# Patient Record
Sex: MALE | Race: WHITE | NOT HISPANIC OR LATINO | Employment: OTHER | URBAN - METROPOLITAN AREA
[De-identification: names, ages, dates, MRNs, and addresses within clinical notes are randomized per-mention and may not be internally consistent; named-entity substitution may affect disease eponyms.]

---

## 2017-03-27 ENCOUNTER — HOSPITAL ENCOUNTER (OUTPATIENT)
Dept: LAB | Facility: HOSPITAL | Age: 57
Setting detail: SPECIMEN
Discharge: HOME | End: 2017-03-27
Attending: INTERNAL MEDICINE | Admitting: INTERNAL MEDICINE

## 2017-03-27 LAB
ALBUMIN SERPL-MCNC: 3.7 G/DL (ref 3.5–4.8)
ALBUMIN/GLOB SERPL: 1 {RATIO} (ref 1–1.7)
ALP SERPL-CCNC: 97 IU/L (ref 32–91)
ALT SERPL-CCNC: 22 IU/L (ref 17–63)
ANION GAP SERPL CALC-SCNC: 15.2 MMOL/L (ref 10–20)
AST SERPL-CCNC: 18 IU/L (ref 15–41)
BILIRUB SERPL-MCNC: 0.5 MG/DL (ref 0.3–1.2)
BUN SERPL-MCNC: 20 MG/DL (ref 8–20)
BUN/CREAT SERPL: 18.2 (ref 6.2–20.3)
CALCIUM SERPL-MCNC: 10.5 MG/DL (ref 8.9–10.3)
CHLORIDE SERPL-SCNC: 98 MMOL/L (ref 101–111)
CHOLEST SERPL-MCNC: 310 MG/DL
CHOLEST/HDLC SERPL: 6.7 {RATIO}
CONV CO2: 28 MMOL/L (ref 22–32)
CONV LDL CHOLESTEROL DIRECT: 204 MG/DL (ref 0–100)
CONV MICROALBUM.,U,RANDOM: 310 MG/L
CONV TOTAL PROTEIN: 7.5 G/DL (ref 6.1–7.9)
CREAT 24H UR-MCNC: 58.5 MG/DL
CREAT UR-MCNC: 1.1 MG/DL (ref 0.7–1.2)
GLOBULIN UR ELPH-MCNC: 3.8 G/DL (ref 2.5–3.8)
GLUCOSE SERPL-MCNC: 268 MG/DL (ref 65–99)
HDLC SERPL-MCNC: 46 MG/DL
LDLC/HDLC SERPL: 4.4 {RATIO}
LIPID INTERPRETATION: ABNORMAL
MICROALBUMIN/CREAT UR: 529.9 UG/MG
POTASSIUM SERPL-SCNC: 4.2 MMOL/L (ref 3.6–5.1)
SODIUM SERPL-SCNC: 137 MMOL/L (ref 136–144)
TRIGL SERPL-MCNC: 370 MG/DL
TSH SERPL-ACNC: 2.47 UIU/ML (ref 0.34–5.6)
VLDLC SERPL CALC-MCNC: 60.3 MG/DL

## 2017-04-03 ENCOUNTER — CONVERSION ENCOUNTER (OUTPATIENT)
Dept: ENDOCRINOLOGY | Facility: CLINIC | Age: 57
End: 2017-04-03

## 2017-05-08 ENCOUNTER — CONVERSION ENCOUNTER (OUTPATIENT)
Dept: ENDOCRINOLOGY | Facility: CLINIC | Age: 57
End: 2017-05-08

## 2017-05-31 ENCOUNTER — CONVERSION ENCOUNTER (OUTPATIENT)
Dept: ENDOCRINOLOGY | Facility: CLINIC | Age: 57
End: 2017-05-31

## 2017-06-19 ENCOUNTER — CONVERSION ENCOUNTER (OUTPATIENT)
Dept: ENDOCRINOLOGY | Facility: CLINIC | Age: 57
End: 2017-06-19

## 2017-07-25 ENCOUNTER — HOSPITAL ENCOUNTER (OUTPATIENT)
Dept: LAB | Facility: HOSPITAL | Age: 57
Setting detail: SPECIMEN
Discharge: HOME | End: 2017-07-25
Attending: INTERNAL MEDICINE | Admitting: INTERNAL MEDICINE

## 2017-07-25 LAB
ALBUMIN SERPL-MCNC: 3.7 G/DL (ref 3.5–4.8)
ALBUMIN/GLOB SERPL: 0.9 {RATIO} (ref 1–1.7)
ALP SERPL-CCNC: 102 IU/L (ref 32–91)
ALT SERPL-CCNC: 34 IU/L (ref 17–63)
ANION GAP SERPL CALC-SCNC: 15.3 MMOL/L (ref 10–20)
AST SERPL-CCNC: 28 IU/L (ref 15–41)
BILIRUB SERPL-MCNC: 0.6 MG/DL (ref 0.3–1.2)
BUN SERPL-MCNC: 25 MG/DL (ref 8–20)
BUN/CREAT SERPL: 22.7 (ref 6.2–20.3)
CALCIUM SERPL-MCNC: 9.6 MG/DL (ref 8.9–10.3)
CHLORIDE SERPL-SCNC: 100 MMOL/L (ref 101–111)
CHOLEST SERPL-MCNC: 178 MG/DL
CHOLEST/HDLC SERPL: 4.8 {RATIO}
CONV CO2: 25 MMOL/L (ref 22–32)
CONV LDL CHOLESTEROL DIRECT: 89 MG/DL (ref 0–100)
CONV TOTAL PROTEIN: 7.7 G/DL (ref 6.1–7.9)
CREAT UR-MCNC: 1.1 MG/DL (ref 0.7–1.2)
GLOBULIN UR ELPH-MCNC: 4 G/DL (ref 2.5–3.8)
GLUCOSE SERPL-MCNC: 217 MG/DL (ref 65–99)
HDLC SERPL-MCNC: 37 MG/DL
LDLC/HDLC SERPL: 2.4 {RATIO}
LIPID INTERPRETATION: ABNORMAL
POTASSIUM SERPL-SCNC: 4.3 MMOL/L (ref 3.6–5.1)
SODIUM SERPL-SCNC: 136 MMOL/L (ref 136–144)
TRIGL SERPL-MCNC: 333 MG/DL
VLDLC SERPL CALC-MCNC: 52.3 MG/DL

## 2017-07-31 ENCOUNTER — CONVERSION ENCOUNTER (OUTPATIENT)
Dept: ENDOCRINOLOGY | Facility: CLINIC | Age: 57
End: 2017-07-31

## 2017-08-01 ENCOUNTER — CONVERSION ENCOUNTER (OUTPATIENT)
Dept: ENDOCRINOLOGY | Facility: CLINIC | Age: 57
End: 2017-08-01

## 2017-11-29 ENCOUNTER — CONVERSION ENCOUNTER (OUTPATIENT)
Dept: ENDOCRINOLOGY | Facility: CLINIC | Age: 57
End: 2017-11-29

## 2018-03-22 ENCOUNTER — CONVERSION ENCOUNTER (OUTPATIENT)
Dept: ENDOCRINOLOGY | Facility: CLINIC | Age: 58
End: 2018-03-22

## 2018-06-06 ENCOUNTER — HOSPITAL ENCOUNTER (OUTPATIENT)
Dept: LAB | Facility: HOSPITAL | Age: 58
Setting detail: SPECIMEN
Discharge: HOME | End: 2018-06-06
Attending: INTERNAL MEDICINE | Admitting: INTERNAL MEDICINE

## 2018-06-13 ENCOUNTER — CONVERSION ENCOUNTER (OUTPATIENT)
Dept: ENDOCRINOLOGY | Facility: CLINIC | Age: 58
End: 2018-06-13

## 2018-08-31 ENCOUNTER — HOSPITAL ENCOUNTER (OUTPATIENT)
Dept: WOUND CARE | Facility: HOSPITAL | Age: 58
Discharge: HOME | End: 2018-08-31
Attending: NURSE PRACTITIONER | Admitting: NURSE PRACTITIONER

## 2018-09-07 ENCOUNTER — HOSPITAL ENCOUNTER (OUTPATIENT)
Dept: WOUND CARE | Facility: HOSPITAL | Age: 58
Discharge: HOME | End: 2018-09-07
Attending: NURSE PRACTITIONER | Admitting: NURSE PRACTITIONER

## 2018-09-14 ENCOUNTER — HOSPITAL ENCOUNTER (OUTPATIENT)
Dept: WOUND CARE | Facility: HOSPITAL | Age: 58
Discharge: HOME | End: 2018-09-14
Attending: NURSE PRACTITIONER | Admitting: NURSE PRACTITIONER

## 2018-09-21 ENCOUNTER — HOSPITAL ENCOUNTER (OUTPATIENT)
Dept: WOUND CARE | Facility: HOSPITAL | Age: 58
Discharge: HOME | End: 2018-09-21
Attending: NURSE PRACTITIONER | Admitting: NURSE PRACTITIONER

## 2018-10-05 ENCOUNTER — HOSPITAL ENCOUNTER (OUTPATIENT)
Dept: WOUND CARE | Facility: HOSPITAL | Age: 58
Discharge: HOME | End: 2018-10-05
Attending: NURSE PRACTITIONER | Admitting: NURSE PRACTITIONER

## 2018-10-26 ENCOUNTER — HOSPITAL ENCOUNTER (OUTPATIENT)
Dept: WOUND CARE | Facility: HOSPITAL | Age: 58
Discharge: HOME | End: 2018-10-26
Attending: NURSE PRACTITIONER | Admitting: NURSE PRACTITIONER

## 2018-11-09 ENCOUNTER — HOSPITAL ENCOUNTER (OUTPATIENT)
Dept: WOUND CARE | Facility: HOSPITAL | Age: 58
Discharge: HOME | End: 2018-11-09
Attending: NURSE PRACTITIONER | Admitting: NURSE PRACTITIONER

## 2018-11-23 ENCOUNTER — HOSPITAL ENCOUNTER (OUTPATIENT)
Dept: WOUND CARE | Facility: HOSPITAL | Age: 58
Discharge: HOME | End: 2018-11-23
Attending: NURSE PRACTITIONER | Admitting: NURSE PRACTITIONER

## 2019-01-02 ENCOUNTER — HOSPITAL ENCOUNTER (OUTPATIENT)
Dept: LAB | Facility: HOSPITAL | Age: 59
Setting detail: SPECIMEN
Discharge: HOME | End: 2019-01-02
Attending: INTERNAL MEDICINE | Admitting: INTERNAL MEDICINE

## 2019-01-02 LAB
ALBUMIN SERPL-MCNC: 3.4 G/DL (ref 3.5–4.8)
ALBUMIN/GLOB SERPL: 0.9 {RATIO} (ref 1–1.7)
ALP SERPL-CCNC: 125 IU/L (ref 32–91)
ALT SERPL-CCNC: 24 IU/L (ref 17–63)
ANION GAP SERPL CALC-SCNC: 22.5 MMOL/L (ref 10–20)
AST SERPL-CCNC: 32 IU/L (ref 15–41)
BILIRUB SERPL-MCNC: 0.2 MG/DL (ref 0.3–1.2)
BUN SERPL-MCNC: 45 MG/DL (ref 8–20)
BUN/CREAT SERPL: 25 (ref 6.2–20.3)
CALCIUM SERPL-MCNC: 9.8 MG/DL (ref 8.9–10.3)
CHLORIDE SERPL-SCNC: 87 MMOL/L (ref 101–111)
CHOLEST SERPL-MCNC: 179 MG/DL
CHOLEST/HDLC SERPL: 4.9 {RATIO}
CONV CO2: 24 MMOL/L (ref 22–32)
CONV LDL CHOLESTEROL DIRECT: 76 MG/DL (ref 0–100)
CONV MICROALBUM.,U,RANDOM: 729 MG/L
CONV TOTAL PROTEIN: 7.1 G/DL (ref 6.1–7.9)
CREAT 24H UR-MCNC: 48.4 MG/DL
CREAT UR-MCNC: 1.8 MG/DL (ref 0.7–1.2)
GLOBULIN UR ELPH-MCNC: 3.7 G/DL (ref 2.5–3.8)
GLUCOSE SERPL-MCNC: ABNORMAL MG/DL (ref 65–99)
HDLC SERPL-MCNC: 37 MG/DL
LDLC/HDLC SERPL: 2.1 {RATIO}
LIPID INTERPRETATION: ABNORMAL
MICROALBUMIN/CREAT UR: 1506.2 UG/MG
POTASSIUM SERPL-SCNC: 4.5 MMOL/L (ref 3.6–5.1)
SODIUM SERPL-SCNC: 129 MMOL/L (ref 136–144)
TRIGL SERPL-MCNC: 712 MG/DL
VLDLC SERPL CALC-MCNC: 66.6 MG/DL

## 2019-01-09 ENCOUNTER — CONVERSION ENCOUNTER (OUTPATIENT)
Dept: ENDOCRINOLOGY | Facility: CLINIC | Age: 59
End: 2019-01-09

## 2019-04-02 ENCOUNTER — HOSPITAL ENCOUNTER (OUTPATIENT)
Dept: LAB | Facility: HOSPITAL | Age: 59
Setting detail: SPECIMEN
Discharge: HOME | End: 2019-04-02
Attending: INTERNAL MEDICINE | Admitting: INTERNAL MEDICINE

## 2019-04-02 LAB
ALBUMIN SERPL-MCNC: 3.6 G/DL (ref 3.5–4.8)
ALBUMIN/GLOB SERPL: 0.9 {RATIO} (ref 1–1.7)
ALP SERPL-CCNC: 103 IU/L (ref 32–91)
ALT SERPL-CCNC: 42 IU/L (ref 17–63)
ANION GAP SERPL CALC-SCNC: 17.2 MMOL/L (ref 10–20)
AST SERPL-CCNC: 32 IU/L (ref 15–41)
BILIRUB SERPL-MCNC: 0.7 MG/DL (ref 0.3–1.2)
BUN SERPL-MCNC: 24 MG/DL (ref 8–20)
BUN/CREAT SERPL: 20 (ref 6.2–20.3)
CALCIUM SERPL-MCNC: 9.8 MG/DL (ref 8.9–10.3)
CHLORIDE SERPL-SCNC: 97 MMOL/L (ref 101–111)
CHOLEST SERPL-MCNC: 173 MG/DL
CHOLEST/HDLC SERPL: 4.8 {RATIO}
CONV CO2: 25 MMOL/L (ref 22–32)
CONV LDL CHOLESTEROL DIRECT: 96 MG/DL (ref 0–100)
CONV TOTAL PROTEIN: 7.5 G/DL (ref 6.1–7.9)
CREAT UR-MCNC: 1.2 MG/DL (ref 0.7–1.2)
GLOBULIN UR ELPH-MCNC: 3.9 G/DL (ref 2.5–3.8)
GLUCOSE SERPL-MCNC: 155 MG/DL (ref 65–99)
HDLC SERPL-MCNC: 36 MG/DL
LDLC/HDLC SERPL: 2.6 {RATIO}
LIPID INTERPRETATION: ABNORMAL
POTASSIUM SERPL-SCNC: 4.2 MMOL/L (ref 3.6–5.1)
SODIUM SERPL-SCNC: 135 MMOL/L (ref 136–144)
TRIGL SERPL-MCNC: 363 MG/DL
VLDLC SERPL CALC-MCNC: 40.9 MG/DL

## 2019-04-09 ENCOUNTER — CONVERSION ENCOUNTER (OUTPATIENT)
Dept: ENDOCRINOLOGY | Facility: CLINIC | Age: 59
End: 2019-04-09

## 2019-05-15 ENCOUNTER — INPATIENT HOSPITAL (AMBULATORY)
Dept: URBAN - METROPOLITAN AREA HOSPITAL 84 | Facility: HOSPITAL | Age: 59
End: 2019-05-15
Payer: COMMERCIAL

## 2019-05-15 ENCOUNTER — INPATIENT HOSPITAL (AMBULATORY)
Dept: URBAN - METROPOLITAN AREA HOSPITAL 84 | Facility: HOSPITAL | Age: 59
End: 2019-05-15

## 2019-05-15 DIAGNOSIS — R93.2 ABNORMAL FINDINGS ON DIAGNOSTIC IMAGING OF LIVER AND BILIARY: ICD-10-CM

## 2019-05-15 DIAGNOSIS — K29.60 OTHER GASTRITIS WITHOUT BLEEDING: ICD-10-CM

## 2019-05-15 DIAGNOSIS — R74.8 ABNORMAL LEVELS OF OTHER SERUM ENZYMES: ICD-10-CM

## 2019-05-15 DIAGNOSIS — K21.0 GASTRO-ESOPHAGEAL REFLUX DISEASE WITH ESOPHAGITIS: ICD-10-CM

## 2019-05-15 DIAGNOSIS — K31.84 GASTROPARESIS: ICD-10-CM

## 2019-05-15 DIAGNOSIS — R11.2 NAUSEA WITH VOMITING, UNSPECIFIED: ICD-10-CM

## 2019-05-15 PROCEDURE — 99252 IP/OBS CONSLTJ NEW/EST SF 35: CPT | Mod: 25 | Performed by: NURSE PRACTITIONER

## 2019-05-15 PROCEDURE — 43239 EGD BIOPSY SINGLE/MULTIPLE: CPT | Performed by: INTERNAL MEDICINE

## 2019-05-16 ENCOUNTER — INPATIENT HOSPITAL (AMBULATORY)
Dept: URBAN - METROPOLITAN AREA HOSPITAL 84 | Facility: HOSPITAL | Age: 59
End: 2019-05-16

## 2019-05-16 DIAGNOSIS — R74.8 ABNORMAL LEVELS OF OTHER SERUM ENZYMES: ICD-10-CM

## 2019-05-16 DIAGNOSIS — R11.2 NAUSEA WITH VOMITING, UNSPECIFIED: ICD-10-CM

## 2019-05-16 DIAGNOSIS — R93.2 ABNORMAL FINDINGS ON DIAGNOSTIC IMAGING OF LIVER AND BILIARY: ICD-10-CM

## 2019-05-16 DIAGNOSIS — K29.60 OTHER GASTRITIS WITHOUT BLEEDING: ICD-10-CM

## 2019-05-16 DIAGNOSIS — K31.84 GASTROPARESIS: ICD-10-CM

## 2019-05-16 PROCEDURE — 99232 SBSQ HOSP IP/OBS MODERATE 35: CPT | Performed by: NURSE PRACTITIONER

## 2019-05-23 ENCOUNTER — ON CAMPUS - OUTPATIENT (AMBULATORY)
Dept: URBAN - METROPOLITAN AREA HOSPITAL 85 | Facility: HOSPITAL | Age: 59
End: 2019-05-23

## 2019-05-23 DIAGNOSIS — I25.10 ATHEROSCLEROTIC HEART DISEASE OF NATIVE CORONARY ARTERY WITH: ICD-10-CM

## 2019-05-23 DIAGNOSIS — E11.9 TYPE 2 DIABETES MELLITUS WITHOUT COMPLICATIONS: ICD-10-CM

## 2019-05-23 DIAGNOSIS — R79.89 OTHER SPECIFIED ABNORMAL FINDINGS OF BLOOD CHEMISTRY: ICD-10-CM

## 2019-05-23 DIAGNOSIS — K21.9 GASTRO-ESOPHAGEAL REFLUX DISEASE WITHOUT ESOPHAGITIS: ICD-10-CM

## 2019-05-23 DIAGNOSIS — Z95.5 PRESENCE OF CORONARY ANGIOPLASTY IMPLANT AND GRAFT: ICD-10-CM

## 2019-05-23 DIAGNOSIS — R11.2 NAUSEA WITH VOMITING, UNSPECIFIED: ICD-10-CM

## 2019-05-23 PROCEDURE — 99242 OFF/OP CONSLTJ NEW/EST SF 20: CPT | Performed by: NURSE PRACTITIONER

## 2019-06-04 VITALS
DIASTOLIC BLOOD PRESSURE: 84 MMHG | BODY MASS INDEX: 26.39 KG/M2 | DIASTOLIC BLOOD PRESSURE: 85 MMHG | BODY MASS INDEX: 26.39 KG/M2 | SYSTOLIC BLOOD PRESSURE: 117 MMHG | BODY MASS INDEX: 27.77 KG/M2 | HEART RATE: 112 BPM | HEIGHT: 73 IN | BODY MASS INDEX: 27.05 KG/M2 | HEART RATE: 99 BPM | HEART RATE: 72 BPM | SYSTOLIC BLOOD PRESSURE: 130 MMHG | WEIGHT: 209.5 LBS | DIASTOLIC BLOOD PRESSURE: 84 MMHG | SYSTOLIC BLOOD PRESSURE: 124 MMHG | HEIGHT: 73 IN | WEIGHT: 200 LBS | HEART RATE: 105 BPM | SYSTOLIC BLOOD PRESSURE: 122 MMHG | SYSTOLIC BLOOD PRESSURE: 136 MMHG | HEIGHT: 73 IN | WEIGHT: 195 LBS | SYSTOLIC BLOOD PRESSURE: 110 MMHG | OXYGEN SATURATION: 98 % | DIASTOLIC BLOOD PRESSURE: 78 MMHG | DIASTOLIC BLOOD PRESSURE: 90 MMHG | DIASTOLIC BLOOD PRESSURE: 72 MMHG | WEIGHT: 201 LBS | DIASTOLIC BLOOD PRESSURE: 80 MMHG | RESPIRATION RATE: 16 BRPM | WEIGHT: 200 LBS | HEIGHT: 73 IN | BODY MASS INDEX: 27.31 KG/M2 | DIASTOLIC BLOOD PRESSURE: 84 MMHG | SYSTOLIC BLOOD PRESSURE: 96 MMHG | DIASTOLIC BLOOD PRESSURE: 72 MMHG | HEART RATE: 76 BPM | WEIGHT: 212 LBS | BODY MASS INDEX: 27.43 KG/M2 | BODY MASS INDEX: 28.1 KG/M2 | WEIGHT: 185 LBS | WEIGHT: 200 LBS | OXYGEN SATURATION: 96 % | SYSTOLIC BLOOD PRESSURE: 119 MMHG | DIASTOLIC BLOOD PRESSURE: 80 MMHG | HEART RATE: 103 BPM | HEIGHT: 73 IN | OXYGEN SATURATION: 97 % | SYSTOLIC BLOOD PRESSURE: 125 MMHG | SYSTOLIC BLOOD PRESSURE: 125 MMHG | WEIGHT: 207 LBS | OXYGEN SATURATION: 97 % | BODY MASS INDEX: 26.64 KG/M2 | RESPIRATION RATE: 16 BRPM | HEART RATE: 94 BPM | SYSTOLIC BLOOD PRESSURE: 130 MMHG | HEART RATE: 69 BPM | RESPIRATION RATE: 18 BRPM | BODY MASS INDEX: 26.51 KG/M2 | WEIGHT: 207 LBS | RESPIRATION RATE: 16 BRPM | HEART RATE: 66 BPM | OXYGEN SATURATION: 99 % | HEART RATE: 103 BPM | DIASTOLIC BLOOD PRESSURE: 73 MMHG | WEIGHT: 205 LBS

## 2019-06-28 DIAGNOSIS — E55.9 VITAMIN D DEFICIENCY: ICD-10-CM

## 2019-06-28 DIAGNOSIS — I10 HYPERTENSION, UNSPECIFIED TYPE: ICD-10-CM

## 2019-06-28 DIAGNOSIS — E78.2 HYPERLIPIDEMIA, MIXED: Primary | ICD-10-CM

## 2019-06-28 DIAGNOSIS — E11.49 TYPE 2 DIABETES MELLITUS WITH OTHER DIABETIC NEUROLOGICAL COMPLICATION (HCC): ICD-10-CM

## 2019-06-28 PROBLEM — R05.3 CHRONIC COUGH: Status: ACTIVE | Noted: 2018-03-22

## 2019-06-28 PROBLEM — I25.10 ATHEROSCLEROTIC HEART DISEASE OF NATIVE CORONARY ARTERY WITHOUT ANGINA PECTORIS: Status: ACTIVE | Noted: 2017-05-08

## 2019-06-28 PROBLEM — Z95.2 HISTORY OF PROSTHETIC HEART VALVE: Status: ACTIVE | Noted: 2017-05-08

## 2019-06-28 PROBLEM — L97.509 DIABETIC FOOT ULCER: Status: ACTIVE | Noted: 2019-04-09

## 2019-06-28 PROBLEM — T78.40XA ALLERGIC STATE: Status: ACTIVE | Noted: 2019-06-28

## 2019-06-28 PROBLEM — R11.2 NAUSEA AND VOMITING: Status: ACTIVE | Noted: 2019-05-14

## 2019-06-28 PROBLEM — E11.65 TYPE 2 DIABETES MELLITUS WITH HYPERGLYCEMIA (HCC): Status: ACTIVE | Noted: 2018-06-13

## 2019-06-28 PROBLEM — J45.909 ASTHMA: Status: ACTIVE | Noted: 2018-03-22

## 2019-06-28 PROBLEM — M53.80 OTHER SPECIFIED DORSOPATHIES, SITE UNSPECIFIED: Status: ACTIVE | Noted: 2019-06-28

## 2019-06-28 PROBLEM — M19.90 DEGENERATIVE JOINT DISEASE: Status: ACTIVE | Noted: 2019-06-28

## 2019-06-28 PROBLEM — E11.621 DIABETIC FOOT ULCER (HCC): Status: ACTIVE | Noted: 2019-04-09

## 2019-07-02 ENCOUNTER — LAB (OUTPATIENT)
Dept: LAB | Facility: HOSPITAL | Age: 59
End: 2019-07-02

## 2019-07-02 DIAGNOSIS — E11.49 TYPE 2 DIABETES MELLITUS WITH OTHER DIABETIC NEUROLOGICAL COMPLICATION (HCC): ICD-10-CM

## 2019-07-02 DIAGNOSIS — E55.9 VITAMIN D DEFICIENCY: ICD-10-CM

## 2019-07-02 DIAGNOSIS — E78.2 HYPERLIPIDEMIA, MIXED: ICD-10-CM

## 2019-07-02 DIAGNOSIS — I10 HYPERTENSION, UNSPECIFIED TYPE: ICD-10-CM

## 2019-07-02 LAB
ALBUMIN SERPL-MCNC: 3.7 G/DL (ref 3.5–4.8)
ALBUMIN/GLOB SERPL: 1 G/DL (ref 1–1.7)
ALP SERPL-CCNC: 94 U/L (ref 32–91)
ALT SERPL W P-5'-P-CCNC: 29 U/L (ref 17–63)
ANION GAP SERPL CALCULATED.3IONS-SCNC: 19.6 MMOL/L (ref 10–20)
ARTICHOKE IGE QN: 127 MG/DL (ref 0–100)
AST SERPL-CCNC: 26 U/L (ref 15–41)
BILIRUB SERPL-MCNC: 0.7 MG/DL (ref 0.3–1.2)
BUN BLD-MCNC: 12 MG/DL (ref 8–20)
BUN/CREAT SERPL: 9.2 (ref 6.2–20.3)
CALCIUM SPEC-SCNC: 9.8 MG/DL (ref 8.9–10.3)
CHLORIDE SERPL-SCNC: 100 MMOL/L (ref 101–111)
CHOLEST SERPL-MCNC: 214 MG/DL
CO2 SERPL-SCNC: 22 MMOL/L (ref 22–32)
CREAT BLD-MCNC: 1.3 MG/DL (ref 0.7–1.2)
GFR SERPL CREATININE-BSD FRML MDRD: 56 ML/MIN/1.73
GLOBULIN UR ELPH-MCNC: 3.7 GM/DL (ref 2.5–3.8)
GLUCOSE BLD-MCNC: 116 MG/DL (ref 65–99)
HBA1C MFR BLD: 8.2 % (ref 3.5–5.6)
HDLC SERPL QL: 5.49
HDLC SERPL-MCNC: 39 MG/DL
LDLC/HDLC SERPL: 2.57 {RATIO}
POTASSIUM BLD-SCNC: 4.6 MMOL/L (ref 3.6–5.1)
PROT SERPL-MCNC: 7.4 G/DL (ref 6.1–7.9)
SODIUM BLD-SCNC: 137 MMOL/L (ref 136–144)
TRIGL SERPL-MCNC: 374 MG/DL
VLDLC SERPL-MCNC: 74.8 MG/DL

## 2019-07-02 PROCEDURE — 80053 COMPREHEN METABOLIC PANEL: CPT | Performed by: INTERNAL MEDICINE

## 2019-07-02 PROCEDURE — 80061 LIPID PANEL: CPT | Performed by: INTERNAL MEDICINE

## 2019-07-02 PROCEDURE — 83036 HEMOGLOBIN GLYCOSYLATED A1C: CPT | Performed by: INTERNAL MEDICINE

## 2019-07-02 PROCEDURE — 36415 COLL VENOUS BLD VENIPUNCTURE: CPT

## 2019-07-02 PROCEDURE — 82306 VITAMIN D 25 HYDROXY: CPT | Performed by: INTERNAL MEDICINE

## 2019-07-03 LAB — 25(OH)D3 SERPL-MCNC: 20.4 NG/ML (ref 30–100)

## 2019-07-10 ENCOUNTER — OFFICE VISIT (OUTPATIENT)
Dept: ENDOCRINOLOGY | Facility: CLINIC | Age: 59
End: 2019-07-10

## 2019-07-10 VITALS
WEIGHT: 205 LBS | HEIGHT: 73 IN | OXYGEN SATURATION: 97 % | SYSTOLIC BLOOD PRESSURE: 122 MMHG | BODY MASS INDEX: 27.17 KG/M2 | HEART RATE: 110 BPM | DIASTOLIC BLOOD PRESSURE: 68 MMHG

## 2019-07-10 DIAGNOSIS — E11.49 TYPE 2 DIABETES MELLITUS WITH OTHER DIABETIC NEUROLOGICAL COMPLICATION (HCC): Primary | ICD-10-CM

## 2019-07-10 DIAGNOSIS — E78.2 HYPERLIPIDEMIA, MIXED: ICD-10-CM

## 2019-07-10 DIAGNOSIS — E55.9 VITAMIN D DEFICIENCY: ICD-10-CM

## 2019-07-10 LAB — GLUCOSE BLDC GLUCOMTR-MCNC: 215 MG/DL (ref 70–130)

## 2019-07-10 PROCEDURE — 99214 OFFICE O/P EST MOD 30 MIN: CPT | Performed by: INTERNAL MEDICINE

## 2019-07-10 PROCEDURE — 82962 GLUCOSE BLOOD TEST: CPT | Performed by: INTERNAL MEDICINE

## 2019-07-10 RX ORDER — LANCETS 33 GAUGE
EACH MISCELLANEOUS
COMMUNITY
Start: 2017-04-03 | End: 2020-07-14 | Stop reason: CLARIF

## 2019-07-10 RX ORDER — METOPROLOL TARTRATE 50 MG/1
50 TABLET, FILM COATED ORAL 2 TIMES DAILY
Refills: 11 | COMMUNITY
Start: 2019-06-25 | End: 2019-11-26 | Stop reason: SDUPTHER

## 2019-07-10 RX ORDER — ATORVASTATIN CALCIUM 20 MG/1
20 TABLET, FILM COATED ORAL EVERY EVENING
Refills: 12 | COMMUNITY
Start: 2019-04-26 | End: 2019-07-19 | Stop reason: SDUPTHER

## 2019-07-10 RX ORDER — FLURBIPROFEN SODIUM 0.3 MG/ML
SOLUTION/ DROPS OPHTHALMIC
Refills: 3 | COMMUNITY
Start: 2019-04-10 | End: 2020-04-20 | Stop reason: SDUPTHER

## 2019-07-10 RX ORDER — DAPAGLIFLOZIN 10 MG/1
1 TABLET, FILM COATED ORAL
Refills: 4 | COMMUNITY
Start: 2019-07-04 | End: 2020-03-10

## 2019-07-10 RX ORDER — OMEPRAZOLE 40 MG/1
40 CAPSULE, DELAYED RELEASE ORAL 2 TIMES DAILY
Refills: 3 | COMMUNITY
Start: 2019-06-25 | End: 2019-09-22 | Stop reason: SDUPTHER

## 2019-07-10 RX ORDER — METOCLOPRAMIDE 10 MG/1
10 TABLET ORAL 3 TIMES DAILY
Refills: 6 | COMMUNITY
Start: 2019-06-30 | End: 2020-07-06

## 2019-07-10 NOTE — PATIENT INSTRUCTIONS
Restart otc vit D 5,000 units daily.  Increase Novolin 70/30 to 64 units before suppe r& bedtime.  Call if blood sugars are running under 100 or over 200.  F/u in 6 months, with labs prior.

## 2019-07-11 NOTE — PROGRESS NOTES
[facility]        Patient Care Team:  Al Kimbrough MD as PCP - General  Al Kimbrough MD as PCP - Family Medicine    Chief Complaint:    Chief Complaint   Patient presents with   • Diabetes     type 2         Subjective   Here for diabetes f/u  Blood sugars higher @ bedtime  Problem with the cost of insulin. Taking [employer] brand now  Hospitalized in June with nausea  Still taking metformin. Off vit D since hospital stay  Exercise program: walking    Interval History:     Patient Complaints: nausea better, but still there  Patient Denies:  hypoglycemia  History taken from: patient    Review of Systems:   Review of Systems   Eyes: Negative for blurred vision.   Gastrointestinal: Positive for nausea.   Endocrine: Negative for polyuria.   Neurological: Negative for headache.     Lost  4 lb since last visit    Objective     Vital Signs  Heart Rate:  [110] 110  BP: (122)/(68) 122/68    Physical Exam:     General Appearance:    Alert, cooperative, in no acute distress   Head:    Normocephalic, without obvious abnormality, atraumatic   Eyes:            Lids and lashes normal, conjunctivae and sclerae normal, no   icterus, no pallor, corneas clear, PERRLA   Throat:   No oral lesions,  oral mucosa moist   Neck:   No adenopathy, supple,  no thyromegaly, no   carotid bruit   Lungs:     Clear    Heart:    Regular rhythm and normal rate   Chest Wall:    No abnormalities observed   Abdomen:     Normal bowel sounds, soft                 Extremities:   Moves all extremities well, no edema, hammer toes, plantar calluses               Pulses:   Pulses palpable and equal bilaterally   Skin:   Dry.    Neurologic:  DTR absent, able to feel the 10g monofilament          Results Review:    I have reviewed the patient's new clinical results, labs & imaging.    Medication Review:   Prior to Admission medications    Medication Sig Start Date End Date Taking? Authorizing Provider   atorvastatin (LIPITOR) 20  MG tablet Take 20 mg by mouth Every Evening. 1 daily 4/26/19  Yes Jaylene Bergre MD B-D UF III MINI PEN NEEDLES 31G X 5 MM misc USE WITH INSULIN PEN 4 TIMES A DAY DX E11.65 4/10/19  Yes Jaylene Berger MD   Cholecalciferol 5000 units tablet Take  by mouth. One daily   Yes Jaylene Berger MD   FARXIGA 10 MG tablet Take 1 tablet by mouth Every Morning Before Breakfast. 1 daily 7/4/19  Yes Jaylene Berger MD   glucose blood (ONE TOUCH ULTRA TEST) test strip ONETOUCH ULTRA BLUE STRP 11/4/18  Yes Jaylene Berger MD   Insulin NPH Isophane & Regular (NOVOLIN 70/30 FLEXPEN RELION) (70-30) 100 UNIT/ML suspension pen-injector Inject 60 Units under the skin into the appropriate area as directed 4 (Four) Times a Day. 5/10/18  Yes Jaylene Berger MD   Insulin Pen Needle (B-D UF III MINI PEN NEEDLES) 31G X 5 MM misc BD PEN NEEDLE MINI U/F 31G X 5 MM 3/9/15  Yes Jaylene Berger MD   metoclopramide (REGLAN) 10 MG tablet Take one three times daily before meals 6/30/19  Yes Jaylene Berger MD   metoprolol tartrate (LOPRESSOR) 50 MG tablet Take 50 mg by mouth 2 (Two) Times a Day. Take one twice daily 6/25/19  Yes Jaylene Berger MD   omeprazole (priLOSEC) 40 MG capsule Take 40 mg by mouth 2 (Two) Times a Day. Twice daily 6/25/19  Yes Provider, Historical, MD   ONETOUCH DELICA LANCETS 33G misc KATERINE DELICA LANCETS 33G 4/3/17  Yes Jaylene Berger MD       Lab Results (most recent)     Procedure Component Value Units Date/Time    POC Glucose Fingerstick [[id number]]  (Abnormal) Collected:  07/10/19 1247    Specimen:  Blood Updated:  07/10/19 1247     Glucose 215 mg/dL      Comment: ate@ 9pm last night, 16 hours ago, insulin@ 10am this morning               Lab Results   Component Value Date    HGBA1C 8.2 (H) 07/02/2019    HGBA1C 7.4 (H) 05/23/2019    HGBA1C 7.1 (H) 05/15/2019      Lab Results   Component Value Date    GLUCOSE 116 (H) 07/02/2019    BUN 12 07/02/2019     CREATININE 1.30 (H) 07/02/2019    EGFRIFNONA 56 (L) 07/02/2019    BCR 9.2 07/02/2019    K 4.6 07/02/2019    CO2 22.0 07/02/2019    CALCIUM 9.8 07/02/2019    ALBUMIN 3.70 07/02/2019    LABIL2 0.8 (L) 05/26/2019    AST 26 07/02/2019    ALT 29 07/02/2019    CHOL 214 (H) 07/02/2019     (H) 07/02/2019    HDL 39 07/02/2019    TRIG 374 (H) 07/02/2019     Lab Results   Component Value Date    TSH 2.47 03/27/2017    [id number] 20.4 (L) 07/02/2019       Assessment/Plan     Chao was seen today for diabetes.    Diagnoses and all orders for this visit:    Type 2 diabetes mellitus with other diabetic neurological complication (CMS/[facility])  -     POC Glucose Fingerstick  -     Hemoglobin A1c; Future    Vitamin D deficiency  -     Vitamin D 25 Hydroxy; Future    Hyperlipidemia, mixed      Restart otc vit D 5,000 units daily.  Increase Novolin 70/30 to 64 units before supper & bedtime.  Decrease metformin to one @ breakfast & supper to see if helps decrease nausea.  Call if blood sugars are running under 100 or over 200.        Radha Manriquez MD  07/10/19  11:04 PM

## 2019-07-15 ENCOUNTER — OFFICE VISIT (OUTPATIENT)
Dept: FAMILY MEDICINE CLINIC | Facility: CLINIC | Age: 59
End: 2019-07-15

## 2019-07-15 VITALS
DIASTOLIC BLOOD PRESSURE: 78 MMHG | RESPIRATION RATE: 20 BRPM | OXYGEN SATURATION: 97 % | HEART RATE: 100 BPM | SYSTOLIC BLOOD PRESSURE: 114 MMHG | WEIGHT: 206.8 LBS | HEIGHT: 73 IN | BODY MASS INDEX: 27.41 KG/M2 | TEMPERATURE: 98.1 F

## 2019-07-15 DIAGNOSIS — K31.84 GASTROPARESIS: Primary | ICD-10-CM

## 2019-07-15 DIAGNOSIS — E11.40 TYPE 2 DIABETES MELLITUS WITH DIABETIC NEUROPATHY, WITH LONG-TERM CURRENT USE OF INSULIN (HCC): ICD-10-CM

## 2019-07-15 DIAGNOSIS — Z79.4 TYPE 2 DIABETES MELLITUS WITH DIABETIC NEUROPATHY, WITH LONG-TERM CURRENT USE OF INSULIN (HCC): ICD-10-CM

## 2019-07-15 PROCEDURE — 99213 OFFICE O/P EST LOW 20 MIN: CPT | Performed by: FAMILY MEDICINE

## 2019-07-15 RX ORDER — ONDANSETRON HYDROCHLORIDE 8 MG/1
8 TABLET, FILM COATED ORAL EVERY 8 HOURS PRN
Qty: 90 TABLET | Refills: 1 | Status: SHIPPED | OUTPATIENT
Start: 2019-07-15 | End: 2019-08-14

## 2019-07-15 NOTE — ASSESSMENT & PLAN NOTE
Diabetes is unchanged.   Continue current treatment regimen.  Dietary recommendations for ADA diet.  Regular aerobic exercise.  Diabetes will be reassessed in 1 year     Patient followed by Dr. Peace for his diabetes.  Last A1c was around 8%.  Continue to try and improve on this weekly..

## 2019-07-15 NOTE — ASSESSMENT & PLAN NOTE
Patient will move his Reglan to early morning with the first dose being somewhere around 5:56 in the morning when he gets up to take his insulin.  His next dose to be before his first meal of the day which is usually around noon.  His other 2 doses will be before his evening meal and also then again at bedtime.  If this seems to help we will possibly drop some of the evening doses back.

## 2019-07-15 NOTE — PROGRESS NOTES
Rooming Tab(CC,VS,Pt Hx,Fall Screen)  Chief Complaint   Patient presents with   • GI Problem     6 week f/u of gastroparesis       Subjective   Patient is here for follow-up on gastroparesis.  He has diabetes.  After his last visit we started him on Reglan 10 mg before meals and at bedtime.  He states he is better but not completely back to normal yet.  He is wondering if maybe Zofran would help.  We were going to use that the last time and forgot to get him his prescription for it.  I have reviewed and updated his medications, medical history and problem list during today's office visit.     Patient Care Team:  Al Kimbrough MD as PCP - General  Al Kimbrough MD as PCP - Family Medicine    Problem List Tab  Medications Tab  Synopsis Tab  Chart Review Tab  Care Everywhere Tab  Immunizations Tab  Patient History Tab    Social History     Tobacco Use   • Smoking status: Former Smoker   Substance Use Topics   • Alcohol use: Yes     Comment: occasionally       Review of Systems   Constitutional: Negative.  Negative for appetite change, diaphoresis, fatigue, fever and unexpected weight loss.   HENT: Negative for congestion, sinus pressure and sore throat.    Eyes: Positive for blurred vision. Negative for double vision and itching.        Diabetic exam ok.   Respiratory: Negative for cough and shortness of breath.    Cardiovascular: Negative for chest pain and palpitations.   Genitourinary: Positive for frequency and nocturia. Negative for urinary incontinence and difficulty urinating.   Musculoskeletal: Negative for arthralgias, back pain, joint swelling, myalgias and neck pain.   Skin: Negative for dry skin and rash.   Allergic/Immunologic: Negative for environmental allergies.   Neurological: Negative for dizziness, tremors, syncope, headache and memory problem.   Hematological: Does not bruise/bleed easily.   Psychiatric/Behavioral: Negative for depressed mood. The patient is not nervous/anxious.    All  "other systems reviewed and are negative.      Objective     Rooming Tab(CC,VS,Pt Hx,Fall Screen)  /78 (BP Location: Right arm, Patient Position: Sitting, Cuff Size: Adult)   Pulse 100   Temp 98.1 °F (36.7 °C) (Oral)   Resp 20   Ht 185.4 cm (73\")   Wt 93.8 kg (206 lb 12.8 oz)   SpO2 97%   BMI 27.28 kg/m²     Body mass index is 27.28 kg/m².    Physical Exam   Constitutional: He is oriented to person, place, and time. He appears well-developed and well-nourished.   HENT:   Head: Normocephalic and atraumatic.   Right Ear: External ear normal.   Left Ear: External ear normal.   Nose: Nose normal.   Mouth/Throat: Oropharynx is clear and moist. No oropharyngeal exudate.   Eyes: Conjunctivae and EOM are normal. Pupils are equal, round, and reactive to light. Right eye exhibits no discharge. Left eye exhibits no discharge. No scleral icterus.   Neck: Normal range of motion. Neck supple. No JVD present. No thyromegaly present.   Cardiovascular: Normal rate, regular rhythm, normal heart sounds and intact distal pulses.   No murmur heard.  Pulmonary/Chest: Breath sounds normal. No respiratory distress. He has no wheezes. He has no rales. He exhibits no tenderness.   Abdominal: Soft. Bowel sounds are normal. He exhibits no distension. There is no tenderness.   Genitourinary: Rectal exam shows guaiac negative stool.   Musculoskeletal: Normal range of motion. He exhibits no edema, tenderness or deformity.   Lymphadenopathy:     He has no cervical adenopathy.   Neurological: He is alert and oriented to person, place, and time.   Skin: Skin is warm and dry.   Psychiatric: He has a normal mood and affect. His behavior is normal. Judgment and thought content normal.        Statin Choice Calculator  Data Reviewed:               Lab Results   Component Value Date    BUN 12 07/02/2019    CREATININE 1.30 (H) 07/02/2019    EGFRIFNONA 56 (L) 07/02/2019     07/02/2019    K 4.6 07/02/2019     (L) 07/02/2019    CALCIUM " 9.8 07/02/2019    ALBUMIN 3.70 07/02/2019    BILITOT 0.7 07/02/2019    ALKPHOS 94 (H) 07/02/2019    AST 26 07/02/2019    ALT 29 07/02/2019    TRIG 374 (H) 07/02/2019    HDL 39 07/02/2019    VLDL 74.8 07/02/2019     (H) 07/02/2019    LDLHDL 2.57 07/02/2019    WBC 14.1 (H) 05/26/2019    RBC 4.89 05/26/2019    HCT 44.1 05/26/2019    MCV 90.2 05/26/2019    MCH 30.7 05/26/2019    QZNI20TX 20.4 (L) 07/02/2019      Assessment/Plan   Order Review Tab  Health Maintenance Tab  Patient Plan/Order Tab  Diagnoses and all orders for this visit:    1. Gastroparesis (Primary)  Assessment & Plan:  Patient will move his Reglan to early morning with the first dose being somewhere around 5:56 in the morning when he gets up to take his insulin.  His next dose to be before his first meal of the day which is usually around noon.  His other 2 doses will be before his evening meal and also then again at bedtime.  If this seems to help we will possibly drop some of the evening doses back.      2. Type 2 diabetes mellitus with diabetic neuropathy, with long-term current use of insulin (CMS/[facility])  Assessment & Plan:  Diabetes is unchanged.   Continue current treatment regimen.  Dietary recommendations for ADA diet.  Regular aerobic exercise.  Diabetes will be reassessed in 1 year     Patient followed by Dr. Peace for his diabetes.  Last A1c was around 8%.  Continue to try and improve on this weekly..        Wrapup Tab  Return in about 6 months (around 1/15/2020) for Recheck.

## 2019-07-17 ENCOUNTER — OFFICE (AMBULATORY)
Dept: URBAN - METROPOLITAN AREA CLINIC 64 | Facility: CLINIC | Age: 59
End: 2019-07-17

## 2019-07-17 VITALS
HEIGHT: 73 IN | DIASTOLIC BLOOD PRESSURE: 76 MMHG | SYSTOLIC BLOOD PRESSURE: 128 MMHG | HEART RATE: 102 BPM | WEIGHT: 209 LBS

## 2019-07-17 DIAGNOSIS — R74.8 ABNORMAL LEVELS OF OTHER SERUM ENZYMES: ICD-10-CM

## 2019-07-17 DIAGNOSIS — Z12.11 ENCOUNTER FOR SCREENING FOR MALIGNANT NEOPLASM OF COLON: ICD-10-CM

## 2019-07-17 DIAGNOSIS — K76.0 FATTY (CHANGE OF) LIVER, NOT ELSEWHERE CLASSIFIED: ICD-10-CM

## 2019-07-17 DIAGNOSIS — Z83.71 FAMILY HISTORY OF COLONIC POLYPS: ICD-10-CM

## 2019-07-17 DIAGNOSIS — K21.9 GASTRO-ESOPHAGEAL REFLUX DISEASE WITHOUT ESOPHAGITIS: ICD-10-CM

## 2019-07-17 DIAGNOSIS — R11.2 NAUSEA WITH VOMITING, UNSPECIFIED: ICD-10-CM

## 2019-07-17 PROCEDURE — 99214 OFFICE O/P EST MOD 30 MIN: CPT | Performed by: NURSE PRACTITIONER

## 2019-07-19 RX ORDER — ATORVASTATIN CALCIUM 20 MG/1
20 TABLET, FILM COATED ORAL EVERY EVENING
Qty: 30 TABLET | Refills: 3 | Status: ON HOLD | OUTPATIENT
Start: 2019-07-19 | End: 2019-12-11 | Stop reason: SDUPTHER

## 2019-07-22 RX ORDER — METFORMIN HYDROCHLORIDE 500 MG/1
TABLET, EXTENDED RELEASE ORAL
Qty: 270 TABLET | Refills: 3 | Status: SHIPPED | OUTPATIENT
Start: 2019-07-22 | End: 2019-11-26

## 2019-08-20 ENCOUNTER — OFFICE (AMBULATORY)
Dept: URBAN - METROPOLITAN AREA PATHOLOGY 4 | Facility: PATHOLOGY | Age: 59
End: 2019-08-20
Payer: COMMERCIAL

## 2019-08-20 ENCOUNTER — ON CAMPUS - OUTPATIENT (AMBULATORY)
Dept: URBAN - METROPOLITAN AREA HOSPITAL 2 | Facility: HOSPITAL | Age: 59
End: 2019-08-20
Payer: COMMERCIAL

## 2019-08-20 VITALS
DIASTOLIC BLOOD PRESSURE: 76 MMHG | SYSTOLIC BLOOD PRESSURE: 145 MMHG | SYSTOLIC BLOOD PRESSURE: 134 MMHG | WEIGHT: 207 LBS | DIASTOLIC BLOOD PRESSURE: 66 MMHG | DIASTOLIC BLOOD PRESSURE: 78 MMHG | HEART RATE: 115 BPM | SYSTOLIC BLOOD PRESSURE: 118 MMHG | SYSTOLIC BLOOD PRESSURE: 161 MMHG | DIASTOLIC BLOOD PRESSURE: 63 MMHG | SYSTOLIC BLOOD PRESSURE: 108 MMHG | DIASTOLIC BLOOD PRESSURE: 74 MMHG | OXYGEN SATURATION: 94 % | DIASTOLIC BLOOD PRESSURE: 68 MMHG | DIASTOLIC BLOOD PRESSURE: 64 MMHG | OXYGEN SATURATION: 97 % | SYSTOLIC BLOOD PRESSURE: 113 MMHG | HEART RATE: 98 BPM | DIASTOLIC BLOOD PRESSURE: 96 MMHG | HEART RATE: 96 BPM | HEART RATE: 108 BPM | SYSTOLIC BLOOD PRESSURE: 131 MMHG | OXYGEN SATURATION: 96 % | DIASTOLIC BLOOD PRESSURE: 61 MMHG | OXYGEN SATURATION: 100 % | DIASTOLIC BLOOD PRESSURE: 86 MMHG | HEART RATE: 110 BPM | SYSTOLIC BLOOD PRESSURE: 133 MMHG | DIASTOLIC BLOOD PRESSURE: 62 MMHG | DIASTOLIC BLOOD PRESSURE: 71 MMHG | HEART RATE: 100 BPM | HEART RATE: 109 BPM | DIASTOLIC BLOOD PRESSURE: 84 MMHG | TEMPERATURE: 98.2 F | DIASTOLIC BLOOD PRESSURE: 52 MMHG | SYSTOLIC BLOOD PRESSURE: 117 MMHG | OXYGEN SATURATION: 98 % | SYSTOLIC BLOOD PRESSURE: 127 MMHG | RESPIRATION RATE: 16 BRPM | OXYGEN SATURATION: 90 % | OXYGEN SATURATION: 92 % | HEART RATE: 104 BPM | SYSTOLIC BLOOD PRESSURE: 140 MMHG | SYSTOLIC BLOOD PRESSURE: 146 MMHG | HEART RATE: 101 BPM | HEIGHT: 73 IN | SYSTOLIC BLOOD PRESSURE: 136 MMHG | DIASTOLIC BLOOD PRESSURE: 58 MMHG | HEART RATE: 102 BPM | RESPIRATION RATE: 18 BRPM | SYSTOLIC BLOOD PRESSURE: 122 MMHG | OXYGEN SATURATION: 95 % | SYSTOLIC BLOOD PRESSURE: 101 MMHG | SYSTOLIC BLOOD PRESSURE: 110 MMHG

## 2019-08-20 DIAGNOSIS — D12.3 BENIGN NEOPLASM OF TRANSVERSE COLON: ICD-10-CM

## 2019-08-20 DIAGNOSIS — D12.5 BENIGN NEOPLASM OF SIGMOID COLON: ICD-10-CM

## 2019-08-20 DIAGNOSIS — Z12.11 ENCOUNTER FOR SCREENING FOR MALIGNANT NEOPLASM OF COLON: ICD-10-CM

## 2019-08-20 DIAGNOSIS — D12.2 BENIGN NEOPLASM OF ASCENDING COLON: ICD-10-CM

## 2019-08-20 LAB
GI HISTOLOGY: A. UNSPECIFIED: (no result)
GI HISTOLOGY: B. UNSPECIFIED: (no result)
GI HISTOLOGY: C. UNSPECIFIED: (no result)
GI HISTOLOGY: PDF REPORT: (no result)

## 2019-08-20 PROCEDURE — 45385 COLONOSCOPY W/LESION REMOVAL: CPT | Mod: 33 | Performed by: INTERNAL MEDICINE

## 2019-08-20 PROCEDURE — 88305 TISSUE EXAM BY PATHOLOGIST: CPT | Mod: 33 | Performed by: INTERNAL MEDICINE

## 2019-09-23 RX ORDER — OMEPRAZOLE 40 MG/1
CAPSULE, DELAYED RELEASE ORAL
Qty: 180 CAPSULE | Refills: 1 | Status: SHIPPED | OUTPATIENT
Start: 2019-09-23 | End: 2019-11-26

## 2019-09-30 ENCOUNTER — OFFICE (AMBULATORY)
Dept: URBAN - METROPOLITAN AREA CLINIC 64 | Facility: CLINIC | Age: 59
End: 2019-09-30

## 2019-09-30 VITALS
HEIGHT: 73 IN | WEIGHT: 208 LBS | DIASTOLIC BLOOD PRESSURE: 74 MMHG | SYSTOLIC BLOOD PRESSURE: 145 MMHG | HEART RATE: 111 BPM

## 2019-09-30 DIAGNOSIS — Z86.010 PERSONAL HISTORY OF COLONIC POLYPS: ICD-10-CM

## 2019-09-30 DIAGNOSIS — R11.2 NAUSEA WITH VOMITING, UNSPECIFIED: ICD-10-CM

## 2019-09-30 DIAGNOSIS — R74.8 ABNORMAL LEVELS OF OTHER SERUM ENZYMES: ICD-10-CM

## 2019-09-30 DIAGNOSIS — K21.9 GASTRO-ESOPHAGEAL REFLUX DISEASE WITHOUT ESOPHAGITIS: ICD-10-CM

## 2019-09-30 DIAGNOSIS — K76.0 FATTY (CHANGE OF) LIVER, NOT ELSEWHERE CLASSIFIED: ICD-10-CM

## 2019-09-30 PROCEDURE — 99213 OFFICE O/P EST LOW 20 MIN: CPT | Performed by: NURSE PRACTITIONER

## 2019-11-04 ENCOUNTER — ON CAMPUS - OUTPATIENT (AMBULATORY)
Dept: URBAN - METROPOLITAN AREA HOSPITAL 2 | Facility: HOSPITAL | Age: 59
End: 2019-11-04
Payer: COMMERCIAL

## 2019-11-04 ENCOUNTER — OFFICE (AMBULATORY)
Dept: URBAN - METROPOLITAN AREA PATHOLOGY 4 | Facility: PATHOLOGY | Age: 59
End: 2019-11-04
Payer: COMMERCIAL

## 2019-11-04 VITALS
RESPIRATION RATE: 16 BRPM | DIASTOLIC BLOOD PRESSURE: 70 MMHG | OXYGEN SATURATION: 92 % | SYSTOLIC BLOOD PRESSURE: 138 MMHG | HEART RATE: 98 BPM | SYSTOLIC BLOOD PRESSURE: 141 MMHG | SYSTOLIC BLOOD PRESSURE: 154 MMHG | DIASTOLIC BLOOD PRESSURE: 89 MMHG | SYSTOLIC BLOOD PRESSURE: 128 MMHG | OXYGEN SATURATION: 91 % | HEIGHT: 73 IN | HEART RATE: 103 BPM | OXYGEN SATURATION: 93 % | DIASTOLIC BLOOD PRESSURE: 75 MMHG | OXYGEN SATURATION: 89 % | HEART RATE: 100 BPM | DIASTOLIC BLOOD PRESSURE: 92 MMHG | DIASTOLIC BLOOD PRESSURE: 99 MMHG | DIASTOLIC BLOOD PRESSURE: 97 MMHG | SYSTOLIC BLOOD PRESSURE: 139 MMHG | HEART RATE: 107 BPM | SYSTOLIC BLOOD PRESSURE: 158 MMHG | SYSTOLIC BLOOD PRESSURE: 118 MMHG | DIASTOLIC BLOOD PRESSURE: 83 MMHG | HEART RATE: 97 BPM | HEART RATE: 95 BPM | HEART RATE: 102 BPM | SYSTOLIC BLOOD PRESSURE: 112 MMHG | HEART RATE: 104 BPM | DIASTOLIC BLOOD PRESSURE: 84 MMHG | DIASTOLIC BLOOD PRESSURE: 90 MMHG | SYSTOLIC BLOOD PRESSURE: 143 MMHG | OXYGEN SATURATION: 94 % | OXYGEN SATURATION: 96 % | HEART RATE: 99 BPM | SYSTOLIC BLOOD PRESSURE: 130 MMHG | WEIGHT: 209 LBS | TEMPERATURE: 97.5 F | DIASTOLIC BLOOD PRESSURE: 81 MMHG | HEART RATE: 101 BPM | SYSTOLIC BLOOD PRESSURE: 123 MMHG

## 2019-11-04 DIAGNOSIS — D12.3 BENIGN NEOPLASM OF TRANSVERSE COLON: ICD-10-CM

## 2019-11-04 DIAGNOSIS — Z86.010 PERSONAL HISTORY OF COLONIC POLYPS: ICD-10-CM

## 2019-11-04 DIAGNOSIS — D12.4 BENIGN NEOPLASM OF DESCENDING COLON: ICD-10-CM

## 2019-11-04 DIAGNOSIS — D12.5 BENIGN NEOPLASM OF SIGMOID COLON: ICD-10-CM

## 2019-11-04 DIAGNOSIS — D12.2 BENIGN NEOPLASM OF ASCENDING COLON: ICD-10-CM

## 2019-11-04 DIAGNOSIS — K57.30 DIVERTICULOSIS OF LARGE INTESTINE WITHOUT PERFORATION OR ABS: ICD-10-CM

## 2019-11-04 LAB
GI HISTOLOGY: A. UNSPECIFIED: (no result)
GI HISTOLOGY: B. UNSPECIFIED: (no result)
GI HISTOLOGY: C. UNSPECIFIED: (no result)
GI HISTOLOGY: D. UNSPECIFIED: (no result)
GI HISTOLOGY: PDF REPORT: (no result)

## 2019-11-04 PROCEDURE — 88305 TISSUE EXAM BY PATHOLOGIST: CPT | Mod: 33 | Performed by: INTERNAL MEDICINE

## 2019-11-04 PROCEDURE — 45385 COLONOSCOPY W/LESION REMOVAL: CPT | Mod: 33 | Performed by: INTERNAL MEDICINE

## 2019-11-04 PROCEDURE — 45381 COLONOSCOPY SUBMUCOUS NJX: CPT | Performed by: INTERNAL MEDICINE

## 2019-11-11 ENCOUNTER — OFFICE VISIT (OUTPATIENT)
Dept: SURGERY | Facility: CLINIC | Age: 59
End: 2019-11-11

## 2019-11-11 VITALS
HEART RATE: 104 BPM | WEIGHT: 209.4 LBS | TEMPERATURE: 98.1 F | DIASTOLIC BLOOD PRESSURE: 99 MMHG | SYSTOLIC BLOOD PRESSURE: 162 MMHG | HEIGHT: 73 IN | BODY MASS INDEX: 27.75 KG/M2 | OXYGEN SATURATION: 94 %

## 2019-11-11 DIAGNOSIS — K63.5 POLYP OF TRANSVERSE COLON, UNSPECIFIED TYPE: Primary | ICD-10-CM

## 2019-11-11 PROBLEM — H35.89 OTHER SPECIFIED RETINAL DISORDERS: Status: ACTIVE | Noted: 2019-11-11

## 2019-11-11 PROBLEM — H25.813 COMBINED FORMS OF AGE-RELATED CATARACT, BILATERAL: Status: ACTIVE | Noted: 2019-11-11

## 2019-11-11 PROCEDURE — 99204 OFFICE O/P NEW MOD 45 MIN: CPT | Performed by: SURGERY

## 2019-11-11 RX ORDER — GABAPENTIN 100 MG/1
600 CAPSULE ORAL 3 TIMES DAILY
Status: CANCELLED | OUTPATIENT
Start: 2019-11-11

## 2019-11-11 RX ORDER — ACETAMINOPHEN 500 MG
1000 TABLET ORAL EVERY 6 HOURS
Status: CANCELLED | OUTPATIENT
Start: 2019-11-11

## 2019-11-11 RX ORDER — ERYTHROMYCIN 500 MG/1
500 TABLET, COATED ORAL 3 TIMES DAILY
Qty: 3 TABLET | Refills: 0 | Status: SHIPPED | OUTPATIENT
Start: 2019-11-11 | End: 2019-11-12

## 2019-11-11 RX ORDER — CELECOXIB 100 MG/1
200 CAPSULE ORAL DAILY
Status: CANCELLED | OUTPATIENT
Start: 2019-11-11

## 2019-11-11 RX ORDER — CHLORHEXIDINE GLUCONATE 4 G/100ML
SOLUTION TOPICAL 2 TIMES DAILY
Qty: 236 ML | Refills: 0 | Status: ON HOLD | OUTPATIENT
Start: 2019-11-11 | End: 2019-12-05

## 2019-11-11 RX ORDER — MAGNESIUM CARB/ALUMINUM HYDROX 105-160MG
296 TABLET,CHEWABLE ORAL ONCE
Qty: 296 ML | Refills: 0 | Status: SHIPPED | OUTPATIENT
Start: 2019-11-11 | End: 2019-11-11

## 2019-11-11 RX ORDER — NEOMYCIN SULFATE 500 MG/1
1000 TABLET ORAL 3 TIMES DAILY
Qty: 3 TABLET | Refills: 0 | Status: SHIPPED | OUTPATIENT
Start: 2019-11-11 | End: 2019-11-12

## 2019-11-12 NOTE — PROGRESS NOTES
GENERAL SURGERY CONSULTATION NOTE    Consult requested by: Dr. Brian    Patient Care Team:  Al Kimbrough MD as PCP - General  Al Kimbrough MD as PCP - Family Medicine    Reason for consult: unresectable transverse colon polyp    Subjective     Patient is a 59 y.o. male presents with an unresectable transverse colon polyp.  The patient reportedly had his first colonoscopy performed in August of this year where he was found to have 36 polyps in his colon which were removed.  At that time, the pathology from the polyps was benign, but due to the overwhelming number of polyps within his colon, the patient was scheduled for a repeat short interval colonoscopy.  Earlier this month, the patient underwent an additional colonoscopy which demonstrated 18 polyps including one polyp in the transverse colon which measured 2 cm in diameter and was too large to be biopsied.  All of his polyps have returned with fragments of tubular adenoma and one hyperplastic polyp within the sigmoid colon.  Other significant findings from his colonoscopy demonstrate diverticulosis within the sigmoid colon which was mild.  The area of the transverse colon polyp was tattooed both proximally and distally.  Of note, the patient's family history is significant for a sister with colon cancer.  His past medical history is significant for gastroparesis which is managed with Reglan and diabetes.  He reports having some diarrhea, but denies constipation, melena, or hematochezia.    Review of Systems   Constitutional: Negative for appetite change, chills and fever.   HENT: Negative for congestion and sore throat.    Respiratory: Negative for cough and shortness of breath.    Cardiovascular: Negative for chest pain and palpitations.   Gastrointestinal: Positive for diarrhea and nausea. Negative for abdominal pain, constipation, vomiting and GERD.   Endocrine: Positive for polydipsia.   Genitourinary: Positive for frequency. Negative for  difficulty urinating and dysuria.   Musculoskeletal: Positive for joint swelling. Negative for arthralgias and back pain.   Skin: Negative for rash and skin lesions.   Neurological: Positive for numbness. Negative for dizziness, seizures and memory problem.   Hematological: Negative for adenopathy. Does not bruise/bleed easily.   Psychiatric/Behavioral: Negative for sleep disturbance and depressed mood.        History  Past Medical History:   Diagnosis Date   • ACE-inhibitor cough    • Hypertension    • RAD (reactive airway disease)      Past Surgical History:   Procedure Laterality Date   • CHOLECYSTECTOMY  2004   • CORONARY ANGIOPLASTY WITH STENT PLACEMENT  04/2017   • KNEE ARTHROSCOPY Left 08/2003   • KNEE JOINT MANIPULATION Left 04/2010   • TOTAL KNEE ARTHROPLASTY Bilateral     2013,2011     Family History   Problem Relation Age of Onset   • Irritable bowel syndrome Mother    • Anxiety disorder Mother    • Depression Father    • Hypertension Father    • Mental illness Father         committed suicide   • Other Sister         back problems   • Other Brother         back problems     Social History     Tobacco Use   • Smoking status: Former Smoker   Substance Use Topics   • Alcohol use: Yes     Comment: occasionally   • Drug use: No       (Not in a hospital admission)  Allergies:  Patient has no known allergies.    Objective     Vital Signs  Temp:  [98.1 °F (36.7 °C)] 98.1 °F (36.7 °C)  Heart Rate:  [104] 104  BP: (162)/(99) 162/99    Physical Exam   Constitutional: He is oriented to person, place, and time. He appears well-developed and well-nourished.   HENT:   Head: Normocephalic and atraumatic.   Eyes: Pupils are equal, round, and reactive to light.   Neck: Normal range of motion.   Cardiovascular: Normal rate and regular rhythm.   Pulmonary/Chest: Effort normal and breath sounds normal.   Abdominal: Soft. He exhibits no distension. There is no tenderness. There is no rigidity, no rebound and no guarding. No  hernia.   1 cm, easily reducible, umbilical hernia.   Musculoskeletal: Normal range of motion. He exhibits no edema.   Lymphadenopathy:     He has no cervical adenopathy.     He has no axillary adenopathy.   Neurological: He is alert and oriented to person, place, and time.   Skin: Skin is warm and dry. No rash noted.   Psychiatric: He has a normal mood and affect.   Vitals reviewed.      Results Review:   Lab Results (last 24 hours)     ** No results found for the last 24 hours. **        No radiology results for the last day      I reviewed the patient's new imaging results and agree with the interpretation.  I reviewed the patient's other test results and agree with the interpretation    Assessment/Plan     Active Problems:  Unresectable transverse colon polyp    We discussed the findings of the colonoscopy, as well as the surgical options available to him.  We discussed that performing a transverse colectomy does not fact present some difficulties as the hepatic and splenic flexures or watershed areas and re-anastomosing these areas together can result in ischemia at the anastomosis.  Therefore, I would prefer to perform a hemicolectomy either on the right or left side depending on where the unresectable polyp is located.  The patient understands, and agrees to proceed.  We will perform a partial colectomy, either right or left, depending on the intraoperative findings.  Discussed the risk, benefits, and alternatives to surgery, as well as his expected postoperative course.  We will schedule the surgery soon.  The patient understands he will need to perform a colon prep the day prior to surgery.    Ronaldo Ardon MD  11/12/19  10:31 AM

## 2019-11-26 ENCOUNTER — APPOINTMENT (OUTPATIENT)
Dept: PREADMISSION TESTING | Facility: HOSPITAL | Age: 59
End: 2019-11-26

## 2019-11-26 ENCOUNTER — HOSPITAL ENCOUNTER (OUTPATIENT)
Dept: GENERAL RADIOLOGY | Facility: HOSPITAL | Age: 59
Discharge: HOME | End: 2019-11-26
Admitting: SURGERY

## 2019-11-26 ENCOUNTER — TELEPHONE (OUTPATIENT)
Dept: FAMILY MEDICINE CLINIC | Facility: CLINIC | Age: 59
End: 2019-11-26

## 2019-11-26 VITALS
DIASTOLIC BLOOD PRESSURE: 84 MMHG | HEIGHT: 73 IN | BODY MASS INDEX: 27.04 KG/M2 | HEART RATE: 98 BPM | WEIGHT: 204 LBS | OXYGEN SATURATION: 98 % | SYSTOLIC BLOOD PRESSURE: 162 MMHG

## 2019-11-26 DIAGNOSIS — K63.5 POLYP OF TRANSVERSE COLON, UNSPECIFIED TYPE: ICD-10-CM

## 2019-11-26 LAB
ABO GROUP BLD: NORMAL
ANION GAP SERPL CALCULATED.3IONS-SCNC: 14 MMOL/L (ref 5–15)
APTT PPP: 21.5 SECONDS (ref 24–31)
BLD GP AB SCN SERPL QL: NEGATIVE
BUN BLD-MCNC: 17 MG/DL (ref 8–23)
BUN/CREAT SERPL: 13.3 (ref 7–25)
CALCIUM SPEC-SCNC: 9.9 MG/DL (ref 8.6–10.5)
CEA SERPL-MCNC: 1.9 NG/ML
CHLORIDE SERPL-SCNC: 95 MMOL/L (ref 98–107)
CO2 SERPL-SCNC: 29 MMOL/L (ref 22–29)
CREAT BLD-MCNC: 1.28 MG/DL (ref 0.76–1.27)
DEPRECATED RDW RBC AUTO: 43.8 FL (ref 37–54)
ERYTHROCYTE [DISTWIDTH] IN BLOOD BY AUTOMATED COUNT: 14.1 % (ref 12.3–15.4)
GFR SERPL CREATININE-BSD FRML MDRD: 57 ML/MIN/1.73
GLUCOSE BLD-MCNC: 136 MG/DL (ref 65–99)
HBA1C MFR BLD: 9.7 % (ref 3.5–5.6)
HCT VFR BLD AUTO: 43.7 % (ref 37.5–51)
HGB BLD-MCNC: 15.2 G/DL (ref 13–17.7)
INR PPP: 0.92 (ref 0.9–1.1)
MCH RBC QN AUTO: 30.7 PG (ref 26.6–33)
MCHC RBC AUTO-ENTMCNC: 34.6 G/DL (ref 31.5–35.7)
MCV RBC AUTO: 88.6 FL (ref 79–97)
PLATELET # BLD AUTO: 380 10*3/MM3 (ref 140–450)
PMV BLD AUTO: 7.5 FL (ref 6–12)
POTASSIUM BLD-SCNC: 4.1 MMOL/L (ref 3.5–5.2)
PROTHROMBIN TIME: 9.8 SECONDS (ref 9.6–11.7)
RBC # BLD AUTO: 4.94 10*6/MM3 (ref 4.14–5.8)
RH BLD: POSITIVE
SODIUM BLD-SCNC: 138 MMOL/L (ref 136–145)
T&S EXPIRATION DATE: NORMAL
WBC NRBC COR # BLD: 9.5 10*3/MM3 (ref 3.4–10.8)

## 2019-11-26 PROCEDURE — 71046 X-RAY EXAM CHEST 2 VIEWS: CPT

## 2019-11-26 PROCEDURE — 86901 BLOOD TYPING SEROLOGIC RH(D): CPT | Performed by: SURGERY

## 2019-11-26 PROCEDURE — 85730 THROMBOPLASTIN TIME PARTIAL: CPT | Performed by: SURGERY

## 2019-11-26 PROCEDURE — 86900 BLOOD TYPING SEROLOGIC ABO: CPT

## 2019-11-26 PROCEDURE — 86900 BLOOD TYPING SEROLOGIC ABO: CPT | Performed by: SURGERY

## 2019-11-26 PROCEDURE — 82378 CARCINOEMBRYONIC ANTIGEN: CPT | Performed by: SURGERY

## 2019-11-26 PROCEDURE — 36415 COLL VENOUS BLD VENIPUNCTURE: CPT

## 2019-11-26 PROCEDURE — 80048 BASIC METABOLIC PNL TOTAL CA: CPT | Performed by: SURGERY

## 2019-11-26 PROCEDURE — 85027 COMPLETE CBC AUTOMATED: CPT | Performed by: SURGERY

## 2019-11-26 PROCEDURE — 86901 BLOOD TYPING SEROLOGIC RH(D): CPT

## 2019-11-26 PROCEDURE — 85610 PROTHROMBIN TIME: CPT | Performed by: SURGERY

## 2019-11-26 PROCEDURE — 93005 ELECTROCARDIOGRAM TRACING: CPT

## 2019-11-26 PROCEDURE — 86850 RBC ANTIBODY SCREEN: CPT | Performed by: SURGERY

## 2019-11-26 PROCEDURE — 83036 HEMOGLOBIN GLYCOSYLATED A1C: CPT | Performed by: SURGERY

## 2019-11-26 RX ORDER — DIPHENHYDRAMINE HCL 25 MG
2 CAPSULE ORAL EVERY 4 HOURS
Status: ON HOLD | COMMUNITY
End: 2019-12-11 | Stop reason: SDUPTHER

## 2019-11-26 RX ORDER — METOPROLOL TARTRATE 50 MG/1
50 TABLET, FILM COATED ORAL 2 TIMES DAILY
Qty: 60 TABLET | Refills: 11 | Status: SHIPPED | OUTPATIENT
Start: 2019-11-26 | End: 2019-12-26

## 2019-11-26 RX ORDER — OMEPRAZOLE 40 MG/1
40 CAPSULE, DELAYED RELEASE ORAL 2 TIMES DAILY
Status: ON HOLD | COMMUNITY
End: 2019-12-11 | Stop reason: SDUPTHER

## 2019-11-26 NOTE — TELEPHONE ENCOUNTER
Gave message to patient at 4:12pm.  He said Metoprolol made him cough, but he will try it again.  Will call us back with readings in two weeks.

## 2019-11-26 NOTE — PAT
Notified Dr Alanis office regarding elevated bp, pt stopped metoprolol due to cough.  LM requesting further instructions to call pt to advise prior to surgery

## 2019-11-26 NOTE — TELEPHONE ENCOUNTER
Mari with [facility] Pre-Admitting Testing left a voice message that patient was there for testing for surgery on 12/5 with Dr Ardon. His blood pressure was 170/96 and they waited a bit and it went to 162/84.  Patient took himself off Metoprolol a few weeks back because he had a severe cough.  He is on nothing now.  What do you want him to do? Patient can be reached at [phone] or [phone].

## 2019-11-26 NOTE — TELEPHONE ENCOUNTER
I refilled Chao's metoprolol.  Tell him to restart it twice a day.  After a couple weeks I need to hear about his blood pressure results.  Take a blood pressure in the morning and then 12 hours later and write those down.  We will make adjustments after 2 weeks

## 2019-11-27 ENCOUNTER — TELEPHONE (OUTPATIENT)
Dept: PREADMISSION TESTING | Facility: HOSPITAL | Age: 59
End: 2019-11-27

## 2019-11-29 PROCEDURE — 93010 ELECTROCARDIOGRAM REPORT: CPT | Performed by: INTERNAL MEDICINE

## 2019-12-04 ENCOUNTER — ANESTHESIA EVENT (OUTPATIENT)
Dept: PERIOP | Facility: HOSPITAL | Age: 59
End: 2019-12-04

## 2019-12-05 ENCOUNTER — HOSPITAL ENCOUNTER (INPATIENT)
Facility: HOSPITAL | Age: 59
LOS: 6 days | Discharge: NURSING, REHAB OR LONG-TERM CARE | End: 2019-12-11
Attending: SURGERY | Admitting: SURGERY

## 2019-12-05 ENCOUNTER — ANESTHESIA (OUTPATIENT)
Dept: PERIOP | Facility: HOSPITAL | Age: 59
End: 2019-12-05

## 2019-12-05 DIAGNOSIS — Z90.49 STATUS POST COLON RESECTION: Primary | ICD-10-CM

## 2019-12-05 DIAGNOSIS — K63.5 POLYP OF TRANSVERSE COLON, UNSPECIFIED TYPE: ICD-10-CM

## 2019-12-05 LAB
ANION GAP SERPL CALCULATED.3IONS-SCNC: 15 MMOL/L (ref 5–15)
BASOPHILS # BLD AUTO: 0.1 10*3/MM3 (ref 0–0.2)
BASOPHILS NFR BLD AUTO: 0.3 % (ref 0–1.5)
BUN BLD-MCNC: 20 MG/DL (ref 8–23)
BUN/CREAT SERPL: 11.9 (ref 7–25)
CALCIUM SPEC-SCNC: 8.6 MG/DL (ref 8.6–10.5)
CHLORIDE SERPL-SCNC: 96 MMOL/L (ref 98–107)
CO2 SERPL-SCNC: 21 MMOL/L (ref 22–29)
CREAT BLD-MCNC: 1.68 MG/DL (ref 0.76–1.27)
DEPRECATED RDW RBC AUTO: 46.4 FL (ref 37–54)
EOSINOPHIL # BLD AUTO: 0 10*3/MM3 (ref 0–0.4)
EOSINOPHIL NFR BLD AUTO: 0 % (ref 0.3–6.2)
ERYTHROCYTE [DISTWIDTH] IN BLOOD BY AUTOMATED COUNT: 14.6 % (ref 12.3–15.4)
GFR SERPL CREATININE-BSD FRML MDRD: 42 ML/MIN/1.73
GLUCOSE BLD-MCNC: 396 MG/DL (ref 65–99)
GLUCOSE BLDC GLUCOMTR-MCNC: 200 MG/DL (ref 70–105)
GLUCOSE BLDC GLUCOMTR-MCNC: 230 MG/DL (ref 70–105)
GLUCOSE BLDC GLUCOMTR-MCNC: 268 MG/DL (ref 70–105)
GLUCOSE BLDC GLUCOMTR-MCNC: 296 MG/DL (ref 70–105)
GLUCOSE BLDC GLUCOMTR-MCNC: 325 MG/DL (ref 70–105)
GLUCOSE BLDC GLUCOMTR-MCNC: 337 MG/DL (ref 70–105)
HCT VFR BLD AUTO: 42.3 % (ref 37.5–51)
HGB BLD-MCNC: 14 G/DL (ref 13–17.7)
LYMPHOCYTES # BLD AUTO: 0.6 10*3/MM3 (ref 0.7–3.1)
LYMPHOCYTES NFR BLD AUTO: 3.5 % (ref 19.6–45.3)
MCH RBC QN AUTO: 29.8 PG (ref 26.6–33)
MCHC RBC AUTO-ENTMCNC: 33 G/DL (ref 31.5–35.7)
MCV RBC AUTO: 90.2 FL (ref 79–97)
MONOCYTES # BLD AUTO: 1.2 10*3/MM3 (ref 0.1–0.9)
MONOCYTES NFR BLD AUTO: 6.6 % (ref 5–12)
NEUTROPHILS # BLD AUTO: 15.9 10*3/MM3 (ref 1.7–7)
NEUTROPHILS NFR BLD AUTO: 89.6 % (ref 42.7–76)
NRBC BLD AUTO-RTO: 0 /100 WBC (ref 0–0.2)
PLATELET # BLD AUTO: 434 10*3/MM3 (ref 140–450)
PMV BLD AUTO: 7.7 FL (ref 6–12)
POTASSIUM BLD-SCNC: 6.3 MMOL/L (ref 3.5–5.2)
POTASSIUM BLD-SCNC: 6.4 MMOL/L (ref 3.5–5.2)
RBC # BLD AUTO: 4.69 10*6/MM3 (ref 4.14–5.8)
SODIUM BLD-SCNC: 132 MMOL/L (ref 136–145)
WBC NRBC COR # BLD: 17.8 10*3/MM3 (ref 3.4–10.8)

## 2019-12-05 PROCEDURE — 25010000002 DEXAMETHASONE PER 1 MG: Performed by: ANESTHESIOLOGY

## 2019-12-05 PROCEDURE — 82962 GLUCOSE BLOOD TEST: CPT

## 2019-12-05 PROCEDURE — 63710000001 INSULIN LISPRO (HUMAN) PER 5 UNITS: Performed by: INTERNAL MEDICINE

## 2019-12-05 PROCEDURE — 63710000001 INSULIN LISPRO (HUMAN) PER 5 UNITS: Performed by: ANESTHESIOLOGIST ASSISTANT

## 2019-12-05 PROCEDURE — 88309 TISSUE EXAM BY PATHOLOGIST: CPT | Performed by: SURGERY

## 2019-12-05 PROCEDURE — 25010000002 ONDANSETRON PER 1 MG: Performed by: PHYSICIAN ASSISTANT

## 2019-12-05 PROCEDURE — 85025 COMPLETE CBC W/AUTO DIFF WBC: CPT | Performed by: PHYSICIAN ASSISTANT

## 2019-12-05 PROCEDURE — 44140 PARTIAL REMOVAL OF COLON: CPT | Performed by: SURGERY

## 2019-12-05 PROCEDURE — 25010000002 MIDAZOLAM PER 1 MG: Performed by: ANESTHESIOLOGIST ASSISTANT

## 2019-12-05 PROCEDURE — 63710000001 INSULIN LISPRO (HUMAN) PER 5 UNITS: Performed by: ANESTHESIOLOGY

## 2019-12-05 PROCEDURE — 25010000002 FENTANYL CITRATE (PF) 100 MCG/2ML SOLUTION: Performed by: ANESTHESIOLOGIST ASSISTANT

## 2019-12-05 PROCEDURE — 25010000002 METOCLOPRAMIDE PER 10 MG: Performed by: ANESTHESIOLOGIST ASSISTANT

## 2019-12-05 PROCEDURE — 25010000002 ROPIVACAINE PER 1 MG: Performed by: ANESTHESIOLOGY

## 2019-12-05 PROCEDURE — 25010000002 PROPOFOL 10 MG/ML EMULSION: Performed by: ANESTHESIOLOGIST ASSISTANT

## 2019-12-05 PROCEDURE — 84132 ASSAY OF SERUM POTASSIUM: CPT | Performed by: PHYSICIAN ASSISTANT

## 2019-12-05 PROCEDURE — 25010000002 CEFOXITIN PER 1 G: Performed by: SURGERY

## 2019-12-05 PROCEDURE — 99252 IP/OBS CONSLTJ NEW/EST SF 35: CPT | Performed by: PHYSICIAN ASSISTANT

## 2019-12-05 PROCEDURE — 44140 PARTIAL REMOVAL OF COLON: CPT | Performed by: NURSE PRACTITIONER

## 2019-12-05 PROCEDURE — 80048 BASIC METABOLIC PNL TOTAL CA: CPT | Performed by: PHYSICIAN ASSISTANT

## 2019-12-05 PROCEDURE — 0DBU0ZZ EXCISION OF OMENTUM, OPEN APPROACH: ICD-10-PCS | Performed by: SURGERY

## 2019-12-05 PROCEDURE — 0DNU0ZZ RELEASE OMENTUM, OPEN APPROACH: ICD-10-PCS | Performed by: SURGERY

## 2019-12-05 PROCEDURE — 25010000002 HYDRALAZINE PER 20 MG: Performed by: ANESTHESIOLOGIST ASSISTANT

## 2019-12-05 PROCEDURE — 0DTF0ZZ RESECTION OF RIGHT LARGE INTESTINE, OPEN APPROACH: ICD-10-PCS | Performed by: SURGERY

## 2019-12-05 PROCEDURE — 25010000002 HYDROMORPHONE PER 4 MG: Performed by: ANESTHESIOLOGIST ASSISTANT

## 2019-12-05 DEVICE — PROXIMATE RELOADABLE LINEAR CUTTER WITH SAFETY LOCK-OUT, 75MM
Type: IMPLANTABLE DEVICE | Site: TRANSVERSE COLON | Status: FUNCTIONAL
Brand: PROXIMATE

## 2019-12-05 DEVICE — PROXIMATE LINEAR CUTTER RELOAD, BLUE, 75MM
Type: IMPLANTABLE DEVICE | Site: TRANSVERSE COLON | Status: FUNCTIONAL
Brand: PROXIMATE

## 2019-12-05 RX ORDER — SODIUM CHLORIDE, SODIUM LACTATE, POTASSIUM CHLORIDE, CALCIUM CHLORIDE 600; 310; 30; 20 MG/100ML; MG/100ML; MG/100ML; MG/100ML
9 INJECTION, SOLUTION INTRAVENOUS CONTINUOUS PRN
Status: DISCONTINUED | OUTPATIENT
Start: 2019-12-05 | End: 2019-12-05 | Stop reason: HOSPADM

## 2019-12-05 RX ORDER — DEXTROSE MONOHYDRATE 25 G/50ML
25 INJECTION, SOLUTION INTRAVENOUS
Status: DISCONTINUED | OUTPATIENT
Start: 2019-12-05 | End: 2019-12-05 | Stop reason: HOSPADM

## 2019-12-05 RX ORDER — ROPIVACAINE HYDROCHLORIDE 5 MG/ML
INJECTION, SOLUTION EPIDURAL; INFILTRATION; PERINEURAL
Status: DISCONTINUED | OUTPATIENT
Start: 2019-12-05 | End: 2019-12-05 | Stop reason: SURG

## 2019-12-05 RX ORDER — METOPROLOL TARTRATE 50 MG/1
50 TABLET, FILM COATED ORAL 2 TIMES DAILY
Status: DISCONTINUED | OUTPATIENT
Start: 2019-12-05 | End: 2019-12-11 | Stop reason: HOSPADM

## 2019-12-05 RX ORDER — PHENYLEPHRINE HCL IN 0.9% NACL 0.5 MG/5ML
SYRINGE (ML) INTRAVENOUS AS NEEDED
Status: DISCONTINUED | OUTPATIENT
Start: 2019-12-05 | End: 2019-12-05 | Stop reason: SURG

## 2019-12-05 RX ORDER — NICOTINE POLACRILEX 4 MG
15 LOZENGE BUCCAL
Status: DISCONTINUED | OUTPATIENT
Start: 2019-12-05 | End: 2019-12-05 | Stop reason: HOSPADM

## 2019-12-05 RX ORDER — DEXAMETHASONE SODIUM PHOSPHATE 4 MG/ML
INJECTION, SOLUTION INTRA-ARTICULAR; INTRALESIONAL; INTRAMUSCULAR; INTRAVENOUS; SOFT TISSUE
Status: DISCONTINUED | OUTPATIENT
Start: 2019-12-05 | End: 2019-12-05 | Stop reason: SURG

## 2019-12-05 RX ORDER — ONDANSETRON 2 MG/ML
4 INJECTION INTRAMUSCULAR; INTRAVENOUS ONCE AS NEEDED
Status: DISCONTINUED | OUTPATIENT
Start: 2019-12-05 | End: 2019-12-05 | Stop reason: HOSPADM

## 2019-12-05 RX ORDER — FENTANYL CITRATE 50 UG/ML
INJECTION, SOLUTION INTRAMUSCULAR; INTRAVENOUS AS NEEDED
Status: DISCONTINUED | OUTPATIENT
Start: 2019-12-05 | End: 2019-12-05 | Stop reason: SURG

## 2019-12-05 RX ORDER — LORAZEPAM 2 MG/ML
1 INJECTION INTRAMUSCULAR
Status: DISCONTINUED | OUTPATIENT
Start: 2019-12-05 | End: 2019-12-05 | Stop reason: HOSPADM

## 2019-12-05 RX ORDER — DEXTROSE MONOHYDRATE 25 G/50ML
25 INJECTION, SOLUTION INTRAVENOUS
Status: DISCONTINUED | OUTPATIENT
Start: 2019-12-05 | End: 2019-12-10 | Stop reason: SDUPTHER

## 2019-12-05 RX ORDER — SODIUM CHLORIDE 9 MG/ML
100 INJECTION, SOLUTION INTRAVENOUS CONTINUOUS
Status: DISCONTINUED | OUTPATIENT
Start: 2019-12-06 | End: 2019-12-11 | Stop reason: HOSPADM

## 2019-12-05 RX ORDER — ONDANSETRON 2 MG/ML
4 INJECTION INTRAMUSCULAR; INTRAVENOUS ONCE
Status: DISCONTINUED | OUTPATIENT
Start: 2019-12-05 | End: 2019-12-05 | Stop reason: HOSPADM

## 2019-12-05 RX ORDER — PROMETHAZINE HYDROCHLORIDE 25 MG/ML
6.25 INJECTION, SOLUTION INTRAMUSCULAR; INTRAVENOUS ONCE AS NEEDED
Status: DISCONTINUED | OUTPATIENT
Start: 2019-12-05 | End: 2019-12-05 | Stop reason: HOSPADM

## 2019-12-05 RX ORDER — LIDOCAINE HYDROCHLORIDE 20 MG/ML
INJECTION, SOLUTION EPIDURAL; INFILTRATION; INTRACAUDAL; PERINEURAL AS NEEDED
Status: DISCONTINUED | OUTPATIENT
Start: 2019-12-05 | End: 2019-12-05 | Stop reason: SURG

## 2019-12-05 RX ORDER — OXYCODONE HYDROCHLORIDE 5 MG/1
10 TABLET ORAL EVERY 4 HOURS PRN
Status: DISCONTINUED | OUTPATIENT
Start: 2019-12-05 | End: 2019-12-05 | Stop reason: HOSPADM

## 2019-12-05 RX ORDER — FAMOTIDINE 10 MG/ML
20 INJECTION, SOLUTION INTRAVENOUS ONCE
Status: COMPLETED | OUTPATIENT
Start: 2019-12-05 | End: 2019-12-05

## 2019-12-05 RX ORDER — SODIUM CHLORIDE 0.9 % (FLUSH) 0.9 %
3 SYRINGE (ML) INJECTION EVERY 12 HOURS SCHEDULED
Status: DISCONTINUED | OUTPATIENT
Start: 2019-12-05 | End: 2019-12-05 | Stop reason: HOSPADM

## 2019-12-05 RX ORDER — METOCLOPRAMIDE HYDROCHLORIDE 5 MG/ML
INJECTION INTRAMUSCULAR; INTRAVENOUS AS NEEDED
Status: DISCONTINUED | OUTPATIENT
Start: 2019-12-05 | End: 2019-12-05 | Stop reason: SURG

## 2019-12-05 RX ORDER — GABAPENTIN 300 MG/1
600 CAPSULE ORAL 3 TIMES DAILY
Status: DISCONTINUED | OUTPATIENT
Start: 2019-12-05 | End: 2019-12-05 | Stop reason: HOSPADM

## 2019-12-05 RX ORDER — ATORVASTATIN CALCIUM 20 MG/1
20 TABLET, FILM COATED ORAL EVERY EVENING
Status: DISCONTINUED | OUTPATIENT
Start: 2019-12-05 | End: 2019-12-06

## 2019-12-05 RX ORDER — NALOXONE HCL 0.4 MG/ML
0.4 VIAL (ML) INJECTION
Status: DISCONTINUED | OUTPATIENT
Start: 2019-12-05 | End: 2019-12-11 | Stop reason: HOSPADM

## 2019-12-05 RX ORDER — NICOTINE POLACRILEX 4 MG
15 LOZENGE BUCCAL
Status: DISCONTINUED | OUTPATIENT
Start: 2019-12-05 | End: 2019-12-10 | Stop reason: SDUPTHER

## 2019-12-05 RX ORDER — PROMETHAZINE HYDROCHLORIDE 25 MG/1
25 SUPPOSITORY RECTAL ONCE AS NEEDED
Status: DISCONTINUED | OUTPATIENT
Start: 2019-12-05 | End: 2019-12-05 | Stop reason: HOSPADM

## 2019-12-05 RX ORDER — OXYCODONE HYDROCHLORIDE 5 MG/1
5 TABLET ORAL EVERY 4 HOURS PRN
Status: DISCONTINUED | OUTPATIENT
Start: 2019-12-05 | End: 2019-12-11 | Stop reason: HOSPADM

## 2019-12-05 RX ORDER — HYDRALAZINE HYDROCHLORIDE 20 MG/ML
INJECTION INTRAMUSCULAR; INTRAVENOUS AS NEEDED
Status: DISCONTINUED | OUTPATIENT
Start: 2019-12-05 | End: 2019-12-05 | Stop reason: SURG

## 2019-12-05 RX ORDER — ONDANSETRON 2 MG/ML
4 INJECTION INTRAMUSCULAR; INTRAVENOUS EVERY 6 HOURS PRN
Status: DISCONTINUED | OUTPATIENT
Start: 2019-12-05 | End: 2019-12-11 | Stop reason: HOSPADM

## 2019-12-05 RX ORDER — ACETAMINOPHEN 500 MG
1000 TABLET ORAL 3 TIMES DAILY
Status: DISCONTINUED | OUTPATIENT
Start: 2019-12-05 | End: 2019-12-11 | Stop reason: HOSPADM

## 2019-12-05 RX ORDER — SODIUM CHLORIDE 0.9 % (FLUSH) 0.9 %
3-10 SYRINGE (ML) INJECTION AS NEEDED
Status: DISCONTINUED | OUTPATIENT
Start: 2019-12-05 | End: 2019-12-05 | Stop reason: HOSPADM

## 2019-12-05 RX ORDER — CELECOXIB 200 MG/1
200 CAPSULE ORAL DAILY
Status: DISCONTINUED | OUTPATIENT
Start: 2019-12-05 | End: 2019-12-05 | Stop reason: HOSPADM

## 2019-12-05 RX ORDER — HYDROMORPHONE HCL 110MG/55ML
0.5 PATIENT CONTROLLED ANALGESIA SYRINGE INTRAVENOUS
Status: DISCONTINUED | OUTPATIENT
Start: 2019-12-05 | End: 2019-12-05 | Stop reason: HOSPADM

## 2019-12-05 RX ORDER — FENTANYL CITRATE 50 UG/ML
50 INJECTION, SOLUTION INTRAMUSCULAR; INTRAVENOUS
Status: DISCONTINUED | OUTPATIENT
Start: 2019-12-05 | End: 2019-12-05 | Stop reason: HOSPADM

## 2019-12-05 RX ORDER — HYDROMORPHONE HCL 110MG/55ML
PATIENT CONTROLLED ANALGESIA SYRINGE INTRAVENOUS AS NEEDED
Status: DISCONTINUED | OUTPATIENT
Start: 2019-12-05 | End: 2019-12-05 | Stop reason: SURG

## 2019-12-05 RX ORDER — ROCURONIUM BROMIDE 10 MG/ML
INJECTION, SOLUTION INTRAVENOUS AS NEEDED
Status: DISCONTINUED | OUTPATIENT
Start: 2019-12-05 | End: 2019-12-05 | Stop reason: SURG

## 2019-12-05 RX ORDER — ACETAMINOPHEN 500 MG
1000 TABLET ORAL EVERY 6 HOURS
Status: DISCONTINUED | OUTPATIENT
Start: 2019-12-05 | End: 2019-12-05 | Stop reason: HOSPADM

## 2019-12-05 RX ORDER — LIDOCAINE HYDROCHLORIDE 10 MG/ML
0.5 INJECTION, SOLUTION EPIDURAL; INFILTRATION; INTRACAUDAL; PERINEURAL ONCE AS NEEDED
Status: DISCONTINUED | OUTPATIENT
Start: 2019-12-05 | End: 2019-12-05 | Stop reason: HOSPADM

## 2019-12-05 RX ORDER — OXYCODONE HYDROCHLORIDE 5 MG/1
10 TABLET ORAL EVERY 4 HOURS PRN
Status: DISCONTINUED | OUTPATIENT
Start: 2019-12-05 | End: 2019-12-11 | Stop reason: HOSPADM

## 2019-12-05 RX ORDER — SCOLOPAMINE TRANSDERMAL SYSTEM 1 MG/1
1 PATCH, EXTENDED RELEASE TRANSDERMAL
Status: DISCONTINUED | OUTPATIENT
Start: 2019-12-05 | End: 2019-12-05 | Stop reason: HOSPADM

## 2019-12-05 RX ORDER — PROMETHAZINE HYDROCHLORIDE 25 MG/1
25 TABLET ORAL ONCE AS NEEDED
Status: DISCONTINUED | OUTPATIENT
Start: 2019-12-05 | End: 2019-12-05 | Stop reason: HOSPADM

## 2019-12-05 RX ORDER — DEXTROSE, SODIUM CHLORIDE, AND POTASSIUM CHLORIDE 5; .45; .15 G/100ML; G/100ML; G/100ML
100 INJECTION INTRAVENOUS CONTINUOUS
Status: DISCONTINUED | OUTPATIENT
Start: 2019-12-05 | End: 2019-12-05

## 2019-12-05 RX ORDER — LABETALOL HYDROCHLORIDE 5 MG/ML
INJECTION, SOLUTION INTRAVENOUS AS NEEDED
Status: DISCONTINUED | OUTPATIENT
Start: 2019-12-05 | End: 2019-12-05 | Stop reason: SURG

## 2019-12-05 RX ORDER — PROPOFOL 10 MG/ML
VIAL (ML) INTRAVENOUS AS NEEDED
Status: DISCONTINUED | OUTPATIENT
Start: 2019-12-05 | End: 2019-12-05 | Stop reason: SURG

## 2019-12-05 RX ORDER — ALBUTEROL SULFATE 2.5 MG/3ML
2.5 SOLUTION RESPIRATORY (INHALATION) EVERY 6 HOURS PRN
Status: DISCONTINUED | OUTPATIENT
Start: 2019-12-05 | End: 2019-12-11 | Stop reason: HOSPADM

## 2019-12-05 RX ORDER — PANTOPRAZOLE SODIUM 40 MG/1
40 TABLET, DELAYED RELEASE ORAL EVERY MORNING
Status: DISCONTINUED | OUTPATIENT
Start: 2019-12-06 | End: 2019-12-11 | Stop reason: HOSPADM

## 2019-12-05 RX ORDER — HYDROMORPHONE HCL 110MG/55ML
0.5 PATIENT CONTROLLED ANALGESIA SYRINGE INTRAVENOUS
Status: DISCONTINUED | OUTPATIENT
Start: 2019-12-05 | End: 2019-12-11 | Stop reason: HOSPADM

## 2019-12-05 RX ORDER — MIDAZOLAM HYDROCHLORIDE 1 MG/ML
INJECTION INTRAMUSCULAR; INTRAVENOUS AS NEEDED
Status: DISCONTINUED | OUTPATIENT
Start: 2019-12-05 | End: 2019-12-05 | Stop reason: SURG

## 2019-12-05 RX ADMIN — HYDROMORPHONE HYDROCHLORIDE 0.4 MG: 2 INJECTION INTRAMUSCULAR; INTRAVENOUS; SUBCUTANEOUS at 15:31

## 2019-12-05 RX ADMIN — LABETALOL 20 MG/4 ML (5 MG/ML) INTRAVENOUS SYRINGE 5 MG: at 14:26

## 2019-12-05 RX ADMIN — HYDROMORPHONE HYDROCHLORIDE 0.4 MG: 2 INJECTION INTRAMUSCULAR; INTRAVENOUS; SUBCUTANEOUS at 14:08

## 2019-12-05 RX ADMIN — HYDRALAZINE HYDROCHLORIDE 10 MG: 20 INJECTION INTRAMUSCULAR; INTRAVENOUS at 14:30

## 2019-12-05 RX ADMIN — GABAPENTIN 600 MG: 300 CAPSULE ORAL at 11:00

## 2019-12-05 RX ADMIN — PROPOFOL 50 MG: 10 INJECTION, EMULSION INTRAVENOUS at 12:55

## 2019-12-05 RX ADMIN — PHENYLEPHRINE HYDROCHLORIDE 100 MCG: 10 INJECTION INTRAVENOUS at 15:12

## 2019-12-05 RX ADMIN — FENTANYL CITRATE 50 MCG: 50 INJECTION, SOLUTION INTRAMUSCULAR; INTRAVENOUS at 12:26

## 2019-12-05 RX ADMIN — PHENYLEPHRINE HYDROCHLORIDE 200 MCG: 10 INJECTION INTRAVENOUS at 15:35

## 2019-12-05 RX ADMIN — INSULIN LISPRO 5 UNITS: 100 INJECTION, SOLUTION INTRAVENOUS; SUBCUTANEOUS at 21:09

## 2019-12-05 RX ADMIN — INSULIN LISPRO 5 UNITS: 100 INJECTION, SOLUTION INTRAVENOUS; SUBCUTANEOUS at 11:11

## 2019-12-05 RX ADMIN — SODIUM CHLORIDE, SODIUM LACTATE, POTASSIUM CHLORIDE, AND CALCIUM CHLORIDE: 600; 310; 30; 20 INJECTION, SOLUTION INTRAVENOUS at 13:23

## 2019-12-05 RX ADMIN — PHENYLEPHRINE HYDROCHLORIDE 100 MCG: 10 INJECTION INTRAVENOUS at 11:40

## 2019-12-05 RX ADMIN — INSULIN LISPRO 8 UNITS: 100 INJECTION, SOLUTION INTRAVENOUS; SUBCUTANEOUS at 16:45

## 2019-12-05 RX ADMIN — LABETALOL 20 MG/4 ML (5 MG/ML) INTRAVENOUS SYRINGE 5 MG: at 12:55

## 2019-12-05 RX ADMIN — DEXAMETHASONE SODIUM PHOSPHATE 4 MG: 4 INJECTION, SOLUTION INTRAMUSCULAR; INTRAVENOUS at 16:29

## 2019-12-05 RX ADMIN — SUGAMMADEX 200 MG: 100 INJECTION, SOLUTION INTRAVENOUS at 15:49

## 2019-12-05 RX ADMIN — HYDROMORPHONE HYDROCHLORIDE 0.6 MG: 2 INJECTION INTRAMUSCULAR; INTRAVENOUS; SUBCUTANEOUS at 13:35

## 2019-12-05 RX ADMIN — SCOPALAMINE 1 PATCH: 1 PATCH, EXTENDED RELEASE TRANSDERMAL at 11:00

## 2019-12-05 RX ADMIN — METOPROLOL TARTRATE 50 MG: 50 TABLET, FILM COATED ORAL at 21:09

## 2019-12-05 RX ADMIN — ATORVASTATIN CALCIUM 20 MG: 20 TABLET, FILM COATED ORAL at 21:10

## 2019-12-05 RX ADMIN — PHENYLEPHRINE HYDROCHLORIDE 100 MCG: 10 INJECTION INTRAVENOUS at 11:45

## 2019-12-05 RX ADMIN — SODIUM CHLORIDE, SODIUM LACTATE, POTASSIUM CHLORIDE, AND CALCIUM CHLORIDE: 600; 310; 30; 20 INJECTION, SOLUTION INTRAVENOUS at 14:55

## 2019-12-05 RX ADMIN — OXYCODONE HYDROCHLORIDE 10 MG: 5 TABLET ORAL at 22:43

## 2019-12-05 RX ADMIN — FAMOTIDINE 20 MG: 10 INJECTION, SOLUTION INTRAVENOUS at 10:59

## 2019-12-05 RX ADMIN — ROCURONIUM BROMIDE 15 MG: 10 INJECTION, SOLUTION INTRAVENOUS at 14:30

## 2019-12-05 RX ADMIN — LABETALOL 20 MG/4 ML (5 MG/ML) INTRAVENOUS SYRINGE 5 MG: at 13:05

## 2019-12-05 RX ADMIN — METOCLOPRAMIDE 10 MG: 5 INJECTION, SOLUTION INTRAMUSCULAR; INTRAVENOUS at 12:05

## 2019-12-05 RX ADMIN — ROCURONIUM BROMIDE 50 MG: 10 INJECTION, SOLUTION INTRAVENOUS at 11:23

## 2019-12-05 RX ADMIN — PHENYLEPHRINE HYDROCHLORIDE 100 MCG: 10 INJECTION INTRAVENOUS at 14:56

## 2019-12-05 RX ADMIN — INSULIN LISPRO 10 UNITS: 100 INJECTION, SOLUTION INTRAVENOUS; SUBCUTANEOUS at 14:54

## 2019-12-05 RX ADMIN — PHENYLEPHRINE HYDROCHLORIDE 50 MCG: 10 INJECTION INTRAVENOUS at 14:50

## 2019-12-05 RX ADMIN — PHENYLEPHRINE HYDROCHLORIDE 50 MCG: 10 INJECTION INTRAVENOUS at 14:46

## 2019-12-05 RX ADMIN — ONDANSETRON 4 MG: 2 INJECTION INTRAMUSCULAR; INTRAVENOUS at 20:01

## 2019-12-05 RX ADMIN — ACETAMINOPHEN 1000 MG: 500 TABLET, FILM COATED ORAL at 21:09

## 2019-12-05 RX ADMIN — PHENYLEPHRINE HYDROCHLORIDE 100 MCG: 10 INJECTION INTRAVENOUS at 15:04

## 2019-12-05 RX ADMIN — SODIUM CHLORIDE, SODIUM LACTATE, POTASSIUM CHLORIDE, AND CALCIUM CHLORIDE 9 ML/HR: 600; 310; 30; 20 INJECTION, SOLUTION INTRAVENOUS at 10:45

## 2019-12-05 RX ADMIN — OXYCODONE HYDROCHLORIDE 10 MG: 5 TABLET ORAL at 18:59

## 2019-12-05 RX ADMIN — LABETALOL 20 MG/4 ML (5 MG/ML) INTRAVENOUS SYRINGE 5 MG: at 14:08

## 2019-12-05 RX ADMIN — CEFOXITIN 2 G: 2 INJECTION, POWDER, FOR SOLUTION INTRAVENOUS at 14:11

## 2019-12-05 RX ADMIN — FENTANYL CITRATE 50 MCG: 50 INJECTION, SOLUTION INTRAMUSCULAR; INTRAVENOUS at 12:37

## 2019-12-05 RX ADMIN — HYDROMORPHONE HYDROCHLORIDE 0.4 MG: 2 INJECTION INTRAMUSCULAR; INTRAVENOUS; SUBCUTANEOUS at 13:21

## 2019-12-05 RX ADMIN — MIDAZOLAM 2 MG: 1 INJECTION INTRAMUSCULAR; INTRAVENOUS at 11:14

## 2019-12-05 RX ADMIN — OXYCODONE HYDROCHLORIDE 10 MG: 5 TABLET ORAL at 11:04

## 2019-12-05 RX ADMIN — PROPOFOL 200 MG: 10 INJECTION, EMULSION INTRAVENOUS at 11:23

## 2019-12-05 RX ADMIN — PROPOFOL 150 MCG/KG/MIN: 10 INJECTION, EMULSION INTRAVENOUS at 11:34

## 2019-12-05 RX ADMIN — CEFOXITIN 2 G: 2 INJECTION, POWDER, FOR SOLUTION INTRAVENOUS at 11:22

## 2019-12-05 RX ADMIN — ROCURONIUM BROMIDE 10 MG: 10 INJECTION, SOLUTION INTRAVENOUS at 13:34

## 2019-12-05 RX ADMIN — FENTANYL CITRATE 100 MCG: 50 INJECTION, SOLUTION INTRAMUSCULAR; INTRAVENOUS at 11:17

## 2019-12-05 RX ADMIN — HYDROMORPHONE HYDROCHLORIDE 0.2 MG: 2 INJECTION INTRAMUSCULAR; INTRAVENOUS; SUBCUTANEOUS at 15:45

## 2019-12-05 RX ADMIN — DEXTROSE MONOHYDRATE, SODIUM CHLORIDE, AND POTASSIUM CHLORIDE 100 ML/HR: 50; 4.5; 1.49 INJECTION, SOLUTION INTRAVENOUS at 21:00

## 2019-12-05 RX ADMIN — CELECOXIB 200 MG: 200 CAPSULE ORAL at 11:00

## 2019-12-05 RX ADMIN — ACETAMINOPHEN 1000 MG: 500 TABLET, FILM COATED ORAL at 10:59

## 2019-12-05 RX ADMIN — LIDOCAINE HYDROCHLORIDE 90 MG: 20 INJECTION, SOLUTION EPIDURAL; INFILTRATION; INTRACAUDAL; PERINEURAL at 11:22

## 2019-12-05 RX ADMIN — ROPIVACAINE HYDROCHLORIDE 30 ML: 5 INJECTION, SOLUTION EPIDURAL; INFILTRATION; PERINEURAL at 16:29

## 2019-12-05 NOTE — ANESTHESIA PROCEDURE NOTES
Airway  Urgency: elective    Date/Time: 12/5/2019 11:25 AM    General Information and Staff    Patient location during procedure: OR    Indications and Patient Condition  Indications for airway management: airway protection    Preoxygenated: yes  Mask difficulty assessment: 0 - not attempted    Final Airway Details  Final airway type: endotracheal airway      Successful airway: ETT  Cuffed: yes   Successful intubation technique: direct laryngoscopy and RSI  Facilitating devices/methods: intubating stylet and cricoid pressure  Endotracheal tube insertion site: oral  Blade: Timothy  Blade size: 4  ETT size (mm): 7.5  Cormack-Lehane Classification: grade I - full view of glottis  Placement verified by: chest auscultation, capnometry and palpation of cuff   Measured from: teeth  ETT/EBT  to teeth (cm): 23  Number of attempts at approach: 1  Assessment: lips, teeth, and gum same as pre-op and atraumatic intubation    Additional Comments  Modfied RSI 2/2 gastroporesis

## 2019-12-05 NOTE — ANESTHESIA PROCEDURE NOTES
Peripheral Block      Patient location during procedure: OR  Start time: 12/5/2019 3:51 PM  Stop time: 12/5/2019 4:11 PM  Reason for block: at surgeon's request and post-op pain management  Performed by  Anesthesiologist: Lizbeth Pisano MD  Preanesthetic Checklist  Completed: patient identified, site marked, surgical consent, pre-op evaluation, timeout performed, IV checked, risks and benefits discussed and monitors and equipment checked  Prep:  Pt Position: supine  Sterile barriers:cap, gloves and sterile barriers  Prep: ChloraPrep  Patient monitoring: blood pressure monitoring, continuous pulse oximetry and EKG  Procedure  Nursing cardiac assessment comments yes: asleep; on about 0.3 mac sevo for about 20 min.  Performed under: general  Guidance:ultrasound guided  ULTRASOUND INTERPRETATION. Using ultrasound guidance a 21 G gauge needle was placed in close proximity to the nerve, at which point, under ultrasound guidance anesthetic was injected in the area of the nerve and spread of the anesthesia was seen on ultrasound in close proximity thereto.  There were no abnormalities seen on ultrasound; a digital image was taken; and the patient tolerated the procedure with no complications. Images:still images obtained, printed/placed on chart    Laterality:Bilateral  Block Type:TAP    Needle Type:echogenic  Needle Gauge:21 G      Medications Used: ropivacaine (NAROPIN) 0.5 % injection, 30 mL  dexamethasone (DECADRON) injection, 4 mg      Medications  Preservative Free Saline:30ml    Post Assessment  Injection Assessment: negative aspiration for heme, no paresthesia on injection and incremental injection  Patient Tolerance:comfortable throughout block  Complications:no

## 2019-12-05 NOTE — OP NOTE
COLON RESECTION SMALL BOWEL  Operative Note    Patient Name:  Chao Lazar  YOB: 1960    Date of Surgery:  12/5/2019     Indications: The patient is a 59-year-old gentleman who was found to have a unresectable colon polyp within the transverse colon.  It was unclear whether the area was within the splenic or hepatic flexure.  Therefore, I discussed with the patient that we would need to perform a hemicolectomy either on the right or left, but I was not sure exactly which.  We would need to see intraoperatively and make that determination there.  The patient understood, and agreed to proceed to the operating room for open hemicolectomy.    Pre-op Diagnosis:   Polyp of transverse colon, unspecified type [D12.3]    Post-op Diagnosis:  Post-Op Diagnosis Codes:     * Polyp of transverse colon, unspecified type [D12.3]    Procedure/CPT® Codes:      Procedure(s):  open right hemicolectomy    Staff:  Surgeon(s):  Ronaldo Ardon MD    Assistant: Flores Barragan APRN    Anesthesia: General with Block    Estimated Blood Loss: 500 mL    Implants:    Implant Name Type Inv. Item Serial No.  Lot No. LRB No. Used   STPLR LNR CUT PROX 75MM LYNETTE TLC75 - SNA - [id number] Implant STPLR LNR CUT PROX 75MM LYNETTE [id number] NA [employer]  DIV OF J AND J [id number] N/A 1   RELOAD STPLR LNR CUT PROX 75MM LYNETTE TCR75 - SNA - [id number] Implant RELOAD STPLR LNR CUT PROX 75MM LYNETTE TCR75 NA [employer]  DIV OF J AND J [id number] N/A 3       Specimen:          Specimens     ID Source Type Tests Collected By Collected At Frozen?      A Large Intestine, Right / Ascending Colon Tissue · TISSUE PATHOLOGY EXAM   Ronaldo Ardon MD 12/5/19 7063 No     Description: right colon and omentum          Findings: Tattoos within the hepatic flexure; massive, fatty omentum with large amount of intra-abdominal fat making visualization difficult.    Complications: None, immediately    Description of Procedure: After  obtaining informed consent in the preop holding area the patient was brought to the operating room and placed in supine position.  SCDs were applied, and preoperative antibiotics were administered.  The patient then underwent uncomplicated induction of general endotracheal anesthesia.  A Bradford catheter was then inserted, and the patient was positioned in lithotomy.  The abdomen and perineum were then prepped and draped in the usual sterile fashion.  After a brief timeout the procedure began.  I began by making a vertical midline incision and entered the abdomen by incising the peritoneum.  Immediately, there was a large amount of thick, fatty omentum which filled the entire abdomen.  There was so much omentum, that I had to perform a partial omentectomy to be able to see the small bowel and the colon.  In the transverse colon, at the hepatic flexure I was able to locate a tattoo, and then more proximally near the liver, there was a second tattoo.  A segment of the transverse colon was selected approximately 5 cm away from the more distal tattoo as our distal margin.  I then incised along the white line of [place] on the right side and medialized the a sending colon in its entirety.  There were some adhesions of the small bowel down in the pelvis which had to be lysed using electrocautery.  Once this was done, the terminal ileum was freely mobile.  I then turned my attention to mobilization of the hepatic flexure by incising the gastrocolic ligament at the midline and proceeded to march towards the liver.  There were dense adhesions of the remaining omentum to the liver within the gallbladder fossa as the patient had a previous cholecystectomy.  Care was taken to lyse these adhesions and fully mobilized the hepatic flexure.  I then treated a window in the mesentery of the terminal ileum and fired a 75 mm ELIAS stapler.  I then used a Enseal device and divided the mesentery low on the ileocolic artery to preserve a large  portion of the lymphovascular pedicle.  I continued to march along the mesentery using the Enseal device to divide the mesentery until I had reached the right branch of the middle colic artery.  Then made a window in the mesentery of the transverse colon at the area I had selected from the distal tattoo.  I fired a 75 mm ELIAS stapler across the colon.  I was then able to divide the mesentery using the Enseal up to that part of the colon.  With the colon now completely removed it was passed off the field as specimen.  I then turned my attention towards creation of my ileocolic anastomosis.  A stay suture was placed on the small bowel and colon antimesenteric borders, and then I created an enterotomy and a colotomy.  A 75 mm ELIAS stapler was placed through the enterotomy/colotomy and I performed a stapled side-to-side, functional end-to-end anastomosis.  Care was taken to ensure there was no twisting of the bowel or colon, and the anastomosis did not appear to be under any tension.  There was also no evidence of any kinking of the bowel.  I then fired a stapler over the common enterotomy, and oversewed the new staple line using 3-0 silk suture.  I then was able to close the mesenteric defect using running 2-0 Vicryl suture.  The abdomen was irrigated, and hemostasis was observed.  We then removed our gowns and gloves and redraped the patient for a clean closure.  The fascia of the midline abdomen was then closed using running #1 looped PDS suture.  The skin was closed using skin staples.  The wound was dressed using an island dressing.  The patient tolerated the procedure well and remained in the operating room for a tap block, before being extubated, and taken to PACU in satisfactory condition.    Ronaldo Ardon MD     Date: 12/5/2019  Time: 3:58 PM

## 2019-12-05 NOTE — ANESTHESIA PREPROCEDURE EVALUATION
Anesthesia Evaluation     Patient summary reviewed and Nursing notes reviewed   NPO Solid Status: > 8 hours  NPO Liquid Status: > 8 hours           Airway   Mallampati: II  TM distance: >3 FB  Neck ROM: full  No difficulty expected  Dental - normal exam   (+) poor dentition    Pulmonary - normal exam   (+) asthma,  Cardiovascular - normal exam    (+) hypertension well controlled, CAD, cardiac stents hyperlipidemia,       Neuro/Psych- negative ROS  GI/Hepatic/Renal/Endo    (+) obesity,  GERD,  diabetes mellitus type 2 poorly controlled,     Musculoskeletal     Abdominal  - normal exam    Bowel sounds: normal.   Substance History - negative use     OB/GYN negative ob/gyn ROS         Other   arthritis,                      Anesthesia Plan    ASA 3     general with block   (General with eras tap block post op  tiva)  intravenous induction     Anesthetic plan, all risks, benefits, and alternatives have been provided, discussed and informed consent has been obtained with: patient.    Plan discussed with CAA.

## 2019-12-05 NOTE — INTERVAL H&P NOTE
H&P reviewed.  The patient was examined and there are no changes to the H&P. Proceed to OR for colon resection with possible ostomy.

## 2019-12-06 ENCOUNTER — APPOINTMENT (OUTPATIENT)
Dept: CT IMAGING | Facility: HOSPITAL | Age: 59
End: 2019-12-06

## 2019-12-06 ENCOUNTER — APPOINTMENT (OUTPATIENT)
Dept: MRI IMAGING | Facility: HOSPITAL | Age: 59
End: 2019-12-06

## 2019-12-06 ENCOUNTER — HOSPITAL ENCOUNTER (INPATIENT)
Dept: CARDIOLOGY | Facility: HOSPITAL | Age: 59
Discharge: HOME | End: 2019-12-06

## 2019-12-06 VITALS
WEIGHT: 200 LBS | HEIGHT: 73 IN | DIASTOLIC BLOOD PRESSURE: 75 MMHG | SYSTOLIC BLOOD PRESSURE: 132 MMHG | BODY MASS INDEX: 26.51 KG/M2

## 2019-12-06 PROBLEM — N17.9 AKI (ACUTE KIDNEY INJURY): Status: ACTIVE | Noted: 2019-12-06

## 2019-12-06 PROBLEM — I63.531 ACUTE ISCHEMIC RIGHT PCA STROKE (HCC): Status: ACTIVE | Noted: 2019-12-06

## 2019-12-06 PROBLEM — Z90.49 STATUS POST COLON RESECTION: Status: ACTIVE | Noted: 2019-12-06

## 2019-12-06 LAB
ANION GAP SERPL CALCULATED.3IONS-SCNC: 14 MMOL/L (ref 5–15)
ANION GAP SERPL CALCULATED.3IONS-SCNC: 20 MMOL/L (ref 5–15)
APTT PPP: 23.3 SECONDS (ref 24–31)
BASOPHILS # BLD AUTO: 0 10*3/MM3 (ref 0–0.2)
BASOPHILS NFR BLD AUTO: 0.1 % (ref 0–1.5)
BH CV ECHO MEAS - ACS: 1.8 CM
BH CV ECHO MEAS - AO MAX PG (FULL): 5.4 MMHG
BH CV ECHO MEAS - AO MAX PG: 9.5 MMHG
BH CV ECHO MEAS - AO MEAN PG (FULL): 4.2 MMHG
BH CV ECHO MEAS - AO MEAN PG: 6.7 MMHG
BH CV ECHO MEAS - AO ROOT AREA (BSA CORRECTED): 1.4
BH CV ECHO MEAS - AO ROOT AREA: 7.6 CM^2
BH CV ECHO MEAS - AO ROOT DIAM: 3.1 CM
BH CV ECHO MEAS - AO V2 MAX: 154 CM/SEC
BH CV ECHO MEAS - AO V2 MEAN: 125.9 CM/SEC
BH CV ECHO MEAS - AO V2 VTI: 34.3 CM
BH CV ECHO MEAS - AVA(I,A): 2 CM^2
BH CV ECHO MEAS - AVA(I,D): 2 CM^2
BH CV ECHO MEAS - AVA(V,A): 2.3 CM^2
BH CV ECHO MEAS - AVA(V,D): 2.3 CM^2
BH CV ECHO MEAS - BSA(HAYCOCK): 2.2 M^2
BH CV ECHO MEAS - BSA: 2.2 M^2
BH CV ECHO MEAS - BZI_BMI: 26.4 KILOGRAMS/M^2
BH CV ECHO MEAS - BZI_METRIC_HEIGHT: 185.4 CM
BH CV ECHO MEAS - BZI_METRIC_WEIGHT: 90.7 KG
BH CV ECHO MEAS - EDV(CUBED): 80.4 ML
BH CV ECHO MEAS - EDV(MOD-SP4): 87.8 ML
BH CV ECHO MEAS - EDV(TEICH): 83.8 ML
BH CV ECHO MEAS - EF(CUBED): 59 %
BH CV ECHO MEAS - EF(MOD-BP): 56 %
BH CV ECHO MEAS - EF(MOD-SP4): 55.5 %
BH CV ECHO MEAS - EF(TEICH): 50.9 %
BH CV ECHO MEAS - ESV(CUBED): 33 ML
BH CV ECHO MEAS - ESV(MOD-SP4): 39.1 ML
BH CV ECHO MEAS - ESV(TEICH): 41.2 ML
BH CV ECHO MEAS - FS: 25.7 %
BH CV ECHO MEAS - IVS/LVPW: 0.87
BH CV ECHO MEAS - IVSD: 0.83 CM
BH CV ECHO MEAS - LA DIMENSION(2D): 4.3 CM
BH CV ECHO MEAS - LV DIASTOLIC VOL/BSA (35-75): 40.8 ML/M^2
BH CV ECHO MEAS - LV MASS(C)D: 122.4 GRAMS
BH CV ECHO MEAS - LV MASS(C)DI: 56.9 GRAMS/M^2
BH CV ECHO MEAS - LV MAX PG: 4 MMHG
BH CV ECHO MEAS - LV MEAN PG: 2.5 MMHG
BH CV ECHO MEAS - LV SYSTOLIC VOL/BSA (12-30): 18.2 ML/M^2
BH CV ECHO MEAS - LV V1 MAX: 100.4 CM/SEC
BH CV ECHO MEAS - LV V1 MEAN: 75.1 CM/SEC
BH CV ECHO MEAS - LV V1 VTI: 20.1 CM
BH CV ECHO MEAS - LVIDD: 4.3 CM
BH CV ECHO MEAS - LVIDS: 3.2 CM
BH CV ECHO MEAS - LVOT AREA: 3.5 CM^2
BH CV ECHO MEAS - LVOT DIAM: 2.1 CM
BH CV ECHO MEAS - LVPWD: 0.95 CM
BH CV ECHO MEAS - MV A MAX VEL: 100.9 CM/SEC
BH CV ECHO MEAS - MV DEC SLOPE: 279.7 CM/SEC^2
BH CV ECHO MEAS - MV DEC TIME: 0.31 SEC
BH CV ECHO MEAS - MV E MAX VEL: 87.4 CM/SEC
BH CV ECHO MEAS - MV E/A: 0.87
BH CV ECHO MEAS - MV MAX PG: 4.4 MMHG
BH CV ECHO MEAS - MV MEAN PG: 2.3 MMHG
BH CV ECHO MEAS - MV V2 MAX: 105.3 CM/SEC
BH CV ECHO MEAS - MV V2 MEAN: 73.5 CM/SEC
BH CV ECHO MEAS - MV V2 VTI: 29.2 CM
BH CV ECHO MEAS - MVA(VTI): 2.4 CM^2
BH CV ECHO MEAS - PA MAX PG (FULL): 2.4 MMHG
BH CV ECHO MEAS - PA MAX PG: 8.2 MMHG
BH CV ECHO MEAS - PA V2 MAX: 142.5 CM/SEC
BH CV ECHO MEAS - PULM A REVS DUR: 0.1 SEC
BH CV ECHO MEAS - PULM A REVS VEL: 32.3 CM/SEC
BH CV ECHO MEAS - PULM DIAS VEL: 45.4 CM/SEC
BH CV ECHO MEAS - PULM S/D: 1.7
BH CV ECHO MEAS - PULM SYS VEL: 77.1 CM/SEC
BH CV ECHO MEAS - PVA(V,A): 2.5 CM^2
BH CV ECHO MEAS - PVA(V,D): 2.5 CM^2
BH CV ECHO MEAS - QP/QS: 0.88
BH CV ECHO MEAS - RAP SYSTOLE: 3 MMHG
BH CV ECHO MEAS - RV MAX PG: 5.8 MMHG
BH CV ECHO MEAS - RV MEAN PG: 2.7 MMHG
BH CV ECHO MEAS - RV V1 MAX: 120.1 CM/SEC
BH CV ECHO MEAS - RV V1 MEAN: 73 CM/SEC
BH CV ECHO MEAS - RV V1 VTI: 20.9 CM
BH CV ECHO MEAS - RVOT AREA: 3 CM^2
BH CV ECHO MEAS - RVOT DIAM: 1.9 CM
BH CV ECHO MEAS - RVSP: 11 MMHG
BH CV ECHO MEAS - SI(AO): 120.3 ML/M^2
BH CV ECHO MEAS - SI(CUBED): 22 ML/M^2
BH CV ECHO MEAS - SI(LVOT): 32.4 ML/M^2
BH CV ECHO MEAS - SI(MOD-SP4): 22.6 ML/M^2
BH CV ECHO MEAS - SI(TEICH): 19.8 ML/M^2
BH CV ECHO MEAS - SV(AO): 258.8 ML
BH CV ECHO MEAS - SV(CUBED): 47.4 ML
BH CV ECHO MEAS - SV(LVOT): 69.8 ML
BH CV ECHO MEAS - SV(MOD-SP4): 48.7 ML
BH CV ECHO MEAS - SV(RVOT): 61.6 ML
BH CV ECHO MEAS - SV(TEICH): 42.6 ML
BH CV ECHO MEAS - TR MAX VEL: 141.3 CM/SEC
BUN BLD-MCNC: 21 MG/DL (ref 8–23)
BUN BLD-MCNC: 22 MG/DL (ref 8–23)
BUN/CREAT SERPL: 11.4 (ref 7–25)
BUN/CREAT SERPL: 12.1 (ref 7–25)
CALCIUM SPEC-SCNC: 8.4 MG/DL (ref 8.6–10.5)
CALCIUM SPEC-SCNC: 8.7 MG/DL (ref 8.6–10.5)
CHLORIDE SERPL-SCNC: 100 MMOL/L (ref 98–107)
CHLORIDE SERPL-SCNC: 98 MMOL/L (ref 98–107)
CHOLEST SERPL-MCNC: 175 MG/DL (ref 0–200)
CO2 SERPL-SCNC: 18 MMOL/L (ref 22–29)
CO2 SERPL-SCNC: 19 MMOL/L (ref 22–29)
CREAT BLD-MCNC: 1.73 MG/DL (ref 0.76–1.27)
CREAT BLD-MCNC: 1.93 MG/DL (ref 0.76–1.27)
DEPRECATED RDW RBC AUTO: 45.9 FL (ref 37–54)
DEPRECATED RDW RBC AUTO: 47.7 FL (ref 37–54)
EOSINOPHIL # BLD AUTO: 0 10*3/MM3 (ref 0–0.4)
EOSINOPHIL NFR BLD AUTO: 0 % (ref 0.3–6.2)
ERYTHROCYTE [DISTWIDTH] IN BLOOD BY AUTOMATED COUNT: 14.3 % (ref 12.3–15.4)
ERYTHROCYTE [DISTWIDTH] IN BLOOD BY AUTOMATED COUNT: 15 % (ref 12.3–15.4)
GFR SERPL CREATININE-BSD FRML MDRD: 36 ML/MIN/1.73
GFR SERPL CREATININE-BSD FRML MDRD: 40 ML/MIN/1.73
GLUCOSE BLD-MCNC: 405 MG/DL (ref 65–99)
GLUCOSE BLD-MCNC: 438 MG/DL (ref 65–99)
GLUCOSE BLDC GLUCOMTR-MCNC: 259 MG/DL (ref 70–105)
GLUCOSE BLDC GLUCOMTR-MCNC: 268 MG/DL (ref 70–105)
GLUCOSE BLDC GLUCOMTR-MCNC: 325 MG/DL (ref 70–105)
GLUCOSE BLDC GLUCOMTR-MCNC: 365 MG/DL (ref 70–105)
GLUCOSE BLDC GLUCOMTR-MCNC: 374 MG/DL (ref 70–105)
GLUCOSE BLDC GLUCOMTR-MCNC: 437 MG/DL (ref 70–105)
HBA1C MFR BLD: 9.5 % (ref 3.5–5.6)
HCT VFR BLD AUTO: 38.5 % (ref 37.5–51)
HCT VFR BLD AUTO: 39.7 % (ref 37.5–51)
HDLC SERPL-MCNC: 37 MG/DL (ref 40–60)
HGB BLD-MCNC: 12.8 G/DL (ref 13–17.7)
HGB BLD-MCNC: 13.6 G/DL (ref 13–17.7)
HOLD SPECIMEN: NORMAL
HOLD SPECIMEN: NORMAL
INR PPP: 0.94 (ref 0.9–1.1)
LDLC SERPL CALC-MCNC: ABNORMAL MG/DL
LDLC/HDLC SERPL: ABNORMAL {RATIO}
LV EF 2D ECHO EST: 60 %
LYMPHOCYTES # BLD AUTO: 0.7 10*3/MM3 (ref 0.7–3.1)
LYMPHOCYTES NFR BLD AUTO: 4.3 % (ref 19.6–45.3)
MCH RBC QN AUTO: 29.7 PG (ref 26.6–33)
MCH RBC QN AUTO: 31.4 PG (ref 26.6–33)
MCHC RBC AUTO-ENTMCNC: 33.3 G/DL (ref 31.5–35.7)
MCHC RBC AUTO-ENTMCNC: 34.4 G/DL (ref 31.5–35.7)
MCV RBC AUTO: 89 FL (ref 79–97)
MCV RBC AUTO: 91.3 FL (ref 79–97)
MONOCYTES # BLD AUTO: 1.1 10*3/MM3 (ref 0.1–0.9)
MONOCYTES NFR BLD AUTO: 6.8 % (ref 5–12)
NEUTROPHILS # BLD AUTO: 14.2 10*3/MM3 (ref 1.7–7)
NEUTROPHILS NFR BLD AUTO: 88.8 % (ref 42.7–76)
NRBC BLD AUTO-RTO: 0 /100 WBC (ref 0–0.2)
PLATELET # BLD AUTO: 379 10*3/MM3 (ref 140–450)
PLATELET # BLD AUTO: 439 10*3/MM3 (ref 140–450)
PMV BLD AUTO: 7.4 FL (ref 6–12)
PMV BLD AUTO: 8.3 FL (ref 6–12)
POTASSIUM BLD-SCNC: 4.8 MMOL/L (ref 3.5–5.2)
POTASSIUM BLD-SCNC: 4.8 MMOL/L (ref 3.5–5.2)
POTASSIUM BLD-SCNC: 6.6 MMOL/L (ref 3.5–5.2)
PROTHROMBIN TIME: 10 SECONDS (ref 9.6–11.7)
RBC # BLD AUTO: 4.33 10*6/MM3 (ref 4.14–5.8)
RBC # BLD AUTO: 4.35 10*6/MM3 (ref 4.14–5.8)
SODIUM BLD-SCNC: 133 MMOL/L (ref 136–145)
SODIUM BLD-SCNC: 136 MMOL/L (ref 136–145)
TRIGL SERPL-MCNC: 454 MG/DL (ref 0–150)
TSH SERPL DL<=0.05 MIU/L-ACNC: 2.34 UIU/ML (ref 0.27–4.2)
VLDLC SERPL-MCNC: ABNORMAL MG/DL
WBC NRBC COR # BLD: 15.7 10*3/MM3 (ref 3.4–10.8)
WBC NRBC COR # BLD: 15.9 10*3/MM3 (ref 3.4–10.8)
WHOLE BLOOD HOLD SPECIMEN: NORMAL

## 2019-12-06 PROCEDURE — 99233 SBSQ HOSP IP/OBS HIGH 50: CPT | Performed by: INTERNAL MEDICINE

## 2019-12-06 PROCEDURE — 97166 OT EVAL MOD COMPLEX 45 MIN: CPT

## 2019-12-06 PROCEDURE — 25010000002 HYDROMORPHONE PER 4 MG: Performed by: SURGERY

## 2019-12-06 PROCEDURE — 99024 POSTOP FOLLOW-UP VISIT: CPT | Performed by: SURGERY

## 2019-12-06 PROCEDURE — 80048 BASIC METABOLIC PNL TOTAL CA: CPT | Performed by: PHYSICIAN ASSISTANT

## 2019-12-06 PROCEDURE — 84132 ASSAY OF SERUM POTASSIUM: CPT | Performed by: INTERNAL MEDICINE

## 2019-12-06 PROCEDURE — 0 IOPAMIDOL PER 1 ML: Performed by: SURGERY

## 2019-12-06 PROCEDURE — 97162 PT EVAL MOD COMPLEX 30 MIN: CPT

## 2019-12-06 PROCEDURE — 85025 COMPLETE CBC W/AUTO DIFF WBC: CPT | Performed by: PHYSICIAN ASSISTANT

## 2019-12-06 PROCEDURE — 84443 ASSAY THYROID STIM HORMONE: CPT | Performed by: NURSE PRACTITIONER

## 2019-12-06 PROCEDURE — 63710000001 INSULIN REGULAR HUMAN PER 5 UNITS: Performed by: INTERNAL MEDICINE

## 2019-12-06 PROCEDURE — 63710000001 INSULIN LISPRO (HUMAN) PER 5 UNITS: Performed by: INTERNAL MEDICINE

## 2019-12-06 PROCEDURE — 86038 ANTINUCLEAR ANTIBODIES: CPT | Performed by: NURSE PRACTITIONER

## 2019-12-06 PROCEDURE — 85730 THROMBOPLASTIN TIME PARTIAL: CPT | Performed by: PHYSICIAN ASSISTANT

## 2019-12-06 PROCEDURE — 70551 MRI BRAIN STEM W/O DYE: CPT

## 2019-12-06 PROCEDURE — 63710000001 INSULIN GLARGINE PER 5 UNITS: Performed by: INTERNAL MEDICINE

## 2019-12-06 PROCEDURE — 83036 HEMOGLOBIN GLYCOSYLATED A1C: CPT | Performed by: PHYSICIAN ASSISTANT

## 2019-12-06 PROCEDURE — 93306 TTE W/DOPPLER COMPLETE: CPT | Performed by: INTERNAL MEDICINE

## 2019-12-06 PROCEDURE — 80048 BASIC METABOLIC PNL TOTAL CA: CPT | Performed by: PSYCHIATRY & NEUROLOGY

## 2019-12-06 PROCEDURE — 70450 CT HEAD/BRAIN W/O DYE: CPT

## 2019-12-06 PROCEDURE — 93306 TTE W/DOPPLER COMPLETE: CPT

## 2019-12-06 PROCEDURE — 83721 ASSAY OF BLOOD LIPOPROTEIN: CPT | Performed by: NURSE PRACTITIONER

## 2019-12-06 PROCEDURE — 99253 IP/OBS CNSLTJ NEW/EST LOW 45: CPT | Performed by: NURSE PRACTITIONER

## 2019-12-06 PROCEDURE — 80061 LIPID PANEL: CPT | Performed by: NURSE PRACTITIONER

## 2019-12-06 PROCEDURE — 97535 SELF CARE MNGMENT TRAINING: CPT

## 2019-12-06 PROCEDURE — 93005 ELECTROCARDIOGRAM TRACING: CPT | Performed by: PHYSICIAN ASSISTANT

## 2019-12-06 PROCEDURE — 85027 COMPLETE CBC AUTOMATED: CPT | Performed by: PHYSICIAN ASSISTANT

## 2019-12-06 PROCEDURE — 82962 GLUCOSE BLOOD TEST: CPT

## 2019-12-06 PROCEDURE — 92610 EVALUATE SWALLOWING FUNCTION: CPT

## 2019-12-06 PROCEDURE — 70498 CT ANGIOGRAPHY NECK: CPT

## 2019-12-06 PROCEDURE — 82607 VITAMIN B-12: CPT | Performed by: NURSE PRACTITIONER

## 2019-12-06 PROCEDURE — 70496 CT ANGIOGRAPHY HEAD: CPT

## 2019-12-06 PROCEDURE — 85610 PROTHROMBIN TIME: CPT | Performed by: PHYSICIAN ASSISTANT

## 2019-12-06 PROCEDURE — 93010 ELECTROCARDIOGRAM REPORT: CPT | Performed by: INTERNAL MEDICINE

## 2019-12-06 RX ORDER — ASPIRIN 81 MG/1
81 TABLET ORAL DAILY
Status: DISCONTINUED | OUTPATIENT
Start: 2019-12-07 | End: 2019-12-11 | Stop reason: HOSPADM

## 2019-12-06 RX ORDER — SODIUM POLYSTYRENE SULFONATE 15 G/60ML
15 SUSPENSION ORAL; RECTAL ONCE
Status: COMPLETED | OUTPATIENT
Start: 2019-12-06 | End: 2019-12-06

## 2019-12-06 RX ORDER — DEXTROSE MONOHYDRATE 25 G/50ML
25 INJECTION, SOLUTION INTRAVENOUS
Status: DISCONTINUED | OUTPATIENT
Start: 2019-12-06 | End: 2019-12-06 | Stop reason: SDUPTHER

## 2019-12-06 RX ORDER — NICOTINE POLACRILEX 4 MG
15 LOZENGE BUCCAL
Status: DISCONTINUED | OUTPATIENT
Start: 2019-12-06 | End: 2019-12-06 | Stop reason: SDUPTHER

## 2019-12-06 RX ORDER — ASPIRIN 325 MG
325 TABLET ORAL ONCE
Status: DISCONTINUED | OUTPATIENT
Start: 2019-12-06 | End: 2019-12-06

## 2019-12-06 RX ORDER — INSULIN GLARGINE 100 [IU]/ML
20 INJECTION, SOLUTION SUBCUTANEOUS NIGHTLY
Status: DISCONTINUED | OUTPATIENT
Start: 2019-12-06 | End: 2019-12-07

## 2019-12-06 RX ORDER — ASPIRIN 300 MG/1
300 SUPPOSITORY RECTAL ONCE
Status: COMPLETED | OUTPATIENT
Start: 2019-12-06 | End: 2019-12-06

## 2019-12-06 RX ORDER — ATORVASTATIN CALCIUM 40 MG/1
40 TABLET, FILM COATED ORAL EVERY EVENING
Status: DISCONTINUED | OUTPATIENT
Start: 2019-12-06 | End: 2019-12-07

## 2019-12-06 RX ADMIN — SODIUM POLYSTYRENE SULFONATE 15 G: 15 SUSPENSION ORAL; RECTAL at 00:51

## 2019-12-06 RX ADMIN — INSULIN LISPRO 6 UNITS: 100 INJECTION, SOLUTION INTRAVENOUS; SUBCUTANEOUS at 22:14

## 2019-12-06 RX ADMIN — OXYCODONE HYDROCHLORIDE 10 MG: 5 TABLET ORAL at 22:14

## 2019-12-06 RX ADMIN — INSULIN LISPRO 6 UNITS: 100 INJECTION, SOLUTION INTRAVENOUS; SUBCUTANEOUS at 07:36

## 2019-12-06 RX ADMIN — INSULIN GLARGINE 20 UNITS: 100 INJECTION, SOLUTION SUBCUTANEOUS at 22:14

## 2019-12-06 RX ADMIN — PANTOPRAZOLE SODIUM 40 MG: 40 TABLET, DELAYED RELEASE ORAL at 05:32

## 2019-12-06 RX ADMIN — HYDROMORPHONE HYDROCHLORIDE 0.5 MG: 2 INJECTION, SOLUTION INTRAMUSCULAR; INTRAVENOUS; SUBCUTANEOUS at 08:56

## 2019-12-06 RX ADMIN — INSULIN HUMAN 10 UNITS: 100 INJECTION, SOLUTION PARENTERAL at 05:31

## 2019-12-06 RX ADMIN — OXYCODONE HYDROCHLORIDE 10 MG: 5 TABLET ORAL at 07:36

## 2019-12-06 RX ADMIN — ASPIRIN 300 MG: 300 SUPPOSITORY RECTAL at 11:00

## 2019-12-06 RX ADMIN — ACETAMINOPHEN 1000 MG: 500 TABLET, FILM COATED ORAL at 07:37

## 2019-12-06 RX ADMIN — ACETAMINOPHEN 1000 MG: 500 TABLET, FILM COATED ORAL at 20:37

## 2019-12-06 RX ADMIN — HYDROMORPHONE HYDROCHLORIDE 0.5 MG: 2 INJECTION, SOLUTION INTRAMUSCULAR; INTRAVENOUS; SUBCUTANEOUS at 13:07

## 2019-12-06 RX ADMIN — ATORVASTATIN CALCIUM 40 MG: 40 TABLET, FILM COATED ORAL at 16:06

## 2019-12-06 RX ADMIN — INSULIN LISPRO 6 UNITS: 100 INJECTION, SOLUTION INTRAVENOUS; SUBCUTANEOUS at 10:39

## 2019-12-06 RX ADMIN — METOPROLOL TARTRATE 50 MG: 50 TABLET, FILM COATED ORAL at 20:37

## 2019-12-06 RX ADMIN — HYDROMORPHONE HYDROCHLORIDE 0.5 MG: 2 INJECTION, SOLUTION INTRAMUSCULAR; INTRAVENOUS; SUBCUTANEOUS at 16:10

## 2019-12-06 RX ADMIN — HYDROMORPHONE HYDROCHLORIDE 0.5 MG: 2 INJECTION, SOLUTION INTRAMUSCULAR; INTRAVENOUS; SUBCUTANEOUS at 02:54

## 2019-12-06 RX ADMIN — INSULIN LISPRO 3 UNITS: 100 INJECTION, SOLUTION INTRAVENOUS; SUBCUTANEOUS at 17:25

## 2019-12-06 RX ADMIN — SODIUM CHLORIDE 100 ML/HR: 900 INJECTION, SOLUTION INTRAVENOUS at 02:11

## 2019-12-06 RX ADMIN — HYDROMORPHONE HYDROCHLORIDE 0.5 MG: 2 INJECTION, SOLUTION INTRAMUSCULAR; INTRAVENOUS; SUBCUTANEOUS at 00:50

## 2019-12-06 RX ADMIN — INSULIN LISPRO 5 UNITS: 100 INJECTION, SOLUTION INTRAVENOUS; SUBCUTANEOUS at 13:04

## 2019-12-06 RX ADMIN — METOPROLOL TARTRATE 50 MG: 50 TABLET, FILM COATED ORAL at 07:37

## 2019-12-06 RX ADMIN — ACETAMINOPHEN 1000 MG: 500 TABLET, FILM COATED ORAL at 16:06

## 2019-12-06 RX ADMIN — IOPAMIDOL 100 ML: 755 INJECTION, SOLUTION INTRAVENOUS at 11:15

## 2019-12-06 RX ADMIN — HYDROMORPHONE HYDROCHLORIDE 0.5 MG: 2 INJECTION, SOLUTION INTRAMUSCULAR; INTRAVENOUS; SUBCUTANEOUS at 05:32

## 2019-12-06 RX ADMIN — HYDROMORPHONE HYDROCHLORIDE 0.5 MG: 2 INJECTION, SOLUTION INTRAMUSCULAR; INTRAVENOUS; SUBCUTANEOUS at 20:38

## 2019-12-06 NOTE — PLAN OF CARE
Problem: Patient Care Overview  Goal: Plan of Care Review   12/06/19 1431   OTHER   Outcome Summary Orders received via stroke protocol. Swallow evaluation completed on this date. Pt tolerates all consistencies w/no clinical s/s of aspiration. Recommend a regular diet w/thin liquids. Please see report for further details. ST to cont to follow for diet tolerance.

## 2019-12-06 NOTE — THERAPY EVALUATION
Acute Care - Occupational Therapy Initial Evaluation  [facility]IG[facility] Mc[facility]     Patient Name: Chao Lazar  : 1960  MRN: [id number]  Today's Date: 2019             Admit Date: 2019       ICD-10-CM ICD-9-CM   1. Polyp of transverse colon, unspecified type D12.3 211.3     Patient Active Problem List   Diagnosis   • Allergic state   • Asthma   • Atherosclerotic heart disease of native coronary artery without angina pectoris   • Chronic cough   • Degenerative joint disease   • Diabetes mellitus, type II (CMS/[facility])   • Type 2 diabetes mellitus with other diabetic neurological complication (CMS/[facility])   • Diabetic foot ulcer (CMS/[facility])   • Encounter for general adult medical examination without abnormal findings   • Gastroparesis   • History of prosthetic heart valve   • Hyperlipidemia, mixed   • Hypertension   • Nausea and vomiting   • Other specified dorsopathies, site unspecified   • Type 2 diabetes mellitus with hyperglycemia (CMS/[facility])   • Vitamin D deficiency   • Combined forms of age-related cataract, bilateral   • Other specified retinal disorders   • Presbyopia   • Type 2 or unspecified type diabetes mellitus     Past Medical History:   Diagnosis Date   • ACE-inhibitor cough    • Arthritis    • Diabetes mellitus (CMS/[facility]) 1988    type 2    • GERD (gastroesophageal reflux disease)    • Hyperlipidemia    • Hypertension    • RAD (reactive airway disease)      Past Surgical History:   Procedure Laterality Date   • CARDIAC CATHETERIZATION     • CHOLECYSTECTOMY     • COLONOSCOPY  2019    x2    • CORONARY ANGIOPLASTY WITH STENT PLACEMENT  2017   • FRACTURE SURGERY      collar bone as a child    • KNEE ARTHROSCOPY Left 2003   • KNEE JOINT MANIPULATION Left 2010   • TOTAL KNEE ARTHROPLASTY Bilateral     ,          OT ASSESSMENT FLOWSHEET (last 12 hours)      Occupational Therapy Evaluation     Row Name 19 0855                   OT Evaluation Time/Intention    Subjective Information  no  complaints  -SR        Document Type  evaluation  -SR        Mode of Treatment  individual therapy  -SR           General Information    Equipment Currently Used at Home  none;cane, straight;walker, rolling  -SR        Pertinent History of Current Functional Problem  Pt underwent R hemicolectomy due to colon polyp.   -SR        Existing Precautions/Restrictions  -- Abdominal incision   -SR           Relationship/Environment    Lives With  spouse  -SR        Name(s) of Who Lives With Patient  Debbi  -SR        Family Caregiver if Needed  spouse Pt works during the day.   -SR           Resource/Environmental Concerns    Current Living Arrangements  home/apartment/condo  -SR           Home Main Entrance    Number of Stairs, Main Entrance  two  -SR           Cognitive Assessment/Intervention- PT/OT    Orientation Status (Cognition)  oriented x 4  -SR           Bed Mobility Assessment/Treatment    Bed Mobility Assessment/Treatment  supine-sit  -SR        Supine-Sit [place] (Bed Mobility)  moderate assist (50% patient effort)  -SR           Functional Mobility    Functional Mobility- Ind. Level  contact guard assist  -SR        Functional Mobility- Device  rolling walker  -SR           Transfer Assessment/Treatment    Transfer Assessment/Treatment  sit-stand transfer  -SR           Sit-Stand Transfer    Sit-Stand [place] (Transfers)  minimum assist (75% patient effort)  -SR        Assistive Device (Sit-Stand Transfers)  walker, front-wheeled  -SR           ADL Assessment/Intervention    BADL Assessment/Intervention  lower body dressing  -SR           Lower Body Dressing Assessment/Training    Lower Body Dressing [place] Level  socks;supervision  -SR           General ROM    GENERAL ROM COMMENTS  WFL   -SR           MMT (Manual Muscle Testing)    General MMT Comments  Shoulders 4-/5 due to pain.  Distal UE 4/5.    -SR           Balance    Balance  static sitting balance;static standing balance;dynamic  sitting balance;dynamic standing balance  -SR           Static Sitting Balance    Level of [place] (Unsupported Sitting, Static Balance)  supervision  -SR        Sitting Position (Unsupported Sitting, Static Balance)  sitting on edge of bed  -SR        Time Able to Maintain Position (Unsupported Sitting, Static Balance)  4 to 5 minutes  -SR        Comment (Supported Sitting, Static Balance)  Pt reports mild dizzines while sitting EOB.  States it remains the same and does not improve or get worse.  BP taken and was 124/78  -SR           Dynamic Sitting Balance    Level of [place], Reaches Outside Midline (Sitting, Dynamic Balance)  supervision  -SR        Sitting Position, Reaches Outside Midline (Sitting, Dynamic Balance)  sitting in chair  -SR           Static Standing Balance    Level of [place] (Supported Standing, Static Balance)  minimal assist, 75% patient effort  -SR        Time Able to Maintain Position (Supported Standing, Static Balance)  2 to 3 minutes  -SR        Assistive Device Utilized (Supported Standing, Static Balance)  walker, rolling  -SR           Dynamic Standing Balance    Level of [place], Reaches Outside Midline (Standing, Dynamic Balance)  minimal assist, 75% patient effort  -SR        Time Able to Maintain Position, Reaches Outside Midline (Standing, Dynamic Balance)  45 to 60 seconds  -SR        Assistive Device Utilized (Supported Standing, Dynamic Balance)  walker, rolling  -SR           Positioning and Restraints    Pre-Treatment Position  sitting in chair/recliner  -SR        Post Treatment Position  bed  -SR        In Bed  call light within reach;encouraged to call for assist;exit alarm on  -SR           Pain Scale: Numbers Pre/Post-Treatment    Pain Scale: Numbers, Pretreatment  6/10  -SR           Wound 12/05/19 midline abdomen Incision    Wound - Properties Group Date first assessed: 12/05/19  -AL Present on Hospital Admission: N  -AL Orientation: midline  -AL  Location: abdomen  -AL Primary Wound Type: Incision  -AL, SURGICAL INCISION        Wound 12/05/19 1551 Other (See comments) abdomen Incision    Wound - Properties Group Date first assessed: 12/05/19  -LEONARDO Time first assessed: 1551  -LEONARDO Side: Other (See comments)  -LEONARDO Location: abdomen  -LEONARDO Primary Wound Type: Incision  -LEONARDO       Plan of Care Review    Plan of Care Reviewed With  patient  -SR           Clinical Impression (OT)    Criteria for Skilled Therapeutic Interventions Met (OT Eval)  yes;treatment indicated  -SR        Rehab Potential (OT Eval)  good, to achieve stated therapy goals  -SR        Therapy Frequency (OT Eval)  3 times/wk  -SR        Predicted Duration of Therapy Intervention (Therapy Eval)  Until discharge   -SR        Anticipated Discharge Disposition (OT)  inpatient rehabilitation facility  -SR           Vital Signs    Intra Systolic BP Rehab  124  -SR        Intra Treatment Diastolic BP  78  -SR           Planned OT Interventions    Planned Therapy Interventions (OT Eval)  activity tolerance training;BADL retraining;functional balance retraining;occupation/activity based interventions;passive ROM/stretching;ROM/therapeutic exercise;transfer/mobility retraining  -SR           OT Goals    Bed Mobility Goal Selection (OT)  bed mobility, OT goal 1  -SR        Toileting Goal Selection (OT)  toileting, OT goal 1  -SR           Bed Mobility Goal 1 (OT)    Activity/Assistive Device (Bed Mobility Goal 1, OT)  bed mobility activities, all  -SR        [place] Level/Cues Needed (Bed Mobility Goal 1, OT)  conditional independence  -SR        Time Frame (Bed Mobility Goal 1, OT)  2 weeks  -SR           Toileting Goal 1 (OT)    Activity/Device (Toileting Goal 1, OT)  toileting skills, all  -SR        [place] Level/Cues Needed (Toileting Goal 1, OT)  contact guard assist  -SR        Time Frame (Toileting Goal 1, OT)  2 weeks  -SR           Living Environment    Home Accessibility  stairs to enter home   -SR          User Key  (r) = Recorded By, (t) = Taken By, (c) = Cosigned By    Initials Name Effective Dates     Loulou Judd OT 03/01/19 -     Viktoria Harvey RN 03/01/19 -     Uma Duque RN 03/01/19 -          Occupational Therapy Education     Title: PT OT SLP Therapies (In Progress)     Topic: Occupational Therapy (In Progress)     Point: ADL training (Done)     Description: Instruct learner(s) on proper safety adaptation and remediation techniques during self care or transfers.   Instruct in proper use of assistive devices.    Learning Progress Summary           Patient Acceptance, E,TB, VU by  at 12/6/2019 11:24 AM                   Point: Body mechanics (Done)     Description: Instruct learner(s) on proper positioning and spine alignment during self-care, functional mobility activities and/or exercises.    Learning Progress Summary           Patient Acceptance, E,TB, VU by SR at 12/6/2019 11:24 AM                               User Key     Initials Effective Dates Name Provider Type Discipline     03/01/19 -  Loulou Judd OT Occupational Therapist OT                  OT Recommendation and Plan  Outcome Summary/Treatment Plan (OT)  Anticipated Discharge Disposition (OT): inpatient rehabilitation facility  Planned Therapy Interventions (OT Eval): activity tolerance training, BADL retraining, functional balance retraining, occupation/activity based interventions, passive ROM/stretching, ROM/therapeutic exercise, transfer/mobility retraining  Therapy Frequency (OT Eval): 3 times/wk  Plan of Care Review  Plan of Care Reviewed With: patient  Plan of Care Reviewed With: patient  Outcome Summary: Pt admitted for polyp of transferse colon.  Underwent R hemicolectomy 12/5.  He is very painful this AM and RN just administered IV pain medication.  He required mod assist for bed mobiltiy.  Reported mild dizziness and BP was taken and WNL.  Dizziness was thought to be associated  with pain medications.  Due to dizziness chair was brought closer to patient.  He required min assist for balance while walking to chair.  He demonstrates ability to reach distal LE using figure 4 technique. After OT session pt had stroke like symptoms with PT and code stroke was called.  Pt may require re-evaluation next visit.  At this point recommend IP rehab at discharge.          Outcome Measures     Row Name 12/06/19 1000             Modified Dickinson Scale    Pre-Stroke Modified Dickinson Scale  0 - No Symptoms at all.  -SS      Modified [place] Scale  4 - Moderately severe disability.  Unable to walk without assistance, and unable to attend to own bodily needs without assistance.  -SS         Functional Assessment    Outcome Measure Options  Modified Dickinson  -SS        User Key  (r) = Recorded By, (t) = Taken By, (c) = Cosigned By    Initials Name Provider Type    SS Sunni Kwan, PT Physical Therapist          Time Calculation:   Time Calculation- OT     Row Name 12/06/19 1124             Time Calculation- OT    OT Start Time  0855  -SR      OT Stop Time  0920  -SR      OT Time Calculation (min)  25 min  -SR      Total Timed Code Minutes- OT  10 minute(s)  -SR      OT Non-Billable Time (min)  15 min  -SR      OT Received On  12/06/19  -SR      OT - Next Appointment  12/09/19  -      OT Goal Re-Cert Due Date  12/20/19  -SR        User Key  (r) = Recorded By, (t) = Taken By, (c) = Cosigned By    Initials Name Provider Type    SR Loulou Judd OT Occupational Therapist        Therapy Charges for Today     Code Description Service Date Service Provider Modifiers Qty    [id number]  OT EVAL MOD COMPLEXITY 2 12/6/2019 Loulou Judd OT GO 1    [id number]  OT SELF CARE/MGMT/TRAIN EA 15 MIN 12/6/2019 Loulou Judd OT GO 1               Loulou Judd OT  12/6/2019

## 2019-12-06 NOTE — CONSULTS
Referring Provider: Duglas  Reason for Consultation: Medical management    Patient Care Team:  Al Kimbrough MD as PCP - General  Al Kimbrough MD as PCP - Family Medicine    Chief complaint: Abdominal pain following colon resection    Subjective     History of present illness: Patient is a 59-year-old male seen today for colon resection secondary to recent diagnosis of multiple polyps.  Patient denies any symptoms associated with his condition however he was undergoing frequent colonoscopies due to sister who was diagnosed with colon cancer.  At present patient states his pain is present but tolerable (being managed by surgeon) he does report some nausea.  He denies any abdominal bloating, and has not had a bowel movement or passed any flatus at present.  No chest pain, short of air, cough or fevers are reported.    Patient does have a history of diabetes and states his blood glucose measurements have been elevated recently.  Current glucose 337.  Other labs pending.    Review of Systems:  Review of Systems   Constitutional: Negative.    HENT: Negative.    Eyes: Negative.    Respiratory: Negative.    Cardiovascular: Negative.    Gastrointestinal: Positive for abdominal pain and nausea.   Endocrine: Negative.    Genitourinary: Negative.    Musculoskeletal: Negative.    Skin: Negative.    Allergic/Immunologic: Negative.    Neurological: Negative.    Hematological: Negative.    Psychiatric/Behavioral: Negative.        History:  Past Medical History:   Diagnosis Date   • ACE-inhibitor cough    • Arthritis    • Diabetes mellitus (CMS/HCC) 1988    type 2    • GERD (gastroesophageal reflux disease)    • Hyperlipidemia    • Hypertension    • RAD (reactive airway disease)       Past Surgical History:   Procedure Laterality Date   • CARDIAC CATHETERIZATION     • CHOLECYSTECTOMY  2004   • COLONOSCOPY  2019    x2    • CORONARY ANGIOPLASTY WITH STENT PLACEMENT  04/2017   • FRACTURE SURGERY      collar bone as  a child    • KNEE ARTHROSCOPY Left 2003   • KNEE JOINT MANIPULATION Left 2010   • TOTAL KNEE ARTHROPLASTY Bilateral     ,     Family History   Problem Relation Age of Onset   • Irritable bowel syndrome Mother    • Anxiety disorder Mother    • Depression Father    • Hypertension Father    • Mental illness Father         committed suicide   • Other Sister         back problems   • Other Brother         back problems     Social History     Tobacco Use   • Smoking status: Former Smoker     Last attempt to quit: 2007     Years since quittin.9   • Smokeless tobacco: Never Used   Substance Use Topics   • Alcohol use: Yes     Comment: occasionally   • Drug use: No     Medications Prior to Admission   Medication Sig Dispense Refill Last Dose   • atorvastatin (LIPITOR) 20 MG tablet Take 1 tablet by mouth Every Evening. 1 daily 30 tablet 3 2019 at Unknown time   • Cholecalciferol 5000 units tablet Take 1 tablet by mouth Daily. Stop taking on    Past Week at Unknown time   • FARXIGA 10 MG tablet Take 1 tablet by mouth Every Morning Before Breakfast. Do not take morning of surgery  4 Past Week at Unknown time   • Insulin NPH Isophane & Regular (NOVOLIN 70/30 FLEXPEN RELION) (70-30) 100 UNIT/ML suspension pen-injector Inject 64 Units under the skin into the appropriate area as directed 4 (Four) Times a Day. Do not take the day of surgery   2019 at Unknown time   • metFORMIN (GLUCOPHAGE) 500 MG tablet Take 1 tablet by mouth 3 (Three) Times a Day. (Patient taking differently: Take 500 mg by mouth 2 (Two) Times a Day With Meals. Take last dose Dec 3 Tues am) 30 tablet 3 Past Week at Unknown time   • metoclopramide (REGLAN) 10 MG tablet Take 10 mg by mouth 3 (Three) Times a Day. Do not take the morning of surgery  6 2019 at Unknown time   • metoprolol tartrate (LOPRESSOR) 50 MG tablet Take 1 tablet by mouth 2 (Two) Times a Day for 30 days. Take one twice daily 60 tablet 11 2019 at Unknown  time   • omeprazole (PrilOSEC) 40 MG capsule Take 40 mg by mouth 2 (Two) Times a Day. May take the day of surgery   12/4/2019 at Unknown time   • B-D UF III MINI PEN NEEDLES 31G X 5 MM misc USE WITH INSULIN PEN 4 TIMES A DAY DX E11.65  3 Taking   • diphenhydrAMINE (BENADRYL ALLERGY) 25 mg capsule Take 2 capsules by mouth Every 4 (Four) Hours.      • glucose blood (ONE TOUCH ULTRA TEST) test strip ONETOUCH ULTRA BLUE STRP   Taking   • Insulin Pen Needle (B-D UF III MINI PEN NEEDLES) 31G X 5 MM misc Use with insulin 4 times daily dx:e11.65 150 each 3 Taking   • ONETOUCH DELICA LANCETS 33G misc ONETOUCH DELICA LANCETS 33G   Taking     Allergies:  Patient has no known allergies.      Objective      Vital Signs:  Temp:  [97.3 °F (36.3 °C)-98.2 °F (36.8 °C)] 98.2 °F (36.8 °C)  Heart Rate:  [88-93] 91  Resp:  [12-24] 18  BP: ()/(58-73) 107/68      Med Review:  Scheduled Meds:  acetaminophen 1,000 mg Oral TID   atorvastatin 20 mg Oral Q PM   insulin lispro 0-7 Units Subcutaneous 4x Daily With Meals & Nightly   metoprolol tartrate 50 mg Oral BID   [START ON 12/6/2019] pantoprazole 40 mg Oral QAM     Continuous Infusions:  dextrose 5 % and sodium chloride 0.45 % with KCl 20 mEq/L 100 mL/hr Last Rate: 100 mL/hr (12/05/19 1942)     PRN Meds:.dextrose  •  dextrose  •  glucagon (human recombinant)  •  HYDROmorphone **AND** naloxone  •  insulin lispro **AND** insulin lispro  •  magnesium hydroxide  •  oxyCODONE **OR** oxyCODONE      Physical Exam:  Physical Exam   Constitutional: He is oriented to person, place, and time. He appears well-developed and well-nourished.   HENT:   Head: Normocephalic and atraumatic.   Eyes: Conjunctivae and EOM are normal. Pupils are equal, round, and reactive to light.   Neck: Normal range of motion. Neck supple.   Cardiovascular: Normal rate, regular rhythm, normal heart sounds and intact distal pulses.   Pulmonary/Chest: Effort normal and breath sounds normal.   Abdominal:   Abdominal binder  in place, difficult to assess abdomen.  Patient does report mild to moderate surgical related pain denies any feelings of distention or bloating.   Musculoskeletal: Normal range of motion.   Neurological: He is alert and oriented to person, place, and time.   Skin: Skin is warm and dry.   Psychiatric: He has a normal mood and affect. His behavior is normal. Judgment and thought content normal.       General: NAD, resting in bed  Eyes: PERRL, nonicteric  HENT: normocephalic, atraumatic, MMM, pharynx clear without erythema or exudate  Card: S1, S2, no murmurs  Resp: CTA b/l, no r/r/w  GI: soft, nt, nd, +bs  Skin: warm, dry, intact, no rashes  Extremities: no c/c/e  Neuro: CN II-XII grossly intact, no focal deficits  Psych: appropriate mood and affect      Labs:  Lab Results (last 24 hours)     Procedure Component Value Units Date/Time    POC Glucose Once [[id number]]  (Abnormal) Collected:  12/05/19 1732    Specimen:  Blood Updated:  12/05/19 1733     Glucose 296 mg/dL      Comment: Serial Number: [id number]:  [id number]       POC Glucose Once [[id number]]  (Abnormal) Collected:  12/05/19 1629    Specimen:  Blood Updated:  12/05/19 1631     Glucose 268 mg/dL      Comment: Serial Number: [id number]:  [id number]       POC Glucose Once [[id number]]  (Abnormal) Collected:  12/05/19 1441    Specimen:  Blood Updated:  12/05/19 1442     Glucose 325 mg/dL      Comment: Serial Number: [id number]:  [id number]       POC Glucose Once [[id number]]  (Abnormal) Collected:  12/05/19 1211    Specimen:  Blood Updated:  12/05/19 1226     Glucose 200 mg/dL      Comment: Serial Number: [id number]:  [id number]       POC Glucose Once [[id number]]  (Abnormal) Collected:  12/05/19 1052    Specimen:  Blood Updated:  12/05/19 1055     Glucose 230 mg/dL      Comment: Serial Number: [id number]:  [id number]             Imaging Results (Last 24 Hours)     ** No results found for the last 24 hours. **                                    ECG/EMG Results (most recent)     None              Results Review:  I reviewed the patient's new clinical results.  I reviewed the patient's new imaging results and agree with the interpretation.        Assessment and Plan:  Status post colon resection with nausea and vomiting  -Colon resection performed on 12/5/2019 with patient reporting moderate pain (being managed by surgeon) and some nausea  -Zofran and Phenergan ordered  -Check BMP and CBC    Diabetes mellitus type 2  -Patient has a history of diabetes on several home medications and notes his blood sugar has recently become fairly poorly controlled  -Current glucose 337  -Check A1c  -Basal and SSI  -Before meals and at bedtime    Hypertension  -Patient has a history of hypertension currently well controlled with blood pressure of 123/79  -Continue metoprolol  -Monitor while admitted    Hyperlipidemia  -Continue statin    GERD  -Continue PPI    Asthma  Patient has a history of asthma though he reports he has not required his inhaler for some time.  -Albuterol nebulizer ordered every 4 hours as needed        I discussed the patients findings and my recommendations with patient and primary care team    Zhen Edwards PA-C  12/05/19  7:49 PM

## 2019-12-06 NOTE — CONSULTS
Primary Care Provider: Ronaldo Ardon*     Consult requested by: Code stroke protocol    Reason for Consultation: Neurological evaluation, inpatient code stroke    History taken from: patient chart family RN    Chief complaint: Left sided vision loss       SUBJECTIVE:    History of present illness: Pt admitted for a hemicolectomy due to an unresectable colon polyp within the transverse colon. He is s/p surgery on 12/5.    A code stroke was initiated this morning around 10am when pt informed the nurse that he could not see out of the left half of his visual field. Pt was evaluated at . He was found to have a minimal left facial droop and complete left homonymous hemianopia. No other focal deficits appreciated (NIH 3). After discussion with pt and his wife at , the last known normal is unclear. Pt's wife reports that she noticed he had difficulty finding things on the left side of the room around 6am this morning when he first got up. He reports not seeing the door on the left around that time. It is unclear if his vision was intact before this moment as he was sleeping.     Pt was taken to radiology for a stat CT head which showed an acute to subacute right occipital lobe ischemic stroke, No hemorrhage. CTA head and neck shows a right PCA P2 segment occlusion.    Pt is not a tPA candidate due to unclear last normal, major abdominal surgery within the past 24 hours, as well as a well defined stroke within the right occipital lobe which suggest the stroke is likely at least 12-24 hours hold.     The possibility of intervention was discussed with Dr. Reddy Solorio at [facility]. Considering the well defined stroke on CT and unclear last normal, Dr. Solorio's opinion is the pt will likely be too high risk for hemorrhagic transformation and does not recommend transfer for embolectomy. He did offer recommendations for full stroke workup as detailed in the plan.     Review of Systems    Constitutional: Negative.    Eyes: Positive for visual disturbance. Negative for photophobia and pain.   Cardiovascular: Negative for chest pain.   Neurological: Positive for facial asymmetry. Negative for dizziness, tremors, seizures, syncope, speech difficulty, light-headedness, numbness and headaches.            PATIENT HISTORY:  Past Medical History:   Diagnosis Date   • ACE-inhibitor cough    • Arthritis    • Diabetes mellitus (CMS/[facility]) 1988    type 2    • GERD (gastroesophageal reflux disease)    • Hyperlipidemia    • Hypertension    • RAD (reactive airway disease)    ,   Past Surgical History:   Procedure Laterality Date   • CARDIAC CATHETERIZATION     • CHOLECYSTECTOMY     • COLONOSCOPY  2019    x2    • CORONARY ANGIOPLASTY WITH STENT PLACEMENT  2017   • FRACTURE SURGERY      collar bone as a child    • KNEE ARTHROSCOPY Left 2003   • KNEE JOINT MANIPULATION Left 2010   • TOTAL KNEE ARTHROPLASTY Bilateral     ,   ,   Family History   Problem Relation Age of Onset   • Irritable bowel syndrome Mother    • Anxiety disorder Mother    • Depression Father    • Hypertension Father    • Mental illness Father         committed suicide   • Other Sister         back problems   • Other Brother         back problems   ,   Social History     Tobacco Use   • Smoking status: Former Smoker     Last attempt to quit: 2007     Years since quittin.9   • Smokeless tobacco: Never Used   Substance Use Topics   • Alcohol use: Yes     Comment: occasionally   • Drug use: No   ,   Medications Prior to Admission   Medication Sig Dispense Refill Last Dose   • atorvastatin (LIPITOR) 20 MG tablet Take 1 tablet by mouth Every Evening. 1 daily 30 tablet 3 2019 at Unknown time   • Cholecalciferol 5000 units tablet Take 1 tablet by mouth Daily. Stop taking on    Past Week at Unknown time   • FARXIGA 10 MG tablet Take 1 tablet by mouth Every Morning Before Breakfast. Do not take morning of surgery  4 Past  Week at Unknown time   • Insulin NPH Isophane & Regular (NOVOLIN 70/30 FLEXPEN RELION) (70-30) 100 UNIT/ML suspension pen-injector Inject 64 Units under the skin into the appropriate area as directed 4 (Four) Times a Day. Do not take the day of surgery   12/4/2019 at Unknown time   • metFORMIN (GLUCOPHAGE) 500 MG tablet Take 1 tablet by mouth 3 (Three) Times a Day. (Patient taking differently: Take 500 mg by mouth 2 (Two) Times a Day With Meals. Take last dose Dec 3 Tues am) 30 tablet 3 Past Week at Unknown time   • metoclopramide (REGLAN) 10 MG tablet Take 10 mg by mouth 3 (Three) Times a Day. Do not take the morning of surgery  6 12/4/2019 at Unknown time   • metoprolol tartrate (LOPRESSOR) 50 MG tablet Take 1 tablet by mouth 2 (Two) Times a Day for 30 days. Take one twice daily 60 tablet 11 12/4/2019 at Unknown time   • omeprazole (PrilOSEC) 40 MG capsule Take 40 mg by mouth 2 (Two) Times a Day. May take the day of surgery   12/4/2019 at Unknown time   • B-D UF III MINI PEN NEEDLES 31G X 5 MM misc USE WITH INSULIN PEN 4 TIMES A DAY DX E11.65  3 Taking   • diphenhydrAMINE (BENADRYL ALLERGY) 25 mg capsule Take 2 capsules by mouth Every 4 (Four) Hours.      • glucose blood (ONE TOUCH ULTRA TEST) test strip ONETOUCH ULTRA BLUE STRP   Taking   • Insulin Pen Needle (B-D UF III MINI PEN NEEDLES) 31G X 5 MM misc Use with insulin 4 times daily dx:e11.65 150 each 3 Taking   • ONETOUCH DELICA LANCETS 33G misc ONETOUCH DELICA LANCETS 33G   Taking   , Scheduled Meds:      acetaminophen 1,000 mg Oral TID   atorvastatin 20 mg Oral Q PM   insulin lispro 0-7 Units Subcutaneous 4x Daily With Meals & Nightly   metoprolol tartrate 50 mg Oral BID   pantoprazole 40 mg Oral QAM   , Continuous Infusions:      sodium chloride 100 mL/hr Last Rate: 100 mL/hr (12/06/19 0211)   , PRN Meds:  albuterol  •  dextrose  •  dextrose  •  glucagon (human recombinant)  •  HYDROmorphone **AND** naloxone  •  insulin lispro **AND** insulin lispro  •   magnesium hydroxide  •  ondansetron  •  oxyCODONE **OR** oxyCODONE  •  promethazine, Allergies:  Patient has no known allergies.    ________________________________________________________        OBJECTIVE:  Upon today's exam, pt is awake and alert.          Neurologic Exam  PHYSICAL EXAM:    Constitutional: The patient is in no apparent distress, bright awake and alert. There is no shortness of breath.     HEENT: There is no tenderness over the temporal arteries bilaterally. Normocephalic, atraumatic. TMJ open symmetrically without tenderness.    Chest: Breathing unlabored    Cardiac: Regular rate and rhythm.     Extremities:  No clubbing, cyanosis or edema.    NEUROLOGICAL:    Cognition:   Fully oriented.  Fund of knowledge excellent.  Concentration and attention normal.   Language normal with normal comprehension, fluent speech, intact repetition and naming.   Short and long term memory appears intact    Cranial nerves;    II - pupils bilaterally equal reacting to light,  Left homonymous hemianopia  Fundoscopic exam- Not able to be done, non-dilated exam  III,IV,VI: EOMI with no diplopia  V: Normal facial sensations  VII: Very slight left nasolabial fold flattening  VIII: No New hearing abnormality  IX, X, XI: normal gag and shoulder shrug;  XII: tongue is in the midline.    Sensory:  Intact to light touch in all extremities.     Motor: Strength 5/5 bilaterally upper and lower extremities. No involuntary movements present. Normal tone and bulk.  Deep tendon reflexes: 2/4 and symmetrical in biceps, brachioradialis, triceps, bilateral 2/4 knees and ankles. Both plantars are flexor.    Cerebellar: Finger to nose and mirror movements normal bilaterally.    Gait and balance: deferred  ___________________________________________________________  Interval: baseline  1a. Level of Consciousness: 0-->Alert, keenly responsive  1b. LOC Questions: 0-->Answers both questions correctly  1c. LOC Commands: 0-->Performs both tasks  correctly  2. Best Gaze: 0-->Normal  3. Visual: 2-->Complete hemianopia  4. Facial Palsy: 1-->Minor paralysis (flattened nasolabial fold, asymmetry on smiling)  5a. Motor Arm, Left: 0-->No drift, limb holds 90 (or 45) degrees for full 10 secs  5b. Motor Arm, Right: 0-->No drift, limb holds 90 (or 45) degrees for full 10 secs  6a. Motor Leg, Left: 0-->No drift, leg holds 30 degree position for full 5 secs  6b. Motor Leg, Right: 0-->No drift, leg holds 30 degree position for full 5 secs  7. Limb Ataxia: 0-->Absent  8. Sensory: 0-->Normal, no sensory loss  9. Best Language: 0-->No aphasia, normal  10. Dysarthria: 0-->Normal  11. Extinction and Inattention (formerly Neglect): 0-->No abnormality    Total (NIH Stroke Scale): 3    ________________________________________________________   RESULTS REVIEW:    VITAL SIGNS:   Temp:  [97.3 °F (36.3 °C)-98.4 °F (36.9 °C)] 98.1 °F (36.7 °C)  Heart Rate:  [] 97  Resp:  [12-24] 14  BP: ()/(58-79) 132/75     LABS:  WBC   Date Value Ref Range Status   12/06/2019 15.70 (H) 3.40 - 10.80 10*3/mm3 Final     RBC   Date Value Ref Range Status   12/06/2019 4.33 4.14 - 5.80 10*6/mm3 Final     Hemoglobin   Date Value Ref Range Status   12/06/2019 12.8 (L) 13.0 - 17.7 g/dL Final     Hematocrit   Date Value Ref Range Status   12/06/2019 38.5 37.5 - 51.0 % Final     MCV   Date Value Ref Range Status   12/06/2019 89.0 79.0 - 97.0 fL Final     MCH   Date Value Ref Range Status   12/06/2019 29.7 26.6 - 33.0 pg Final     MCHC   Date Value Ref Range Status   12/06/2019 33.3 31.5 - 35.7 g/dL Final     RDW   Date Value Ref Range Status   12/06/2019 15.0 12.3 - 15.4 % Final     RDW-SD   Date Value Ref Range Status   12/06/2019 47.7 37.0 - 54.0 fl Final     MPV   Date Value Ref Range Status   12/06/2019 7.4 6.0 - 12.0 fL Final     Platelets   Date Value Ref Range Status   12/06/2019 439 140 - 450 10*3/mm3 Final     Neutrophil %   Date Value Ref Range Status   12/06/2019 88.8 (H) 42.7 - 76.0 %  Final     Lymphocyte %   Date Value Ref Range Status   12/06/2019 4.3 (L) 19.6 - 45.3 % Final     Monocyte %   Date Value Ref Range Status   12/06/2019 6.8 5.0 - 12.0 % Final     Eosinophil %   Date Value Ref Range Status   12/06/2019 0.0 (L) 0.3 - 6.2 % Final     Basophil %   Date Value Ref Range Status   12/06/2019 0.1 0.0 - 1.5 % Final     Neutrophils, Absolute   Date Value Ref Range Status   12/06/2019 14.20 (H) 1.70 - 7.00 10*3/mm3 Final     Lymphocytes, Absolute   Date Value Ref Range Status   12/06/2019 0.70 0.70 - 3.10 10*3/mm3 Final     Monocytes, Absolute   Date Value Ref Range Status   12/06/2019 1.10 (H) 0.10 - 0.90 10*3/mm3 Final     Eosinophils, Absolute   Date Value Ref Range Status   12/06/2019 0.00 0.00 - 0.40 10*3/mm3 Final     Basophils, Absolute   Date Value Ref Range Status   12/06/2019 0.00 0.00 - 0.20 10*3/mm3 Final     nRBC   Date Value Ref Range Status   12/06/2019 0.0 0.0 - 0.2 /100 WBC Final     Glucose   Date Value Ref Range Status   12/06/2019 405 (C) 65 - 99 mg/dL Final     BUN   Date Value Ref Range Status   12/06/2019 22 8 - 23 mg/dL Final     Creatinine   Date Value Ref Range Status   12/06/2019 1.93 (H) 0.76 - 1.27 mg/dL Final     Sodium   Date Value Ref Range Status   12/06/2019 136 136 - 145 mmol/L Final     Potassium   Date Value Ref Range Status   12/06/2019 4.8 3.5 - 5.2 mmol/L Final   12/06/2019 4.8 3.5 - 5.2 mmol/L Final     Chloride   Date Value Ref Range Status   12/06/2019 98 98 - 107 mmol/L Final     CO2   Date Value Ref Range Status   12/06/2019 18.0 (L) 22.0 - 29.0 mmol/L Final     Calcium   Date Value Ref Range Status   12/06/2019 8.7 8.6 - 10.5 mg/dL Final     eGFR Non  Amer   Date Value Ref Range Status   12/06/2019 36 (L) >60 mL/min/1.73 Final     BUN/Creatinine Ratio   Date Value Ref Range Status   12/06/2019 11.4 7.0 - 25.0 Final     Anion Gap   Date Value Ref Range Status   12/06/2019 20.0 (H) 5.0 - 15.0 mmol/L Final       Lab Results   Component Value  Date    TSH 2.340 12/06/2019     (H) 07/02/2019    HGBA1C 9.5 (H) 12/06/2019         IMAGING STUDIES:  Ct Angiogram Carotids    Result Date: 12/6/2019   1. Acute to subacute area of infarction involving the right posterior cerebral artery territory with occlusion of the right P2 segment of the posterior cerebral artery. 2. No hemodynamically significant extracranial vascular disease.  Electronically Signed By-Ronaldo Sheikh On:12/6/2019 11:04 AM This report was finalized on [id number] by  Ronaldo Sheikh, .    Ct Angiogram Head    Result Date: 12/6/2019   1. Acute to subacute area of infarction involving the right posterior cerebral artery territory with occlusion of the right P2 segment of the posterior cerebral artery. 2. No hemodynamically significant extracranial vascular disease.  Electronically Signed By-Ronaldo Sheikh On:12/6/2019 11:04 AM This report was finalized on [id number] by  Ronaldo Sheikh, .    Ct Head Without Contrast Stroke Protocol    Result Date: 12/6/2019   1. Findings consistent with early subacute ischemic change in the right occipitoparietal region, PCA distribution. MRI is a more sensitive examination for determining acuity of ischemic change or for intracranial metastatic disease.  Electronically Signed ByClement Churchill On:12/6/2019 10:20 AM This report was finalized on [id number] by  Joshua Churchill, .      I reviewed the patient's new clinical results.      ________________________________________________________     PROBLEM LIST:    Diabetes mellitus, type II (CMS/HCC)    Hyperlipidemia, mixed    Hypertension    Acute ischemic right PCA stroke involving the right occipital lobe (CMS/HCC)          Assessment/Plan   ASSESSMENT/PLAN:  1. Acute to subacute right occipital lobe ischemic stroke with occlusion of the right PCA P2 segment. Concern for embolic disease.   Pt is not a tPA or intervention candidate as a clear last known normal cannot be established. Also, CT findings suggest the stroke  is at least 12-24 hours old. Pt's major risk factors for stroke are uncontrolled diabetes with chronic hyperglycemia, HLD, and HTN.   - CT head: Findings consistent with early subacute ischemic change in the right  occipitoparietal region, PCA distribution.  - MRI brain: pending  - CTA head and neck: cute to subacute area of infarction involving the right posterior cerebral artery territory with occlusion of the right P2 segment of the posterior cerebral artery. No hemodynamically significant extracranial vascular disease.  - Echo: pending  - May need LINH pending echo results.   - Event monitor x30 days or Loop recorder to r/o afib  - EKG: Sinus rhythm. Abnormal T, consider ischemia, lateral leads  - Labs: A1C: 9.5, B12: P, LDL: P, TSH: 2.34  - Antithrombotics: Recommend ASA 81mg daily for now if ok with surgery  - Statin: Lipitor 40mg qhs  - PT/OT/ST as appropriate, Neuro checks per protocol, DVT prophylaxis, Stroke education  - D[facility] with [facility] Sol[facility] at [facility]     2. DM2 with acute hyperglycemia, uncontrolled  - Family reports non-compliance with medications   - BS between 300 and 400 today  - SSI per primary  - Recommend dietician and diabetic educator    4. Polyp of transverse colon s/p open right hemicolectomy    5. Medical non-compliance  - discussed the risks of non-compliance    6. Modification of stroke risk factors:   - Blood pressure should be less than 130/80 outpatient, HbA1c less than 6.5, LDL less than 70; b12>500 and smoking cessation if applicable. We would be grateful if the primary team / primary care physician keep a close watch on this above targets.  - Stroke education  - Follow up with neurologist of choice      I discussed the patients findings and my recommendations with patient, family, nursing staff and consulting provider    EMRE Lutz  12/06/19  11:50 AM

## 2019-12-06 NOTE — PLAN OF CARE
Problem: Patient Care Overview  Goal: Plan of Care Review  Outcome: Ongoing (interventions implemented as appropriate)   12/06/19 1118   OTHER   Outcome Summary Pt admitted for polyp of transferse colon. Underwent R hemicolectomy 12/5. He is very painful this AM and RN just administered IV pain medication. He required mod assist for bed mobiltiy. Reported mild dizziness and BP was taken and WNL. Dizziness was thought to be associated with pain medications. Due to dizziness chair was brought closer to patient. He required min assist for balance while walking to chair. He demonstrates ability to reach distal LE using figure 4 technique. After OT session pt had stroke like symptoms with PT and code stroke was called. Pt may require re-evaluation next visit. At this point recommend IP rehab at discharge.    Coping/Psychosocial   Plan of Care Reviewed With patient

## 2019-12-06 NOTE — NURSING NOTE
Pt was walking with physical therapy when rn was notified pt was having trouble with left sided vision.  rn assessed pt at [facility], vss,  c/o loss of vision, tingling in L arm, pain in right side of neck.  Called code stroke at this time. Team at [facility] @ [id number].

## 2019-12-06 NOTE — THERAPY EVALUATION
Acute Care - Physical Therapy Initial Evaluation  RIGOBERTO Mcdermott     Patient Name: Chao Lazar  : 1960  MRN: [id number]  Today's Date: 2019                Admit Date: 2019    Visit Dx:     ICD-10-CM ICD-9-CM   1. Polyp of transverse colon, unspecified type D12.3 211.3     Patient Active Problem List   Diagnosis   • Allergic state   • Asthma   • Atherosclerotic heart disease of native coronary artery without angina pectoris   • Chronic cough   • Degenerative joint disease   • Diabetes mellitus, type II (CMS/[facility])   • Type 2 diabetes mellitus with other diabetic neurological complication (CMS/[facility])   • Diabetic foot ulcer (CMS/[facility])   • Encounter for general adult medical examination without abnormal findings   • Gastroparesis   • History of prosthetic heart valve   • Hyperlipidemia, mixed   • Hypertension   • Nausea and vomiting   • Other specified dorsopathies, site unspecified   • Type 2 diabetes mellitus with hyperglycemia (CMS/[facility])   • Vitamin D deficiency   • Combined forms of age-related cataract, bilateral   • Other specified retinal disorders   • Presbyopia   • Type 2 or unspecified type diabetes mellitus     Past Medical History:   Diagnosis Date   • ACE-inhibitor cough    • Arthritis    • Diabetes mellitus (CMS/[facility]) 1988    type 2    • GERD (gastroesophageal reflux disease)    • Hyperlipidemia    • Hypertension    • RAD (reactive airway disease)      Past Surgical History:   Procedure Laterality Date   • CARDIAC CATHETERIZATION     • CHOLECYSTECTOMY     • COLONOSCOPY  2019    x2    • CORONARY ANGIOPLASTY WITH STENT PLACEMENT  2017   • FRACTURE SURGERY      collar bone as a child    • KNEE ARTHROSCOPY Left 2003   • KNEE JOINT MANIPULATION Left 2010   • TOTAL KNEE ARTHROPLASTY Bilateral     ,        PT ASSESSMENT (last 12 hours)      Physical Therapy Evaluation     Row Name 19 0956          PT Evaluation Time/Intention    Document Type  evaluation  -SS     Mode of  Treatment  physical therapy  -     Row Name 12/06/19 0956          General Information    Equipment Currently Used at Home  none;cane, straight;walker, rolling  -     Pertinent History of Current Functional Problem  58 y/o M POD 1 open R hemicolectomy due to polyp of transverse colon.   -     Existing Precautions/Restrictions  -- abdominal incision- wearing binder  -     Row Name 12/06/19 0956          Relationship/Environment    Lives With  spouse  -     Name(s) of Who Lives With Patient  Debbi- spouse works full time but brother will be nearby if needed  -     Row Name 12/06/19 0956          Resource/Environmental Concerns    Current Living Arrangements  home/apartment/condo  -     Row Name 12/06/19 0956          Living Environment    Home Accessibility  stairs to enter home  -     Row Name 12/06/19 0956          Home Main Entrance    Number of Stairs, Main Entrance  two  -     Row Name 12/06/19 0956          Cognitive Assessment/Intervention- PT/OT    Orientation Status (Cognition)  oriented x 4  -     Row Name 12/06/19 0956          Safety Issues, Functional Mobility    Impairments Affecting Function (Mobility)  visual/perceptual;postural/trunk control;balance  -     Row Name 12/06/19 0956          Bed Mobility Assessment/Treatment    Bed Mobility Assessment/Treatment  sit-supine  -     Sit-Supine [place] (Bed Mobility)  minimum assist (75% patient effort)  -     Row Name 12/06/19 0956          Transfer Assessment/Treatment    Transfer Assessment/Treatment  sit-stand transfer  -     Sit-Stand [place] (Transfers)  minimum assist (75% patient effort)  -     Row Name 12/06/19 0956          Gait/Stairs Assessment/Training    [place] Level (Gait)  moderate assist (50% patient effort)  -     Assistive Device (Gait)  walker, front-wheeled  -     Distance in Feet (Gait)  30  -SS     Comment (Gait/Stairs)  pt with poor postural stability and makes contact with multiple  items on L sided environment. Pt runs walker directly into door on L and does not see it. Following this, pt was retured to sit, RN called. Vision assessed- pt not tracking L. Pt returned to bed. Vitals assessed and WNL. Pt reports tingling in L hand.  RN called code stroke. Strength on L side is WNL, no facial droop noted.   -     Row Name 12/06/19 0956          General ROM    GENERAL ROM COMMENTS  grossly WFL  -     Row Name 12/06/19 0956          MMT (Manual Muscle Testing)    General MMT Comments  grossly WFL  -     Row Name 12/06/19 0956          Motor Assessment/Intervention    Additional Documentation  Balance (Group)  -     Row Name 12/06/19 0956          Balance    Balance  static sitting balance;static standing balance  -     Row Name 12/06/19 0956          Static Sitting Balance    Level of [place] (Unsupported Sitting, Static Balance)  supervision  -     Row Name 12/06/19 0956          Static Standing Balance    Level of [place] (Supported Standing, Static Balance)  moderate assist, 50 to 74% patient effort  -     Time Able to Maintain Position (Supported Standing, Static Balance)  2 to 3 minutes  -     Assistive Device Utilized (Supported Standing, Static Balance)  walker, rolling  -     Row Name 12/06/19 0956          Sensory Assessment/Intervention    Sensory General Assessment  -- tingling in L hand  -     Row Name 12/06/19 0956          Vision Assessment/Intervention    Vision Assessment Comment  L visual field loss  -     Row Name 12/06/19 0956          Pain Assessment    Additional Documentation  Pain Scale: Numbers Pre/Post-Treatment (Group)  -     Row Name 12/06/19 0956          Pain Scale: Numbers Pre/Post-Treatment    Pain Scale: Numbers, Pretreatment  7/10  -     Pain Scale: Numbers, Post-Treatment  7/10  -     Row Name             Wound 12/05/19 midline abdomen Incision    Wound - Properties Group Date first assessed: 12/05/19  -AL Present on Hospital  Admission: N  -AL Orientation: midline  -AL Location: abdomen  -AL Primary Wound Type: Incision  -AL, SURGICAL INCISION     Row Name             Wound 12/05/19 1551 Other (See comments) abdomen Incision    Wound - Properties Group Date first assessed: 12/05/19  -LEONARDO Time first assessed: 1551  -LEONARDO Side: Other (See comments)  -LEONARDO Location: abdomen  -LEONARDO Primary Wound Type: Incision  -LEONARDO    Row Name 12/06/19 0956          Physical Therapy Clinical Impression    Criteria for Skilled Interventions Met (PT Clinical Impression)  yes;treatment indicated  -SS     Impairments Found (describe specific impairments)  aerobic capacity/endurance;gait, locomotion, and balance;motor function;posture  -SS     Rehab Potential (PT Clinical Summary)  good, to achieve stated therapy goals  -SS     Predicted Duration of Therapy (PT)  until d/c  -SS     Row Name 12/06/19 0956          Vital Signs    Post Systolic BP Rehab  124  -SS     Post Treatment Diastolic BP  78  -SS     Intratreatment Heart Rate (beats/min)  105  -SS     Posttreatment Heart Rate (beats/min)  102  -SS     Intra SpO2 (%)  95  -SS     Row Name 12/06/19 0956          Physical Therapy Goals    Bed Mobility Goal Selection (PT)  bed mobility, PT goal 1  -SS     Transfer Goal Selection (PT)  transfer, PT goal 1  -SS     Gait Training Goal Selection (PT)  gait training, PT goal 1  -SS     Row Name 12/06/19 0956          Bed Mobility Goal 1 (PT)    Activity/Assistive Device (Bed Mobility Goal 1, PT)  bed mobility activities, all  -SS     [place] Level/Cues Needed (Bed Mobility Goal 1, PT)  conditional independence  -SS     Time Frame (Bed Mobility Goal 1, PT)  long term goal (LTG);2 weeks  -     Row Name 12/06/19 0956          Transfer Goal 1 (PT)    Activity/Assistive Device (Transfer Goal 1, PT)  transfers, all  -SS     [place] Level/Cues Needed (Transfer Goal 1, PT)  conditional independence  -SS     Time Frame (Transfer Goal 1, PT)  long term goal (LTG);2 weeks   -     Row Name 12/06/19 0956          Gait Training Goal 1 (PT)    Activity/Assistive Device (Gait Training Goal 1, PT)  gait (walking locomotion)  -     [place] Level (Gait Training Goal 1, PT)  independent  -     Distance (Gait Goal 1, PT)  100  -     Time Frame (Gait Training Goal 1, PT)  long term goal (LTG);2 weeks  -     Row Name 12/06/19 0956          Positioning and Restraints    Pre-Treatment Position  sitting in chair/recliner  -     Post Treatment Position  bed  -SS     In Bed  with nsg;with other staff;with family/caregiver code stroke team present  -       User Key  (r) = Recorded By, (t) = Taken By, (c) = Cosigned By    Initials Name Provider Type     Sunni Kwan, PT Physical Therapist    Viktoria Harvey, RN Registered Nurse    Uma Duque RN Registered Nurse        Physical Therapy Education     Title: PT OT SLP Therapies (Done)     Topic: Physical Therapy (Done)     Point: Mobility training (Done)     Learning Progress Summary           Patient Acceptance, E, VU by  at 12/6/2019 10:14 AM                               User Key     Initials Effective Dates Name Provider Type Discipline     06/19/19 -  Sunni Kwan, PT Physical Therapist PT              PT Recommendation and Plan  Anticipated Discharge Disposition (PT): inpatient rehabilitation facility  Planned Therapy Interventions (PT Eval): balance training, bed mobility training, gait training, home exercise program, neuromuscular re-education, patient/family education, postural re-education, strengthening, transfer training  Therapy Frequency (PT Clinical Impression): daily  Outcome Summary/Treatment Plan (PT)  Anticipated Discharge Disposition (PT): inpatient rehabilitation facility  Outcome Summary: 60 y/o M POD 1 open R hemicolectomy due to polyp of transverse colon. Pt independent at baseline, ambulates without AD, is retired. Pt this date is up in chair upon PT arrival appearing fatigued and  reports 7/10 pain however is agreeable to PT eval. He requires min A for STS and is noted to have postural instability requiring use of RW to stand and ambulate. Requires mod A for ambulation due to postural instability. Pt reports lightheadedness during ambulation that does not worsen. HR and SaO2 WNL throughout gait. When pt is ambulating through hospital room door, it is noted that he runs directly into door frame on L with walker as if he did not see it. Pt when questioned reports he does not see door frame. Chair pulled up, pt return to sit, vision assessed and pt not tracking to L and cannot see in his L visual field. Pt returned to bed, RN called. Pt reports L hand tingling. BP WNL. RN called code stroke. At this point, code stroke team took over care of pt. Will follow case and re-assess as needed. Currently recommending IP rehab upon d/c. Will follow daily.   Outcome Measures     Row Name 12/06/19 1000             Modified Atkinson Scale    Pre-Stroke Modified [place]kinson Scale  0 - No Symptoms at all.  -SS      Modified [place] Scale  4 - Moderately severe disability.  Unable to walk without assistance, and unable to attend to own bodily needs without assistance.  -SS         Functional Assessment    Outcome Measure Options  Modified [place]  -        User Key  (r) = Recorded By, (t) = Taken By, (c) = Cosigned By    Initials Name Provider Type    Sunni Gonzáles PT Physical Therapist         Time Calculation:   PT Charges     Row Name 12/06/19 1015             Time Calculation    Start Time  0934  -      Stop Time  0951  -      Time Calculation (min)  17 min  -      PT Received On  12/06/19  -      PT - Next Appointment  12/07/19  -      PT Goal Re-Cert Due Date  12/20/19  -         Time Calculation- PT    Total Timed Code Minutes- PT  0 minute(s)  -        User Key  (r) = Recorded By, (t) = Taken By, (c) = Cosigned By    Initials Name Provider Type    Sunni Gonzáles PT Physical  Therapist        Therapy Charges for Today     Code Description Service Date Service Provider Modifiers Qty    44164091328 HC PT EVAL MOD COMPLEXITY 4 12/6/2019 Sunni Kwan, PT GP 1          PT G-Codes  Outcome Measure Options: Modified [place]  Modified [place] Scale: 4 - Moderately severe disability.  Unable to walk without assistance, and unable to attend to own bodily needs without assistance.      Sunni Kwan, PT  12/6/2019

## 2019-12-06 NOTE — ANESTHESIA POSTPROCEDURE EVALUATION
Patient: Chao Lazar    Procedure Summary     Date:  12/05/19 Room / Location:  [facility] OR 09 / [facility] MAIN OR    Anesthesia Start:  1116 Anesthesia Stop:  1621    Procedure:  open right hemicolectomy (Right Abdomen) Diagnosis:       Polyp of transverse colon, unspecified type      (Polyp of transverse colon, unspecified type [D12.3])    Surgeon:  Ronaldo Ardon MD Provider:  Heather Menendez MD    Anesthesia Type:  general with block ASA Status:  3          Anesthesia Type: general with block  Last vitals  BP   107/68 (12/05/19 1710)   Temp   98.2 °F (36.8 °C) (12/05/19 1710)   Pulse   91 (12/05/19 1710)   Resp   18 (12/05/19 1710)     SpO2   94 % (12/05/19 1710)     Post Anesthesia Care and Evaluation    Patient location during evaluation: PACU  Patient participation: complete - patient participated  Level of consciousness: awake  Pain score: 0 (See nurse's notes for pain score)  Pain management: adequate  Airway patency: patent  Anesthetic complications: No anesthetic complications  PONV Status: none  Cardiovascular status: acceptable  Respiratory status: acceptable  Hydration status: acceptable    Comments: Patient seen and examined postoperatively; vital signs stable; SpO2 greater than or equal to 90%; cardiopulmonary status stable; nausea/vomiting adequately controlled; pain adequately controlled; no apparent anesthesia complications; patient discharged from anesthesia care when discharge criteria were met

## 2019-12-06 NOTE — PLAN OF CARE
Problem: Patient Care Overview  Goal: Plan of Care Review  Outcome: Ongoing (interventions implemented as appropriate)    Goal: Individualization and Mutuality  Outcome: Ongoing (interventions implemented as appropriate)    Goal: Discharge Needs Assessment  Outcome: Ongoing (interventions implemented as appropriate)    Goal: Interprofessional Rounds/Family Conf  Outcome: Ongoing (interventions implemented as appropriate)      Problem: Fall Risk (Adult)  Goal: Identify Related Risk Factors and Signs and Symptoms  Outcome: Outcome(s) achieved Date Met: 12/06/19    Goal: Absence of Fall  Outcome: Ongoing (interventions implemented as appropriate)      Problem: Bowel Resection (Adult)  Goal: Signs and Symptoms of Listed Potential Problems Will be Absent, Minimized or Managed (Bowel Resection)  Outcome: Ongoing (interventions implemented as appropriate)    Goal: Anesthesia/Sedation Recovery  Outcome: Ongoing (interventions implemented as appropriate)      Problem: Pain, Acute (Adult)  Goal: Identify Related Risk Factors and Signs and Symptoms  Outcome: Outcome(s) achieved Date Met: 12/06/19    Goal: Acceptable Pain Control/Comfort Level  Outcome: Ongoing (interventions implemented as appropriate)

## 2019-12-06 NOTE — PROGRESS NOTES
"Post-op Note  RIGHT HEMICOLECTOMY  12/5/2019    Subjective   Chao Lazar is a 59 y.o. male status post open right hemicolectomy for unresectable transverse colon polyp performed on 12/5/2019.  On the morning of 12/6/2019, the patient was sitting up in bed when he was evaluated by physical therapy.  The patient got up out of bed, ambulated through the room with physical therapy and then walked into the door frame.  When asked if he saw the door frame there, the patient reported no.  A code stroke was called, and the patient was taken urgently to CT scan where a CT scan of the head demonstrated an age indeterminate right-sided PCA stroke.  The patient has an occlusion at the P2 segment of the right posterior cerebral artery on CTA.  He was transferred from the surgical floor to [facility], and neurology was consulted.  From a surgical standpoint, the patient reports that his abdominal pain is well controlled.  He is not having any nausea or vomiting.  He is afebrile.  He is passing some flatus.  On a separate note, I went back and reviewed the patient's vital signs intraoperatively, there were only a few short intervals where the patient's maps were noted to be below 60.  The patient was only treated with Faisal-Synephrine at the beginning of the case upon induction prior to incision.  He then received some doses of labetalol and hydralazine intraoperatively for hypertension.      Objective   /75 (BP Location: Right arm, Patient Position: Lying)   Pulse 97   Temp 98.1 °F (36.7 °C) (Oral)   Resp 14   Ht 185.4 cm (73\")   Wt 90.9 kg (200 lb 6.4 oz)   SpO2 95%   BMI 26.44 kg/m²   Vital signs reviewed  Patient has left-sided hemianopsia  Surgical incision with dressings in place with minimal amount of strikethrough.  Dressings taken down, staples intact without any surrounding erythema, induration, or drainage to suggest infection.  Abdomen is soft, and appropriately tender, nondistended.    Assessment/Plan "   59-year-old gentleman status post open right hemicolectomy for unresectable transverse colon polyp performed on 12/5/2019.  The following morning, the patient had left-sided hemianopsia, and was determined to have a right-sided PCA stroke.    Greatly appreciate the input of neurology and this problem.  We will follow their recommendations.  From a surgical standpoint, the patient may continue to be on a CLD once all of the initial testing for his stroke is completed.  We will then proceed with advancement of his diet as he continues to tolerate.  Continue pain management with Roxicodone, Dilaudid for breakthrough, and Tylenol.  Okay for VTE prophylaxis, continue SCDs  Appreciate medicine input with management of his hyperglycemia and management of his hypertension.  No need for further antibiotics  [place] catheter has been removed  Continue ambulation with assistance, and incentive spirometer while awake.    Ronaldo Ardon MD  12/6/2019  12:43 PM

## 2019-12-06 NOTE — CONSULTS
Diabetes Education    Patient Name:  Chao Lazar  YOB: 1960  MRN: [id number]  Admit Date:  12/5/2019        Patient to be seen for A1C >8% and admission blood sugar >200.  Patient off unit for tests.  Will attempt follow-up at another time.      Electronically signed by:  Veronica Rivera RN  12/06/19 4:16 PM

## 2019-12-06 NOTE — THERAPY EVALUATION
Acute Care - Speech Language Pathology   Swallow Initial Evaluation  [place]     Patient Name: Chao Lazar  : 1960  MRN: [id number]  Today's Date: 2019               Admit Date: 2019    Visit Dx:     ICD-10-CM ICD-9-CM   1. Polyp of transverse colon, unspecified type D12.3 211.3     Patient Active Problem List   Diagnosis   • Allergic state   • Asthma   • Atherosclerotic heart disease of native coronary artery without angina pectoris   • Chronic cough   • Degenerative joint disease   • Type 2 diabetes mellitus with hyperglycemia, with long-term current use of insulin (CMS/[facility])   • Type 2 diabetes mellitus with other diabetic neurological complication (CMS/[facility])   • Diabetic foot ulcer (CMS/[facility])   • Encounter for general adult medical examination without abnormal findings   • Gastroparesis   • History of prosthetic heart valve   • Hyperlipidemia, mixed   • Hypertension   • Nausea and vomiting   • Other specified dorsopathies, site unspecified   • Type 2 diabetes mellitus with hyperglycemia (CMS/[facility])   • Vitamin D deficiency   • Combined forms of age-related cataract, bilateral   • Other specified retinal disorders   • Presbyopia   • Type 2 or unspecified type diabetes mellitus   • Acute ischemic right PCA stroke involving the right occipital lobe (CMS/[facility])     Past Medical History:   Diagnosis Date   • ACE-inhibitor cough    • Arthritis    • Diabetes mellitus (CMS/[facility]) 1988    type 2    • GERD (gastroesophageal reflux disease)    • Hyperlipidemia    • Hypertension    • RAD (reactive airway disease)      Past Surgical History:   Procedure Laterality Date   • CARDIAC CATHETERIZATION     • CHOLECYSTECTOMY     • COLONOSCOPY  2019    x2    • CORONARY ANGIOPLASTY WITH STENT PLACEMENT  2017   • FRACTURE SURGERY      collar bone as a child    • KNEE ARTHROSCOPY Left 2003   • KNEE JOINT MANIPULATION Left 2010   • TOTAL KNEE ARTHROPLASTY Bilateral             SWALLOW EVALUATION (last  72 hours)      SLP Adult Swallow Evaluation     Row Name 12/06/19 1400          Document Type  evaluation  -EC    Subjective Information  no complaints  -EC    Patient Observations  alert;cooperative;agree to therapy  -EC    Patient/Family Observations  Pt w/L visual field deficit  -EC    Patient Effort  excellent  -EC    Symptoms Noted During/After Treatment  none  -EC          Patient Profile Reviewed  yes  -EC    Pertinent History Of Current Problem  Pt admitted for a hemicolectomy due to an unresectable colon polyp within the transverse colon. He is s/p surgery on 12/5. A code stroke was initiated this morning around 10am when pt informed the nurse that he could not see out of the left half of his visual field. Pt was evaluated at . He was found to have a minimal left facial droop and complete left homonymous hemianopia. No other focal deficits appreciated (NIH 3). After discussion with pt and his wife at , the last known normal is unclear. Pt's wife reports that she noticed he had difficulty finding things on the left side of the room around 6am this morning when he first got up. He reports not seeing the door on the left around that time. It is unclear if his vision was intact before this moment as he was sleeping. Stat CT head showed an acute to subacute right occipital lobe ischemic stroke, No hemorrhage. CTA head and neck shows a right PCA P2 segment occlusion.  -EC    Current Method of Nutrition  NPO  -EC    Precautions/Limitations, Vision  vision impairment, left  -EC    Precautions/Limitations, Hearing  WFL  -EC    Prior Level of Function-Communication  WFL  -EC    Prior Level of Function-Swallowing  no diet consistency restrictions;regular textures;thin liquids  -EC    Plans/Goals Discussed with  patient and family  -EC          Dentition Assessment  natural, present and adequate;missing teeth  -EC    Secretion Management  WNL/WFL  -EC    Mucosal Quality  moist, healthy  -EC    Volitional Swallow   WFL  -EC          Oral Motor General Assessment  oral labial or buccal impairment  -EC    Oral Labial or Buccal Impairment, Detail, Cranial Nerve VII (Facial):  left labial droop  -EC          Respiratory Support Currently in Use  room air  -EC    Eating/Swallowing Skills  fed by SLP;self-fed  -EC    Positioning During Eating  upright 90 degree;upright in bed  -EC    Utensils Used  spoon;straw;cup  -EC    Consistencies Trialed  regular textures;thin liquids;pureed mechanical soft, mixed consistency  -EC          Respiratory Status  WFL;room air  -EC          Oral Prep Phase  WFL  -EC    Oral Transit  WFL  -EC    Oral Residue  WFL  -EC    Pharyngeal Phase  no overt signs/symptoms of pharyngeal impairment  -EC    Esophageal Phase  unremarkable  -EC    Clinical Swallow Evaluation Summary  Pt noted w/some difficulty seeing spoon/straw to take trials 2/2 visual deficit even when presented on R side. Oral transit and mastication appear WFL for all consistencies assessed.  No clinical s/s of aspiration demonstrated at any time.    -EC          SLP Swallowing Diagnosis  functional oral phase;functional pharyngeal phase  -EC    Functional Impact  no impact on function  -EC    Rehab Potential/Prognosis, Swallowing  good, to achieve stated therapy goals  -EC    Swallow Criteria for Skilled Therapeutic Interventions Met  demonstrates skilled criteria  -EC          Therapy Frequency (Swallow)  PRN  -EC    SLP Diet Recommendation  regular textures;thin liquids  -EC    Recommended Precautions and Strategies  upright posture during/after eating;small bites of food and sips of liquid  -EC    SLP Rec. for Method of Medication Administration  meds whole;with thin liquids;as tolerated  -EC    Monitor for Signs of Aspiration  yes;notify SLP if any concerns  -EC          Oral Nutrition/Hydration Goal Selection (SLP)  oral nutrition/hydration, SLP goal 1;oral nutrition/hydration, SLP goal 2  -EC          Oral Nutrition/Hydration Goal 1,  SLP  Pt to be seen for follow up meal assessment to ensure continued diet tolerance  -EC    Time Frame (Oral Nutrition/Hydration Goal 1, SLP)  2 days  -EC          Oral Nutrition/Hydration Goal 2, SLP  Pt to tolerate safest & least restrictive diet recommendations w/no complications from aspiration.  -EC    Time Frame (Oral Nutrition/Hydration Goal 2, SLP)  by discharge  -EC      User Key  (r) = Recorded By, (t) = Taken By, (c) = Cosigned By    Initials Name Effective Dates    EC Nicolle Castro 03/01/19 -           EDUCATION  The patient has been educated in the following areas:   Dysphagia (Swallowing Impairment).    SLP Recommendation and Plan  SLP Swallowing Diagnosis: functional oral phase, functional pharyngeal phase  SLP Diet Recommendation: regular textures, thin liquids  Recommended Precautions and Strategies: upright posture during/after eating, small bites of food and sips of liquid  SLP Rec. for Method of Medication Administration: meds whole, with thin liquids, as tolerated     Monitor for Signs of Aspiration: yes, notify SLP if any concerns     Swallow Criteria for Skilled Therapeutic Interventions Met: demonstrates skilled criteria     Rehab Potential/Prognosis, Swallowing: good, to achieve stated therapy goals  Therapy Frequency (Swallow): PRN               SLP GOALS     Row Name 12/06/19 1400             Oral Nutrition/Hydration Goal 1 (SLP)    Oral Nutrition/Hydration Goal 1, SLP  Pt to be seen for follow up meal assessment to ensure continued diet tolerance  -EC      Time Frame (Oral Nutrition/Hydration Goal 1, SLP)  2 days  -EC         Oral Nutrition/Hydration Goal 2 (SLP)    Oral Nutrition/Hydration Goal 2, SLP  Pt to tolerate safest & least restrictive diet recommendations w/no complications from aspiration.  -EC      Time Frame (Oral Nutrition/Hydration Goal 2, SLP)  by discharge  -EC        User Key  (r) = Recorded By, (t) = Taken By, (c) = Cosigned By    Initials Name Provider Type    EC  Nicolle Castro Speech and Language Pathologist             Time Calculation:                Nicolle Castro  12/6/2019

## 2019-12-06 NOTE — PLAN OF CARE
Problem: Patient Care Overview  Goal: Plan of Care Review  Outcome: Ongoing (interventions implemented as appropriate)   12/06/19 1009   OTHER   Outcome Summary 60 y/o M POD 1 open R hemicolectomy due to polyp of transverse colon. Pt independent at baseline, ambulates without AD, is retired. Pt this date is up in chair upon PT arrival appearing fatigued and reports 7/10 pain however is agreeable to PT eval. He requires min A for STS and is noted to have postural instability requiring use of RW to stand and ambulate. Requires mod A for ambulation due to postural instability. Pt reports lightheadedness during ambulation that does not worsen. HR and SaO2 WNL throughout gait. When pt is ambulating through hospital room door, it is noted that he runs directly into door frame on L with walker as if he did not see it. Pt when questioned reports he does not see door frame. Chair pulled up, pt return to sit, vision assessed and pt not tracking to L and cannot see in his L visual field. Pt returned to bed, RN called. Pt reports L hand tingling. BP WNL. RN called code stroke. At this point, code stroke team took over care of pt. Will follow case and re-assess as needed. Currently recommending IP rehab upon d/c. Will follow daily.

## 2019-12-06 NOTE — PLAN OF CARE
Problem: Patient Care Overview  Goal: Individualization and Mutuality  Outcome: Ongoing (interventions implemented as appropriate)    Goal: Discharge Needs Assessment  Outcome: Ongoing (interventions implemented as appropriate)    Goal: Interprofessional Rounds/Family Conf  Outcome: Ongoing (interventions implemented as appropriate)   12/06/19 1459   Interdisciplinary Rounds/Family Conf   Participants ;nursing;pharmacy;social work/services       Problem: Fall Risk (Adult)  Goal: Absence of Fall  Outcome: Ongoing (interventions implemented as appropriate)   12/06/19 1459   Fall Risk (Adult)   Absence of Fall making progress toward outcome       Problem: Bowel Resection (Adult)  Goal: Signs and Symptoms of Listed Potential Problems Will be Absent, Minimized or Managed (Bowel Resection)  Outcome: Ongoing (interventions implemented as appropriate)      Problem: Pain, Acute (Adult)  Goal: Acceptable Pain Control/Comfort Level  Outcome: Ongoing (interventions implemented as appropriate)   12/06/19 1459   Pain, Acute (Adult)   Acceptable Pain Control/Comfort Level making progress toward outcome       Problem: Stroke (Ischemic) (Adult)  Goal: Signs and Symptoms of Listed Potential Problems Will be Absent, Minimized or Managed (Stroke)  Outcome: Ongoing (interventions implemented as appropriate)

## 2019-12-07 ENCOUNTER — APPOINTMENT (OUTPATIENT)
Dept: GENERAL RADIOLOGY | Facility: HOSPITAL | Age: 59
End: 2019-12-07

## 2019-12-07 ENCOUNTER — APPOINTMENT (OUTPATIENT)
Dept: CT IMAGING | Facility: HOSPITAL | Age: 59
End: 2019-12-07

## 2019-12-07 LAB
ANION GAP SERPL CALCULATED.3IONS-SCNC: 14 MMOL/L (ref 5–15)
ARTERIAL PATENCY WRIST A: POSITIVE
ARTICHOKE IGE QN: 82 MG/DL (ref 0–100)
ATMOSPHERIC PRESS: ABNORMAL MM[HG]
BASE EXCESS BLDA CALC-SCNC: 4.2 MMOL/L (ref 0–3)
BASOPHILS # BLD AUTO: 0.1 10*3/MM3 (ref 0–0.2)
BASOPHILS NFR BLD AUTO: 0.9 % (ref 0–1.5)
BDY SITE: ABNORMAL
BUN BLD-MCNC: 24 MG/DL (ref 8–23)
BUN/CREAT SERPL: 17.5 (ref 7–25)
CALCIUM SPEC-SCNC: 8.8 MG/DL (ref 8.6–10.5)
CHLORIDE SERPL-SCNC: 99 MMOL/L (ref 98–107)
CO2 BLDA-SCNC: 29.6 MMOL/L (ref 22–29)
CO2 SERPL-SCNC: 24 MMOL/L (ref 22–29)
CREAT BLD-MCNC: 1.37 MG/DL (ref 0.76–1.27)
D-LACTATE SERPL-SCNC: 1 MMOL/L (ref 0.5–2)
DEPRECATED RDW RBC AUTO: 47.3 FL (ref 37–54)
EOSINOPHIL # BLD AUTO: 0.3 10*3/MM3 (ref 0–0.4)
EOSINOPHIL NFR BLD AUTO: 2.6 % (ref 0.3–6.2)
ERYTHROCYTE [DISTWIDTH] IN BLOOD BY AUTOMATED COUNT: 14.7 % (ref 12.3–15.4)
GFR SERPL CREATININE-BSD FRML MDRD: 53 ML/MIN/1.73
GLUCOSE BLD-MCNC: 227 MG/DL (ref 65–99)
GLUCOSE BLDC GLUCOMTR-MCNC: 231 MG/DL (ref 70–105)
GLUCOSE BLDC GLUCOMTR-MCNC: 241 MG/DL (ref 70–105)
GLUCOSE BLDC GLUCOMTR-MCNC: 243 MG/DL (ref 70–105)
GLUCOSE BLDC GLUCOMTR-MCNC: 247 MG/DL (ref 70–105)
GLUCOSE BLDC GLUCOMTR-MCNC: 267 MG/DL (ref 70–105)
HCO3 BLDA-SCNC: 28.3 MMOL/L (ref 21–28)
HCT VFR BLD AUTO: 32.5 % (ref 37.5–51)
HEMODILUTION: NO
HGB BLD-MCNC: 10.7 G/DL (ref 13–17.7)
HOROWITZ INDEX BLD+IHG-RTO: 21 %
LYMPHOCYTES # BLD AUTO: 1.7 10*3/MM3 (ref 0.7–3.1)
LYMPHOCYTES NFR BLD AUTO: 14.4 % (ref 19.6–45.3)
MCH RBC QN AUTO: 30 PG (ref 26.6–33)
MCHC RBC AUTO-ENTMCNC: 33.1 G/DL (ref 31.5–35.7)
MCV RBC AUTO: 90.9 FL (ref 79–97)
MODALITY: ABNORMAL
MONOCYTES # BLD AUTO: 0.8 10*3/MM3 (ref 0.1–0.9)
MONOCYTES NFR BLD AUTO: 7 % (ref 5–12)
NEUTROPHILS # BLD AUTO: 8.7 10*3/MM3 (ref 1.7–7)
NEUTROPHILS NFR BLD AUTO: 75.1 % (ref 42.7–76)
NRBC BLD AUTO-RTO: 0 /100 WBC (ref 0–0.2)
PCO2 BLDA: 39.9 MM HG (ref 35–48)
PH BLDA: 7.46 PH UNITS (ref 7.35–7.45)
PLATELET # BLD AUTO: 276 10*3/MM3 (ref 140–450)
PMV BLD AUTO: 8.4 FL (ref 6–12)
PO2 BLDA: 60.2 MM HG (ref 83–108)
POTASSIUM BLD-SCNC: 4.4 MMOL/L (ref 3.5–5.2)
PROCALCITONIN SERPL-MCNC: 6.12 NG/ML (ref 0.1–0.25)
RBC # BLD AUTO: 3.58 10*6/MM3 (ref 4.14–5.8)
SAO2 % BLDCOA: 91.9 % (ref 94–98)
SODIUM BLD-SCNC: 137 MMOL/L (ref 136–145)
VIT B12 BLD-MCNC: 403 PG/ML (ref 211–946)
WBC NRBC COR # BLD: 11.5 10*3/MM3 (ref 3.4–10.8)

## 2019-12-07 PROCEDURE — 99255 IP/OBS CONSLTJ NEW/EST HI 80: CPT | Performed by: INTERNAL MEDICINE

## 2019-12-07 PROCEDURE — 25010000002 HYDROMORPHONE PER 4 MG: Performed by: SURGERY

## 2019-12-07 PROCEDURE — 82962 GLUCOSE BLOOD TEST: CPT

## 2019-12-07 PROCEDURE — 99253 IP/OBS CNSLTJ NEW/EST LOW 45: CPT | Performed by: INTERNAL MEDICINE

## 2019-12-07 PROCEDURE — 63710000001 INSULIN LISPRO (HUMAN) PER 5 UNITS: Performed by: INTERNAL MEDICINE

## 2019-12-07 PROCEDURE — 83605 ASSAY OF LACTIC ACID: CPT | Performed by: NURSE PRACTITIONER

## 2019-12-07 PROCEDURE — 82803 BLOOD GASES ANY COMBINATION: CPT

## 2019-12-07 PROCEDURE — 36600 WITHDRAWAL OF ARTERIAL BLOOD: CPT

## 2019-12-07 PROCEDURE — 63710000001 INSULIN GLARGINE PER 5 UNITS: Performed by: INTERNAL MEDICINE

## 2019-12-07 PROCEDURE — 99232 SBSQ HOSP IP/OBS MODERATE 35: CPT | Performed by: NURSE PRACTITIONER

## 2019-12-07 PROCEDURE — 85025 COMPLETE CBC W/AUTO DIFF WBC: CPT | Performed by: PHYSICIAN ASSISTANT

## 2019-12-07 PROCEDURE — 99024 POSTOP FOLLOW-UP VISIT: CPT | Performed by: SURGERY

## 2019-12-07 PROCEDURE — 25010000002 CYANOCOBALAMIN PER 1000 MCG: Performed by: NURSE PRACTITIONER

## 2019-12-07 PROCEDURE — 71045 X-RAY EXAM CHEST 1 VIEW: CPT

## 2019-12-07 PROCEDURE — 70450 CT HEAD/BRAIN W/O DYE: CPT

## 2019-12-07 PROCEDURE — 80048 BASIC METABOLIC PNL TOTAL CA: CPT | Performed by: PHYSICIAN ASSISTANT

## 2019-12-07 PROCEDURE — 99232 SBSQ HOSP IP/OBS MODERATE 35: CPT | Performed by: INTERNAL MEDICINE

## 2019-12-07 PROCEDURE — 84145 PROCALCITONIN (PCT): CPT | Performed by: NURSE PRACTITIONER

## 2019-12-07 RX ORDER — POLYETHYLENE GLYCOL 3350 17 G/17G
17 POWDER, FOR SOLUTION ORAL DAILY
Status: DISCONTINUED | OUTPATIENT
Start: 2019-12-07 | End: 2019-12-11 | Stop reason: HOSPADM

## 2019-12-07 RX ORDER — INSULIN GLARGINE 100 [IU]/ML
28 INJECTION, SOLUTION SUBCUTANEOUS NIGHTLY
Status: DISCONTINUED | OUTPATIENT
Start: 2019-12-07 | End: 2019-12-07

## 2019-12-07 RX ORDER — LANOLIN ALCOHOL/MO/W.PET/CERES
1000 CREAM (GRAM) TOPICAL DAILY
Status: DISCONTINUED | OUTPATIENT
Start: 2019-12-08 | End: 2019-12-11 | Stop reason: HOSPADM

## 2019-12-07 RX ORDER — ATORVASTATIN CALCIUM 40 MG/1
80 TABLET, FILM COATED ORAL EVERY EVENING
Status: DISCONTINUED | OUTPATIENT
Start: 2019-12-07 | End: 2019-12-11 | Stop reason: HOSPADM

## 2019-12-07 RX ORDER — INSULIN GLARGINE 100 [IU]/ML
32 INJECTION, SOLUTION SUBCUTANEOUS NIGHTLY
Status: DISCONTINUED | OUTPATIENT
Start: 2019-12-07 | End: 2019-12-09

## 2019-12-07 RX ORDER — CYANOCOBALAMIN 1000 UG/ML
1000 INJECTION, SOLUTION INTRAMUSCULAR; SUBCUTANEOUS ONCE
Status: COMPLETED | OUTPATIENT
Start: 2019-12-07 | End: 2019-12-07

## 2019-12-07 RX ADMIN — ATORVASTATIN CALCIUM 80 MG: 40 TABLET, FILM COATED ORAL at 18:19

## 2019-12-07 RX ADMIN — OXYCODONE HYDROCHLORIDE 10 MG: 5 TABLET ORAL at 18:18

## 2019-12-07 RX ADMIN — ACETAMINOPHEN 1000 MG: 500 TABLET, FILM COATED ORAL at 20:32

## 2019-12-07 RX ADMIN — CYANOCOBALAMIN 1000 MCG: 1000 INJECTION, SOLUTION INTRAMUSCULAR at 10:57

## 2019-12-07 RX ADMIN — METOPROLOL TARTRATE 50 MG: 50 TABLET, FILM COATED ORAL at 10:57

## 2019-12-07 RX ADMIN — OXYCODONE HYDROCHLORIDE 10 MG: 5 TABLET ORAL at 10:58

## 2019-12-07 RX ADMIN — ACETAMINOPHEN 1000 MG: 500 TABLET, FILM COATED ORAL at 10:58

## 2019-12-07 RX ADMIN — PANTOPRAZOLE SODIUM 40 MG: 40 TABLET, DELAYED RELEASE ORAL at 10:58

## 2019-12-07 RX ADMIN — ASPIRIN 81 MG: 81 TABLET, DELAYED RELEASE ORAL at 10:59

## 2019-12-07 RX ADMIN — OXYCODONE HYDROCHLORIDE 10 MG: 5 TABLET ORAL at 02:10

## 2019-12-07 RX ADMIN — HYDROMORPHONE HYDROCHLORIDE 0.5 MG: 2 INJECTION, SOLUTION INTRAMUSCULAR; INTRAVENOUS; SUBCUTANEOUS at 01:02

## 2019-12-07 RX ADMIN — INSULIN GLARGINE 32 UNITS: 100 INJECTION, SOLUTION SUBCUTANEOUS at 20:32

## 2019-12-07 RX ADMIN — INSULIN LISPRO 4 UNITS: 100 INJECTION, SOLUTION INTRAVENOUS; SUBCUTANEOUS at 18:19

## 2019-12-07 RX ADMIN — HYDROMORPHONE HYDROCHLORIDE 0.5 MG: 2 INJECTION, SOLUTION INTRAMUSCULAR; INTRAVENOUS; SUBCUTANEOUS at 05:36

## 2019-12-07 RX ADMIN — INSULIN LISPRO 4 UNITS: 100 INJECTION, SOLUTION INTRAVENOUS; SUBCUTANEOUS at 20:32

## 2019-12-07 RX ADMIN — ACETAMINOPHEN 1000 MG: 500 TABLET, FILM COATED ORAL at 18:19

## 2019-12-07 RX ADMIN — HYDROMORPHONE HYDROCHLORIDE 0.5 MG: 2 INJECTION, SOLUTION INTRAMUSCULAR; INTRAVENOUS; SUBCUTANEOUS at 20:33

## 2019-12-07 RX ADMIN — INSULIN LISPRO 4 UNITS: 100 INJECTION, SOLUTION INTRAVENOUS; SUBCUTANEOUS at 12:11

## 2019-12-07 RX ADMIN — INSULIN LISPRO 12 UNITS: 100 INJECTION, SOLUTION INTRAVENOUS; SUBCUTANEOUS at 20:41

## 2019-12-07 RX ADMIN — POLYETHYLENE GLYCOL 3350 17 G: 17 POWDER, FOR SOLUTION ORAL at 18:19

## 2019-12-07 RX ADMIN — METOPROLOL TARTRATE 50 MG: 50 TABLET, FILM COATED ORAL at 20:33

## 2019-12-07 RX ADMIN — INSULIN LISPRO 6 UNITS: 100 INJECTION, SOLUTION INTRAVENOUS; SUBCUTANEOUS at 10:58

## 2019-12-07 NOTE — ASSESSMENT & PLAN NOTE
Sudden vision impairment on left side of vision (Hemianopsia)  MRI done - large area of acute infarct on right occiput area  Neurology consulted  Started on aspirin and may benefit from addition of Plavix after his postop recovery  PT/OT/ST following  Cardiology performed LINH on 12/10 which showed no right to left shunt and no intracardiac thrombus  -Plan is for 30-day event monitor at discharge  -Dr. Caceres cardiology is recommending full anticoagulation once patient is stable from his stroke and surgery,  and Dr. Wells is recommending waiting 4 weeks

## 2019-12-07 NOTE — PLAN OF CARE
Problem: Patient Care Overview  Goal: Plan of Care Review  Outcome: Ongoing (interventions implemented as appropriate)  Flowsheets  Taken 12/6/2019 2018 by Elizabeth Bangura RN  Plan of Care Reviewed With: patient;family  Taken 12/6/2019 1439 by Nicolle Castro  Outcome Summary: Orders received via stroke protocol.  Swallow evaluation completed on this date.  Pt tolerates all consistencies w/no clinical s/s of aspiration.  Recommend a regular diet w/thin liquids.  Please see report for further details.  ST to cont to follow for diet tolerance.  Note:   PT VSS overnight. Pain still at a 7 with IV and PO pain meds. Using walker to stand and use urinal, Drsg clean dry and intact. No peripheral vision on left side still.       puree solid thin liquid/~2 ounces

## 2019-12-07 NOTE — ASSESSMENT & PLAN NOTE
Likely prerenal azotemia from fluid volume fluctuations  Creatinine peaked at 1.93 and baseline appears to be 1.2  Current creatinine 0.96  Avoid nephrotoxic medications

## 2019-12-07 NOTE — CONSULTS
[facility]   CONSULT NOTE    Referring Provider: EMRE Nair neurology  Reason for Consultation: Need for LINH, rule out cardioembolic stroke     Patient Care Team:  Al Kimbrough MD as PCP - General  Al Kimbrough MD as PCP - Family Medicine    Chief complaint - blurry vision         History of present illness:  Chao Lazar is a 59 y.o. male with past medical history of  Hyperlipidemia, hypertension, CAD post PCI, uncontrolled DM2 presented to the hospital for an elective colon resection due to colon polyp (rule out colon cancer). Patient apparently developed blurry vision and code STROKE was called and he was found to have an acute to subacute right occipital lobe CVA.  Cardiology has been consulted to rule out cardioembolic stroke and need for possible LINH.  Patient denies any chest pain shortness of breath or palpiations or irregular heart beats.  His only complaint now is  Blurry vision and post op surgical pain.     He is a former smoker quit 2007  Family history of CAD in father and brother       Review of Systems   Constitution: Positive for malaise/fatigue.   HENT: Negative for congestion.    Eyes: Positive for blurred vision.   Cardiovascular: Negative for chest pain, dyspnea on exertion, irregular heartbeat, leg swelling, near-syncope, orthopnea and palpitations.   Gastrointestinal:        Post op surgical pain   Neurological: Negative for focal weakness and light-headedness.   All other systems reviewed and are negative.      History  Past Medical History:   Diagnosis Date   • ACE-inhibitor cough    • Arthritis    • Diabetes mellitus (CMS/HCC) 1988    type 2    • GERD (gastroesophageal reflux disease)    • Hyperlipidemia    • Hypertension    • RAD (reactive airway disease)        Past Surgical History:   Procedure Laterality Date   • CARDIAC CATHETERIZATION     • CHOLECYSTECTOMY  2004   • COLON RESECTION SMALL BOWEL Right 12/5/2019    Procedure:  open right hemicolectomy;  Surgeon: Ronaldo Ardon MD;  Location: [facility] MAIN OR;  Service: General   • COLONOSCOPY  2019    x2    • CORONARY ANGIOPLASTY WITH STENT PLACEMENT  2017   • FRACTURE SURGERY      collar bone as a child    • KNEE ARTHROSCOPY Left 2003   • KNEE JOINT MANIPULATION Left 2010   • TOTAL KNEE ARTHROPLASTY Bilateral     ,       Family History   Problem Relation Age of Onset   • Irritable bowel syndrome Mother    • Anxiety disorder Mother    • Depression Father    • Hypertension Father    • Mental illness Father         committed suicide   • Other Sister         back problems   • Other Brother         back problems       Social History     Tobacco Use   • Smoking status: Former Smoker     Last attempt to quit: 2007     Years since quittin.9   • Smokeless tobacco: Never Used   Substance Use Topics   • Alcohol use: Yes     Comment: occasionally   • Drug use: No        Medications Prior to Admission   Medication Sig Dispense Refill Last Dose   • atorvastatin (LIPITOR) 20 MG tablet Take 1 tablet by mouth Every Evening. 1 daily 30 tablet 3 2019 at Unknown time   • Cholecalciferol 5000 units tablet Take 1 tablet by mouth Daily. Stop taking on    Past Week at Unknown time   • FARXIGA 10 MG tablet Take 1 tablet by mouth Every Morning Before Breakfast. Do not take morning of surgery  4 Past Week at Unknown time   • Insulin NPH Isophane & Regular (NOVOLIN 70/30 FLEXPEN RELION) (70-30) 100 UNIT/ML suspension pen-injector Inject 64 Units under the skin into the appropriate area as directed 4 (Four) Times a Day. Do not take the day of surgery   2019 at Unknown time   • metFORMIN (GLUCOPHAGE) 500 MG tablet Take 1 tablet by mouth 3 (Three) Times a Day. (Patient taking differently: Take 500 mg by mouth 2 (Two) Times a Day With Meals. Take last dose Dec 3 Tues am) 30 tablet 3 Past Week at Unknown time   • metoclopramide (REGLAN) 10 MG tablet Take 10 mg by mouth 3  (Three) Times a Day. Do not take the morning of surgery  6 12/4/2019 at Unknown time   • metoprolol tartrate (LOPRESSOR) 50 MG tablet Take 1 tablet by mouth 2 (Two) Times a Day for 30 days. Take one twice daily 60 tablet 11 12/4/2019 at Unknown time   • omeprazole (PrilOSEC) 40 MG capsule Take 40 mg by mouth 2 (Two) Times a Day. May take the day of surgery   12/4/2019 at Unknown time   • B-D UF III MINI PEN NEEDLES 31G X 5 MM misc USE WITH INSULIN PEN 4 TIMES A DAY DX E11.65  3 Taking   • diphenhydrAMINE (BENADRYL ALLERGY) 25 mg capsule Take 2 capsules by mouth Every 4 (Four) Hours.      • glucose blood (ONE TOUCH ULTRA TEST) test strip ONETOUCH ULTRA BLUE STRP   Taking   • Insulin Pen Needle (B-D UF III MINI PEN NEEDLES) 31G X 5 MM misc Use with insulin 4 times daily dx:e11.65 150 each 3 Taking   • ONETOUCH DELICA LANCETS 33G misc ONETOUCH DELICA LANCETS 33G   Taking         Patient has no known allergies.    Scheduled Meds:  acetaminophen 1,000 mg Oral TID   aspirin 81 mg Oral Daily   atorvastatin 80 mg Oral Q PM   insulin glargine 20 Units Subcutaneous Nightly   insulin lispro 0-9 Units Subcutaneous 4x Daily With Meals & Nightly   insulin regular 10 Units Subcutaneous Once   metoprolol tartrate 50 mg Oral BID   pantoprazole 40 mg Oral QAM   [START ON 12/8/2019] vitamin B-12 1,000 mcg Oral Daily     Continuous Infusions:  sodium chloride 100 mL/hr Last Rate: 100 mL/hr (12/06/19 0211)     PRN Meds:.albuterol  •  dextrose  •  dextrose  •  glucagon (human recombinant)  •  HYDROmorphone **AND** naloxone  •  insulin lispro **AND** insulin lispro  •  magnesium hydroxide  •  ondansetron  •  oxyCODONE **OR** oxyCODONE  •  promethazine        VITAL SIGNS  Vitals:    12/07/19 0410 12/07/19 0411 12/07/19 0627 12/07/19 1009   BP:   139/80 125/77   BP Location:   Right arm Right arm   Patient Position:   Lying Lying   Pulse: 81 72 89 85   Resp:   18 15   Temp:   97.9 °F (36.6 °C) 97.7 °F (36.5 °C)   TempSrc:   Oral Oral  "  SpO2: 90% 90% 95% 94%   Weight:       Height:           Flowsheet Rows      First Filed Value   Admission Height  185.4 cm (73\") Documented at 12/05/2019 1041   Admission Weight  90.9 kg (200 lb 6.4 oz) Documented at 12/05/2019 1041           TELEMETRY: normal sinus rhythm     Physical Exam:  Physical Exam   Constitutional: He is oriented to person, place, and time. He appears well-developed and well-nourished.   HENT:   Head: Normocephalic and atraumatic.   Eyes: Conjunctivae are normal.   Neck: Normal range of motion. Neck supple. No JVD present.   Cardiovascular: Normal rate, regular rhythm, normal heart sounds and intact distal pulses.   No murmur heard.  Pulmonary/Chest: Effort normal.   Abdominal: Soft. Bowel sounds are normal.   Musculoskeletal: Normal range of motion.   Neurological: He is alert and oriented to person, place, and time.   Skin: Skin is warm and dry.   Psychiatric: He has a normal mood and affect. His behavior is normal.   Nursing note and vitals reviewed.               LAB RESULTS (LAST 7 DAYS)    CBC  Results from last 7 days   Lab Units 12/07/19  0526 12/06/19  0958 12/06/19  0338 12/05/19  2142   WBC 10*3/mm3 11.50* 15.70* 15.90* 17.80*   RBC 10*6/mm3 3.58* 4.33 4.35 4.69   HEMOGLOBIN g/dL 10.7* 12.8* 13.6 14.0   HEMATOCRIT % 32.5* 38.5 39.7 42.3   MCV fL 90.9 89.0 91.3 90.2   PLATELETS 10*3/mm3 276 439 379 434       BMP  Results from last 7 days   Lab Units 12/07/19  0526 12/06/19  0958 12/06/19  0338 12/05/19  2321 12/05/19  2142   SODIUM mmol/L 137 136 133*  --  132*   POTASSIUM mmol/L 4.4 4.8  4.8 6.6* 6.4* 6.3*   CHLORIDE mmol/L 99 98 100  --  96*   CO2 mmol/L 24.0 18.0* 19.0*  --  21.0*   BUN mg/dL 24* 22 21  --  20   CREATININE mg/dL 1.37* 1.93* 1.73*  --  1.68*   GLUCOSE mg/dL 227* 405* 438*  --  396*       CMP Results from last 7 days   Lab Units 12/07/19  0526 12/06/19  0958 12/06/19  0338 12/05/19  2321 12/05/19  2142   SODIUM mmol/L 137 136 133*  --  132*   POTASSIUM mmol/L " 4.4 4.8  4.8 6.6* 6.4* 6.3*   CHLORIDE mmol/L 99 98 100  --  96*   CO2 mmol/L 24.0 18.0* 19.0*  --  21.0*   BUN mg/dL 24* 22 21  --  20   CREATININE mg/dL 1.37* 1.93* 1.73*  --  1.68*   GLUCOSE mg/dL 227* 405* 438*  --  396*         BNP        TROPONIN        CoAg  Results from last 7 days   Lab Units 12/06/19  0958   INR  0.94   APTT seconds 23.3*       Creatinine Clearance  Estimated Creatinine Clearance: 72.8 mL/min (A) (by C-G formula based on SCr of 1.37 mg/dL (H)).    ABG        Radiology  Mri Brain Without Contrast    Result Date: 12/6/2019   1. Large area of acute ischemic change involving the right occipitoparietal region with additional small punctate foci involving the thalamus and periventricular white matter as described above. Susceptibility weighted imaging demonstrates some curvilinear dark areas within the largest area of infarction, which may indicate petechial hemorrhage. 2. Mild parenchymal volume loss and ventricular white matter T2 and FLAIR high signal abnormality most commonly secondary to chronic small vessel ischemic change. 3. Paranasal sinus disease.     Electronically Signed ByClement Churchill On:12/6/2019 1:09 PM This report was finalized on [id number] by  Joshua Churchill, .    Ct Angiogram Carotids    Result Date: 12/6/2019   1. Acute to subacute area of infarction involving the right posterior cerebral artery territory with occlusion of the right P2 segment of the posterior cerebral artery. 2. No hemodynamically significant extracranial vascular disease.  Electronically Signed ByMaycol Sheikh On:12/6/2019 11:04 AM This report was finalized on [id number] by  Ronaldo Sheikh, .    Ct Angiogram Head    Result Date: 12/6/2019   1. Acute to subacute area of infarction involving the right posterior cerebral artery territory with occlusion of the right P2 segment of the posterior cerebral artery. 2. No hemodynamically significant extracranial vascular disease.  Electronically Signed ByMaycol Sheikh  On:12/6/2019 11:04 AM This report was finalized on [id number] by  Ronaldo Sheikh, .    Ct Head Without Contrast Stroke Protocol    Result Date: 12/6/2019   1. Findings consistent with early subacute ischemic change in the right occipitoparietal region, PCA distribution. MRI is a more sensitive examination for determining acuity of ischemic change or for intracranial metastatic disease.  Electronically Signed By-Joshua Churchill On:12/6/2019 10:20 AM This report was finalized on [id number] by  Joshua Churchill, .          EKG          I personally viewed and interpreted the patient's EKG/Telemetry data: Sinus rhythm nonspecific ST-T wave abnormalities    ECHOCARDIOGRAM:    Results for orders placed during the hospital encounter of 12/05/19   Adult Transthoracic Echo Complete W/ Cont if Necessary Per Protocol    Narrative · Estimated EF = 60%.  · Left ventricular systolic function is normal.     Indications  Cardiac source of emboli.    Technically satisfactory study.  Mitral valve is structurally normal.  Tricuspid valve is structurally normal.  Aortic valve is structurally normal.  Pulmonic valve could not be well visualized.  No evidence for mitral tricuspid or aortic regurgitation is seen by   Doppler study.  Left atrium is normal in size.  Right atrium is normal in size.  Left ventricle is normal in size and contractility with ejection fraction   of 60%.  Right ventricle is normal in size.  Atrial septum is intact.  Aorta is normal.  No pericardial effusion or intracardiac thrombus is seen.    Impression  Structurally and functionally normal cardiac valves.  Normal left ventricle size and contractility with ejection fraction of   60%.  No pericardial effusion or intracardiac thrombus is seen.             STRESS MYOVIEW:    CARDIAC CATHETERIZATION:    OTHER:         Assessment/Plan       Type 2 diabetes mellitus with hyperglycemia, with long-term current use of insulin (CMS/HCC)    Hyperlipidemia, mixed     Hypertension    Acute ischemic right PCA stroke involving the right occipital lobe (CMS/HCC)    Status post colon resection    FRANKI (acute kidney injury) (CMS/HCC)    Plan:  Patient admitted for colon resection   Now with acute to subacute CVA right occipital lobe with blurry vision   Echocardiogram as above no intracardiac thrombus seen normal EF   TELE- NSR  Will likely need LINH prior to discharge - possibly Monday   Will need event monitor prior to discharge to rule out arrhythmia for cause of CVA  Continue ASA, statin, beta blocker   Needs aggressive control of hypertension, HLD, DM as well as possible sleep apnea     Further recommendations per Dr. Brown    I discussed the patients findings and my recommendations with patient and RN      [[[[[[[[[[[[[[[[[[[[  Chart reviewed patient examined and discussed with patient and patient's sister at bedside.  No expectorations note was reviewed and agree with the assessment and plan.  Patient recently had stroke after he was admitted for elective colon resection.  Physical exam-agree with above documentation..  Right-sided weakness is present.  EKG showed sinus rhythm without any ischemic changes.  Patient to have transesophageal echocardiogram by primary cardiologist probably on Monday Dr. Caceres  Risks and benefits pros and cons of the procedure were discussed with patient.  Further plan will depend on patient's progress.      Viktoria Kunz, APRN  12/07/19  12:10 PM

## 2019-12-07 NOTE — PROGRESS NOTES
[facility]   INPATIENT PROGRESS NOTE    Date of Admission: 12/5/2019  Length of Stay: 1  Primary Care Physician: Al Kimbrough MD    Subjective   Subjective   CC: vision impairment, left side    HPI: 59 years old male patient with colon resection on 12/05.  She had sudden left side vision impairment last night.  MRI was done. Showing new acute infarct on right occipital lobe. Neuro was consulted  No motor weakness.  Creatinine is elevated.hyperglycemia.        Review Of Systems:   Left side vision impairment  Abdominal pain and discomfort - improving  No CP, no SOB  Objective   Objective    Physical Exam:  Temp:  [97.4 °F (36.3 °C)-98.4 °F (36.9 °C)] 98 °F (36.7 °C)  Heart Rate:  [] 103  Resp:  [12-17] 14  BP: ()/(60-86) 124/86      Constitutional: He is oriented to person, place, and time. He appears well-developed and well-nourished.   HENT:   Head: Normocephalic and atraumatic.   Eyes: Conjunctivae and EOM are normal. Pupils are equal, round, and reactive to light.   Neck: Normal range of motion. Neck supple.   Cardiovascular: Normal rate, regular rhythm, normal heart sounds and intact distal pulses.   Pulmonary/Chest: Effort normal and breath sounds normal.   Abdominal:   Slightly distended, BS reduced  Musculoskeletal: Normal range of motion.   Neurological: He is alert and oriented to person, place, and time. Left eye vision was diminished. ELISABET  Skin: Skin is warm and dry.   Psychiatric: He has a normal mood and affect. His behavior is normal. Judgment and thought content normal.         Results Review:    I have personally reviewed most recent lab results and agree with findings, most notably: .      Results from last 7 days   Lab Units 12/06/19  0958 12/06/19  0338 12/05/19  2142   WBC 10*3/mm3 15.70* 15.90* 17.80*   HEMOGLOBIN g/dL 12.8* 13.6 14.0   HEMATOCRIT % 38.5 39.7 42.3   PLATELETS 10*3/mm3 439 379 434   INR  0.94  --   --      Results from last 7 days   Lab Units  12/06/19  0958 12/06/19  0338 12/05/19  2321 12/05/19  2142   SODIUM mmol/L 136 133*  --  132*   POTASSIUM mmol/L 4.8  4.8 6.6* 6.4* 6.3*   CHLORIDE mmol/L 98 100  --  96*   CO2 mmol/L 18.0* 19.0*  --  21.0*   BUN mg/dL 22 21  --  20   CREATININE mg/dL 1.93* 1.73*  --  1.68*   GLUCOSE mg/dL 405* 438*  --  396*   CALCIUM mg/dL 8.7 8.4*  --  8.6     Hemoglobin A1C (%)   Date/Time Value   12/06/2019 0338 9.5 (H)     TSH (uIU/mL)   Date/Time Value   12/06/2019 0958 2.340     Microbiology Results Abnormal     None        Mri Brain Without Contrast    Result Date: 12/6/2019   1. Large area of acute ischemic change involving the right occipitoparietal region with additional small punctate foci involving the thalamus and periventricular white matter as described above. Susceptibility weighted imaging demonstrates some curvilinear dark areas within the largest area of infarction, which may indicate petechial hemorrhage. 2. Mild parenchymal volume loss and ventricular white matter T2 and FLAIR high signal abnormality most commonly secondary to chronic small vessel ischemic change. 3. Paranasal sinus disease.     Electronically Signed ByClement Churchill On:12/6/2019 1:09 PM This report was finalized on [id number] by  Joshua Churchill, .    Ct Angiogram Carotids    Result Date: 12/6/2019   1. Acute to subacute area of infarction involving the right posterior cerebral artery territory with occlusion of the right P2 segment of the posterior cerebral artery. 2. No hemodynamically significant extracranial vascular disease.  Electronically Signed ByMaycol Sheikh On:12/6/2019 11:04 AM This report was finalized on [id number] by  Ronaldo Sheikh, .    Ct Angiogram Head    Result Date: 12/6/2019   1. Acute to subacute area of infarction involving the right posterior cerebral artery territory with occlusion of the right P2 segment of the posterior cerebral artery. 2. No hemodynamically significant extracranial vascular disease.  Electronically  Signed By-Ronaldo Sheikh On:12/6/2019 11:04 AM This report was finalized on [id number] by  Ronaldo Sheikh, .    Ct Head Without Contrast Stroke Protocol    Result Date: 12/6/2019   1. Findings consistent with early subacute ischemic change in the right occipitoparietal region, PCA distribution. MRI is a more sensitive examination for determining acuity of ischemic change or for intracranial metastatic disease.  Electronically Signed By-Joshua Churchill On:12/6/2019 10:20 AM This report was finalized on [id number] by  Joshua Churchill, .      Results for orders placed during the hospital encounter of 12/05/19   Adult Transthoracic Echo Complete W/ Cont if Necessary Per Protocol    Narrative · Estimated EF = 60%.  · Left ventricular systolic function is normal.     Indications  Cardiac source of emboli.    Technically satisfactory study.  Mitral valve is structurally normal.  Tricuspid valve is structurally normal.  Aortic valve is structurally normal.  Pulmonic valve could not be well visualized.  No evidence for mitral tricuspid or aortic regurgitation is seen by   Doppler study.  Left atrium is normal in size.  Right atrium is normal in size.  Left ventricle is normal in size and contractility with ejection fraction   of 60%.  Right ventricle is normal in size.  Atrial septum is intact.  Aorta is normal.  No pericardial effusion or intracardiac thrombus is seen.    Impression  Structurally and functionally normal cardiac valves.  Normal left ventricle size and contractility with ejection fraction of   60%.  No pericardial effusion or intracardiac thrombus is seen.             I have reviewed the medications.    Assessment/Plan   Assessment/Plan   Brief Hospital Course:      Active Hospital Problems:  Acute ischemic right PCA stroke involving the right occipital lobe (CMS/HCC)     Sudden vision impairment on left eye  MRI done - large area of acute infarct on right occiput area  Neurology consulted  DW Dr. Ventura  He may need  Aspirin and Plavix.  Now the patient is immediate postoperative period (Colon resection)  Neurologist will discuss with surgeon for these medication use. (Plavix may start next week)  PT/OT need         FRANKI (acute kidney injury) (CMS/[facility])    Cr - 1.95  Baseline 1.2-3  On IVF  Monitor renal funciton daily      Status post colon resection     Partial Colectomy done 12/05  ; Reason - multiple polyps  ;      Type 2 diabetes mellitus with hyperglycemia, with long-term current use of insulin (CMS/[facility])     Hyperglycemia  Lantus 20 units from today  + Sliding scale  Tight control needed             DVT prophylaxis:  Discharge Planning: I expect patient to be discharged to  Rehab 2-3 days    Rober Coronado MD, 12/06/19 7:28 PM

## 2019-12-07 NOTE — PROGRESS NOTES
LOS: 2 days     Chief Complaint: Left-sided vision loss       SUBJECTIVE:  History taken from: patient chart RN      Patient Complaints: Patient states he still having left vision loss.  Bedside states yesterday he was unable to see out of the left eye at all but that is now improved.  He is very unsteady when ambulating and has difficulty grabbing objects like a cup.  They are still waiting for pathology from the surgeon.    Review of Systems   Constitutional: Negative.    Eyes: Positive for visual disturbance.   Cardiovascular: Negative.    Neurological: Negative for dizziness, tremors, seizures, syncope, facial asymmetry, speech difficulty, weakness, light-headedness, numbness and headaches.      ________________________________________________     OBJECTIVE:    On exam:  GENERAL: NAD  CARDIO: RRR  NEURO:  Oriented x3  EOMI, PERRL, Left homonymous hemianopsia  CN 2-12 intact, No facial asymmetry  Speech clear without dysarthria   Sensations intact and equal bilaterally  Strength 5/5 and equal in all extremities  Limb ataxia, Left > right     ________________________________________________   RESULTS REVIEW    VITAL SIGNS:  Temp:  [97.8 °F (36.6 °C)-98.8 °F (37.1 °C)] 97.9 °F (36.6 °C)  Heart Rate:  [] 89  Resp:  [12-23] 18  BP: (108-139)/(57-86) 139/80    LABS:   Lab Results   Component Value Date    WBC 11.50 (H) 12/07/2019    HGB 10.7 (L) 12/07/2019    HCT 32.5 (L) 12/07/2019    MCV 90.9 12/07/2019     12/07/2019     Lab Results   Component Value Date    GLUCOSE 227 (H) 12/07/2019    BUN 24 (H) 12/07/2019    CREATININE 1.37 (H) 12/07/2019    EGFRIFNONA 53 (L) 12/07/2019    BCR 17.5 12/07/2019    K 4.4 12/07/2019    CO2 24.0 12/07/2019    CALCIUM 8.8 12/07/2019    ALBUMIN 3.70 07/02/2019    LABIL2 0.8 (L) 05/26/2019    AST 26 07/02/2019    ALT 29 07/02/2019       Lab Results   Component Value Date    TSH 2.340 12/06/2019    LDL  12/06/2019      Comment:      Unable to calculate    HGBA1C 9.5  (H) 12/06/2019    [id number] 403 12/06/2019         IMAGING STUDIES:  Mri Brain Without Contrast    Result Date: 12/6/2019   1. Large area of acute ischemic change involving the right occipitoparietal region with additional small punctate foci involving the thalamus and periventricular white matter as described above. Susceptibility weighted imaging demonstrates some curvilinear dark areas within the largest area of infarction, which may indicate petechial hemorrhage. 2. Mild parenchymal volume loss and ventricular white matter T2 and FLAIR high signal abnormality most commonly secondary to chronic small vessel ischemic change. 3. Paranasal sinus disease.     Electronically Signed ByClement Churchill On:12/6/2019 1:09 PM This report was finalized on [id number] by  Joshua Churchill, .    Ct Angiogram Carotids    Result Date: 12/6/2019   1. Acute to subacute area of infarction involving the right posterior cerebral artery territory with occlusion of the right P2 segment of the posterior cerebral artery. 2. No hemodynamically significant extracranial vascular disease.  Electronically Signed ByMaycol Sheikh On:12/6/2019 11:04 AM This report was finalized on [id number] by  Ronaldo Sheikh, .    Ct Angiogram Head    Result Date: 12/6/2019   1. Acute to subacute area of infarction involving the right posterior cerebral artery territory with occlusion of the right P2 segment of the posterior cerebral artery. 2. No hemodynamically significant extracranial vascular disease.  Electronically Signed ByMaycol Sheikh On:12/6/2019 11:04 AM This report was finalized on [id number] by  Ronaldo Sheikh, .    Ct Head Without Contrast Stroke Protocol    Result Date: 12/6/2019   1. Findings consistent with early subacute ischemic change in the right occipitoparietal region, PCA distribution. MRI is a more sensitive examination for determining acuity of ischemic change or for intracranial metastatic disease.  Electronically Signed ByClement Churchill  On:12/6/2019 10:20 AM This report was finalized on [id number] by  Joshua Churchill, .      I reviewed the patient's new clinical results.    ________________________________________________      PROBLEM LIST:    Type 2 diabetes mellitus with hyperglycemia, with long-term current use of insulin (CMS/[facility])    Hyperlipidemia, mixed    Hypertension    Acute ischemic right PCA stroke involving the right occipital lobe (CMS/[facility])    Status post colon resection    FRANKI (acute kidney injury) (CMS/[facility])        Assessment/Plan   ASSESSMENT/PLAN:  1. Acute to subacute right occipital lobe ischemic stroke with occlusion of the right PCA P2 segment. Concern for embolic disease.   - CT head: Findings consistent with early subacute ischemic change in the right  occipitoparietal region, PCA distribution.  - MRI brain:  Large acute ischemic change involving the right occipital parietal region and additional small punctate foci of involving the thalamus and periventricular white matter.  Susceptibility weighted imaging demonstrates dark areas with a large area of infarction which may indicate petechial hemorrhage.  - CTA head and neck: cute to subacute area of infarction involving the right posterior cerebral artery territory with occlusion of the right P2 segment of the posterior cerebral artery. No hemodynamically significant extracranial vascular disease.  - Echo:  EF is 60%, no intracardiac thrombus is seen.  Bubble study is not reported.  - Cardiology consult for LINH   - Event monitor x30 days or Loop recorder to r/o afib  - EKG: Sinus rhythm. Abnormal T, consider ischemia, lateral leads  - Labs: A1C: 9.5, B12: 403, LDL: (unable to calculate), TSH: 2.34  - Antithrombotics: ASA 81 mg daily   - Statin: Lipitor 80 mg qhs  - PT/OT/ST as appropriate, Neuro checks per protocol, DVT prophylaxis, Stroke education     2. DM2 with acute hyperglycemia, uncontrolled  - Family reports non-compliance with medications   - BS between 300 and 400  today  - SSI per primary  - Recommend dietician and diabetic educator     4. Polyp of transverse colon s/p open right hemicolectomy     5.  Hyperlipidemia  - Statin & dietary modifications    6.  Vitamin B12 deficiency  - Replacement ordered  - Recommend monthly B12 injections outpt or 1000 mcg p.o. daily    7. Medical non-compliance  - discussed the risks of non-compliance     8. Modification of stroke risk factors:   - Blood pressure should be less than 130/80 outpatient, HbA1c less than 6.5, LDL less than 70; b12>500 and smoking cessation if applicable. We would be grateful if the primary team / primary care physician keep a close watch on this above targets.  - Stroke education  - Follow up with neurologist of choice    **Please refer to previous notes for further details and recommendations.     I discussed the patients findings and my recommendations with patient, family and nursing staff.  Patient has been seen by myself, MJ Nair, and the attending Neurologist who agrees with the diagnosis and treatment plan.     Esperanza Yao, EMRE  12/07/19  8:21 AM

## 2019-12-07 NOTE — PLAN OF CARE
Patient is having some intermittent confusion. This AM pulled out an IV and tried to exit bed. Has attempted to exit bed a few more times since. Patient NIH of 6. Scored for facial droop, visual fields, ataxia, best language as patient is having some trouble with word finding and patient is having visual neglect on left side. Nurse will continue to monitor.

## 2019-12-07 NOTE — ASSESSMENT & PLAN NOTE
Patient was on high dose of 70/30 preadmission  Endocrine consulted on 12/07/19 switch to basal bolus insulin with dosage adjustments for blood sugar control and it is better now

## 2019-12-07 NOTE — PROGRESS NOTES
[facility]   INPATIENT PROGRESS NOTE    Date of Admission: 12/5/2019  Length of Stay: 2  Primary Care Physician: Al Kimbrough MD    Subjective   Subjective   CC: feels better    HPI: Colon resection on 12.05  Acute stroke on 12/06 AM (Vision impairment)  Rt occiput ischemic Infarct  Neuro consulted  Sx improves.  Hyperglycemia - Lantus  + SS      Review Of Systems:   As per HPI and pMH    Objective   Objective    Physical Exam:  Temp:  [97.6 °F (36.4 °C)-98.8 °F (37.1 °C)] 97.6 °F (36.4 °C)  Heart Rate:  [] 85  Resp:  [14-23] 17  BP: (108-139)/(57-86) 125/77    Constitutional: He is oriented to person, place, and time. He appears well-developed and well-nourished.   HENT:   Head: Normocephalic and atraumatic.   Eyes: Conjunctivae and EOM are normal. Pupils are equal, round, and reactive to light. _ Left Eye vision and field impaired  Neck: Normal range of motion. Neck supple.   Cardiovascular: Normal rate, regular rhythm, normal heart sounds and intact distal pulses.   Pulmonary/Chest: Effort normal and breath sounds normal.   Abdominal:   Slightly distended, BS reduced  Musculoskeletal: Normal range of motion.   Neurological: He is alert and oriented to person, place, and time. Left eye vision was diminished. ELISABET  Skin: Skin is warm and dry.   Psychiatric: He has a normal mood and affect. His behavior is normal. Judgment and thought content normal.        Results Review:    I have personally reviewed most recent lab results and agree with findings, most notably:       Results from last 7 days   Lab Units 12/07/19  0526 12/06/19 0958 12/06/19  0338   WBC 10*3/mm3 11.50* 15.70* 15.90*   HEMOGLOBIN g/dL 10.7* 12.8* 13.6   HEMATOCRIT % 32.5* 38.5 39.7   PLATELETS 10*3/mm3 276 439 379   INR   --  0.94  --      Results from last 7 days   Lab Units 12/07/19  0526 12/06/19  0958 12/06/19  0338   SODIUM mmol/L 137 136 133*   POTASSIUM mmol/L 4.4 4.8  4.8 6.6*   CHLORIDE mmol/L 99 98 100   CO2 mmol/L  24.0 18.0* 19.0*   BUN mg/dL 24* 22 21   CREATININE mg/dL 1.37* 1.93* 1.73*   GLUCOSE mg/dL 227* 405* 438*   CALCIUM mg/dL 8.8 8.7 8.4*     Hemoglobin A1C (%)   Date/Time Value   12/06/2019 0338 9.5 (H)     TSH (uIU/mL)   Date/Time Value   12/06/2019 0958 2.340     Microbiology Results Abnormal     None        Mri Brain Without Contrast    Result Date: 12/6/2019   1. Large area of acute ischemic change involving the right occipitoparietal region with additional small punctate foci involving the thalamus and periventricular white matter as described above. Susceptibility weighted imaging demonstrates some curvilinear dark areas within the largest area of infarction, which may indicate petechial hemorrhage. 2. Mild parenchymal volume loss and ventricular white matter T2 and FLAIR high signal abnormality most commonly secondary to chronic small vessel ischemic change. 3. Paranasal sinus disease.     Electronically Signed By-Joshua Churchill On:12/6/2019 1:09 PM This report was finalized on [id number] by  Joshua Churchill, .    Ct Angiogram Carotids    Result Date: 12/6/2019   1. Acute to subacute area of infarction involving the right posterior cerebral artery territory with occlusion of the right P2 segment of the posterior cerebral artery. 2. No hemodynamically significant extracranial vascular disease.  Electronically Signed ByMaycol Sheikh On:12/6/2019 11:04 AM This report was finalized on [id number] by  Ronaldo Sheikh .    Ct Angiogram Head    Result Date: 12/6/2019   1. Acute to subacute area of infarction involving the right posterior cerebral artery territory with occlusion of the right P2 segment of the posterior cerebral artery. 2. No hemodynamically significant extracranial vascular disease.  Electronically Signed ByMaycol Sheikh On:12/6/2019 11:04 AM This report was finalized on [id number] by  Ronaldo Sheikh .    Ct Head Without Contrast Stroke Protocol    Result Date: 12/6/2019   1. Findings consistent with early  subacute ischemic change in the right occipitoparietal region, PCA distribution. MRI is a more sensitive examination for determining acuity of ischemic change or for intracranial metastatic disease.  Electronically Signed By-Joshua Churchill On:12/6/2019 10:20 AM This report was finalized on [id number] by  Joshua Churchill, .      Results for orders placed during the hospital encounter of 12/05/19   Adult Transthoracic Echo Complete W/ Cont if Necessary Per Protocol    Narrative · Estimated EF = 60%.  · Left ventricular systolic function is normal.     Indications  Cardiac source of emboli.    Technically satisfactory study.  Mitral valve is structurally normal.  Tricuspid valve is structurally normal.  Aortic valve is structurally normal.  Pulmonic valve could not be well visualized.  No evidence for mitral tricuspid or aortic regurgitation is seen by   Doppler study.  Left atrium is normal in size.  Right atrium is normal in size.  Left ventricle is normal in size and contractility with ejection fraction   of 60%.  Right ventricle is normal in size.  Atrial septum is intact.  Aorta is normal.  No pericardial effusion or intracardiac thrombus is seen.    Impression  Structurally and functionally normal cardiac valves.  Normal left ventricle size and contractility with ejection fraction of   60%.  No pericardial effusion or intracardiac thrombus is seen.             I have reviewed the medications.    Assessment/Plan   Assessment/Plan   Brief Hospital Course:      Active Hospital Problems:  Acute ischemic right PCA stroke involving the right occipital lobe (CMS/HCC)   Sudden vision impairment on left eye  MRI done - large area of acute infarct on right occiput area  Neurology consulted  DW Dr. Ventura  He may need Aspirin and Plavix.  Now the patient is immediate postoperative period (Colon resection)  Neurologist will discuss with surgeon for these medication use. (Plavix may start next week)  PT/OT need    Feels better,  BP control is good.  Vision - improving. No focal motor weakness        FRANKI (acute kidney injury) (CMS/[facility])  Cr - 1.95, today - 1.37, improving, on IVF  Baseline 1.2-3  On IVF  Monitor renal funciton daily     Status post colon resection   Partial Colectomy done 12/05  ; Reason - multiple polyps  ; Lots of gas passage (++)  : surgery follows    Type 2 diabetes mellitus with hyperglycemia, with long-term current use of insulin (CMS/[facility])   Hyperglycemia  Lantus 20 units from today  + Sliding scale  Tight control needed  A1C - 9.5  Today 230 +-. Will increase Lantus from 20 to 28            DVT prophylaxis:  Discharge Planning: I expect patient to be discharged to  2- 3 days    Rober Coronado MD, 12/07/19 5:27 PM

## 2019-12-07 NOTE — PROGRESS NOTES
"Post-op Note  RIGHT HEMICOLECTOMY  12/5/2019    Subjective   Chao Lazar is a 59 y.o. male status post open right hemicolectomy for unresectable transverse colon polyp performed on 12/5/2019.  No significant changes since yesterday.  Having stable visual field issues.  Has been ambulatory with a walker.  No nausea or vomiting.  Passing flatus.  No full bowel movement yet.  Incisional pain is controlled.      Objective   /77 (BP Location: Right arm, Patient Position: Lying)   Pulse 85   Temp 97.6 °F (36.4 °C) (Oral)   Resp 17   Ht 185.4 cm (73\")   Wt 90.9 kg (200 lb 6.4 oz)   SpO2 94%   BMI 26.44 kg/m²   Vital signs reviewed  Abdomen is soft and appropriately tender incision healing well without any erythema or drainage    Assessment/Plan   59-year-old gentleman status post open right hemicolectomy for unresectable transverse colon polyp performed on 12/5/2019.  The following morning, the patient had left-sided hemianopsia, and was determined to have a right-sided PCA stroke.    Greatly appreciate the input of neurology and this problem.  We will follow their recommendations.  Low residue diet  Continue pain management with Roxicodone, Dilaudid for breakthrough, and Tylenol.  Okay for VTE prophylaxis, continue SCDs  Appreciate medicine input with management of his hyperglycemia and management of his hypertension.  No need for further antibiotics  Continue ambulation with assistance, and incentive spirometer while awake.    LINH likely planned for Monday  Bowel regimen  Armando Albarado MD  12/7/2019  4:27 PM    "

## 2019-12-08 ENCOUNTER — APPOINTMENT (OUTPATIENT)
Dept: GENERAL RADIOLOGY | Facility: HOSPITAL | Age: 59
End: 2019-12-08

## 2019-12-08 LAB
ANION GAP SERPL CALCULATED.3IONS-SCNC: 13 MMOL/L (ref 5–15)
B PERT DNA SPEC QL NAA+PROBE: NOT DETECTED
BASOPHILS # BLD AUTO: 0.1 10*3/MM3 (ref 0–0.2)
BASOPHILS NFR BLD AUTO: 0.8 % (ref 0–1.5)
BUN BLD-MCNC: 22 MG/DL (ref 8–23)
BUN/CREAT SERPL: 19.1 (ref 7–25)
C PNEUM DNA NPH QL NAA+NON-PROBE: NOT DETECTED
CALCIUM SPEC-SCNC: 9.3 MG/DL (ref 8.6–10.5)
CHLORIDE SERPL-SCNC: 98 MMOL/L (ref 98–107)
CO2 SERPL-SCNC: 23 MMOL/L (ref 22–29)
CREAT BLD-MCNC: 1.15 MG/DL (ref 0.76–1.27)
DEPRECATED RDW RBC AUTO: 44.6 FL (ref 37–54)
EOSINOPHIL # BLD AUTO: 0.5 10*3/MM3 (ref 0–0.4)
EOSINOPHIL NFR BLD AUTO: 3.5 % (ref 0.3–6.2)
ERYTHROCYTE [DISTWIDTH] IN BLOOD BY AUTOMATED COUNT: 14.2 % (ref 12.3–15.4)
FLUAV H1 2009 PAND RNA NPH QL NAA+PROBE: NOT DETECTED
FLUAV H1 HA GENE NPH QL NAA+PROBE: NOT DETECTED
FLUAV H3 RNA NPH QL NAA+PROBE: NOT DETECTED
FLUAV SUBTYP SPEC NAA+PROBE: NOT DETECTED
FLUBV RNA ISLT QL NAA+PROBE: NOT DETECTED
GFR SERPL CREATININE-BSD FRML MDRD: 65 ML/MIN/1.73
GLUCOSE BLD-MCNC: 196 MG/DL (ref 65–99)
GLUCOSE BLDC GLUCOMTR-MCNC: 153 MG/DL (ref 70–105)
GLUCOSE BLDC GLUCOMTR-MCNC: 180 MG/DL (ref 70–105)
GLUCOSE BLDC GLUCOMTR-MCNC: 198 MG/DL (ref 70–105)
GLUCOSE BLDC GLUCOMTR-MCNC: 210 MG/DL (ref 70–105)
GLUCOSE BLDC GLUCOMTR-MCNC: 221 MG/DL (ref 70–105)
GLUCOSE BLDC GLUCOMTR-MCNC: 275 MG/DL (ref 70–105)
HADV DNA SPEC NAA+PROBE: NOT DETECTED
HCOV 229E RNA SPEC QL NAA+PROBE: NOT DETECTED
HCOV HKU1 RNA SPEC QL NAA+PROBE: NOT DETECTED
HCOV NL63 RNA SPEC QL NAA+PROBE: NOT DETECTED
HCOV OC43 RNA SPEC QL NAA+PROBE: NOT DETECTED
HCT VFR BLD AUTO: 31.8 % (ref 37.5–51)
HGB BLD-MCNC: 10.7 G/DL (ref 13–17.7)
HMPV RNA NPH QL NAA+NON-PROBE: NOT DETECTED
HPIV1 RNA SPEC QL NAA+PROBE: NOT DETECTED
HPIV2 RNA SPEC QL NAA+PROBE: NOT DETECTED
HPIV3 RNA NPH QL NAA+PROBE: NOT DETECTED
HPIV4 P GENE NPH QL NAA+PROBE: NOT DETECTED
LYMPHOCYTES # BLD AUTO: 1.6 10*3/MM3 (ref 0.7–3.1)
LYMPHOCYTES NFR BLD AUTO: 12 % (ref 19.6–45.3)
M PNEUMO IGG SER IA-ACNC: NOT DETECTED
MCH RBC QN AUTO: 30.2 PG (ref 26.6–33)
MCHC RBC AUTO-ENTMCNC: 33.6 G/DL (ref 31.5–35.7)
MCV RBC AUTO: 89.9 FL (ref 79–97)
MONOCYTES # BLD AUTO: 0.8 10*3/MM3 (ref 0.1–0.9)
MONOCYTES NFR BLD AUTO: 5.9 % (ref 5–12)
NEUTROPHILS # BLD AUTO: 10.4 10*3/MM3 (ref 1.7–7)
NEUTROPHILS NFR BLD AUTO: 77.8 % (ref 42.7–76)
NRBC BLD AUTO-RTO: 0 /100 WBC (ref 0–0.2)
PLATELET # BLD AUTO: 307 10*3/MM3 (ref 140–450)
PMV BLD AUTO: 7.9 FL (ref 6–12)
POTASSIUM BLD-SCNC: 4.5 MMOL/L (ref 3.5–5.2)
RBC # BLD AUTO: 3.53 10*6/MM3 (ref 4.14–5.8)
RHINOVIRUS RNA SPEC NAA+PROBE: NOT DETECTED
RSV RNA NPH QL NAA+NON-PROBE: NOT DETECTED
SODIUM BLD-SCNC: 134 MMOL/L (ref 136–145)
WBC NRBC COR # BLD: 13.4 10*3/MM3 (ref 3.4–10.8)

## 2019-12-08 PROCEDURE — 99232 SBSQ HOSP IP/OBS MODERATE 35: CPT | Performed by: NURSE PRACTITIONER

## 2019-12-08 PROCEDURE — 25010000002 HYDROMORPHONE PER 4 MG: Performed by: SURGERY

## 2019-12-08 PROCEDURE — 85025 COMPLETE CBC W/AUTO DIFF WBC: CPT | Performed by: PHYSICIAN ASSISTANT

## 2019-12-08 PROCEDURE — 97162 PT EVAL MOD COMPLEX 30 MIN: CPT

## 2019-12-08 PROCEDURE — 99024 POSTOP FOLLOW-UP VISIT: CPT | Performed by: SURGERY

## 2019-12-08 PROCEDURE — 99233 SBSQ HOSP IP/OBS HIGH 50: CPT | Performed by: INTERNAL MEDICINE

## 2019-12-08 PROCEDURE — 74022 RADEX COMPL AQT ABD SERIES: CPT

## 2019-12-08 PROCEDURE — 99232 SBSQ HOSP IP/OBS MODERATE 35: CPT | Performed by: INTERNAL MEDICINE

## 2019-12-08 PROCEDURE — 97116 GAIT TRAINING THERAPY: CPT

## 2019-12-08 PROCEDURE — 97164 PT RE-EVAL EST PLAN CARE: CPT

## 2019-12-08 PROCEDURE — 63710000001 INSULIN LISPRO (HUMAN) PER 5 UNITS: Performed by: INTERNAL MEDICINE

## 2019-12-08 PROCEDURE — 80048 BASIC METABOLIC PNL TOTAL CA: CPT | Performed by: PHYSICIAN ASSISTANT

## 2019-12-08 PROCEDURE — 0099U HC BIOFIRE FILMARRAY RESP PANEL 1: CPT | Performed by: NURSE PRACTITIONER

## 2019-12-08 PROCEDURE — 99231 SBSQ HOSP IP/OBS SF/LOW 25: CPT | Performed by: INTERNAL MEDICINE

## 2019-12-08 PROCEDURE — 63710000001 INSULIN GLARGINE PER 5 UNITS: Performed by: INTERNAL MEDICINE

## 2019-12-08 PROCEDURE — 82962 GLUCOSE BLOOD TEST: CPT

## 2019-12-08 RX ORDER — BISACODYL 10 MG
10 SUPPOSITORY, RECTAL RECTAL ONCE
Status: COMPLETED | OUTPATIENT
Start: 2019-12-08 | End: 2019-12-08

## 2019-12-08 RX ADMIN — INSULIN LISPRO 6 UNITS: 100 INJECTION, SOLUTION INTRAVENOUS; SUBCUTANEOUS at 21:00

## 2019-12-08 RX ADMIN — INSULIN LISPRO 2 UNITS: 100 INJECTION, SOLUTION INTRAVENOUS; SUBCUTANEOUS at 13:45

## 2019-12-08 RX ADMIN — CYANOCOBALAMIN TAB 1000 MCG 1000 MCG: 1000 TAB at 08:58

## 2019-12-08 RX ADMIN — OXYCODONE HYDROCHLORIDE 10 MG: 5 TABLET ORAL at 17:33

## 2019-12-08 RX ADMIN — ASPIRIN 81 MG: 81 TABLET, DELAYED RELEASE ORAL at 08:58

## 2019-12-08 RX ADMIN — METOPROLOL TARTRATE 50 MG: 50 TABLET, FILM COATED ORAL at 20:59

## 2019-12-08 RX ADMIN — INSULIN LISPRO 12 UNITS: 100 INJECTION, SOLUTION INTRAVENOUS; SUBCUTANEOUS at 08:58

## 2019-12-08 RX ADMIN — ACETAMINOPHEN 1000 MG: 500 TABLET, FILM COATED ORAL at 20:59

## 2019-12-08 RX ADMIN — OXYCODONE HYDROCHLORIDE 10 MG: 5 TABLET ORAL at 21:27

## 2019-12-08 RX ADMIN — POLYETHYLENE GLYCOL 3350 17 G: 17 POWDER, FOR SOLUTION ORAL at 08:58

## 2019-12-08 RX ADMIN — INSULIN LISPRO 4 UNITS: 100 INJECTION, SOLUTION INTRAVENOUS; SUBCUTANEOUS at 17:33

## 2019-12-08 RX ADMIN — OXYCODONE HYDROCHLORIDE 10 MG: 5 TABLET ORAL at 00:30

## 2019-12-08 RX ADMIN — INSULIN GLARGINE 32 UNITS: 100 INJECTION, SOLUTION SUBCUTANEOUS at 21:01

## 2019-12-08 RX ADMIN — METOPROLOL TARTRATE 50 MG: 50 TABLET, FILM COATED ORAL at 08:58

## 2019-12-08 RX ADMIN — HYDROMORPHONE HYDROCHLORIDE 0.5 MG: 2 INJECTION, SOLUTION INTRAMUSCULAR; INTRAVENOUS; SUBCUTANEOUS at 08:58

## 2019-12-08 RX ADMIN — BISACODYL 10 MG: 10 SUPPOSITORY RECTAL at 15:51

## 2019-12-08 RX ADMIN — SODIUM CHLORIDE 100 ML/HR: 900 INJECTION, SOLUTION INTRAVENOUS at 15:51

## 2019-12-08 RX ADMIN — ACETAMINOPHEN 1000 MG: 500 TABLET, FILM COATED ORAL at 08:58

## 2019-12-08 RX ADMIN — OXYCODONE HYDROCHLORIDE 10 MG: 5 TABLET ORAL at 13:45

## 2019-12-08 RX ADMIN — ACETAMINOPHEN 1000 MG: 500 TABLET, FILM COATED ORAL at 15:51

## 2019-12-08 RX ADMIN — PANTOPRAZOLE SODIUM 40 MG: 40 TABLET, DELAYED RELEASE ORAL at 08:58

## 2019-12-08 RX ADMIN — ATORVASTATIN CALCIUM 80 MG: 40 TABLET, FILM COATED ORAL at 17:33

## 2019-12-08 RX ADMIN — INSULIN LISPRO 4 UNITS: 100 INJECTION, SOLUTION INTRAVENOUS; SUBCUTANEOUS at 08:59

## 2019-12-08 NOTE — PLAN OF CARE
Problem: Patient Care Overview  Goal: Plan of Care Review  Outcome: Ongoing (interventions implemented as appropriate)  Flowsheets  Taken 12/8/2019 1702  Progress: no change  Taken 12/8/2019 1553  Plan of Care Reviewed With: patient  Outcome Summary: PT reassessment completed this date following diagnosis of acute CVA. Pt demonstrates L visual field loss with impaired visual tracking exam, impaired visual field exam, and impaired Finger Nose Finger exam into L upper/lower quadrants. Pt demonstrated improved bed mobility this date, requiring SBA, requires CGA for transfers with RW, and requires min/mod assist for gait training 55ft with RW with significant difficulty navigating turns/doorways and avoiding obstacles secondary to L visual field loss. Recommending IP rehab at d/c, as pt is not safe for home and at increased risk for falls. Plan to see daily at [facility] for progression of mobility.

## 2019-12-08 NOTE — NURSING NOTE
Spoke with neuro, surgery, and hospitalist regarding patient change in status. Per neuro and surgery have hospitalist to evaluate and put in orders. Spoke with hospitalist who is to put in orders on patient. Nurse will continue to monitor.

## 2019-12-08 NOTE — PROGRESS NOTES
Referring Provider: Ronaldo Ardon,*    Reason for follow-up:  Visual loss and confusion       Patient Care Team:  Al Kimbrough MD as PCP - General  Al Kimbrough MD as PCP - Family Medicine    Subjective .  Feeling okay     ROS    Since I have last seen him yesterday, the patient has been without any chest discomfort ,shortness of breath, palpitations, dizziness or syncope.  Denies having any headache ,abdominal pain ,nausea, vomiting , diarrhea constipation, loss of weight or loss of appetite.  Denies having any excessive bruising ,hematuria or blood in the stool.    Review of all systems negative except as indicated    History  Past Medical History:   Diagnosis Date   • ACE-inhibitor cough    • Arthritis    • Diabetes mellitus (CMS/HCC) 1988    type 2    • GERD (gastroesophageal reflux disease)    • Hyperlipidemia    • Hypertension    • RAD (reactive airway disease)        Past Surgical History:   Procedure Laterality Date   • CARDIAC CATHETERIZATION     • CHOLECYSTECTOMY     • COLON RESECTION SMALL BOWEL Right 2019    Procedure: open right hemicolectomy;  Surgeon: Ronaldo Ardon MD;  Location: [facility];  Service: General   • COLONOSCOPY  2019    x2    • CORONARY ANGIOPLASTY WITH STENT PLACEMENT  2017   • FRACTURE SURGERY      collar bone as a child    • KNEE ARTHROSCOPY Left 2003   • KNEE JOINT MANIPULATION Left 2010   • TOTAL KNEE ARTHROPLASTY Bilateral     ,       Family History   Problem Relation Age of Onset   • Irritable bowel syndrome Mother    • Anxiety disorder Mother    • Depression Father    • Hypertension Father    • Mental illness Father         committed suicide   • Other Sister         back problems   • Other Brother         back problems       Social History     Tobacco Use   • Smoking status: Former Smoker     Last attempt to quit: 2007     Years since quittin.9   • Smokeless tobacco: Never Used   Substance Use Topics   •  Alcohol use: Yes     Comment: occasionally   • Drug use: No        Medications Prior to Admission   Medication Sig Dispense Refill Last Dose   • atorvastatin (LIPITOR) 20 MG tablet Take 1 tablet by mouth Every Evening. 1 daily 30 tablet 3 12/4/2019 at Unknown time   • Cholecalciferol 5000 units tablet Take 1 tablet by mouth Daily. Stop taking on 11/28   Past Week at Unknown time   • FARXIGA 10 MG tablet Take 1 tablet by mouth Every Morning Before Breakfast. Do not take morning of surgery  4 Past Week at Unknown time   • Insulin NPH Isophane & Regular (NOVOLIN 70/30 FLEXPEN RELION) (70-30) 100 UNIT/ML suspension pen-injector Inject 64 Units under the skin into the appropriate area as directed 4 (Four) Times a Day. Do not take the day of surgery   12/4/2019 at Unknown time   • metFORMIN (GLUCOPHAGE) 500 MG tablet Take 1 tablet by mouth 3 (Three) Times a Day. (Patient taking differently: Take 500 mg by mouth 2 (Two) Times a Day With Meals. Take last dose Dec 3 Tues am) 30 tablet 3 Past Week at Unknown time   • metoclopramide (REGLAN) 10 MG tablet Take 10 mg by mouth 3 (Three) Times a Day. Do not take the morning of surgery  6 12/4/2019 at Unknown time   • metoprolol tartrate (LOPRESSOR) 50 MG tablet Take 1 tablet by mouth 2 (Two) Times a Day for 30 days. Take one twice daily 60 tablet 11 12/4/2019 at Unknown time   • omeprazole (PrilOSEC) 40 MG capsule Take 40 mg by mouth 2 (Two) Times a Day. May take the day of surgery   12/4/2019 at Unknown time   • B-D UF III MINI PEN NEEDLES 31G X 5 MM misc USE WITH INSULIN PEN 4 TIMES A DAY DX E11.65  3 Taking   • diphenhydrAMINE (BENADRYL ALLERGY) 25 mg capsule Take 2 capsules by mouth Every 4 (Four) Hours.      • glucose blood (ONE TOUCH ULTRA TEST) test strip ONETOUCH ULTRA BLUE STRP   Taking   • Insulin Pen Needle (B-D UF III MINI PEN NEEDLES) 31G X 5 MM misc Use with insulin 4 times daily dx:e11.65 150 each 3 Taking   • ONETOUCH DELICA LANCETS 33G misc ONETOUCH DELICA LANCETS  "33G   Taking       Allergies  Patient has no known allergies.    Scheduled Meds:  acetaminophen 1,000 mg Oral TID   aspirin 81 mg Oral Daily   atorvastatin 80 mg Oral Q PM   insulin glargine 32 Units Subcutaneous Nightly   insulin lispro 0-9 Units Subcutaneous 4x Daily With Meals & Nightly   insulin lispro 12 Units Subcutaneous TID With Meals   insulin regular 10 Units Subcutaneous Once   metoprolol tartrate 50 mg Oral BID   pantoprazole 40 mg Oral QAM   polyethylene glycol 17 g Oral Daily   vitamin B-12 1,000 mcg Oral Daily     Continuous Infusions:  sodium chloride 100 mL/hr Last Rate: 100 mL/hr (12/06/19 0211)     PRN Meds:.albuterol  •  dextrose  •  dextrose  •  glucagon (human recombinant)  •  HYDROmorphone **AND** naloxone  •  insulin lispro **AND** insulin lispro  •  magnesium hydroxide  •  ondansetron  •  oxyCODONE **OR** oxyCODONE  •  promethazine    Objective     VITAL SIGNS  Vitals:    12/08/19 0619 12/08/19 1053 12/08/19 1100 12/08/19 1136   BP: 129/72 118/67 118/67    BP Location: Right arm Right arm     Patient Position: Lying Lying     Pulse: 98 80 78 78   Resp: 12 11  14   Temp: 98.6 °F (37 °C) 98.2 °F (36.8 °C)  99.6 °F (37.6 °C)   TempSrc: Oral Oral  Rectal   SpO2: 100% 95% 94% 97%   Weight:       Height:           Flowsheet Rows      First Filed Value   Admission Height  185.4 cm (73\") Documented at 12/05/2019 1041   Admission Weight  90.9 kg (200 lb 6.4 oz) Documented at 12/05/2019 1041            Intake/Output Summary (Last 24 hours) at 12/8/2019 1244  Last data filed at 12/8/2019 1053  Gross per 24 hour   Intake 358 ml   Output --   Net 358 ml        TELEMETRY: Sinus rhythm    Physical Exam:  The patient is alert, oriented and in no distress.  Vital signs as noted above.  Head and neck revealed no carotid bruits or jugular venous distention.  No thyromegaly or lymphadenopathy is present  Lungs clear.  No wheezing.  Breath sounds are normal bilaterally.  Heart normal first and second heart " sounds.  No murmur. No precordial rub is present.  No gallop is present.  Abdomen soft and nontender.  No organomegaly is present.  Extremities with good peripheral pulses without any pedal edema.  Skin warm and dry.  Musculoskeletal system is grossly normal  CNS grossly normal      Results Review:   I reviewed the patient's new clinical results.  Lab Results (last 24 hours)     Procedure Component Value Units Date/Time    POC Glucose Once [[id number]]  (Abnormal) Collected:  12/08/19 1133    Specimen:  Blood Updated:  12/08/19 1138     Glucose 180 mg/dL      Comment: Serial Number: [id number]:  [id number]       POC Glucose Once [[id number]]  (Abnormal) Collected:  12/08/19 1017    Specimen:  Blood Updated:  12/08/19 1018     Glucose 198 mg/dL      Comment: Serial Number: [id number]:  [id number]       POC Glucose Once [[id number]]  (Abnormal) Collected:  12/08/19 0801    Specimen:  Blood Updated:  12/08/19 0802     Glucose 210 mg/dL      Comment: Serial Number: [id number]:  [id number]       Basic Metabolic Panel [[id number]]  (Abnormal) Collected:  12/08/19 0508    Specimen:  Blood Updated:  12/08/19 0632     Glucose 196 mg/dL      BUN 22 mg/dL      Creatinine 1.15 mg/dL      Sodium 134 mmol/L      Potassium 4.5 mmol/L      Chloride 98 mmol/L      CO2 23.0 mmol/L      Calcium 9.3 mg/dL      eGFR Non African Amer 65 mL/min/1.73      BUN/Creatinine Ratio 19.1     Anion Gap 13.0 mmol/L     Narrative:       GFR Normal >60  Chronic Kidney Disease <60  Kidney Failure <15      CBC & Differential [[id number]] Collected:  12/08/19 0508    Specimen:  Blood Updated:  12/08/19 0609    Narrative:       The following orders were created for panel order CBC & Differential.  Procedure                               Abnormality         Status                     ---------                               -----------         ------                     CBC Auto Differential[[id number]]        Abnormal            Final result                  Please view results for these tests on the individual orders.    CBC Auto Differential [[id number]]  (Abnormal) Collected:  12/08/19 0508    Specimen:  Blood Updated:  12/08/19 0609     WBC 13.40 10*3/mm3      RBC 3.53 10*6/mm3      Hemoglobin 10.7 g/dL      Hematocrit 31.8 %      MCV 89.9 fL      MCH 30.2 pg      MCHC 33.6 g/dL      RDW 14.2 %      RDW-SD 44.6 fl      MPV 7.9 fL      Platelets 307 10*3/mm3      Neutrophil % 77.8 %      Lymphocyte % 12.0 %      Monocyte % 5.9 %      Eosinophil % 3.5 %      Basophil % 0.8 %      Neutrophils, Absolute 10.40 10*3/mm3      Lymphocytes, Absolute 1.60 10*3/mm3      Monocytes, Absolute 0.80 10*3/mm3      Eosinophils, Absolute 0.50 10*3/mm3      Basophils, Absolute 0.10 10*3/mm3      nRBC 0.0 /100 WBC     POC Glucose Once [[id number]]  (Abnormal) Collected:  12/08/19 0310    Specimen:  Blood Updated:  12/08/19 0311     Glucose 153 mg/dL      Comment: Serial Number: [id number]:  [id number]       Respiratory Panel, PCR - Swab, Nasopharynx [[id number]]  (Normal) Collected:  12/08/19 0027    Specimen:  Swab from Nasopharynx Updated:  12/08/19 0200     ADENOVIRUS, PCR Not Detected     Coronavirus 229E Not Detected     Coronavirus HKU1 Not Detected     Coronavirus NL63 Not Detected     Coronavirus OC43 Not Detected     Human Metapneumovirus Not Detected     Human Rhinovirus/Enterovirus Not Detected     Influenza B PCR Not Detected     Parainfluenza Virus 1 Not Detected     Parainfluenza Virus 2 Not Detected     Parainfluenza Virus 3 Not Detected     Parainfluenza Virus 4 Not Detected     Bordetella pertussis pcr Not Detected     Influenza A H1 2009 PCR Not Detected     Chlamydophila pneumoniae PCR Not Detected     Mycoplasma pneumo by PCR Not Detected     Influenza A PCR Not Detected     Influenza A H3 Not Detected     Influenza A H1 Not Detected     RSV, PCR Not Detected    Procalcitonin [[id number]]  (Abnormal) Collected:  12/07/19 3766    Specimen:  Blood  "Updated:  12/07/19 2356     Procalcitonin 6.12 ng/mL     Narrative:       As a Marker for Sepsis (Non-Neonates):   1. <0.5 ng/mL represents a low risk of severe sepsis and/or septic shock.  1. >2 ng/mL represents a high risk of severe sepsis and/or septic shock.    As a Marker for Lower Respiratory Tract Infections that require antibiotic therapy:  PCT on Admission     Antibiotic Therapy             6-12 Hrs later  > 0.5                Strongly Recommended            >0.25 - <0.5         Recommended  0.1 - 0.25           Discouraged                   Remeasure/reassess PCT  <0.1                 Strongly Discouraged          Remeasure/reassess PCT      As 28 day mortality risk marker: \"Change in Procalcitonin Result\" (> 80 % or <=80 %) if Day 0 (or Day 1) and Day 4 values are available. Refer to http://[email or web address] [email or web address] Millspct-[email or web address]/   Change in PCT <=80 %   A decrease of PCT levels below or equal to 80 % defines a positive change in PCT test result representing a higher risk for 28-day all-cause mortality of patients diagnosed with severe sepsis or septic shock.  Change in PCT > 80 %   A decrease of PCT levels of more than 80 % defines a negative change in PCT result representing a lower risk for 28-day all-cause mortality of patients diagnosed with severe sepsis or septic shock.                  Lactic Acid, Plasma [[id number]]  (Normal) Collected:  12/07/19 2315    Specimen:  Blood Updated:  12/07/19 2348     Lactate 1.0 mmol/L     Blood Gas, Arterial [[id number]]  (Abnormal) Collected:  12/07/19 2314    Specimen:  Arterial Blood Updated:  12/07/19 2318     Site Left Radial     Jacob's Test Positive     pH, Arterial 7.459 pH units      pCO2, Arterial 39.9 mm Hg      pO2, Arterial 60.2 mm Hg      HCO3, Arterial 28.3 mmol/L      Base Excess, Arterial 4.2 mmol/L      Comment: Serial Number: [id number]:  [id number]        O2 Saturation, Arterial 91.9 %      CO2 Content 29.6 mmol/L      Barometric Pressure for Blood Gas -- "     Comment: N/A        Modality Room Air     FIO2 21 %      Hemodilution No    POC Glucose Once [[id number]]  (Abnormal) Collected:  12/07/19 2139    Specimen:  Blood Updated:  12/07/19 2140     Glucose 243 mg/dL      Comment: Serial Number: [id number]:  [id number]       POC Glucose Once [[id number]]  (Abnormal) Collected:  12/07/19 2010    Specimen:  Blood Updated:  12/07/19 2011     Glucose 231 mg/dL      Comment: Serial Number: [id number]:  [id number]       LDL Cholesterol, Direct [[id number]]  (Normal) Collected:  12/06/19 0958    Specimen:  Blood Updated:  12/07/19 1742     LDL Cholesterol  82 mg/dL     Narrative:       LDL Reference Ranges    (U.S. Department of Health and Human Services ATP III Classifications)    Optimal          <100 mg/dl  Near Optimal     100-129 mg/dl  Borderline High  130-159 mg/dl  High             160-189 mg/dl  Very High        >189 mg/dl      POC Glucose Once [[id number]]  (Abnormal) Collected:  12/07/19 1705    Specimen:  Blood Updated:  12/07/19 1706     Glucose 247 mg/dL      Comment: Serial Number: [id number]:  [id number]             Imaging Results (Last 24 Hours)     Procedure Component Value Units Date/Time    XR Abdomen 2 View With Chest 1 View [[id number]] Collected:  12/08/19 0806     Updated:  12/08/19 0811    Narrative:       DATE OF EXAM:  12/8/2019 7:55 AM     PROCEDURE:  XR ABDOMEN 2 VW W CHEST 1 VW-     INDICATIONS:  post op eval for bowel gas pattern, overnight desat; 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     FINDINGS:  Today's abdominal study demonstrates the lung bases being free of  disease. Change of cholecystectomy and midline abdominal  incision is  seen. Right side up decubitus view demonstrates a small amount of air  adjacent to the right lobe of the liver which is not unusual due to  patient's recent hemicolectomy on 12/05/2019 couple air-fluid levels are  seen which may represent an element of ileus gas in the transverse colon  and stomach are seen. Minimal gas in the distal colon is seen.  The AP erect chest demonstrates cardiomegaly without evidence of active  cardiopulmonary disease., The chest appears stable from December 7       Impression:          1. Small amount of free air within the abdomen which is unremarkable  given history of any colectomy on December 5  2. Couple of air fluid levels seen on decubitus view suggesting mild  postop ileus  3. No stairstep air-fluid levels are seen that would suggest obstruction  4. Cardiomegaly without active cardiopulmonary disease     Electronically Signed By-Wilder London Jr. On:12/8/2019 8:09 AM  This report was finalized on [id number] by  Wilder London Jr., .    XR Chest 1 View [[id number]] Collected:  12/07/19 2203     Updated:  12/08/19 0006    Narrative:       Examination: AP view of the chest    Indication: Confusion, increased oxygen demand    Comparison: No prior study is available for comparison.    Findings:  The cardiomediastinal silhouette is enlarged.    Subsegmental atelectatic changes are present in the right lung base. There is otherwise no focal consolidative airspace disease. There is no sizable pleural fluid accumulation or pneumothorax.     No destructive osseous lesion is identified.      Impression:       Impression:    1. No evidence of an acute pleural or pulmonary parenchymal abnormality.  2. Cardiomegaly.    Electronically signed by:  Jeremie Caceres M.D.    12/7/2019 10:05 PM    CT Head Without Contrast [[id number]] Collected:  12/07/19 2005     Updated:  12/07/19 2209    Narrative:       EXAMINATION: CT HEAD WO CONTRAST    DATE: 12/7/2019 9:50 PM     INDICATION: Confusion,  delirium, stroke     COMPARISON: CT head and brain MRI from yesterday.     TECHNIQUE: Thin section noncontrast axial images were obtained through the head. Coronal reformats were created.  CT dose lowering techniques were used, to include: automated exposure control, adjustment for patient size, and or use of iterative   reconstruction.     FINDINGS:     Intracranial contents:    Expected evolution of the subacute right occipital infarct. No hemorrhage or mass effect. No new infarct visible by CT. Mild chronic small vessel ischemic changes in the white matter. Mild intracranial atherosclerosis. Mild cerebral volume loss.    Bones and extracranial soft tissues:     No fracture or focal osseous lesion in the calvarium or skull base. Fluid level in the left sphenoid sinus. Mastoid air cells are clear. Orbits are unremarkable.         Impression:         1. Expected evolution of the acute/subacute right occipital infarct. No hemorrhage or mass effect.  2. No new infarct visible by CT.  3. Mild chronic age-related intracranial findings as above, unchanged.  4. Fluid level in the left sphenoid sinus.         This examination was interpreted by Eric Kaplan M.D.    Electronically signed by:  Eric Kaplan M.D.    12/7/2019 8:08 PM      LAB RESULTS (LAST 7 DAYS)    CBC  Results from last 7 days   Lab Units 12/08/19  0508 12/07/19  0526 12/06/19  0958 12/06/19  0338 12/05/19  2142   WBC 10*3/mm3 13.40* 11.50* 15.70* 15.90* 17.80*   RBC 10*6/mm3 3.53* 3.58* 4.33 4.35 4.69   HEMOGLOBIN g/dL 10.7* 10.7* 12.8* 13.6 14.0   HEMATOCRIT % 31.8* 32.5* 38.5 39.7 42.3   MCV fL 89.9 90.9 89.0 91.3 90.2   PLATELETS 10*3/mm3 307 276 439 379 434       BMP  Results from last 7 days   Lab Units 12/08/19  0508 12/07/19  0526 12/06/19  0958 12/06/19  0338 12/05/19  2321 12/05/19  2142   SODIUM mmol/L 134* 137 136 133*  --  132*   POTASSIUM mmol/L 4.5 4.4 4.8  4.8 6.6* 6.4* 6.3*   CHLORIDE mmol/L 98 99 98 100  --  96*   CO2 mmol/L  23.0 24.0 18.0* 19.0*  --  21.0*   BUN mg/dL 22 24* 22 21  --  20   CREATININE mg/dL 1.15 1.37* 1.93* 1.73*  --  1.68*   GLUCOSE mg/dL 196* 227* 405* 438*  --  396*       CMP Results from last 7 days   Lab Units 12/08/19  0508 12/07/19  0526 12/06/19  0958 12/06/19  0338 12/05/19  2321 12/05/19  2142   SODIUM mmol/L 134* 137 136 133*  --  132*   POTASSIUM mmol/L 4.5 4.4 4.8  4.8 6.6* 6.4* 6.3*   CHLORIDE mmol/L 98 99 98 100  --  96*   CO2 mmol/L 23.0 24.0 18.0* 19.0*  --  21.0*   BUN mg/dL 22 24* 22 21  --  20   CREATININE mg/dL 1.15 1.37* 1.93* 1.73*  --  1.68*   GLUCOSE mg/dL 196* 227* 405* 438*  --  396*         BNP        TROPONIN        CoAg  Results from last 7 days   Lab Units 12/06/19  0958   INR  0.94   APTT seconds 23.3*       Creatinine Clearance  Estimated Creatinine Clearance: 84.6 mL/min (by C-G formula based on SCr of 1.15 mg/dL).    ABG  Results from last 7 days   Lab Units 12/07/19  2314   PH, ARTERIAL pH units 7.459*   PCO2, ARTERIAL mm Hg 39.9   PO2 ART mm Hg 60.2*   O2 SATURATION ART % 91.9*   BASE EXCESS ART mmol/L 4.2*       Radiology  Xr Abdomen 2 View With Chest 1 View    Result Date: 12/8/2019   1. Small amount of free air within the abdomen which is unremarkable given history of any colectomy on December 5 2. Couple of air fluid levels seen on decubitus view suggesting mild postop ileus 3. No stairstep air-fluid levels are seen that would suggest obstruction 4. Cardiomegaly without active cardiopulmonary disease  Electronically Signed By-Wilder London Jr. On:12/8/2019 8:09 AM This report was finalized on [id number] by  Wilder London Jr., .    Ct Head Without Contrast    Result Date: 12/7/2019  1. Expected evolution of the acute/subacute right occipital infarct. No hemorrhage or mass effect. 2. No new infarct visible by CT. 3. Mild chronic age-related intracranial findings as above, unchanged. 4. Fluid level in the left sphenoid sinus.  This examination was interpreted by Eric Kaplan,  M.D. Electronically signed by:  Eric Kaplan M.D.  12/7/2019 8:08 PM    Xr Chest 1 View    Result Date: 12/7/2019  Impression: 1. No evidence of an acute pleural or pulmonary parenchymal abnormality. 2. Cardiomegaly. Electronically signed by:  Jeremie Caceres M.D.  12/7/2019 10:05 PM              EKG      I personally viewed and interpreted the patient's EKG/Telemetry data:    ECHOCARDIOGRAM:    Results for orders placed during the hospital encounter of 12/05/19   Adult Transthoracic Echo Complete W/ Cont if Necessary Per Protocol    Narrative · Estimated EF = 60%.  · Left ventricular systolic function is normal.     Indications  Cardiac source of emboli.    Technically satisfactory study.  Mitral valve is structurally normal.  Tricuspid valve is structurally normal.  Aortic valve is structurally normal.  Pulmonic valve could not be well visualized.  No evidence for mitral tricuspid or aortic regurgitation is seen by   Doppler study.  Left atrium is normal in size.  Right atrium is normal in size.  Left ventricle is normal in size and contractility with ejection fraction   of 60%.  Right ventricle is normal in size.  Atrial septum is intact.  Aorta is normal.  No pericardial effusion or intracardiac thrombus is seen.    Impression  Structurally and functionally normal cardiac valves.  Normal left ventricle size and contractility with ejection fraction of   60%.  No pericardial effusion or intracardiac thrombus is seen.                 STRESS MYOVIEW:    Cardiolite (Tc-99m Sestamibi) stress test    CARDIAC CATHETERIZATION:            OTHER:         Assessment/Plan     Active Problems:    Type 2 diabetes mellitus with hyperglycemia, with long-term current use of insulin (CMS/HCC)    Hyperlipidemia, mixed    Hypertension    Acute ischemic right PCA stroke involving the right occipital lobe (CMS/HCC)    Status post colon resection    FRANKI (acute kidney injury) (CMS/HCC)      Type 2 diabetes mellitus with  hyperglycemia, with long-term current use of insulin (CMS/HCC)    Hyperlipidemia, mixed    Hypertension    Acute ischemic right PCA stroke involving the right occipital lobe (CMS/HCC)    Status post colon resection    FRANKI (acute kidney injury) (CMS/HCC)     Plan:  Patient admitted for colon resection   Now with acute to subacute CVA right occipital lobe with blurry vision   Echocardiogram as above no intracardiac thrombus seen normal EF   Rhythm-patient is in sinus rhythm  Patient would benefit from LINH.  Will discuss with Dr. Caceres primary cardiologist and will perform tomorrow depending on the schedule.  Risks and benefits pros and cons of the procedure were discussed with patient and patient's wife at bedside.  Will need event monitor prior to discharge to rule out arrhythmia for cause of CVA  Continue ASA, statin, beta blocker   Needs aggressive control of hypertension, HLD, DM as well as possible sleep apnea    Further plan will depend on patient's progress.  [[[[[[[[[[[[[[[[[[[[  .           Kvng Brown MD  12/08/19  12:44 PM

## 2019-12-08 NOTE — PROGRESS NOTES
LOS: 3 days     Chief Complaint: Vision loss, confusion       SUBJECTIVE:  History taken from: chart family RN      Patient Complaints: Patient states he feels okay and feels that his vision is a little bit better.  Family at bedside and feels that he is less confused and disoriented than yesterday.      Review of Systems   Constitutional: Negative.    Eyes: Positive for visual disturbance (left vision loss).   Cardiovascular: Negative.    Neurological: Negative for dizziness, tremors, seizures, syncope, facial asymmetry, speech difficulty, weakness, light-headedness, numbness and headaches.        ________________________________________________     OBJECTIVE:    On exam:  GENERAL: NAD  CARDIO: RRR  NEURO:  Oriented to name, hospital, month  Encephalopathic   Limited eye contact but he does answer appropriately   EOMI, PERRL, left homonymous hemianopsia  No facial asymmetry  Speech clear without dysarthria  Sensations intact and equal bilaterally  Strength 5/5 and equal in all extremities  Limb ataxia, left > right     ________________________________________________   RESULTS REVIEW    VITAL SIGNS:  Temp:  [97.6 °F (36.4 °C)-99.4 °F (37.4 °C)] 98.6 °F (37 °C)  Heart Rate:  [85-98] 98  Resp:  [12-19] 12  BP: (125-135)/(67-80) 129/72    LABS:   Lab Results   Component Value Date    WBC 13.40 (H) 12/08/2019    HGB 10.7 (L) 12/08/2019    HCT 31.8 (L) 12/08/2019    MCV 89.9 12/08/2019     12/08/2019     Lab Results   Component Value Date    GLUCOSE 196 (H) 12/08/2019    BUN 22 12/08/2019    CREATININE 1.15 12/08/2019    EGFRIFNONA 65 12/08/2019    BCR 19.1 12/08/2019    K 4.5 12/08/2019    CO2 23.0 12/08/2019    CALCIUM 9.3 12/08/2019    ALBUMIN 3.70 07/02/2019    LABIL2 0.8 (L) 05/26/2019    AST 26 07/02/2019    ALT 29 07/02/2019       Lab Results   Component Value Date    TSH 2.340 12/06/2019    LDL  12/06/2019      Comment:      Unable to calculate    LDL 82 12/06/2019    HGBA1C 9.5 (H) 12/06/2019     [id number] 403 12/06/2019         IMAGING STUDIES:  Xr Abdomen 2 View With Chest 1 View    Result Date: 12/8/2019   1. Small amount of free air within the abdomen which is unremarkable given history of any colectomy on December 5 2. Couple of air fluid levels seen on decubitus view suggesting mild postop ileus 3. No stairstep air-fluid levels are seen that would suggest obstruction 4. Cardiomegaly without active cardiopulmonary disease  Electronically Signed By-Wilder London Jr. On:12/8/2019 8:09 AM This report was finalized on [id number] by  Wilder London Jr., .    Ct Head Without Contrast    Result Date: 12/7/2019  1. Expected evolution of the acute/subacute right occipital infarct. No hemorrhage or mass effect. 2. No new infarct visible by CT. 3. Mild chronic age-related intracranial findings as above, unchanged. 4. Fluid level in the left sphenoid sinus.  This examination was interpreted by Eric Kaplan M.D. Electronically signed by:  Eric Kaplan M.D.  12/7/2019 8:08 PM    Mri Brain Without Contrast    Result Date: 12/6/2019   1. Large area of acute ischemic change involving the right occipitoparietal region with additional small punctate foci involving the thalamus and periventricular white matter as described above. Susceptibility weighted imaging demonstrates some curvilinear dark areas within the largest area of infarction, which may indicate petechial hemorrhage. 2. Mild parenchymal volume loss and ventricular white matter T2 and FLAIR high signal abnormality most commonly secondary to chronic small vessel ischemic change. 3. Paranasal sinus disease.     Electronically Signed By-Joshua Churchill On:12/6/2019 1:09 PM This report was finalized on [id number] by  Joshua Churchill, .    Xr Chest 1 View    Result Date: 12/7/2019  Impression: 1. No evidence of an acute pleural or pulmonary parenchymal abnormality. 2. Cardiomegaly. Electronically signed by:  Jeremie Caceres M.D.  12/7/2019 10:05 PM    Ct  Angiogram Carotids    Result Date: 12/6/2019   1. Acute to subacute area of infarction involving the right posterior cerebral artery territory with occlusion of the right P2 segment of the posterior cerebral artery. 2. No hemodynamically significant extracranial vascular disease.  Electronically Signed By-Ronaldo Sheikh On:12/6/2019 11:04 AM This report was finalized on [id number] by  Ronaldo Sheikh, .    Ct Angiogram Head    Result Date: 12/6/2019   1. Acute to subacute area of infarction involving the right posterior cerebral artery territory with occlusion of the right P2 segment of the posterior cerebral artery. 2. No hemodynamically significant extracranial vascular disease.  Electronically Signed By-Ronaldo Sheikh On:12/6/2019 11:04 AM This report was finalized on [id number] by  Ronaldo Sheikh, .    Ct Head Without Contrast Stroke Protocol    Result Date: 12/6/2019   1. Findings consistent with early subacute ischemic change in the right occipitoparietal region, PCA distribution. MRI is a more sensitive examination for determining acuity of ischemic change or for intracranial metastatic disease.  Electronically Signed By-Joshua Churchill On:12/6/2019 10:20 AM This report was finalized on [id number] by  Joshua Churchill, .      I reviewed the patient's new clinical results.    ________________________________________________      PROBLEM LIST:    Type 2 diabetes mellitus with hyperglycemia, with long-term current use of insulin (CMS/[facility])    Hyperlipidemia, mixed    Hypertension    Acute ischemic right PCA stroke involving the right occipital lobe (CMS/HCC)    Status post colon resection    FRANKI (acute kidney injury) (CMS/[facility])        Assessment/Plan   ASSESSMENT/PLAN:    1. Acute to subacute right occipital lobe ischemic stroke with occlusion of the right PCA P2 segment. Concern for embolic disease.   - MRI brain:  Large acute ischemic change involving the right occipital parietal region and additional small punctate foci of  involving the thalamus and periventricular white matter.  Susceptibility weighted imaging demonstrates dark areas with a large area of infarction which may indicate petechial hemorrhage.  - CTA head and neck: cute to subacute area of infarction involving the right posterior cerebral artery territory with occlusion of the right P2 segment of the posterior cerebral artery.  - Echo:  EF is 60%, no intracardiac thrombus is seen.  Bubble study is not reported.  - LINH planned on Monday.   - Event monitor x30 days (ordered)   - EKG: Sinus rhythm. Abnormal T, consider ischemia, lateral leads  - Labs: A1C: 9.5, B12: 403, LDL: (unable to calculate), TSH: 2.34  - Antithrombotics: ASA 81 mg daily for now- work up is pending but patient may need long term oral anticoagulation (would recommend waiting 5-7 days from stroke due to size) .   - Statin: Lipitor 80 mg qhs    2. Acute Encephalopathy. Most likely second to pain medications .   - Continue to monitor, falls risk   - EEG tomorrow if no improvement.     3. Polyp of transverse colon s/p open right hemicolectomy  - Surgery team following  - Ok with anticoagulation if indicated.      4. Type 2 Diabetes mellitus, uncontrolled   - A1C: 9.5  - Strict glycemic control, SSI, diabetic diet, diabetes educator     5.  Hyperlipidemia  - Statin & dietary modifications     6.  Vitamin B12 deficiency  - Replacement ordered  - Recommend monthly B12 injections outpt or 1000 mcg p.o. daily     7. Medical non-compliance  - discussed the risks of non-compliance       **Please refer to previous notes for further details and recommendations.     I discussed the patients findings and my recommendations with patient, family and nursing staff.     Patient has been seen by myself, MJ Nair, and the attending Neurologist who agrees with the diagnosis and treatment plan.    Esperanza Yao, EMRE  12/08/19  8:24 AM

## 2019-12-08 NOTE — PROGRESS NOTES
"Subjective   Chao Lazar is a 59 y.o. male.   Seen for diabetes f/u.  Not eating much. Nauseated      Objective     /75 (BP Location: Right arm, Patient Position: Lying)   Pulse 77   Temp 99.6 °F (37.6 °C) (Rectal)   Resp 12   Ht 185.4 cm (73\")   Wt 88.7 kg (195 lb 8.8 oz)   SpO2 97%   BMI 25.80 kg/m²   Blood sugar  210 this am,  180 @ lunch    ASSESSMENT    Patient is stable    PLAN    Will continue same insulin regimen.         Radha Manriquez MD  12/8/2019  3:41 PM    "

## 2019-12-08 NOTE — PROGRESS NOTES
Surgery:    Notified about mental status change. Discussed with nursing.  Most recent vitals are afvss. Appreciate medicine/neuro involvement in care to help manage. It would be too early for an intraabdominal process to be contributing, and would likely manifest differently. CXR pending. Labs pending.  If worsening sats, another thrombotic/embolic event like PE would certainly be in the diff, and from a surgical standpoint it would be ok for anticoagulation. This would have to be at the direction/ok with neuro given the recent stroke.     Armando Albarado MD  11:01 PM

## 2019-12-08 NOTE — SIGNIFICANT NOTE
This note also relates to the following rows which could not be included:  Heart Rate - Cannot attach notes to unvalidated device data  SpO2 - Cannot attach notes to unvalidated device data    Pt sweating, warm to touch. Glucose not low so checked rectal temp. Will continue to monitor.

## 2019-12-08 NOTE — CONSULTS
[facility]    Referring Provider: Ronaldo Ardon*  Reason for Consultation: Diabetes evaluation & management.    Patient Care Team:  Al Kimbrough MD as PCP - General  Al Kimbrough MD as PCP - Family Medicine    Chief complaint: hyperglycemia    Subjective .     History of present illness:    This is a  59 y.o. male, well known to me, with type 2 Diabetes since 1997, on 70/30 insulin.  A1C was 8.2% in July.  Had R hemicolectomy on 12/5. Developed L hemianopsia the following day due to PCA stroke.  Vision is beginning to improve.  Blood sugars have been elevated & a consult was requested.  Started on Lantus insulin yesterday. Had been on sliding scale Humalog.  Has been eating, but has some nausea.    Review of Systems  Review of Systems   Constitutional: Negative for fever and unexpected weight loss.   HENT: Negative for trouble swallowing.    Eyes: Positive for visual disturbance.   Gastrointestinal: Positive for abdominal pain and nausea. Negative for diarrhea and vomiting.   Endocrine: Positive for polyuria.   Genitourinary: Positive for nocturia.   Neurological: Positive for numbness. Negative for dizziness and tremors.       History  Past Medical History:   Diagnosis Date   • ACE-inhibitor cough    • Arthritis    • Diabetes mellitus (CMS/HCC) 1988    type 2    • GERD (gastroesophageal reflux disease)    • Hyperlipidemia    • Hypertension    • RAD (reactive airway disease)      Past Surgical History:   Procedure Laterality Date   • CARDIAC CATHETERIZATION     • CHOLECYSTECTOMY  2004   • COLON RESECTION SMALL BOWEL Right 12/5/2019    Procedure: open right hemicolectomy;  Surgeon: Ronaldo Ardon MD;  [facility];  Service: General   • COLONOSCOPY  2019    x2    • CORONARY ANGIOPLASTY WITH STENT PLACEMENT  04/2017   • FRACTURE SURGERY      collar bone as a child    • KNEE ARTHROSCOPY Left 08/2003   • KNEE JOINT MANIPULATION Left 04/2010   • TOTAL  KNEE ARTHROPLASTY Bilateral     ,     Family History   Problem Relation Age of Onset   • Irritable bowel syndrome Mother    • Anxiety disorder Mother    • Depression Father    • Hypertension Father    • Mental illness Father         committed suicide   • Other Sister         back problems   • Other Brother         back problems     Social History     Tobacco Use   • Smoking status: Former Smoker     Last attempt to quit: 2007     Years since quittin.9   • Smokeless tobacco: Never Used   Substance Use Topics   • Alcohol use: Yes     Comment: occasionally   • Drug use: No     Medications Prior to Admission   Medication Sig Dispense Refill Last Dose   • atorvastatin (LIPITOR) 20 MG tablet Take 1 tablet by mouth Every Evening. 1 daily 30 tablet 3 2019 at Unknown time   • Cholecalciferol 5000 units tablet Take 1 tablet by mouth Daily. Stop taking on    Past Week at Unknown time   • FARXIGA 10 MG tablet Take 1 tablet by mouth Every Morning Before Breakfast. Do not take morning of surgery  4 Past Week at Unknown time   • Insulin NPH Isophane & Regular (NOVOLIN 70/30 FLEXPEN RELION) (70-30) 100 UNIT/ML suspension pen-injector Inject 64 Units under the skin into the appropriate area as directed 4 (Four) Times a Day. Do not take the day of surgery   2019 at Unknown time   • metFORMIN (GLUCOPHAGE) 500 MG tablet Take 1 tablet by mouth 3 (Three) Times a Day. (Patient taking differently: Take 500 mg by mouth 2 (Two) Times a Day With Meals. Take last dose Dec 3 Tues am) 30 tablet 3 Past Week at Unknown time   • metoclopramide (REGLAN) 10 MG tablet Take 10 mg by mouth 3 (Three) Times a Day. Do not take the morning of surgery  6 2019 at Unknown time   • metoprolol tartrate (LOPRESSOR) 50 MG tablet Take 1 tablet by mouth 2 (Two) Times a Day for 30 days. Take one twice daily 60 tablet 11 2019 at Unknown time   • omeprazole (PrilOSEC) 40 MG capsule Take 40 mg by mouth 2 (Two) Times a Day. May take  the day of surgery   12/4/2019 at Unknown time   • B-D UF III MINI PEN NEEDLES 31G X 5 MM misc USE WITH INSULIN PEN 4 TIMES A DAY DX E11.65  3 Taking   • diphenhydrAMINE (BENADRYL ALLERGY) 25 mg capsule Take 2 capsules by mouth Every 4 (Four) Hours.      • glucose blood (ONE TOUCH ULTRA TEST) test strip ONETOUCH ULTRA BLUE STRP   Taking   • Insulin Pen Needle (B-D UF III MINI PEN NEEDLES) 31G X 5 MM misc Use with insulin 4 times daily dx:e11.65 150 each 3 Taking   • ONETOUCH DELICA LANCETS 33G misc ONETOUCH DELICA LANCETS 33G   Taking     Scheduled Meds:    acetaminophen 1,000 mg Oral TID   aspirin 81 mg Oral Daily   atorvastatin 80 mg Oral Q PM   insulin glargine 32 Units Subcutaneous Nightly   insulin lispro 0-9 Units Subcutaneous 4x Daily With Meals & Nightly   insulin lispro 12 Units Subcutaneous TID With Meals   insulin regular 10 Units Subcutaneous Once   metoprolol tartrate 50 mg Oral BID   pantoprazole 40 mg Oral QAM   polyethylene glycol 17 g Oral Daily   [START ON 12/8/2019] vitamin B-12 1,000 mcg Oral Daily     Continuous Infusions:    sodium chloride 100 mL/hr Last Rate: 100 mL/hr (12/06/19 0211)     PRN Meds:  albuterol  •  dextrose  •  dextrose  •  glucagon (human recombinant)  •  HYDROmorphone **AND** naloxone  •  insulin lispro **AND** insulin lispro  •  magnesium hydroxide  •  ondansetron  •  oxyCODONE **OR** oxyCODONE  •  promethazine  Allergies:  Patient has no known allergies.    Objective     Vital Signs   Temp:  [97.6 °F (36.4 °C)-99.4 °F (37.4 °C)] 99.4 °F (37.4 °C)  Heart Rate:  [72-94] 91  Resp:  [15-23] 19  BP: (108-139)/(57-80) 131/67    Physical Exam:     General Appearance:    Alert, cooperative, in no acute distress   Head:    Normocephalic, without obvious abnormality, atraumatic   Eyes:            Lids and lashes normal, conjunctivae and sclerae normal   Throat:   No oral lesions,  oral mucosa moist   Neck:   No adenopathy, supple,  no thyromegaly, no   carotid bruit   Lungs:      Clear     Heart:    Regular rhythm and normal rate   Chest Wall:    No abnormalities observed   Abdomen:     bowel sounds heard, soft, surgical wound bandage                 Extremities:   Hammer toes, plantar calluses, no edema               Pulses:   Pulses palpable and equal bilaterally   Skin:   Dry   Neurologic:  DTR absent, able to feel the 10g monofilament       Results Review  I have reviewed the patient's new clinical results, labs & imaging.    Lab Results (last 24 hours)     Procedure Component Value Units Date/Time    POC Glucose Once [[id number]]  (Abnormal) Collected:  12/07/19 2139    Specimen:  Blood Updated:  12/07/19 2140     Glucose 243 mg/dL      Comment: Serial Number: [id number]:  [id number]       POC Glucose Once [[id number]]  (Abnormal) Collected:  12/07/19 2010    Specimen:  Blood Updated:  12/07/19 2011     Glucose 231 mg/dL      Comment: Serial Number: [id number]:  [id number]       LDL Cholesterol, Direct [[id number]]  (Normal) Collected:  12/06/19 0958    Specimen:  Blood Updated:  12/07/19 1742     LDL Cholesterol  82 mg/dL     Narrative:       LDL Reference Ranges    (U.S. Department of Health and Human Services ATP III Classifications)    Optimal          <100 mg/dl  Near Optimal     100-129 mg/dl  Borderline High  130-159 mg/dl  High             160-189 mg/dl  Very High        >189 mg/dl      POC Glucose Once [[id number]]  (Abnormal) Collected:  12/07/19 1705    Specimen:  Blood Updated:  12/07/19 1706     Glucose 247 mg/dL      Comment: Serial Number: [id number]:  [id number]       POC Glucose Once [[id number]]  (Abnormal) Collected:  12/07/19 1202    Specimen:  Blood Updated:  12/07/19 1208     Glucose 241 mg/dL      Comment: Serial Number: [id number]:  [id number]       POC Glucose Once [[id number]]  (Abnormal) Collected:  12/07/19 0721    Specimen:  Blood Updated:  12/07/19 0723     Glucose 267 mg/dL      Comment: Serial Number: [id number]:  [id number]        CBC & Differential [[id number]] Collected:  12/07/19 0526    Specimen:  Blood Updated:  12/07/19 0645    Narrative:       The following orders were created for panel order CBC & Differential.  Procedure                               Abnormality         Status                     ---------                               -----------         ------                     CBC Auto Differential[[id number]]        Abnormal            Final result                 Please view results for these tests on the individual orders.    CBC Auto Differential [[id number]]  (Abnormal) Collected:  12/07/19 0526    Specimen:  Blood Updated:  12/07/19 0645     WBC 11.50 10*3/mm3      RBC 3.58 10*6/mm3      Hemoglobin 10.7 g/dL      Comment: Result checked         Hematocrit 32.5 %      MCV 90.9 fL      MCH 30.0 pg      MCHC 33.1 g/dL      RDW 14.7 %      RDW-SD 47.3 fl      MPV 8.4 fL      Platelets 276 10*3/mm3      Neutrophil % 75.1 %      Lymphocyte % 14.4 %      Monocyte % 7.0 %      Eosinophil % 2.6 %      Basophil % 0.9 %      Neutrophils, Absolute 8.70 10*3/mm3      Lymphocytes, Absolute 1.70 10*3/mm3      Monocytes, Absolute 0.80 10*3/mm3      Eosinophils, Absolute 0.30 10*3/mm3      Basophils, Absolute 0.10 10*3/mm3      nRBC 0.0 /100 WBC     Basic Metabolic Panel [[id number]]  (Abnormal) Collected:  12/07/19 0526    Specimen:  Blood Updated:  12/07/19 0637     Glucose 227 mg/dL      BUN 24 mg/dL      Creatinine 1.37 mg/dL      Sodium 137 mmol/L      Potassium 4.4 mmol/L      Chloride 99 mmol/L      CO2 24.0 mmol/L      Calcium 8.8 mg/dL      eGFR Non African Amer 53 mL/min/1.73      BUN/Creatinine Ratio 17.5     Anion Gap 14.0 mmol/L     Narrative:       GFR Normal >60  Chronic Kidney Disease <60  Kidney Failure <15      Vitamin B12 [[id number]]  (Normal) Collected:  12/06/19 0958    Specimen:  Blood Updated:  12/07/19 0404     Vitamin B-12 403 pg/mL     Lipid Panel [[id number]]  (Abnormal) Collected:  12/06/19 0958    Specimen:  Blood  Updated:  12/06/19 2307     Total Cholesterol 175 mg/dL      Triglycerides 454 mg/dL      HDL Cholesterol 37 mg/dL      LDL Cholesterol  --     Comment: Unable to calculate        VLDL Cholesterol --     Comment: Unable to calculate        LDL/HDL Ratio --     Comment: Unable to calculate       Narrative:       Cholesterol Reference Ranges  (U.S. Department of Health and Human Services ATP III Classifications)    Desirable          <200 mg/dL  Borderline High    200-239 mg/dL  High Risk          >240 mg/dL      Triglyceride Reference Ranges  (U.S. Department of Health and Human Services ATP III Classifications)    Normal           <150 mg/dL  Borderline High  150-199 mg/dL  High             200-499 mg/dL  Very High        >500 mg/dL    HDL Reference Ranges  (U.S. Department of Health and Human Services ATP III Classifcations)    Low     <40 mg/dl (major risk factor for CHD)  High    >60 mg/dl ('negative' risk factor for CHD)        LDL Reference Ranges  (U.S. Department of Health and Human Services ATP III Classifcations)    Optimal          <100 mg/dL  Near Optimal     100-129 mg/dL  Borderline High  130-159 mg/dL  High             160-189 mg/dL  Very High        >189 mg/dL        Lab Results   Component Value Date    HGBA1C 9.5 (H) 12/06/2019     Lab Results   Component Value Date    TSH 2.340 12/06/2019         Assessment/Plan     1. Diabetes type 2, with hyperglycemia & peripheral neuropathy.  2. R sided PCA stroke  3. S/P R hemicolectomy    Will added scheduled pre- meal Humalog & increase Lantus insulin dose to more closely match total home insulin dose.  Check 2 am blood sugar & cover with sliding scale if needed.  Will follow with you & make adjustments as needed.  Thank you for the consult.     I discussed the patients findings and my recommendations with patient    Radha Manriquez MD  12/07/19  10:17 PM

## 2019-12-08 NOTE — THERAPY RE-EVALUATION
Patient Name: Chao Lazar  : 1960    MRN: [id number]                              Today's Date: 2019       Admit Date: 2019    Visit Dx:     ICD-10-CM ICD-9-CM   1. Polyp of transverse colon, unspecified type D12.3 211.3     Patient Active Problem List   Diagnosis   • Allergic state   • Asthma   • Atherosclerotic heart disease of native coronary artery without angina pectoris   • Chronic cough   • Degenerative joint disease   • Type 2 diabetes mellitus with hyperglycemia, with long-term current use of insulin (CMS/[facility])   • Type 2 diabetes mellitus with other diabetic neurological complication (CMS/[facility])   • Diabetic foot ulcer (CMS/[facility])   • Encounter for general adult medical examination without abnormal findings   • Gastroparesis   • History of prosthetic heart valve   • Hyperlipidemia, mixed   • Hypertension   • Nausea and vomiting   • Other specified dorsopathies, site unspecified   • Type 2 diabetes mellitus with hyperglycemia (CMS/[facility])   • Vitamin D deficiency   • Combined forms of age-related cataract, bilateral   • Other specified retinal disorders   • Presbyopia   • Type 2 or unspecified type diabetes mellitus   • Acute ischemic right PCA stroke involving the right occipital lobe (CMS/[facility])   • Status post colon resection   • FRANKI (acute kidney injury) (CMS/[facility])     Past Medical History:   Diagnosis Date   • ACE-inhibitor cough    • Arthritis    • Diabetes mellitus (CMS/[facility]) 1988    type 2    • GERD (gastroesophageal reflux disease)    • Hyperlipidemia    • Hypertension    • RAD (reactive airway disease)      Past Surgical History:   Procedure Laterality Date   • CARDIAC CATHETERIZATION     • CHOLECYSTECTOMY     • COLON RESECTION SMALL BOWEL Right 2019    Procedure: open right hemicolectomy;  Surgeon: Ronaldo Ardon MD;  Location: [facility];  Service: General   • COLONOSCOPY  2019    x2    • CORONARY ANGIOPLASTY WITH STENT PLACEMENT  2017   • FRACTURE SURGERY       collar bone as a child    • KNEE ARTHROSCOPY Left 08/2003   • KNEE JOINT MANIPULATION Left 04/2010   • TOTAL KNEE ARTHROPLASTY Bilateral     2013,2011     General Information     Row Name 12/08/19 1533          PT Evaluation Time/Intention    Document Type  re-evaluation  -AO     Mode of Treatment  physical therapy  -AO     Row Name 12/08/19 1533          General Information    Patient Profile Reviewed?  yes  -AO     Prior Level of Function  -- Refer to initial evaluation for PLOF/PMH/social hx  -AO     Existing Precautions/Restrictions  fall Abdominal incision/binder  -AO     Barriers to Rehab  visual deficit L visual field loss, L-sided neglect  -AO     Row Name 12/08/19 1533          Cognitive Assessment/Intervention- PT/OT    Orientation Status (Cognition)  oriented x 4  -AO     Row Name 12/08/19 1533          Safety Issues, Functional Mobility    Impairments Affecting Function (Mobility)  balance;coordination;postural/trunk control;visual/perceptual  -AO       User Key  (r) = Recorded By, (t) = Taken By, (c) = Cosigned By    Initials Name Provider Type    AO Allegra Nesbitt, PT Physical Therapist        Mobility     Row Name 12/08/19 1533          Bed Mobility Assessment/Treatment    Bed Mobility Assessment/Treatment  supine-sit  -AO     Supine-Sit [place] (Bed Mobility)  supervision  -AO     Row Name 12/08/19 1533          Sit-Stand Transfer    Sit-Stand [place] (Transfers)  minimum assist (75% patient effort)  -AO     Assistive Device (Sit-Stand Transfers)  walker, front-wheeled  -AO     Row Name 12/08/19 1533          Gait/Stairs Assessment/Training    Gait/Stairs Assessment/Training  distance ambulated  -AO     [place] Level (Gait)  minimum assist (75% patient effort)  -AO     Assistive Device (Gait)  walker, front-wheeled  -AO     Distance in Feet (Gait)  55 ft  -AO     Pattern (Gait)  step-through  -AO     Comment (Gait/Stairs)  Pt with significant R lateral veering, unable to  locate/avoid objects on L side, and with difficulty navigating turns/doorways secondary to L visual field loss. Pt requires min A at trunk to maintain balance during gait training but requires MOD assist to navigate RW  -AO       User Key  (r) = Recorded By, (t) = Taken By, (c) = Cosigned By    Initials Name Provider Type    AO Allegra Nesbitt, PT Physical Therapist        Obj/Interventions     Row Name 12/08/19 1553          General ROM    GENERAL ROM COMMENTS  BUE/BLE AROM WFL  -AO     Row Name 12/08/19 1553          MMT (Manual Muscle Testing)    General MMT Comments  BUE/BLE: 5/5  -AO     Row Name 12/08/19 1553          Static Sitting Balance    Level of [place] (Unsupported Sitting, Static Balance)  supervision  -AO     Sitting Position (Unsupported Sitting, Static Balance)  sitting on edge of bed  -AO     Time Able to Maintain Position (Unsupported Sitting, Static Balance)  4 to 5 minutes  -AO     Row Name 12/08/19 1553          Dynamic Sitting Balance    Level of [place], Reaches Outside Midline (Sitting, Dynamic Balance)  supervision  -AO     Sitting Position, Reaches Outside Midline (Sitting, Dynamic Balance)  sitting on edge of bed  -AO     Comment, Reaches Outside Midline (Sitting, Dynamic Balance)  Pt demonstrated intact Rapid Alternating Movement, Heel Knee Shin, and Finger Approximation, however, demonstated impaired Finger Nose Finger (secondary to L visual field loss and difficulty locating targe), impaired L visual field exam, and impaired L visual tracking  -AO     Row Name 12/08/19 1553          Static Standing Balance    Level of [place] (Supported Standing, Static Balance)  contact guard assist  -AO     Time Able to Maintain Position (Supported Standing, Static Balance)  1 to 2 minutes  -AO     Assistive Device Utilized (Supported Standing, Static Balance)  walker, rolling  -AO     Row Name 12/08/19 155          Dynamic Standing Balance    Level of [place], Reaches Outside  Midline (Standing, Dynamic Balance)  minimal assist, 75% patient effort  -AO     Time Able to Maintain Position, Reaches Outside Midline (Standing, Dynamic Balance)  30 to 45 seconds  -AO     Assistive Device Utilized (Supported Standing, Dynamic Balance)  walker, rolling  -AO     Row Name 12/08/19 1553          Sensory Assessment/Intervention    Sensory General Assessment  -- Pt c/o tingling in B hands and with hx of peripheral neuropathy B feet  -AO       User Key  (r) = Recorded By, (t) = Taken By, (c) = Cosigned By    Initials Name Provider Type    Allegra Hester, PT Physical Therapist        Goals/Plan     Row Name 12/08/19 1533          Bed Mobility Goal 1 (PT)    Activity/Assistive Device (Bed Mobility Goal 1, PT)  bed mobility activities, all  -AO     [place] Level/Cues Needed (Bed Mobility Goal 1, PT)  conditional independence  -AO     Time Frame (Bed Mobility Goal 1, PT)  2 weeks  -AO     Progress/Outcomes (Bed Mobility Goal 1, PT)  goal not met  -AO     Row Name 12/08/19 1533          Transfer Goal 1 (PT)    Activity/Assistive Device (Transfer Goal 1, PT)  transfers, all  -AO     [place] Level/Cues Needed (Transfer Goal 1, PT)  supervision required  -AO     Time Frame (Transfer Goal 1, PT)  2 weeks  -AO     Progress/Outcome (Transfer Goal 1, PT)  goal not met  -AO     Row Name 12/08/19 1533          Gait Training Goal 1 (PT)    Activity/Assistive Device (Gait Training Goal 1, PT)  gait (walking locomotion);walker, rolling  -AO     [place] Level (Gait Training Goal 1, PT)  contact guard assist  -AO     Distance (Gait Goal 1, PT)  150 ft  -AO     Time Frame (Gait Training Goal 1, PT)  2 weeks  -AO     Progress/Outcome (Gait Training Goal 1, PT)  goal not met  -AO       User Key  (r) = Recorded By, (t) = Taken By, (c) = Cosigned By    Initials Name Provider Type    Allegra Hester, PT Physical Therapist        Clinical Impression     Row Name 12/08/19 3668          Pain Assessment     Additional Documentation  Pain Scale: Numbers Pre/Post-Treatment (Group)  -AO     Row Name 12/08/19 3618          Pain Scale: Numbers Pre/Post-Treatment    Pain Scale: Numbers, Pretreatment  3/10  -AO     Pain Scale: Numbers, Post-Treatment  7/10  -AO     Pain Location - Orientation  incisional  -AO     Pain Location  abdomen  -AO     Row Name 12/08/19 0750          Plan of Care Review    Plan of Care Reviewed With  patient  -AO     Progress  no change  -AO     Outcome Summary  PT reassessment completed this date following diagnosis of acute CVA. Pt demonstrates L visual field loss with impaired visual tracking exam, impaired visual field exam, and impaired Finger Nose Finger exam into L upper/lower quadrants. Pt demonstrated improved bed mobility this date, requiring SBA, requires CGA for transfers with RW, and requires min/mod assist for gait training 55ft with RW with significant difficulty navigating turns/doorways and avoiding obstacles secondary to L visual field loss. Recommending IP rehab at d/c, as pt is not safe for home and at increased risk for falls. Plan to see daily at [facility] for progression of mobility.   -AO     Row Name 12/08/19 0452          Physical Therapy Clinical Impression    Patient/Family Goals Statement (PT Clinical Impression)  PT Re-assessment completed this date secondary to acute/subacute CVA R occipital lobe with L visual field impairments/loss. Admitted for colon resection and with LINH planned for 12/9/19.  -AO     Criteria for Skilled Interventions Met (PT Clinical Impression)  yes;treatment indicated  -AO     Rehab Potential (PT Clinical Summary)  good, to achieve stated therapy goals  -AO     Row Name 12/08/19 9446          Vital Signs    Recovery Time  Vitals stable and WNL on RA  -AO     Row Name 12/08/19 0262          Positioning and Restraints    Pre-Treatment Position  in bed  -AO     Post Treatment Position  chair  -AO     In Chair  notified nsg;sitting;call light within  reach;encouraged to call for assist;exit alarm on;with family/caregiver;with nsg  -AO       User Key  (r) = Recorded By, (t) = Taken By, (c) = Cosigned By    Initials Name Provider Type    Allegra Hester PT Physical Therapist        Outcome Measures     Row Name 12/08/19 1533          Modified [place] Scale    Modified [place] Scale  4 - Moderately severe disability.  Unable to walk without assistance, and unable to attend to own bodily needs without assistance.  -AO     Row Name 12/08/19 1533          Functional Assessment    Outcome Measure Options  Modified [place]  -AO       User Key  (r) = Recorded By, (t) = Taken By, (c) = Cosigned By    Initials Name Provider Type    Allegra Hester PT Physical Therapist          PT Recommendation and Plan  Planned Therapy Interventions (PT Eval): balance training, bed mobility training, gait training, motor coordination training, neuromuscular re-education, patient/family education, postural re-education, strengthening, transfer training  Outcome Summary/Treatment Plan (PT)  Anticipated Discharge Disposition (PT): inpatient rehabilitation facility  Plan of Care Reviewed With: patient  Progress: no change  Outcome Summary: PT reassessment completed this date following diagnosis of acute CVA. Pt demonstrates L visual field loss with impaired visual tracking exam, impaired visual field exam, and impaired Finger Nose Finger exam into L upper/lower quadrants. Pt demonstrated improved bed mobility this date, requiring SBA, requires CGA for transfers with RW, and requires min/mod assist for gait training 55ft with RW with significant difficulty navigating turns/doorways and avoiding obstacles secondary to L visual field loss. Recommending IP rehab at d/c, as pt is not safe for home and at increased risk for falls. Plan to see daily at [facility] for progression of mobility.      Time Calculation:   PT Charges     Row Name 12/08/19 1127             Time Calculation    Start Time   1533  -AO      Stop Time  1553  -AO      Time Calculation (min)  20 min  -AO      PT Received On  12/08/19  -AO      PT - Next Appointment  12/09/19  -AO      PT Goal Re-Cert Due Date  12/22/19  -AO         Time Calculation- PT    Total Timed Code Minutes- PT  10 minute(s)  -AO        User Key  (r) = Recorded By, (t) = Taken By, (c) = Cosigned By    Initials Name Provider Type    AO Allegra Nesbitt, PT Physical Therapist        Therapy Charges for Today     Code Description Service Date Service Provider Modifiers Qty    24203013386  GAIT TRAINING EA 15 MIN 12/8/2019 Allegra Nesbitt, PT GP 1    [id number]  PT RE-EVAL ESTABLISHED PLAN 2 12/8/2019 Allegra Nesbitt, PT GP 1          PT G-Codes  Outcome Measure Options: Modified [place]  Modified [place]aro Scale: 4 - Moderately severe disability.  Unable to walk without assistance, and unable to attend to own bodily needs without assistance.    Allegra Nesbitt, LUPE  12/8/2019

## 2019-12-08 NOTE — PLAN OF CARE
Patient having increase in confusion, scopalamine patch removed. STAT CT obtained and was unremarkble. Call placed to hospitalist and surgeon awaiting return calls. Temp going up to 99.4 at this time and o2 was dropping to high 80s low 90s and o2 was applied at 2lpm nc. Continue to monitor.

## 2019-12-08 NOTE — PROGRESS NOTES
[facility]   INPATIENT PROGRESS NOTE    Date of Admission: 12/5/2019  Length of Stay: 3  Primary Care Physician: Al Kimbrough MD    Subjective   Subjective   CC:  Left side vision impairement immediate after colon resection    HPI: Colon resection done on 12/05 for multiple polys  H/o D, HTN, Hyperlipidema.  Acute stroke on right occiput ws found on MRI.  Left side Hemianopsia. Neurology consulted  FRANKI - Creatinine went up 1.95, now 1.15  Poorly controlled DM.   At home 70/30 X4, Endocrine consulted  Plan for LINH tomorrow      Review Of Systems:   As per HPI and PMH    Objective   Objective    Physical Exam:  Temp:  [98.2 °F (36.8 °C)-99.6 °F (37.6 °C)] 99.6 °F (37.6 °C)  Heart Rate:  [77-98] 77  Resp:  [11-19] 12  BP: (118-135)/(67-80) 125/75    The patient is alert, oriented and in no distress.  Vital signs as noted above.  Head and neck revealed no carotid bruits or jugular venous distention.  No thyromegaly or lymphadenopathy is present  Lungs clear.  No wheezing.  Breath sounds are normal bilaterally.  Heart normal first and second heart sounds.  No murmur. No precordial rub is present.  No gallop is present.  Abdomen soft and nontender. Slightly distended.  No organomegaly is present.  Extremities with good peripheral pulses without any pedal edema.  Skin warm and dry.  Musculoskeletal system is grossly normal  CNS grossly normal - left side visual field defect       Results Review:          Results from last 7 days   Lab Units 12/08/19  0508 12/07/19  0526 12/06/19  0958   WBC 10*3/mm3 13.40* 11.50* 15.70*   HEMOGLOBIN g/dL 10.7* 10.7* 12.8*   HEMATOCRIT % 31.8* 32.5* 38.5   PLATELETS 10*3/mm3 307 276 439   INR   --   --  0.94     Results from last 7 days   Lab Units 12/08/19  0508 12/07/19  0526 12/06/19  0958   SODIUM mmol/L 134* 137 136   POTASSIUM mmol/L 4.5 4.4 4.8  4.8   CHLORIDE mmol/L 98 99 98   CO2 mmol/L 23.0 24.0 18.0*   BUN mg/dL 22 24* 22   CREATININE mg/dL 1.15 1.37* 1.93*   GLUCOSE  mg/dL 196* 227* 405*   CALCIUM mg/dL 9.3 8.8 8.7     Hemoglobin A1C (%)   Date/Time Value   12/06/2019 0338 9.5 (H)     TSH (uIU/mL)   Date/Time Value   12/06/2019 0958 2.340     Microbiology Results Abnormal     Procedure Component Value - Date/Time    Respiratory Panel, PCR - Swab, Nasopharynx [[id number]]  (Normal) Collected:  12/08/19 0027    Lab Status:  Final result Specimen:  Swab from Nasopharynx Updated:  12/08/19 0200     ADENOVIRUS, PCR Not Detected     Coronavirus 229E Not Detected     Coronavirus HKU1 Not Detected     Coronavirus NL63 Not Detected     Coronavirus OC43 Not Detected     Human Metapneumovirus Not Detected     Human Rhinovirus/Enterovirus Not Detected     Influenza B PCR Not Detected     Parainfluenza Virus 1 Not Detected     Parainfluenza Virus 2 Not Detected     Parainfluenza Virus 3 Not Detected     Parainfluenza Virus 4 Not Detected     Bordetella pertussis pcr Not Detected     Influenza A H1 2009 PCR Not Detected     Chlamydophila pneumoniae PCR Not Detected     Mycoplasma pneumo by PCR Not Detected     Influenza A PCR Not Detected     Influenza A H3 Not Detected     Influenza A H1 Not Detected     RSV, PCR Not Detected        Xr Abdomen 2 View With Chest 1 View    Result Date: 12/8/2019   1. Small amount of free air within the abdomen which is unremarkable given history of any colectomy on December 5 2. Couple of air fluid levels seen on decubitus view suggesting mild postop ileus 3. No stairstep air-fluid levels are seen that would suggest obstruction 4. Cardiomegaly without active cardiopulmonary disease  Electronically Signed By-Wilder London Jr. On:12/8/2019 8:09 AM This report was finalized on [id number] by  Wilder London Jr., .    Ct Head Without Contrast    Result Date: 12/7/2019  1. Expected evolution of the acute/subacute right occipital infarct. No hemorrhage or mass effect. 2. No new infarct visible by CT. 3. Mild chronic age-related intracranial findings as above,  unchanged. 4. Fluid level in the left sphenoid sinus.  This examination was interpreted by Eric Kaplan M.D. Electronically signed by:  Eric Kaplan M.D.  12/7/2019 8:08 PM    Xr Chest 1 View    Result Date: 12/7/2019  Impression: 1. No evidence of an acute pleural or pulmonary parenchymal abnormality. 2. Cardiomegaly. Electronically signed by:  Jeremie Caceres M.D.  12/7/2019 10:05 PM      Results for orders placed during the hospital encounter of 12/05/19   Adult Transthoracic Echo Complete W/ Cont if Necessary Per Protocol    Narrative · Estimated EF = 60%.  · Left ventricular systolic function is normal.     Indications  Cardiac source of emboli.    Technically satisfactory study.  Mitral valve is structurally normal.  Tricuspid valve is structurally normal.  Aortic valve is structurally normal.  Pulmonic valve could not be well visualized.  No evidence for mitral tricuspid or aortic regurgitation is seen by   Doppler study.  Left atrium is normal in size.  Right atrium is normal in size.  Left ventricle is normal in size and contractility with ejection fraction   of 60%.  Right ventricle is normal in size.  Atrial septum is intact.  Aorta is normal.  No pericardial effusion or intracardiac thrombus is seen.    Impression  Structurally and functionally normal cardiac valves.  Normal left ventricle size and contractility with ejection fraction of   60%.  No pericardial effusion or intracardiac thrombus is seen.             I have reviewed the medications.    Assessment/Plan   Assessment/Plan   Brief Hospital Course:      Active Hospital Problems:  Acute ischemic right PCA stroke involving the right occipital lobe (CMS/HCC)   Sudden vision impairment on left side of vision (Hemianopsia)  MRI done - large area of acute infarct on right occiput area  Neurology consulted  DW Dr. Ventura  He may need Aspirin and Plavix.  Now the patient is immediate postoperative period (Colon resection)  Neurologist to  discuss with surgeon for these medication use. (Plavix may start next week)  PT/OT need    Feels better, BP control is good.  Visiual field defect - slightly improving. No focal motor weakness    [id number] will do LINH possibly on Monday    FRANKI (acute kidney injury) (CMS/[facility])  Cr was 1.95 after surgery.  - 1.37,  1.15 respectively   improving with  IVF    Monitor renal funciton daily     Status post colon resection   Partial Colectomy done 12/05  ; Reason - multiple polyps  ; Lots of gas passage (++)  : surgery follows,  Still abdomen is slightly distended    Type 2 diabetes mellitus with hyperglycemia, with long-term current use of insulin (CMS/[facility])   Hyperglycemia    He used to take high dose of 70/30 at home  Endocrine consulted on 12/07 (his own endocrinologist?)    Lantus increased to 32, plus Premeal 12 units  Control is better now            DVT prophylaxis:  Discharge Planning: I expect patient to be discharged to home in 2- 3 days    Rober Coronado MD, 12/08/19 6:28 PM

## 2019-12-08 NOTE — PLAN OF CARE
Patient vitals stable. Mental status has continued to improve throughout shift. Iv fluid replacement restarted. Pt not eating and drinking very little. NIH of 7 this shift. Family is at the bedside. Nurse will continue to monitor..

## 2019-12-08 NOTE — PROGRESS NOTES
"Post-op Note  RIGHT HEMICOLECTOMY  12/5/2019    Subjective   Chao Lazar is a 59 y.o. male status post open right hemicolectomy for unresectable transverse colon polyp performed on 12/5/2019.      Patient's mental status changes cleared yesterday spontaneously or with some additional oxygenation.  Also the scopolamine patch was removed unsure exactly what helped but he seems to be back to how he looked yesterday.  Complaining of some abdominal fullness but no nausea or vomiting.  Having flatus but no significant bowel movement in the last 24 hours      Objective   /75 (BP Location: Right arm, Patient Position: Lying)   Pulse 77   Temp 99.6 °F (37.6 °C) (Rectal)   Resp 12   Ht 185.4 cm (73\")   Wt 88.7 kg (195 lb 8.8 oz)   SpO2 97%   BMI 25.80 kg/m²   Vital signs reviewed  Abdomen is soft and obese mild tenderness to palpation incision healing well without any erythema or drainage    Assessment/Plan   59-year-old gentleman status post open right hemicolectomy for unresectable transverse colon polyp performed on 12/5/2019.  The following morning, the patient had left-sided hemianopsia, and was determined to have a right-sided PCA stroke.    Greatly appreciate the input of neurology and this problem.  We will follow their recommendations.  Low residue diet  Continue pain management with Roxicodone, Dilaudid for breakthrough, and Tylenol.  Okay for VTE prophylaxis, continue SCDs  Appreciate medicine input with management of his hyperglycemia and management of his hypertension.  No need for further antibiotics  Continue ambulation with assistance, and incentive spirometer while awake.    Suppository today  KUB reviewed we will continue to support him during his ileus  Armando Albarado MD  12/8/2019  2:48 PM    "

## 2019-12-09 PROBLEM — E87.1 HYPONATREMIA: Status: ACTIVE | Noted: 2019-12-09

## 2019-12-09 LAB
ANA SER QL: NEGATIVE
ANION GAP SERPL CALCULATED.3IONS-SCNC: 13 MMOL/L (ref 5–15)
BASOPHILS # BLD AUTO: 0.1 10*3/MM3 (ref 0–0.2)
BASOPHILS NFR BLD AUTO: 0.5 % (ref 0–1.5)
BUN BLD-MCNC: 22 MG/DL (ref 8–23)
BUN/CREAT SERPL: 18.8 (ref 7–25)
CALCIUM SPEC-SCNC: 9.3 MG/DL (ref 8.6–10.5)
CHLORIDE SERPL-SCNC: 96 MMOL/L (ref 98–107)
CO2 SERPL-SCNC: 25 MMOL/L (ref 22–29)
CREAT BLD-MCNC: 1.17 MG/DL (ref 0.76–1.27)
DEPRECATED RDW RBC AUTO: 44.6 FL (ref 37–54)
EOSINOPHIL # BLD AUTO: 0.5 10*3/MM3 (ref 0–0.4)
EOSINOPHIL NFR BLD AUTO: 5 % (ref 0.3–6.2)
ERYTHROCYTE [DISTWIDTH] IN BLOOD BY AUTOMATED COUNT: 14.2 % (ref 12.3–15.4)
GFR SERPL CREATININE-BSD FRML MDRD: 63 ML/MIN/1.73
GLUCOSE BLD-MCNC: 199 MG/DL (ref 65–99)
GLUCOSE BLDC GLUCOMTR-MCNC: 141 MG/DL (ref 70–105)
GLUCOSE BLDC GLUCOMTR-MCNC: 144 MG/DL (ref 70–105)
GLUCOSE BLDC GLUCOMTR-MCNC: 219 MG/DL (ref 70–105)
GLUCOSE BLDC GLUCOMTR-MCNC: 80 MG/DL (ref 70–105)
HCT VFR BLD AUTO: 30 % (ref 37.5–51)
HGB BLD-MCNC: 10.2 G/DL (ref 13–17.7)
LAB AP CASE REPORT: NORMAL
LYMPHOCYTES # BLD AUTO: 2 10*3/MM3 (ref 0.7–3.1)
LYMPHOCYTES NFR BLD AUTO: 18.6 % (ref 19.6–45.3)
MCH RBC QN AUTO: 30.4 PG (ref 26.6–33)
MCHC RBC AUTO-ENTMCNC: 34 G/DL (ref 31.5–35.7)
MCV RBC AUTO: 89.3 FL (ref 79–97)
MONOCYTES # BLD AUTO: 0.8 10*3/MM3 (ref 0.1–0.9)
MONOCYTES NFR BLD AUTO: 7.4 % (ref 5–12)
NEUTROPHILS # BLD AUTO: 7.2 10*3/MM3 (ref 1.7–7)
NEUTROPHILS NFR BLD AUTO: 68.5 % (ref 42.7–76)
NRBC BLD AUTO-RTO: 0.1 /100 WBC (ref 0–0.2)
PATH REPORT.FINAL DX SPEC: NORMAL
PATH REPORT.GROSS SPEC: NORMAL
PLATELET # BLD AUTO: 312 10*3/MM3 (ref 140–450)
PMV BLD AUTO: 7.7 FL (ref 6–12)
POTASSIUM BLD-SCNC: 4 MMOL/L (ref 3.5–5.2)
RBC # BLD AUTO: 3.36 10*6/MM3 (ref 4.14–5.8)
SODIUM BLD-SCNC: 134 MMOL/L (ref 136–145)
WBC NRBC COR # BLD: 10.5 10*3/MM3 (ref 3.4–10.8)

## 2019-12-09 PROCEDURE — 97116 GAIT TRAINING THERAPY: CPT

## 2019-12-09 PROCEDURE — 92523 SPEECH SOUND LANG COMPREHEN: CPT

## 2019-12-09 PROCEDURE — 80048 BASIC METABOLIC PNL TOTAL CA: CPT | Performed by: PHYSICIAN ASSISTANT

## 2019-12-09 PROCEDURE — 99232 SBSQ HOSP IP/OBS MODERATE 35: CPT | Performed by: INTERNAL MEDICINE

## 2019-12-09 PROCEDURE — 25010000002 HYDROMORPHONE PER 4 MG: Performed by: SURGERY

## 2019-12-09 PROCEDURE — 99232 SBSQ HOSP IP/OBS MODERATE 35: CPT | Performed by: HOSPITALIST

## 2019-12-09 PROCEDURE — 63710000001 INSULIN LISPRO (HUMAN) PER 5 UNITS: Performed by: INTERNAL MEDICINE

## 2019-12-09 PROCEDURE — 82962 GLUCOSE BLOOD TEST: CPT

## 2019-12-09 PROCEDURE — 63710000001 INSULIN GLARGINE PER 5 UNITS: Performed by: INTERNAL MEDICINE

## 2019-12-09 PROCEDURE — 99024 POSTOP FOLLOW-UP VISIT: CPT | Performed by: SURGERY

## 2019-12-09 PROCEDURE — 85025 COMPLETE CBC W/AUTO DIFF WBC: CPT | Performed by: PHYSICIAN ASSISTANT

## 2019-12-09 RX ORDER — INSULIN GLARGINE 100 [IU]/ML
34 INJECTION, SOLUTION SUBCUTANEOUS NIGHTLY
Status: DISCONTINUED | OUTPATIENT
Start: 2019-12-09 | End: 2019-12-11 | Stop reason: HOSPADM

## 2019-12-09 RX ADMIN — CYANOCOBALAMIN TAB 1000 MCG 1000 MCG: 1000 TAB at 09:47

## 2019-12-09 RX ADMIN — INSULIN LISPRO 4 UNITS: 100 INJECTION, SOLUTION INTRAVENOUS; SUBCUTANEOUS at 09:48

## 2019-12-09 RX ADMIN — METOPROLOL TARTRATE 50 MG: 50 TABLET, FILM COATED ORAL at 09:47

## 2019-12-09 RX ADMIN — METOPROLOL TARTRATE 50 MG: 50 TABLET, FILM COATED ORAL at 20:35

## 2019-12-09 RX ADMIN — OXYCODONE HYDROCHLORIDE 10 MG: 5 TABLET ORAL at 20:10

## 2019-12-09 RX ADMIN — OXYCODONE HYDROCHLORIDE 10 MG: 5 TABLET ORAL at 05:28

## 2019-12-09 RX ADMIN — ASPIRIN 81 MG: 81 TABLET, DELAYED RELEASE ORAL at 09:47

## 2019-12-09 RX ADMIN — PANTOPRAZOLE SODIUM 40 MG: 40 TABLET, DELAYED RELEASE ORAL at 09:47

## 2019-12-09 RX ADMIN — ACETAMINOPHEN 1000 MG: 500 TABLET, FILM COATED ORAL at 09:47

## 2019-12-09 RX ADMIN — INSULIN LISPRO 12 UNITS: 100 INJECTION, SOLUTION INTRAVENOUS; SUBCUTANEOUS at 09:46

## 2019-12-09 RX ADMIN — ATORVASTATIN CALCIUM 80 MG: 40 TABLET, FILM COATED ORAL at 16:38

## 2019-12-09 RX ADMIN — INSULIN LISPRO 12 UNITS: 100 INJECTION, SOLUTION INTRAVENOUS; SUBCUTANEOUS at 12:50

## 2019-12-09 RX ADMIN — INSULIN GLARGINE 34 UNITS: 100 INJECTION, SOLUTION SUBCUTANEOUS at 20:36

## 2019-12-09 RX ADMIN — ACETAMINOPHEN 1000 MG: 500 TABLET, FILM COATED ORAL at 20:35

## 2019-12-09 RX ADMIN — ACETAMINOPHEN 1000 MG: 500 TABLET, FILM COATED ORAL at 16:38

## 2019-12-09 RX ADMIN — HYDROMORPHONE HYDROCHLORIDE 0.5 MG: 2 INJECTION, SOLUTION INTRAMUSCULAR; INTRAVENOUS; SUBCUTANEOUS at 11:49

## 2019-12-09 NOTE — PROGRESS NOTES
Discharge Planning Assessment  [facility]     Patient Name: Chao Lazar  MRN: [id number]  Today's Date: 12/9/2019    Admit Date: 12/5/2019               Discharge Plan    [facility] accepted acute pending bed availability,No PASRR needed,Precert to be started when pt is close to dc.       Destination      Service Provider Request Status Selected Services Address Phone Number Fax Number    [facility] Accepted N/A [address] [address] [phone]       No social barriers to discharge identified.    Italia Dawn, [facility], LSW  [phone]

## 2019-12-09 NOTE — DISCHARGE PLACEMENT REQUEST
"Chao Maldonado EMILE (59 y.o. Male)     Date of Birth Social Security Number Address Home Phone MRN    1960  [address] [facility] [place] [address] [phone] [id number]    Orthodox Marital Status          Restorationist        Admission Date Admission Type Admitting Provider Attending Provider Department, Room/Bed    12/5/19 Elective Ronaldo Ardon MD McCormick, John Patrick, MD [facility] HEART, 265/1    Discharge Date Discharge Disposition Discharge Destination                       Attending Provider:  Ronaldo Ardon MD    Allergies:  No Known Allergies    Isolation:  None   Infection:  None   Code Status:  CPR    Ht:  185.4 cm (73\")   Wt:  88.3 kg (194 lb 10.7 oz)    Admission Cmt:  None   Principal Problem:  Polyp of transverse colon [D12.3] More...                 Active Insurance as of 12/5/2019     Primary Coverage     Payor Plan Insurance Group Employer/Plan Group    [facility] [employer] [facility] [employer] [facility] PPO [id number]     Payor Plan Address Payor Plan Phone Number Payor Plan Fax Number Effective Dates    [address] [phone]  1/1/2019 - None Entered    [facility] [id number]       Subscriber Name Subscriber Birth Date Member ID       SHIV MALDONADO 1/1/1965 [id number]                 Emergency Contacts      (Rel.) Home Phone Work Phone Mobile Phone    SHIV MALDONADO (Spouse) [phone] -- [phone]                     "

## 2019-12-09 NOTE — THERAPY TREATMENT NOTE
Patient Name: Chao Lazar  : 1960    MRN: [id number]                              Today's Date: 2019       Admit Date: 2019    Visit Dx:     ICD-10-CM ICD-9-CM   1. Polyp of transverse colon, unspecified type D12.3 211.3     Patient Active Problem List   Diagnosis   • Allergic state   • Asthma   • Atherosclerotic heart disease of native coronary artery without angina pectoris   • Chronic cough   • Degenerative joint disease   • Type 2 diabetes mellitus with hyperglycemia, with long-term current use of insulin (CMS/[facility])   • Type 2 diabetes mellitus with other diabetic neurological complication (CMS/[facility])   • Diabetic foot ulcer (CMS/[facility])   • Encounter for general adult medical examination without abnormal findings   • Gastroparesis   • History of prosthetic heart valve   • Hyperlipidemia, mixed   • Hypertension   • Nausea and vomiting   • Other specified dorsopathies, site unspecified   • Type 2 diabetes mellitus with hyperglycemia (CMS/[facility])   • Vitamin D deficiency   • Combined forms of age-related cataract, bilateral   • Other specified retinal disorders   • Presbyopia   • Type 2 or unspecified type diabetes mellitus   • Acute ischemic right PCA stroke involving the right occipital lobe (CMS/[facility])   • Status post colon resection   • FRANKI (acute kidney injury) (CMS/[facility])     Past Medical History:   Diagnosis Date   • ACE-inhibitor cough    • Arthritis    • Diabetes mellitus (CMS/[facility]) 1988    type 2    • GERD (gastroesophageal reflux disease)    • Hyperlipidemia    • Hypertension    • RAD (reactive airway disease)      Past Surgical History:   Procedure Laterality Date   • CARDIAC CATHETERIZATION     • CHOLECYSTECTOMY     • COLON RESECTION SMALL BOWEL Right 2019    Procedure: open right hemicolectomy;  Surgeon: Ronaldo Ardon MD;  Location: [facility];  Service: General   • COLONOSCOPY  2019    x2    • CORONARY ANGIOPLASTY WITH STENT PLACEMENT  2017   • FRACTURE SURGERY       collar bone as a child    • KNEE ARTHROSCOPY Left 08/2003   • KNEE JOINT MANIPULATION Left 04/2010   • TOTAL KNEE ARTHROPLASTY Bilateral     2013,2011     General Information     Row Name 12/09/19 1055          PT Evaluation Time/Intention    Document Type  therapy note (daily note)  -     Mode of Treatment  physical therapy  -     Row Name 12/09/19 1055          General Information    Patient Profile Reviewed?  yes  -     Existing Precautions/Restrictions  fall  -     Barriers to Rehab  visual deficit  -     Row Name 12/09/19 1055          Relationship/Environment    Lives With  spouse  -     Name(s) of Who Lives With Patient  Debbi  -     Row Name 12/09/19 1055          Resource/Environmental Concerns    Current Living Arrangements  home/apartment/condo  -     Row Name 12/09/19 1055          Home Main Entrance    Number of Stairs, Main Entrance  two  -     Row Name 12/09/19 1055          Cognitive Assessment/Intervention- PT/OT    Orientation Status (Cognition)  oriented x 4  -     Row Name 12/09/19 1055          Safety Issues, Functional Mobility    Safety Issues Affecting Function (Mobility)  sequencing abilities;other (see comments) L sided neglect, needs constant cueing throughout amb  -     Impairments Affecting Function (Mobility)  balance;coordination;postural/trunk control;visual/perceptual  -     Comment, Safety Issues/Impairments (Mobility)  Pt has L sided neglect, needs constant cues for redirection throughout amb but able to correct following cues.   -       User Key  (r) = Recorded By, (t) = Taken By, (c) = Cosigned By    Initials Name Provider Type     Brenda Chung PTA Physical Therapy Assistant        Mobility     Row Name 12/09/19 1057          Bed Mobility Assessment/Treatment    Bed Mobility Assessment/Treatment  supine-sit  -     Supine-Sit [place] (Bed Mobility)  minimum assist (75% patient effort)  -     Sit-Supine [place] (Bed Mobility)  minimum  assist (75% patient effort)  -     Assistive Device (Bed Mobility)  bed rails  -     Row Name 12/09/19 1057          Sit-Stand Transfer    Sit-Stand [place] (Transfers)  minimum assist (75% patient effort);verbal cues  -     Assistive Device (Sit-Stand Transfers)  walker, front-wheeled  -     Row Name 12/09/19 1057          Gait/Stairs Assessment/Training    Gait/Stairs Assessment/Training  gait/ambulation assistive device  -     [place] Level (Gait)  minimum assist (75% patient effort);verbal cues  -     Assistive Device (Gait)  walker, front-wheeled  -     Distance in Feet (Gait)  60ft with Rwx and MIN A. Needs constant v/cs and t/c throughout amb due to L sided neglect. Pt unware of surroundings/barriers while amb and would run wx into wall without cues. Pt would stop freq throughout amb due to fatigue.   -     Pattern (Gait)  step-to  -MC       User Key  (r) = Recorded By, (t) = Taken By, (c) = Cosigned By    Initials Name Provider Type    Brenda Zapata PTA Physical Therapy Assistant        Obj/Interventions     Row Name 12/09/19 1059          Static Standing Balance    Level of [place] (Supported Standing, Static Balance)  contact guard assist  -     Time Able to Maintain Position (Supported Standing, Static Balance)  1 to 2 minutes  -     Assistive Device Utilized (Supported Standing, Static Balance)  walker, rolling  -     Row Name 12/09/19 1059          Dynamic Standing Balance    Level of [place], Reaches Outside Midline (Standing, Dynamic Balance)  minimal assist, 75% patient effort  -     Time Able to Maintain Position, Reaches Outside Midline (Standing, Dynamic Balance)  more than 5 minutes  -       User Key  (r) = Recorded By, (t) = Taken By, (c) = Cosigned By    Initials Name Provider Type    Brenda Zapata PTA Physical Therapy Assistant        Goals/Plan    No documentation.       Clinical Impression     Row Name 12/09/19 1059          Pain Scale:  Numbers Pre/Post-Treatment    Pain Scale: Numbers, Pretreatment  3/10  -     Pain Scale: Numbers, Post-Treatment  7/10  -     Pain Location - Orientation  incisional  -     Pain Location  abdomen  -     Pain Intervention(s)  Repositioned  -     Row Name 12/09/19 1059          Plan of Care Review    Plan of Care Reviewed With  patient  -     Progress  improving  -     Outcome Summary  Pt agreed to PT this am. Pt has flat affect but follows instructions well. Pt has L sided neglect and needs constant cueing throughout amb for proper technique and AD positioning. Pt took freq rest breaks throughout amb secondary to fatigue. Pt does well responding to commands.  BP slightly elevated following amb, nsg notified. all other vitals remained stable throughout tx. IP rehab recommended at [place] to address global deficits.   -     Row Name 12/09/19 1059          Physical Therapy Clinical Impression    Criteria for Skilled Interventions Met (PT Clinical Impression)  yes;treatment indicated  -     Rehab Potential (PT Clinical Summary)  good, to achieve stated therapy goals  -     Row Name 12/09/19 1059          Vital Signs    Pre Systolic BP Rehab  136  -MC     Pre Treatment Diastolic BP  79  -MC     Post Systolic BP Rehab  152  -MC     Post Treatment Diastolic BP  87  -MC     Pretreatment Heart Rate (beats/min)  86  -MC     Intratreatment Heart Rate (beats/min)  127  -MC     Posttreatment Heart Rate (beats/min)  103  -     Pre SpO2 (%)  97  -     O2 Delivery Pre Treatment  supplemental O2  -     Recovery Time  BP slightly elevated following amb, nsg notified. All other vitals remained stable throughout amb.  -     Row Name 12/09/19 1059          Positioning and Restraints    Pre-Treatment Position  in bed  -     Post Treatment Position  bed  -     In Bed  notified nsg;supine;call light within reach;encouraged to call for assist;exit alarm on;with family/caregiver  -       User Key  (r) = Recorded  By, (t) = Taken By, (c) = Cosigned By    Initials Name Provider Type    Brenda Zapata PTA Physical Therapy Assistant        Outcome Measures     Row Name 12/09/19 1104          Modified [place] Scale    Pre-Stroke Modified [place] Scale  0 - No Symptoms at all.  -     Modified [place] Scale  4 - Moderately severe disability.  Unable to walk without assistance, and unable to attend to own bodily needs without assistance.  -     Row Name 12/09/19 1104          Functional Assessment    Outcome Measure Options  Modified [place]  -       User Key  (r) = Recorded By, (t) = Taken By, (c) = Cosigned By    Initials Name Provider Type    Brenda Zapata PTA Physical Therapy Assistant          PT Recommendation and Plan     Outcome Summary/Treatment Plan (PT)  Anticipated Discharge Disposition (PT): inpatient rehabilitation facility  Plan of Care Reviewed With: patient  Progress: improving  Outcome Summary: Pt agreed to PT this am. Pt has flat affect but follows instructions well. Pt has L sided neglect and needs constant cueing throughout amb for proper technique and AD positioning. Pt took freq rest breaks throughout amb secondary to fatigue. Pt does well responding to commands.  BP slightly elevated following amb, nsg notified. all other vitals remained stable throughout tx. IP rehab recommended at [place] to address global deficits.      Time Calculation:   PT Charges     Row Name 12/09/19 1105             Time Calculation    Start Time  0907  -      Stop Time  0930  -      Time Calculation (min)  23 min  -      PT Received On  12/09/19  -      PT - Next Appointment  12/10/19  -         Time Calculation- PT    Total Timed Code Minutes- PT  23 minute(s)  -        User Key  (r) = Recorded By, (t) = Taken By, (c) = Cosigned By    Initials Name Provider Type    Brenda Zapata PTA Physical Therapy Assistant        Therapy Charges for Today     Code Description Service Date Service Provider Modifiers Qty     [id number]  GAIT TRAINING EA 15 MIN 12/9/2019 Brenda Chung, PTA GP 2          PT G-Codes  Outcome Measure Options: Modified [place]  Modified [place] Scale: 4 - Moderately severe disability.  Unable to walk without assistance, and unable to attend to own bodily needs without assistance.    Brenda Chung, CHANDU  12/9/2019

## 2019-12-09 NOTE — THERAPY EVALUATION
Acute Care - Speech Language Pathology Initial Evaluation   [facility]     Patient Name: Chao Lazar  : 1960  MRN: [id number]  Today's Date: 2019               Admit Date: 2019     Visit Dx:    ICD-10-CM ICD-9-CM   1. Polyp of transverse colon, unspecified type D12.3 211.3     Patient Active Problem List   Diagnosis   • Allergic state   • Asthma   • Atherosclerotic heart disease of native coronary artery without angina pectoris   • Chronic cough   • Degenerative joint disease   • Type 2 diabetes mellitus with hyperglycemia, with long-term current use of insulin (CMS/[facility])   • Type 2 diabetes mellitus with other diabetic neurological complication (CMS/[facility])   • Diabetic foot ulcer (CMS/[facility])   • Encounter for general adult medical examination without abnormal findings   • Gastroparesis   • History of prosthetic heart valve   • Hyperlipidemia, mixed   • Hypertension   • Nausea and vomiting   • Other specified dorsopathies, site unspecified   • Type 2 diabetes mellitus with hyperglycemia (CMS/[facility])   • Vitamin D deficiency   • Combined forms of age-related cataract, bilateral   • Other specified retinal disorders   • Presbyopia   • Type 2 or unspecified type diabetes mellitus   • Acute ischemic right PCA stroke involving the right occipital lobe (CMS/[facility])   • Status post colon resection   • FRANKI (acute kidney injury) (CMS/[facility])     Past Medical History:   Diagnosis Date   • ACE-inhibitor cough    • Arthritis    • Diabetes mellitus (CMS/[facility]) 1988    type 2    • GERD (gastroesophageal reflux disease)    • Hyperlipidemia    • Hypertension    • RAD (reactive airway disease)      Past Surgical History:   Procedure Laterality Date   • CARDIAC CATHETERIZATION     • CHOLECYSTECTOMY     • COLON RESECTION SMALL BOWEL Right 2019    Procedure: open right hemicolectomy;  Surgeon: Ronaldo Ardon MD;  Location: [facility];  Service: General   • COLONOSCOPY  2019    x2    • CORONARY ANGIOPLASTY WITH  STENT PLACEMENT  04/2017   • FRACTURE SURGERY      collar bone as a child    • KNEE ARTHROSCOPY Left 08/2003   • KNEE JOINT MANIPULATION Left 04/2010   • TOTAL KNEE ARTHROPLASTY Bilateral     2013,2011        SLP EVALUATION (last 72 hours)      SLP SLC Evaluation     Row Name 12/09/19 1120                   Communication Assessment/Intervention    Document Type  evaluation  -SC        Subjective Information  no complaints  -SC        Patient Observations  alert;cooperative;agree to therapy  -SC        Patient/Family Observations  Pt's sister and wife were present and provided information as needed.  -SC        Patient Effort  excellent  -SC        Symptoms Noted During/After Treatment  none  -SC           General Information    Patient Profile Reviewed  yes  -SC        Pertinent History Of Current Problem  Pt is a 60 y/o male with no known history of speech/cognitive or swallowing difficulties. Pt was admitted for a hemicolectomy due to an unresectable colon polyp. He is s/p surgery on 12/5. A code stroke was initiated after he developed blurry vision and left visual field loss. Head CT/MRI showed a large acute ischemic change involving the right occipitoparietal region and possible petechial hemorrhage.    -SC        Precautions/Limitations, Vision  vision impairment, left pt wears reading glasses  -SC        Precautions/Limitations, Hearing  WFL  -SC        Patient Level of Education  high school  -SC        Prior Level of Function-Communication  L  -SC        Plans/Goals Discussed with  patient and family  -SC        Barriers to Rehab  none identified  -SC        Standardized Assessment Used  SLUMS  -SC           Comprehension Assessment/Intervention    Comprehension Assessment/Intervention  Auditory Comprehension;Reading Comprehension  -SC           Auditory Comprehension Assessment/Intervention    Auditory Comprehension (Communication)  WFL  -SC        Able to Identify Objects/Pictures (Communication)   familiar objects;[facility]  -SC        Answers Questions (Communication)  yes/no;simple;[facility]  -SC        Able to Follow Commands (Communication)  1-step;[facility]  -SC        Auditory Comprehension Communication, Comment  Auditory comprehension was WNL for simple/concrete tasks and conversation. He had increased difficulty with more complex/abstract comprehension tasks.   -SC           Reading Comprehension Assessment/Intervention    Reading Comprehension (Communication)  severe impairment  -SC        Scanning (Reading)  letters;sentences;severe impairment  -SC        Reading Comprehension, Comment  Pt had difficulty visually orienting on page in order to read a sentence. He read 1 of 4 sentences with max cues. Pt completed a visual scanning task and demonstrated severe left sided neglect. He located specific items on right 1/4th of page only.    -SC           Expression Assessment/Intervention    Expression Assessment/Intervention  verbal expression;graphic expression  -SC           Verbal Expression Assessment/Intervention    Verbal Expression  mild impairment  -SC        Automatic Speech (Communication)  days of week;response to greeting;[facility]  -SC        Repetition  words;[facility] Pt repeated 5 of 5 words.  -SC        Confrontational Naming  high frequency;[facility]  -SC        Sentence Formulation  other (see comments) minimal output noted  -SC        Conversational Discourse/Fluency  mild impairment  -SC        Verbal Expression, Comment  Pt's verbal responses were short in length. He showed no word-finding difficulties. Automatic responses were appropriate. Spontaneous responses were minimal. Pt showed generative naming deficits; he named only 8 animals in 1 minute, with verbal cues.   -SC           Graphic Expression Assessment/Intervention    Graphic Expression  moderate impairment  -SC        Graphic Expression, Comment  Pt wrote his name and 2 familiar words. Writing was legible, but he required max cues for orienting to page and  writing in the appropriate place.  -SC           Oral Motor Structure and Function    Oral Motor Structure and Function  WFL  -SC        Oral Lesions or Structural Abnormalities and/or variants  none  -SC        Dentition Assessment  natural, present and adequate;missing teeth  -SC        Mucosal Quality  moist, healthy  -SC           Oral Musculature and Cranial Nerve Assessment    Oral Motor General Assessment  WFL  -SC        Oral Motor, Comment  A brief oral-motor examination revealed lingual protrusion, elevation, depression, lateralization to be WNL. Labial protrusion/retraction appeared WNL as well. Pt has natural dentition.  -SC           Motor Speech Assessment/Intervention    Motor Speech Function  WFL  -SC        Motor Speech, Comment  Pt was 100% intelligible in structured tasks and conversation. No dysarthria or apraxia noted.   -SC           Cursory Voice Assessment/Intervention    Quality and Resonance (Voice)  hoarse;further voice-assessment warranted;mild impairment  -SC        Voice, Comment  Pt demonstrated a strained/hoarse voice. Pt and family reported that his voice quality is baseline.   -SC           Cognitive Assessment Intervention- SLP    Cognitive Function (Cognition)  moderate impairment  -SC        Orientation Status (Cognition)  WFL  -SC        Memory (Cognitive)  mild impairment;short-term;delayed  -SC        Thought Organization (Cognitive)  mild impairment  -SC        Reasoning (Cognitive)  moderate impairment  -SC        Problem Solving (Cognitive)  moderate impairment  -SC        Functional Math (Cognitive)  moderate impairment  -SC        Pragmatics (Communication)  topic maintenance;turn taking;affect;initiation;moderate impairment  -SC        Right Hemisphere Function  left neglect  -SC        Cognition, Comment  Pt demonstrated cognitive deficits in the areas of memory, mental manipulation/calculations, reasoning/problem solving, and pragmatics. He demonstrates flat affect.  Immediate recall of 5 items: 4/5. Delayed recall of 5 items: 3 of 5. He recalled newly learned information with 63% accuracy. He completed serial repetitions forwards and backwards with 100% accuracy. He completed simple addition but was unable to complete subtraction or functional math word problems. Clock drawing and visual scanning tasks revealed severe left neglect. Pt wrote numbers 1-6 in clock only. Pt required max verbal cues to sequence steps he would take if he lost his wallet, demonstrating reasoning and problem solving deficits.       -SC           Standardized Tests    Cognitive/Memory Tests  SLUMS: [facility] Mental Status Examination  -SC           SLUMS: [facility] Status Examination    SLUMS Score  15  -SC        SLUMS Range  1-20: Dementia (High school education or higher)  -SC        SLUMS Comments  Pt showed cognitive deficits in multiple areas in the assessment, but being unable to complete any part of the visuospatial portions significantly lowered the score.  -SC           SLP Clinical Impressions    SLP Diagnosis  Pt presents today with moderate cognitive deficits, mild pragmatic deficits, and severe left neglect.   -SC        Rehab Potential/Prognosis  excellent  -[facility] Criteria for Skilled Therapy Interventions Met  yes  -SC        Functional Impact  functional impact in social situations;functional impact in ADLs;unable to care for self;needs 24 hour supervision;decreased ability to respond to situations safely  -SC           Recommendations    Therapy Frequency (SLP SLC)  at least;3 days per week  -SC        Predicted Duration Therapy Intervention (Days)  until discharge  -SC        Anticipated Dischage Disposition  inpatient rehabilitation facility  -SC           Communication Treatment Objective and Progress Goals (SLP)    Reading Comprehension Treatment Objectives  Reading Comprehension Treatment Objectives (Group)  -SC        Cognitive Linguistic  "Treatment Objectives  Cognitive Linguistic Treatment Objectives (Group)  -SC           Reading Comprehension Treatment Objectives    Scanning Selection  scanning, SLP goal 1  -SC           Scanning Goal 1 (SLP)    Improve Scanning Through: Goal 1 (SLP)  scanning from left to right on page to identify target in series of letters;scanning task/letter cancellation;complete Trail making task;use compensatory strategies for visual scanning;with moderate cues (50-74%)  -SC        Time Frame (Scanning Goal 1, SLP)  by discharge  -SC           Cognitive Linguistic Treatment Objectives    Problem Solving Selection  problem solving, SLP goal 1  -SC        Functional Math Skills Selection  functional math skills, SLP goal 1  -SC        Pragmatic Skills Selection  pragmatic skills, SLP goal 1  -SC        Right Hemisphere Function Selection  right hemisphere function, SLP goal 1  -SC           Functional Problem Solving Skills Goal 1 (SLP)    Improve Problem Solving Through Goal 1 (SLP)  determine solutions to simple ADL/safety problems;answer \"what if\" questions;sequence steps in a task;with minimal cues (75-90%)  -SC        Time Frame (Problem Solving Goal 1, SLP)  by discharge  -SC           Functional Math Skills Goal 1 (SLP)    Improve Functional Math Skills Through Goal 1 (SLP)  complete simple math problems;complete word problems involving time;complete word problems involving money;80%;with minimal cues (75-90%)  -SC        Time Frame (Functional Math Skills Goal 1, SLP)  by discharge  -SC           Pragmatic Skills Goal 1 (SLP)    Improve Pragmatic Skills Goal 1 (SLP)  give/attend to non-verbal signals;demonstrate awareness/response-intonation/stress;with minimal cues (75-90%)  -SC        Time Frame (Pragmatic Skills Goal 1, SLP)  by discharge  -SC           SLP Discharge Summary    Discharge Destination  [facility]  -SC          User Key  (r) = Recorded By, (t) = Taken By, (c) = Cosigned By    Initials Name Effective Dates    " "SC Christina Snyder, SLP 06/17/19 -              EDUCATION  The patient has been educated in the following areas:     Cognitive Impairment.    SLP Recommendation and Plan  SLP Diagnosis: Pt presents today with moderate cognitive deficits, mild pragmatic deficits, and severe left neglect.      SLC Criteria for Skilled Therapy Interventions Met: yes  Anticipated Dischage Disposition: inpatient rehabilitation facility     Predicted Duration Therapy Intervention (Days): until discharge    Plan of Care Reviewed With: patient, spouse, sibling  Outcome Summary: Pt was seen today for evaluation of speech/language/cognitive skills. Pt demonstrates moderate cognitive deficits in the areas of mental manipulation/calculations, reasoning/problem solving, short term recall, and pragmatics. He demonstrates flat affect. He has severe left sided neglect with difficulty orienting for reading/writing tasks. Speech therapy is recommended to address these deficits. Speech therapy will likely be indicated after discharge at an inpatient rehab facility.      SLP GOALS     Row Name 12/09/19 7430             Scanning Goal 1 (SLP)    Improve Scanning Through: Goal 1 (SLP)  scanning from left to right on page to identify target in series of letters;scanning task/letter cancellation;complete Trail making task;use compensatory strategies for visual scanning;with moderate cues (50-74%)  -SC      Time Frame (Scanning Goal 1, SLP)  by discharge  -SC         Functional Problem Solving Skills Goal 1 (SLP)    Improve Problem Solving Through Goal 1 (SLP)  determine solutions to simple ADL/safety problems;answer \"what if\" questions;sequence steps in a task;with minimal cues (75-90%)  -SC      Time Frame (Problem Solving Goal 1, SLP)  by discharge  -SC         Functional Math Skills Goal 1 (SLP)    Improve Functional Math Skills Through Goal 1 (SLP)  complete simple math problems;complete word problems involving time;complete word problems involving " money;80%;with minimal cues (75-90%)  -SC      Time Frame (Functional Math Skills Goal 1, SLP)  by discharge  -SC         Pragmatic Skills Goal 1 (SLP)    Improve Pragmatic Skills Goal 1 (SLP)  give/attend to non-verbal signals;demonstrate awareness/response-intonation/stress;with minimal cues (75-90%)  -SC      Time Frame (Pragmatic Skills Goal 1, SLP)  by discharge  -SC        User Key  (r) = Recorded By, (t) = Taken By, (c) = Cosigned By    Initials Name Provider Type    Christina Perez, SLP Speech and Language Pathologist                  Time Calculation:                        JAIDA Quiles  12/9/2019

## 2019-12-09 NOTE — PROGRESS NOTES
Discharge Planning Assessment   [facility]     Patient Name: Chao Lazar  MRN: [id number]  Today's Date: 12/9/2019    Admit Date: 12/5/2019    Discharge Needs Assessment     Row Name 12/09/19 1534       Living Environment    Lives With  spouse    Current Living Arrangements  home/apartment/condo    Primary Care Provided by  self    Able to Return to Prior Arrangements  -- PT recommends IP Rehab and spouse chose [facility] - referral made per  and they are able to accept - Acute        Resource/Environmental Concerns    Resource/Environmental Concerns  none    Transportation Concerns  car, none       Transition Planning    Patient/Family Anticipates Transition to  inpatient rehabilitation facility    Patient/Family Anticipated Services at Transition  rehabilitation services    Transportation Anticipated  family or friend will provide;health plan transportation       Discharge Needs Assessment    Concerns to be Addressed  discharge planning    Equipment Currently Used at Home  none    Anticipated Changes Related to Illness  none    Equipment Needed After Discharge  none        Discharge Plan     Row Name 12/09/19 1503       Plan    Plan  [facility] accepted acute,pending bed availability at dc,no PASRR needed,needs precert started when close to dc        Destination      Service Provider Request Status Selected Services Address Phone Number Fax Number    [facility] Accepted [facility]A [address] [facility] [id number] [phone]          Demographic Summary     Row Name 12/09/19 1533       General Information    Admission Type  inpatient    Referral Source  admission list    Reason for Consult  discharge planning    Preferred Language  English        Functional Status     Row Name 12/09/19 1534       Functional Status, IADL    Medications  independent    Meal Preparation  independent    Housekeeping  independent    Laundry  independent    Shopping  independent        DC Barriers - [facility]  accepted pending bed availability , Pending precert     Alejandra Aleman RN,   Office Phone [phone]  Cell [phone]

## 2019-12-09 NOTE — PROGRESS NOTES
"Post-op Note  RIGHT HEMICOLECTOMY  12/5/2019    Subjective   Chao Lazar is a 59 y.o. male status post open right hemicolectomy for unresectable transverse colon polyp performed on 12/5/2019.      Patient made NPO for LINH that was never performed. Never returned to regular diet. Patient reports feeling hungry. Mild nausea. Passing flatus      Objective   /84 (BP Location: Right arm, Patient Position: Lying)   Pulse 69   Temp 97.5 °F (36.4 °C) (Oral)   Resp 16   Ht 185.4 cm (73\")   Wt 88.3 kg (194 lb 10.7 oz)   SpO2 98%   BMI 25.68 kg/m²   Vital signs reviewed  Abdomen is soft and obese mild tenderness to palpation incision healing well without any erythema or drainage. BS+    Assessment/Plan   59-year-old gentleman status post open right hemicolectomy for unresectable transverse colon polyp performed on 12/5/2019.  The following morning, the patient had left-sided hemianopsia, and was determined to have a right-sided PCA stroke.    Greatly appreciate the input of neurology and this problem.  We will follow their recommendations.  Continue low residue diet, NPO at [place] for LINH in AM  Continue pain management with Roxicodone, Dilaudid for breakthrough, and Tylenol.  Okay for VTE prophylaxis, continue SCDs  Appreciate medicine input with management of his hyperglycemia and management of his hypertension.  No need for further antibiotics  Continue ambulation with assistance, and incentive spirometer while awake.    Ronaldo Ardon MD  12/9/2019  5:36 PM    "

## 2019-12-09 NOTE — PROGRESS NOTES
[facility]   INPATIENT PROGRESS NOTE    Date of Admission: 12/5/2019  Length of Stay: 4  Primary Care Physician: Al Kimbrough MD    Subjective   Subjective   CC:  Left side vision impairement immediate after colon resection    HPI: Patient is a 59-year-old gentleman who had 36 polyps removed on colonoscopy 8/2019 and follow-up colonoscopy a few weeks ago showed 18 polyps 1 in the transverse colon which was 2 cm and too large to be removed.  General surgery was consulted and the patient was admitted this hospital stay for partial colon resection which was performed 12/5/2019.  The patient has a strong family history of colon cancer in his sister.  Postoperatively the patient complained of left hemianopsia and on CT was found to have an early subacute ischemic right occipital parietal PCA distribution ischemic stroke which was confirmed by MRI to be a large acute ischemic area involving the right occipital parietal region and additional small punctate foci of involving the thalamus and periventricular white matter.     Record review: Patient has past medical history of hypertension, hyperlipidemia, insulin-dependent diabetes and gastroparesis.      Review Of Systems:   12 point review of systems was reviewed and was negative except as above.    Objective   Objective    Physical Exam:  Temp:  [97.5 °F (36.4 °C)-99.3 °F (37.4 °C)] 97.6 °F (36.4 °C)  Heart Rate:  [69-83] 74  Resp:  [13-18] 18  BP: (115-141)/(66-84) 134/69    Well-developed well-nourished gentleman sitting up in bed eating in no acute distress  awake and alert; mucous membranes moist; sclerae anicteric; neck supple; lungs clear to auscultation bilaterally; CV regular rate and rhythm; abdomen soft nontender nondistended with active bowel sounds; extremities with no edema, cyanosis or calf tenderness; palpable pedal pulses bilaterally; no Bradford catheter.       Results Review:          Results from last 7 days   Lab Units 12/09/19  5343  12/08/19  0508 12/07/19  0526 12/06/19  0958   WBC 10*3/mm3 10.50 13.40* 11.50* 15.70*   HEMOGLOBIN g/dL 10.2* 10.7* 10.7* 12.8*   HEMATOCRIT % 30.0* 31.8* 32.5* 38.5   PLATELETS 10*3/mm3 312 307 276 439   INR   --   --   --  0.94     Results from last 7 days   Lab Units 12/09/19  0416 12/08/19  0508 12/07/19  0526   SODIUM mmol/L 134* 134* 137   POTASSIUM mmol/L 4.0 4.5 4.4   CHLORIDE mmol/L 96* 98 99   CO2 mmol/L 25.0 23.0 24.0   BUN mg/dL 22 22 24*   CREATININE mg/dL 1.17 1.15 1.37*   GLUCOSE mg/dL 199* 196* 227*   CALCIUM mg/dL 9.3 9.3 8.8     Hemoglobin A1C (%)   Date/Time Value   12/06/2019 0338 9.5 (H)     TSH (uIU/mL)   Date/Time Value   12/06/2019 0958 2.340     Microbiology Results Abnormal     Procedure Component Value - Date/Time    Respiratory Panel, PCR - Swab, Nasopharynx [[id number]]  (Normal) Collected:  12/08/19 0027    Lab Status:  Final result Specimen:  Swab from Nasopharynx Updated:  12/08/19 0200     ADENOVIRUS, PCR Not Detected     Coronavirus 229E Not Detected     Coronavirus HKU1 Not Detected     Coronavirus NL63 Not Detected     Coronavirus OC43 Not Detected     Human Metapneumovirus Not Detected     Human Rhinovirus/Enterovirus Not Detected     Influenza B PCR Not Detected     Parainfluenza Virus 1 Not Detected     Parainfluenza Virus 2 Not Detected     Parainfluenza Virus 3 Not Detected     Parainfluenza Virus 4 Not Detected     Bordetella pertussis pcr Not Detected     Influenza A H1 2009 PCR Not Detected     Chlamydophila pneumoniae PCR Not Detected     Mycoplasma pneumo by PCR Not Detected     Influenza A PCR Not Detected     Influenza A H3 Not Detected     Influenza A H1 Not Detected     RSV, PCR Not Detected        Xr Abdomen 2 View With Chest 1 View    Result Date: 12/8/2019   1. Small amount of free air within the abdomen which is unremarkable given history of any colectomy on December 5 2. Couple of air fluid levels seen on decubitus view suggesting mild postop ileus 3. No  stairstep air-fluid levels are seen that would suggest obstruction 4. Cardiomegaly without active cardiopulmonary disease  Electronically Signed By-Wilder London Jr. On:12/8/2019 8:09 AM This report was finalized on [id number] by  Wilder London Jr., .    Ct Head Without Contrast    Result Date: 12/7/2019  1. Expected evolution of the acute/subacute right occipital infarct. No hemorrhage or mass effect. 2. No new infarct visible by CT. 3. Mild chronic age-related intracranial findings as above, unchanged. 4. Fluid level in the left sphenoid sinus.  This examination was interpreted by Eric Kaplan M.D. Electronically signed by:  Eric Kaplan M.D.  12/7/2019 8:08 PM    Xr Chest 1 View    Result Date: 12/7/2019  Impression: 1. No evidence of an acute pleural or pulmonary parenchymal abnormality. 2. Cardiomegaly. Electronically signed by:  Jeremie Caceres M.D.  12/7/2019 10:05 PM      Results for orders placed during the hospital encounter of 12/05/19   Adult Transthoracic Echo Complete W/ Cont if Necessary Per Protocol    Narrative · Estimated EF = 60%.  · Left ventricular systolic function is normal.     Indications  Cardiac source of emboli.    Technically satisfactory study.  Mitral valve is structurally normal.  Tricuspid valve is structurally normal.  Aortic valve is structurally normal.  Pulmonic valve could not be well visualized.  No evidence for mitral tricuspid or aortic regurgitation is seen by   Doppler study.  Left atrium is normal in size.  Right atrium is normal in size.  Left ventricle is normal in size and contractility with ejection fraction   of 60%.  Right ventricle is normal in size.  Atrial septum is intact.  Aorta is normal.  No pericardial effusion or intracardiac thrombus is seen.    Impression  Structurally and functionally normal cardiac valves.  Normal left ventricle size and contractility with ejection fraction of   60%.  No pericardial effusion or intracardiac thrombus is  seen.             I have reviewed the medications.    Assessment/Plan   Assessment/Plan   Brief Hospital Course:      Active Hospital Problems:  * Acute ischemic right PCA stroke involving the right occipital lobe (CMS/HCC)  Sudden vision impairment on left side of vision (Hemianopsia)  MRI done - large area of acute infarct on right occiput area  Neurology consulted  Started on aspirin and may benefit from addition of Plavix after his postop recovery  PT/OT/ST following  Cardiology will do LINH and event monitor at discharge    Type 2 diabetes mellitus with hyperglycemia, with long-term current use of insulin (CMS/[facility])  Patient was on high dose of 70/30 preadmission  Endocrine consulted on 12/07/19 switch to basal bolus insulin with dosage adjustments for blood sugar control  Control is better now    FRANKI (acute kidney injury) (CMS/[facility])- (present on admission)  Likely prerenal azotemia from fluid volume fluctuations  Creatinine peaked at 1.93 and baseline appears to be 1.2  Current creatinine 1.17  Monitor    Status post colon resection   Partial Colectomy done 12/05 for a large unresectable transverse colon polyp multiple polyps      Essential hypertension- (present on admission)  Chronic and controlled  Continue metoprolol 50 mg twice a day     Hyperlipidemia, mixed- (present on admission)  Home atorvastatin dosage increased     Hyponatremia  Acute, mild  Monitor          DVT prophylaxis: SCDs    Discharge Planning: I expect patient to be discharged to home tomorrow.    Farrah Crain MD 12/9/2019 1946

## 2019-12-09 NOTE — PLAN OF CARE
Problem: Patient Care Overview  Goal: Plan of Care Review  Outcome: Ongoing (interventions implemented as appropriate)  Flowsheets  Taken 12/9/2019 0136 by Sonia Phillips RN  Plan of Care Reviewed With: patient  Taken 12/8/2019 1553 by Allegra Nesbitt, PT  Outcome Summary: PT reassessment completed this date following diagnosis of acute CVA. Pt demonstrates L visual field loss with impaired visual tracking exam, impaired visual field exam, and impaired Finger Nose Finger exam into L upper/lower quadrants. Pt demonstrated improved bed mobility this date, requiring SBA, requires CGA for transfers with RW, and requires min/mod assist for gait training 55ft with RW with significant difficulty navigating turns/doorways and avoiding obstacles secondary to L visual field loss. Recommending IP rehab at d/, as pt is not safe for home and at increased risk for falls. Plan to see daily at [facility] for progression of mobility.   Goal: Individualization and Mutuality  Outcome: Ongoing (interventions implemented as appropriate)  Goal: Discharge Needs Assessment  Outcome: Ongoing (interventions implemented as appropriate)  Goal: Interprofessional Rounds/Family Conf  Outcome: Ongoing (interventions implemented as appropriate)     Problem: Fall Risk (Adult)  Goal: Absence of Fall  Outcome: Ongoing (interventions implemented as appropriate)     Problem: Bowel Resection (Adult)  Goal: Signs and Symptoms of Listed Potential Problems Will be Absent, Minimized or Managed (Bowel Resection)  Outcome: Ongoing (interventions implemented as appropriate)  Goal: Anesthesia/Sedation Recovery  Outcome: Ongoing (interventions implemented as appropriate)     Problem: Pain, Acute (Adult)  Goal: Acceptable Pain Control/Comfort Level  Outcome: Ongoing (interventions implemented as appropriate)     Problem: Stroke (Ischemic) (Adult)  Goal: Signs and Symptoms of Listed Potential Problems Will be Absent, Minimized or Managed (Stroke)  Outcome:  Ongoing (interventions implemented as appropriate)     Problem: Skin Injury Risk (Adult)  Goal: Identify Related Risk Factors and Signs and Symptoms  Outcome: Ongoing (interventions implemented as appropriate)  Goal: Skin Health and Integrity  Outcome: Ongoing (interventions implemented as appropriate)

## 2019-12-09 NOTE — DISCHARGE PLACEMENT REQUEST
"Chao Maldonado EMILE (59 y.o. Male)     Date of Birth Social Security Number Address [place] Phone MRN    [id number]  [id number] [facility] [place] [address] [phone] [id number]    Restorationist Marital Status          Samaritan        Admission Date Admission Type Admitting Provider Attending Provider Department, Room/Bed    12/5/19 Elective Ronaldo Ardon MD McCormick, John Patrick, MD [facility] HEART, 265/1    Discharge Date Discharge Disposition Discharge Destination                       Attending Provider:  Ronaldo Ardon MD    Allergies:  No Known Allergies    Isolation:  None   Infection:  None   Code Status:  CPR    Ht:  185.4 cm (73\")   Wt:  88.3 kg (194 lb 10.7 oz)    Admission Cmt:  None   Principal Problem:  Polyp of transverse colon [D12.3] More...                 Active Insurance as of 12/5/2019     Primary Coverage     Payor Plan Insurance Group Employer/Plan Group    [facility] [employer] [facility] [employer] [facility] PPO [id number]     Payor Plan Address Payor Plan Phone Number Payor Plan Fax Number Effective Dates    [address] [phone]  1/1/2019 - None Entered    [facility] [id number]       Subscriber Name Subscriber Birth Date Member ID       SHIV MALDONADO [birth year]1963 [id number]                 Emergency Contacts      (Rel.) Home Phone Work Phone Mobile Phone    SHIV MALDONADO (Spouse) [phone] -- [phone]              "

## 2019-12-09 NOTE — PLAN OF CARE
Problem: Patient Care Overview  Goal: Plan of Care Review  Outcome: Ongoing (interventions implemented as appropriate)  Flowsheets (Taken 12/9/2019 9595)  Plan of Care Reviewed With: patient; spouse; sibling  Outcome Summary: Pt was seen today for evaluation of speech/language/cognitive skills. Pt demonstrates moderate cognitive deficits in the areas of mental manipulation/calculations, reasoning/problem solving, short term recall, and pragmatics. He demonstrates flat affect. He has severe left sided neglect with difficulty orienting for reading/writing tasks. Speech therapy is recommended to address these deficits. Speech therapy will likely be indicated after discharge at an inpatient rehab facility.

## 2019-12-09 NOTE — PROGRESS NOTES
CARDIOLOGY FOLLOW-UP PROGRESS NOTE      Reason for follow-up:    CVA  LINH requested  CAD prior PCI         Attending: Ronaldo Ardon,*      Subjective .        Review of Systems   Constitution: Negative for chills and fever.   HENT: Negative for ear discharge and nosebleeds.    Eyes: Positive for vision loss in left eye. Negative for discharge and redness.   Cardiovascular: Negative for chest pain, orthopnea, palpitations, paroxysmal nocturnal dyspnea and syncope.   Respiratory: Negative for cough, shortness of breath and wheezing.    Endocrine: Negative for heat intolerance.   Skin: Negative for rash.   Musculoskeletal: Negative for arthritis and myalgias.   Gastrointestinal: Negative for abdominal pain, melena, nausea and vomiting.   Genitourinary: Negative for dysuria and hematuria.   Neurological: Negative for difficulty with concentration, disturbances in coordination, dizziness, light-headedness, numbness and tremors.   Psychiatric/Behavioral: Negative for depression. The patient is not nervous/anxious.        Allergies: Patient has no known allergies.    Scheduled Meds:    acetaminophen 1,000 mg Oral TID   aspirin 81 mg Oral Daily   atorvastatin 80 mg Oral Q PM   insulin glargine 34 Units Subcutaneous Nightly   insulin lispro 0-9 Units Subcutaneous TID With Meals   And      insulin lispro 0-9 Units Subcutaneous 4x Daily With Meals & Nightly   insulin lispro 12 Units Subcutaneous TID With Meals   metoprolol tartrate 50 mg Oral BID   pantoprazole 40 mg Oral QAM   polyethylene glycol 17 g Oral Daily   vitamin B-12 1,000 mcg Oral Daily       Continuous Infusions:    sodium chloride 100 mL/hr Last Rate: 100 mL/hr (12/08/19 1551)       PRN Meds:.albuterol  •  dextrose  •  dextrose  •  glucagon (human recombinant)  •  HYDROmorphone **AND** naloxone  •  magnesium hydroxide  •  ondansetron  •  oxyCODONE **OR** oxyCODONE  •  promethazine    Objective     VITAL SIGNS  Patient Vitals for the past 24 hrs:   BP  "Temp Temp src Pulse Resp SpO2 Weight   12/09/19 1801 134/69 97.6 °F (36.4 °C) Oral 74 18 92 % --   12/09/19 1401 138/84 97.5 °F (36.4 °C) Oral 69 16 98 % --   12/09/19 1001 141/78 98 °F (36.7 °C) Oral 77 16 96 % --   12/09/19 0606 132/73 98 °F (36.7 °C) Oral 83 14 95 % --   12/09/19 0605 -- -- -- -- -- -- 88.3 kg (194 lb 10.7 oz)   12/09/19 0239 134/79 98.6 °F (37 °C) Oral 72 13 98 % --   12/08/19 2210 115/66 99.3 °F (37.4 °C) Oral 82 15 95 % --        Flowsheet Rows      First Filed Value   Admission Height  185.4 cm (73\") Documented at 12/05/2019 1041   Admission Weight  90.9 kg (200 lb 6.4 oz) Documented at 12/05/2019 1041            Intake/Output Summary (Last 24 hours) at 12/9/2019 1909  Last data filed at 12/8/2019 2120  Gross per 24 hour   Intake --   Output 350 ml   Net -350 ml        TELEMETRY:     SR    Physical Exam:  Physical Exam   Constitutional: He is oriented to person, place, and time. He appears well-developed and well-nourished. No distress.   HENT:   Head: Normocephalic and atraumatic.   Eyes: Pupils are equal, round, and reactive to light. Right eye exhibits no discharge. No scleral icterus.   Neck: Normal range of motion. Neck supple. No JVD present. No thyromegaly present.   Cardiovascular: Normal rate, regular rhythm, S1 normal, S2 normal, normal heart sounds and intact distal pulses. Exam reveals no gallop and no friction rub.   No murmur heard.  Pulmonary/Chest: Effort normal and breath sounds normal. No respiratory distress. He has no wheezes. He has no rales.   Abdominal: Soft. Normal appearance. He exhibits distension. There is tenderness.   Musculoskeletal: Normal range of motion. He exhibits no edema.   Lymphadenopathy:     He has no cervical adenopathy.   Neurological: He is alert and oriented to person, place, and time.   Skin: Skin is warm and dry. No rash noted. No erythema.   Psychiatric: He has a normal mood and affect.          Results Review:   I reviewed the patient's new " clinical results.    CBC    Results from last 7 days   Lab Units 12/09/19  0416 12/08/19  0508 12/07/19  0526 12/06/19  0958 12/06/19  0338 12/05/19  2142   WBC 10*3/mm3 10.50 13.40* 11.50* 15.70* 15.90* 17.80*   HEMOGLOBIN g/dL 10.2* 10.7* 10.7* 12.8* 13.6 14.0   PLATELETS 10*3/mm3 312 307 276 439 379 434     BMP   Results from last 7 days   Lab Units 12/09/19  0416 12/08/19  0508 12/07/19  0526 12/06/19  0958 12/06/19  0338 12/05/19  2321 12/05/19  2142   SODIUM mmol/L 134* 134* 137 136 133*  --  132*   POTASSIUM mmol/L 4.0 4.5 4.4 4.8  4.8 6.6* 6.4* 6.3*   CHLORIDE mmol/L 96* 98 99 98 100  --  96*   CO2 mmol/L 25.0 23.0 24.0 18.0* 19.0*  --  21.0*   BUN mg/dL 22 22 24* 22 21  --  20   CREATININE mg/dL 1.17 1.15 1.37* 1.93* 1.73*  --  1.68*   GLUCOSE mg/dL 199* 196* 227* 405* 438*  --  396*     Cr Clearance Estimated Creatinine Clearance: 82.8 mL/min (by C-G formula based on SCr of 1.17 mg/dL).  Coag   Results from last 7 days   Lab Units 12/06/19  0958   INR  0.94   APTT seconds 23.3*     HbA1C   Lab Results   Component Value Date    HGBA1C 9.5 (H) 12/06/2019    HGBA1C 9.7 (H) 11/26/2019    HGBA1C 8.2 (H) 07/02/2019     Blood Glucose   Glucose   Date/Time Value Ref Range Status   12/09/2019 1604 80 70 - 105 mg/dL Final     Comment:     Serial Number: [id number]:  [id number]   12/09/2019 1143 144 (H) 70 - 105 mg/dL Final     Comment:     Serial Number: [id number]:  [id number]   12/09/2019 0723 219 (H) 70 - 105 mg/dL Final     Comment:     Serial Number: [id number]:  [id number]   12/08/2019 2009 275 (H) 70 - 105 mg/dL Final     Comment:     Serial Number: [id number]:  [id number]   12/08/2019 1632 221 (H) 70 - 105 mg/dL Final     Comment:     Serial Number: [id number]:  [id number]   12/08/2019 1133 180 (H) 70 - 105 mg/dL Final     Comment:     Serial Number: [id number]:  [id number]   12/08/2019 1017 198 (H) 70 - 105 mg/dL Final     Comment:     Serial Number:  [id number]:  [id number]   12/08/2019 0801 210 (H) 70 - 105 mg/dL Final     Comment:     Serial Number: [id number]:  [id number]     Infection   Results from last 7 days   Lab Units 12/07/19  2315   PROCALCITONIN ng/mL 6.12*     CMP   Results from last 7 days   Lab Units 12/09/19  0416 12/08/19  0508 12/07/19  0526 12/06/19  0958 12/06/19 0338 12/05/19  2321 12/05/19  2142   SODIUM mmol/L 134* 134* 137 136 133*  --  132*   POTASSIUM mmol/L 4.0 4.5 4.4 4.8  4.8 6.6* 6.4* 6.3*   CHLORIDE mmol/L 96* 98 99 98 100  --  96*   CO2 mmol/L 25.0 23.0 24.0 18.0* 19.0*  --  21.0*   BUN mg/dL 22 22 24* 22 21  --  20   CREATININE mg/dL 1.17 1.15 1.37* 1.93* 1.73*  --  1.68*   GLUCOSE mg/dL 199* 196* 227* 405* 438*  --  396*     ABG    Results from last 7 days   Lab Units 12/07/19  2314   PH, ARTERIAL pH units 7.459*   PCO2, ARTERIAL mm Hg 39.9   PO2 ART mm Hg 60.2*   O2 SATURATION ART % 91.9*   BASE EXCESS ART mmol/L 4.2*     UA      NAHOMI  No results found for: POCMETH, POCAMPHET, POCBARBITUR, POCBENZO, POCCOCAINE, POCOPIATES, POCOXYCODO, POCPHENCYC, POCPROPOXY, POCTHC, POCTRICYC  Lysis Labs   Results from last 7 days   Lab Units 12/09/19  0416 12/08/19  0508 12/07/19  0526 12/06/19  0958 12/06/19  0338 12/05/19  2142   INR   --   --   --  0.94  --   --    APTT seconds  --   --   --  23.3*  --   --    HEMOGLOBIN g/dL 10.2* 10.7* 10.7* 12.8* 13.6 14.0   PLATELETS 10*3/mm3 312 307 276 439 379 434   CREATININE mg/dL 1.17 1.15 1.37* 1.93* 1.73* 1.68*     Radiology(recent) Xr Abdomen 2 View With Chest 1 View    Result Date: 12/8/2019   1. Small amount of free air within the abdomen which is unremarkable given history of any colectomy on December 5 2. Couple of air fluid levels seen on decubitus view suggesting mild postop ileus 3. No stairstep air-fluid levels are seen that would suggest obstruction 4. Cardiomegaly without active cardiopulmonary disease  Electronically Signed By-Wilder London Jr. On:12/8/2019 8:09 AM This  report was finalized on [id number] by  Wilder London Jr., .    Ct Head Without Contrast    Result Date: 12/7/2019  1. Expected evolution of the acute/subacute right occipital infarct. No hemorrhage or mass effect. 2. No new infarct visible by CT. 3. Mild chronic age-related intracranial findings as above, unchanged. 4. Fluid level in the left sphenoid sinus.  This examination was interpreted by Eric Kaplan M.D. Electronically signed by:  Eric Kaplan M.D.  12/7/2019 8:08 PM    Xr Chest 1 View    Result Date: 12/7/2019  Impression: 1. No evidence of an acute pleural or pulmonary parenchymal abnormality. 2. Cardiomegaly. Electronically signed by:  Jeremie Caceres M.D.  12/7/2019 10:05 PM      Imaging Results (Last 24 Hours)     ** No results found for the last 24 hours. **                EKG             I personally viewed and interpreted the patient's EKG/Telemetry data:        ECHOCARDIOGRAM:     Results for orders placed during the hospital encounter of 12/05/19   Adult Transthoracic Echo Complete W/ Cont if Necessary Per Protocol    Narrative · Estimated EF = 60%.  · Left ventricular systolic function is normal.     Indications  Cardiac source of emboli.    Technically satisfactory study.  Mitral valve is structurally normal.  Tricuspid valve is structurally normal.  Aortic valve is structurally normal.  Pulmonic valve could not be well visualized.  No evidence for mitral tricuspid or aortic regurgitation is seen by   Doppler study.  Left atrium is normal in size.  Right atrium is normal in size.  Left ventricle is normal in size and contractility with ejection fraction   of 60%.  Right ventricle is normal in size.  Atrial septum is intact.  Aorta is normal.  No pericardial effusion or intracardiac thrombus is seen.    Impression  Structurally and functionally normal cardiac valves.  Normal left ventricle size and contractility with ejection fraction of   60%.  No pericardial effusion or intracardiac  thrombus is seen.               STRESS MYOVIEW:      CARDIAC CATHETERIZATION:      OTHER:         Assessment/Plan            Type 2 diabetes mellitus with hyperglycemia, with long-term current use of insulin (CMS/HCC)    Hyperlipidemia, mixed    Hypertension    Acute ischemic right PCA stroke involving the right occipital lobe (CMS/HCC)    Status post colon resection    FRANKI (acute kidney injury) (CMS/HCC)    Assessment/Plan:    Right occipital lobe ischemic stroke        -LINH Requested  CAD prev PCI RCA; Mild non obstructive disease LAD/LCx  Colon Resection for colon polyp  HTN  DM  Dyslipidemia        Plan:  No history , clinical report of or observed atrial arrhythmias  LINH anticipated tomorrow.   Consider loop recorder vs event recorder at d/c to watch for AF    Additional recommendations per Dr. Caceres    I discussed the patients findings and my recommendations with patient and family    Patient is seen and examined and findings are verified.  Patient is lying in the bed.  He does not have any complaints.  No chest pain or shortness of breath.    Hemodynamics are stable.    Chest is clear to auscultation.  Normal S1 and S2.  No pericardial rub or murmur no leg edema noted.    Patient is presented with right occipital lobe ischemic stroke.  It appears to be most likely embolic in nature.  Patient is high risk for future strokes too.  There is no obvious cause for embolic stroke.  Neurological service recommends LINH.  I would recommend to proceed with LINH.  Risk and benefit and alternative options are explained to the patient.  The is arranged tomorrow.  We shall follow      Electronically signed by Joao Caceres MD, 12/09/19, 7:11 PM.      Joao Caceres MD  12/09/19  7:09 PM

## 2019-12-09 NOTE — PROGRESS NOTES
Daily Progress Note    Patient Care Team:  lA Kimbrough MD as PCP - General  Al Kimbrough MD as PCP - Family Medicine    Chief Complaint: Follow-up type 2 diabetes    HPI: 59-year-old male with history of type 2 diabetes, hypertension, hyperlipidemia underwent colon resection on December 5, 2019 for multiple polyps.  Diagnosed with acute stroke on right occipital on MRI, neurology is following.  Endocrine consulted for uncontrolled diabetes.  Continue on Lantus with Humalog along with Humalog sliding scale blood sugars fair.    ROS:   Constitutional:  Denies fatigue, tiredness.    Eyes:  Denies change in visual acuity   HENT:  Denies nasal congestion or sore throat   Respiratory: denies cough, shortness of breath.   Cardiovascular:  denies chest pain, edema   GI:  Denies abdominal pain, nausea, vomiting.   :  Denies polyuria and polydipsia  Musculoskeletal:  Denies back pain or joint pain   Integument:  Denies rash   Neurologic:  Denies headache, focal weakness or sensory changes   Endocrine:  Denies polyuria or polydipsia   Psychiatric:  Denies depression or anxiety       Vitals:    12/09/19 1401   BP: 138/84   Pulse: 69   Resp: 16   Temp: 97.5 °F (36.4 °C)   SpO2: 98%       Physical Exam:  GEN: NAD, conversant  NECK: no thyromegaly, full ROM   CV: RRR, no murmurs/rubs/gallops, no peripheral edema  LUNG: CTAB, no wheezes/rales/ronchi  SKIN: no rashes, no acanthosis        Results Review:     I reviewed the patient's new clinical results.    Glucose   Date Value Ref Range Status   12/09/2019 199 (H) 65 - 99 mg/dL Final     Sodium   Date Value Ref Range Status   12/09/2019 134 (L) 136 - 145 mmol/L Final     Potassium   Date Value Ref Range Status   12/09/2019 4.0 3.5 - 5.2 mmol/L Final     CO2   Date Value Ref Range Status   12/09/2019 25.0 22.0 - 29.0 mmol/L Final     Chloride   Date Value Ref Range Status   12/09/2019 96 (L) 98 - 107 mmol/L Final     Anion Gap   Date Value Ref Range Status    12/09/2019 13.0 5.0 - 15.0 mmol/L Final     Creatinine   Date Value Ref Range Status   12/09/2019 1.17 0.76 - 1.27 mg/dL Final     BUN   Date Value Ref Range Status   12/09/2019 22 8 - 23 mg/dL Final     BUN/Creatinine Ratio   Date Value Ref Range Status   12/09/2019 18.8 7.0 - 25.0 Final     Calcium   Date Value Ref Range Status   12/09/2019 9.3 8.6 - 10.5 mg/dL Final     eGFR Non  Amer   Date Value Ref Range Status   12/09/2019 63 >60 mL/min/1.73 Final     Lab Results   Component Value Date    HGBA1C 9.5 (H) 12/06/2019    HGBA1C 9.7 (H) 11/26/2019    HGBA1C 8.2 (H) 07/02/2019     No results found for: GLUF, MICROALBUR  Results from last 7 days   Lab Units 12/09/19  1143 12/09/19  0723 12/08/19  2009 12/08/19  1632 12/08/19  1133 12/08/19  1017   GLUCOSE mg/dL 144* 219* 275* 221* 180* 198*             Medication Review: Reviewed.       acetaminophen 1,000 mg Oral TID   aspirin 81 mg Oral Daily   atorvastatin 80 mg Oral Q PM   insulin glargine 32 Units Subcutaneous Nightly   insulin lispro 0-9 Units Subcutaneous 4x Daily With Meals & Nightly   insulin lispro 12 Units Subcutaneous TID With Meals   insulin regular 10 Units Subcutaneous Once   metoprolol tartrate 50 mg Oral BID   pantoprazole 40 mg Oral QAM   polyethylene glycol 17 g Oral Daily   vitamin B-12 1,000 mcg Oral Daily         Assessment/Plan   1.  Diabetes mellitus type 2: Blood sugars fluctuating, will increase Lantus to 34 units subcu nightly and continue Humalog 12 units at each meal along with Humalog sliding scale with meals only.  Will follow blood sugars and make adjustments as needed.    2.  Hyperlipidemia: On atorvastatin.             Jay Pichardo MD. FACE    Much of the above report is an electronic transcription/translation of the spoken language to printed text using Dragon Software. As such, the subtleties and finesse of the spoken language may permit erroneous, or at times, nonsensical words or phrases to be inadvertently  transcribed; thus changes may be made at a later date to rectify these errors.

## 2019-12-09 NOTE — PLAN OF CARE
Problem: Patient Care Overview  Goal: Plan of Care Review  Outcome: Ongoing (interventions implemented as appropriate)  Flowsheets  Taken 12/9/2019 1105  Progress: improving  Taken 12/9/2019 1059  Plan of Care Reviewed With: patient  Outcome Summary: Pt agreed to PT this am. Pt has flat affect but follows instructions well. Pt has L sided neglect and needs constant cueing throughout amb for proper technique and AD positioning. Pt took freq rest breaks throughout amb secondary to fatigue. Pt does well responding to commands.  BP slightly elevated following amb, nsg notified. all other vitals remained stable throughout tx. IP rehab recommended at dc to address global deficits.

## 2019-12-09 NOTE — CONSULTS
"Diabetes Education  Assessment/Teaching    Patient Name:  Chao Lazar  YOB: 1960  MRN: [id number]  Admit Date:  12/5/2019      Assessment Date:  12/9/2019    Most Recent Value   General Information    Referral From:  A1c [Pt seen due to A1c result 12/6/2019 of 9.5%]   Height  185.4 cm (73\")   Height Method  Stated   Weight  88.3 kg (194 lb 10.7 oz)   Weight Method  Bed scale   Pregnancy Assessment   Diabetes History   What type of diabetes do you have?  Type 2   Length of Diabetes Diagnosis  10 + years   Current DM knowledge  good   Do you test your blood sugar at home?  yes   Frequency of checks  Pt states checking bs 4 times/day   Meter type  One Touch meter   Who performs the test?  self   How would you rate your diabetes control?  fair   Education Preferences   What areas of diabetes would you like to learn about?  avoiding high blood sugar, avoiding low blood sugar   Nutrition Information   Assessment Topics   Problem Solving - Assessment  Needs education   Reducing Risk - Assessment  Needs education   DM Goals   Problem Solving - Goal  Today   Reducing Risk - Goal  Today            Most Recent Value   DM Education Needs   Meter  Has own   Meter Type  One Touch   Frequency of Testing  AC/HS   Blood Glucose Target Range  Discussed with pt his A1c result of 9.5%. Reviewed his previous A1c result on 7/2/2019 of 8.2%. Discussed healthy bs range and healthy A1c target. Discussed importance of bs control.   Medication  Insulin, Oral [Pt states taking Novolin 70/30 64 units 4 times/day, Metformin 500 mg tid and Farxiga 10 mg am. Pt states he is taking meds as prescribed.]   Problem Solving  Hyperglycemia, Signs, Symptoms, Treatment   Reducing Risks  A1C testing   Motivation  Engaged   Teaching Method  Explanation, Discussion, Handouts   Patient Response  Verbalized understanding            Other Comments:  Pt's bs down to 80 mg/dl today in hospital. Reviewed low bs signs/symptoms and tx and " discussed with pt importance of notifying nurse if feeling he is having a low bs. Pt states he can tell when bs is dropping. Pt states last saw Dr. Radha Manriquez in 7/2019 and will be following up in January 2020. Pt states Dr. Radha Manriquez wanting pt to take the Novolin 70/30 4 times/day. Pt states he has the insulin and bs checking supplies at home and not needing additional education at this time. Gave A1c info sheet.        Electronically signed by:  Lauren Be RN  12/09/19 4:45 PM

## 2019-12-09 NOTE — PAYOR COMM NOTE
"      [facility]  NPI # [id number]  TID # [id number]      RETURN CONTACT  TAYLOR ZALDIVAR RN [facility] /    PH: [phone]  FX: [phone]  ===========================    REF # - [id number]  ===========================    ATTN:  RN REVIEW    WE HAVE APPROVAL FOR 3 INPT DAYS FOR THIS PATIENT'S SURGERY.   ADDITIONAL DAYS ARE REQUIRED.      REQUEST FOR ADDITIONAL DAYS AUTHORIZATION      ===========================  Please respond to above contact. Thank you.     ===========================      Chao Maldonado (59 y.o. Male)     Date of Birth Social Security Number Address Home Phone MRN    1960  [id number] [facility] [place] [address] [phone] [id number]    Cheondoism Marital Status          Mandaen        Admission Date Admission Type Admitting Provider Attending Provider Department, Room/Bed    12/5/19 Elective Ronaldo Ardon MD McCormick, John Patrick, MD [facility] NEURO HEART, 265/1    Discharge Date Discharge Disposition Discharge Destination                       Attending Provider:  Ronaldo Ardon MD    Allergies:  No Known Allergies    Isolation:  None   Infection:  None   Code Status:  CPR    Ht:  185.4 cm (73\")   Wt:  88.3 kg (194 lb 10.7 oz)    Admission Cmt:  None   Principal Problem:  Polyp of transverse colon [D12.3] More...                 Active Insurance as of 12/5/2019     Primary Coverage     Payor Plan Insurance Group Employer/Plan Group    [employer] BLUE CROSS [employer] BLUE CROSS BLUE [employer]IELD PPO [id number]     Payor Plan Address Payor Plan Phone Number Payor Plan Fax Number Effective Dates    [address] [phone]  1/1/2019 - None Entered    [facility] [address]       Subscriber Name Subscriber Birth Date Member ID       ANURAGSHIV LEMONS [birth year]/1963 [id number]                 Emergency Contacts      (Rel.) Home Phone Work Phone Mobile Phone    SHIV MALDONADO (Spouse) " [phone] -- [phone]        ==========================  UR REVIEW-   CONTINUED REVIEW-        MILLIMAN-  CRITERIA MET-   - Acute cva  post op   =========================    DX- COLON RESECTION- PLANNED.  /  NEW- ACUTE CVA  POST OP   HX- IBS / ANXIETY / HTN / DM2/ 12/5-  COLON RESECTION   ===========================  12/6-  Now with acute to subacute CVA right occipital lobe with blurry vision  - Neuro eval - Antithrombotic: ASA 81 mg daily for now- work up is pending but patient may need long term oral anticoagulation (would recommend waiting 5-7 days from stroke due to size) .   - CARDS EVAL-   ===========================  12/9 -Patient to have transesophageal echocardiogram by primary cardiologist probably on Monday Dr. Caceres- PENDING             Physician Progress Notes (last 24 hours) (Notes from 12/08/19 1330 through 12/09/19 1330)      Rober Coronado MD at 12/08/19 1828          [facility]   INPATIENT PROGRESS NOTE    Date of Admission: 12/5/2019  Length of Stay: 3  Primary Care Physician: Al Kimbrough MD    Subjective   Subjective   CC:  Left side vision impairement immediate after colon resection    HPI: Colon resection done on 12/05 for multiple polys  H/o D, HTN, Hyperlipidema.  Acute stroke on right occiput ws found on MRI.  Left side Hemianopsia. Neurology consulted  FRANKI - Creatinine went up 1.95, now 1.15  Poorly controlled DM.   At home 70/30 X4, Endocrine consulted  Plan for LINH tomorrow      Review Of Systems:   As per HPI and PMH    Objective   Objective    Physical Exam:  Temp:  [98.2 °F (36.8 °C)-99.6 °F (37.6 °C)] 99.6 °F (37.6 °C)  Heart Rate:  [77-98] 77  Resp:  [11-19] 12  BP: (118-135)/(67-80) 125/75    The patient is alert, oriented and in no distress.  Vital signs as noted above.  Head and neck revealed no carotid bruits or jugular venous distention.  No thyromegaly or lymphadenopathy is present  Lungs clear.  No wheezing.  Breath sounds are normal  bilaterally.  Heart normal first and second heart sounds.  No murmur. No precordial rub is present.  No gallop is present.  Abdomen soft and nontender. Slightly distended.  No organomegaly is present.  Extremities with good peripheral pulses without any pedal edema.  Skin warm and dry.  Musculoskeletal system is grossly normal  CNS grossly normal - left side visual field defect       Results Review:          Results from last 7 days   Lab Units 12/08/19  0508 12/07/19  0526 12/06/19  0958   WBC 10*3/mm3 13.40* 11.50* 15.70*   HEMOGLOBIN g/dL 10.7* 10.7* 12.8*   HEMATOCRIT % 31.8* 32.5* 38.5   PLATELETS 10*3/mm3 307 276 439   INR   --   --  0.94     Results from last 7 days   Lab Units 12/08/19  0508 12/07/19  0526 12/06/19 0958   SODIUM mmol/L 134* 137 136   POTASSIUM mmol/L 4.5 4.4 4.8  4.8   CHLORIDE mmol/L 98 99 98   CO2 mmol/L 23.0 24.0 18.0*   BUN mg/dL 22 24* 22   CREATININE mg/dL 1.15 1.37* 1.93*   GLUCOSE mg/dL 196* 227* 405*   CALCIUM mg/dL 9.3 8.8 8.7     Hemoglobin A1C (%)   Date/Time Value   12/06/2019 0338 9.5 (H)     TSH (uIU/mL)   Date/Time Value   12/06/2019 0958 2.340     Microbiology Results Abnormal     Procedure Component Value - Date/Time    Respiratory Panel, PCR - Swab, Nasopharynx [[id number]]  (Normal) Collected:  12/08/19 0027    Lab Status:  Final result Specimen:  Swab from Nasopharynx Updated:  12/08/19 0200     ADENOVIRUS, PCR Not Detected     Coronavirus 229E Not Detected     Coronavirus HKU1 Not Detected     Coronavirus NL63 Not Detected     Coronavirus OC43 Not Detected     Human Metapneumovirus Not Detected     Human Rhinovirus/Enterovirus Not Detected     Influenza B PCR Not Detected     Parainfluenza Virus 1 Not Detected     Parainfluenza Virus 2 Not Detected     Parainfluenza Virus 3 Not Detected     Parainfluenza Virus 4 Not Detected     Bordetella pertussis pcr Not Detected     Influenza A H1 2009 PCR Not Detected     Chlamydophila pneumoniae PCR Not Detected     Mycoplasma  pneumo by PCR Not Detected     Influenza A PCR Not Detected     Influenza A H3 Not Detected     Influenza A H1 Not Detected     RSV, PCR Not Detected        Xr Abdomen 2 View With Chest 1 View    Result Date: 12/8/2019   1. Small amount of free air within the abdomen which is unremarkable given history of any colectomy on December 5 2. Couple of air fluid levels seen on decubitus view suggesting mild postop ileus 3. No stairstep air-fluid levels are seen that would suggest obstruction 4. Cardiomegaly without active cardiopulmonary disease  Electronically Signed By-Wilder London Jr. On:12/8/2019 8:09 AM This report was finalized on [id number] by  Wilder London Jr., .    Ct Head Without Contrast    Result Date: 12/7/2019  1. Expected evolution of the acute/subacute right occipital infarct. No hemorrhage or mass effect. 2. No new infarct visible by CT. 3. Mild chronic age-related intracranial findings as above, unchanged. 4. Fluid level in the left sphenoid sinus.  This examination was interpreted by Eric Kaplan M.D. Electronically signed by:  Eric Kaplan M.D.  12/7/2019 8:08 PM    Xr Chest 1 View    Result Date: 12/7/2019  Impression: 1. No evidence of an acute pleural or pulmonary parenchymal abnormality. 2. Cardiomegaly. Electronically signed by:  Jeremie Caceres M.D.  12/7/2019 10:05 PM      Results for orders placed during the hospital encounter of 12/05/19   Adult Transthoracic Echo Complete W/ Cont if Necessary Per Protocol    Narrative · Estimated EF = 60%.  · Left ventricular systolic function is normal.     Indications  Cardiac source of emboli.    Technically satisfactory study.  Mitral valve is structurally normal.  Tricuspid valve is structurally normal.  Aortic valve is structurally normal.  Pulmonic valve could not be well visualized.  No evidence for mitral tricuspid or aortic regurgitation is seen by   Doppler study.  Left atrium is normal in size.  Right atrium is normal in size.  Left  ventricle is normal in size and contractility with ejection fraction   of 60%.  Right ventricle is normal in size.  Atrial septum is intact.  Aorta is normal.  No pericardial effusion or intracardiac thrombus is seen.    Impression  Structurally and functionally normal cardiac valves.  Normal left ventricle size and contractility with ejection fraction of   60%.  No pericardial effusion or intracardiac thrombus is seen.             I have reviewed the medications.    Assessment/Plan   Assessment/Plan   Brief Hospital Course:      Active Hospital Problems:  Acute ischemic right PCA stroke involving the right occipital lobe (CMS/HCC)   Sudden vision impairment on left side of vision (Hemianopsia)  MRI done - large area of acute infarct on right occiput area  Neurology consulted  DW Dr. Ventura  He may need Aspirin and Plavix.  Now the patient is immediate postoperative period (Colon resection)  Neurologist to discuss with surgeon for these medication use. (Plavix may start next week)  PT/OT need    Feels better, BP control is good.  Visiual field defect - slightly improving. No focal motor weakness    [id number] will do LINH possibly on Monday    FRANKI (acute kidney injury) (CMS/[facility])  Cr was 1.95 after surgery.  - 1.37,  1.15 respectively   improving with  IVF    Monitor renal funciton daily     Status post colon resection   Partial Colectomy done 12/05  ; Reason - multiple polyps  ; Lots of gas passage (++)  : surgery follows,  Still abdomen is slightly distended    Type 2 diabetes mellitus with hyperglycemia, with long-term current use of insulin (CMS/[facility])   Hyperglycemia    He used to take high dose of 70/30 at home  Endocrine consulted on 12/07 (his own endocrinologist?)    Lantus increased to 32, plus Premeal 12 units  Control is better now            DVT prophylaxis:  Discharge Planning: I expect patient to be discharged to home in 2- 3 days    Rober Coronado MD, 12/08/19 6:28 PM      Electronically signed by  "Rober Coronado MD at 12/08/19 1832     Radha Manriquez MD at 12/08/19 1541        Subjective   Chao Lazar is a 59 y.o. male.   Seen for diabetes f/u.  Not eating much. Nauseated      Objective     /75 (BP Location: Right arm, Patient Position: Lying)   Pulse 77   Temp 99.6 °F (37.6 °C) (Rectal)   Resp 12   Ht 185.4 cm (73\")   Wt 88.7 kg (195 lb 8.8 oz)   SpO2 97%   BMI 25.80 kg/m²    Blood sugar  210 this am,  180 @ lunch    ASSESSMENT    Patient is stable    PLAN    Will continue same insulin regimen.        Radha Manriquez MD  12/8/2019  3:41 PM      Electronically signed by Radha Manriquez MD at 12/08/19 1542     Armando Albarado MD at 12/08/19 1448        Post-op Note  RIGHT HEMICOLECTOMY  12/5/2019    Subjective   Chao Lazar is a 59 y.o. male status post open right hemicolectomy for unresectable transverse colon polyp performed on 12/5/2019.      Patient's mental status changes cleared yesterday spontaneously or with some additional oxygenation.  Also the scopolamine patch was removed unsure exactly what helped but he seems to be back to how he looked yesterday.  Complaining of some abdominal fullness but no nausea or vomiting.  Having flatus but no significant bowel movement in the last 24 hours      Objective   /75 (BP Location: Right arm, Patient Position: Lying)   Pulse 77   Temp 99.6 °F (37.6 °C) (Rectal)   Resp 12   Ht 185.4 cm (73\")   Wt 88.7 kg (195 lb 8.8 oz)   SpO2 97%   BMI 25.80 kg/m²    Vital signs reviewed  Abdomen is soft and obese mild tenderness to palpation incision healing well without any erythema or drainage    Assessment/Plan   59-year-old gentleman status post open right hemicolectomy for unresectable transverse colon polyp performed on 12/5/2019.  The following morning, the patient had left-sided hemianopsia, and was determined to have a right-sided PCA stroke.    Greatly appreciate the input of neurology and this problem.  We will follow " their recommendations.  Low residue diet  Continue pain management with Roxicodone, Dilaudid for breakthrough, and Tylenol.  Okay for VTE prophylaxis, continue SCDs  Appreciate medicine input with management of his hyperglycemia and management of his hypertension.  No need for further antibiotics  Continue ambulation with assistance, and incentive spirometer while awake.    Suppository today  KUB reviewed we will continue to support him during his ileus  Armando Albarado MD  12/8/2019  2:48 PM      Electronically signed by Armando Albarado MD at 12/08/19 9729

## 2019-12-10 ENCOUNTER — APPOINTMENT (OUTPATIENT)
Dept: CARDIOLOGY | Facility: HOSPITAL | Age: 59
End: 2019-12-10

## 2019-12-10 ENCOUNTER — APPOINTMENT (OUTPATIENT)
Dept: NEUROLOGY | Facility: HOSPITAL | Age: 59
End: 2019-12-10

## 2019-12-10 PROBLEM — D62 ACUTE BLOOD LOSS ANEMIA: Status: ACTIVE | Noted: 2019-12-10

## 2019-12-10 LAB
ANION GAP SERPL CALCULATED.3IONS-SCNC: 11 MMOL/L (ref 5–15)
BUN BLD-MCNC: 20 MG/DL (ref 8–23)
BUN/CREAT SERPL: 20.8 (ref 7–25)
CALCIUM SPEC-SCNC: 9 MG/DL (ref 8.6–10.5)
CHLORIDE SERPL-SCNC: 98 MMOL/L (ref 98–107)
CO2 SERPL-SCNC: 25 MMOL/L (ref 22–29)
CREAT BLD-MCNC: 0.96 MG/DL (ref 0.76–1.27)
DEPRECATED RDW RBC AUTO: 44.6 FL (ref 37–54)
EOSINOPHIL # BLD MANUAL: 1.06 10*3/MM3 (ref 0–0.4)
EOSINOPHIL NFR BLD MANUAL: 7 % (ref 0.3–6.2)
ERYTHROCYTE [DISTWIDTH] IN BLOOD BY AUTOMATED COUNT: 13.9 % (ref 12.3–15.4)
GFR SERPL CREATININE-BSD FRML MDRD: 80 ML/MIN/1.73
GIANT PLATELETS: ABNORMAL
GLUCOSE BLD-MCNC: 170 MG/DL (ref 65–99)
GLUCOSE BLDC GLUCOMTR-MCNC: 100 MG/DL (ref 70–105)
GLUCOSE BLDC GLUCOMTR-MCNC: 161 MG/DL (ref 70–105)
GLUCOSE BLDC GLUCOMTR-MCNC: 206 MG/DL (ref 70–105)
GLUCOSE BLDC GLUCOMTR-MCNC: 213 MG/DL (ref 70–105)
GLUCOSE BLDC GLUCOMTR-MCNC: 71 MG/DL (ref 70–105)
GLUCOSE BLDC GLUCOMTR-MCNC: 89 MG/DL (ref 70–105)
HCT VFR BLD AUTO: 22.6 % (ref 37.5–51)
HCT VFR BLD AUTO: 34.6 % (ref 37.5–51)
HGB BLD-MCNC: 11.1 G/DL (ref 13–17.7)
HGB BLD-MCNC: 7.6 G/DL (ref 13–17.7)
LYMPHOCYTES # BLD MANUAL: 3.17 10*3/MM3 (ref 0.7–3.1)
LYMPHOCYTES NFR BLD MANUAL: 21 % (ref 19.6–45.3)
LYMPHOCYTES NFR BLD MANUAL: 6 % (ref 5–12)
MCH RBC QN AUTO: 30.3 PG (ref 26.6–33)
MCHC RBC AUTO-ENTMCNC: 33.6 G/DL (ref 31.5–35.7)
MCV RBC AUTO: 90.4 FL (ref 79–97)
MONOCYTES # BLD AUTO: 0.91 10*3/MM3 (ref 0.1–0.9)
NEUTROPHILS # BLD AUTO: 9.97 10*3/MM3 (ref 1.7–7)
NEUTROPHILS NFR BLD MANUAL: 63 % (ref 42.7–76)
NEUTS BAND NFR BLD MANUAL: 3 % (ref 0–5)
PLATELET # BLD AUTO: 396 10*3/MM3 (ref 140–450)
PMV BLD AUTO: 8.3 FL (ref 6–12)
POLYCHROMASIA BLD QL SMEAR: ABNORMAL
POTASSIUM BLD-SCNC: 3.9 MMOL/L (ref 3.5–5.2)
RBC # BLD AUTO: 2.5 10*6/MM3 (ref 4.14–5.8)
SCAN SLIDE: NORMAL
SODIUM BLD-SCNC: 134 MMOL/L (ref 136–145)
WBC MORPH BLD: NORMAL
WBC NRBC COR # BLD: 15.1 10*3/MM3 (ref 3.4–10.8)

## 2019-12-10 PROCEDURE — 99232 SBSQ HOSP IP/OBS MODERATE 35: CPT | Performed by: INTERNAL MEDICINE

## 2019-12-10 PROCEDURE — 80048 BASIC METABOLIC PNL TOTAL CA: CPT | Performed by: PHYSICIAN ASSISTANT

## 2019-12-10 PROCEDURE — 63710000001 INSULIN GLARGINE PER 5 UNITS: Performed by: INTERNAL MEDICINE

## 2019-12-10 PROCEDURE — 93320 DOPPLER ECHO COMPLETE: CPT | Performed by: INTERNAL MEDICINE

## 2019-12-10 PROCEDURE — 99233 SBSQ HOSP IP/OBS HIGH 50: CPT | Performed by: HOSPITALIST

## 2019-12-10 PROCEDURE — 63710000001 INSULIN LISPRO (HUMAN) PER 5 UNITS: Performed by: INTERNAL MEDICINE

## 2019-12-10 PROCEDURE — 93320 DOPPLER ECHO COMPLETE: CPT

## 2019-12-10 PROCEDURE — 95819 EEG AWAKE AND ASLEEP: CPT | Performed by: PSYCHIATRY & NEUROLOGY

## 2019-12-10 PROCEDURE — 85014 HEMATOCRIT: CPT | Performed by: HOSPITALIST

## 2019-12-10 PROCEDURE — 95816 EEG AWAKE AND DROWSY: CPT

## 2019-12-10 PROCEDURE — 93312 ECHO TRANSESOPHAGEAL: CPT

## 2019-12-10 PROCEDURE — 85018 HEMOGLOBIN: CPT | Performed by: HOSPITALIST

## 2019-12-10 PROCEDURE — 85007 BL SMEAR W/DIFF WBC COUNT: CPT | Performed by: PHYSICIAN ASSISTANT

## 2019-12-10 PROCEDURE — 25010000002 HYDROMORPHONE PER 4 MG: Performed by: SURGERY

## 2019-12-10 PROCEDURE — 93325 DOPPLER ECHO COLOR FLOW MAPG: CPT

## 2019-12-10 PROCEDURE — 85025 COMPLETE CBC W/AUTO DIFF WBC: CPT | Performed by: PHYSICIAN ASSISTANT

## 2019-12-10 PROCEDURE — 25010000002 FENTANYL CITRATE (PF) 100 MCG/2ML SOLUTION: Performed by: INTERNAL MEDICINE

## 2019-12-10 PROCEDURE — 25010000002 MIDAZOLAM PER 1 MG: Performed by: INTERNAL MEDICINE

## 2019-12-10 PROCEDURE — 99024 POSTOP FOLLOW-UP VISIT: CPT | Performed by: SURGERY

## 2019-12-10 PROCEDURE — 93325 DOPPLER ECHO COLOR FLOW MAPG: CPT | Performed by: INTERNAL MEDICINE

## 2019-12-10 PROCEDURE — 82962 GLUCOSE BLOOD TEST: CPT

## 2019-12-10 PROCEDURE — 93312 ECHO TRANSESOPHAGEAL: CPT | Performed by: INTERNAL MEDICINE

## 2019-12-10 RX ORDER — DEXTROSE MONOHYDRATE 25 G/50ML
25 INJECTION, SOLUTION INTRAVENOUS
Status: DISCONTINUED | OUTPATIENT
Start: 2019-12-10 | End: 2019-12-11 | Stop reason: HOSPADM

## 2019-12-10 RX ORDER — NICOTINE POLACRILEX 4 MG
15 LOZENGE BUCCAL
Status: DISCONTINUED | OUTPATIENT
Start: 2019-12-10 | End: 2019-12-11 | Stop reason: HOSPADM

## 2019-12-10 RX ORDER — LIDOCAINE HYDROCHLORIDE 20 MG/ML
SOLUTION OROPHARYNGEAL
Status: COMPLETED | OUTPATIENT
Start: 2019-12-10 | End: 2019-12-10

## 2019-12-10 RX ORDER — MIDAZOLAM HYDROCHLORIDE 1 MG/ML
INJECTION INTRAMUSCULAR; INTRAVENOUS
Status: COMPLETED | OUTPATIENT
Start: 2019-12-10 | End: 2019-12-10

## 2019-12-10 RX ORDER — FENTANYL CITRATE 50 UG/ML
INJECTION, SOLUTION INTRAMUSCULAR; INTRAVENOUS
Status: COMPLETED | OUTPATIENT
Start: 2019-12-10 | End: 2019-12-10

## 2019-12-10 RX ADMIN — HYDROMORPHONE HYDROCHLORIDE 0.5 MG: 2 INJECTION, SOLUTION INTRAMUSCULAR; INTRAVENOUS; SUBCUTANEOUS at 11:50

## 2019-12-10 RX ADMIN — HYDROMORPHONE HYDROCHLORIDE 0.5 MG: 2 INJECTION, SOLUTION INTRAMUSCULAR; INTRAVENOUS; SUBCUTANEOUS at 23:13

## 2019-12-10 RX ADMIN — HYDROMORPHONE HYDROCHLORIDE 0.5 MG: 2 INJECTION, SOLUTION INTRAMUSCULAR; INTRAVENOUS; SUBCUTANEOUS at 00:42

## 2019-12-10 RX ADMIN — INSULIN GLARGINE 34 UNITS: 100 INJECTION, SOLUTION SUBCUTANEOUS at 20:54

## 2019-12-10 RX ADMIN — INSULIN LISPRO 2 UNITS: 100 INJECTION, SOLUTION INTRAVENOUS; SUBCUTANEOUS at 10:01

## 2019-12-10 RX ADMIN — ACETAMINOPHEN 1000 MG: 500 TABLET, FILM COATED ORAL at 10:05

## 2019-12-10 RX ADMIN — ACETAMINOPHEN 1000 MG: 500 TABLET, FILM COATED ORAL at 20:51

## 2019-12-10 RX ADMIN — CYANOCOBALAMIN TAB 1000 MCG 1000 MCG: 1000 TAB at 10:00

## 2019-12-10 RX ADMIN — ATORVASTATIN CALCIUM 80 MG: 40 TABLET, FILM COATED ORAL at 18:28

## 2019-12-10 RX ADMIN — ASPIRIN 81 MG: 81 TABLET, DELAYED RELEASE ORAL at 10:00

## 2019-12-10 RX ADMIN — ACETAMINOPHEN 1000 MG: 500 TABLET, FILM COATED ORAL at 18:29

## 2019-12-10 RX ADMIN — INSULIN LISPRO 4 UNITS: 100 INJECTION, SOLUTION INTRAVENOUS; SUBCUTANEOUS at 23:13

## 2019-12-10 RX ADMIN — FENTANYL CITRATE 25 MCG: 50 INJECTION, SOLUTION INTRAMUSCULAR; INTRAVENOUS at 15:29

## 2019-12-10 RX ADMIN — LIDOCAINE HYDROCHLORIDE 15 ML: 20 SOLUTION ORAL; TOPICAL at 15:08

## 2019-12-10 RX ADMIN — MIDAZOLAM 1 MG: 1 INJECTION INTRAMUSCULAR; INTRAVENOUS at 15:29

## 2019-12-10 RX ADMIN — FENTANYL CITRATE 25 MCG: 50 INJECTION, SOLUTION INTRAMUSCULAR; INTRAVENOUS at 15:28

## 2019-12-10 RX ADMIN — HYDROMORPHONE HYDROCHLORIDE 0.5 MG: 2 INJECTION, SOLUTION INTRAMUSCULAR; INTRAVENOUS; SUBCUTANEOUS at 20:53

## 2019-12-10 RX ADMIN — MIDAZOLAM 1 MG: 1 INJECTION INTRAMUSCULAR; INTRAVENOUS at 15:27

## 2019-12-10 RX ADMIN — METOPROLOL TARTRATE 50 MG: 50 TABLET, FILM COATED ORAL at 20:51

## 2019-12-10 RX ADMIN — FENTANYL CITRATE 25 MCG: 50 INJECTION, SOLUTION INTRAMUSCULAR; INTRAVENOUS at 15:32

## 2019-12-10 RX ADMIN — HYDROMORPHONE HYDROCHLORIDE 0.5 MG: 2 INJECTION, SOLUTION INTRAMUSCULAR; INTRAVENOUS; SUBCUTANEOUS at 16:57

## 2019-12-10 RX ADMIN — MIDAZOLAM 1 MG: 1 INJECTION INTRAMUSCULAR; INTRAVENOUS at 15:32

## 2019-12-10 RX ADMIN — HYDROMORPHONE HYDROCHLORIDE 0.5 MG: 2 INJECTION, SOLUTION INTRAMUSCULAR; INTRAVENOUS; SUBCUTANEOUS at 05:48

## 2019-12-10 RX ADMIN — INSULIN LISPRO 12 UNITS: 100 INJECTION, SOLUTION INTRAVENOUS; SUBCUTANEOUS at 10:00

## 2019-12-10 RX ADMIN — SODIUM CHLORIDE 100 ML/HR: 900 INJECTION, SOLUTION INTRAVENOUS at 11:44

## 2019-12-10 NOTE — PROGRESS NOTES
Discharge Planning Assessment   [place]     Patient Name: Chao Lazar  MRN: [id number]  Today's Date: 12/10/2019    Admit Date: 12/5/2019           Discharge Plan     Row Name 12/10/19 1309       Plan    Plan  [facility] accepted acute per Rashmi/nani hawley started 12/10,no PASRR needed        Destination      Service Provider Request Status Selected Services Address Phone Number Fax Number    [facility] Accepted N/A [address] [facility] [address] [phone]      No social barriers to discharge identified.    Italia Dawn, [facility], LSW  [phone]

## 2019-12-10 NOTE — PROGRESS NOTES
"Post-op Note  RIGHT HEMICOLECTOMY  12/5/2019    Subjective   Chao Lazar is a 59 y.o. male status post open right hemicolectomy for unresectable transverse colon polyp performed on 12/5/2019.      NPO for LINH. Tolerated regular diet without N/V yesterday. Passing flatus without BMs. Continues to ambulate with assistance.       Objective   /70 (BP Location: Right arm, Patient Position: Lying)   Pulse 76   Temp 97.6 °F (36.4 °C) (Oral)   Resp 16   Ht 185.4 cm (73\")   Wt 88.3 kg (194 lb 10.7 oz)   SpO2 93%   BMI 25.68 kg/m²   Vital signs reviewed  Abdomen is soft and obese mild tenderness to palpation incision healing well without any erythema or drainage. BS+    Assessment/Plan   59-year-old gentleman status post open right hemicolectomy for unresectable transverse colon polyp performed on 12/5/2019.  The following morning, the patient had left-sided hemianopsia, and was determined to have a right-sided PCA stroke.    Greatly appreciate the input of neurology and this problem.  We will follow their recommendations.  Continue low residue diet after LINH in AM  Continue pain management with Roxicodone, Dilaudid for breakthrough, and Tylenol.  Okay for VTE prophylaxis, continue SCDs  Appreciate Endocrinology input with management of his hyperglycemia and IM for management of his hypertension.  No need for further antibiotics  Continue ambulation with assistance, and incentive spirometer while awake.    Ronaldo Ardon MD  12/10/2019  10:31 AM      "

## 2019-12-10 NOTE — PROGRESS NOTES
Daily Progress Note    Patient Care Team:  Al Kimbrough MD as PCP - General  Al Kimbrough MD as PCP - Family Medicine     Chief Complaint: Follow-up type 2 diabetes     HPI: 59-year-old male with history of type 2 diabetes, hypertension, hyperlipidemia underwent colon resection on December 5, 2019 for multiple polyps.  Diagnosed with acute stroke on right occipital on MRI, neurology is following.  Endocrine consulted for uncontrolled diabetes.  Continue on Lantus with Humalog along with Humalog sliding scale blood sugars fair.    ROS:   Constitutional:  Denies fatigue, tiredness.    Eyes:  Denies change in visual acuity   HENT:  Denies nasal congestion or sore throat   Respiratory: denies cough, shortness of breath.   Cardiovascular:  denies chest pain, edema   GI:  Denies abdominal pain, nausea, vomiting.   :  Denies polyuria and polydipsia  Musculoskeletal:  Denies back pain or joint pain   Integument:  Denies rash   Neurologic:  Denies headache, focal weakness or sensory changes   Endocrine:  Denies polyuria or polydipsia   Psychiatric:  Denies depression or anxiety       Vitals:    12/10/19 0600   BP: 125/70   Pulse: 76   Resp: 16   Temp: 97.6 °F (36.4 °C)   SpO2: 93%       Physical Exam:  GEN: NAD, conversant  EYES: EOMI, PERRL, no conjunctival erythema  NECK: no thyromegaly, full ROM   CV: RRR, no murmurs/rubs/gallops, no peripheral edema  LUNG: CTAB, no wheezes/rales/ronchi  SKIN: no rashes, no acanthosis  MSK: no deformities, full ROM of all extremities  NEURO: no tremors, DTR normal  PSYCH: AOX3, appropriate mood, affect normal      Results Review:     I reviewed the patient's new clinical results.    Glucose   Date Value Ref Range Status   12/10/2019 170 (H) 65 - 99 mg/dL Final     Sodium   Date Value Ref Range Status   12/10/2019 134 (L) 136 - 145 mmol/L Final     Potassium   Date Value Ref Range Status   12/10/2019 3.9 3.5 - 5.2 mmol/L Final     CO2   Date Value Ref Range Status    12/10/2019 25.0 22.0 - 29.0 mmol/L Final     Chloride   Date Value Ref Range Status   12/10/2019 98 98 - 107 mmol/L Final     Anion Gap   Date Value Ref Range Status   12/10/2019 11.0 5.0 - 15.0 mmol/L Final     Creatinine   Date Value Ref Range Status   12/10/2019 0.96 0.76 - 1.27 mg/dL Final     BUN   Date Value Ref Range Status   12/10/2019 20 8 - 23 mg/dL Final     BUN/Creatinine Ratio   Date Value Ref Range Status   12/10/2019 20.8 7.0 - 25.0 Final     Calcium   Date Value Ref Range Status   12/10/2019 9.0 8.6 - 10.5 mg/dL Final     eGFR Non  Amer   Date Value Ref Range Status   12/10/2019 80 >60 mL/min/1.73 Final     Lab Results   Component Value Date    HGBA1C 9.5 (H) 12/06/2019    HGBA1C 9.7 (H) 11/26/2019    HGBA1C 8.2 (H) 07/02/2019     No results found for: GLUF, MICROALBUR  Results from last 7 days   Lab Units 12/10/19  0730 12/09/19  1959 12/09/19  1604 12/09/19  1143 12/09/19  0723 12/08/19 2009   GLUCOSE mg/dL 161* 141* 80 144* 219* 275*             Medication Review: Reviewed.       acetaminophen 1,000 mg Oral TID   aspirin 81 mg Oral Daily   atorvastatin 80 mg Oral Q PM   insulin glargine 34 Units Subcutaneous Nightly   insulin lispro 0-9 Units Subcutaneous TID With Meals   And      insulin lispro 0-9 Units Subcutaneous 4x Daily With Meals & Nightly   insulin lispro 12 Units Subcutaneous TID With Meals   metoprolol tartrate 50 mg Oral BID   pantoprazole 40 mg Oral QAM   polyethylene glycol 17 g Oral Daily   vitamin B-12 1,000 mcg Oral Daily         Assessment/Plan   1.  Diabetes mellitus type 2: Blood sugars doing well, continue current medications and follow blood sugars and make further adjustments as needed.   2.  Hyperlipidemia: On atorvastatin.             Jay Pichardo MD. FACE    Much of the above report is an electronic transcription/translation of the spoken language to printed text using Dragon Software. As such, the subtleties and finesse of the spoken language may permit  erroneous, or at times, nonsensical words or phrases to be inadvertently transcribed; thus changes may be made at a later date to rectify these errors.

## 2019-12-10 NOTE — PROGRESS NOTES
LOS: 5 days   Admiting Physician- Ronaldo Ardon,*    Reason For Followup:    CVA  CAD  Hypertension  Diabetes mellitus    Subjective     Denies any respiratory distress.    Objective     Hemodynamics are stable    Review of Systems:   Review of Systems   Constitution: Negative for chills and fever.   HENT: Negative for ear discharge and nosebleeds.    Eyes: Positive for vision loss in left eye. Negative for discharge and redness.   Cardiovascular: Negative for chest pain, orthopnea, palpitations, paroxysmal nocturnal dyspnea and syncope.   Respiratory: Negative for cough, shortness of breath and wheezing.    Endocrine: Negative for heat intolerance.   Skin: Negative for rash.   Musculoskeletal: Negative for arthritis and myalgias.   Gastrointestinal: Negative for abdominal pain, melena, nausea and vomiting.   Genitourinary: Negative for dysuria and hematuria.   Neurological: Negative for dizziness, light-headedness, numbness and tremors.   Psychiatric/Behavioral: Negative for depression. The patient is not nervous/anxious.          Vital Signs  Vitals:    12/10/19 1535 12/10/19 1540 12/10/19 1545 12/10/19 1550   BP: 117/63 129/69 128/71 122/71   BP Location:       Patient Position:       Pulse: 88 87 89 88   Resp:       Temp:       TempSrc:       SpO2: 95% 97% 96% 97%   Weight:       Height:         Wt Readings from Last 1 Encounters:   12/09/19 88.3 kg (194 lb 10.7 oz)       Intake/Output Summary (Last 24 hours) at 12/10/2019 1620  Last data filed at 12/10/2019 0500  Gross per 24 hour   Intake --   Output 800 ml   Net -800 ml     Physical Exam:  Physical Exam   Constitutional: He is oriented to person, place, and time. He appears well-developed and well-nourished.   HENT:   Head: Normocephalic and atraumatic.   Eyes: Right eye exhibits no discharge. No scleral icterus.   Neck: No thyromegaly present.   Cardiovascular: Normal rate, regular rhythm and normal heart sounds. Exam reveals no gallop and no  friction rub.   No murmur heard.  Pulmonary/Chest: Effort normal and breath sounds normal. No respiratory distress. He has no wheezes. He has no rales.   Abdominal: There is no tenderness.   Musculoskeletal: He exhibits no edema.   Lymphadenopathy:     He has no cervical adenopathy.   Neurological: He is alert and oriented to person, place, and time.   Skin: No rash noted. No erythema.   Psychiatric: He has a normal mood and affect.       Results Review:   Lab Results (last 24 hours)     Procedure Component Value Units Date/Time    POC Glucose Once [[id number]]  (Normal) Collected:  12/10/19 1558    Specimen:  Blood Updated:  12/10/19 1600     Glucose 100 mg/dL      Comment: Serial Number: [id number]:  [id number]       POC Glucose Once [[id number]]  (Normal) Collected:  12/10/19 1506    Specimen:  Blood Updated:  12/10/19 1507     Glucose 71 mg/dL      Comment: Serial Number: [id number]:  [id number]       POC Glucose Once [[id number]]  (Normal) Collected:  12/10/19 1315    Specimen:  Blood Updated:  12/10/19 1316     Glucose 89 mg/dL      Comment: Serial Number: [id number]:  [id number]       POC Glucose Once [[id number]]  (Abnormal) Collected:  12/10/19 0730    Specimen:  Blood Updated:  12/10/19 0732     Glucose 161 mg/dL      Comment: Serial Number: [id number]:  [id number]       Basic Metabolic Panel [[id number]]  (Abnormal) Collected:  12/10/19 0551    Specimen:  Blood Updated:  12/10/19 0659     Glucose 170 mg/dL      BUN 20 mg/dL      Creatinine 0.96 mg/dL      Sodium 134 mmol/L      Potassium 3.9 mmol/L      Chloride 98 mmol/L      CO2 25.0 mmol/L      Calcium 9.0 mg/dL      eGFR Non African Amer 80 mL/min/1.73      BUN/Creatinine Ratio 20.8     Anion Gap 11.0 mmol/L     Narrative:       GFR Normal >60  Chronic Kidney Disease <60  Kidney Failure <15      CBC & Differential [[id number]] Collected:  12/10/19 0342    Specimen:  Blood Updated:  12/10/19 0559    Narrative:       The following  orders were created for panel order CBC & Differential.  Procedure                               Abnormality         Status                     ---------                               -----------         ------                     CBC Auto Differential[[id number]]        Abnormal            Final result                 Please view results for these tests on the individual orders.    CBC Auto Differential [[id number]]  (Abnormal) Collected:  12/10/19 0342    Specimen:  Blood Updated:  12/10/19 0559     WBC 15.10 10*3/mm3      RBC 2.50 10*6/mm3      Hemoglobin 7.6 g/dL      Hematocrit 22.6 %      MCV 90.4 fL      MCH 30.3 pg      MCHC 33.6 g/dL      RDW 13.9 %      RDW-SD 44.6 fl      MPV 8.3 fL      Platelets 396 10*3/mm3     Scan Slide [[id number]] Collected:  12/10/19 0342    Specimen:  Blood Updated:  12/10/19 0559     Scan Slide --     Comment: See Manual Differential Results       Manual Differential [[id number]]  (Abnormal) Collected:  12/10/19 0342    Specimen:  Blood Updated:  12/10/19 0559     Neutrophil % 63.0 %      Lymphocyte % 21.0 %      Monocyte % 6.0 %      Eosinophil % 7.0 %      Bands %  3.0 %      Neutrophils Absolute 9.97 10*3/mm3      Lymphocytes Absolute 3.17 10*3/mm3      Monocytes Absolute 0.91 10*3/mm3      Eosinophils Absolute 1.06 10*3/mm3      Polychromasia Slight/1+     WBC Morphology Normal     Giant Platelets Slight/1+    POC Glucose Once [[id number]]  (Abnormal) Collected:  12/09/19 1959    Specimen:  Blood Updated:  12/09/19 2001     Glucose 141 mg/dL      Comment: Serial Number: [id number]:  [id number]       Tissue Pathology Exam [[id number]] Collected:  12/05/19 1453    Specimen:  Tissue from Large Intestine, Right / Ascending Colon Updated:  12/09/19 1620     Case Report --     Surgical Pathology Report                         Case: [id number]                                  Authorizing Provider:  Ronaldo Ardon,   Collected:           12/05/2019 02:53 PM                    "              MD                                                                           Ordering Location:     [facility]  Received:            12/06/2019 09:51 AM                                 OR                                                                           Pathologist:           Chandler Salazar MD                                                             Specimen:    Large Intestine, Right / Ascending Colon, right colon and omentum                           Final Diagnosis --     Colon, right hemicolectomy:    Residual tubular adenoma with changes consistent with prior procedure    Negative for high grade dysplasia and carcinoma    Appendix with fibrous obliteration of tip    Four benign lymph nodes    No evidence of malignancy    JPR/tkd        Gross Description --     Received in formalin designated \"Right colon and omentum\" is a resection of bowel which measures 32 cm from cecum to distal margin and averages 4.5 cm in diameter. A stump of terminal ileum is present which measures 4 x 2.5 cm. The specimen is stapled at both ends. The serosa is pink and glistening. The colon is accompanied by a generous amount of yellow brown pericolonic fatty tissue and sheets of yellow fatty mesentery which measure 26 x 21 x 3.5 cm in aggregate. The appendix is present on the cecum and measurement 3.5 x 0.5 cm in dimension. Sectioning reveals no polyps or masses. The bowel is opened revealing tan mucosa with usual folds. Located 7 cm from the closest (distal) margin is an area of black tattoo pigment which is associated with a sessile polyp which measures 1 cm in greatest dimension. The area of prior polypectomy is serially sectioned revealing a whitish cut surface which grossly does not extend into the muscularis propria. No other lesions are seen. Sectioning through the mesenteric fatty tissue reveals yellow glistening cut surfaces. No nodules or masses are identified. Sections are submitted as " follows:    A-B: Margins    C: Sections of appendix    D-F: polyp and surrounding margins    G-J: Candidate lymph nodes submitted whole.     WENDY/tkd            Imaging Results (Last 72 Hours)     Procedure Component Value Units Date/Time    XR Abdomen 2 View With Chest 1 View [[id number]] Collected:  12/08/19 0806     Updated:  12/08/19 0811    Narrative:       DATE OF EXAM:  12/8/2019 7:55 AM     PROCEDURE:  XR ABDOMEN 2 VW W CHEST 1 VW-     INDICATIONS:  post op eval for bowel gas pattern, overnight desat; 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     FINDINGS:  Today's abdominal study demonstrates the lung bases being free of  disease. Change of cholecystectomy and midline abdominal incision is  seen. Right side up decubitus view demonstrates a small amount of air  adjacent to the right lobe of the liver which is not unusual due to  patient's recent hemicolectomy on 12/05/2019 couple air-fluid levels are  seen which may represent an element of ileus gas in the transverse colon  and stomach are seen. Minimal gas in the distal colon is seen.  The AP erect chest demonstrates cardiomegaly without evidence of active  cardiopulmonary disease., The chest appears stable from December 7       Impression:          1. Small amount of free air within the abdomen which is unremarkable  given history of any colectomy on December 5  2. Couple of air fluid levels seen on decubitus view suggesting mild  postop ileus  3. No stairstep air-fluid levels are seen that would suggest obstruction  4. Cardiomegaly without active cardiopulmonary disease     Electronically Signed By-Wilder London Jr. On:12/8/2019 8:09 AM  This report was  finalized on [id number] by  Wilder London Jr., .    XR Chest 1 View [[id number]] Collected:  12/07/19 2203     Updated:  12/08/19 0006    Narrative:       Examination: AP view of the chest    Indication: Confusion, increased oxygen demand    Comparison: No prior study is available for comparison.    Findings:  The cardiomediastinal silhouette is enlarged.    Subsegmental atelectatic changes are present in the right lung base. There is otherwise no focal consolidative airspace disease. There is no sizable pleural fluid accumulation or pneumothorax.     No destructive osseous lesion is identified.      Impression:       Impression:    1. No evidence of an acute pleural or pulmonary parenchymal abnormality.  2. Cardiomegaly.    Electronically signed by:  Jeremie Caceres M.D.    12/7/2019 10:05 PM    CT Head Without Contrast [[id number]] Collected:  12/07/19 2005     Updated:  12/07/19 2209    Narrative:       EXAMINATION: CT HEAD WO CONTRAST    DATE: 12/7/2019 9:50 PM     INDICATION: Confusion, delirium, stroke     COMPARISON: CT head and brain MRI from yesterday.     TECHNIQUE: Thin section noncontrast axial images were obtained through the head. Coronal reformats were created.  CT dose lowering techniques were used, to include: automated exposure control, adjustment for patient size, and or use of iterative   reconstruction.     FINDINGS:     Intracranial contents:    Expected evolution of the subacute right occipital infarct. No hemorrhage or mass effect. No new infarct visible by CT. Mild chronic small vessel ischemic changes in the white matter. Mild intracranial atherosclerosis. Mild cerebral volume loss.    Bones and extracranial soft tissues:     No fracture or focal osseous lesion in the calvarium or skull base. Fluid level in the left sphenoid sinus. Mastoid air cells are clear. Orbits are unremarkable.         Impression:         1. Expected evolution of the acute/subacute right occipital infarct. No  hemorrhage or mass effect.  2. No new infarct visible by CT.  3. Mild chronic age-related intracranial findings as above, unchanged.  4. Fluid level in the left sphenoid sinus.         This examination was interpreted by Eric Kaplan M.D.    Electronically signed by:  Eric Kaplan M.D.    12/7/2019 8:08 PM        ECG/EMG Results (most recent)     Procedure Component Value Units Date/Time    Adult Transthoracic Echo Complete W/ Cont if Necessary Per Protocol [[id number]] Collected:  12/06/19 1358     Updated:  12/06/19 1558     BSA 2.2 m^2      IVSd 0.83 cm      LVIDd 4.3 cm      LVIDs 3.2 cm      LVPWd 0.95 cm      IVS/LVPW 0.87     FS 25.7 %      EDV(Teich) 83.8 ml      ESV(Teich) 41.2 ml      EF(Teich) 50.9 %      EDV(cubed) 80.4 ml      ESV(cubed) 33.0 ml      EF(cubed) 59.0 %      LV mass(C)d 122.4 grams      LV mass(C)dI 56.9 grams/m^2      SV(Teich) 42.6 ml      SI(Teich) 19.8 ml/m^2      SV(cubed) 47.4 ml      SI(cubed) 22.0 ml/m^2      Ao root diam 3.1 cm      Ao root area 7.6 cm^2      ACS 1.8 cm      LVOT diam 2.1 cm      LVOT area 3.5 cm^2      RVOT diam 1.9 cm      RVOT area 3.0 cm^2      EDV(MOD-sp4) 87.8 ml      ESV(MOD-sp4) 39.1 ml      EF(MOD-sp4) 55.5 %      SV(MOD-sp4) 48.7 ml      SI(MOD-sp4) 22.6 ml/m^2      Ao root area (BSA corrected) 1.4     LV Perez Vol (BSA corrected) 40.8 ml/m^2      LV Sys Vol (BSA corrected) 18.2 ml/m^2      MV E max phil 87.4 cm/sec      MV A max phil 100.9 cm/sec      MV E/A 0.87     MV V2 max 105.3 cm/sec      MV max PG 4.4 mmHg      MV V2 mean 73.5 cm/sec      MV mean PG 2.3 mmHg      MV V2 VTI 29.2 cm      MVA(VTI) 2.4 cm^2      MV dec slope 279.7 cm/sec^2      MV dec time 0.31 sec      Ao pk phil 154.0 cm/sec      Ao max PG 9.5 mmHg      Ao max PG (full) 5.4 mmHg      Ao V2 mean 125.9 cm/sec      Ao mean PG 6.7 mmHg      Ao mean PG (full) 4.2 mmHg      Ao V2 VTI 34.3 cm      YOLANDA(I,A) 2.0 cm^2      YOLANDA(I,D) 2.0 cm^2      YOLANDA(V,A) 2.3 cm^2      YOLANDA(V,D) 2.3 cm^2       LV V1 max PG 4.0 mmHg      LV V1 mean PG 2.5 mmHg      LV V1 max 100.4 cm/sec      LV V1 mean 75.1 cm/sec      LV V1 VTI 20.1 cm      SV(Ao) 258.8 ml      SI(Ao) 120.3 ml/m^2      SV(LVOT) 69.8 ml      SV(RVOT) 61.6 ml      SI(LVOT) 32.4 ml/m^2      PA V2 max 142.5 cm/sec      PA max PG 8.2 mmHg      PA max PG (full) 2.4 mmHg       CV ECHO LUISA - PVA(V,A) 2.5 cm^2       CV ECHO LUISA - PVA(V,D) 2.5 cm^2      RV V1 max PG 5.8 mmHg      RV V1 mean PG 2.7 mmHg      RV V1 max 120.1 cm/sec      RV V1 mean 73.0 cm/sec      RV V1 VTI 20.9 cm      TR max umang 141.3 cm/sec      RVSP(TR) 11.0 mmHg      RAP systole 3.0 mmHg      Pulm Sys Umang 77.1 cm/sec      Pulm Perez Umang 45.4 cm/sec      Pulm S/D 1.7     Qp/Qs 0.88     Pulm A Revs Dur 0.1 sec      Pulm A Revs Umang 32.3 cm/sec       CV ECHO LUISA - BZI_BMI 26.4 kilograms/m^2       CV ECHO LUISA - BSA(HAYCOCK) 2.2 m^2       CV ECHO LUISA - BZI_METRIC_WEIGHT 90.7 kg       CV ECHO LUISA - BZI_METRIC_HEIGHT 185.4 cm      EF(MOD-bp) 56.0 %      LA dimension(2D) 4.3 cm      Echo EF Estimated 60 %     Narrative:         · Estimated EF = 60%.  · Left ventricular systolic function is normal.     Indications  Cardiac source of emboli.    Technically satisfactory study.  Mitral valve is structurally normal.  Tricuspid valve is structurally normal.  Aortic valve is structurally normal.  Pulmonic valve could not be well visualized.  No evidence for mitral tricuspid or aortic regurgitation is seen by   Doppler study.  Left atrium is normal in size.  Right atrium is normal in size.  Left ventricle is normal in size and contractility with ejection fraction   of 60%.  Right ventricle is normal in size.  Atrial septum is intact.  Aorta is normal.  No pericardial effusion or intracardiac thrombus is seen.    Impression  Structurally and functionally normal cardiac valves.  Normal left ventricle size and contractility with ejection fraction of   60%.  No pericardial effusion or  intracardiac thrombus is seen.          ECG 12 Lead [[id number]] Collected:  12/06/19 0516     Updated:  12/06/19 1838    Narrative:       HEART RATE= 93  bpm  RR Interval= 644  ms  ND Interval= 148  ms  P Horizontal Axis= 20  deg  P Front Axis= -15  deg  QRSD Interval= 87  ms  QT Interval= 361  ms  QRS Axis= -12  deg  T Wave Axis= 136  deg  - ABNORMAL ECG -  Sinus rhythm  Abnormal T, consider ischemia, lateral leads  When compared with ECG of 26-Nov-2019 13:53:15,  No significant change  Electronically Signed By: Kvng Brown (PHILLIP) 06-Dec-2019 18:38:21  Date and Time of Study: 2019-12-06 05:16:19    Adult Transesophageal Echo (LINH) W/ Cont if Necessary Per Protocol [[id number]] Collected:  12/10/19 1514     Updated:  12/10/19 1617     BSA 2.2 m^2      BH CV ECHO LUISA - BZI_BMI 26.4 kilograms/m^2      BH CV ECHO LUISA - BSA(HAYCOCK) 2.2 m^2      BH CV ECHO LUISA - BZI_METRIC_WEIGHT 90.7 kg       CV ECHO LUISA - BZI_METRIC_HEIGHT 185.4 cm           Cardiac Studies:  Echo-   Results for orders placed during the hospital encounter of 12/05/19   Adult Transthoracic Echo Complete W/ Cont if Necessary Per Protocol    Narrative · Estimated EF = 60%.  · Left ventricular systolic function is normal.     Indications  Cardiac source of emboli.    Technically satisfactory study.  Mitral valve is structurally normal.  Tricuspid valve is structurally normal.  Aortic valve is structurally normal.  Pulmonic valve could not be well visualized.  No evidence for mitral tricuspid or aortic regurgitation is seen by   Doppler study.  Left atrium is normal in size.  Right atrium is normal in size.  Left ventricle is normal in size and contractility with ejection fraction   of 60%.  Right ventricle is normal in size.  Atrial septum is intact.  Aorta is normal.  No pericardial effusion or intracardiac thrombus is seen.    Impression  Structurally and functionally normal cardiac valves.  Normal left ventricle size and contractility with  ejection fraction of   60%.  No pericardial effusion or intracardiac thrombus is seen.           Stress Myoview-  Cath-      Medication Review:   Scheduled Meds:  acetaminophen 1,000 mg Oral TID   aspirin 81 mg Oral Daily   atorvastatin 80 mg Oral Q PM   insulin glargine 34 Units Subcutaneous Nightly   insulin lispro 0-9 Units Subcutaneous TID With Meals   And      insulin lispro 0-9 Units Subcutaneous 4x Daily With Meals & Nightly   insulin lispro 12 Units Subcutaneous TID With Meals   metoprolol tartrate 50 mg Oral BID   pantoprazole 40 mg Oral QAM   polyethylene glycol 17 g Oral Daily   vitamin B-12 1,000 mcg Oral Daily     Continuous Infusions:  sodium chloride 100 mL/hr Last Rate: 100 mL/hr (12/10/19 3164)     PRN Meds:.albuterol  •  dextrose  •  dextrose  •  glucagon (human recombinant)  •  HYDROmorphone **AND** naloxone  •  magnesium hydroxide  •  ondansetron  •  oxyCODONE **OR** oxyCODONE  •  promethazine      Assessment/Plan   Patient Active Problem List   Diagnosis   • Allergic state   • Asthma   • Atherosclerotic heart disease of native coronary artery without angina pectoris   • Chronic cough   • Degenerative joint disease   • Type 2 diabetes mellitus with hyperglycemia, with long-term current use of insulin (CMS/formerly [facility])   • Type 2 diabetes mellitus with other diabetic neurological complication (CMS/formerly [facility])   • Diabetic foot ulcer (CMS/formerly [facility])   • Encounter for general adult medical examination without abnormal findings   • Gastroparesis   • History of prosthetic heart valve   • Hyperlipidemia, mixed   • Essential hypertension   • Nausea and vomiting   • Other specified dorsopathies, site unspecified   • Type 2 diabetes mellitus with hyperglycemia (CMS/formerly [facility])   • Vitamin D deficiency   • Combined forms of age-related cataract, bilateral   • Other specified retinal disorders   • Presbyopia   • Type 2 or unspecified type diabetes mellitus   • Acute ischemic right PCA stroke involving the right occipital lobe  (CMS/HCC)   • Status post colon resection   • FRANKI (acute kidney injury) (CMS/HCC)   • Hyponatremia     Patient is seen and examined and findings are verified.  Patient had right posterior circulation CVA with left hemianopsia.  Patient underwent LINH and it did not show any PFO or clot.  I discussed in detail with neurologist Dr. Wells who recommends to delay and her for anticoagulation for at least 2 to 4 weeks.  I would continue aspirin.  Patient probably would need Eliquis after 4 weeks.    Joao Caceres MD  12/10/19  4:20 PM

## 2019-12-10 NOTE — PROCEDURES
This is an inpatient, 36-minute recording  Digitally recorded multi-montage EEG with leads placed according to the international 10/20 system  Photic stimulation was done  Hyperventilation was  done    With the patient sleep drowsy the background was 5 to 6 Hz    Patient did fall asleep in stage II sleep was seen  No asymmetry    Nothing suggesting clear-cut epileptiform activity or asymmetry  No clinical events  Hyperventilation was attempted but I did not see any significant changes, slowing or accentuation of slowing  Photic stimulation was attempted in intermittent stepwise pattern up to the flash frequency of 21 Hz but I did not see any driving, asymmetry of paroxysmal activity    Impression:    This is an  adult, drowsy/asleep EEG which showed diffuse slowing which is seen in patients with medication effect, deep sleep or moderate to severe encephalopathy    Nonspecific  No seizures but EEG like this does not rule out epilepsy

## 2019-12-10 NOTE — PLAN OF CARE
Pt was out of room this morning for EEG and this afternoon for LINH. OT spoke w/ family regarding IP rehab recommendation & all were agreeable. OT will FU for treatment tomorrow.

## 2019-12-10 NOTE — PLAN OF CARE
Problem: Patient Care Overview  Goal: Plan of Care Review  Outcome: Ongoing (interventions implemented as appropriate)  Flowsheets  Taken 12/10/2019 0555  Progress: no change  Taken 12/9/2019 2010  Plan of Care Reviewed With: patient;spouse  Goal: Individualization and Mutuality  Outcome: Ongoing (interventions implemented as appropriate)  Goal: Discharge Needs Assessment  Outcome: Ongoing (interventions implemented as appropriate)  Goal: Interprofessional Rounds/Family Conf  Outcome: Ongoing (interventions implemented as appropriate)     Problem: Fall Risk (Adult)  Goal: Absence of Fall  Outcome: Ongoing (interventions implemented as appropriate)  Flowsheets (Taken 12/10/2019 0555)  Absence of Fall: making progress toward outcome     Problem: Bowel Resection (Adult)  Goal: Signs and Symptoms of Listed Potential Problems Will be Absent, Minimized or Managed (Bowel Resection)  Outcome: Ongoing (interventions implemented as appropriate)  Goal: Anesthesia/Sedation Recovery  Outcome: Ongoing (interventions implemented as appropriate)     Problem: Pain, Acute (Adult)  Goal: Acceptable Pain Control/Comfort Level  Outcome: Ongoing (interventions implemented as appropriate)  Flowsheets (Taken 12/10/2019 0555)  Acceptable Pain Control/Comfort Level: making progress toward outcome     Problem: Stroke (Ischemic) (Adult)  Goal: Signs and Symptoms of Listed Potential Problems Will be Absent, Minimized or Managed (Stroke)  Outcome: Ongoing (interventions implemented as appropriate)  Flowsheets (Taken 12/10/2019 0555)  Problems Assessed (Stroke (Ischemic)): all  Problems Assessed (Stroke (Ischemic)): communication impairment; cognitive impairment     Problem: Skin Injury Risk (Adult)  Goal: Identify Related Risk Factors and Signs and Symptoms  Outcome: Ongoing (interventions implemented as appropriate)  Goal: Skin Health and Integrity  Outcome: Ongoing (interventions implemented as appropriate)  Flowsheets (Taken 12/10/2019  0555)  Skin Health and Integrity: making progress toward outcome

## 2019-12-11 VITALS
TEMPERATURE: 97.6 F | HEIGHT: 73 IN | RESPIRATION RATE: 20 BRPM | WEIGHT: 195.11 LBS | HEART RATE: 98 BPM | DIASTOLIC BLOOD PRESSURE: 72 MMHG | OXYGEN SATURATION: 98 % | SYSTOLIC BLOOD PRESSURE: 125 MMHG | BODY MASS INDEX: 25.86 KG/M2

## 2019-12-11 PROBLEM — J32.9 SINUSITIS: Status: ACTIVE | Noted: 2019-12-11

## 2019-12-11 PROBLEM — Z90.49 STATUS POST COLON RESECTION: Status: RESOLVED | Noted: 2019-12-06 | Resolved: 2019-12-11

## 2019-12-11 PROBLEM — J32.3 SPHENOID SINUSITIS: Status: ACTIVE | Noted: 2019-12-11

## 2019-12-11 LAB
BASOPHILS # BLD AUTO: 0.1 10*3/MM3 (ref 0–0.2)
BASOPHILS NFR BLD AUTO: 1.3 % (ref 0–1.5)
DEPRECATED RDW RBC AUTO: 44.6 FL (ref 37–54)
EOSINOPHIL # BLD AUTO: 0.6 10*3/MM3 (ref 0–0.4)
EOSINOPHIL NFR BLD AUTO: 6 % (ref 0.3–6.2)
ERYTHROCYTE [DISTWIDTH] IN BLOOD BY AUTOMATED COUNT: 14.1 % (ref 12.3–15.4)
GLUCOSE BLDC GLUCOMTR-MCNC: 126 MG/DL (ref 70–105)
GLUCOSE BLDC GLUCOMTR-MCNC: 178 MG/DL (ref 70–105)
HCT VFR BLD AUTO: 31.1 % (ref 37.5–51)
HGB BLD-MCNC: 10.7 G/DL (ref 13–17.7)
LYMPHOCYTES # BLD AUTO: 1.6 10*3/MM3 (ref 0.7–3.1)
LYMPHOCYTES NFR BLD AUTO: 17.5 % (ref 19.6–45.3)
MCH RBC QN AUTO: 30.7 PG (ref 26.6–33)
MCHC RBC AUTO-ENTMCNC: 34.4 G/DL (ref 31.5–35.7)
MCV RBC AUTO: 89.3 FL (ref 79–97)
MONOCYTES # BLD AUTO: 0.7 10*3/MM3 (ref 0.1–0.9)
MONOCYTES NFR BLD AUTO: 7.5 % (ref 5–12)
NEUTROPHILS # BLD AUTO: 6.3 10*3/MM3 (ref 1.7–7)
NEUTROPHILS NFR BLD AUTO: 67.7 % (ref 42.7–76)
NRBC BLD AUTO-RTO: 0.1 /100 WBC (ref 0–0.2)
PLATELET # BLD AUTO: 420 10*3/MM3 (ref 140–450)
PMV BLD AUTO: 7.7 FL (ref 6–12)
RBC # BLD AUTO: 3.49 10*6/MM3 (ref 4.14–5.8)
WBC NRBC COR # BLD: 9.3 10*3/MM3 (ref 3.4–10.8)

## 2019-12-11 PROCEDURE — 63710000001 INSULIN LISPRO (HUMAN) PER 5 UNITS: Performed by: INTERNAL MEDICINE

## 2019-12-11 PROCEDURE — 82962 GLUCOSE BLOOD TEST: CPT

## 2019-12-11 PROCEDURE — 25010000002 HYDROMORPHONE PER 4 MG: Performed by: SURGERY

## 2019-12-11 PROCEDURE — 97168 OT RE-EVAL EST PLAN CARE: CPT

## 2019-12-11 PROCEDURE — 97530 THERAPEUTIC ACTIVITIES: CPT

## 2019-12-11 PROCEDURE — 97116 GAIT TRAINING THERAPY: CPT

## 2019-12-11 PROCEDURE — 99239 HOSP IP/OBS DSCHRG MGMT >30: CPT | Performed by: HOSPITALIST

## 2019-12-11 PROCEDURE — 97112 NEUROMUSCULAR REEDUCATION: CPT

## 2019-12-11 PROCEDURE — G0515 COGNITIVE SKILLS DEVELOPMENT: HCPCS

## 2019-12-11 PROCEDURE — 99024 POSTOP FOLLOW-UP VISIT: CPT | Performed by: SURGERY

## 2019-12-11 PROCEDURE — 85025 COMPLETE CBC W/AUTO DIFF WBC: CPT | Performed by: PHYSICIAN ASSISTANT

## 2019-12-11 PROCEDURE — 99232 SBSQ HOSP IP/OBS MODERATE 35: CPT | Performed by: NURSE PRACTITIONER

## 2019-12-11 RX ORDER — POLYETHYLENE GLYCOL 3350 17 G/17G
17 POWDER, FOR SOLUTION ORAL DAILY
Start: 2019-12-12 | End: 2020-01-14

## 2019-12-11 RX ORDER — PSEUDOEPHEDRINE HCL 30 MG
100 TABLET ORAL 2 TIMES DAILY
Start: 2019-12-11 | End: 2020-06-10

## 2019-12-11 RX ORDER — FLUTICASONE PROPIONATE 50 MCG
2 SPRAY, SUSPENSION (ML) NASAL DAILY
Status: DISCONTINUED | OUTPATIENT
Start: 2019-12-11 | End: 2019-12-11 | Stop reason: HOSPADM

## 2019-12-11 RX ORDER — ACETAMINOPHEN 500 MG
1000 TABLET ORAL 3 TIMES DAILY
Start: 2019-12-11 | End: 2020-01-14

## 2019-12-11 RX ORDER — ATORVASTATIN CALCIUM 20 MG/1
80 TABLET, FILM COATED ORAL EVERY EVENING
Qty: 30 TABLET | Refills: 3
Start: 2019-12-11 | End: 2020-01-14

## 2019-12-11 RX ORDER — OXYCODONE HYDROCHLORIDE 5 MG/1
5 TABLET ORAL EVERY 4 HOURS PRN
Start: 2019-12-11 | End: 2019-12-15

## 2019-12-11 RX ORDER — DIPHENHYDRAMINE HCL 25 MG
50 CAPSULE ORAL EVERY 4 HOURS PRN
Start: 2019-12-11 | End: 2020-06-10

## 2019-12-11 RX ORDER — OMEPRAZOLE 40 MG/1
40 CAPSULE, DELAYED RELEASE ORAL DAILY
Start: 2019-12-11 | End: 2020-03-20

## 2019-12-11 RX ORDER — FLUTICASONE PROPIONATE 50 MCG
2 SPRAY, SUSPENSION (ML) NASAL DAILY
Start: 2019-12-11 | End: 2020-07-14

## 2019-12-11 RX ORDER — DOCUSATE SODIUM 100 MG/1
100 CAPSULE, LIQUID FILLED ORAL 2 TIMES DAILY
Status: DISCONTINUED | OUTPATIENT
Start: 2019-12-11 | End: 2019-12-11 | Stop reason: HOSPADM

## 2019-12-11 RX ORDER — ASPIRIN 81 MG/1
81 TABLET ORAL DAILY
Start: 2019-12-12 | End: 2023-03-09

## 2019-12-11 RX ORDER — INSULIN GLARGINE 100 [IU]/ML
34 INJECTION, SOLUTION SUBCUTANEOUS NIGHTLY
Refills: 12
Start: 2019-12-11 | End: 2020-01-14

## 2019-12-11 RX ORDER — ALBUTEROL SULFATE 2.5 MG/3ML
2.5 SOLUTION RESPIRATORY (INHALATION) EVERY 6 HOURS PRN
Refills: 12
Start: 2019-12-11 | End: 2020-07-14

## 2019-12-11 RX ADMIN — ACETAMINOPHEN 1000 MG: 500 TABLET, FILM COATED ORAL at 15:33

## 2019-12-11 RX ADMIN — MAGNESIUM HYDROXIDE 10 ML: 2400 SUSPENSION ORAL at 06:20

## 2019-12-11 RX ADMIN — HYDROMORPHONE HYDROCHLORIDE 0.5 MG: 2 INJECTION, SOLUTION INTRAMUSCULAR; INTRAVENOUS; SUBCUTANEOUS at 06:23

## 2019-12-11 RX ADMIN — SODIUM CHLORIDE 100 ML/HR: 900 INJECTION, SOLUTION INTRAVENOUS at 00:09

## 2019-12-11 RX ADMIN — INSULIN LISPRO 12 UNITS: 100 INJECTION, SOLUTION INTRAVENOUS; SUBCUTANEOUS at 12:25

## 2019-12-11 RX ADMIN — DOCUSATE SODIUM 100 MG: 100 CAPSULE, LIQUID FILLED ORAL at 08:45

## 2019-12-11 RX ADMIN — OXYCODONE HYDROCHLORIDE 10 MG: 5 TABLET ORAL at 11:01

## 2019-12-11 RX ADMIN — INSULIN LISPRO 12 UNITS: 100 INJECTION, SOLUTION INTRAVENOUS; SUBCUTANEOUS at 08:43

## 2019-12-11 RX ADMIN — SODIUM CHLORIDE 100 ML/HR: 900 INJECTION, SOLUTION INTRAVENOUS at 10:28

## 2019-12-11 RX ADMIN — ACETAMINOPHEN 1000 MG: 500 TABLET, FILM COATED ORAL at 08:42

## 2019-12-11 RX ADMIN — OXYCODONE HYDROCHLORIDE 10 MG: 5 TABLET ORAL at 15:33

## 2019-12-11 RX ADMIN — POLYETHYLENE GLYCOL 3350 17 G: 17 POWDER, FOR SOLUTION ORAL at 08:43

## 2019-12-11 RX ADMIN — OXYCODONE HYDROCHLORIDE 10 MG: 5 TABLET ORAL at 03:06

## 2019-12-11 RX ADMIN — CYANOCOBALAMIN TAB 1000 MCG 1000 MCG: 1000 TAB at 08:43

## 2019-12-11 RX ADMIN — INSULIN LISPRO 2 UNITS: 100 INJECTION, SOLUTION INTRAVENOUS; SUBCUTANEOUS at 08:44

## 2019-12-11 RX ADMIN — ASPIRIN 81 MG: 81 TABLET, DELAYED RELEASE ORAL at 08:43

## 2019-12-11 RX ADMIN — FLUTICASONE PROPIONATE 2 SPRAY: 50 SPRAY, METERED NASAL at 15:33

## 2019-12-11 RX ADMIN — METOPROLOL TARTRATE 50 MG: 50 TABLET, FILM COATED ORAL at 08:43

## 2019-12-11 RX ADMIN — PANTOPRAZOLE SODIUM 40 MG: 40 TABLET, DELAYED RELEASE ORAL at 06:20

## 2019-12-11 NOTE — PROGRESS NOTES
LOS: 6 days     Chief Complaint: Left kenia-anopia       SUBJECTIVE:  History taken from: patient chart RN      Patient Complaints: Patient states he is feeling better, confusion is better, pain is better.       Review of Systems   Constitutional: Negative.    Eyes: Positive for visual disturbance.   Cardiovascular: Negative.    Neurological: Negative.         ________________________________________________     OBJECTIVE:  On exam:  GENERAL: NAD  CARDIO: RRR  NEURO:  Oriented x3  EOMI, PERRL, left homonymous hemianopsia   CN 2-12 intact, No facial asymmetry  Speech clear without dysarthria  Sensations intact and equal bilaterally  Strength 5/5 and equal in all extremities  No ataxia        ________________________________________________   RESULTS REVIEW    VITAL SIGNS:  Temp:  [97.6 °F (36.4 °C)-98.7 °F (37.1 °C)] 98.3 °F (36.8 °C)  Heart Rate:  [] 78  Resp:  [11-22] 15  BP: (109-135)/(60-86) 113/65    LABS:   Lab Results   Component Value Date    WBC 9.30 12/11/2019    HGB 10.7 (L) 12/11/2019    HCT 31.1 (L) 12/11/2019    MCV 89.3 12/11/2019     12/11/2019     Lab Results   Component Value Date    GLUCOSE 170 (H) 12/10/2019    BUN 20 12/10/2019    CREATININE 0.96 12/10/2019    EGFRIFNONA 80 12/10/2019    BCR 20.8 12/10/2019    K 3.9 12/10/2019    CO2 25.0 12/10/2019    CALCIUM 9.0 12/10/2019    ALBUMIN 3.70 07/02/2019    LABIL2 0.8 (L) 05/26/2019    AST 26 07/02/2019    ALT 29 07/02/2019       Lab Results   Component Value Date    TSH 2.340 12/06/2019    LDL  12/06/2019      Comment:      Unable to calculate    LDL 82 12/06/2019    HGBA1C 9.5 (H) 12/06/2019    [id number] 403 12/06/2019         IMAGING STUDIES:  No radiology results for the last day    I reviewed the patient's new clinical results.    ________________________________________________      PROBLEM LIST:    Acute ischemic right PCA stroke involving the right occipital lobe (CMS/HCC)    Atherosclerotic heart disease of native coronary  artery without angina pectoris    Type 2 diabetes mellitus with hyperglycemia, with long-term current use of insulin (CMS/[facility])    Gastroparesis    Hyperlipidemia, mixed    Essential hypertension    Status post colon resection    FRANKI (acute kidney injury) (CMS/[facility])    Hyponatremia    Acute blood loss anemia        Assessment/Plan   ASSESSMENT/PLAN:  1. Acute to subacute right occipital lobe ischemic stroke with occlusion of the right PCA P2 segment. Likely embolic.    -LINH completed by Dr. Caceres- no PFO or thrombus.   - Event monitor x30 days (ordered)   - Labs: A1C: 9.5, B12: 403, LDL: (unable to calculate), TSH: 2.34  - Antithrombotics: ASA 81 mg daily for now-  Discussed Eliquis with patient- after reviewing MRI- it is recommended that the patient wait 2-4 weeks prior to starting OAC.   - Statin: Lipitor 80 mg qhs    **Please refer to previous notes for further details and recommendations.     I discussed the patients findings and my recommendations with patient, family and nursing staff    EMRE Sierra  12/11/19  9:05 AM

## 2019-12-11 NOTE — PROGRESS NOTES
Continued Stay Note  RIGOBERTO Mcdermott     Patient Name: Chao Lazar  MRN: [id number]  Today's Date: 12/11/2019    Admit Date: 12/5/2019    Discharge Plan     Row Name 12/11/19 1358       Plan    Plan  [facility] accepted pt acute per Lynne/chandan and precert obtained 12/11.Secure chat sent to MD ETHAN and RN updating them        No social barriers to discharge identified.    Italia Dawn, [facility], LSW  [phone]

## 2019-12-11 NOTE — PROGRESS NOTES
[facility]   INPATIENT PROGRESS NOTE    Date of Admission: 12/5/2019  Length of Stay: 5  Primary Care Physician: Al Kimbrough MD    Subjective   Subjective   CC:  Left side vision impairement immediate after colon resection    HPI: Patient is a 59-year-old gentleman who had 36 polyps removed on colonoscopy 8/2019 and follow-up colonoscopy a few weeks ago showed 18 polyps 1 in the transverse colon which was 2 cm and too large to be removed.  General surgery was consulted and the patient was admitted this hospital stay for partial colon resection which was performed 12/5/2019.  The patient has a strong family history of colon cancer in his sister.  Record review: Patient has past medical history of hypertension, hyperlipidemia, insulin-dependent diabetes and gastroparesis.  Postoperatively the patient complained of left hemianopsia and on CT was found to have an early subacute ischemic right occipital parietal PCA distribution ischemic stroke which was confirmed by MRI to be a large acute ischemic area involving the right occipital parietal region and additional small punctate foci of involving the thalamus and periventricular white matter.   LINH was performed which showed no right to left shunt and no intracardiac thrombus.  EEG showed diffuse slowing but no epileptiform activity.        Review Of Systems:   12 point review of systems was reviewed and was negative except as above.    Objective   Objective    Physical Exam:  Temp:  [97.6 °F (36.4 °C)-98.3 °F (36.8 °C)] 97.6 °F (36.4 °C)  Heart Rate:  [71-89] 87  Resp:  [14-18] 15  BP: (109-132)/(60-86) 132/74    Exam unchanged from 12/9/2019:  Well-developed well-nourished gentleman sitting up in bed eating in no acute distress  awake and alert; mucous membranes moist; sclerae anicteric; neck supple; lungs clear to auscultation bilaterally; CV regular rate and rhythm; abdomen soft nontender nondistended with active bowel sounds; extremities with no edema,  cyanosis or calf tenderness; palpable pedal pulses bilaterally; no Bradford catheter.       Results Review:          Results from last 7 days   Lab Units 12/10/19  2012 12/10/19  0342 12/09/19  0416 12/08/19  0508  12/06/19  0958   WBC 10*3/mm3  --  15.10* 10.50 13.40*   < > 15.70*   HEMOGLOBIN g/dL 11.1* 7.6* 10.2* 10.7*   < > 12.8*   HEMATOCRIT % 34.6* 22.6* 30.0* 31.8*   < > 38.5   PLATELETS 10*3/mm3  --  396 312 307   < > 439   INR   --   --   --   --   --  0.94    < > = values in this interval not displayed.     Results from last 7 days   Lab Units 12/10/19  0551 12/09/19  0416 12/08/19  0508   SODIUM mmol/L 134* 134* 134*   POTASSIUM mmol/L 3.9 4.0 4.5   CHLORIDE mmol/L 98 96* 98   CO2 mmol/L 25.0 25.0 23.0   BUN mg/dL 20 22 22   CREATININE mg/dL 0.96 1.17 1.15   GLUCOSE mg/dL 170* 199* 196*   CALCIUM mg/dL 9.0 9.3 9.3     Hemoglobin A1C (%)   Date/Time Value   12/06/2019 0338 9.5 (H)     TSH (uIU/mL)   Date/Time Value   12/06/2019 0958 2.340     Microbiology Results Abnormal     Procedure Component Value - Date/Time    Respiratory Panel, PCR - Swab, Nasopharynx [[id number]]  (Normal) Collected:  12/08/19 0027    Lab Status:  Final result Specimen:  Swab from Nasopharynx Updated:  12/08/19 0200     ADENOVIRUS, PCR Not Detected     Coronavirus 229E Not Detected     Coronavirus HKU1 Not Detected     Coronavirus NL63 Not Detected     Coronavirus OC43 Not Detected     Human Metapneumovirus Not Detected     Human Rhinovirus/Enterovirus Not Detected     Influenza B PCR Not Detected     Parainfluenza Virus 1 Not Detected     Parainfluenza Virus 2 Not Detected     Parainfluenza Virus 3 Not Detected     Parainfluenza Virus 4 Not Detected     Bordetella pertussis pcr Not Detected     Influenza A H1 2009 PCR Not Detected     Chlamydophila pneumoniae PCR Not Detected     Mycoplasma pneumo by PCR Not Detected     Influenza A PCR Not Detected     Influenza A H3 Not Detected     Influenza A H1 Not Detected     RSV, PCR Not  Detected        No radiology results for the last day    Results for orders placed during the hospital encounter of 12/05/19   Adult Transthoracic Echo Complete W/ Cont if Necessary Per Protocol    Narrative · Estimated EF = 60%.  · Left ventricular systolic function is normal.     Indications  Cardiac source of emboli.    Technically satisfactory study.  Mitral valve is structurally normal.  Tricuspid valve is structurally normal.  Aortic valve is structurally normal.  Pulmonic valve could not be well visualized.  No evidence for mitral tricuspid or aortic regurgitation is seen by   Doppler study.  Left atrium is normal in size.  Right atrium is normal in size.  Left ventricle is normal in size and contractility with ejection fraction   of 60%.  Right ventricle is normal in size.  Atrial septum is intact.  Aorta is normal.  No pericardial effusion or intracardiac thrombus is seen.    Impression  Structurally and functionally normal cardiac valves.  Normal left ventricle size and contractility with ejection fraction of   60%.  No pericardial effusion or intracardiac thrombus is seen.             I have reviewed the medications.    Assessment/Plan   Assessment/Plan   Brief Hospital Course:      Active Hospital Problems:  * Acute ischemic right PCA stroke involving the right occipital lobe (CMS/HCC)  Sudden vision impairment on left side of vision (Hemianopsia)  MRI done - large area of acute infarct on right occiput area  Neurology consulted  Started on aspirin and may benefit from addition of Plavix after his postop recovery  PT/OT/ST following  Cardiology performed LINH on 12/10 which showed no right to left shunt and no intracardiac thrombus  -Plan is for 30-day event monitor at discharge  -Dr. Caceres cardiology is recommending full anticoagulation once patient is stable from his stroke and surgery,  and Dr. Wells is recommending waiting 4 weeks    Type 2 diabetes mellitus with hyperglycemia, with long-term current  use of insulin (CMS/[facility])  Patient was on high dose of 70/30 preadmission  Endocrine consulted on 12/07/19 switch to basal bolus insulin with dosage adjustments for blood sugar control  Control is better now    FRANKI (acute kidney injury) (CMS/[facility])- (present on admission)  Likely prerenal azotemia from fluid volume fluctuations  Creatinine peaked at 1.93 and baseline appears to be 1.2  Current creatinine 1.17  Monitor    Status post colon resection   Partial Colectomy done 12/05 for a large unresectable transverse colon polyp multiple polyps      Essential hypertension- (present on admission)  Chronic and controlled  Continue metoprolol 50 mg twice a day     Hyperlipidemia, mixed- (present on admission)  Home atorvastatin dosage increased     Hyponatremia  Acute, mild  Monitor    Acute blood loss anemia  -Hemoglobin was 14.0 on admission and gradually drifted down to 10.2 on 12/9, but was 7.6 this a.m. with no signs of bleeding   -Hemoglobin recheck was 11.1  -monitor hemoglobin and transfuse for hemoglobin less than 7  -Stool occult blood ordered but would likely be positive given recent surgery          DVT prophylaxis: SCDs    Discharge Planning: I expect patient to be discharged to to rehab once placement is arranged and pre-CERT obtained.    Farrah Crain MD 12/9/2019 1946

## 2019-12-11 NOTE — THERAPY TREATMENT NOTE
Inpatient Rehabilitation - Speech Language Pathology Treatment Note  RIGOBERTO Mcdermott     Patient Name: Chao Lazar  : 1960  MRN: [id number]  Today's Date: 2019         Admit Date: 2019    Visit Dx:      ICD-10-CM ICD-9-CM   1. Polyp of transverse colon, unspecified type D12.3 211.3     Patient Active Problem List   Diagnosis   • Allergic state   • Asthma   • Atherosclerotic heart disease of native coronary artery without angina pectoris   • Chronic cough   • Degenerative joint disease   • Type 2 diabetes mellitus with hyperglycemia, with long-term current use of insulin (CMS/[facility])   • Type 2 diabetes mellitus with other diabetic neurological complication (CMS/[facility])   • Diabetic foot ulcer (CMS/[facility])   • Encounter for general adult medical examination without abnormal findings   • Gastroparesis   • History of prosthetic heart valve   • Hyperlipidemia, mixed   • Essential hypertension   • Nausea and vomiting   • Other specified dorsopathies, site unspecified   • Type 2 diabetes mellitus with hyperglycemia (CMS/[facility])   • Vitamin D deficiency   • Combined forms of age-related cataract, bilateral   • Other specified retinal disorders   • Presbyopia   • Type 2 or unspecified type diabetes mellitus   • Acute ischemic right PCA stroke involving the right occipital lobe (CMS/[facility])   • Status post colon resection   • FRANKI (acute kidney injury) (CMS/[facility])   • Hyponatremia   • Acute blood loss anemia        Therapy Treatment  Rehabilitation Treatment Summary     Row Name 19 1200             Treatment Time/Intention    Discipline  speech language pathologist  -VR      Document Type  therapy note (daily note)  -VR      Subjective Information  no complaints  -VR      Mode of Treatment  individual therapy  -VR      Care Plan Review  evaluation/treatment results reviewed;care plan/treatment goals reviewed  -VR      Patient Effort  good  -VR      Patient Response to Treatment  Pt was seen for Speech therapy to address  cognitive deficits.  Pt reported that he was independent w/ADLs including managing finances/meds and shopping.  Pt completed a visual scanning exercise including word cancellation (enlarged print) w/50% acc w/min verbal cues; increased to 80% acc w/mod-max visual and verbal cues.  Pt completed simple situational verbal problem solving w/70% acc w/min cues.  Simple math word problems were completed w/80% acc I'ly .;100% acc w/min verbal cues.  -VR      Recorded by [VR] Melissa Rgealado SLP 12/11/19 1236      Row Name                Wound 12/05/19 midline abdomen Incision    Wound - Properties Group Date first assessed: 12/05/19 [AL] Present on Hospital Admission: N [AL] Orientation: midline [AL] Location: abdomen [AL] Primary Wound Type: Incision [AL], SURGICAL INCISION  Recorded by:  [AL] Uma Garza RN 12/05/19 1246    Row Name                Wound 12/05/19 1551 Other (See comments) abdomen Incision    Wound - Properties Group Date first assessed: 12/05/19 [LEONARDO] Time first assessed: 1551 [LEONARDO] Side: Other (See comments) [LEONARDO] Location: abdomen [LEONARDO] Primary Wound Type: Incision [LEONARDO] Recorded by:  [LEONARDO] Viktoria Guillen RN 12/05/19 1551    Row Name 12/11/19 1200             Outcome Summary/Treatment Plan (SLP)    Daily Summary of Progress (SLP)  progress toward functional goals is good  -VR      Plan for Continued Treatment (SLP)  Continue to address cognitive deficits.  -VR      Anticipated Dischage Disposition  inpatient rehabilitation facility  -VR      Recorded by [VR] Melissa Regalado SLP 12/11/19 1236        User Key  (r) = Recorded By, (t) = Taken By, (c) = Cosigned By    Initials Name Effective Dates Discipline    Viktoria Harvey RN 03/01/19 -  Nurse    Uma Duque RN 03/01/19 -  Nurse    Melissa Cavazos SLP 06/17/19 -  SLP          EDUCATION  The patient has been educated in the following areas:   Cognitive Impairment.    SLP Recommendation and Plan  Daily Summary of Progress (SLP):  "progress toward functional goals is good     Plan for Continued Treatment (SLP): Continue to address cognitive deficits.  Anticipated Dischage Disposition: inpatient rehabilitation facility             SLP GOALS     Row Name 12/11/19 1200 12/09/19 1350          Scanning Goal 1 (SLP)    Improve Scanning Through: Goal 1 (SLP)  --  scanning from left to right on page to identify target in series of letters;scanning task/letter cancellation;complete Trail making task;use compensatory strategies for visual scanning;with moderate cues (50-74%)  -SC     Time Frame (Scanning Goal 1, SLP)  --  by discharge  -SC        Functional Problem Solving Skills Goal 1 (SLP)    Improve Problem Solving Through Goal 1 (SLP)  determine solutions to simple ADL/safety problems  -VR  determine solutions to simple ADL/safety problems;answer \"what if\" questions;sequence steps in a task;with minimal cues (75-90%)  -SC     Time Frame (Problem Solving Goal 1, SLP)  by discharge  -VR  by discharge  -SC     Progress (Problem Solving Goal 1, SLP)  70%;with minimal cues (75-90%)  -VR  --        Functional Math Skills Goal 1 (SLP)    Improve Functional Math Skills Through Goal 1 (SLP)  complete simple math problems;complete word problems involving time;complete word problems involving money;80%;with minimal cues (75-90%)  -VR  complete simple math problems;complete word problems involving time;complete word problems involving money;80%;with minimal cues (75-90%)  -SC     Time Frame (Functional Math Skills Goal 1, SLP)  by discharge  -VR  by discharge  -SC     Progress (Functional Math Skills Goal 1, SLP)  80%;independently (over 90% accuracy) 100% acc w/min cues  -VR  --        Pragmatic Skills Goal 1 (SLP)    Improve Pragmatic Skills Goal 1 (SLP)  --  -VR  give/attend to non-verbal signals;demonstrate awareness/response-intonation/stress;with minimal cues (75-90%)  -SC     Time Frame (Pragmatic Skills Goal 1, SLP)  --  -VR  by discharge  -SC     " Progress (Pragmatic Skills Goal 1, SLP)  --  -VR  --     Progress/Outcomes (Pragmatic Skills Goal 1, SLP)  --  -VR  --        Right Hemisphere Function Goal 1 (SLP)    Improve Right Hemisphere Function Through Goal 1 (SLP)  use compensatory strategies for left neglect;90%;with minimal cues (75-90%)  -VR  --     Time Frame (Right Hemisphere Function Goal 1, SLP)  by discharge  -VR  --     Progress (Right Hemisphere Function Goal 1, SLP)  50%;with minimal cues (75-90%) increased to 80% acc w/mod-max visual and verbal cues.  -VR  --     Progress/Outcomes (Right Hemisphere Function Goal 1, SLP)  continuing progress toward goal  -VR  --       User Key  (r) = Recorded By, (t) = Taken By, (c) = Cosigned By    Initials Name Provider Type    Christina Perez, SLP Speech and Language Pathologist    Melissa Cavazos, SLP Speech and Language Pathologist              Time Calculation:                        JAIDA Cordova  12/11/2019

## 2019-12-11 NOTE — ASSESSMENT & PLAN NOTE
-Hemoglobin was 14.0 on admission and gradually drifted down to 10.2 on 12/9, but was 7.6 on 12/10 with no signs of bleeding   -Hemoglobin recheck was 11.1

## 2019-12-11 NOTE — PROGRESS NOTES
"Post-op Note  RIGHT HEMICOLECTOMY  12/5/2019    Subjective   Chao Lazar is a 59 y.o. male status post open right hemicolectomy for unresectable transverse colon polyp performed on 12/5/2019.      Pain controlled. Tolerating diet, passing flatus, but no BM. LINH w/o shunt or thrombus.      Objective   /65 (BP Location: Right arm, Patient Position: Lying)   Pulse 78   Temp 98.3 °F (36.8 °C) (Oral)   Resp 15   Ht 185.4 cm (73\")   Wt 88.5 kg (195 lb 1.7 oz)   SpO2 99%   BMI 25.74 kg/m²   Vital signs reviewed  Abdomen is soft and obese mild tenderness to palpation incision healing well without any erythema or drainage. BS+    Assessment/Plan   59-year-old gentleman status post open right hemicolectomy for unresectable transverse colon polyp performed on 12/5/2019.  The following morning, the patient had left-sided hemianopsia, and was determined to have a right-sided PCA stroke.    Continue low residue diet   Continue pain management with Roxicodone, Dilaudid for breakthrough, and Tylenol.  Will add colace and milk of magnesia, hold on miralax  Okay for VTE prophylaxis, continue SCDs  No need for further antibiotics  No need for labs from surgical standpoint  Continue ambulation with assistance, and incentive spirometer while awake  Greatly appreciate the input of neurology and this problem.  We will follow their recommendations.  Appreciate Endocrinology input with management of his hyperglycemia and IM for management of his hypertension.  Patient is surgically stable for d/c when cleared by all consultants.  Plan for inpatient rehab following hospitalization    Ronaldo Ardon MD  12/11/2019  8:24 AM      "

## 2019-12-11 NOTE — DISCHARGE SUMMARY
[facility] Medicine Services  DISCHARGE SUMMARY        Prepared For PCP:  Al Kimbrough MD    Patient Name: Chao Lazar  : 1960  MRN: [id number]      Date of Admission:   2019    Date of Discharge:  2019    Length of stay:  LOS: 6 days     Hospital Course     Presenting Problem:   Polyp of transverse colon, unspecified type [D12.3]  Polyp of transverse colon, unspecified type [D12.3]      Active Hospital Problems    Diagnosis  POA   • **Acute ischemic right PCA stroke involving the right occipital lobe (CMS/HCC) [I63.531]  No     Priority: High   • Type 2 diabetes mellitus with hyperglycemia, with long-term current use of insulin (CMS/[facility]) [E11.65, Z79.4]  Not Applicable     Priority: High   • FRANKI (acute kidney injury) (CMS/[facility]) [N17.9]  Yes     Priority: Medium   • Essential hypertension [I10]  Yes     Priority: Medium   • Atherosclerotic heart disease of native coronary artery without angina pectoris [I25.10]  Yes     Priority: Medium   • Hyperlipidemia, mixed [E78.2]  Yes     Priority: Medium   • Gastroparesis [K31.84]  Yes     Priority: Medium   • Hyponatremia [E87.1]  No     Priority: Low   • Sphenoid sinusitis [J32.3]  Yes   • Acute blood loss anemia [D62]  No      Resolved Hospital Problems    Diagnosis Date Resolved POA   • Status post colon resection [Z90.49] 2019 Not Applicable     Priority: Medium   • Polyp of transverse colon [D12.3] 2019 Yes       Active Hospital Problems:  * Acute ischemic right PCA stroke involving the right occipital lobe (CMS/HCC)  Sudden vision impairment on left side of vision (Hemianopsia)  MRI done - large area of acute infarct on right occiput area  Neurology consulted  Started on aspirin and may benefit from addition of Plavix after his postop recovery  PT/OT/ST following  Cardiology performed LINH on 12/10 which showed no right to left shunt and no intracardiac thrombus  -Plan is for 30-day event monitor at  discharge  -Dr. Caceres cardiology is recommending full anticoagulation once patient is stable from his stroke and surgery,  and Dr. Wells is recommending waiting 4 weeks    Type 2 diabetes mellitus with hyperglycemia, with long-term current use of insulin (CMS/[facility])  Patient was on high dose of 70/30 preadmission  Endocrine consulted on 12/07/19 switch to basal bolus insulin with dosage adjustments for blood sugar control and it is better now    FRANKI (acute kidney injury) (CMS/[facility])- (present on admission)  Likely prerenal azotemia from fluid volume fluctuations  Creatinine peaked at 1.93 and baseline appears to be 1.2  Current creatinine 0.96  Avoid nephrotoxic medications    Essential hypertension- (present on admission)  Chronic and controlled  Continue metoprolol 50 mg twice a day     Hyperlipidemia, mixed- (present on admission)  Home atorvastatin dosage increased     Hyponatremia  Acute, mild      Sphenoid sinusitis- (present on admission)  -Noted on CT  -Initiate Flonase    Acute blood loss anemia  -Hemoglobin was 14.0 on admission and gradually drifted down to 10.2 on 12/9, but was 7.6 on 12/10 with no signs of bleeding   -Hemoglobin recheck was 11.1        Resolved Hospital Problems:  Status post colon resection-resolved as of 12/11/2019   Partial Colectomy done 12/05 for a large unresectable transverse colon polyp multiple polyps          Hospital Course:  Chao Lazar is a 59 y.o. male who had 36 polyps removed on colonoscopy 8/2019 and follow-up colonoscopy a few weeks ago showed 18 polyps 1 in the transverse colon which was 2 cm and too large to be removed.  General surgery was consulted and the patient was admitted this hospital stay for partial colon resection which was performed 12/5/2019.  The patient has a strong family history of colon cancer in his sister.  Record review: Patient has past medical history of hypertension, hyperlipidemia, insulin-dependent diabetes and  gastroparesis.  Postoperatively the patient complained of left hemianopsia and on CT was found to have an early subacute ischemic right occipital parietal PCA distribution ischemic stroke which was confirmed by MRI to be a large acute ischemic area involving the right occipital parietal region and additional small punctate foci of involving the thalamus and periventricular white matter.   LINH was performed which showed no right to left shunt and no intracardiac thrombus.  EEG showed diffuse slowing but no epileptiform activity.  The patient had adequate postoperative pain control with oral narcotics pain medication.  He did occasionally require nasal cannula O2 for mild hypoxia.  An exercise oximetry will be performed to determine if the patient needs continued O2 at this time.    Patient is felt to be stable for discharge to rehab.        Recommendation for Outpatient Providers:     Ophthalmology referral once released from rehab.        Reasons For Change In Medications and Indications for New Medications:        Day of Discharge     HPI: Patient reports some abdominal pain related to his surgical wounds but otherwise denies complaints.  He is having normal bowel movements and eating well.  No  complaints.  No chest pain or shortness of breath or cough.      Vital Signs:   Temp:  [97.3 °F (36.3 °C)-98.7 °F (37.1 °C)] 97.6 °F (36.4 °C)  Heart Rate:  [] 98  Resp:  [11-22] 20  BP: (109-135)/(60-86) 125/72     Physical Exam:  Physical Exam  Well-developed well-nourished gentleman in no acute distress sitting in the chair awake and alert; mucous membranes moist; sclerae anicteric; lungs clear to auscultation bilaterally; CV regular rate and rhythm; abdomen soft and nondistended with clean dry surgical dressings in place not removed; extremities with no edema, cyanosis or calf tenderness; palpable pedal pulses bilaterally; no Bradford catheter.    Pertinent  and/or Most Recent Results     Results from last 7 days    Lab Units 12/11/19  0258 12/10/19  2012 12/10/19  0551 12/10/19  0342 12/09/19  0416 12/08/19  0508 12/07/19  0526 12/06/19  0958 12/06/19  0338 12/05/19  2321 12/05/19  2142   WBC 10*3/mm3 9.30  --   --  15.10* 10.50 13.40* 11.50* 15.70* 15.90*  --  17.80*   HEMOGLOBIN g/dL 10.7* 11.1*  --  7.6* 10.2* 10.7* 10.7* 12.8* 13.6  --  14.0   HEMATOCRIT % 31.1* 34.6*  --  22.6* 30.0* 31.8* 32.5* 38.5 39.7  --  42.3   PLATELETS 10*3/mm3 420  --   --  396 312 307 276 439 379  --  434   SODIUM mmol/L  --   --  134*  --  134* 134* 137 136 133*  --  132*   POTASSIUM mmol/L  --   --  3.9  --  4.0 4.5 4.4 4.8  4.8 6.6* 6.4* 6.3*   CHLORIDE mmol/L  --   --  98  --  96* 98 99 98 100  --  96*   CO2 mmol/L  --   --  25.0  --  25.0 23.0 24.0 18.0* 19.0*  --  21.0*   BUN mg/dL  --   --  20  --  22 22 24* 22 21  --  20   CREATININE mg/dL  --   --  0.96  --  1.17 1.15 1.37* 1.93* 1.73*  --  1.68*   GLUCOSE mg/dL  --   --  170*  --  199* 196* 227* 405* 438*  --  396*   CALCIUM mg/dL  --   --  9.0  --  9.3 9.3 8.8 8.7 8.4*  --  8.6     Results from last 7 days   Lab Units 12/06/19  0958   PROTIME Seconds 10.0   INR  0.94   APTT seconds 23.3*     Results from last 7 days   Lab Units 12/06/19  0958   CHOLESTEROL mg/dL 175   TRIGLYCERIDES mg/dL 454*   HDL CHOL mg/dL 37*     Results from last 7 days   Lab Units 12/07/19  2315 12/06/19  0958 12/06/19  0338   TSH uIU/mL  --  2.340  --    HEMOGLOBIN A1C %  --   --  9.5*   PROCALCITONIN ng/mL 6.12*  --   --    LACTATE mmol/L 1.0  --   --        Brief Urine Lab Results  (Last result in the past 365 days)      Color   Clarity   Blood   Leuk Est   Nitrite   Protein   CREAT   Urine HCG        05/22/19 0138 YELLOW CLEAR TRACE NEGATIVE NEGATIVE 100               Microbiology Results Abnormal     Procedure Component Value - Date/Time    Respiratory Panel, PCR - Swab, Nasopharynx [[id number]]  (Normal) Collected:  12/08/19 0027    Lab Status:  Final result Specimen:  Swab from Nasopharynx Updated:   12/08/19 0200     ADENOVIRUS, PCR Not Detected     Coronavirus 229E Not Detected     Coronavirus HKU1 Not Detected     Coronavirus NL63 Not Detected     Coronavirus OC43 Not Detected     Human Metapneumovirus Not Detected     Human Rhinovirus/Enterovirus Not Detected     Influenza B PCR Not Detected     Parainfluenza Virus 1 Not Detected     Parainfluenza Virus 2 Not Detected     Parainfluenza Virus 3 Not Detected     Parainfluenza Virus 4 Not Detected     Bordetella pertussis pcr Not Detected     Influenza A H1 2009 PCR Not Detected     Chlamydophila pneumoniae PCR Not Detected     Mycoplasma pneumo by PCR Not Detected     Influenza A PCR Not Detected     Influenza A H3 Not Detected     Influenza A H1 Not Detected     RSV, PCR Not Detected          Xr Abdomen 2 View With Chest 1 View    Result Date: 12/8/2019  Impression:  1. Small amount of free air within the abdomen which is unremarkable given history of any colectomy on December 5 2. Couple of air fluid levels seen on decubitus view suggesting mild postop ileus 3. No stairstep air-fluid levels are seen that would suggest obstruction 4. Cardiomegaly without active cardiopulmonary disease  Electronically Signed By-Wilder London Jr. On:12/8/2019 8:09 AM This report was finalized on [id number] by  Wilder London Jr., .    Ct Head Without Contrast    Result Date: 12/7/2019  Impression: 1. Expected evolution of the acute/subacute right occipital infarct. No hemorrhage or mass effect. 2. No new infarct visible by CT. 3. Mild chronic age-related intracranial findings as above, unchanged. 4. Fluid level in the left sphenoid sinus.  This examination was interpreted by Eric Kaplan M.D. Electronically signed by:  Eric Kaplan M.D.  12/7/2019 8:08 PM    Mri Brain Without Contrast    Result Date: 12/6/2019  Impression:  1. Large area of acute ischemic change involving the right occipitoparietal region with additional small punctate foci involving the thalamus and  periventricular white matter as described above. Susceptibility weighted imaging demonstrates some curvilinear dark areas within the largest area of infarction, which may indicate petechial hemorrhage. 2. Mild parenchymal volume loss and ventricular white matter T2 and FLAIR high signal abnormality most commonly secondary to chronic small vessel ischemic change. 3. Paranasal sinus disease.     Electronically Signed ByClement Churchill On:12/6/2019 1:09 PM This report was finalized on [id number] by  Joshua Churchill, .    Xr Chest 1 View    Result Date: 12/7/2019  Impression: Impression: 1. No evidence of an acute pleural or pulmonary parenchymal abnormality. 2. Cardiomegaly. Electronically signed by:  Jeremie Caceres M.D.  12/7/2019 10:05 PM    Ct Angiogram Carotids    Result Date: 12/6/2019  Impression:  1. Acute to subacute area of infarction involving the right posterior cerebral artery territory with occlusion of the right P2 segment of the posterior cerebral artery. 2. No hemodynamically significant extracranial vascular disease.  Electronically Signed ByMaycol Sheikh On:12/6/2019 11:04 AM This report was finalized on [id number] by  Ronaldo Sheikh .    Ct Angiogram Head    Result Date: 12/6/2019  Impression:  1. Acute to subacute area of infarction involving the right posterior cerebral artery territory with occlusion of the right P2 segment of the posterior cerebral artery. 2. No hemodynamically significant extracranial vascular disease.  Electronically Signed ByMaycol Sheikh On:12/6/2019 11:04 AM This report was finalized on [id number] by  Ronaldo Sheikh .    Ct Head Without Contrast Stroke Protocol    Result Date: 12/6/2019  Impression:  1. Findings consistent with early subacute ischemic change in the right occipitoparietal region, PCA distribution. MRI is a more sensitive examination for determining acuity of ischemic change or for intracranial metastatic disease.  Electronically Signed ByClement Churchill On:12/6/2019  10:20 AM This report was finalized on [id number] by  Joshua Churchill, .    Xr Chest Pa & Lateral    Result Date: 11/26/2019  Impression: No acute cardiopulmonary process. 2. No interval change from 05/14/2019  Electronically Signed By-Wilder London Jr. On:11/26/2019 3:17 PM This report was finalized on [id number] by  Wilder London Jr., .                Results for orders placed during the hospital encounter of 12/05/19   Adult Transthoracic Echo Complete W/ Cont if Necessary Per Protocol    Narrative · Estimated EF = 60%.  · Left ventricular systolic function is normal.     Indications  Cardiac source of emboli.    Technically satisfactory study.  Mitral valve is structurally normal.  Tricuspid valve is structurally normal.  Aortic valve is structurally normal.  Pulmonic valve could not be well visualized.  No evidence for mitral tricuspid or aortic regurgitation is seen by   Doppler study.  Left atrium is normal in size.  Right atrium is normal in size.  Left ventricle is normal in size and contractility with ejection fraction   of 60%.  Right ventricle is normal in size.  Atrial septum is intact.  Aorta is normal.  No pericardial effusion or intracardiac thrombus is seen.    Impression  Structurally and functionally normal cardiac valves.  Normal left ventricle size and contractility with ejection fraction of   60%.  No pericardial effusion or intracardiac thrombus is seen.                     Test Results Pending at Discharge        Procedures Performed  Procedure(s):  open right hemicolectomy  12/10 1205 EEG      Consults:   Consults     Date and Time Order Name Status Description    12/7/2019 1740 Inpatient Endocrinology Consult      12/7/2019 0838 Inpatient Cardiology Consult Completed     12/5/2019 1751 Inpatient Hospitalist Consult Completed             Discharge Details        Discharge Medications      New Medications      Instructions Start Date   acetaminophen 500 MG tablet  Commonly known as:  TYLENOL    1,000 mg, Oral, 3 Times Daily      albuterol (2.5 MG/3ML) 0.083% nebulizer solution  Commonly known as:  PROVENTIL   2.5 mg, Nebulization, Every 6 Hours PRN      aspirin 81 MG EC tablet   81 mg, Oral, Daily   Start Date:  December 12, 2019     cyanocobalamin 1000 MCG tablet  Commonly known as:  VITAMIN B-12   1,000 mcg, Oral, Daily   Start Date:  December 12, 2019     docusate sodium 100 MG capsule   100 mg, Oral, 2 Times Daily      fluticasone 50 MCG/ACT nasal spray  Commonly known as:  FLONASE   2 sprays, Each Nare, Daily      insulin glargine 100 UNIT/ML injection  Commonly known as:  LANTUS   34 Units, Subcutaneous, Nightly      insulin lispro 100 UNIT/ML injection  Commonly known as:  humaLOG   0-9 Units, Subcutaneous, 4 Times Daily With Meals & Nightly      insulin lispro 100 UNIT/ML injection  Commonly known as:  humaLOG   0-9 Units, Subcutaneous, 3 Times Daily With Meals      insulin lispro 100 UNIT/ML injection  Commonly known as:  humaLOG   12 Units, Subcutaneous, 3 Times Daily With Meals      oxyCODONE 5 MG immediate release tablet  Commonly known as:  ROXICODONE   5 mg, Oral, Every 4 Hours PRN      polyethylene glycol packet  Commonly known as:  MIRALAX   17 g, Oral, Daily   Start Date:  December 12, 2019        Changes to Medications      Instructions Start Date   atorvastatin 20 MG tablet  Commonly known as:  LIPITOR  What changed:  how much to take   80 mg, Oral, Every Evening, 1 daily      diphenhydrAMINE 25 mg capsule  Commonly known as:  BENADRYL ALLERGY  What changed:    · when to take this  · reasons to take this   50 mg, Oral, Every 4 Hours PRN      metFORMIN 500 MG tablet  Commonly known as:  GLUCOPHAGE  What changed:    · when to take this  · additional instructions   500 mg, Oral, 3 Times Daily      omeprazole 40 MG capsule  Commonly known as:  PrilOSEC  What changed:  when to take this   40 mg, Oral, Daily, May take the day of surgery          Continue These Medications      Instructions  Start Date   B-D UF III MINI PEN NEEDLES 31G X 5 MM misc  Generic drug:  Insulin Pen Needle   USE WITH INSULIN PEN 4 TIMES A DAY DX E11.65      Insulin Pen Needle 31G X 5 MM misc  Commonly known as:  B-D UF III MINI PEN NEEDLES   Use with insulin 4 times daily dx:e11.65      Cholecalciferol 125 MCG (5000 UT) tablet   1 tablet, Oral, Daily, Stop taking on 11/28      FARXIGA 10 MG tablet  Generic drug:  Dapagliflozin Propanediol   1 tablet, Oral, Every Morning Before Breakfast, Do not take morning of surgery      metoclopramide 10 MG tablet  Commonly known as:  REGLAN   10 mg, Oral, 3 Times Daily, Do not take the morning of surgery      metoprolol tartrate 50 MG tablet  Commonly known as:  LOPRESSOR   50 mg, Oral, 2 Times Daily, Take one twice daily      ONE TOUCH ULTRA TEST test strip  Generic drug:  glucose blood   ONETOUCH ULTRA BLUE STRP      ONETOUCH DELICA LANCETS 33G misc   ONETOUCH DELICA LANCETS 33G         Stop These Medications    NOVOLIN 70/30 FLEXPEN RELION (70-30) 100 UNIT/ML suspension pen-injector  Generic drug:  Insulin NPH Isophane & Regular            No Known Allergies      Discharge Disposition:  Rehab Facility or Unit (DC - External)    Diet:  Hospital:  Diet Order   Procedures   • Diet Gastrointestinal; [place]         Discharge Activity:   Activity Instructions     Activity as Tolerated              CODE STATUS:    Code Status and Medical Interventions:   Ordered at: 12/05/19 4471     Level Of Support Discussed With:    Patient     Code Status:    CPR     Medical Interventions (Level of Support Prior to Arrest):    Full         Follow-up Appointments  Future Appointments   Date Time Provider Department Center   1/7/2020 11:00 AM LAB  [facility] None   1/14/2020 12:00 PM Radha Manriquez MD [facility] None   1/15/2020  1:00 PM Al Kimbrough MD [facility] None       Additional Instructions for the Follow-ups that You Need to Schedule     Call MD With Problems /  Concerns   As directed      Instructions: Call [phone] or email [email or web address] for problems or concerns.    Order Comments:  Instructions: Call [phone] or email [email or web address] for problems or concerns.          Discharge Follow-up with PCP   As directed       Currently Documented PCP:    Al Kimbrough MD    PCP Phone Number:    [phone]     Follow Up Details:  Follow-up with PCP after released from rehab                 Condition on Discharge:      Stable          Electronically signed by Farrah Crain MD, 12/11/19, 2:11 PM.    Time: I spent 40 minutes on this discharge activity which included face-to-face encounter with the patient/reviewing the data in the system/coordination of the care with the nursing staff as well as consultants/documentation/entering orders.

## 2019-12-11 NOTE — PLAN OF CARE
Pt following direction  well today.  Still with moderate left side neglect and left side visual loss making transfers and gait training a challenge for pt.  Pt still will not avoid obstacle on the left without cues making pt a high fall risk.  Pt is far from his PLOF but has potential to cont to improve.   Recommend IP rehab at d/c to allow pt to recover his mobility.

## 2019-12-11 NOTE — PLAN OF CARE
Problem: Patient Care Overview  Goal: Plan of Care Review  Outcome: Ongoing (interventions implemented as appropriate)  Flowsheets (Taken 12/11/2019 6290)  Plan of Care Reviewed With: patient; sibling; other (see comments) (sister)  Outcome Summary: Re-eval completed following dx of acute CVA. Pt demo'ed good strength in BUE's, sensation, fm/gm skills, however limited by L visual field loss. Pt with difficulty tracking to the L much beyond midline. Pt motivated and agreeable to participatee and return to PLOF. Rec IP rehab upon d/c. Will follow 5x a week while IP.

## 2019-12-11 NOTE — THERAPY TREATMENT NOTE
Patient Name: Chao Lazar  : 1960    MRN: [id number]                              Today's Date: 2019       Admit Date: 2019    Visit Dx:     ICD-10-CM ICD-9-CM   1. Status post colon resection Z90.49 V45.89   2. Polyp of transverse colon, unspecified type D12.3 211.3     Patient Active Problem List   Diagnosis   • Allergic state   • Asthma   • Atherosclerotic heart disease of native coronary artery without angina pectoris   • Chronic cough   • Degenerative joint disease   • Type 2 diabetes mellitus with hyperglycemia, with long-term current use of insulin (CMS/[facility])   • Type 2 diabetes mellitus with other diabetic neurological complication (CMS/[facility])   • Diabetic foot ulcer (CMS/[facility])   • Encounter for general adult medical examination without abnormal findings   • Gastroparesis   • History of prosthetic heart valve   • Hyperlipidemia, mixed   • Essential hypertension   • Nausea and vomiting   • Other specified dorsopathies, site unspecified   • Type 2 diabetes mellitus with hyperglycemia (CMS/[facility])   • Vitamin D deficiency   • Combined forms of age-related cataract, bilateral   • Other specified retinal disorders   • Presbyopia   • Type 2 or unspecified type diabetes mellitus   • Acute ischemic right PCA stroke involving the right occipital lobe (CMS/[facility])   • FRANKI (acute kidney injury) (CMS/[facility])   • Hyponatremia   • Acute blood loss anemia   • Sphenoid sinusitis     Past Medical History:   Diagnosis Date   • ACE-inhibitor cough    • Arthritis    • Diabetes mellitus (CMS/[facility]) 1988    type 2    • GERD (gastroesophageal reflux disease)    • Hyperlipidemia    • Hypertension    • RAD (reactive airway disease)      Past Surgical History:   Procedure Laterality Date   • CARDIAC CATHETERIZATION     • CHOLECYSTECTOMY     • COLON RESECTION SMALL BOWEL Right 2019    Procedure: open right hemicolectomy;  Surgeon: Ronaldo Ardon MD;  Location: [facility] OR;  Service: General   •  COLONOSCOPY  2019    x2    • CORONARY ANGIOPLASTY WITH STENT PLACEMENT  04/2017   • FRACTURE SURGERY      collar bone as a child    • KNEE ARTHROSCOPY Left 08/2003   • KNEE JOINT MANIPULATION Left 04/2010   • TOTAL KNEE ARTHROPLASTY Bilateral     2013,2011     General Information     [facility] Name 12/11/19 1549          PT Evaluation Time/Intention    Document Type  therapy note (daily note)  -SC     Mode of Treatment  individual therapy;physical therapy  -[facility] Name 12/11/19 1549          General Information    Existing Precautions/Restrictions  fall abdominal binder  -SC     Barriers to Rehab  visual deficit left side  -[facility] Name 12/11/19 1549          Cognitive Assessment/Intervention- PT/OT    Orientation Status (Cognition)  oriented x 3;oriented to;person;place;situation  -[facility] Name 12/11/19 1549          Safety Issues, Functional Mobility    Safety Issues Affecting Function (Mobility)  sequencing abilities;problem solving;safety precautions follow-through/compliance  -SC     Impairments Affecting Function (Mobility)  balance;coordination;postural/trunk control;visual/perceptual  -SC     Comment, Safety Issues/Impairments (Mobility)  left side neglect and left side visual deficits  -SC       User Key  (r) = Recorded By, (t) = Taken By, (c) = Cosigned By    Initials Name Provider Type    SC Melba Everett, PTA Physical Therapy Assistant        Mobility     [facility] Name 12/11/19 1552          Bed Mobility Assessment/Treatment    Bed Mobility Assessment/Treatment  supine-sit  -SC     Supine-Sit [place] (Bed Mobility)  verbal cues;minimum assist (75% patient effort) toward left side.  pt needs extra time to complete task  -[facility] Name 12/11/19 1555          Transfer Assessment/Treatment    Comment (Transfers)  needed assist for pt to find left side of walker  -[facility] Name 12/11/19 1551          Bed-Chair Transfer    Bed-Chair [place] (Transfers)  minimum assist (75% patient effort);1  person assist  -SC     Assistive Device (Bed-Chair Transfers)  walker, front-wheeled  -SC     Row Name 12/11/19 1557          Sit-Stand Transfer    Sit-Stand [place] (Transfers)  contact guard;1 person assist  -SC     Assistive Device (Sit-Stand Transfers)  walker, front-wheeled  -SC     Row Name 12/11/19 1550          Gait/Stairs Assessment/Training    Gait/Stairs Assessment/Training  gait/ambulation assistive device;gait/ambulation independence  -SC     [place] Level (Gait)  minimum assist (75% patient effort);verbal cues  -SC     Assistive Device (Gait)  walker, front-wheeled  -SC     Distance in Feet (Gait)  70'  -SC     Pattern (Gait)  step-to  -SC     Deviations/Abnormal Patterns (Gait)  gait speed decreased;stride length decreased  -SC     Comment (Gait/Stairs)  pt with very guarded posture as pt is aware that he is not seeing entire enviroment.  pt needs cues to scan enviroment to increase left side awarenss.    -SC       User Key  (r) = Recorded By, (t) = Taken By, (c) = Cosigned By    Initials Name Provider Type    SC Melba Everett, PTA Physical Therapy Assistant        Obj/Interventions     Row Name 12/11/19 1553          Static Sitting Balance    Level of [place] (Unsupported Sitting, Static Balance)  supervision  -SC     Sitting Position (Unsupported Sitting, Static Balance)  sitting on edge of bed  -SC     Time Able to Maintain Position (Unsupported Sitting, Static Balance)  4 to 5 minutes  -SC     Row Name 12/11/19 1556          Static Standing Balance    Level of [place] (Supported Standing, Static Balance)  contact guard assist  -SC     Time Able to Maintain Position (Supported Standing, Static Balance)  1 to 2 minutes  -SC     Assistive Device Utilized (Supported Standing, Static Balance)  walker, rolling  -SC     Row Name 12/11/19 1550          Dynamic Standing Balance    Level of [place], Reaches Outside Midline (Standing, Dynamic Balance)  minimal assist, 75% patient  effort  -SC     Time Able to Maintain Position, Reaches Outside Midline (Standing, Dynamic Balance)  4 to 5 minutes  -SC     Assistive Device Utilized (Supported Standing, Dynamic Balance)  walker, rolling  -SC       User Key  (r) = Recorded By, (t) = Taken By, (c) = Cosigned By    Initials Name Provider Type    Melba Ramirez PTA Physical Therapy Assistant        Goals/Plan    No documentation.       Clinical Impression     [facility] Name 12/11/19 155          Pain Assessment    Additional Documentation  Pain Scale: FACES Pre/Post-Treatment (Group)  -[facility] Name 12/11/19 1550          Pain Scale: Numbers Pre/Post-Treatment    Pain Location  abdomen  -SC     Pain Intervention(s)  Rest  -[facility] Name 12/11/19 3372          Pain Scale: FACES Pre/Post-Treatment    Pain: FACES Scale, Pretreatment  2-->hurts little bit  -SC     Pain: FACES Scale, Post-Treatment  4-->hurts little more  -SC     Pre/Post Treatment Pain Comment  most abdominal pain occurs with transfer out of the bed  -[facility] Name 12/11/19 8536          Vital Signs    O2 Delivery Pre Treatment  room air  -SC     O2 Delivery Intra Treatment  room air  -SC     O2 Delivery Post Treatment  room air  -[facility] Name 12/11/19 1556          Positioning and Restraints    Pre-Treatment Position  in bed  -SC     Post Treatment Position  chair  -SC     In Chair  notified nsg;sitting;call light within reach;with family/caregiver;exit alarm on  -SC       User Key  (r) = Recorded By, (t) = Taken By, (c) = Cosigned By    Initials Name Provider Type    Melba Ramirez PTA Physical Therapy Assistant        Outcome Measures     Row Name 12/11/19 9734          How much help from another person do you currently need...    Turning from your back to your side while in flat bed without using bedrails?  3  -SC     Moving from lying on back to sitting on the side of a flat bed without bedrails?  3  -SC     Moving to and from a bed to a chair (including a  wheelchair)?  3  -SC     Standing up from a chair using your arms (e.g., wheelchair, bedside chair)?  3  -SC     Climbing 3-5 steps with a railing?  2  -SC     To walk in hospital room?  2  -SC     AM-PAC 6 Clicks Score (PT)  16  -SC     Row Name 12/11/19 1559          Modified [place] Scale    Modified [place] Scale  4 - Moderately severe disability.  Unable to walk without assistance, and unable to attend to own bodily needs without assistance.  -SC     Row Name 12/11/19 1559          Functional Assessment    Outcome Measure Options  Modified [place];AM-PAC 6 Clicks Basic Mobility (PT)  -SC       User Key  (r) = Recorded By, (t) = Taken By, (c) = Cosigned By    Initials Name Provider Type    Melba Ramirez PTA Physical Therapy Assistant          PT Recommendation and Plan     Outcome Summary/Treatment Plan (PT)  Anticipated Discharge Disposition (PT): inpatient rehabilitation facility  Plan of Care Reviewed With: patient, sibling     Time Calculation:   PT Charges     Row Name 12/11/19 1608             Time Calculation    Start Time  0950  -SC      Stop Time  1020  -SC      Time Calculation (min)  30 min  -SC      PT Received On  12/11/19  -SC      PT - Next Appointment  12/12/19  -SC         Time Calculation- PT    Total Timed Code Minutes- PT  30 minute(s)  -SC         Timed Charges    88544 -  PT Neuromuscular Reeducation Minutes  10  -SC      96552 - Gait Training Minutes   10  -SC      20031 - PT Therapeutic Activity Minutes  10  -SC        User Key  (r) = Recorded By, (t) = Taken By, (c) = Cosigned By    Initials Name Provider Type    Melba Ramirez PTA Physical Therapy Assistant        Therapy Charges for Today     Code Description Service Date Service Provider Modifiers Qty    17951567799 HC GAIT TRAINING EA 15 MIN 12/11/2019 Melba Everett, PTA GP 1    51590138696 HC PT NEUROMUSC RE EDUCATION EA 15 MIN 12/11/2019 Melba Everett, CHANDU GP 1    82511443214 HC PT THERAPEUTIC ACT EA 15 MIN  12/11/2019 Melba Everett, PTA GP 1          PT G-Codes  Outcome Measure Options: Modified [place], AM-PAC 6 Clicks Basic Mobility (PT)  AM-PAC 6 Clicks Score (PT): 16  Modified [place] Scale: 4 - Moderately severe disability.  Unable to walk without assistance, and unable to attend to own bodily needs without assistance.    Melba Everett, PTA  12/11/2019

## 2019-12-11 NOTE — PLAN OF CARE
Problem: Patient Care Overview  Goal: Plan of Care Review  Outcome: Ongoing (interventions implemented as appropriate)  Flowsheets (Taken 12/11/2019 0413)  Progress: improving  Plan of Care Reviewed With: patient  Goal: Individualization and Mutuality  Outcome: Ongoing (interventions implemented as appropriate)  Goal: Discharge Needs Assessment  Outcome: Ongoing (interventions implemented as appropriate)  Goal: Interprofessional Rounds/Family Conf  Outcome: Ongoing (interventions implemented as appropriate)     Problem: Fall Risk (Adult)  Goal: Absence of Fall  Outcome: Ongoing (interventions implemented as appropriate)  Flowsheets (Taken 12/11/2019 0413)  Absence of Fall: making progress toward outcome     Problem: Bowel Resection (Adult)  Goal: Signs and Symptoms of Listed Potential Problems Will be Absent, Minimized or Managed (Bowel Resection)  Outcome: Ongoing (interventions implemented as appropriate)  Flowsheets (Taken 12/11/2019 0413)  Problems Assessed (Bowel Resection): all  Problems Present (Bowel Resection): bowel motility decreased  Goal: Anesthesia/Sedation Recovery  Outcome: Ongoing (interventions implemented as appropriate)     Problem: Pain, Acute (Adult)  Goal: Acceptable Pain Control/Comfort Level  Outcome: Ongoing (interventions implemented as appropriate)  Flowsheets (Taken 12/11/2019 0413)  Acceptable Pain Control/Comfort Level: making progress toward outcome     Problem: Stroke (Ischemic) (Adult)  Goal: Signs and Symptoms of Listed Potential Problems Will be Absent, Minimized or Managed (Stroke)  Outcome: Ongoing (interventions implemented as appropriate)  Flowsheets (Taken 12/11/2019 0413)  Problems Assessed (Stroke (Ischemic)): all  Problems Assessed (Stroke (Ischemic)): communication impairment; cognitive impairment     Problem: Skin Injury Risk (Adult)  Goal: Identify Related Risk Factors and Signs and Symptoms  Outcome: Ongoing (interventions implemented as appropriate)  Flowsheets  (Taken 12/11/2019 4893)  Related Risk Factors (Skin Injury Risk): body weight extremes; mobility impaired  Goal: Skin Health and Integrity  Outcome: Ongoing (interventions implemented as appropriate)  Flowsheets (Taken 12/11/2019 0413)  Skin Health and Integrity: making progress toward outcome

## 2019-12-11 NOTE — THERAPY RE-EVALUATION
Acute Care - Occupational Therapy Re-Evaluation  [facility]     Patient Name: Chao Lazar  : 1960  MRN: [id number]  Today's Date: 2019             Admit Date: 2019       ICD-10-CM ICD-9-CM   1. Polyp of transverse colon, unspecified type D12.3 211.3     Patient Active Problem List   Diagnosis   • Allergic state   • Asthma   • Atherosclerotic heart disease of native coronary artery without angina pectoris   • Chronic cough   • Degenerative joint disease   • Type 2 diabetes mellitus with hyperglycemia, with long-term current use of insulin (CMS/[facility])   • Type 2 diabetes mellitus with other diabetic neurological complication (CMS/[facility])   • Diabetic foot ulcer (CMS/[facility])   • Encounter for general adult medical examination without abnormal findings   • Gastroparesis   • History of prosthetic heart valve   • Hyperlipidemia, mixed   • Essential hypertension   • Nausea and vomiting   • Other specified dorsopathies, site unspecified   • Type 2 diabetes mellitus with hyperglycemia (CMS/[facility])   • Vitamin D deficiency   • Combined forms of age-related cataract, bilateral   • Other specified retinal disorders   • Presbyopia   • Type 2 or unspecified type diabetes mellitus   • Acute ischemic right PCA stroke involving the right occipital lobe (CMS/[facility])   • FRANKI (acute kidney injury) (CMS/[facility])   • Hyponatremia   • Acute blood loss anemia     Past Medical History:   Diagnosis Date   • ACE-inhibitor cough    • Arthritis    • Diabetes mellitus (CMS/[facility]) 1988    type 2    • GERD (gastroesophageal reflux disease)    • Hyperlipidemia    • Hypertension    • RAD (reactive airway disease)      Past Surgical History:   Procedure Laterality Date   • CARDIAC CATHETERIZATION     • CHOLECYSTECTOMY     • COLON RESECTION SMALL BOWEL Right 2019    Procedure: open right hemicolectomy;  Surgeon: Ronaldo Ardon MD;  Location: [facility];  Service: General   • COLONOSCOPY  2019    x2    • CORONARY ANGIOPLASTY  WITH STENT PLACEMENT  04/2017   • FRACTURE SURGERY      collar bone as a child    • KNEE ARTHROSCOPY Left 08/2003   • KNEE JOINT MANIPULATION Left 04/2010   • TOTAL KNEE ARTHROPLASTY Bilateral     2013,2011          OT ASSESSMENT FLOWSHEET (last 12 hours)      Occupational Therapy Evaluation     Row Name 12/11/19 1423                   OT Evaluation Time/Intention    Document Type  re-evaluation  -        Mode of Treatment  occupational therapy  -           General Information    Patient Profile Reviewed?  yes  -        Patient/Family Observations  sister present, continued L visual field cut   -        Pertinent History of Current Functional Problem  acute CVA following R hemicolectomy, L sided deficits  -        Existing Precautions/Restrictions  fall abd binder in place  -        Barriers to Rehab  visual deficit  -           Bed Mobility Assessment/Treatment    Bed Mobility Assessment/Treatment  bed mobility (all) activities;supine-sit  -ANDRÉS        Supine-Sit [place] (Bed Mobility)  contact guard;1 person assist  -        Assistive Device (Bed Mobility)  bed rails  -        Comment (Bed Mobility)  to the [facility]           Functional Mobility    Functional Mobility- Ind. Level  contact guard assist;1 person  -           Transfer Assessment/Treatment    Transfer Assessment/Treatment  sit-stand transfer;bed-chair transfer  -           Bed-Chair Transfer    Bed-Chair [place] (Transfers)  minimum assist (75% patient effort);1 person assist  -        Assistive Device (Bed-Chair Transfers)  walker, front-wheeled  -ANDRÉS           Sit-Stand Transfer    Sit-Stand [place] (Transfers)  contact guard;1 person assist  -        Assistive Device (Sit-Stand Transfers)  walker, front-wheeled  -ANDRÉS           ADL Assessment/Intervention    BADL Assessment/Intervention  lower body dressing  -           Lower Body Dressing Assessment/Training    Lower Body Dressing [place] Level   socks;supervision  -ANDRÉS        Lower Body Dressing Position  edge of bed sitting  -ANDRÉS        Comment (Lower Body Dressing)  as seen to re-adjust B socks  -ANDRÉS           General ROM    GENERAL ROM COMMENTS  BUE AROM WFL  -ANDRÉS           MMT (Manual Muscle Testing)    General MMT Comments  BUE WFL  -ANDRÉS           Positioning and Restraints    Pre-Treatment Position  in bed  -ANDRÉS        Post Treatment Position  chair  -ANDRÉS        In Chair  sitting;call light within reach;encouraged to call for assist;with family/caregiver  -ANDRÉS           Pain Scale: Numbers Pre/Post-Treatment    Pain Scale: Numbers, Pretreatment  0/10 - no pain  -ANDRÉS        Pain Scale: Numbers, Post-Treatment  0/10 - no pain  -ANDRÉS           Wound 12/05/19 midline abdomen Incision    Wound - Properties Group Date first assessed: 12/05/19  -AL Present on Hospital Admission: N  -AL Orientation: midline  -AL Location: abdomen  -AL Primary Wound Type: Incision  -AL, SURGICAL INCISION        Wound 12/05/19 1551 Other (See comments) abdomen Incision    Wound - Properties Group Date first assessed: 12/05/19  -LEONARDO Time first assessed: 1551  -LEONARDO Side: Other (See comments)  -LEONARDO Location: abdomen  -LEONARDO Primary Wound Type: Incision  -LEONARDO       Clinical Impression (OT)    Criteria for Skilled Therapeutic Interventions Met (OT Eval)  yes;treatment indicated  -ANDRÉS        Rehab Potential (OT Eval)  good, to achieve stated therapy goals  -ANDRÉS        Therapy Frequency (OT Eval)  5 times/wk  -ANDRÉS        Anticipated Discharge Disposition (OT)  inpatient rehabilitation facility  -ANDRÉS           OT Goals    Transfer Goal Selection (OT)  transfer, OT goal 1  -ANDRÉS        Dressing Goal Selection (OT)  dressing, OT goal 1  -ANDRÉS        Toileting Goal Selection (OT)  toileting, OT goal 1  -ANDRÉS           Transfer Goal 1 (OT)    Activity/Assistive Device (Transfer Goal 1, OT)  transfers, all  -ANDRÉS        [place] Level/Cues Needed (Transfer Goal 1, OT)  supervision required  -ANDRÉS        Time Frame  (Transfer Goal 1, OT)  2 weeks  -ANDRÉS           Dressing Goal 1 (OT)    Activity/Assistive Device (Dressing Goal 1, OT)  dressing skills, all  -ANDRÉS        Falls/Cues Needed (Dressing Goal 1, OT)  conditional independence  -ANDRÉS        Time Frame (Dressing Goal 1, OT)  2 weeks  -ANDRÉS           Toileting Goal 1 (OT)    Activity/Device (Toileting Goal 1, OT)  toileting skills, all  -ANDRÉS        [place] Level/Cues Needed (Toileting Goal 1, OT)  conditional independence  -ANDRÉS        Time Frame (Toileting Goal 1, OT)  2 weeks  -ANDRÉS          User Key  (r) = Recorded By, (t) = Taken By, (c) = Cosigned By    Initials Name Effective Dates    Viktoria Harvey RN 03/01/19 -     Uma Duque RN 03/01/19 -     Dominga Milan OT 07/25/19 -                OT Recommendation and Plan  Outcome Summary/Treatment Plan (OT)  Anticipated Discharge Disposition (OT): inpatient rehabilitation facility  Therapy Frequency (OT Eval): 5 times/wk  Plan of Care Review  Plan of Care Reviewed With: patient, sibling, other (see comments)(sister)  Plan of Care Reviewed With: patient, sibling, other (see comments)(sister)  Outcome Summary: Re-eval completed following dx of acute CVA. Pt demo'ed good strength in BUE's, sensation, fm/gm skills, however limited by L visual field loss. Pt with difficulty tracking to the L much beyond midline. Pt motivated and agreeable to participatee and return to [facility]. Rec IP rehab upon d/c.        Time Calculation:   Time Calculation- OT     Row Name 12/11/19 1432             Time Calculation- OT    OT Start Time  0200  -ANDRÉS      OT Stop Time  0218  -ANDRÉS      OT Time Calculation (min)  18 min  -ANDRÉS      Total Timed Code Minutes- OT  8 minute(s)  -ANDRÉS      OT Received On  12/11/19  -ANDRÉS      OT - Next Appointment  12/12/19  -ANDRÉS      OT Goal Re-Cert Due Date  12/25/19  -ANDRÉS        User Key  (r) = Recorded By, (t) = Taken By, (c) = Cosigned By    Initials Name Provider Type    Dominga Milan, OT  Occupational Therapist        Therapy Charges for Today     Code Description Service Date Service Provider Modifiers Qty    09203759657  OT RE-EVAL 2 12/11/2019 Dominga Alcaraz OT GO 1    [id number]  OT NEUROMUSC RE EDUCATION EA 15 MIN 12/11/2019 Dominga Alcaraz OT GO 1               Dominga Alcaraz OT  12/11/2019

## 2019-12-12 NOTE — PROGRESS NOTES
Case Management Discharge Note      Final Note: [facility] - Acute                  Final Discharge Disposition Code: 62 - inpatient rehab facility

## 2019-12-12 NOTE — PAYOR COMM NOTE
"  [facility]  NPI # [id number]  TID # [id number]      RETURN CONTACT  TAYLOR ZALDIVAR RN [facility]   MARIA A /    PH: [phone]  FX: [phone]  ===========================    REFERENCE # [id number]    ATTN: FRANCISCO SCOTT    CLINICAL FROM 12/9-12/10-12/11      ( PATIENT DC ON 12/11)      DC SUMMARY ATTACHED AS WELL.     PLEASE CALL ME DIRECT WITH ANY QUESTIONS AND ADDITIONAL APPROVAL ON DAYS.     THANK YOU !          Nagi Chao EMILE (59 y.o. Male)     Date of Birth Social Security Number Address Home Phone MRN    [id number]  [id number] [place] [address] [phone] [id number]    Yarsani Marital Status          Mandaeism        Admission Date Admission Type Admitting Provider Attending Provider Department, Room/Bed    12/5/19 Elective Ronaldo Ardon MD  [facility] NEURO HEART, 265/1    Discharge Date Discharge Disposition Discharge Destination        12/11/2019 Rehab Facility or Unit (DC - External)              Attending Provider:  (none)   Allergies:  No Known Allergies    Isolation:  None   Infection:  None   Code Status:  Prior    Ht:  185.4 cm (73\")   Wt:  88.5 kg (195 lb 1.7 oz)    Admission Cmt:  None   Principal Problem:  Acute ischemic right PCA stroke involving the right occipital lobe (CMS/HCC) [I63.531] More...                 Active Insurance as of 12/5/2019     Primary Coverage     Payor Plan Insurance Group Employer/Plan Group    [employer] BLUE [employer]OSS [employer] BLUE CROSS BLUE [employer]IELD PPO [id number]     Payor Plan Address Payor Plan Phone Number Payor Plan Fax Number Effective Dates    [address] [phone]  1/1/2019 - None Entered    [facility] [address]       Subscriber Name Subscriber Birth Date Member ID       SHIV MALDONADO 1/1/1965 [id number]                 Emergency Contacts      (Rel.) Home Phone Work Phone Mobile Phone    SHIV MALDONADO (Spouse) [phone] -- [phone]          DC PLANNING NOTES   "     12/11 DC TO--Final Note: [facility] - Acute     12/11 MSW-  12/11 1358   [facility] accepted pt acute per Lynne/chandan and precert obtained 12/11.Secure chat sent to MD ETHAN and RN updating them      12/11/19 1358 [facility] accepted pt acute and precert obtained  12/11.Secure chat sent to MD ETHAN and RN  updating them        12/10/19 1309 [facility] accepted,precert started 12/10,no PASRR  needed --   12/09/19 1503 [facility] accepted acute,pending bed availability  at [facility],no PASRR needed,needs precert started  when close to dc               Physician Progress Notes (last 72 hours) (Notes from 12/09/19 1020 through 12/12/19 1020)      Esperanza Yao APRN at 12/11/19 0905     Attestation signed by Candice Wells MD at 12/11/19 1452    I have reviewed the documentation above and agree.                         LOS: 6 days     Chief Complaint: Left kenia-anopia    Subjective   SUBJECTIVE:  History taken from: patient chart RN      Patient Complaints: Patient states he is feeling better, confusion is better, pain is better.       Review of Systems   Constitutional: Negative.    Eyes: Positive for visual disturbance.   Cardiovascular: Negative.    Neurological: Negative.         ________________________________________________  Objective   OBJECTIVE:  On exam:  GENERAL: NAD  CARDIO: RRR  NEURO:  Oriented x3  EOMI, PERRL, left homonymous hemianopsia   CN 2-12 intact, No facial asymmetry  Speech clear without dysarthria  Sensations intact and equal bilaterally  Strength 5/5 and equal in all extremities  No ataxia        ________________________________________________   RESULTS REVIEW    VITAL SIGNS:  Temp:  [97.6 °F (36.4 °C)-98.7 °F (37.1 °C)] 98.3 °F (36.8 °C)  Heart Rate:  [] 78  Resp:  [11-22] 15  BP: (109-135)/(60-86) 113/65    LABS:   Lab Results   Component Value Date    WBC 9.30 12/11/2019    HGB 10.7 (L) 12/11/2019    HCT 31.1 (L) 12/11/2019    MCV 89.3 12/11/2019     12/11/2019     Lab Results   Component Value Date     GLUCOSE 170 (H) 12/10/2019    BUN 20 12/10/2019    CREATININE 0.96 12/10/2019    EGFRIFNONA 80 12/10/2019    BCR 20.8 12/10/2019    K 3.9 12/10/2019    CO2 25.0 12/10/2019    CALCIUM 9.0 12/10/2019    ALBUMIN 3.70 07/02/2019    LABIL2 0.8 (L) 05/26/2019    AST 26 07/02/2019    ALT 29 07/02/2019       Lab Results   Component Value Date    TSH 2.340 12/06/2019    LDL  12/06/2019      Comment:      Unable to calculate    LDL 82 12/06/2019    HGBA1C 9.5 (H) 12/06/2019    [id number] 403 12/06/2019         IMAGING STUDIES:  No radiology results for the last day    I reviewed the patient's new clinical results.    ________________________________________________      PROBLEM LIST:    Acute ischemic right PCA stroke involving the right occipital lobe (CMS/HCC)    Atherosclerotic heart disease of native coronary artery without angina pectoris    Type 2 diabetes mellitus with hyperglycemia, with long-term current use of insulin (CMS/HCC)    Gastroparesis    Hyperlipidemia, mixed    Essential hypertension    Status post colon resection    FRANKI (acute kidney injury) (CMS/HCC)    Hyponatremia    Acute blood loss anemia        Assessment/Plan   ASSESSMENT/PLAN:  1. Acute to subacute right occipital lobe ischemic stroke with occlusion of the right PCA P2 segment. Likely embolic.    -LINH completed by Dr. Caceres- no PFO or thrombus.   - Event monitor x30 days (ordered)   - Labs: A1C: 9.5, B12: 403, LDL: (unable to calculate), TSH: 2.34  - Antithrombotics: ASA 81 mg daily for now-  Discussed Eliquis with patient- after reviewing MRI- it is recommended that the patient wait 2-4 weeks prior to starting OAC.   - Statin: Lipitor 80 mg qhs    **Please refer to previous notes for further details and recommendations.     I discussed the patients findings and my recommendations with patient, family and nursing staff    EMRE Sierra  12/11/19  9:05 AM        Electronically signed by Candice Wells MD at 12/11/19 0491    "  Ronaldo Ardon MD at 12/11/19 0824        Post-op Note  RIGHT HEMICOLECTOMY  12/5/2019    Subjective   Chao Lazar is a 59 y.o. male status post open right hemicolectomy for unresectable transverse colon polyp performed on 12/5/2019.      Pain controlled. Tolerating diet, passing flatus, but no BM. LINH w/o shunt or thrombus.      Objective   /65 (BP Location: Right arm, Patient Position: Lying)   Pulse 78   Temp 98.3 °F (36.8 °C) (Oral)   Resp 15   Ht 185.4 cm (73\")   Wt 88.5 kg (195 lb 1.7 oz)   SpO2 99%   BMI 25.74 kg/m²    Vital signs reviewed  Abdomen is soft and obese mild tenderness to palpation incision healing well without any erythema or drainage. BS+    Assessment/Plan   59-year-old gentleman status post open right hemicolectomy for unresectable transverse colon polyp performed on 12/5/2019.  The following morning, the patient had left-sided hemianopsia, and was determined to have a right-sided PCA stroke.    Continue low residue diet   Continue pain management with Roxicodone, Dilaudid for breakthrough, and Tylenol.  Will add colace and milk of magnesia, hold on miralax  Okay for VTE prophylaxis, continue SCDs  No need for further antibiotics  No need for labs from surgical standpoint  Continue ambulation with assistance, and incentive spirometer while awake  Greatly appreciate the input of neurology and this problem.  We will follow their recommendations.  Appreciate Endocrinology input with management of his hyperglycemia and IM for management of his hypertension.  Patient is surgically stable for d/c when cleared by all consultants.  Plan for inpatient rehab following hospitalization    Ronaldo Ardon MD  12/11/2019  8:24 AM        Electronically signed by Ronaldo Ardon MD at 12/11/19 0861     Farrah Crain MD at 12/10/19 2001          [facility]   INPATIENT PROGRESS NOTE    Date of Admission: 12/5/2019  Length of Stay: 5  Primary Care Physician: " Al Kimbrough MD    Subjective   Subjective   CC:  Left side vision impairement immediate after colon resection    HPI: Patient is a 59-year-old gentleman who had 36 polyps removed on colonoscopy 8/2019 and follow-up colonoscopy a few weeks ago showed 18 polyps 1 in the transverse colon which was 2 cm and too large to be removed.  General surgery was consulted and the patient was admitted this hospital stay for partial colon resection which was performed 12/5/2019.  The patient has a strong family history of colon cancer in his sister.  Record review: Patient has past medical history of hypertension, hyperlipidemia, insulin-dependent diabetes and gastroparesis.  Postoperatively the patient complained of left hemianopsia and on CT was found to have an early subacute ischemic right occipital parietal PCA distribution ischemic stroke which was confirmed by MRI to be a large acute ischemic area involving the right occipital parietal region and additional small punctate foci of involving the thalamus and periventricular white matter.   LINH was performed which showed no right to left shunt and no intracardiac thrombus.  EEG showed diffuse slowing but no epileptiform activity.        Review Of Systems:   12 point review of systems was reviewed and was negative except as above.    Objective   Objective    Physical Exam:  Temp:  [97.6 °F (36.4 °C)-98.3 °F (36.8 °C)] 97.6 °F (36.4 °C)  Heart Rate:  [71-89] 87  Resp:  [14-18] 15  BP: (109-132)/(60-86) 132/74    Exam unchanged from 12/9/2019:  Well-developed well-nourished gentleman sitting up in bed eating in no acute distress  awake and alert; mucous membranes moist; sclerae anicteric; neck supple; lungs clear to auscultation bilaterally; CV regular rate and rhythm; abdomen soft nontender nondistended with active bowel sounds; extremities with no edema, cyanosis or calf tenderness; palpable pedal pulses bilaterally; no Bradford catheter.       Results Review:           Results from last 7 days   Lab Units 12/10/19  2012 12/10/19  0342 12/09/19  0416 12/08/19  0508  12/06/19  0958   WBC 10*3/mm3  --  15.10* 10.50 13.40*   < > 15.70*   HEMOGLOBIN g/dL 11.1* 7.6* 10.2* 10.7*   < > 12.8*   HEMATOCRIT % 34.6* 22.6* 30.0* 31.8*   < > 38.5   PLATELETS 10*3/mm3  --  396 312 307   < > 439   INR   --   --   --   --   --  0.94    < > = values in this interval not displayed.     Results from last 7 days   Lab Units 12/10/19  0551 12/09/19  0416 12/08/19  0508   SODIUM mmol/L 134* 134* 134*   POTASSIUM mmol/L 3.9 4.0 4.5   CHLORIDE mmol/L 98 96* 98   CO2 mmol/L 25.0 25.0 23.0   BUN mg/dL 20 22 22   CREATININE mg/dL 0.96 1.17 1.15   GLUCOSE mg/dL 170* 199* 196*   CALCIUM mg/dL 9.0 9.3 9.3     Hemoglobin A1C (%)   Date/Time Value   12/06/2019 0338 9.5 (H)     TSH (uIU/mL)   Date/Time Value   12/06/2019 0958 2.340     Microbiology Results Abnormal     Procedure Component Value - Date/Time    Respiratory Panel, PCR - Swab, Nasopharynx [[id number]]  (Normal) Collected:  12/08/19 0027    Lab Status:  Final result Specimen:  Swab from Nasopharynx Updated:  12/08/19 0200     ADENOVIRUS, PCR Not Detected     Coronavirus 229E Not Detected     Coronavirus HKU1 Not Detected     Coronavirus NL63 Not Detected     Coronavirus OC43 Not Detected     Human Metapneumovirus Not Detected     Human Rhinovirus/Enterovirus Not Detected     Influenza B PCR Not Detected     Parainfluenza Virus 1 Not Detected     Parainfluenza Virus 2 Not Detected     Parainfluenza Virus 3 Not Detected     Parainfluenza Virus 4 Not Detected     Bordetella pertussis pcr Not Detected     Influenza A H1 2009 PCR Not Detected     Chlamydophila pneumoniae PCR Not Detected     Mycoplasma pneumo by PCR Not Detected     Influenza A PCR Not Detected     Influenza A H3 Not Detected     Influenza A H1 Not Detected     RSV, PCR Not Detected        No radiology results for the last day    Results for orders placed during the hospital  encounter of 12/05/19   Adult Transthoracic Echo Complete W/ Cont if Necessary Per Protocol    Narrative · Estimated EF = 60%.  · Left ventricular systolic function is normal.     Indications  Cardiac source of emboli.    Technically satisfactory study.  Mitral valve is structurally normal.  Tricuspid valve is structurally normal.  Aortic valve is structurally normal.  Pulmonic valve could not be well visualized.  No evidence for mitral tricuspid or aortic regurgitation is seen by   Doppler study.  Left atrium is normal in size.  Right atrium is normal in size.  Left ventricle is normal in size and contractility with ejection fraction   of 60%.  Right ventricle is normal in size.  Atrial septum is intact.  Aorta is normal.  No pericardial effusion or intracardiac thrombus is seen.    Impression  Structurally and functionally normal cardiac valves.  Normal left ventricle size and contractility with ejection fraction of   60%.  No pericardial effusion or intracardiac thrombus is seen.             I have reviewed the medications.    Assessment/Plan   Assessment/Plan   Brief Hospital Course:      Active Hospital Problems:  * Acute ischemic right PCA stroke involving the right occipital lobe (CMS/HCC)  Sudden vision impairment on left side of vision (Hemianopsia)  MRI done - large area of acute infarct on right occiput area  Neurology consulted  Started on aspirin and may benefit from addition of Plavix after his postop recovery  PT/OT/ST following  Cardiology performed LINH on 12/10 which showed no right to left shunt and no intracardiac thrombus  -Plan is for 30-day event monitor at discharge  -Dr. Caceres cardiology is recommending full anticoagulation once patient is stable from his stroke and surgery,  and Dr. Wells is recommending waiting 4 weeks    Type 2 diabetes mellitus with hyperglycemia, with long-term current use of insulin (CMS/HCC)  Patient was on high dose of 70/30 preadmission  Endocrine consulted on  12/07/19 switch to basal bolus insulin with dosage adjustments for blood sugar control  Control is better now    FRANKI (acute kidney injury) (CMS/HCC)- (present on admission)  Likely prerenal azotemia from fluid volume fluctuations  Creatinine peaked at 1.93 and baseline appears to be 1.2  Current creatinine 1.17  Monitor    Status post colon resection   Partial Colectomy done 12/05 for a large unresectable transverse colon polyp multiple polyps      Essential hypertension- (present on admission)  Chronic and controlled  Continue metoprolol 50 mg twice a day     Hyperlipidemia, mixed- (present on admission)  Home atorvastatin dosage increased     Hyponatremia  Acute, mild  Monitor    Acute blood loss anemia  -Hemoglobin was 14.0 on admission and gradually drifted down to 10.2 on 12/9, but was 7.6 this a.m. with no signs of bleeding   -Hemoglobin recheck was 11.1  -monitor hemoglobin and transfuse for hemoglobin less than 7  -Stool occult blood ordered but would likely be positive given recent surgery          DVT prophylaxis: SCDs    Discharge Planning: I expect patient to be discharged to to rehab once placement is arranged and pre-CERT obtained.    Farrah Crain MD 12/9/2019 1946      Electronically signed by Farrah Crain MD at 12/10/19 2208     Joao Caceres MD at 12/10/19 1620             LOS: 5 days   Admiting Physician- Ronaldo Ardon,*    Reason For Followup:    CVA  CAD  Hypertension  Diabetes mellitus    Subjective     Denies any respiratory distress.    Objective     Hemodynamics are stable    Review of Systems:   Review of Systems   Constitution: Negative for chills and fever.   HENT: Negative for ear discharge and nosebleeds.    Eyes: Positive for vision loss in left eye. Negative for discharge and redness.   Cardiovascular: Negative for chest pain, orthopnea, palpitations, paroxysmal nocturnal dyspnea and syncope.   Respiratory: Negative for cough, shortness of breath and wheezing.    Endocrine:  Negative for heat intolerance.   Skin: Negative for rash.   Musculoskeletal: Negative for arthritis and myalgias.   Gastrointestinal: Negative for abdominal pain, melena, nausea and vomiting.   Genitourinary: Negative for dysuria and hematuria.   Neurological: Negative for dizziness, light-headedness, numbness and tremors.   Psychiatric/Behavioral: Negative for depression. The patient is not nervous/anxious.          Vital Signs  Vitals:    12/10/19 1535 12/10/19 1540 12/10/19 1545 12/10/19 1550   BP: 117/63 129/69 128/71 122/71   BP Location:       Patient Position:       Pulse: 88 87 89 88   Resp:       Temp:       TempSrc:       SpO2: 95% 97% 96% 97%   Weight:       Height:         Wt Readings from Last 1 Encounters:   12/09/19 88.3 kg (194 lb 10.7 oz)       Intake/Output Summary (Last 24 hours) at 12/10/2019 1620  Last data filed at 12/10/2019 0500  Gross per 24 hour   Intake --   Output 800 ml   Net -800 ml     Physical Exam:  Physical Exam   Constitutional: He is oriented to person, place, and time. He appears well-developed and well-nourished.   HENT:   Head: Normocephalic and atraumatic.   Eyes: Right eye exhibits no discharge. No scleral icterus.   Neck: No thyromegaly present.   Cardiovascular: Normal rate, regular rhythm and normal heart sounds. Exam reveals no gallop and no friction rub.   No murmur heard.  Pulmonary/Chest: Effort normal and breath sounds normal. No respiratory distress. He has no wheezes. He has no rales.   Abdominal: There is no tenderness.   Musculoskeletal: He exhibits no edema.   Lymphadenopathy:     He has no cervical adenopathy.   Neurological: He is alert and oriented to person, place, and time.   Skin: No rash noted. No erythema.   Psychiatric: He has a normal mood and affect.       Results Review:   Lab Results (last 24 hours)     Procedure Component Value Units Date/Time    POC Glucose Once [[id number]]  (Normal) Collected:  12/10/19 1558    Specimen:  Blood Updated:  12/10/19  1600     Glucose 100 mg/dL      Comment: Serial Number: [id number]:  [id number]       POC Glucose Once [[id number]]  (Normal) Collected:  12/10/19 1506    Specimen:  Blood Updated:  12/10/19 1507     Glucose 71 mg/dL      Comment: Serial Number: [id number]:  [id number]       POC Glucose Once [[id number]]  (Normal) Collected:  12/10/19 1315    Specimen:  Blood Updated:  12/10/19 1316     Glucose 89 mg/dL      Comment: Serial Number: [id number]:  [id number]       POC Glucose Once [[id number]]  (Abnormal) Collected:  12/10/19 0730    Specimen:  Blood Updated:  12/10/19 0732     Glucose 161 mg/dL      Comment: Serial Number: [id number]:  [id number]       Basic Metabolic Panel [[id number]]  (Abnormal) Collected:  12/10/19 0551    Specimen:  Blood Updated:  12/10/19 0659     Glucose 170 mg/dL      BUN 20 mg/dL      Creatinine 0.96 mg/dL      Sodium 134 mmol/L      Potassium 3.9 mmol/L      Chloride 98 mmol/L      CO2 25.0 mmol/L      Calcium 9.0 mg/dL      eGFR Non African Amer 80 mL/min/1.73      BUN/Creatinine Ratio 20.8     Anion Gap 11.0 mmol/L     Narrative:       GFR Normal >60  Chronic Kidney Disease <60  Kidney Failure <15      CBC & Differential [[id number]] Collected:  12/10/19 0342    Specimen:  Blood Updated:  12/10/19 0559    Narrative:       The following orders were created for panel order CBC & Differential.  Procedure                               Abnormality         Status                     ---------                               -----------         ------                     CBC Auto Differential[[id number]]        Abnormal            Final result                 Please view results for these tests on the individual orders.    CBC Auto Differential [[id number]]  (Abnormal) Collected:  12/10/19 0342    Specimen:  Blood Updated:  12/10/19 0559     WBC 15.10 10*3/mm3      RBC 2.50 10*6/mm3      Hemoglobin 7.6 g/dL      Hematocrit 22.6 %      MCV 90.4 fL      MCH 30.3 pg      MCHC 33.6  g/dL      RDW 13.9 %      RDW-SD 44.6 fl      MPV 8.3 fL      Platelets 396 10*3/mm3     Scan Slide [[id number]] Collected:  12/10/19 0342    Specimen:  Blood Updated:  12/10/19 0559     Scan Slide --     Comment: See Manual Differential Results       Manual Differential [[id number]]  (Abnormal) Collected:  12/10/19 0342    Specimen:  Blood Updated:  12/10/19 0559     Neutrophil % 63.0 %      Lymphocyte % 21.0 %      Monocyte % 6.0 %      Eosinophil % 7.0 %      Bands %  3.0 %      Neutrophils Absolute 9.97 10*3/mm3      Lymphocytes Absolute 3.17 10*3/mm3      Monocytes Absolute 0.91 10*3/mm3      Eosinophils Absolute 1.06 10*3/mm3      Polychromasia Slight/1+     WBC Morphology Normal     Giant Platelets Slight/1+    POC Glucose Once [[id number]]  (Abnormal) Collected:  12/09/19 1959    Specimen:  Blood Updated:  12/09/19 2001     Glucose 141 mg/dL      Comment: Serial Number: [id number]:  [id number]       Tissue Pathology Exam [[id number]] Collected:  12/05/19 1453    Specimen:  Tissue from Large Intestine, Right / Ascending Colon Updated:  12/09/19 1620     Case Report --     Surgical Pathology Report                         Case: [id number]                                  Authorizing Provider:  Ronaldo Ardon,   Collected:           12/05/2019 02:53 PM                                 MD                                                                           Ordering Location:     [facility] MAIN  Received:            12/06/2019 09:51 AM                                 OR                                                                           Pathologist:           Chandler Salazar MD                                                             Specimen:    Large Intestine, Right / Ascending Colon, right colon and omentum                           Final Diagnosis --     Colon, right hemicolectomy:    Residual tubular adenoma with changes consistent with prior procedure    Negative for high  "grade dysplasia and carcinoma    Appendix with fibrous obliteration of tip    Four benign lymph nodes    No evidence of malignancy    JPR/tkd        Gross Description --     Received in formalin designated \"Right colon and omentum\" is a resection of bowel which measures 32 cm from cecum to distal margin and averages 4.5 cm in diameter. A stump of terminal ileum is present which measures 4 x 2.5 cm. The specimen is stapled at both ends. The serosa is pink and glistening. The colon is accompanied by a generous amount of yellow brown pericolonic fatty tissue and sheets of yellow fatty mesentery which measure 26 x 21 x 3.5 cm in aggregate. The appendix is present on the cecum and measurement 3.5 x 0.5 cm in dimension. Sectioning reveals no polyps or masses. The bowel is opened revealing tan mucosa with usual folds. Located 7 cm from the closest (distal) margin is an area of black tattoo pigment which is associated with a sessile polyp which measures 1 cm in greatest dimension. The area of prior polypectomy is serially sectioned revealing a whitish cut surface which grossly does not extend into the muscularis propria. No other lesions are seen. Sectioning through the mesenteric fatty tissue reveals yellow glistening cut surfaces. No nodules or masses are identified. Sections are submitted as follows:    A-B: Margins    C: Sections of appendix    D-F: polyp and surrounding margins    G-J: Candidate lymph nodes submitted whole.     WENDY/tkd            Imaging Results (Last 72 Hours)     Procedure Component Value Units Date/Time    XR Abdomen 2 View With Chest 1 View [[id number]] Collected:  12/08/19 0806     Updated:  12/08/19 0811    Narrative:       DATE OF EXAM:  12/8/2019 7:55 AM     PROCEDURE:  XR ABDOMEN 2 VW W CHEST 1 VW-     INDICATIONS:  post op eval for bowel gas pattern, overnight desat; D12.3-Benign  neoplasm of transverse colon patient's a 59-year-old male postop  evaluating bowel gas pattern, diabetes, " hypertension, prior smoking  history     COMPARISON:  No Comparisons Available     TECHNIQUE:   A complete acute abdomen series, including supine, erect, and/or  decubitus of the abdomen and single view of the chest were obtained.     FINDINGS:  Today's abdominal study demonstrates the lung bases being free of  disease. Change of cholecystectomy and midline abdominal incision is  seen. Right side up decubitus view demonstrates a small amount of air  adjacent to the right lobe of the liver which is not unusual due to  patient's recent hemicolectomy on 12/05/2019 couple air-fluid levels are  seen which may represent an element of ileus gas in the transverse colon  and stomach are seen. Minimal gas in the distal colon is seen.  The AP erect chest demonstrates cardiomegaly without evidence of active  cardiopulmonary disease., The chest appears stable from December 7       Impression:          1. Small amount of free air within the abdomen which is unremarkable  given history of any colectomy on December 5  2. Couple of air fluid levels seen on decubitus view suggesting mild  postop ileus  3. No stairstep air-fluid levels are seen that would suggest obstruction  4. Cardiomegaly without active cardiopulmonary disease     Electronically Signed By-Wilder London Jr. On:12/8/2019 8:09 AM  This report was finalized on [id number] by  Wilder London Jr., .    XR Chest 1 View [[id number]] Collected:  12/07/19 2203     Updated:  12/08/19 0006    Narrative:       Examination: AP view of the chest    Indication: Confusion, increased oxygen demand    Comparison: No prior study is available for comparison.    Findings:  The cardiomediastinal silhouette is enlarged.    Subsegmental atelectatic changes are present in the right lung base. There is otherwise no focal consolidative airspace disease. There is no sizable pleural fluid accumulation or pneumothorax.     No destructive osseous lesion is identified.      Impression:        Impression:    1. No evidence of an acute pleural or pulmonary parenchymal abnormality.  2. Cardiomegaly.    Electronically signed by:  Jeremie Caceres M.D.    12/7/2019 10:05 PM    CT Head Without Contrast [[id number]] Collected:  12/07/19 2005     Updated:  12/07/19 2209    Narrative:       EXAMINATION: CT HEAD WO CONTRAST    DATE: 12/7/2019 9:50 PM     INDICATION: Confusion, delirium, stroke     COMPARISON: CT head and brain MRI from yesterday.     TECHNIQUE: Thin section noncontrast axial images were obtained through the head. Coronal reformats were created.  CT dose lowering techniques were used, to include: automated exposure control, adjustment for patient size, and or use of iterative   reconstruction.     FINDINGS:     Intracranial contents:    Expected evolution of the subacute right occipital infarct. No hemorrhage or mass effect. No new infarct visible by CT. Mild chronic small vessel ischemic changes in the white matter. Mild intracranial atherosclerosis. Mild cerebral volume loss.    Bones and extracranial soft tissues:     No fracture or focal osseous lesion in the calvarium or skull base. Fluid level in the left sphenoid sinus. Mastoid air cells are clear. Orbits are unremarkable.         Impression:         1. Expected evolution of the acute/subacute right occipital infarct. No hemorrhage or mass effect.  2. No new infarct visible by CT.  3. Mild chronic age-related intracranial findings as above, unchanged.  4. Fluid level in the left sphenoid sinus.         This examination was interpreted by Eric Kaplan M.D.    Electronically signed by:  Eric Kaplan M.D.    12/7/2019 8:08 PM        ECG/EMG Results (most recent)     Procedure Component Value Units Date/Time    Adult Transthoracic Echo Complete W/ Cont if Necessary Per Protocol [[id number]] Collected:  12/06/19 1358     Updated:  12/06/19 1558     BSA 2.2 m^2      IVSd 0.83 cm      LVIDd 4.3 cm      LVIDs 3.2 cm      LVPWd 0.95 cm       IVS/LVPW 0.87     FS 25.7 %      EDV(Teich) 83.8 ml      ESV(Teich) 41.2 ml      EF(Teich) 50.9 %      EDV(cubed) 80.4 ml      ESV(cubed) 33.0 ml      EF(cubed) 59.0 %      LV mass(C)d 122.4 grams      LV mass(C)dI 56.9 grams/m^2      SV(Teich) 42.6 ml      SI(Teich) 19.8 ml/m^2      SV(cubed) 47.4 ml      SI(cubed) 22.0 ml/m^2      Ao root diam 3.1 cm      Ao root area 7.6 cm^2      ACS 1.8 cm      LVOT diam 2.1 cm      LVOT area 3.5 cm^2      RVOT diam 1.9 cm      RVOT area 3.0 cm^2      EDV(MOD-sp4) 87.8 ml      ESV(MOD-sp4) 39.1 ml      EF(MOD-sp4) 55.5 %      SV(MOD-sp4) 48.7 ml      SI(MOD-sp4) 22.6 ml/m^2      Ao root area (BSA corrected) 1.4     LV Perez Vol (BSA corrected) 40.8 ml/m^2      LV Sys Vol (BSA corrected) 18.2 ml/m^2      MV E max phil 87.4 cm/sec      MV A max phil 100.9 cm/sec      MV E/A 0.87     MV V2 max 105.3 cm/sec      MV max PG 4.4 mmHg      MV V2 mean 73.5 cm/sec      MV mean PG 2.3 mmHg      MV V2 VTI 29.2 cm      MVA(VTI) 2.4 cm^2      MV dec slope 279.7 cm/sec^2      MV dec time 0.31 sec      Ao pk phil 154.0 cm/sec      Ao max PG 9.5 mmHg      Ao max PG (full) 5.4 mmHg      Ao V2 mean 125.9 cm/sec      Ao mean PG 6.7 mmHg      Ao mean PG (full) 4.2 mmHg      Ao V2 VTI 34.3 cm      YOLANDA(I,A) 2.0 cm^2      YOLANDA(I,D) 2.0 cm^2      YOLANDA(V,A) 2.3 cm^2      YOLANDA(V,D) 2.3 cm^2      LV V1 max PG 4.0 mmHg      LV V1 mean PG 2.5 mmHg      LV V1 max 100.4 cm/sec      LV V1 mean 75.1 cm/sec      LV V1 VTI 20.1 cm      SV(Ao) 258.8 ml      SI(Ao) 120.3 ml/m^2      SV(LVOT) 69.8 ml      SV(RVOT) 61.6 ml      SI(LVOT) 32.4 ml/m^2      PA V2 max 142.5 cm/sec      PA max PG 8.2 mmHg      PA max PG (full) 2.4 mmHg      BH CV ECHO LUISA - PVA(V,A) 2.5 cm^2      BH CV ECHO LUISA - PVA(V,D) 2.5 cm^2      RV V1 max PG 5.8 mmHg      RV V1 mean PG 2.7 mmHg      RV V1 max 120.1 cm/sec      RV V1 mean 73.0 cm/sec      RV V1 VTI 20.9 cm      TR max phil 141.3 cm/sec      RVSP(TR) 11.0 mmHg      RAP systole 3.0 mmHg       Pulm Sys Umang 77.1 cm/sec      Pulm Perez Umang 45.4 cm/sec      Pulm S/D 1.7     Qp/Qs 0.88     Pulm A Revs Dur 0.1 sec      Pulm A Revs Umang 32.3 cm/sec       CV ECHO LUISA - BZI_BMI 26.4 kilograms/m^2       CV ECHO LUISA - BSA(HAYCOCK) 2.2 m^2       CV ECHO LUISA - BZI_METRIC_WEIGHT 90.7 kg       CV ECHO LUISA - BZI_METRIC_HEIGHT 185.4 cm      EF(MOD-bp) 56.0 %      LA dimension(2D) 4.3 cm      Echo EF Estimated 60 %     Narrative:         · Estimated EF = 60%.  · Left ventricular systolic function is normal.     Indications  Cardiac source of emboli.    Technically satisfactory study.  Mitral valve is structurally normal.  Tricuspid valve is structurally normal.  Aortic valve is structurally normal.  Pulmonic valve could not be well visualized.  No evidence for mitral tricuspid or aortic regurgitation is seen by   Doppler study.  Left atrium is normal in size.  Right atrium is normal in size.  Left ventricle is normal in size and contractility with ejection fraction   of 60%.  Right ventricle is normal in size.  Atrial septum is intact.  Aorta is normal.  No pericardial effusion or intracardiac thrombus is seen.    Impression  Structurally and functionally normal cardiac valves.  Normal left ventricle size and contractility with ejection fraction of   60%.  No pericardial effusion or intracardiac thrombus is seen.          ECG 12 Lead [[id number]] Collected:  12/06/19 0516     Updated:  12/06/19 1838    Narrative:       HEART RATE= 93  bpm  RR Interval= 644  ms  WA Interval= 148  ms  P Horizontal Axis= 20  deg  P Front Axis= -15  deg  QRSD Interval= 87  ms  QT Interval= 361  ms  QRS Axis= -12  deg  T Wave Axis= 136  deg  - ABNORMAL ECG -  Sinus rhythm  Abnormal T, consider ischemia, lateral leads  When compared with ECG of 26-Nov-2019 13:53:15,  No significant change  Electronically Signed By: Kvng Brown (PHILLIP) 06-Dec-2019 18:38:21  Date and Time of Study: 2019-12-06 05:16:19    Adult Transesophageal Echo  (LINH) W/ Cont if Necessary Per Protocol [[id number]] Collected:  12/10/19 1514     Updated:  12/10/19 1617     BSA 2.2 m^2       CV ECHO LUISA - BZI_BMI 26.4 kilograms/m^2       CV ECHO LUISA - BSA(HAYCOCK) 2.2 m^2       CV ECHO LUISA - BZI_METRIC_WEIGHT 90.7 kg       CV ECHO LUISA - BZI_METRIC_HEIGHT 185.4 cm           Cardiac Studies:  Echo-   Results for orders placed during the hospital encounter of 12/05/19   Adult Transthoracic Echo Complete W/ Cont if Necessary Per Protocol    Narrative · Estimated EF = 60%.  · Left ventricular systolic function is normal.     Indications  Cardiac source of emboli.    Technically satisfactory study.  Mitral valve is structurally normal.  Tricuspid valve is structurally normal.  Aortic valve is structurally normal.  Pulmonic valve could not be well visualized.  No evidence for mitral tricuspid or aortic regurgitation is seen by   Doppler study.  Left atrium is normal in size.  Right atrium is normal in size.  Left ventricle is normal in size and contractility with ejection fraction   of 60%.  Right ventricle is normal in size.  Atrial septum is intact.  Aorta is normal.  No pericardial effusion or intracardiac thrombus is seen.    Impression  Structurally and functionally normal cardiac valves.  Normal left ventricle size and contractility with ejection fraction of   60%.  No pericardial effusion or intracardiac thrombus is seen.           Stress Myoview-  Cath-      Medication Review:   Scheduled Meds:  acetaminophen 1,000 mg Oral TID   aspirin 81 mg Oral Daily   atorvastatin 80 mg Oral Q PM   insulin glargine 34 Units Subcutaneous Nightly   insulin lispro 0-9 Units Subcutaneous TID With Meals   And      insulin lispro 0-9 Units Subcutaneous 4x Daily With Meals & Nightly   insulin lispro 12 Units Subcutaneous TID With Meals   metoprolol tartrate 50 mg Oral BID   pantoprazole 40 mg Oral QAM   polyethylene glycol 17 g Oral Daily   vitamin B-12 1,000 mcg Oral Daily      Continuous Infusions:  sodium chloride 100 mL/hr Last Rate: 100 mL/hr (12/10/19 1144)     PRN Meds:.albuterol  •  dextrose  •  dextrose  •  glucagon (human recombinant)  •  HYDROmorphone **AND** naloxone  •  magnesium hydroxide  •  ondansetron  •  oxyCODONE **OR** oxyCODONE  •  promethazine      Assessment/Plan   Patient Active Problem List   Diagnosis   • Allergic state   • Asthma   • Atherosclerotic heart disease of native coronary artery without angina pectoris   • Chronic cough   • Degenerative joint disease   • Type 2 diabetes mellitus with hyperglycemia, with long-term current use of insulin (CMS/[facility])   • Type 2 diabetes mellitus with other diabetic neurological complication (CMS/[facility])   • Diabetic foot ulcer (CMS/[facility])   • Encounter for general adult medical examination without abnormal findings   • Gastroparesis   • History of prosthetic heart valve   • Hyperlipidemia, mixed   • Essential hypertension   • Nausea and vomiting   • Other specified dorsopathies, site unspecified   • Type 2 diabetes mellitus with hyperglycemia (CMS/[facility])   • Vitamin D deficiency   • Combined forms of age-related cataract, bilateral   • Other specified retinal disorders   • Presbyopia   • Type 2 or unspecified type diabetes mellitus   • Acute ischemic right PCA stroke involving the right occipital lobe (CMS/[facility])   • Status post colon resection   • FRANKI (acute kidney injury) (CMS/[facility])   • Hyponatremia     Patient is seen and examined and findings are verified.  Patient had right posterior circulation CVA with left hemianopsia.  Patient underwent LINH and it did not show any PFO or clot.  I discussed in detail with neurologist Dr. Wells who recommends to delay and her for anticoagulation for at least 2 to 4 weeks.  I would continue aspirin.  Patient probably would need Eliquis after 4 weeks.    Joao Caceres MD  12/10/19  4:20 PM          Electronically signed by Joao Caceres MD at 12/10/19 8028     Jay Pichardo MD at 12/10/19  1253                 Daily Progress Note    Patient Care Team:  Al Kimbrough MD as PCP - General  Al Kimbrough MD as PCP - Family Medicine     Chief Complaint: Follow-up type 2 diabetes     HPI: 59-year-old male with history of type 2 diabetes, hypertension, hyperlipidemia underwent colon resection on December 5, 2019 for multiple polyps.  Diagnosed with acute stroke on right occipital on MRI, neurology is following.  Endocrine consulted for uncontrolled diabetes.  Continue on Lantus with Humalog along with Humalog sliding scale blood sugars fair.    ROS:   Constitutional:  Denies fatigue, tiredness.    Eyes:  Denies change in visual acuity   HENT:  Denies nasal congestion or sore throat   Respiratory: denies cough, shortness of breath.   Cardiovascular:  denies chest pain, edema   GI:  Denies abdominal pain, nausea, vomiting.   :  Denies polyuria and polydipsia  Musculoskeletal:  Denies back pain or joint pain   Integument:  Denies rash   Neurologic:  Denies headache, focal weakness or sensory changes   Endocrine:  Denies polyuria or polydipsia   Psychiatric:  Denies depression or anxiety       Vitals:    12/10/19 0600   BP: 125/70   Pulse: 76   Resp: 16   Temp: 97.6 °F (36.4 °C)   SpO2: 93%       Physical Exam:  GEN: NAD, conversant  EYES: EOMI, PERRL, no conjunctival erythema  NECK: no thyromegaly, full ROM   CV: RRR, no murmurs/rubs/gallops, no peripheral edema  LUNG: CTAB, no wheezes/rales/ronchi  SKIN: no rashes, no acanthosis  MSK: no deformities, full ROM of all extremities  NEURO: no tremors, DTR normal  PSYCH: AOX3, appropriate mood, affect normal      Results Review:     I reviewed the patient's new clinical results.    Glucose   Date Value Ref Range Status   12/10/2019 170 (H) 65 - 99 mg/dL Final     Sodium   Date Value Ref Range Status   12/10/2019 134 (L) 136 - 145 mmol/L Final     Potassium   Date Value Ref Range Status   12/10/2019 3.9 3.5 - 5.2 mmol/L Final     CO2   Date Value Ref  Range Status   12/10/2019 25.0 22.0 - 29.0 mmol/L Final     Chloride   Date Value Ref Range Status   12/10/2019 98 98 - 107 mmol/L Final     Anion Gap   Date Value Ref Range Status   12/10/2019 11.0 5.0 - 15.0 mmol/L Final     Creatinine   Date Value Ref Range Status   12/10/2019 0.96 0.76 - 1.27 mg/dL Final     BUN   Date Value Ref Range Status   12/10/2019 20 8 - 23 mg/dL Final     BUN/Creatinine Ratio   Date Value Ref Range Status   12/10/2019 20.8 7.0 - 25.0 Final     Calcium   Date Value Ref Range Status   12/10/2019 9.0 8.6 - 10.5 mg/dL Final     eGFR Non  Amer   Date Value Ref Range Status   12/10/2019 80 >60 mL/min/1.73 Final     Lab Results   Component Value Date    HGBA1C 9.5 (H) 12/06/2019    HGBA1C 9.7 (H) 11/26/2019    HGBA1C 8.2 (H) 07/02/2019     No results found for: GLUF, MICROALBUR  Results from last 7 days   Lab Units 12/10/19  0730 12/09/19  1959 12/09/19  1604 12/09/19  1143 12/09/19  0723 12/08/19 2009   GLUCOSE mg/dL 161* 141* 80 144* 219* 275*             Medication Review: Reviewed.       acetaminophen 1,000 mg Oral TID   aspirin 81 mg Oral Daily   atorvastatin 80 mg Oral Q PM   insulin glargine 34 Units Subcutaneous Nightly   insulin lispro 0-9 Units Subcutaneous TID With Meals   And      insulin lispro 0-9 Units Subcutaneous 4x Daily With Meals & Nightly   insulin lispro 12 Units Subcutaneous TID With Meals   metoprolol tartrate 50 mg Oral BID   pantoprazole 40 mg Oral QAM   polyethylene glycol 17 g Oral Daily   vitamin B-12 1,000 mcg Oral Daily         Assessment/Plan   1.  Diabetes mellitus type 2: Blood sugars doing well, continue current medications and follow blood sugars and make further adjustments as needed.   2.  Hyperlipidemia: On atorvastatin.            Jay Pichardo MD. FACE    Much of the above report is an electronic transcription/translation of the spoken language to printed text using Dragon Software. As such, the subtleties and finesse of the spoken language may  "permit erroneous, or at times, nonsensical words or phrases to be inadvertently transcribed; thus changes may be made at a later date to rectify these errors.            Electronically signed by Jay Pichardo MD at 12/10/19 9294     Ronaldo Ardon MD at 12/10/19 1038        Post-op Note  RIGHT HEMICOLECTOMY  12/5/2019    Subjective   Chao Lazar is a 59 y.o. male status post open right hemicolectomy for unresectable transverse colon polyp performed on 12/5/2019.      NPO for LINH. Tolerated regular diet without N/V yesterday. Passing flatus without BMs. Continues to ambulate with assistance.       Objective   /70 (BP Location: Right arm, Patient Position: Lying)   Pulse 76   Temp 97.6 °F (36.4 °C) (Oral)   Resp 16   Ht 185.4 cm (73\")   Wt 88.3 kg (194 lb 10.7 oz)   SpO2 93%   BMI 25.68 kg/m²    Vital signs reviewed  Abdomen is soft and obese mild tenderness to palpation incision healing well without any erythema or drainage. BS+    Assessment/Plan   59-year-old gentleman status post open right hemicolectomy for unresectable transverse colon polyp performed on 12/5/2019.  The following morning, the patient had left-sided hemianopsia, and was determined to have a right-sided PCA stroke.    Greatly appreciate the input of neurology and this problem.  We will follow their recommendations.  Continue low residue diet after LINH in AM  Continue pain management with Roxicodone, Dilaudid for breakthrough, and Tylenol.  Okay for VTE prophylaxis, continue SCDs  Appreciate Endocrinology input with management of his hyperglycemia and IM for management of his hypertension.  No need for further antibiotics  Continue ambulation with assistance, and incentive spirometer while awake.    Ronaldo Ardon MD  12/10/2019  10:31 AM        Electronically signed by Ronaldo Ardon MD at 12/10/19 1036     Yamileth العراقي APRN at 12/10/19 5771     Attestation signed by Candice Wells MD at " 12/10/19 1447    I have reviewed the documentation above and agree.                         LOS: 5 days     Chief Complaint: Vision loss, confusion    Subjective   SUBJECTIVE:  History taken from: chart family RN      Patient Complaints:  Pt denies any complaints. His family reports his confusion has resolved and he is cognitively back to baseline.       Review of Systems   Constitutional: Negative.    Eyes: Positive for visual disturbance (left vision loss).   Cardiovascular: Negative.    Neurological: Negative for dizziness, tremors, seizures, syncope, facial asymmetry, speech difficulty, weakness, light-headedness, numbness and headaches.        ________________________________________________  Objective   OBJECTIVE:    On exam:  GENERAL: NAD  CARDIO: RRR  NEURO:  Oriented x3  Flat affect  EOMI, PERRL, left homonymous hemianopsia  No facial asymmetry  Speech clear without dysarthria  Sensations intact and equal bilaterally  Strength 5/5 and equal in all extremities      ________________________________________________   RESULTS REVIEW    VITAL SIGNS:  Temp:  [97.5 °F (36.4 °C)-98.1 °F (36.7 °C)] 97.6 °F (36.4 °C)  Heart Rate:  [69-81] 76  Resp:  [14-18] 16  BP: (125-141)/(69-84) 125/70    LABS:   Lab Results   Component Value Date    WBC 15.10 (H) 12/10/2019    HGB 7.6 (L) 12/10/2019    HCT 22.6 (L) 12/10/2019    MCV 90.4 12/10/2019     12/10/2019     Lab Results   Component Value Date    GLUCOSE 170 (H) 12/10/2019    BUN 20 12/10/2019    CREATININE 0.96 12/10/2019    EGFRIFNONA 80 12/10/2019    BCR 20.8 12/10/2019    K 3.9 12/10/2019    CO2 25.0 12/10/2019    CALCIUM 9.0 12/10/2019    ALBUMIN 3.70 07/02/2019    LABIL2 0.8 (L) 05/26/2019    AST 26 07/02/2019    ALT 29 07/02/2019       Lab Results   Component Value Date    TSH 2.340 12/06/2019    LDL  12/06/2019      Comment:      Unable to calculate    LDL 82 12/06/2019    HGBA1C 9.5 (H) 12/06/2019    [id number] 403 12/06/2019         IMAGING STUDIES:  No  radiology results for the last day    I reviewed the patient's new clinical results.    ________________________________________________      PROBLEM LIST:    Acute ischemic right PCA stroke involving the right occipital lobe (CMS/HCC)    Atherosclerotic heart disease of native coronary artery without angina pectoris    Type 2 diabetes mellitus with hyperglycemia, with long-term current use of insulin (CMS/H[facility])    Gastroparesis    Hyperlipidemia, mixed    Essential hypertension    Status post colon resection    FRANKI (acute kidney injury) (CMS/HCC)    Hyponatremia        Assessment/Plan   ASSESSMENT/PLAN:    1. Acute to subacute right occipital lobe ischemic stroke with occlusion of the right PCA P2 segment.  Likely embolic.    - LINH  today  - Event monitor x30 days (ordered)   - Labs: A1C: 9.5, B12: 403, LDL: (unable to calculate), TSH: 2.34  - Antithrombotics: ASA 81 mg daily for now- work up is pending but patient may need long term oral anticoagulation (would recommend waiting 5-7 days from stroke due to size) .   - Statin: Lipitor 80 mg qhs    2. Acute Encephalopathy, resolved. Most likely second to pain medications .   - Continue to monitor, falls risk   - EEG: Mild diffuse slowing. No evidence of seizures.      Will follow after LINH       **Please refer to previous notes for further details and recommendations.     I discussed the patients findings and my recommendations with patient, family and nursing staff.        EMRE Lutz  12/10/19  9:40 AM        Electronically signed by Candice Wells MD at 12/10/19 0557     Farrah Crain MD at 12/09/19 2866          [facility]   INPATIENT PROGRESS NOTE    Date of Admission: 12/5/2019  Length of Stay: 4  Primary Care Physician: Al Kimbrough MD    Subjective   Subjective   CC:  Left side vision impairement immediate after colon resection    HPI: Patient is a 59-year-old gentleman who had 36 polyps removed on colonoscopy 8/2019 and follow-up  colonoscopy a few weeks ago showed 18 polyps 1 in the transverse colon which was 2 cm and too large to be removed.  General surgery was consulted and the patient was admitted this hospital stay for partial colon resection which was performed 12/5/2019.  The patient has a strong family history of colon cancer in his sister.  Postoperatively the patient complained of left hemianopsia and on CT was found to have an early subacute ischemic right occipital parietal PCA distribution ischemic stroke which was confirmed by MRI to be a large acute ischemic area involving the right occipital parietal region and additional small punctate foci of involving the thalamus and periventricular white matter.     Record review: Patient has past medical history of hypertension, hyperlipidemia, insulin-dependent diabetes and gastroparesis.      Review Of Systems:   12 point review of systems was reviewed and was negative except as above.    Objective   Objective    Physical Exam:  Temp:  [97.5 °F (36.4 °C)-99.3 °F (37.4 °C)] 97.6 °F (36.4 °C)  Heart Rate:  [69-83] 74  Resp:  [13-18] 18  BP: (115-141)/(66-84) 134/69    Well-developed well-nourished gentleman sitting up in bed eating in no acute distress  awake and alert; mucous membranes moist; sclerae anicteric; neck supple; lungs clear to auscultation bilaterally; CV regular rate and rhythm; abdomen soft nontender nondistended with active bowel sounds; extremities with no edema, cyanosis or calf tenderness; palpable pedal pulses bilaterally; no Bradford catheter.       Results Review:          Results from last 7 days   Lab Units 12/09/19 0416 12/08/19 0508 12/07/19 0526 12/06/19  0958   WBC 10*3/mm3 10.50 13.40* 11.50* 15.70*   HEMOGLOBIN g/dL 10.2* 10.7* 10.7* 12.8*   HEMATOCRIT % 30.0* 31.8* 32.5* 38.5   PLATELETS 10*3/mm3 312 307 276 439   INR   --   --   --  0.94     Results from last 7 days   Lab Units 12/09/19 0416 12/08/19 0508 12/07/19  0526   SODIUM mmol/L 134* 134* 137    POTASSIUM mmol/L 4.0 4.5 4.4   CHLORIDE mmol/L 96* 98 99   CO2 mmol/L 25.0 23.0 24.0   BUN mg/dL 22 22 24*   CREATININE mg/dL 1.17 1.15 1.37*   GLUCOSE mg/dL 199* 196* 227*   CALCIUM mg/dL 9.3 9.3 8.8     Hemoglobin A1C (%)   Date/Time Value   12/06/2019 0338 9.5 (H)     TSH (uIU/mL)   Date/Time Value   12/06/2019 0958 2.340     Microbiology Results Abnormal     Procedure Component Value - Date/Time    Respiratory Panel, PCR - Swab, Nasopharynx [[id number]]  (Normal) Collected:  12/08/19 0027    Lab Status:  Final result Specimen:  Swab from Nasopharynx Updated:  12/08/19 0200     ADENOVIRUS, PCR Not Detected     Coronavirus 229E Not Detected     Coronavirus HKU1 Not Detected     Coronavirus NL63 Not Detected     Coronavirus OC43 Not Detected     Human Metapneumovirus Not Detected     Human Rhinovirus/Enterovirus Not Detected     Influenza B PCR Not Detected     Parainfluenza Virus 1 Not Detected     Parainfluenza Virus 2 Not Detected     Parainfluenza Virus 3 Not Detected     Parainfluenza Virus 4 Not Detected     Bordetella pertussis pcr Not Detected     Influenza A H1 2009 PCR Not Detected     Chlamydophila pneumoniae PCR Not Detected     Mycoplasma pneumo by PCR Not Detected     Influenza A PCR Not Detected     Influenza A H3 Not Detected     Influenza A H1 Not Detected     RSV, PCR Not Detected        Xr Abdomen 2 View With Chest 1 View    Result Date: 12/8/2019   1. Small amount of free air within the abdomen which is unremarkable given history of any colectomy on December 5 2. Couple of air fluid levels seen on decubitus view suggesting mild postop ileus 3. No stairstep air-fluid levels are seen that would suggest obstruction 4. Cardiomegaly without active cardiopulmonary disease  Electronically Signed By-Wilder London Jr. On:12/8/2019 8:09 AM This report was finalized on [id number] by  Wilder London Jr., .    Ct Head Without Contrast    Result Date: 12/7/2019  1. Expected evolution of the acute/subacute  right occipital infarct. No hemorrhage or mass effect. 2. No new infarct visible by CT. 3. Mild chronic age-related intracranial findings as above, unchanged. 4. Fluid level in the left sphenoid sinus.  This examination was interpreted by Eric Kaplan M.D. Electronically signed by:  Eric Kaplan M.D.  12/7/2019 8:08 PM    Xr Chest 1 View    Result Date: 12/7/2019  Impression: 1. No evidence of an acute pleural or pulmonary parenchymal abnormality. 2. Cardiomegaly. Electronically signed by:  Jeremie Caceres M.D.  12/7/2019 10:05 PM      Results for orders placed during the hospital encounter of 12/05/19   Adult Transthoracic Echo Complete W/ Cont if Necessary Per Protocol    Narrative · Estimated EF = 60%.  · Left ventricular systolic function is normal.     Indications  Cardiac source of emboli.    Technically satisfactory study.  Mitral valve is structurally normal.  Tricuspid valve is structurally normal.  Aortic valve is structurally normal.  Pulmonic valve could not be well visualized.  No evidence for mitral tricuspid or aortic regurgitation is seen by   Doppler study.  Left atrium is normal in size.  Right atrium is normal in size.  Left ventricle is normal in size and contractility with ejection fraction   of 60%.  Right ventricle is normal in size.  Atrial septum is intact.  Aorta is normal.  No pericardial effusion or intracardiac thrombus is seen.    Impression  Structurally and functionally normal cardiac valves.  Normal left ventricle size and contractility with ejection fraction of   60%.  No pericardial effusion or intracardiac thrombus is seen.             I have reviewed the medications.    Assessment/Plan   Assessment/Plan   Brief Hospital Course:      Active Hospital Problems:  * Acute ischemic right PCA stroke involving the right occipital lobe (CMS/HCC)  Sudden vision impairment on left side of vision (Hemianopsia)  MRI done - large area of acute infarct on right occiput area  Neurology  "consulted  Started on aspirin and may benefit from addition of Plavix after his postop recovery  PT/OT/ST following  Cardiology will do LINH and event monitor at discharge    Type 2 diabetes mellitus with hyperglycemia, with long-term current use of insulin (CMS/[facility])  Patient was on high dose of 70/30 preadmission  Endocrine consulted on 12/07/19 switch to basal bolus insulin with dosage adjustments for blood sugar control  Control is better now    FRANKI (acute kidney injury) (CMS/HCC)- (present on admission)  Likely prerenal azotemia from fluid volume fluctuations  Creatinine peaked at 1.93 and baseline appears to be 1.2  Current creatinine 1.17  Monitor    Status post colon resection   Partial Colectomy done 12/05 for a large unresectable transverse colon polyp multiple polyps      Essential hypertension- (present on admission)  Chronic and controlled  Continue metoprolol 50 mg twice a day     Hyperlipidemia, mixed- (present on admission)  Home atorvastatin dosage increased     Hyponatremia  Acute, mild  Monitor          DVT prophylaxis: SCDs    Discharge Planning: I expect patient to be discharged to home tomorrow.    Farrah Crain MD 12/9/2019 1946      Electronically signed by Farrah Crain MD at 12/09/19 1946     Ronaldo Ardon MD at 12/09/19 1736        Post-op Note  RIGHT HEMICOLECTOMY  12/5/2019    Subjective   Chao Lazar is a 59 y.o. male status post open right hemicolectomy for unresectable transverse colon polyp performed on 12/5/2019.      Patient made NPO for LINH that was never performed. Never returned to regular diet. Patient reports feeling hungry. Mild nausea. Passing flatus      Objective   /84 (BP Location: Right arm, Patient Position: Lying)   Pulse 69   Temp 97.5 °F (36.4 °C) (Oral)   Resp 16   Ht 185.4 cm (73\")   Wt 88.3 kg (194 lb 10.7 oz)   SpO2 98%   BMI 25.68 kg/m²    Vital signs reviewed  Abdomen is soft and obese mild tenderness to palpation incision healing well " without any erythema or drainage. BS+    Assessment/Plan   59-year-old gentleman status post open right hemicolectomy for unresectable transverse colon polyp performed on 12/5/2019.  The following morning, the patient had left-sided hemianopsia, and was determined to have a right-sided PCA stroke.    Greatly appreciate the input of neurology and this problem.  We will follow their recommendations.  Continue low residue diet, NPO at MN for LINH in AM  Continue pain management with Roxicodone, Dilaudid for breakthrough, and Tylenol.  Okay for VTE prophylaxis, continue SCDs  Appreciate medicine input with management of his hyperglycemia and management of his hypertension.  No need for further antibiotics  Continue ambulation with assistance, and incentive spirometer while awake.    Ronaldo Ardon MD  12/9/2019  5:36 PM      Electronically signed by Ronaldo Ardon MD at 12/09/19 7907     Jay Pichardo MD at 12/09/19 1435                 Daily Progress Note    Patient Care Team:  Al Kimbrough MD as PCP - General  Al Kimbrough MD as PCP - Family Medicine    Chief Complaint: Follow-up type 2 diabetes    HPI: 59-year-old male with history of type 2 diabetes, hypertension, hyperlipidemia underwent colon resection on December 5, 2019 for multiple polyps.  Diagnosed with acute stroke on right occipital on MRI, neurology is following.  Endocrine consulted for uncontrolled diabetes.  Continue on Lantus with Humalog along with Humalog sliding scale blood sugars fair.    ROS:   Constitutional:  Denies fatigue, tiredness.    Eyes:  Denies change in visual acuity   HENT:  Denies nasal congestion or sore throat   Respiratory: denies cough, shortness of breath.   Cardiovascular:  denies chest pain, edema   GI:  Denies abdominal pain, nausea, vomiting.   :  Denies polyuria and polydipsia  Musculoskeletal:  Denies back pain or joint pain   Integument:  Denies rash   Neurologic:  Denies headache, focal  weakness or sensory changes   Endocrine:  Denies polyuria or polydipsia   Psychiatric:  Denies depression or anxiety       Vitals:    12/09/19 1401   BP: 138/84   Pulse: 69   Resp: 16   Temp: 97.5 °F (36.4 °C)   SpO2: 98%       Physical Exam:  GEN: NAD, conversant  NECK: no thyromegaly, full ROM   CV: RRR, no murmurs/rubs/gallops, no peripheral edema  LUNG: CTAB, no wheezes/rales/ronchi  SKIN: no rashes, no acanthosis        Results Review:     I reviewed the patient's new clinical results.    Glucose   Date Value Ref Range Status   12/09/2019 199 (H) 65 - 99 mg/dL Final     Sodium   Date Value Ref Range Status   12/09/2019 134 (L) 136 - 145 mmol/L Final     Potassium   Date Value Ref Range Status   12/09/2019 4.0 3.5 - 5.2 mmol/L Final     CO2   Date Value Ref Range Status   12/09/2019 25.0 22.0 - 29.0 mmol/L Final     Chloride   Date Value Ref Range Status   12/09/2019 96 (L) 98 - 107 mmol/L Final     Anion Gap   Date Value Ref Range Status   12/09/2019 13.0 5.0 - 15.0 mmol/L Final     Creatinine   Date Value Ref Range Status   12/09/2019 1.17 0.76 - 1.27 mg/dL Final     BUN   Date Value Ref Range Status   12/09/2019 22 8 - 23 mg/dL Final     BUN/Creatinine Ratio   Date Value Ref Range Status   12/09/2019 18.8 7.0 - 25.0 Final     Calcium   Date Value Ref Range Status   12/09/2019 9.3 8.6 - 10.5 mg/dL Final     eGFR Non  Amer   Date Value Ref Range Status   12/09/2019 63 >60 mL/min/1.73 Final     Lab Results   Component Value Date    HGBA1C 9.5 (H) 12/06/2019    HGBA1C 9.7 (H) 11/26/2019    HGBA1C 8.2 (H) 07/02/2019     No results found for: GLUF, MICROALBUR  Results from last 7 days   Lab Units 12/09/19  1143 12/09/19  0723 12/08/19  2009 12/08/19  1632 12/08/19  1133 12/08/19  1017   GLUCOSE mg/dL 144* 219* 275* 221* 180* 198*             Medication Review: Reviewed.       acetaminophen 1,000 mg Oral TID   aspirin 81 mg Oral Daily   atorvastatin 80 mg Oral Q PM   insulin glargine 32 Units Subcutaneous  Nightly   insulin lispro 0-9 Units Subcutaneous 4x Daily With Meals & Nightly   insulin lispro 12 Units Subcutaneous TID With Meals   insulin regular 10 Units Subcutaneous Once   metoprolol tartrate 50 mg Oral BID   pantoprazole 40 mg Oral QAM   polyethylene glycol 17 g Oral Daily   vitamin B-12 1,000 mcg Oral Daily         Assessment/Plan   1.  Diabetes mellitus type 2: Blood sugars fluctuating, will increase Lantus to 34 units subcu nightly and continue Humalog 12 units at each meal along with Humalog sliding scale with meals only.  Will follow blood sugars and make adjustments as needed.    2.  Hyperlipidemia: On atorvastatin.            Jay Pichardo MD. FACE    Much of the above report is an electronic transcription/translation of the spoken language to printed text using Dragon Software. As such, the subtleties and finesse of the spoken language may permit erroneous, or at times, nonsensical words or phrases to be inadvertently transcribed; thus changes may be made at a later date to rectify these errors.            Electronically signed by Jay Pichardo MD at 12/09/19 1439     Joao Caceres MD at 12/09/19 1348          CARDIOLOGY FOLLOW-UP PROGRESS NOTE      Reason for follow-up:    CVA  LINH requested  CAD prior PCI         Attending: Ronaldo Ardon,*      Subjective .        Review of Systems   Constitution: Negative for chills and fever.   HENT: Negative for ear discharge and nosebleeds.    Eyes: Positive for vision loss in left eye. Negative for discharge and redness.   Cardiovascular: Negative for chest pain, orthopnea, palpitations, paroxysmal nocturnal dyspnea and syncope.   Respiratory: Negative for cough, shortness of breath and wheezing.    Endocrine: Negative for heat intolerance.   Skin: Negative for rash.   Musculoskeletal: Negative for arthritis and myalgias.   Gastrointestinal: Negative for abdominal pain, melena, nausea and vomiting.   Genitourinary: Negative for dysuria and  "hematuria.   Neurological: Negative for difficulty with concentration, disturbances in coordination, dizziness, light-headedness, numbness and tremors.   Psychiatric/Behavioral: Negative for depression. The patient is not nervous/anxious.        Allergies: Patient has no known allergies.    Scheduled Meds:    acetaminophen 1,000 mg Oral TID   aspirin 81 mg Oral Daily   atorvastatin 80 mg Oral Q PM   insulin glargine 34 Units Subcutaneous Nightly   insulin lispro 0-9 Units Subcutaneous TID With Meals   And      insulin lispro 0-9 Units Subcutaneous 4x Daily With Meals & Nightly   insulin lispro 12 Units Subcutaneous TID With Meals   metoprolol tartrate 50 mg Oral BID   pantoprazole 40 mg Oral QAM   polyethylene glycol 17 g Oral Daily   vitamin B-12 1,000 mcg Oral Daily       Continuous Infusions:    sodium chloride 100 mL/hr Last Rate: 100 mL/hr (12/08/19 1551)       PRN Meds:.albuterol  •  dextrose  •  dextrose  •  glucagon (human recombinant)  •  HYDROmorphone **AND** naloxone  •  magnesium hydroxide  •  ondansetron  •  oxyCODONE **OR** oxyCODONE  •  promethazine    Objective     VITAL SIGNS  Patient Vitals for the past 24 hrs:   BP Temp Temp src Pulse Resp SpO2 Weight   12/09/19 1801 134/69 97.6 °F (36.4 °C) Oral 74 18 92 % --   12/09/19 1401 138/84 97.5 °F (36.4 °C) Oral 69 16 98 % --   12/09/19 1001 141/78 98 °F (36.7 °C) Oral 77 16 96 % --   12/09/19 0606 132/73 98 °F (36.7 °C) Oral 83 14 95 % --   12/09/19 0605 -- -- -- -- -- -- 88.3 kg (194 lb 10.7 oz)   12/09/19 0239 134/79 98.6 °F (37 °C) Oral 72 13 98 % --   12/08/19 2210 115/66 99.3 °F (37.4 °C) Oral 82 15 95 % --        Flowsheet Rows      First Filed Value   Admission Height  185.4 cm (73\") Documented at 12/05/2019 1041   Admission Weight  90.9 kg (200 lb 6.4 oz) Documented at 12/05/2019 1041            Intake/Output Summary (Last 24 hours) at 12/9/2019 1909  Last data filed at 12/8/2019 2120  Gross per 24 hour   Intake --   Output 350 ml   Net -350 ml "        TELEMETRY:     SR    Physical Exam:  Physical Exam   Constitutional: He is oriented to person, place, and time. He appears well-developed and well-nourished. No distress.   HENT:   Head: Normocephalic and atraumatic.   Eyes: Pupils are equal, round, and reactive to light. Right eye exhibits no discharge. No scleral icterus.   Neck: Normal range of motion. Neck supple. No JVD present. No thyromegaly present.   Cardiovascular: Normal rate, regular rhythm, S1 normal, S2 normal, normal heart sounds and intact distal pulses. Exam reveals no gallop and no friction rub.   No murmur heard.  Pulmonary/Chest: Effort normal and breath sounds normal. No respiratory distress. He has no wheezes. He has no rales.   Abdominal: Soft. Normal appearance. He exhibits distension. There is tenderness.   Musculoskeletal: Normal range of motion. He exhibits no edema.   Lymphadenopathy:     He has no cervical adenopathy.   Neurological: He is alert and oriented to person, place, and time.   Skin: Skin is warm and dry. No rash noted. No erythema.   Psychiatric: He has a normal mood and affect.          Results Review:   I reviewed the patient's new clinical results.    CBC    Results from last 7 days   Lab Units 12/09/19  0416 12/08/19  0508 12/07/19  0526 12/06/19  0958 12/06/19  0338 12/05/19  2142   WBC 10*3/mm3 10.50 13.40* 11.50* 15.70* 15.90* 17.80*   HEMOGLOBIN g/dL 10.2* 10.7* 10.7* 12.8* 13.6 14.0   PLATELETS 10*3/mm3 312 307 276 439 379 434     BMP   Results from last 7 days   Lab Units 12/09/19  0416 12/08/19  0508 12/07/19  0526 12/06/19  0958 12/06/19  0338 12/05/19  2321 12/05/19  2142   SODIUM mmol/L 134* 134* 137 136 133*  --  132*   POTASSIUM mmol/L 4.0 4.5 4.4 4.8  4.8 6.6* 6.4* 6.3*   CHLORIDE mmol/L 96* 98 99 98 100  --  96*   CO2 mmol/L 25.0 23.0 24.0 18.0* 19.0*  --  21.0*   BUN mg/dL 22 22 24* 22 21  --  20   CREATININE mg/dL 1.17 1.15 1.37* 1.93* 1.73*  --  1.68*   GLUCOSE mg/dL 199* 196* 227* 405* 438*  --   396*     Cr Clearance Estimated Creatinine Clearance: 82.8 mL/min (by C-G formula based on SCr of 1.17 mg/dL).  Coag   Results from last 7 days   Lab Units 12/06/19  0958   INR  0.94   APTT seconds 23.3*     HbA1C   Lab Results   Component Value Date    HGBA1C 9.5 (H) 12/06/2019    HGBA1C 9.7 (H) 11/26/2019    HGBA1C 8.2 (H) 07/02/2019     Blood Glucose   Glucose   Date/Time Value Ref Range Status   12/09/2019 1604 80 70 - 105 mg/dL Final     Comment:     Serial Number: [id number]:  [id number]   12/09/2019 1143 144 (H) 70 - 105 mg/dL Final     Comment:     Serial Number: [id number]:  [id number]   12/09/2019 0723 219 (H) 70 - 105 mg/dL Final     Comment:     Serial Number: [id number]:  [id number]   12/08/2019 2009 275 (H) 70 - 105 mg/dL Final     Comment:     Serial Number: [id number]:  [id number]   12/08/2019 1632 221 (H) 70 - 105 mg/dL Final     Comment:     Serial Number: [id number]:  [id number]   12/08/2019 1133 180 (H) 70 - 105 mg/dL Final     Comment:     Serial Number: [id number]:  [id number]   12/08/2019 1017 198 (H) 70 - 105 mg/dL Final     Comment:     Serial Number: [id number]:  [id number]   12/08/2019 0801 210 (H) 70 - 105 mg/dL Final     Comment:     Serial Number: [id number]:  [id number]     Infection   Results from last 7 days   Lab Units 12/07/19  2315   PROCALCITONIN ng/mL 6.12*     CMP   Results from last 7 days   Lab Units 12/09/19  0416 12/08/19  0508 12/07/19  0526 12/06/19  0958 12/06/19  0338 12/05/19  2321 12/05/19  2142   SODIUM mmol/L 134* 134* 137 136 133*  --  132*   POTASSIUM mmol/L 4.0 4.5 4.4 4.8  4.8 6.6* 6.4* 6.3*   CHLORIDE mmol/L 96* 98 99 98 100  --  96*   CO2 mmol/L 25.0 23.0 24.0 18.0* 19.0*  --  21.0*   BUN mg/dL 22 22 24* 22 21  --  20   CREATININE mg/dL 1.17 1.15 1.37* 1.93* 1.73*  --  1.68*   GLUCOSE mg/dL 199* 196* 227* 405* 438*  --  396*     ABG    Results from last 7 days   Lab Units 12/07/19  2314   PH, ARTERIAL pH units  7.459*   PCO2, ARTERIAL mm Hg 39.9   PO2 ART mm Hg 60.2*   O2 SATURATION ART % 91.9*   BASE EXCESS ART mmol/L 4.2*     UA      NAHOMI  No results found for: POCMETH, POCAMPHET, POCBARBITUR, POCBENZO, POCCOCAINE, POCOPIATES, POCOXYCODO, POCPHENCYC, POCPROPOXY, POCTHC, POCTRICYC  Lysis Labs   Results from last 7 days   Lab Units 12/09/19  0416 12/08/19  0508 12/07/19  0526 12/06/19  0958 12/06/19  0338 12/05/19  2142   INR   --   --   --  0.94  --   --    APTT seconds  --   --   --  23.3*  --   --    HEMOGLOBIN g/dL 10.2* 10.7* 10.7* 12.8* 13.6 14.0   PLATELETS 10*3/mm3 312 307 276 439 379 434   CREATININE mg/dL 1.17 1.15 1.37* 1.93* 1.73* 1.68*     Radiology(recent) Xr Abdomen 2 View With Chest 1 View    Result Date: 12/8/2019   1. Small amount of free air within the abdomen which is unremarkable given history of any colectomy on December 5 2. Couple of air fluid levels seen on decubitus view suggesting mild postop ileus 3. No stairstep air-fluid levels are seen that would suggest obstruction 4. Cardiomegaly without active cardiopulmonary disease  Electronically Signed By-Wilder London Jr. On:12/8/2019 8:09 AM This report was finalized on [id number] by  Wilder London Jr., .    Ct Head Without Contrast    Result Date: 12/7/2019  1. Expected evolution of the acute/subacute right occipital infarct. No hemorrhage or mass effect. 2. No new infarct visible by CT. 3. Mild chronic age-related intracranial findings as above, unchanged. 4. Fluid level in the left sphenoid sinus.  This examination was interpreted by Eric Kaplan M.D. Electronically signed by:  Eric Kaplan M.D.  12/7/2019 8:08 PM    Xr Chest 1 View    Result Date: 12/7/2019  Impression: 1. No evidence of an acute pleural or pulmonary parenchymal abnormality. 2. Cardiomegaly. Electronically signed by:  Jeremie Caceres M.D.  12/7/2019 10:05 PM      Imaging Results (Last 24 Hours)     ** No results found for the last 24 hours. **                EKG              I personally viewed and interpreted the patient's EKG/Telemetry data:        ECHOCARDIOGRAM:     Results for orders placed during the hospital encounter of 12/05/19   Adult Transthoracic Echo Complete W/ Cont if Necessary Per Protocol    Narrative · Estimated EF = 60%.  · Left ventricular systolic function is normal.     Indications  Cardiac source of emboli.    Technically satisfactory study.  Mitral valve is structurally normal.  Tricuspid valve is structurally normal.  Aortic valve is structurally normal.  Pulmonic valve could not be well visualized.  No evidence for mitral tricuspid or aortic regurgitation is seen by   Doppler study.  Left atrium is normal in size.  Right atrium is normal in size.  Left ventricle is normal in size and contractility with ejection fraction   of 60%.  Right ventricle is normal in size.  Atrial septum is intact.  Aorta is normal.  No pericardial effusion or intracardiac thrombus is seen.    Impression  Structurally and functionally normal cardiac valves.  Normal left ventricle size and contractility with ejection fraction of   60%.  No pericardial effusion or intracardiac thrombus is seen.               STRESS MYOVIEW:      CARDIAC CATHETERIZATION:      OTHER:         Assessment/Plan            Type 2 diabetes mellitus with hyperglycemia, with long-term current use of insulin (CMS/HCC)    Hyperlipidemia, mixed    Hypertension    Acute ischemic right PCA stroke involving the right occipital lobe (CMS/HCC)    Status post colon resection    FRANKI (acute kidney injury) (CMS/HCC)    Assessment/Plan:    Right occipital lobe ischemic stroke        -LINH Requested  CAD prev PCI RCA; Mild non obstructive disease LAD/LCx  Colon Resection for colon polyp  HTN  DM  Dyslipidemia        Plan:  No history , clinical report of or observed atrial arrhythmias  LINH anticipated tomorrow.   Consider loop recorder vs event recorder at d/c to watch for AF    Additional recommendations per   Morris    I discussed the patients findings and my recommendations with patient and family    Patient is seen and examined and findings are verified.  Patient is lying in the bed.  He does not have any complaints.  No chest pain or shortness of breath.    Hemodynamics are stable.    Chest is clear to auscultation.  Normal S1 and S2.  No pericardial rub or murmur no leg edema noted.    Patient is presented with right occipital lobe ischemic stroke.  It appears to be most likely embolic in nature.  Patient is high risk for future strokes too.  There is no obvious cause for embolic stroke.  Neurological service recommends LINH.  I would recommend to proceed with LINH.  Risk and benefit and alternative options are explained to the patient.  The is arranged tomorrow.  We shall follow      Electronically signed by Joao Caceres MD, 19, 7:11 PM.      Joao Caceres MD  19  7:09 PM              Electronically signed by Joao Caceres MD at 19 1911       Consult Notes (last 72 hours) (Notes from 19 1020 through 19 1020)    No notes of this type exist for this encounter.            Discharge Summary      Farrah Crain MD at 19 1411                [facility] Medicine Services  DISCHARGE SUMMARY        Prepared For PCP:  Al Kimbrough MD    Patient Name: Chao Lazar  : 1960  MRN: [id number]      Date of Admission:   2019    Date of Discharge:  2019    Length of stay:  LOS: 6 days     Hospital Course     Presenting Problem:   Polyp of transverse colon, unspecified type [D12.3]  Polyp of transverse colon, unspecified type [D12.3]      Active Hospital Problems    Diagnosis  POA   • **Acute ischemic right PCA stroke involving the right occipital lobe (CMS/[facility]) [I63.531]  No     Priority: High   • Type 2 diabetes mellitus with hyperglycemia, with long-term current use of insulin (CMS/[facility]) [E11.65, Z79.4]  Not Applicable     Priority: High   • FRANKI (acute  kidney injury) (CMS/[facility]) [N17.9]  Yes     Priority: Medium   • Essential hypertension [I10]  Yes     Priority: Medium   • Atherosclerotic heart disease of native coronary artery without angina pectoris [I25.10]  Yes     Priority: Medium   • Hyperlipidemia, mixed [E78.2]  Yes     Priority: Medium   • Gastroparesis [K31.84]  Yes     Priority: Medium   • Hyponatremia [E87.1]  No     Priority: Low   • Sphenoid sinusitis [J32.3]  Yes   • Acute blood loss anemia [D62]  No      Resolved Hospital Problems    Diagnosis Date Resolved POA   • Status post colon resection [Z90.49] 12/11/2019 Not Applicable     Priority: Medium   • Polyp of transverse colon [D12.3] 12/05/2019 Yes       Active Hospital Problems:  * Acute ischemic right PCA stroke involving the right occipital lobe (CMS/[facility])  Sudden vision impairment on left side of vision (Hemianopsia)  MRI done - large area of acute infarct on right occiput area  Neurology consulted  Started on aspirin and may benefit from addition of Plavix after his postop recovery  PT/OT/ST following  Cardiology performed LINH on 12/10 which showed no right to left shunt and no intracardiac thrombus  -Plan is for 30-day event monitor at discharge  -Dr. Caceres cardiology is recommending full anticoagulation once patient is stable from his stroke and surgery,  and Dr. Wells is recommending waiting 4 weeks    Type 2 diabetes mellitus with hyperglycemia, with long-term current use of insulin (CMS/[facility])  Patient was on high dose of 70/30 preadmission  Endocrine consulted on 12/07/19 switch to basal bolus insulin with dosage adjustments for blood sugar control and it is better now    FRANKI (acute kidney injury) (CMS/[facility])- (present on admission)  Likely prerenal azotemia from fluid volume fluctuations  Creatinine peaked at 1.93 and baseline appears to be 1.2  Current creatinine 0.96  Avoid nephrotoxic medications    Essential hypertension- (present on admission)  Chronic and controlled  Continue  metoprolol 50 mg twice a day     Hyperlipidemia, mixed- (present on admission)  Home atorvastatin dosage increased     Hyponatremia  Acute, mild      Sphenoid sinusitis- (present on admission)  -Noted on CT  -Initiate Flonase    Acute blood loss anemia  -Hemoglobin was 14.0 on admission and gradually drifted down to 10.2 on 12/9, but was 7.6 on 12/10 with no signs of bleeding   -Hemoglobin recheck was 11.1        Resolved Hospital Problems:  Status post colon resection-resolved as of 12/11/2019   Partial Colectomy done 12/05 for a large unresectable transverse colon polyp multiple polyps          Hospital Course:  Chao Lazar is a 59 y.o. male who had 36 polyps removed on colonoscopy 8/2019 and follow-up colonoscopy a few weeks ago showed 18 polyps 1 in the transverse colon which was 2 cm and too large to be removed.  General surgery was consulted and the patient was admitted this hospital stay for partial colon resection which was performed 12/5/2019.  The patient has a strong family history of colon cancer in his sister.  Record review: Patient has past medical history of hypertension, hyperlipidemia, insulin-dependent diabetes and gastroparesis.  Postoperatively the patient complained of left hemianopsia and on CT was found to have an early subacute ischemic right occipital parietal PCA distribution ischemic stroke which was confirmed by MRI to be a large acute ischemic area involving the right occipital parietal region and additional small punctate foci of involving the thalamus and periventricular white matter.   LINH was performed which showed no right to left shunt and no intracardiac thrombus.  EEG showed diffuse slowing but no epileptiform activity.  The patient had adequate postoperative pain control with oral narcotics pain medication.  He did occasionally require nasal cannula O2 for mild hypoxia.  An exercise oximetry will be performed to determine if the patient needs continued O2 at this  time.    Patient is felt to be stable for discharge to rehab.        Recommendation for Outpatient Providers:     Ophthalmology referral once released from rehab.        Reasons For Change In Medications and Indications for New Medications:        Day of Discharge     HPI: Patient reports some abdominal pain related to his surgical wounds but otherwise denies complaints.  He is having normal bowel movements and eating well.  No  complaints.  No chest pain or shortness of breath or cough.      Vital Signs:   Temp:  [97.3 °F (36.3 °C)-98.7 °F (37.1 °C)] 97.6 °F (36.4 °C)  Heart Rate:  [] 98  Resp:  [11-22] 20  BP: (109-135)/(60-86) 125/72     Physical Exam:  Physical Exam  Well-developed well-nourished gentleman in no acute distress sitting in the chair awake and alert; mucous membranes moist; sclerae anicteric; lungs clear to auscultation bilaterally; CV regular rate and rhythm; abdomen soft and nondistended with clean dry surgical dressings in place not removed; extremities with no edema, cyanosis or calf tenderness; palpable pedal pulses bilaterally; no Bradford catheter.    Pertinent  and/or Most Recent Results     Results from last 7 days   Lab Units 12/11/19  0258 12/10/19  2012 12/10/19  0551 12/10/19  0342 12/09/19  0416 12/08/19  0508 12/07/19  0526 12/06/19  0958 12/06/19  0338 12/05/19  2321 12/05/19  2142   WBC 10*3/mm3 9.30  --   --  15.10* 10.50 13.40* 11.50* 15.70* 15.90*  --  17.80*   HEMOGLOBIN g/dL 10.7* 11.1*  --  7.6* 10.2* 10.7* 10.7* 12.8* 13.6  --  14.0   HEMATOCRIT % 31.1* 34.6*  --  22.6* 30.0* 31.8* 32.5* 38.5 39.7  --  42.3   PLATELETS 10*3/mm3 420  --   --  396 312 307 276 439 379  --  434   SODIUM mmol/L  --   --  134*  --  134* 134* 137 136 133*  --  132*   POTASSIUM mmol/L  --   --  3.9  --  4.0 4.5 4.4 4.8  4.8 6.6* 6.4* 6.3*   CHLORIDE mmol/L  --   --  98  --  96* 98 99 98 100  --  96*   CO2 mmol/L  --   --  25.0  --  25.0 23.0 24.0 18.0* 19.0*  --  21.0*   BUN mg/dL  --   --  20   --  22 22 24* 22 21  --  20   CREATININE mg/dL  --   --  0.96  --  1.17 1.15 1.37* 1.93* 1.73*  --  1.68*   GLUCOSE mg/dL  --   --  170*  --  199* 196* 227* 405* 438*  --  396*   CALCIUM mg/dL  --   --  9.0  --  9.3 9.3 8.8 8.7 8.4*  --  8.6     Results from last 7 days   Lab Units 12/06/19  0958   PROTIME Seconds 10.0   INR  0.94   APTT seconds 23.3*     Results from last 7 days   Lab Units 12/06/19  0958   CHOLESTEROL mg/dL 175   TRIGLYCERIDES mg/dL 454*   HDL CHOL mg/dL 37*     Results from last 7 days   Lab Units 12/07/19  2315 12/06/19  0958 12/06/19  0338   TSH uIU/mL  --  2.340  --    HEMOGLOBIN A1C %  --   --  9.5*   PROCALCITONIN ng/mL 6.12*  --   --    LACTATE mmol/L 1.0  --   --        Brief Urine Lab Results  (Last result in the past 365 days)      Color   Clarity   Blood   Leuk Est   Nitrite   Protein   CREAT   Urine HCG        05/22/19 0138 YELLOW CLEAR TRACE NEGATIVE NEGATIVE 100               Microbiology Results Abnormal     Procedure Component Value - Date/Time    Respiratory Panel, PCR - Swab, Nasopharynx [[id number]]  (Normal) Collected:  12/08/19 0027    Lab Status:  Final result Specimen:  Swab from Nasopharynx Updated:  12/08/19 0200     ADENOVIRUS, PCR Not Detected     Coronavirus 229E Not Detected     Coronavirus HKU1 Not Detected     Coronavirus NL63 Not Detected     Coronavirus OC43 Not Detected     Human Metapneumovirus Not Detected     Human Rhinovirus/Enterovirus Not Detected     Influenza B PCR Not Detected     Parainfluenza Virus 1 Not Detected     Parainfluenza Virus 2 Not Detected     Parainfluenza Virus 3 Not Detected     Parainfluenza Virus 4 Not Detected     Bordetella pertussis pcr Not Detected     Influenza A H1 2009 PCR Not Detected     Chlamydophila pneumoniae PCR Not Detected     Mycoplasma pneumo by PCR Not Detected     Influenza A PCR Not Detected     Influenza A H3 Not Detected     Influenza A H1 Not Detected     RSV, PCR Not Detected          Xr Abdomen 2 View With Chest  1 View    Result Date: 12/8/2019  Impression:  1. Small amount of free air within the abdomen which is unremarkable given history of any colectomy on December 5 2. Couple of air fluid levels seen on decubitus view suggesting mild postop ileus 3. No stairstep air-fluid levels are seen that would suggest obstruction 4. Cardiomegaly without active cardiopulmonary disease  Electronically Signed By-Wilder London Jr. On:12/8/2019 8:09 AM This report was finalized on [id number] by  Wilder London Jr., .    Ct Head Without Contrast    Result Date: 12/7/2019  Impression: 1. Expected evolution of the acute/subacute right occipital infarct. No hemorrhage or mass effect. 2. No new infarct visible by CT. 3. Mild chronic age-related intracranial findings as above, unchanged. 4. Fluid level in the left sphenoid sinus.  This examination was interpreted by Eric Kaplan M.D. Electronically signed by:  Eric Kaplan M.D.  12/7/2019 8:08 PM    Mri Brain Without Contrast    Result Date: 12/6/2019  Impression:  1. Large area of acute ischemic change involving the right occipitoparietal region with additional small punctate foci involving the thalamus and periventricular white matter as described above. Susceptibility weighted imaging demonstrates some curvilinear dark areas within the largest area of infarction, which may indicate petechial hemorrhage. 2. Mild parenchymal volume loss and ventricular white matter T2 and FLAIR high signal abnormality most commonly secondary to chronic small vessel ischemic change. 3. Paranasal sinus disease.     Electronically Signed By-Joshua Churchill On:12/6/2019 1:09 PM This report was finalized on [id number] by  Joshua Churchill, .    Xr Chest 1 View    Result Date: 12/7/2019  Impression: Impression: 1. No evidence of an acute pleural or pulmonary parenchymal abnormality. 2. Cardiomegaly. Electronically signed by:  Jeremie Caceres M.D.  12/7/2019 10:05 PM    Ct Angiogram Carotids    Result Date:  12/6/2019  Impression:  1. Acute to subacute area of infarction involving the right posterior cerebral artery territory with occlusion of the right P2 segment of the posterior cerebral artery. 2. No hemodynamically significant extracranial vascular disease.  Electronically Signed ByMaycol Sheikh On:12/6/2019 11:04 AM This report was finalized on [id number] by  Ronaldo Sehikh, .    Ct Angiogram Head    Result Date: 12/6/2019  Impression:  1. Acute to subacute area of infarction involving the right posterior cerebral artery territory with occlusion of the right P2 segment of the posterior cerebral artery. 2. No hemodynamically significant extracranial vascular disease.  Electronically Signed ByMaycol Sheikh On:12/6/2019 11:04 AM This report was finalized on [id number] by  Ronaldo Sheikh, .    Ct Head Without Contrast Stroke Protocol    Result Date: 12/6/2019  Impression:  1. Findings consistent with early subacute ischemic change in the right occipitoparietal region, PCA distribution. MRI is a more sensitive examination for determining acuity of ischemic change or for intracranial metastatic disease.  Electronically Signed ByClement Churchill On:12/6/2019 10:20 AM This report was finalized on [id number] by  Joshua Churchill, .    Xr Chest Pa & Lateral    Result Date: 11/26/2019  Impression: No acute cardiopulmonary process. 2. No interval change from 05/14/2019  Electronically Signed ByQuinton London Jr. On:11/26/2019 3:17 PM This report was finalized on [id number] by  Wilder London Jr., .                Results for orders placed during the hospital encounter of 12/05/19   Adult Transthoracic Echo Complete W/ Cont if Necessary Per Protocol    Narrative · Estimated EF = 60%.  · Left ventricular systolic function is normal.     Indications  Cardiac source of emboli.    Technically satisfactory study.  Mitral valve is structurally normal.  Tricuspid valve is structurally normal.  Aortic valve is structurally normal.  Pulmonic  valve could not be well visualized.  No evidence for mitral tricuspid or aortic regurgitation is seen by   Doppler study.  Left atrium is normal in size.  Right atrium is normal in size.  Left ventricle is normal in size and contractility with ejection fraction   of 60%.  Right ventricle is normal in size.  Atrial septum is intact.  Aorta is normal.  No pericardial effusion or intracardiac thrombus is seen.    Impression  Structurally and functionally normal cardiac valves.  Normal left ventricle size and contractility with ejection fraction of   60%.  No pericardial effusion or intracardiac thrombus is seen.                     Test Results Pending at Discharge        Procedures Performed  Procedure(s):  open right hemicolectomy  12/10 1205 EEG      Consults:   Consults     Date and Time Order Name Status Description    12/7/2019 1740 Inpatient Endocrinology Consult      12/7/2019 0824 Inpatient Cardiology Consult Completed     12/5/2019 1751 Inpatient Hospitalist Consult Completed             Discharge Details        Discharge Medications      New Medications      Instructions Start Date   acetaminophen 500 MG tablet  Commonly known as:  TYLENOL   1,000 mg, Oral, 3 Times Daily      albuterol (2.5 MG/3ML) 0.083% nebulizer solution  Commonly known as:  PROVENTIL   2.5 mg, Nebulization, Every 6 Hours PRN      aspirin 81 MG EC tablet   81 mg, Oral, Daily   Start Date:  December 12, 2019     cyanocobalamin 1000 MCG tablet  Commonly known as:  VITAMIN B-12   1,000 mcg, Oral, Daily   Start Date:  December 12, 2019     docusate sodium 100 MG capsule   100 mg, Oral, 2 Times Daily      fluticasone 50 MCG/ACT nasal spray  Commonly known as:  FLONASE   2 sprays, Each Nare, Daily      insulin glargine 100 UNIT/ML injection  Commonly known as:  LANTUS   34 Units, Subcutaneous, Nightly      insulin lispro 100 UNIT/ML injection  Commonly known as:  humaLOG   0-9 Units, Subcutaneous, 4 Times Daily With Meals & Nightly      insulin  lispro 100 UNIT/ML injection  Commonly known as:  humaLOG   0-9 Units, Subcutaneous, 3 Times Daily With Meals      insulin lispro 100 UNIT/ML injection  Commonly known as:  humaLOG   12 Units, Subcutaneous, 3 Times Daily With Meals      oxyCODONE 5 MG immediate release tablet  Commonly known as:  ROXICODONE   5 mg, Oral, Every 4 Hours PRN      polyethylene glycol packet  Commonly known as:  MIRALAX   17 g, Oral, Daily   Start Date:  December 12, 2019        Changes to Medications      Instructions Start Date   atorvastatin 20 MG tablet  Commonly known as:  LIPITOR  What changed:  how much to take   80 mg, Oral, Every Evening, 1 daily      diphenhydrAMINE 25 mg capsule  Commonly known as:  BENADRYL ALLERGY  What changed:    · when to take this  · reasons to take this   50 mg, Oral, Every 4 Hours PRN      metFORMIN 500 MG tablet  Commonly known as:  GLUCOPHAGE  What changed:    · when to take this  · additional instructions   500 mg, Oral, 3 Times Daily      omeprazole 40 MG capsule  Commonly known as:  PrilOSEC  What changed:  when to take this   40 mg, Oral, Daily, May take the day of surgery          Continue These Medications      Instructions Start Date   B-D UF III MINI PEN NEEDLES 31G X 5 MM misc  Generic drug:  Insulin Pen Needle   USE WITH INSULIN PEN 4 TIMES A DAY DX E11.65      Insulin Pen Needle 31G X 5 MM misc  Commonly known as:  B-D UF III MINI PEN NEEDLES   Use with insulin 4 times daily dx:e11.65      Cholecalciferol 125 MCG (5000 UT) tablet   1 tablet, Oral, Daily, Stop taking on 11/28      FARXIGA 10 MG tablet  Generic drug:  Dapagliflozin Propanediol   1 tablet, Oral, Every Morning Before Breakfast, Do not take morning of surgery      metoclopramide 10 MG tablet  Commonly known as:  REGLAN   10 mg, Oral, 3 Times Daily, Do not take the morning of surgery      metoprolol tartrate 50 MG tablet  Commonly known as:  LOPRESSOR   50 mg, Oral, 2 Times Daily, Take one twice daily      ONE TOUCH ULTRA TEST  test strip  Generic drug:  glucose blood   ONETOUCH ULTRA BLUE STRP      ONETOUCH DELICA LANCETS 33G misc   ONETOUCH DELICA LANCETS 33G         Stop These Medications    NOVOLIN 70/30 FLEXPEN RELION (70-30) 100 UNIT/ML suspension pen-injector  Generic drug:  Insulin NPH Isophane & Regular            No Known Allergies      Discharge Disposition:  Rehab Facility or Unit (DC - External)    Diet:  Hospital:  Diet Order   Procedures   • Diet Gastrointestinal; [place]         Discharge Activity:   Activity Instructions     Activity as Tolerated              CODE STATUS:    Code Status and Medical Interventions:   Ordered at: 12/05/19 5351     Level Of Support Discussed With:    Patient     Code Status:    CPR     Medical Interventions (Level of Support Prior to Arrest):    Full         Follow-up Appointments  Future Appointments   Date Time Provider Department Center   1/7/2020 11:00 AM LAB  [facility] None   1/14/2020 12:00 PM Radha Manriquez MD [facility] None   1/15/2020  1:00 PM Al Kimbrough MD [facility] None       Additional Instructions for the Follow-ups that You Need to Schedule     Call MD With Problems / Concerns   As directed      Instructions: Call [phone] or email [email or web address] for problems or concerns.    Order Comments:  Instructions: Call [phone] or email [email or web address] for problems or concerns.          Discharge Follow-up with PCP   As directed       Currently Documented PCP:    Al Kimbrough MD    PCP Phone Number:    [phone]     Follow Up Details:  Follow-up with PCP after released from rehab                 Condition on Discharge:      Stable          Electronically signed by Farrah Crain MD, 12/11/19, 2:11 PM.    Time: I spent 40 minutes on this discharge activity which included face-to-face encounter with the patient/reviewing the data in the system/coordination of the care with the nursing staff as well as  consultants/documentation/entering orders.      Electronically signed by Farrah Crain MD at 12/11/19 7017

## 2019-12-13 LAB
BH CV ECHO MEAS - BSA(HAYCOCK): 2.2 M^2
BH CV ECHO MEAS - BSA: 2.2 M^2
BH CV ECHO MEAS - BZI_BMI: 26.4 KILOGRAMS/M^2
BH CV ECHO MEAS - BZI_METRIC_HEIGHT: 185.4 CM
BH CV ECHO MEAS - BZI_METRIC_WEIGHT: 90.7 KG

## 2019-12-18 ENCOUNTER — LAB REQUISITION (OUTPATIENT)
Dept: LAB | Facility: HOSPITAL | Age: 59
End: 2019-12-18

## 2019-12-18 DIAGNOSIS — Z00.00 ENCOUNTER FOR GENERAL ADULT MEDICAL EXAMINATION WITHOUT ABNORMAL FINDINGS: ICD-10-CM

## 2019-12-18 LAB
ANION GAP SERPL CALCULATED.3IONS-SCNC: 16 MMOL/L (ref 5–15)
BUN BLD-MCNC: 19 MG/DL (ref 8–23)
BUN/CREAT SERPL: 14.3 (ref 7–25)
CALCIUM SPEC-SCNC: 10.2 MG/DL (ref 8.6–10.5)
CHLORIDE SERPL-SCNC: 95 MMOL/L (ref 98–107)
CO2 SERPL-SCNC: 21 MMOL/L (ref 22–29)
CREAT BLD-MCNC: 1.33 MG/DL (ref 0.76–1.27)
DEPRECATED RDW RBC AUTO: 45.5 FL (ref 37–54)
ERYTHROCYTE [DISTWIDTH] IN BLOOD BY AUTOMATED COUNT: 14.2 % (ref 12.3–15.4)
GFR SERPL CREATININE-BSD FRML MDRD: 55 ML/MIN/1.73
GLUCOSE BLD-MCNC: 168 MG/DL (ref 65–99)
HCT VFR BLD AUTO: 35.6 % (ref 37.5–51)
HGB BLD-MCNC: 12 G/DL (ref 13–17.7)
MCH RBC QN AUTO: 30.6 PG (ref 26.6–33)
MCHC RBC AUTO-ENTMCNC: 33.8 G/DL (ref 31.5–35.7)
MCV RBC AUTO: 90.3 FL (ref 79–97)
PLATELET # BLD AUTO: 688 10*3/MM3 (ref 140–450)
PMV BLD AUTO: 7.8 FL (ref 6–12)
POTASSIUM BLD-SCNC: 4.5 MMOL/L (ref 3.5–5.2)
RBC # BLD AUTO: 3.94 10*6/MM3 (ref 4.14–5.8)
SODIUM BLD-SCNC: 132 MMOL/L (ref 136–145)
WBC NRBC COR # BLD: 13 10*3/MM3 (ref 3.4–10.8)

## 2019-12-18 PROCEDURE — 80048 BASIC METABOLIC PNL TOTAL CA: CPT

## 2019-12-18 PROCEDURE — 85027 COMPLETE CBC AUTOMATED: CPT

## 2019-12-23 ENCOUNTER — LAB REQUISITION (OUTPATIENT)
Dept: LAB | Facility: HOSPITAL | Age: 59
End: 2019-12-23

## 2019-12-23 DIAGNOSIS — Z00.00 ENCOUNTER FOR GENERAL ADULT MEDICAL EXAMINATION WITHOUT ABNORMAL FINDINGS: ICD-10-CM

## 2019-12-23 LAB
ANION GAP SERPL CALCULATED.3IONS-SCNC: 13 MMOL/L (ref 5–15)
BUN BLD-MCNC: 25 MG/DL (ref 8–23)
BUN/CREAT SERPL: 18.7 (ref 7–25)
CALCIUM SPEC-SCNC: 9.5 MG/DL (ref 8.6–10.5)
CHLORIDE SERPL-SCNC: 97 MMOL/L (ref 98–107)
CO2 SERPL-SCNC: 24 MMOL/L (ref 22–29)
CREAT BLD-MCNC: 1.34 MG/DL (ref 0.76–1.27)
GFR SERPL CREATININE-BSD FRML MDRD: 54 ML/MIN/1.73
GLUCOSE BLD-MCNC: 151 MG/DL (ref 65–99)
POTASSIUM BLD-SCNC: 4.6 MMOL/L (ref 3.5–5.2)
SODIUM BLD-SCNC: 134 MMOL/L (ref 136–145)

## 2019-12-23 PROCEDURE — 80048 BASIC METABOLIC PNL TOTAL CA: CPT

## 2019-12-26 ENCOUNTER — TELEPHONE (OUTPATIENT)
Dept: FAMILY MEDICINE CLINIC | Facility: CLINIC | Age: 59
End: 2019-12-26

## 2019-12-26 NOTE — ADDENDUM NOTE
Addendum  created 12/26/19 0814 by Lizbeth Pisano MD    Diagnosis association updated, Intraprocedure Blocks edited, Sign clinical note

## 2020-01-07 ENCOUNTER — LAB (OUTPATIENT)
Dept: LAB | Facility: HOSPITAL | Age: 60
End: 2020-01-07

## 2020-01-07 DIAGNOSIS — E55.9 VITAMIN D DEFICIENCY: ICD-10-CM

## 2020-01-07 DIAGNOSIS — E11.49 TYPE 2 DIABETES MELLITUS WITH OTHER DIABETIC NEUROLOGICAL COMPLICATION (HCC): ICD-10-CM

## 2020-01-07 LAB
25(OH)D3 SERPL-MCNC: 38.4 NG/ML (ref 30–100)
HBA1C MFR BLD: 7.6 % (ref 3.5–5.6)

## 2020-01-07 PROCEDURE — 36415 COLL VENOUS BLD VENIPUNCTURE: CPT

## 2020-01-07 PROCEDURE — 82306 VITAMIN D 25 HYDROXY: CPT

## 2020-01-07 PROCEDURE — 83036 HEMOGLOBIN GLYCOSYLATED A1C: CPT

## 2020-01-07 RX ORDER — OXYCODONE HYDROCHLORIDE 5 MG/1
5 TABLET ORAL EVERY 6 HOURS PRN
COMMUNITY
Start: 2019-12-24 | End: 2020-01-14

## 2020-01-07 RX ORDER — ERYTHROMYCIN 250 MG/1
TABLET, COATED ORAL
Refills: 0 | COMMUNITY
Start: 2019-11-13 | End: 2020-01-14

## 2020-01-07 RX ORDER — NEOMYCIN SULFATE 500 MG/1
TABLET ORAL
Refills: 0 | COMMUNITY
Start: 2019-11-13 | End: 2020-01-14

## 2020-01-07 RX ORDER — METFORMIN HYDROCHLORIDE 500 MG/1
500 TABLET, EXTENDED RELEASE ORAL 3 TIMES DAILY
COMMUNITY
Start: 2019-12-14 | End: 2020-01-16 | Stop reason: SDUPTHER

## 2020-01-07 RX ORDER — ATORVASTATIN CALCIUM 80 MG/1
80 TABLET, FILM COATED ORAL
COMMUNITY
Start: 2019-12-24 | End: 2020-01-14 | Stop reason: SDUPTHER

## 2020-01-10 ENCOUNTER — TELEPHONE (OUTPATIENT)
Dept: FAMILY MEDICINE CLINIC | Facility: CLINIC | Age: 60
End: 2020-01-10

## 2020-01-10 NOTE — TELEPHONE ENCOUNTER
Krupa called from Case management on this pt wanting to make sure he followed up with you and what plan of care and education and support for him. Please call her at [phone] [phone]

## 2020-01-14 ENCOUNTER — OFFICE VISIT (OUTPATIENT)
Dept: ENDOCRINOLOGY | Facility: CLINIC | Age: 60
End: 2020-01-14

## 2020-01-14 VITALS
BODY MASS INDEX: 24.92 KG/M2 | DIASTOLIC BLOOD PRESSURE: 68 MMHG | OXYGEN SATURATION: 98 % | WEIGHT: 188 LBS | HEART RATE: 74 BPM | SYSTOLIC BLOOD PRESSURE: 116 MMHG | HEIGHT: 73 IN

## 2020-01-14 DIAGNOSIS — E78.2 HYPERLIPIDEMIA, MIXED: ICD-10-CM

## 2020-01-14 DIAGNOSIS — E11.49 TYPE 2 DIABETES MELLITUS WITH OTHER DIABETIC NEUROLOGICAL COMPLICATION (HCC): Primary | ICD-10-CM

## 2020-01-14 DIAGNOSIS — E55.9 VITAMIN D DEFICIENCY: ICD-10-CM

## 2020-01-14 LAB — GLUCOSE BLDC GLUCOMTR-MCNC: 162 MG/DL (ref 70–130)

## 2020-01-14 PROCEDURE — 82962 GLUCOSE BLOOD TEST: CPT | Performed by: INTERNAL MEDICINE

## 2020-01-14 PROCEDURE — 99213 OFFICE O/P EST LOW 20 MIN: CPT | Performed by: INTERNAL MEDICINE

## 2020-01-14 RX ORDER — METOPROLOL TARTRATE 50 MG/1
50 TABLET, FILM COATED ORAL 2 TIMES DAILY
COMMUNITY
Start: 2020-01-10 | End: 2020-04-13

## 2020-01-14 RX ORDER — ATORVASTATIN CALCIUM 40 MG/1
40 TABLET, FILM COATED ORAL DAILY
COMMUNITY
End: 2020-02-27

## 2020-01-14 RX ORDER — HUMAN INSULIN 100 [IU]/ML
60 INJECTION, SUSPENSION SUBCUTANEOUS 2 TIMES DAILY
COMMUNITY
End: 2020-02-20

## 2020-01-14 NOTE — PATIENT INSTRUCTIONS
Keep up the good work!  Continue PT.  Continue meds.  Call if blood sugars are running under 100 or over 200.  F/u in 6 months, with labs prior.

## 2020-01-15 ENCOUNTER — OFFICE VISIT (OUTPATIENT)
Dept: FAMILY MEDICINE CLINIC | Facility: CLINIC | Age: 60
End: 2020-01-15

## 2020-01-15 VITALS
HEART RATE: 80 BPM | HEIGHT: 73 IN | TEMPERATURE: 98.1 F | DIASTOLIC BLOOD PRESSURE: 68 MMHG | RESPIRATION RATE: 16 BRPM | SYSTOLIC BLOOD PRESSURE: 106 MMHG | BODY MASS INDEX: 24.78 KG/M2 | WEIGHT: 187 LBS

## 2020-01-15 DIAGNOSIS — I63.531 ACUTE ISCHEMIC RIGHT PCA STROKE (HCC): Primary | ICD-10-CM

## 2020-01-15 DIAGNOSIS — I10 ESSENTIAL HYPERTENSION: ICD-10-CM

## 2020-01-15 DIAGNOSIS — Z79.4 TYPE 2 DIABETES MELLITUS WITH HYPERGLYCEMIA, WITH LONG-TERM CURRENT USE OF INSULIN (HCC): ICD-10-CM

## 2020-01-15 DIAGNOSIS — E11.65 TYPE 2 DIABETES MELLITUS WITH HYPERGLYCEMIA, WITH LONG-TERM CURRENT USE OF INSULIN (HCC): ICD-10-CM

## 2020-01-15 PROCEDURE — 99214 OFFICE O/P EST MOD 30 MIN: CPT | Performed by: FAMILY MEDICINE

## 2020-01-15 NOTE — PROGRESS NOTES
Rooming Tab(CC,VS,Pt Hx,Fall Screen)  Chief Complaint   Patient presents with   • Transient Ischemic Attack     f/u    • colon resection       Subjective    Elias is a 59-year-old male who back in the late fall was admitted after her colonoscopy revealed multiple colon polyps.  Many of them were removed through the scope but after a while it became obvious that he needed a resection of of his colon to get rid of some of the large polyps which could not be easily removed through the scope.  He had a partial colectomy done in early December and the procedure itself went well.  Unfortunately that evening he had a stroke in the right posterior occipital septal area his brain and he woke up with a left hemiparesis and a left visual field cut.  Elias is been working very hard since then with rehab trying to get all the function back they can.  He is here today to follow-up on that hospital stay and to discuss the possibility of starting anticoagulation.  He was felt to have an ischemic throat probably from a ruptured arterial plaque.  Extensive work-up in the hospitalization included echocardiogram and a LINH.  He had both cardiology and neurology consultation.  He had no arrhythmias during the hospital stay.            I have reviewed and updated his medications, medical history and problem list during today's office visit.     Patient Care Team:  Al Kimbrough MD as PCP - General  Al Kimbrough MD as PCP - Family Medicine    Problem List Tab  Medications Tab  Synopsis Tab  Chart Review Tab  Care Everywhere Tab  Immunizations Tab  Patient History Tab    Social History     Tobacco Use   • Smoking status: Former Smoker     Last attempt to quit:      Years since quittin.0   • Smokeless tobacco: Former User   Substance Use Topics   • Alcohol use: Yes     Comment: occasionally       Review of Systems   Constitutional: Negative for activity change, appetite change, fatigue, fever and unexpected weight loss.  "  HENT: Negative for congestion, ear pain, hearing loss, postnasal drip, rhinorrhea, sinus pressure, sneezing, sore throat and swollen glands.    Eyes: Positive for blurred vision and visual disturbance.   Respiratory: Negative for cough, shortness of breath and wheezing.    Cardiovascular: Negative for chest pain.   Gastrointestinal: Negative for abdominal pain, nausea and indigestion.   Genitourinary: Negative for dysuria, urgency and urinary incontinence.   Musculoskeletal: Negative for arthralgias, back pain, joint swelling and myalgias.   Skin: Negative for dry skin and skin lesions.   Allergic/Immunologic: Negative for environmental allergies.   Neurological: Positive for weakness. Negative for dizziness, headache, memory problem and confusion.        On left    Visual field cut on left   Hematological: Negative for adenopathy.   Psychiatric/Behavioral: Negative for agitation, depressed mood and stress. The patient is not nervous/anxious.    All other systems reviewed and are negative.      Objective     Rooming Tab(CC,VS,Pt Hx,Fall Screen)  /68 (BP Location: Left arm, Cuff Size: Adult)   Pulse 80   Temp 98.1 °F (36.7 °C) (Oral)   Resp 16   Ht 185.4 cm (73\")   Wt 84.8 kg (187 lb)   BMI 24.67 kg/m²     Body mass index is 24.67 kg/m².    Physical Exam   Constitutional: He is oriented to person, place, and time. He appears well-developed. No distress.   HENT:   Head: Normocephalic and atraumatic.   Right Ear: External ear normal.   Left Ear: External ear normal.   Nose: Nose normal.   Mouth/Throat: Oropharynx is clear and moist.   Left visual field cut.  Left complete hemianopsia   Eyes: Pupils are equal, round, and reactive to light. Conjunctivae, EOM and lids are normal. Right pupil is round. Left pupil is round. Pupils are equal.   Extraocular extraocular movements are intact.  Let him I anopsia   Neck: Normal range of motion. Neck supple.   Cardiovascular: Normal rate, regular rhythm, normal heart " sounds and intact distal pulses.   Pulmonary/Chest: Effort normal and breath sounds normal.   Abdominal: Soft. Bowel sounds are normal.   Musculoskeletal: Normal range of motion.   Low sided weakness arm and leg.  Definitely present but subtle   Neurological: He is alert and oriented to person, place, and time.   Skin: Skin is warm and dry.   Psychiatric: He has a normal mood and affect. His speech is normal and behavior is normal. Judgment and thought content normal. He is attentive.        Statin Choice Calculator  Data Reviewed:               Lab Results   Component Value Date    BUN 25 (H) 12/23/2019    CREATININE 1.34 (H) 12/23/2019    EGFRIFNONA 54 (L) 12/23/2019     (L) 12/23/2019    K 4.6 12/23/2019    CL 97 (L) 12/23/2019    CALCIUM 9.5 12/23/2019    TRIG 454 (H) 12/06/2019    HDL 37 (L) 12/06/2019    VLDL  12/06/2019      Comment:      Unable to calculate    LDL  12/06/2019      Comment:      Unable to calculate    LDL 82 12/06/2019    LDLHDL  12/06/2019      Comment:      Unable to calculate    WBC 13.00 (H) 12/18/2019    RBC 3.94 (L) 12/18/2019    HCT 35.6 (L) 12/18/2019    MCV 90.3 12/18/2019    MCH 30.6 12/18/2019    TSH 2.340 12/06/2019    INR 0.94 12/06/2019    BEZP88HR 38.4 01/07/2020      Assessment/Plan   Order Review Tab  Health Maintenance Tab  Patient Plan/Order Tab  Diagnoses and all orders for this visit:    1. Acute ischemic right PCA stroke involving the right occipital lobe (CMS/HCC) (Primary)  Assessment & Plan:  Patient experienced a right PCA stroke with left him with a left visual field cut and left hemiparesis.  He is able to walk although his balance is more.  He feels his left groin is a little weak and indeed on testing it is weak.  Hopefully with ongoing therapy though he is going to get his strength back.  We are going to go ahead since he is almost 6 weeks out now from the event we will go ahead and consider starting Eliquis.  Will take 5 mg twice a day.  He will be fine  up with .  We are going to refer him to a neurologist in [place] and is we can track someone down who will take him in the next few weeks.  We also are going to ask Dr. Vincent alegre to see him and document the extent of his visual field loss so it can be tracted over the years.      2. Type 2 diabetes mellitus with hyperglycemia, with long-term current use of insulin (CMS/[facility])  Assessment & Plan:  Diabetes is improving with lifestyle modifications.   Continue current treatment regimen.  Reminded to bring in blood sugar diary at next visit.  Dietary recommendations for ADA diet.  Regular aerobic exercise.  Discussed ways to avoid symptomatic hypoglycemia.  Diabetes will be reassessed in 3 months.    Patient is taking Humalog 70/30 and is taking 60 units in the morning 60 at night.  His recent A1c was 7.7.  Dr. Manriquez is following him through the [facility] and is very satisfied with his recent progress.      3. Essential hypertension  Assessment & Plan:  Hypertension is improving with treatment.  Continue current treatment regimen.  Dietary sodium restriction.  Weight loss.  Regular aerobic exercise.  Stop smoking.  Continue current medications.  Blood pressure will be reassessed in 3 months.    Patient's blood pressure is excellent today.  He will continue his treatment with metoprolol 50mg.        Wrapup Tab  Return in about 6 weeks (around 2/26/2020).

## 2020-01-15 NOTE — ASSESSMENT & PLAN NOTE
Patient experienced a right PCA stroke with left him with a left visual field cut and left hemiparesis.  He is able to walk although his balance is more.  He feels his left groin is a little weak and indeed on testing it is weak.  Hopefully with ongoing therapy though he is going to get his strength back.  We are going to go ahead since he is almost 6 weeks out now from the event we will go ahead and consider starting Eliquis.  Will take 5 mg twice a day.  He will be fine up with .  We are going to refer him to a neurologist in [place] and is we can track someone down who will take him in the next few weeks.  We also are going to ask Dr. Vincent alegre to see him and document the extent of his visual field loss so it can be tracted over the years.

## 2020-01-15 NOTE — PATIENT INSTRUCTIONS
Ischemic Stroke    An ischemic stroke is the sudden death of brain tissue. Blood carries oxygen to all areas of the body. This type of stroke happens when your blood does not flow to your brain like normal. Your brain cannot get the oxygen it needs. This is an emergency. It must be treated right away.  Symptoms of a stroke usually happen all of a sudden. You may notice them when you wake up. They can include:  · Weakness or loss of feeling in your face, arm, or leg. This often happens on one side of the body.  · Trouble walking.  · Trouble moving your arms or legs.  · Loss of balance or coordination.  · Feeling confused.  · Trouble talking or understanding what people are saying.  · Slurred speech.  · Trouble seeing.  · Seeing two of one object (double vision).  · Feeling dizzy.  · Feeling sick to your stomach (nauseous) and throwing up (vomiting).  · A very bad headache for no reason.  Get help as soon as any of these problems start. This is important. Some treatments work better if they are given right away. These include:  · Aspirin.  · Medicines to control blood pressure.  · A shot (injection) of medicine to break up the blood clot.  · Treatments given in the blood vessel (artery) to take out the clot or break it up.  Other treatments may include:  · Oxygen.  · Fluids given through an IV tube.  · Medicines to thin out your blood.  · Procedures to help your blood flow better.  What increases the risk?  Certain things may make you more likely to have a stroke. Some of these are things that you can change, such as:  · Being very overweight (obesity).  · Smoking.  · Taking birth control pills.  · Not being active.  · Drinking too much alcohol.  · Using drugs.  Other risk factors include:  · High blood pressure.  · High cholesterol.  · Diabetes.  · Heart disease.  · Being , , , or .  · Being over age 60.  · Family history of stroke.  · Having had blood clots,  stroke, or warning stroke (transient ischemic attack, TIA) in the past.  · Sickle cell disease.  · Being a woman with a history of high blood pressure in pregnancy (preeclampsia).  · Migraine headache.  · Sleep apnea.  · Having an irregular heartbeat (atrial fibrillation).  · Long-term (chronic) diseases that cause soreness and swelling (inflammation).  · Disorders that affect how your blood clots.  Follow these instructions at home:  Medicines  · Take over-the-counter and prescription medicines only as told by your doctor.  · If you were told to take aspirin or another medicine to thin your blood, take it exactly as told by your doctor.  ? Taking too much of the medicine can cause bleeding.  ? If you do not take enough, it may not work as well.  · Know the side effects of your medicines. If you are taking a blood thinner, make sure you:  ? Hold pressure over any cuts for longer than usual.  ? Tell your dentist and other doctors that you take this medicine.  ? Avoid activities that may cause damage or injury to your body.  Eating and drinking  · Follow instructions from your doctor about what you cannot eat or drink.  · Eat healthy foods.  · If you have trouble with swallowing, do these things to avoid choking:  ? Take small bites when eating.  ? Eat foods that are soft or pureed.  Safety  · Follow instructions from your health care team about physical activity.  · Use a walker or cane as told by your doctor.  · Keep your home safe so you do not fall. This may include:  ? Having experts look at your home to make sure it is safe.  ? Putting grab bars in the bedroom and bathroom.  ? Using raised toilets.  ? Putting a seat in the shower.  General instructions  · Do not use any tobacco products.  ? Examples of these are cigarettes, chewing tobacco, and e-cigarettes.  ? If you need help quitting, ask your doctor.  · Limit how much alcohol you drink. This means no more than 1 drink a day for nonpregnant women and 2 drinks  "a day for men. One drink equals 12 oz of beer, 5 oz of wine, or 1½ oz of hard liquor.  · If you need help to stop using drugs or alcohol, ask your doctor to refer you to a program or specialist.  · Stay active. Exercise as told by your doctor.  · Keep all follow-up visits as told by your doctor. This is important.  Get help right away if:    · You have any signs of a stroke. \"BE FAST\" is an easy way to remember the main warning signs:  ? B - Balance. Signs are dizziness, sudden trouble walking, or loss of balance.  ? E - Eyes. Signs are trouble seeing or a change in how you see.  ? F - Face. Signs are sudden weakness or loss of feeling of the face, or the face or eyelid drooping on one side.  ? A - Arms. Signs are weakness or loss of feeling in an arm. This happens suddenly and usually on one side of the body.  ? S - Speech. Signs are sudden trouble speaking, slurred speech, or trouble understanding what people say.  ? T - Time. Time to call emergency services. Write down what time symptoms started.  · You have other signs of a stroke, such as:  ? A sudden, very bad headache with no known cause.  ? Feeling sick to your stomach (nausea).  ? Throwing up (vomiting).  ? Jerky movements you cannot control (seizure).  These symptoms may be an emergency. Do not wait to see if the symptoms will go away. Get medical help right away. Call your local emergency services (911 in the [place] Do not drive yourself to the hospital.  Summary  · An ischemic stroke is the sudden death of brain tissue.  · Symptoms of a stroke usually happen all of a sudden. You may notice them when you wake up.  · Get help if you have any warning signs of a stroke. This is important. Some treatments work better if they are given right away.  This information is not intended to replace advice given to you by your health care provider. Make sure you discuss any questions you have with your health care provider.  Document Released: 12/06/2012 Document " Revised: 05/29/2019 Document Reviewed: 03/15/2017  [employer] Interactive Patient Education © 2019 [employer]

## 2020-01-15 NOTE — ASSESSMENT & PLAN NOTE
Hypertension is improving with treatment.  Continue current treatment regimen.  Dietary sodium restriction.  Weight loss.  Regular aerobic exercise.  Stop smoking.  Continue current medications.  Blood pressure will be reassessed in 3 months.    Patient's blood pressure is excellent today.  He will continue his treatment with metoprolol 50mg.

## 2020-01-15 NOTE — ASSESSMENT & PLAN NOTE
Diabetes is improving with lifestyle modifications.   Continue current treatment regimen.  Reminded to bring in blood sugar diary at next visit.  Dietary recommendations for ADA diet.  Regular aerobic exercise.  Discussed ways to avoid symptomatic hypoglycemia.  Diabetes will be reassessed in 3 months.    Patient is taking Humalog 70/30 and is taking 60 units in the morning 60 at night.  His recent A1c was 7.7.  Dr. Manriquez is following him through the [facility] and is very satisfied with his recent progress.

## 2020-01-16 RX ORDER — METFORMIN HYDROCHLORIDE 500 MG/1
500 TABLET, EXTENDED RELEASE ORAL 3 TIMES DAILY
Qty: 270 TABLET | Refills: 1 | Status: SHIPPED | OUTPATIENT
Start: 2020-01-16 | End: 2020-09-03

## 2020-01-20 NOTE — PROGRESS NOTES
[facility]        Patient Care Team:  Al Kimbrough MD as PCP - General  Al Kimbrough MD as PCP - Family Medicine    Chief Complaint:    Chief Complaint   Patient presents with   • Diabetes     type 2         Subjective   Here for diabetes f/u  Blood sugars: in the mid 100's  Hospitalized in December with a stroke. Vision has not recovered much  Exercise program: doing PT 2x/week    Interval History:     Patient Complaints: none  Patient Denies:  hypoglycemia  History taken from: patient    Review of Systems:   Review of Systems   Eyes: Positive for blurred vision.   Gastrointestinal: Negative for nausea.   Endocrine: Negative for polyuria.   Neurological: Negative for headache.     Lost  17 lb since last visit    Objective     Vital Signs      Vitals:    01/14/20 1202   BP: 116/68   Pulse: 74   SpO2: 98%         Physical Exam:     General Appearance:    Alert, cooperative, in no acute distress   Head:    Normocephalic, without obvious abnormality, atraumatic   Eyes:            Lids and lashes normal, conjunctivae and sclerae normal   Throat:   No oral lesions,  oral mucosa moist   Neck:   No adenopathy, supple,  no thyromegaly, no   carotid bruit   Lungs:     Clear     Heart:    Regular rhythm and normal rate   Chest Wall:    No abnormalities observed   Abdomen:     Normal bowel sounds, soft                 Extremities:   Moves all extremities well, no edema               Pulses:   Pulses palpable and equal bilaterally   Skin:   Dry   Neurologic:  DTR absent in ankles, absent vibratory sense in toes, unable to feel the 10g monofilament ( same as 1y ago )            Results Review:    I have reviewed the patient's new clinical results, labs & imaging.    Medication Review:   Prior to Admission medications    Medication Sig Start Date End Date Taking? Authorizing Provider   albuterol (PROVENTIL) (2.5 MG/3ML) 0.083% nebulizer solution Take 2.5 mg by nebulization Every 6 (Six) Hours  As Needed for Wheezing or Shortness of Air. 12/11/19  Yes Farrah Crain MD   aspirin 81 MG EC tablet Take 1 tablet by mouth Daily. 12/12/19  Yes Farrah Crain MD   atorvastatin (LIPITOR) 40 MG tablet Take 40 mg by mouth Daily.   Yes Jaylene Berger MD B-D UF III MINI PEN NEEDLES 31G X 5 MM misc USE WITH INSULIN PEN 4 TIMES A DAY DX E11.65 4/10/19  Yes Jaylene Berger MD   Cholecalciferol 5000 units tablet Take 1 tablet by mouth Daily. Stop taking on 11/28   Yes Jaylene Berger MD   diphenhydrAMINE (BENADRYL ALLERGY) 25 mg capsule Take 2 capsules by mouth Every 4 (Four) Hours As Needed for Itching or Allergies. 12/11/19  Yes Farrah Crain MD   docusate sodium 100 MG capsule Take 100 mg by mouth 2 (Two) Times a Day. 12/11/19  Yes Farrah Crain MD   FARXIGA 10 MG tablet Take 1 tablet by mouth Every Morning Before Breakfast. Do not take morning of surgery 7/4/19  Yes Jaylene Berger MD   fluticasone (FLONASE) 50 MCG/ACT nasal spray 2 sprays by Each Nare route Daily. 12/11/19  Yes Farrah Crain MD   glucose blood (ONE TOUCH ULTRA TEST) test strip Use to check blood sugar twice daily 11/4/18  Yes Jaylene Berger MD   Insulin Pen Needle (CHU-D UF III MINI PEN NEEDLES) 31G X 5 MM misc Use with insulin 4 times daily dx:e11.65 7/19/19  Yes Radha Manriquez MD   metoclopramide (REGLAN) 10 MG tablet Take 10 mg by mouth 3 (Three) Times a Day. Do not take the morning of surgery 6/30/19  Yes Jaylene Berger MD   metoprolol tartrate (LOPRESSOR) 50 MG tablet  1/10/20  Yes Jaylene Berger MD   NOVOLIN 70/30 FLEXPEN RELION (70-30) 100 UNIT/ML suspension pen-injector Inject 60 Units under the skin into the appropriate area as directed 2 (Two) Times a Day.   Yes Jaylene Berger MD   omeprazole (PrilOSEC) 40 MG capsule Take 1 capsule by mouth Daily. May take the day of surgery  Patient taking differently: Take 40 mg by mouth 2 (Two) Times a Day. 12/11/19  Yes Farrah Crain MD    ONETOUCH DELICA LANCETS 33G misc Use to check blood sugar twice daily 4/3/17  Yes Provider, MD Jaylene   apixaban (ELIQUIS) 5 MG tablet tablet Take 1 tablet by mouth Every 12 (Twelve) Hours for 30 days. 1/15/20 2/14/20  Al Kimbrough MD   metFORMIN ER (GLUCOPHAGE-XR) 500 MG 24 hr tablet Take 1 tablet by mouth 3 (Three) Times a Day. 1/16/20   Radha Manriquez MD       Lab Results (most recent)     Procedure Component Value Units Date/Time    POC Glucose Fingerstick [[id number]]  (Abnormal) Collected:  01/14/20 1201    Specimen:  Blood Updated:  01/14/20 1202     Glucose 162 mg/dL      Comment: ate@ 6p last night, 18 hours ago, [facility]               Lab Results   Component Value Date    HGBA1C 7.6 (H) 01/07/2020    HGBA1C 9.5 (H) 12/06/2019    HGBA1C 9.7 (H) 11/26/2019      Lab Results   Component Value Date    GLUCOSE 151 (H) 12/23/2019    BUN 25 (H) 12/23/2019    CREATININE 1.34 (H) 12/23/2019    EGFRIFNONA 54 (L) 12/23/2019    BCR 18.7 12/23/2019    K 4.6 12/23/2019    CO2 24.0 12/23/2019    CALCIUM 9.5 12/23/2019    ALBUMIN 3.70 07/02/2019    LABIL2 0.8 (L) 05/26/2019    AST 26 07/02/2019    ALT 29 07/02/2019    CHOL 175 12/06/2019    LDL  12/06/2019      Comment:      Unable to calculate    LDL 82 12/06/2019    HDL 37 (L) 12/06/2019    TRIG 454 (H) 12/06/2019     Lab Results   Component Value Date    TSH 2.340 12/06/2019    RHEH78NI 38.4 01/07/2020       Assessment/Plan     Chao was seen today for diabetes.    Diagnoses and all orders for this visit:    Type 2 diabetes mellitus with other diabetic neurological complication (CMS/HCC)  -     POC Glucose Fingerstick  -     Hemoglobin A1c; Future  -     Comprehensive Metabolic Panel; Future    Hyperlipidemia, mixed    Vitamin D deficiency        Continue PT.  Continue meds.  Call if blood sugars are running under 100 or over 200.        Radha Manriquez MD  01/19/20  10:28 PM

## 2020-02-13 ENCOUNTER — TRANSCRIBE ORDERS (OUTPATIENT)
Dept: ADMINISTRATIVE | Facility: HOSPITAL | Age: 60
End: 2020-02-13

## 2020-02-13 DIAGNOSIS — Z86.73 HISTORY OF STROKE: Primary | ICD-10-CM

## 2020-02-20 ENCOUNTER — HOSPITAL ENCOUNTER (OUTPATIENT)
Dept: CT IMAGING | Facility: HOSPITAL | Age: 60
Discharge: HOME | End: 2020-02-20
Admitting: FAMILY MEDICINE

## 2020-02-20 DIAGNOSIS — Z86.73 HISTORY OF STROKE: ICD-10-CM

## 2020-02-20 PROCEDURE — 70450 CT HEAD/BRAIN W/O DYE: CPT

## 2020-02-20 RX ORDER — HUMAN INSULIN 100 [IU]/ML
INJECTION, SUSPENSION SUBCUTANEOUS
Qty: 105 ML | Refills: 1 | Status: SHIPPED | OUTPATIENT
Start: 2020-02-20 | End: 2020-06-10

## 2020-02-27 ENCOUNTER — OFFICE VISIT (OUTPATIENT)
Dept: CARDIOLOGY | Facility: CLINIC | Age: 60
End: 2020-02-27

## 2020-02-27 VITALS
SYSTOLIC BLOOD PRESSURE: 116 MMHG | HEIGHT: 73 IN | DIASTOLIC BLOOD PRESSURE: 74 MMHG | BODY MASS INDEX: 26.11 KG/M2 | HEART RATE: 75 BPM | WEIGHT: 197 LBS

## 2020-02-27 DIAGNOSIS — I25.10 ATHEROSCLEROSIS OF NATIVE CORONARY ARTERY WITHOUT ANGINA PECTORIS, UNSPECIFIED WHETHER NATIVE OR TRANSPLANTED HEART: Primary | ICD-10-CM

## 2020-02-27 DIAGNOSIS — E11.65 TYPE 2 DIABETES MELLITUS WITH HYPERGLYCEMIA, WITH LONG-TERM CURRENT USE OF INSULIN (HCC): ICD-10-CM

## 2020-02-27 DIAGNOSIS — Z79.4 TYPE 2 DIABETES MELLITUS WITH HYPERGLYCEMIA, WITH LONG-TERM CURRENT USE OF INSULIN (HCC): ICD-10-CM

## 2020-02-27 DIAGNOSIS — E78.2 HYPERLIPIDEMIA, MIXED: ICD-10-CM

## 2020-02-27 DIAGNOSIS — I10 ESSENTIAL HYPERTENSION: ICD-10-CM

## 2020-02-27 PROCEDURE — 99214 OFFICE O/P EST MOD 30 MIN: CPT | Performed by: INTERNAL MEDICINE

## 2020-02-27 RX ORDER — ATORVASTATIN CALCIUM 20 MG/1
1 TABLET, FILM COATED ORAL DAILY
COMMUNITY
Start: 2020-02-05 | End: 2020-05-07 | Stop reason: SDUPTHER

## 2020-02-27 NOTE — PROGRESS NOTES
Date of Office Visit: 2020  Encounter Provider: Dr. Joao Caceres  Place of Service: [facility] CARDI[facility] [place]  Patient Name: Chao Lazar  :1960  Al Kimbrough MD    Chief Complaint   Patient presents with   • Coronary Artery Disease     hospital follow up/echo   • Hypertension   • Hyperlipidemia   • Stroke     History of Present Illness:    I am pleased to see Mr. Frias in my office today as a follow-up.    As you know, patient is 60 years old white gentleman whose past medical history is significant for hypertension, hyperlipidemia, diabetes mellitus, CAD, coronary artery stenting, who came today for follow-up after his recent hospital admission and discharge.    In , patient was admitted with acute coronary artery syndrome and was noted to have non-ST elevation myocardial infarction.  Subsequent cardiac catheterization showed nonobstructive disease of LAD and LCx.  RCA had high-grade stenosis.  Patient had dual PDA system.  Patient underwent stenting of proximal RCA.  Left ventricular function was preserved.    In 2019, patient underwent colonoscopy because of polyps and underwent polypectomy.  Postprocedure patient developed visual disturbances and patient was transferred to [facility].  MRI confirmed right parietal and occipital stroke causing hemianopsia.  Patient was started on Eliquis and aspirin and was discharged.    Since the discharge, patient is slowly recovering from the stroke.  Patient denies any symptom of chest pain or tightness or heaviness.  No orthopnea, PND, syncope or presyncope.  No leg edema noted.  Visual disturbances still persist.    At this stage patient is doing well.  His blood pressure is well controlled.  Patient is started on Eliquis.  I would continue current treatment.  Slow exercise regimen is recommended.        Past Medical History:   Diagnosis Date   • ACE-inhibitor cough    • Arthritis    • Diabetes mellitus (CMS/[facility])  1988    type 2    • GERD (gastroesophageal reflux disease)    • Hyperlipidemia    • Hypertension    • RAD (reactive airway disease)    • Stroke (cerebrum) (CMS/[facility])     [id number]   • Type 2 diabetes mellitus (CMS/[facility])          Past Surgical History:   Procedure Laterality Date   • CARDIAC CATHETERIZATION     • CHOLECYSTECTOMY  2004   • COLON RESECTION      [id number]   • COLON RESECTION SMALL BOWEL Right 12/5/2019    Procedure: open right hemicolectomy;  Surgeon: Ronaldo Ardon MD;  Location: [facility];  Service: General   • COLONOSCOPY  2019    x2    • CORONARY ANGIOPLASTY WITH STENT PLACEMENT  04/2017   • ECHO - CONVERTED  2019   • FRACTURE SURGERY      collar bone as a child    • KNEE ARTHROSCOPY Left 08/2003   • KNEE JOINT MANIPULATION Left 04/2010   • TOTAL KNEE ARTHROPLASTY Bilateral     2013,2011           Current Outpatient Medications:   •  albuterol (PROVENTIL) (2.5 MG/3ML) 0.083% nebulizer solution, Take 2.5 mg by nebulization Every 6 (Six) Hours As Needed for Wheezing or Shortness of Air., Disp: , Rfl: 12  •  apixaban (ELIQUIS) 5 MG tablet tablet, Take 5 mg by mouth 2 (Two) Times a Day., Disp: , Rfl:   •  aspirin 81 MG EC tablet, Take 1 tablet by mouth Daily., Disp: , Rfl:   •  atorvastatin (LIPITOR) 20 MG tablet, 1 tablet Daily., Disp: , Rfl:   •  B-D UF III MINI PEN NEEDLES 31G X 5 MM misc, USE WITH INSULIN PEN 4 TIMES A DAY DX E11.65, Disp: , Rfl: 3  •  diphenhydrAMINE (BENADRYL ALLERGY) 25 mg capsule, Take 2 capsules by mouth Every 4 (Four) Hours As Needed for Itching or Allergies., Disp: , Rfl:   •  docusate sodium 100 MG capsule, Take 100 mg by mouth 2 (Two) Times a Day., Disp: , Rfl:   •  FARXIGA 10 MG tablet, Take 1 tablet by mouth Every Morning Before Breakfast. Do not take morning of surgery, Disp: , Rfl: 4  •  fluticasone (FLONASE) 50 MCG/ACT nasal spray, 2 sprays by Each Nare route Daily., Disp: , Rfl:   •  glucose blood (ONE TOUCH ULTRA TEST) test strip, Use to check blood sugar  twice daily, Disp: , Rfl:   •  Insulin Pen Needle (B-D UF III MINI PEN NEEDLES) 31G X 5 MM misc, Use with insulin 4 times daily dx:e11.65, Disp: 150 each, Rfl: 3  •  metFORMIN ER (GLUCOPHAGE-XR) 500 MG 24 hr tablet, Take 1 tablet by mouth 3 (Three) Times a Day., Disp: 270 tablet, Rfl: 1  •  metoclopramide (REGLAN) 10 MG tablet, Take 10 mg by mouth 3 (Three) Times a Day. Do not take the morning of surgery, Disp: , Rfl: 6  •  metoprolol tartrate (LOPRESSOR) 50 MG tablet, Take 50 mg by mouth 2 (Two) Times a Day., Disp: , Rfl:   •  NOVOLIN 70/30 FLEXPEN RELION (70-30) 100 UNIT/ML suspension pen-injector, Inject 60 Units under the skin into the appropriate area as directed 2 (Two) Times a Day dx:e11.65, Disp: 105 mL, Rfl: 1  •  omeprazole (PrilOSEC) 40 MG capsule, Take 1 capsule by mouth Daily. May take the day of surgery (Patient taking differently: Take 40 mg by mouth 2 (Two) Times a Day.), Disp: , Rfl:   •  ONETOUCH DELICA LANCETS 33G misc, Use to check blood sugar twice daily, Disp: , Rfl:       Social History     Socioeconomic History   • Marital status:      Spouse name: Not on file   • Number of children: Not on file   • Years of education: Not on file   • Highest education level: Not on file   Tobacco Use   • Smoking status: Former Smoker     Last attempt to quit:      Years since quittin.1   • Smokeless tobacco: Former User   Substance and Sexual Activity   • Alcohol use: Yes     Comment: occasionally   • Drug use: No   • Sexual activity: Defer         Review of Systems   Constitution: Negative for chills and fever.   HENT: Negative for ear discharge and nosebleeds.    Eyes: Positive for vision loss in left eye. Negative for discharge and redness.   Cardiovascular: Negative for chest pain, orthopnea, palpitations, paroxysmal nocturnal dyspnea and syncope.   Respiratory: Positive for shortness of breath. Negative for cough and wheezing.    Endocrine: Negative for heat intolerance.   Skin: Negative  "for rash.   Musculoskeletal: Negative for arthritis and myalgias.   Gastrointestinal: Negative for abdominal pain, melena, nausea and vomiting.   Genitourinary: Negative for dysuria and hematuria.   Neurological: Negative for dizziness, light-headedness, numbness and tremors.   Psychiatric/Behavioral: Negative for depression. The patient is not nervous/anxious.        Procedures    Procedures    No orders to display           Objective:    /74   Pulse 75   Ht 185.4 cm (72.99\")   Wt 89.4 kg (197 lb)   BMI 26.00 kg/m²         Physical Exam   Constitutional: He is oriented to person, place, and time. He appears well-developed and well-nourished.   HENT:   Head: Normocephalic and atraumatic.   Eyes: No scleral icterus.   Neck: No thyromegaly present.   Cardiovascular: Normal rate, regular rhythm and normal heart sounds. Exam reveals no gallop and no friction rub.   No murmur heard.  Pulmonary/Chest: Effort normal and breath sounds normal. No respiratory distress. He has no wheezes. He has no rales.   Abdominal: There is no tenderness.   Musculoskeletal: He exhibits no edema.   Lymphadenopathy:     He has no cervical adenopathy.   Neurological: He is alert and oriented to person, place, and time.   Skin: No rash noted. No erythema.   Psychiatric: He has a normal mood and affect.           Assessment:       Diagnosis Plan   1. Atherosclerosis of native coronary artery without angina pectoris, unspecified whether native or transplanted heart     2. Hyperlipidemia, mixed     3. Essential hypertension     4. Type 2 diabetes mellitus with hyperglycemia, with long-term current use of insulin (CMS/[facility])              Plan:       She will continue current treatment.  Blood pressure monitoring is recommended.  Increase aerobic activity recommended.  Diabetic control is emphasized.  "

## 2020-03-10 RX ORDER — DAPAGLIFLOZIN 10 MG/1
TABLET, FILM COATED ORAL
Qty: 90 TABLET | Refills: 4 | Status: SHIPPED | OUTPATIENT
Start: 2020-03-10 | End: 2020-06-10

## 2020-03-20 RX ORDER — OMEPRAZOLE 40 MG/1
CAPSULE, DELAYED RELEASE ORAL
Qty: 180 CAPSULE | Refills: 1 | Status: SHIPPED | OUTPATIENT
Start: 2020-03-20 | End: 2020-05-12 | Stop reason: SDUPTHER

## 2020-03-30 RX ORDER — ATORVASTATIN CALCIUM 80 MG/1
TABLET, FILM COATED ORAL
Qty: 30 TABLET | Refills: 0 | Status: SHIPPED | OUTPATIENT
Start: 2020-03-30 | End: 2020-04-23

## 2020-04-13 RX ORDER — METOPROLOL TARTRATE 50 MG/1
TABLET, FILM COATED ORAL
Qty: 180 TABLET | Refills: 3 | Status: SHIPPED | OUTPATIENT
Start: 2020-04-13 | End: 2021-05-17

## 2020-04-20 RX ORDER — FLURBIPROFEN SODIUM 0.3 MG/ML
SOLUTION/ DROPS OPHTHALMIC
Qty: 400 EACH | Refills: 3 | Status: SHIPPED | OUTPATIENT
Start: 2020-04-20 | End: 2020-07-14 | Stop reason: SDUPTHER

## 2020-04-23 RX ORDER — ATORVASTATIN CALCIUM 80 MG/1
TABLET, FILM COATED ORAL
Qty: 30 TABLET | Refills: 0 | Status: SHIPPED | OUTPATIENT
Start: 2020-04-23 | End: 2020-05-07

## 2020-05-07 RX ORDER — ATORVASTATIN CALCIUM 20 MG/1
20 TABLET, FILM COATED ORAL DAILY
Qty: 90 TABLET | Refills: 1 | Status: SHIPPED | OUTPATIENT
Start: 2020-05-07 | End: 2020-05-12 | Stop reason: SDUPTHER

## 2020-05-12 ENCOUNTER — TELEPHONE (OUTPATIENT)
Dept: FAMILY MEDICINE CLINIC | Facility: CLINIC | Age: 60
End: 2020-05-12

## 2020-05-12 RX ORDER — OMEPRAZOLE 40 MG/1
40 CAPSULE, DELAYED RELEASE ORAL 2 TIMES DAILY
Qty: 180 CAPSULE | Refills: 1 | Status: SHIPPED | OUTPATIENT
Start: 2020-05-12 | End: 2020-06-10 | Stop reason: SDUPTHER

## 2020-05-12 RX ORDER — ATORVASTATIN CALCIUM 20 MG/1
20 TABLET, FILM COATED ORAL DAILY
Qty: 90 TABLET | Refills: 1 | Status: SHIPPED | OUTPATIENT
Start: 2020-05-12 | End: 2020-06-10

## 2020-05-12 NOTE — TELEPHONE ENCOUNTER
Patient called for medication refill and dosage increase.  Dosage increase for Atorvastatin to 80mg   Please return call and advise.

## 2020-06-02 ENCOUNTER — TELEPHONE (OUTPATIENT)
Dept: FAMILY MEDICINE CLINIC | Facility: CLINIC | Age: 60
End: 2020-06-02

## 2020-06-02 NOTE — TELEPHONE ENCOUNTER
Patient is being discharged from [facility] for DKA.  Needs a hospital follow up appointment.  I offered 6/12 at 1:30pm but the earliest wife can get patient here is 3:30pm.  When can you see patient at a 3:30pm or later?  Please call wife Debbi back to schedule at [phone].  LIZ  L is faxing records to us.

## 2020-06-10 ENCOUNTER — OFFICE VISIT (OUTPATIENT)
Dept: FAMILY MEDICINE CLINIC | Facility: CLINIC | Age: 60
End: 2020-06-10

## 2020-06-10 VITALS
DIASTOLIC BLOOD PRESSURE: 62 MMHG | WEIGHT: 183 LBS | SYSTOLIC BLOOD PRESSURE: 112 MMHG | HEIGHT: 72 IN | HEART RATE: 76 BPM | TEMPERATURE: 96.6 F | OXYGEN SATURATION: 98 % | BODY MASS INDEX: 24.79 KG/M2 | RESPIRATION RATE: 16 BRPM

## 2020-06-10 DIAGNOSIS — E11.10 DIABETIC KETOACIDOSIS WITHOUT COMA ASSOCIATED WITH TYPE 2 DIABETES MELLITUS (HCC): ICD-10-CM

## 2020-06-10 DIAGNOSIS — Z79.4 TYPE 2 DIABETES MELLITUS WITH HYPERGLYCEMIA, WITH LONG-TERM CURRENT USE OF INSULIN (HCC): ICD-10-CM

## 2020-06-10 DIAGNOSIS — I63.531 ACUTE ISCHEMIC RIGHT PCA STROKE (HCC): Primary | ICD-10-CM

## 2020-06-10 DIAGNOSIS — E11.65 TYPE 2 DIABETES MELLITUS WITH HYPERGLYCEMIA, WITH LONG-TERM CURRENT USE OF INSULIN (HCC): ICD-10-CM

## 2020-06-10 PROBLEM — E78.5 HYPERLIPIDEMIA: Status: ACTIVE | Noted: 2020-06-10

## 2020-06-10 PROBLEM — H35.9 RETINAL DISORDER: Status: ACTIVE | Noted: 2019-04-22

## 2020-06-10 PROBLEM — I25.10 ARTERIOSCLEROSIS OF CORONARY ARTERY: Status: ACTIVE | Noted: 2020-06-10

## 2020-06-10 PROBLEM — E11.9 DIABETES MELLITUS: Status: ACTIVE | Noted: 2020-06-10

## 2020-06-10 PROBLEM — I63.431 CEREBROVASCULAR ACCIDENT (CVA) DUE TO EMBOLISM OF RIGHT POSTERIOR CEREBRAL ARTERY: Status: ACTIVE | Noted: 2020-06-10

## 2020-06-10 PROBLEM — I63.9 CEREBROVASCULAR ACCIDENT (CVA) (HCC): Status: ACTIVE | Noted: 2020-06-10

## 2020-06-10 PROCEDURE — 99214 OFFICE O/P EST MOD 30 MIN: CPT | Performed by: FAMILY MEDICINE

## 2020-06-10 RX ORDER — OMEPRAZOLE 40 MG/1
40 CAPSULE, DELAYED RELEASE ORAL 2 TIMES DAILY
Qty: 360 CAPSULE | Refills: 3 | Status: SHIPPED | OUTPATIENT
Start: 2020-06-10 | End: 2020-06-12

## 2020-06-10 RX ORDER — INSULIN DETEMIR 100 [IU]/ML
INJECTION, SOLUTION SUBCUTANEOUS
COMMUNITY
Start: 2020-06-02 | End: 2020-07-14 | Stop reason: SDUPTHER

## 2020-06-10 RX ORDER — AMOXICILLIN 500 MG/1
CAPSULE ORAL
COMMUNITY
Start: 2020-06-04 | End: 2021-01-13

## 2020-06-10 RX ORDER — ATORVASTATIN CALCIUM 80 MG/1
80 TABLET, FILM COATED ORAL DAILY
Qty: 90 TABLET | Refills: 3 | Status: SHIPPED | OUTPATIENT
Start: 2020-06-10 | End: 2021-05-14

## 2020-06-10 RX ORDER — ATORVASTATIN CALCIUM 80 MG/1
80 TABLET, FILM COATED ORAL DAILY
COMMUNITY
Start: 2020-06-02 | End: 2020-06-10 | Stop reason: SDUPTHER

## 2020-06-10 NOTE — ASSESSMENT & PLAN NOTE
Diabetes is improving with treatment.   Continue current treatment regimen.  Reminded to bring in blood sugar diary at next visit.  Dietary recommendations for ADA diet.  Regular aerobic exercise.  Diabetes will be reassessed in 3 months.    Patient's ketoacidosis has resolved in the hospital.  He is working hard to get his blood sugars down.  They are still running anywhere from 3-500.  We will begin by increasing his Levemir 10 more units in the morning and then more units at night.  He will also be following up with [facility] in a few weeks.

## 2020-06-10 NOTE — ASSESSMENT & PLAN NOTE
Patient's stroke seems to be stable.  His visual field apparently is fractured and to pieces that he can see in pieces and areas that he cannot see.  He is learning to adjust and live with what he has.

## 2020-06-10 NOTE — PROGRESS NOTES
Rooming Tab(CC,VS,Pt Hx,Fall Screen)  Chief Complaint   Patient presents with   • Transitional Care Management       Subjective    Patient here for his TCV after being hospitilized for diabetic DKA.   Not sure the trigger.   Sugar over 1000.  Thinking was distorted.   Could not walk.  Went to ER and DKA found and he was admitted and Rx given.  In Dec 2019 he went in for colon sugery for a cyst.   He had colonoscopy a few mos earlier and 56 polyps eventually removed.  One was a large cyst and they could not get out with a scope.   General surgery did procedure.  Post op he had a massive stroke with loss of vision.    I have reviewed and updated his medications, medical history and problem list during today's office visit.     Patient Care Team:  Al Kimbrough MD as PCP - General  Al Kimbrough MD as PCP - Family Medicine    Problem List Tab  Medications Tab  Synopsis Tab  Chart Review Tab  Care Everywhere Tab  Immunizations Tab  Patient History Tab    Social History     Tobacco Use   • Smoking status: Former Smoker     Last attempt to quit:      Years since quittin.4   • Smokeless tobacco: Former User   Substance Use Topics   • Alcohol use: Yes     Comment: occasionally       Review of Systems   Constitutional: Positive for fatigue. Negative for activity change, appetite change, fever and unexpected weight loss.   HENT: Negative for congestion, ear pain, hearing loss, postnasal drip, rhinorrhea, sinus pressure, sneezing, sore throat and swollen glands.    Eyes: Positive for blurred vision and visual disturbance.   Respiratory: Negative for cough, shortness of breath and wheezing.    Cardiovascular: Negative for chest pain.   Gastrointestinal: Negative for abdominal pain, nausea and indigestion.   Genitourinary: Negative for dysuria, urgency and urinary incontinence.   Musculoskeletal: Positive for arthralgias and neck pain. Negative for back pain, joint swelling and myalgias.   Skin: Negative for  "dry skin and skin lesions.   Allergic/Immunologic: Negative for environmental allergies.   Neurological: Positive for headache. Negative for dizziness, memory problem and confusion.   Hematological: Negative for adenopathy.   Psychiatric/Behavioral: Positive for decreased concentration. Negative for agitation, depressed mood and stress. The patient is nervous/anxious.    All other systems reviewed and are negative.      Objective     Rooming Tab(CC,VS,Pt Hx,Fall Screen)  /62 (BP Location: Left arm, Patient Position: Sitting, Cuff Size: Adult)   Pulse 76   Temp 96.6 °F (35.9 °C) (Temporal)   Resp 16   Ht 182.9 cm (72\")   Wt 83 kg (183 lb)   SpO2 98%   BMI 24.82 kg/m²     Body mass index is 24.82 kg/m².    Physical Exam   Constitutional: He is oriented to person, place, and time. He appears well-developed and well-nourished.   HENT:   Head: Normocephalic and atraumatic.   Right Ear: External ear normal.   Left Ear: External ear normal.   Nose: Nose normal.   Mouth/Throat: Oropharynx is clear and moist. No oropharyngeal exudate.   Eyes: Pupils are equal, round, and reactive to light. Conjunctivae and EOM are normal. Right eye exhibits no discharge. Left eye exhibits no discharge. No scleral icterus.   Neck: Normal range of motion. Neck supple. No JVD present. No thyromegaly present.   Cardiovascular: Normal rate, regular rhythm, normal heart sounds and intact distal pulses.   No murmur heard.  Pulmonary/Chest: Effort normal and breath sounds normal. No respiratory distress. He has no wheezes. He has no rales. He exhibits no tenderness.   Abdominal: Soft. Bowel sounds are normal. He exhibits no distension. There is no tenderness.   Genitourinary: Rectal exam shows guaiac negative stool.   Musculoskeletal: Normal range of motion. He exhibits no edema, tenderness or deformity.   Lymphadenopathy:     He has no cervical adenopathy.   Neurological: He is alert and oriented to person, place, and time.   Skin: " Skin is warm and dry.   Psychiatric: He has a normal mood and affect. His behavior is normal. Judgment and thought content normal.   Vitals reviewed.       Statin Choice Calculator  Data Reviewed:                   Assessment/Plan   Order Review Tab  Health Maintenance Tab  Patient Plan/Order Tab  Diagnoses and all orders for this visit:    1. Acute ischemic right PCA stroke involving the right occipital lobe (CMS/HCC) (Primary)  Assessment & Plan:  Patient's stroke seems to be stable.  His visual field apparently is fractured and to pieces that he can see in pieces and areas that he cannot see.  He is learning to adjust and live with what he has.      2. Diabetic ketoacidosis without coma associated with type 2 diabetes mellitus (CMS/HCC)  Assessment & Plan:  Diabetes is improving with treatment.   Continue current treatment regimen.  Reminded to bring in blood sugar diary at next visit.  Dietary recommendations for ADA diet.  Regular aerobic exercise.  Diabetes will be reassessed in 3 months.    Patient's ketoacidosis has resolved in the hospital.  He is working hard to get his blood sugars down.  They are still running anywhere from 3-500.  We will begin by increasing his Levemir 10 more units in the morning and then more units at night.  He will also be following up with [facility] in a few weeks.      3. Type 2 diabetes mellitus with hyperglycemia, with long-term current use of insulin (CMS/[facility])  Assessment & Plan:  Diabetes is worsening.   Continue current treatment regimen.  Reminded to bring in blood sugar diary at next visit.  Dietary recommendations for ADA diet.  Regular aerobic exercise.  Diabetes will be reassessed in 3 months.    Patient's blood sugars are still running high.  Anywhere from 300-500 morning and evening.  We will increase his Levemir and he will keep close touch with us over the next few weeks and to we see improvement in his blood sugars.      Other orders  -     omeprazole  (priLOSEC) 40 MG capsule; Take 1 capsule by mouth 2 (Two) Times a Day for 90 days.  Dispense: 360 capsule; Refill: 3  -     atorvastatin (LIPITOR) 80 MG tablet; Take 1 tablet by mouth Daily for 90 days.  Dispense: 90 tablet; Refill: 3      Wrapup Tab  Return in about 4 weeks (around 7/8/2020), or if symptoms worsen or fail to improve, for Recheck.

## 2020-06-10 NOTE — ASSESSMENT & PLAN NOTE
Diabetes is worsening.   Continue current treatment regimen.  Reminded to bring in blood sugar diary at next visit.  Dietary recommendations for ADA diet.  Regular aerobic exercise.  Diabetes will be reassessed in 3 months.    Patient's blood sugars are still running high.  Anywhere from 300-500 morning and evening.  We will increase his Levemir and he will keep close touch with us over the next few weeks and to we see improvement in his blood sugars.

## 2020-06-10 NOTE — PATIENT INSTRUCTIONS
Preventing Diabetic Ketoacidosis  Diabetic ketoacidosis (DKA) is a life-threatening complication of diabetes (diabetes mellitus). It develops when there is not enough of a hormone called insulin in the body. If the body does not have enough insulin, it cannot divide (break down) sugar (glucose) into usable cells, so it breaks down fats instead. This leads to the production of acids (ketones), which can cause the blood to have too much acid in it (acidosis). DKA is a medical emergency that must be treated at the hospital.  You may be more likely to develop DKA if you have type 1 diabetes and you take insulin. You can prevent DKA by working closely with your health care provider to manage your diabetes.  What nutrition changes can be made?    · Follow your meal plan, as directed by your health care provider or diet and nutrition specialist (dietitian).  · Eat healthy meals at about the same time every day. Have healthy snacks between meals.  · Avoid not eating for long periods of time. Do not skip meals, especially if you are ill.  · Avoid regularly eating foods that contain a lot of sugar. Also avoid drinking alcohol. Sugary food and alcohol increase your risk of high blood glucose (hyperglycemia), which increases your risk for DKA.  · Drink enough fluid to keep your urine pale yellow. Dehydration increases your risk for DKA.  What actions can I take to lower my risk?  To lower your risk for diabetic ketoacidosis, manage your diabetes as directed by your health care provider:  · Take insulin and other diabetes medicines as directed.  · Check your blood glucose every day, as often as directed.  · Follow your sick day plan whenever you cannot eat or drink as usual. Make this plan in advance with your health care provider.  · Check your urine for ketones as often as directed.  ? During times when you are sick, check your ketones every 4-6 hours.  ? If you develop symptoms of DKA, check your ketones right away.  · If you  have ketones in your urine:  ? Contact your health care provider right away.  ? Do not exercise.  · Know the symptoms of DKA so that you can get treatment as soon as possible.  · Make sure that people at work, home, and school know how to check your blood glucose, in case you are not able to do it yourself.  · Carry a medical alert card or wear medical alert jewelry that says that you have diabetes.  Why are these changes important?  DKA is a warning sign that your diabetes is not being well-controlled. You may need to work with your health care provider to adjust your diabetes management plan. DKA can lead to a serious medical emergency that can be life-threatening.  Where to find support  For more support with preventing DKA:  · Talk with your health care provider.  · Consider joining a support group. The American Diabetes Association has an online support community at: community.diabetes.org/home  Where to find more information  Learn more about preventing DKA from:  · American Diabetes Association: www.diabetes.org  · American Heart Association: www.heart.org  Contact a health care provider if:  You develop symptoms of DKA, such as:  · Fatigue.  · Weight loss.  · Excessive thirst.  · Light-headedness.  · Fruity or sweet-smelling breath.  · Excessive urination.  · Vision changes.  · Confusion or irritability.  · Nausea.  · Vomiting.  · Rapid breathing.  · Pain in the abdomen.  · Feeling warm in your face (flushed). This may or may not include a reddish color coming to your face.  If you develop any of these symptoms, do not wait to see if the symptoms will go away. Get medical help right away. Call your local emergency services (911 in the [place] Do not drive yourself to the hospital.  Summary  · DKA may be a warning sign that your diabetes is not being well-controlled. You may need to work with your health care provider to adjust your diabetes management plan.  · Preventing high blood glucose and dehydration  helps prevent DKA.  · Check your urine for ketones as often as directed. You may need to check more often when your blood glucose level is high and when you are ill.  · DKA is a medical emergency. Make sure you know the symptoms so that you can recognize and get treatment right away.  This information is not intended to replace advice given to you by your health care provider. Make sure you discuss any questions you have with your health care provider.  Document Released: 07/19/2018 Document Revised: 04/10/2020 Document Reviewed: 07/19/2018  Elsevier Patient Education © 2020 [employer]

## 2020-06-12 RX ORDER — ESOMEPRAZOLE MAGNESIUM 40 MG/1
40 CAPSULE, DELAYED RELEASE ORAL
Qty: 9030 CAPSULE | Refills: 3 | Status: SHIPPED | OUTPATIENT
Start: 2020-06-12 | End: 2020-06-17 | Stop reason: SDUPTHER

## 2020-06-17 RX ORDER — ESOMEPRAZOLE MAGNESIUM 40 MG/1
40 CAPSULE, DELAYED RELEASE ORAL
Qty: 90 CAPSULE | Refills: 3 | Status: SHIPPED | OUTPATIENT
Start: 2020-06-17 | End: 2021-09-22

## 2020-07-06 RX ORDER — METOCLOPRAMIDE 10 MG/1
TABLET ORAL
Qty: 120 TABLET | Refills: 6 | Status: SHIPPED | OUTPATIENT
Start: 2020-07-06 | End: 2020-07-13 | Stop reason: SDUPTHER

## 2020-07-07 ENCOUNTER — LAB (OUTPATIENT)
Dept: LAB | Facility: HOSPITAL | Age: 60
End: 2020-07-07

## 2020-07-07 DIAGNOSIS — E11.49 TYPE 2 DIABETES MELLITUS WITH OTHER DIABETIC NEUROLOGICAL COMPLICATION (HCC): ICD-10-CM

## 2020-07-07 LAB
ALBUMIN SERPL-MCNC: 4 G/DL (ref 3.5–5.2)
ALBUMIN/GLOB SERPL: 1.3 G/DL
ALP SERPL-CCNC: 104 U/L (ref 39–117)
ALT SERPL W P-5'-P-CCNC: 27 U/L (ref 1–41)
ANION GAP SERPL CALCULATED.3IONS-SCNC: 11.9 MMOL/L (ref 5–15)
AST SERPL-CCNC: 16 U/L (ref 1–40)
BILIRUB SERPL-MCNC: 0.2 MG/DL (ref 0–1.2)
BUN SERPL-MCNC: 16 MG/DL (ref 8–23)
BUN/CREAT SERPL: 13.4 (ref 7–25)
CALCIUM SPEC-SCNC: 10.2 MG/DL (ref 8.6–10.5)
CHLORIDE SERPL-SCNC: 98 MMOL/L (ref 98–107)
CO2 SERPL-SCNC: 24.1 MMOL/L (ref 22–29)
CREAT SERPL-MCNC: 1.19 MG/DL (ref 0.76–1.27)
GFR SERPL CREATININE-BSD FRML MDRD: 62 ML/MIN/1.73
GLOBULIN UR ELPH-MCNC: 3.1 GM/DL
GLUCOSE SERPL-MCNC: 303 MG/DL (ref 65–99)
HBA1C MFR BLD: 12 % (ref 3.5–5.6)
POTASSIUM SERPL-SCNC: 4.9 MMOL/L (ref 3.5–5.2)
PROT SERPL-MCNC: 7.1 G/DL (ref 6–8.5)
SODIUM SERPL-SCNC: 134 MMOL/L (ref 136–145)

## 2020-07-07 PROCEDURE — 80053 COMPREHEN METABOLIC PANEL: CPT

## 2020-07-07 PROCEDURE — 36415 COLL VENOUS BLD VENIPUNCTURE: CPT

## 2020-07-07 PROCEDURE — 83036 HEMOGLOBIN GLYCOSYLATED A1C: CPT

## 2020-07-09 RX ORDER — OMEPRAZOLE 40 MG/1
CAPSULE, DELAYED RELEASE ORAL
COMMUNITY
Start: 2020-06-01 | End: 2020-07-14

## 2020-07-09 RX ORDER — LEVOCETIRIZINE DIHYDROCHLORIDE 5 MG/1
5 TABLET, FILM COATED ORAL
COMMUNITY
Start: 2020-06-01 | End: 2020-07-14

## 2020-07-13 RX ORDER — METOCLOPRAMIDE 10 MG/1
10 TABLET ORAL
Qty: 120 TABLET | Refills: 6 | Status: SHIPPED | OUTPATIENT
Start: 2020-07-13 | End: 2020-08-12

## 2020-07-14 ENCOUNTER — OFFICE VISIT (OUTPATIENT)
Dept: ENDOCRINOLOGY | Facility: CLINIC | Age: 60
End: 2020-07-14

## 2020-07-14 VITALS
BODY MASS INDEX: 23.99 KG/M2 | SYSTOLIC BLOOD PRESSURE: 92 MMHG | TEMPERATURE: 97.1 F | HEART RATE: 82 BPM | DIASTOLIC BLOOD PRESSURE: 58 MMHG | WEIGHT: 181 LBS | OXYGEN SATURATION: 96 % | HEIGHT: 73 IN

## 2020-07-14 DIAGNOSIS — I10 ESSENTIAL HYPERTENSION: ICD-10-CM

## 2020-07-14 DIAGNOSIS — Z79.4 TYPE 2 DIABETES MELLITUS WITH HYPERGLYCEMIA, WITH LONG-TERM CURRENT USE OF INSULIN (HCC): Primary | ICD-10-CM

## 2020-07-14 DIAGNOSIS — E11.65 TYPE 2 DIABETES MELLITUS WITH HYPERGLYCEMIA, WITH LONG-TERM CURRENT USE OF INSULIN (HCC): Primary | ICD-10-CM

## 2020-07-14 DIAGNOSIS — E55.9 VITAMIN D DEFICIENCY: ICD-10-CM

## 2020-07-14 DIAGNOSIS — E78.2 HYPERLIPIDEMIA, MIXED: ICD-10-CM

## 2020-07-14 LAB
GLUCOSE BLDC GLUCOMTR-MCNC: 446 MG/DL (ref 70–105)
GLUCOSE BLDC GLUCOMTR-MCNC: 446 MG/DL (ref 70–130)
KETONES UR QL: ABNORMAL

## 2020-07-14 PROCEDURE — 99214 OFFICE O/P EST MOD 30 MIN: CPT | Performed by: INTERNAL MEDICINE

## 2020-07-14 PROCEDURE — 82962 GLUCOSE BLOOD TEST: CPT | Performed by: INTERNAL MEDICINE

## 2020-07-14 PROCEDURE — 81003 URINALYSIS AUTO W/O SCOPE: CPT | Performed by: INTERNAL MEDICINE

## 2020-07-14 RX ORDER — LANCETS
EACH MISCELLANEOUS
Qty: 100 EACH | Refills: 12 | Status: SHIPPED | OUTPATIENT
Start: 2020-07-14 | End: 2021-09-22

## 2020-07-14 RX ORDER — FLURBIPROFEN SODIUM 0.3 MG/ML
SOLUTION/ DROPS OPHTHALMIC
Qty: 400 EACH | Refills: 3 | Status: SHIPPED | OUTPATIENT
Start: 2020-07-14 | End: 2021-09-22

## 2020-07-14 RX ORDER — LANCETS 33 GAUGE
EACH MISCELLANEOUS
COMMUNITY
End: 2020-07-14 | Stop reason: SDUPTHER

## 2020-07-14 RX ORDER — LANCETS 33 GAUGE
EACH MISCELLANEOUS
Qty: 200 EACH | Refills: 4 | Status: SHIPPED | OUTPATIENT
Start: 2020-07-14 | End: 2020-07-14 | Stop reason: CLARIF

## 2020-07-14 RX ORDER — INSULIN DETEMIR 100 [IU]/ML
INJECTION, SOLUTION SUBCUTANEOUS
Qty: 45 ML | Refills: 4 | Status: SHIPPED | OUTPATIENT
Start: 2020-07-14 | End: 2020-07-14

## 2020-07-14 NOTE — PROGRESS NOTES
[facility]        Patient Care Team:  Al Kimbrough MD as PCP - General  Al Kimbrough MD as PCP - Family Medicine    Chief Complaint:    Chief Complaint   Patient presents with   • Diabetes     type 2         Subjective   Here for diabetes f/u  Blood sugars: in the 300-400's  Ran out of Levemir 1 week ago. Didn't call  Restarted 70/30 insulin, but taking only once a day (has at home)  Exercise program: walking some, but unsteady. Does not like to use a cane (has @ home)  Vision has not improved since the stroke    Interval History:     Patient Complaints: episode of DKA in May. Adm to [facility] hosp. Stopped Farxiga. Discharege on Levemir  Patient Denies:  Hypoglycemia, dizziness  History taken from: patient    Review of Systems:   Review of Systems   HENT: Negative for trouble swallowing.    Eyes: Positive for blurred vision.   Gastrointestinal: Negative for nausea.   Endocrine: Negative for polyuria.   Neurological: Negative for headache.     Lost  7 lb since last visit    Objective     Vital Signs  Temp:  [97.1 °F (36.2 °C)] 97.1 °F (36.2 °C)  Heart Rate:  [82] 82  BP: (92)/(58) 92/58    Physical Exam:     General Appearance:    Alert, cooperative, in no acute distress   Head:    Normocephalic, without obvious abnormality, atraumatic   Eyes:            Lids and lashes normal, conjunctivae and sclerae normal, no   icterus, no pallor, corneas clear, PERRLA   Throat:   No oral lesions,  oral mucosa moist. Missing teeth   Neck:   No adenopathy, supple,  no thyromegaly, no   carotid bruit   Lungs:     Clear     Heart:    Regular rhythm and normal rate   Chest Wall:    No abnormalities observed   Abdomen:     Normal bowel sounds, soft                 Extremities:   Hammer toes, no edema               Pulses:   Pulses palpable and equal bilaterally   Skin:   Dry   Neurologic:  DTR absent, unable to feel the 10g monofilament          Results Review:    I have reviewed the patient's new  clinical results, labs & imaging.    Medication Review:   Prior to Admission medications    Medication Sig Start Date End Date Taking? Authorizing Provider   amoxicillin (AMOXIL) 500 MG capsule TAKE 2 CAPSULES BY MOUTH NOW, THEN 1 CAPSULE EVERY 8 HOURS UNTIL FINISHED 6/4/20  Yes Jaylene Berger MD   apixaban (ELIQUIS) 5 MG tablet tablet Take 5 mg by mouth 2 (Two) Times a Day.   Yes Jaylene Berger MD   aspirin 81 MG EC tablet Take 1 tablet by mouth Daily. 12/12/19  Yes Farrah Crain MD   atorvastatin (LIPITOR) 80 MG tablet Take 1 tablet by mouth Daily for 90 days. 6/10/20 9/8/20 Yes Al Kimbrough MD B-D UF III MINI PEN NEEDLES 31G X 5 MM misc USE TO INJECT INSULIN 4 TIMES DAILY DX E11.65 7/14/20  Yes Radha Manriquez MD   esomeprazole (nexIUM) 40 MG capsule Take 1 capsule by mouth Every Morning Before Breakfast for 90 days. 6/17/20 9/15/20 Yes Al Kimbrough MD   glucose blood test strip ACCU-CHEK GUIDE ME TEST STRIPS....Use to check blood sugar twice daily 7/14/20  Yes Radha Manriquez MD   insulin NPH-insulin regular (humuLIN 70/30,novoLIN 70/30) (70-30) 100 UNIT/ML injection Nvolin 70/30 Relion 60 units ac breakfast & supper   Yes Jaylene Berger MD   metFORMIN ER (GLUCOPHAGE-XR) 500 MG 24 hr tablet Take 1 tablet by mouth 3 (Three) Times a Day. 1/16/20  Yes Radha Manriquez MD   metoclopramide (REGLAN) 10 MG tablet Take 1 tablet by mouth 4 (Four) Times a Day Before Meals & at Bedtime for 30 days. 7/13/20 8/12/20 Yes Al Kimbrough MD   metoprolol tartrate (LOPRESSOR) 50 MG tablet TAKE 1 TABLET BY MOUTH TWICE A DAY 4/13/20  Yes Al Kimbrough MD B-D UF III MINI PEN NEEDLES 31G X 5 MM misc USE TO INJECT INSULIN 4 TIMES DAILY DX E11.65 4/20/20 7/14/20 Yes Radha Manriquez MD   glucose blood test strip 1 each by Other route. ACCU-CHEK GUIDE ME TEST STRIPS....Use to check blood sugar twice daily  7/14/20 Yes Provider, MD Jaylene   LEVEMIR FLEXTOUCH 100 UNIT/ML  injection INJECT 20UNITS UNDER SKIN TWICE DAY 6/2/20 7/14/20 Yes Jaylene Berger MD   LEVEMIR FLEXTOUCH 100 UNIT/ML injection Inject 20 units twice daily 7/14/20 7/14/20 Yes Radha Manriquez MD   omeprazole (priLOSEC) 40 MG capsule  6/1/20 7/14/20 Yes Provider, Historical, MD   OneTouch Delica Lancets 33G misc Use to check blood sugar twice daily  7/14/20 Yes Provider, Historical, MD   OneTouch Delica Lancets 33G misc Use to check blood sugar twice daily 7/14/20 7/14/20 Yes Radha Manriquez MD   Accu-Chek FastClix Lancets misc For finger stick 4x/d 7/14/20   Radha Manriquez MD   albuterol (PROVENTIL) (2.5 MG/3ML) 0.083% nebulizer solution Take 2.5 mg by nebulization Every 6 (Six) Hours As Needed for Wheezing or Shortness of Air. 12/11/19 7/14/20  Farrah Crain MD   fluticasone (FLONASE) 50 MCG/ACT nasal spray 2 sprays by Each Nare route Daily. 12/11/19 7/14/20  Farrah Crain MD   glucose blood (ONE TOUCH ULTRA TEST) test strip Test blood sugar 2 times daily DX E11.65 4/20/20 7/14/20  Radha Manriquez MD   levocetirizine (XYZAL) 5 MG tablet 5 mg. 6/1/20 7/14/20  Provider, Historical, MD   ONETOUCH DELICA LANCETS 33G misc Use to check blood sugar twice daily 4/3/17 7/14/20  Jaylene Berger MD       Lab Results (most recent)     Procedure Component Value Units Date/Time    POC Ketone, Urine [[id number]]  (Abnormal) Collected:  07/14/20 1516    Specimen:  Urine Updated:  07/14/20 1516     Ketones, UA Trace    POC Glucose [[id number]]  (Abnormal) Collected:  07/14/20 1419    Specimen:  Blood Updated:  07/14/20 1430     Glucose 446 mg/dL      Comment: Serial Number: [id number]:  [id number]       POC Glucose Fingerstick [[id number]]  (Abnormal) Collected:  07/14/20 1418    Specimen:  Blood Updated:  07/14/20 1419     Glucose 446 mg/dL      Comment: ate@ 11p last night, 14 hours ago, insulin@ 8p last night             Lab Results   Component Value Date    HGBA1C 12.0 (H) 07/07/2020    HGBA1C 7.6  (H) 01/07/2020    HGBA1C 9.5 (H) 12/06/2019      Lab Results   Component Value Date    GLUCOSE 303 (H) 07/07/2020    BUN 16 07/07/2020    CREATININE 1.19 07/07/2020    EGFRIFNONA 62 07/07/2020    BCR 13.4 07/07/2020    K 4.9 07/07/2020    CO2 24.1 07/07/2020    CALCIUM 10.2 07/07/2020    ALBUMIN 4.00 07/07/2020    LABIL2 0.8 (L) 05/26/2019    AST 16 07/07/2020    ALT 27 07/07/2020    CHOL 175 12/06/2019    LDL  12/06/2019      Comment:      Unable to calculate    LDL 82 12/06/2019    HDL 37 (L) 12/06/2019    TRIG 454 (H) 12/06/2019     Lab Results   Component Value Date    TSH 2.340 12/06/2019    FSLB46MX 38.4 01/07/2020       Assessment/Plan     Chao was seen today for diabetes.    Diagnoses and all orders for this visit:    Type 2 diabetes mellitus with hyperglycemia, with long-term current use of insulin (CMS/[facility])  -     POC Glucose Fingerstick  -     glucose blood test strip; ACCU-CHEK GUIDE ME TEST STRIPS....Use to check blood sugar twice daily  -     Discontinue: LEVEMIR FLEXTOUCH 100 UNIT/ML injection; Inject 20 units twice daily  -     B-D UF III MINI PEN NEEDLES 31G X 5 MM misc; USE TO INJECT INSULIN 4 TIMES DAILY DX E11.65  -     Discontinue: OneTouch Delica Lancets 33G misc; Use to check blood sugar twice daily  -     Hemoglobin A1c; Future  -     Microalbumin / Creatinine Urine Ratio - Urine, Clean Catch; Future  -     Accu-Chek FastClix Lancets misc; For finger stick 4x/d  -     POC Ketone, Urine    Vitamin D deficiency  -     Vitamin D 25 Hydroxy; Future    Hyperlipidemia, mixed  -     Lipid Panel; Future  -     TSH; Future    Essential hypertension  -     Comprehensive Metabolic Panel; Future    Other orders  -     POC Glucose    Worse.    Increase exercise as planned. Use a cane or walker.  Drink plenty of water.  Take Novolin 70/30 60 units before breakfast & supper.  Check for expiration date.  Send blood sugar in 1 week.  Continue other meds.  Call if blood sugars are running under 100 or over  200.        Radha Manriquez MD  07/14/20  19:16

## 2020-07-14 NOTE — PATIENT INSTRUCTIONS
Increase exercise as planned. Use a cane or walker.  Drink plenty of water.  Take Novolin 70/30 60 units before breakfast & supper.  Check for expiration date.  Send blood sugar in 1 week.  Continue other meds.  Call if blood sugars are running under 100 or over 200.  F/u in 6 months, with fasting labs prior.

## 2020-08-27 PROBLEM — I25.10 CORONARY ARTERY DISEASE INVOLVING NATIVE CORONARY ARTERY OF NATIVE HEART WITHOUT ANGINA PECTORIS: Status: ACTIVE | Noted: 2020-08-27

## 2020-09-03 RX ORDER — METFORMIN HYDROCHLORIDE 500 MG/1
TABLET, EXTENDED RELEASE ORAL
Qty: 270 TABLET | Refills: 1 | Status: SHIPPED | OUTPATIENT
Start: 2020-09-03 | End: 2021-03-08

## 2020-10-11 RX ORDER — ATORVASTATIN CALCIUM 20 MG/1
TABLET, FILM COATED ORAL
Qty: 90 TABLET | Refills: 1 | Status: SHIPPED | OUTPATIENT
Start: 2020-10-11 | End: 2020-11-18 | Stop reason: DRUGHIGH

## 2020-11-18 ENCOUNTER — OFFICE VISIT (OUTPATIENT)
Dept: FAMILY MEDICINE CLINIC | Facility: CLINIC | Age: 60
End: 2020-11-18

## 2020-11-18 VITALS
OXYGEN SATURATION: 97 % | DIASTOLIC BLOOD PRESSURE: 70 MMHG | BODY MASS INDEX: 25.58 KG/M2 | WEIGHT: 193 LBS | RESPIRATION RATE: 16 BRPM | HEART RATE: 85 BPM | HEIGHT: 73 IN | SYSTOLIC BLOOD PRESSURE: 140 MMHG | TEMPERATURE: 96.8 F

## 2020-11-18 DIAGNOSIS — L02.811 CUTANEOUS ABSCESS OF HEAD EXCLUDING FACE: Primary | ICD-10-CM

## 2020-11-18 PROCEDURE — 10060 I&D ABSCESS SIMPLE/SINGLE: CPT | Performed by: FAMILY MEDICINE

## 2020-11-18 PROCEDURE — 99213 OFFICE O/P EST LOW 20 MIN: CPT | Performed by: FAMILY MEDICINE

## 2020-11-18 PROCEDURE — 87205 SMEAR GRAM STAIN: CPT | Performed by: FAMILY MEDICINE

## 2020-11-18 PROCEDURE — 87186 SC STD MICRODIL/AGAR DIL: CPT | Performed by: FAMILY MEDICINE

## 2020-11-18 PROCEDURE — 87147 CULTURE TYPE IMMUNOLOGIC: CPT | Performed by: FAMILY MEDICINE

## 2020-11-18 PROCEDURE — 87070 CULTURE OTHR SPECIMN AEROBIC: CPT | Performed by: FAMILY MEDICINE

## 2020-11-18 RX ORDER — METOCLOPRAMIDE 10 MG/1
TABLET ORAL
COMMUNITY
Start: 2020-11-07 | End: 2021-02-15

## 2020-11-18 RX ORDER — CLINDAMYCIN HYDROCHLORIDE 150 MG/1
300 CAPSULE ORAL 3 TIMES DAILY
Qty: 60 CAPSULE | Refills: 0 | Status: SHIPPED | OUTPATIENT
Start: 2020-11-18 | End: 2020-11-28

## 2020-11-18 NOTE — PATIENT INSTRUCTIONS
Skin Abscess    A skin abscess is an infected area on or under your skin that contains a collection of pus and other material. An abscess may also be called a furuncle, carbuncle, or boil. An abscess can occur in or on almost any part of your body.  Some abscesses break open (rupture) on their own. Most continue to get worse unless they are treated. The infection can spread deeper into the body and eventually into your blood, which can make you feel ill. Treatment usually involves draining the abscess.  What are the causes?  An abscess occurs when germs, like bacteria, pass through your skin and cause an infection. This may be caused by:  · A scrape or cut on your skin.  · A puncture wound through your skin, including a needle injection or insect bite.  · Blocked oil or sweat glands.  · Blocked and infected hair follicles.  · A cyst that forms beneath your skin (sebaceous cyst) and becomes infected.  What increases the risk?  This condition is more likely to develop in people who:  · Have a weak body defense system (immune system).  · Have diabetes.  · Have dry and irritated skin.  · Get frequent injections or use illegal IV drugs.  · Have a foreign body in a wound, such as a splinter.  · Have problems with their lymph system or veins.  What are the signs or symptoms?  Symptoms of this condition include:  · A painful, firm bump under the skin.  · A bump with pus at the top. This may break through the skin and drain.  Other symptoms include:  · Redness surrounding the abscess site.  · Warmth.  · Swelling of the lymph nodes (glands) near the abscess.  · Tenderness.  · A sore on the skin.  How is this diagnosed?  This condition may be diagnosed based on:  · A physical exam.  · Your medical history.  · A sample of pus. This may be used to find out what is causing the infection.  · Blood tests.  · Imaging tests, such as an ultrasound, CT scan, or MRI.  How is this treated?  A small abscess that drains on its own may not  need treatment. Treatment for larger abscesses may include:  · Moist heat or heat pack applied to the area several times a day.  · A procedure to drain the abscess (incision and drainage).  · Antibiotic medicines. For a severe abscess, you may first get antibiotics through an IV and then change to antibiotics by mouth.  Follow these instructions at home:  Medicines    · Take over-the-counter and prescription medicines only as told by your health care provider.  · If you were prescribed an antibiotic medicine, take it as told by your health care provider. Do not stop taking the antibiotic even if you start to feel better.  Abscess care    · If you have an abscess that has not drained, apply heat to the affected area. Use the heat source that your health care provider recommends, such as a moist heat pack or a heating pad.  ? Place a towel between your skin and the heat source.  ? Leave the heat on for 20-30 minutes.  ? Remove the heat if your skin turns bright red. This is especially important if you are unable to feel pain, heat, or cold. You may have a greater risk of getting burned.  · Follow instructions from your health care provider about how to take care of your abscess. Make sure you:  ? Cover the abscess with a bandage (dressing).  ? Change your dressing or gauze as told by your health care provider.  ? Wash your hands with soap and water before you change the dressing or gauze. If soap and water are not available, use hand .  · Check your abscess every day for signs of a worsening infection. Check for:  ? More redness, swelling, or pain.  ? More fluid or blood.  ? Warmth.  ? More pus or a bad smell.  General instructions  · To avoid spreading the infection:  ? Do not share personal care items, towels, or hot tubs with others.  ? Avoid making skin contact with other people.  · Keep all follow-up visits as told by your health care provider. This is important.  Contact a health care provider if you  have:  · More redness, swelling, or pain around your abscess.  · More fluid or blood coming from your abscess.  · Warm skin around your abscess.  · More pus or a bad smell coming from your abscess.  · A fever.  · Muscle aches.  · Chills or a general ill feeling.  Get help right away if you:  · Have severe pain.  · See red streaks on your skin spreading away from the abscess.  Summary  · A skin abscess is an infected area on or under your skin that contains a collection of pus and other material.  · A small abscess that drains on its own may not need treatment.  · Treatment for larger abscesses may include having a procedure to drain the abscess and taking an antibiotic.  This information is not intended to replace advice given to you by your health care provider. Make sure you discuss any questions you have with your health care provider.  Document Released: 09/27/2006 Document Revised: 04/09/2020 Document Reviewed: 01/31/2019  [employer] Patient Education © 2020 [employer]

## 2020-11-18 NOTE — PROGRESS NOTES
"Rooming Tab(CC,VS,Pt Hx,Fall Screen)  Chief Complaint   Patient presents with   • scalp infection       Subjective    Chao, 60 year old white male, came in today for scalp pain and drainage.  About 2 days ago, he noticed that it was red on the top of his scalp and swollen.  Today he noticed some discharge.  He reports that he had been scratching/picking the top of his head.  He reports to have put triple antibiotic ointment on it.  He also reports that it is painful, \"like sticking knife\" in his head.  He also reports swelling and discharge to his left eye.  He states that he does not have any fevers, has not been around anyone sick, nausea, or vomiting.  He also reports that this has never happened before.      I have reviewed and updated his medications, medical history and problem list during today's office visit.     Patient Care Team:  Al Kimbrough MD as PCP - General  Al Kimbrough MD as PCP - Family Medicine    Problem List Tab  Medications Tab  Synopsis Tab  Chart Review Tab  Care Everywhere Tab  Immunizations Tab  Patient History Tab    Social History     Tobacco Use   • Smoking status: Former Smoker     Quit date:      Years since quittin.8   • Smokeless tobacco: Former User   Substance Use Topics   • Alcohol use: Yes     Comment: occasionally       Review of Systems   Constitutional: Negative for fatigue and fever.   Eyes: Positive for discharge and redness.        Swelling and discharge to left eye   Respiratory: Negative for chest tightness, shortness of breath and wheezing.    Cardiovascular: Negative for chest pain and palpitations.   Gastrointestinal: Negative for nausea and vomiting.       Objective     Rooming Tab(CC,VS,Pt Hx,Fall Screen)  /70 (BP Location: Left arm)   Pulse 85   Temp 96.8 °F (36 °C)   Resp 16   Ht 185.4 cm (73\")   Wt 87.5 kg (193 lb)   SpO2 97%   BMI 25.46 kg/m²     Body mass index is 25.46 kg/m².    Physical Exam  Skin:     Comments: Scalp " skin abscess   I and D needed     Incision & Drainage    Date/Time: 11/18/2020 2:21 PM  Performed by: Al Kimbrough MD  Authorized by: Al Kimbrough MD   Type: abscess  Body area: head/neck  Location details: scalp  Anesthesia: local infiltration    Anesthesia:  Local Anesthetic: lidocaine 2% with epinephrine  Anesthetic total: 1 mL    Sedation:  Patient sedated: no    Scalpel size: 15  Needle gauge: 22  Incision type: single straight  Complexity: simple  Drainage: purulent  Drainage amount: moderate  Wound treatment: wound left open  Patient tolerance: patient tolerated the procedure well with no immediate complications  Comments: Culture taken from wound             Statin Choice Calculator  Data Reviewed:                   Assessment/Plan   Order Review Tab  Health Maintenance Tab  Patient Plan/Order Tab  Diagnoses and all orders for this visit:    1. Cutaneous abscess of head excluding face (Primary)  Assessment & Plan:  I and D if scalp abscess.     Sent to path for culture and sensitivity.  Cleocin  300mg tid      Other orders  -     clindamycin (Cleocin) 150 MG capsule; Take 2 capsules by mouth 3 (Three) Times a Day for 10 days.  Dispense: 60 capsule; Refill: 0      Wrapup Tab  Return in about 10 days (around 11/28/2020) for Recheck.

## 2020-11-20 LAB
BACTERIA SPEC AEROBE CULT: ABNORMAL
GRAM STN SPEC: ABNORMAL
GRAM STN SPEC: ABNORMAL

## 2020-12-16 ENCOUNTER — TELEPHONE (OUTPATIENT)
Dept: ENDOCRINOLOGY | Facility: CLINIC | Age: 60
End: 2020-12-16

## 2020-12-16 DIAGNOSIS — Z79.4 TYPE 2 DIABETES MELLITUS WITH HYPERGLYCEMIA, WITH LONG-TERM CURRENT USE OF INSULIN (HCC): Primary | ICD-10-CM

## 2020-12-16 DIAGNOSIS — E11.65 TYPE 2 DIABETES MELLITUS WITH HYPERGLYCEMIA, WITH LONG-TERM CURRENT USE OF INSULIN (HCC): Primary | ICD-10-CM

## 2020-12-16 RX ORDER — HUMAN INSULIN 100 [IU]/ML
INJECTION, SUSPENSION SUBCUTANEOUS
Qty: 45 ML | Refills: 12 | Status: SHIPPED | OUTPATIENT
Start: 2020-12-16 | End: 2021-01-13

## 2020-12-16 NOTE — TELEPHONE ENCOUNTER
Pt wants to switch back to Novolin 70/30, he does not like the insulin he is using now. Rx needs to go to [facility]. Note routed to Dr. Manriquez

## 2020-12-21 RX ORDER — APIXABAN 5 MG/1
TABLET, FILM COATED ORAL
Qty: 60 TABLET | Refills: 11 | Status: SHIPPED | OUTPATIENT
Start: 2020-12-21 | End: 2021-12-22

## 2021-01-06 ENCOUNTER — LAB (OUTPATIENT)
Dept: LAB | Facility: HOSPITAL | Age: 61
End: 2021-01-06

## 2021-01-06 DIAGNOSIS — Z79.4 TYPE 2 DIABETES MELLITUS WITH HYPERGLYCEMIA, WITH LONG-TERM CURRENT USE OF INSULIN (HCC): ICD-10-CM

## 2021-01-06 DIAGNOSIS — E78.2 HYPERLIPIDEMIA, MIXED: ICD-10-CM

## 2021-01-06 DIAGNOSIS — E55.9 VITAMIN D DEFICIENCY: ICD-10-CM

## 2021-01-06 DIAGNOSIS — E11.65 TYPE 2 DIABETES MELLITUS WITH HYPERGLYCEMIA, WITH LONG-TERM CURRENT USE OF INSULIN (HCC): ICD-10-CM

## 2021-01-06 DIAGNOSIS — I10 ESSENTIAL HYPERTENSION: ICD-10-CM

## 2021-01-06 LAB
25(OH)D3 SERPL-MCNC: 52.4 NG/ML (ref 30–100)
ALBUMIN SERPL-MCNC: 4.1 G/DL (ref 3.5–5.2)
ALBUMIN UR-MCNC: 51.1 MG/DL
ALBUMIN/GLOB SERPL: 1.1 G/DL
ALP SERPL-CCNC: 135 U/L (ref 39–117)
ALT SERPL W P-5'-P-CCNC: 36 U/L (ref 1–41)
ANION GAP SERPL CALCULATED.3IONS-SCNC: 13.5 MMOL/L (ref 5–15)
AST SERPL-CCNC: 27 U/L (ref 1–40)
BILIRUB SERPL-MCNC: 0.2 MG/DL (ref 0–1.2)
BUN SERPL-MCNC: 20 MG/DL (ref 8–23)
BUN/CREAT SERPL: 14.8 (ref 7–25)
CALCIUM SPEC-SCNC: 9.6 MG/DL (ref 8.6–10.5)
CHLORIDE SERPL-SCNC: 95 MMOL/L (ref 98–107)
CHOLEST SERPL-MCNC: 128 MG/DL (ref 0–200)
CO2 SERPL-SCNC: 26.5 MMOL/L (ref 22–29)
CREAT SERPL-MCNC: 1.35 MG/DL (ref 0.76–1.27)
CREAT UR-MCNC: 105.3 MG/DL
GFR SERPL CREATININE-BSD FRML MDRD: 54 ML/MIN/1.73
GLOBULIN UR ELPH-MCNC: 3.6 GM/DL
GLUCOSE SERPL-MCNC: 280 MG/DL (ref 65–99)
HBA1C MFR BLD: 12.5 % (ref 3.5–5.6)
HDLC SERPL-MCNC: 29 MG/DL (ref 40–60)
LDLC SERPL CALC-MCNC: 39 MG/DL (ref 0–100)
LDLC/HDLC SERPL: 0.57 {RATIO}
MICROALBUMIN/CREAT UR: 485.3 MG/G
POTASSIUM SERPL-SCNC: 4.5 MMOL/L (ref 3.5–5.2)
PROT SERPL-MCNC: 7.7 G/DL (ref 6–8.5)
SODIUM SERPL-SCNC: 135 MMOL/L (ref 136–145)
TRIGL SERPL-MCNC: 412 MG/DL (ref 0–150)
TSH SERPL DL<=0.05 MIU/L-ACNC: 2.69 UIU/ML (ref 0.27–4.2)
VLDLC SERPL-MCNC: 60 MG/DL (ref 5–40)

## 2021-01-06 PROCEDURE — 36415 COLL VENOUS BLD VENIPUNCTURE: CPT

## 2021-01-06 PROCEDURE — 82570 ASSAY OF URINE CREATININE: CPT

## 2021-01-06 PROCEDURE — 80053 COMPREHEN METABOLIC PANEL: CPT

## 2021-01-06 PROCEDURE — 82043 UR ALBUMIN QUANTITATIVE: CPT

## 2021-01-06 PROCEDURE — 82306 VITAMIN D 25 HYDROXY: CPT

## 2021-01-06 PROCEDURE — 80061 LIPID PANEL: CPT

## 2021-01-06 PROCEDURE — 84443 ASSAY THYROID STIM HORMONE: CPT

## 2021-01-06 PROCEDURE — 83036 HEMOGLOBIN GLYCOSYLATED A1C: CPT

## 2021-01-13 ENCOUNTER — OFFICE VISIT (OUTPATIENT)
Dept: ENDOCRINOLOGY | Facility: CLINIC | Age: 61
End: 2021-01-13

## 2021-01-13 VITALS
SYSTOLIC BLOOD PRESSURE: 105 MMHG | BODY MASS INDEX: 24.52 KG/M2 | HEART RATE: 73 BPM | TEMPERATURE: 97.2 F | HEIGHT: 73 IN | WEIGHT: 185 LBS | DIASTOLIC BLOOD PRESSURE: 68 MMHG | OXYGEN SATURATION: 98 %

## 2021-01-13 DIAGNOSIS — E55.9 VITAMIN D DEFICIENCY: ICD-10-CM

## 2021-01-13 DIAGNOSIS — E11.65 TYPE 2 DIABETES MELLITUS WITH HYPERGLYCEMIA, WITH LONG-TERM CURRENT USE OF INSULIN (HCC): Primary | ICD-10-CM

## 2021-01-13 DIAGNOSIS — E78.2 HYPERLIPIDEMIA, MIXED: ICD-10-CM

## 2021-01-13 DIAGNOSIS — Z79.4 TYPE 2 DIABETES MELLITUS WITH HYPERGLYCEMIA, WITH LONG-TERM CURRENT USE OF INSULIN (HCC): Primary | ICD-10-CM

## 2021-01-13 LAB — GLUCOSE BLDC GLUCOMTR-MCNC: 305 MG/DL (ref 70–105)

## 2021-01-13 PROCEDURE — 99214 OFFICE O/P EST MOD 30 MIN: CPT | Performed by: INTERNAL MEDICINE

## 2021-01-13 PROCEDURE — 82962 GLUCOSE BLOOD TEST: CPT | Performed by: INTERNAL MEDICINE

## 2021-01-13 RX ORDER — HUMAN INSULIN 100 [IU]/ML
INJECTION, SUSPENSION SUBCUTANEOUS
Qty: 45 ML | Refills: 12
Start: 2021-01-13 | End: 2021-01-15

## 2021-01-13 RX ORDER — INSULIN DETEMIR 100 [IU]/ML
INJECTION, SOLUTION SUBCUTANEOUS
COMMUNITY
Start: 2021-01-11 | End: 2021-01-13

## 2021-01-13 NOTE — PATIENT INSTRUCTIONS
Increase exercise as able.  Take Novolin 70/30 60 units @ 3p before supper & 20 units @ 9p.  Send blood sugars in 1 week for possible adjustments.  Get diabetes eye exam.  Call if blood sugars are running under 100 or over 200.  F/u in 6 months, with labs prior.

## 2021-01-15 RX ORDER — HUMAN INSULIN 100 [IU]/ML
INJECTION, SUSPENSION SUBCUTANEOUS
Qty: 45 ML | Refills: 12
Start: 2021-01-15 | End: 2021-07-14

## 2021-01-15 NOTE — PROGRESS NOTES
[facility]        Patient Care Team:  Al Kimbrough MD as PCP - General  Al Kimbrough MD as PCP - Family Medicine    Chief Complaint:    Chief Complaint   Patient presents with   • Diabetes     Type 2 /   Ate 2 5:00pm          Subjective   Here for diabetes f/u  Blood sugars: in the 300- 400's  Taking 70/30 insulin once a day around 3p when wife gets home from work  Wakes up around noon, goes to bed around 2a. Snacks & watches TV  Exercise program: walking some  Due for eye exam  Unsure if taking vit D    Interval History:     Patient Complaints: still having diplopia   Patient Denies:  hypoglycemia  History taken from: patient & sister    Review of Systems:   Review of Systems   HENT: Negative for trouble swallowing.    Eyes: Positive for blurred vision and double vision.   Respiratory: Negative for shortness of breath.    Cardiovascular: Negative for leg swelling.   Gastrointestinal: Negative for nausea.   Endocrine: Negative for polyuria.   Musculoskeletal: Positive for gait problem.   Neurological: Positive for speech difficulty and weakness. Negative for headache.     Gained 4 lb since last visit    Objective     Vital Signs      Vitals:    01/13/21 1325   BP: 105/68   Pulse: 73   Temp: 97.2 °F (36.2 °C)   SpO2: 98%         Physical Exam:     General Appearance:    Alert, cooperative, in no acute distress   Head:    Normocephalic, without obvious abnormality, atraumatic   Eyes:            Lids and lashes normal, conjunctivae and sclerae normal, no   icterus, no pallor, corneas clear, PERRLA   Throat:   No oral lesions,  oral mucosa moist. Missing teeth   Neck:   No adenopathy, supple,  no thyromegaly, no   carotid bruit   Lungs:     Clear     Heart:    Regular rhythm and normal rate   Chest Wall:    No abnormalities observed   Abdomen:     Normal bowel sounds, soft                 Extremities:   Hammer toes, no edema               Pulses:   Pulses palpable and equal  bilaterally   Skin:   Dry   Neurologic:  DTR absent in ankles, absent vibratory sense in toes, unable to feel the 10g monofilament ( same as 1y ago )            Results Review:    I have reviewed the patient's new clinical results, labs & imaging.    Medication Review:   Prior to Admission medications    Medication Sig Start Date End Date Taking? Authorizing Provider   Accu-Chek FastClix Lancets misc For finger stick 4x/d 7/14/20  Yes Radha Manriquez MD   aspirin 81 MG EC tablet Take 1 tablet by mouth Daily. 12/12/19  Yes Farrah Crain MD   B-D UF III MINI PEN NEEDLES 31G X 5 MM misc USE TO INJECT INSULIN 4 TIMES DAILY DX E11.65 7/14/20  Yes Radha Manriquez MD   Eliquis 5 MG tablet tablet TAKE 1 TABLET BY MOUTH EVERY 12 (TWELVE) HOURS FOR 30 DAYS. 12/21/20  Yes Al Kimbrough MD   glucose blood test strip ACCU-CHEK GUIDE ME TEST STRIPS....Use to check blood sugar twice daily 7/14/20  Yes Radha Manriquez MD   Insulin NPH Isophane & Regular (NovoLIN 70/30 FlexPen Relion) (70-30) 100 UNIT/ML suspension pen-injector Inject 60 units ac breakfast & supper 1/13/21  Yes Radha Manriquez MD   metFORMIN ER (GLUCOPHAGE-XR) 500 MG 24 hr tablet TAKE 1 TABLET BY MOUTH THREE TIMES A DAY 9/3/20  Yes Radha Manriquez MD   metoclopramide (REGLAN) 10 MG tablet TAKE 1 TABLET BY MOUTH 4 (FOUR) TIMES A DAY BEFORE MEALS & AT BEDTIME FOR 30 DAYS. 11/7/20  Yes ProviderJaylene MD   metoprolol tartrate (LOPRESSOR) 50 MG tablet TAKE 1 TABLET BY MOUTH TWICE A DAY 4/13/20  Yes Al Kimbrough MD   atorvastatin (LIPITOR) 80 MG tablet Take 1 tablet by mouth Daily for 90 days. 6/10/20 9/8/20  Al Kimbrough MD   esomeprazole (nexIUM) 40 MG capsule Take 1 capsule by mouth Every Morning Before Breakfast for 90 days. 6/17/20 9/15/20  Al Kimbrough MD       Lab Results (most recent)     Procedure Component Value Units Date/Time    POC Glucose [[id number]]  (Abnormal) Collected: 01/13/21 1324    Specimen: Blood  Updated: 01/13/21 1325     Glucose 305 mg/dL      Comment: Serial Number: [id number]:  [id number]               Lab Results   Component Value Date    HGBA1C 12.5 (H) 01/06/2021    HGBA1C 12.0 (H) 07/07/2020    HGBA1C 7.6 (H) 01/07/2020      Lab Results   Component Value Date    GLUCOSE 280 (H) 01/06/2021    BUN 20 01/06/2021    CREATININE 1.35 (H) 01/06/2021    EGFRIFNONA 54 (L) 01/06/2021    BCR 14.8 01/06/2021    K 4.5 01/06/2021    CO2 26.5 01/06/2021    CALCIUM 9.6 01/06/2021    ALBUMIN 4.10 01/06/2021    LABIL2 0.8 (L) 05/26/2019    AST 27 01/06/2021    ALT 36 01/06/2021    CHOL 128 01/06/2021    LDL 39 01/06/2021    HDL 29 (L) 01/06/2021    TRIG 412 (H) 01/06/2021     Lab Results   Component Value Date    TSH 2.690 01/06/2021    [id number] 52.4 01/06/2021     Microalb/cr ratio 485.3    Assessment/Plan     Diagnoses and all orders for this visit:    1. Type 2 diabetes mellitus with hyperglycemia, with long-term current use of insulin (CMS/[facility]) (Primary)  -     Hemoglobin A1c; Future  -     Insulin NPH Isophane & Regular (NovoLIN 70/30 FlexPen Relion) (70-30) 100 UNIT/ML suspension pen-injector; Inject 60 units ac breakfast & supper  Dispense: 45 mL; Refill: 12    2. Vitamin D deficiency    3. Hyperlipidemia, mixed  -     Comprehensive Metabolic Panel; Future    Other orders  -     POC Glucose    Glucose control worse, lipids & vit D stable.    Increase exercise as able.  Take Novolin 70/30 60 units @ 3p before supper & 20 units @ 9p when wife is going to bed.  Send blood sugars in 1 week for possible adjustments.  Get diabetes eye exam.  Continue metformin & atorvastatin.  Call if blood sugars are running under 100 or over 200.        Radha Manriquez MD  01/15/21  18:44 EST

## 2021-01-21 ENCOUNTER — TELEPHONE (OUTPATIENT)
Dept: FAMILY MEDICINE CLINIC | Facility: CLINIC | Age: 61
End: 2021-01-21

## 2021-01-21 NOTE — TELEPHONE ENCOUNTER
Dr Miranda's office called and said they faxed a Protochol Letter to you on 1/12.  Patient is on Eliquis and is needing multiple extractions.  They are needing approval for him to stop Eliquis two day before extractions and restart 24 hours after extractions.  Said he is scheduled next week and they need to call patient today.  They are faxing the form again right now.  Please complete and fax back TODAY.

## 2021-01-21 NOTE — TELEPHONE ENCOUNTER
I spoke to [facility] office she resent it today. Please print it for me when you see it he is to have surgery on 1/27/21

## 2021-01-22 NOTE — TELEPHONE ENCOUNTER
Are you ok with him being off eliquis for 2 days before surgery and starting back the day after?    I can do a note and send it to them.

## 2021-02-15 RX ORDER — METOCLOPRAMIDE 10 MG/1
TABLET ORAL
Qty: 120 TABLET | Refills: 6 | Status: SHIPPED | OUTPATIENT
Start: 2021-02-15 | End: 2021-09-22

## 2021-03-08 RX ORDER — METFORMIN HYDROCHLORIDE 500 MG/1
TABLET, EXTENDED RELEASE ORAL
Qty: 270 TABLET | Refills: 1 | Status: SHIPPED | OUTPATIENT
Start: 2021-03-08 | End: 2021-09-09

## 2021-05-14 RX ORDER — ATORVASTATIN CALCIUM 20 MG/1
TABLET, FILM COATED ORAL
Qty: 90 TABLET | Refills: 1 | Status: SHIPPED | OUTPATIENT
Start: 2021-05-14 | End: 2021-06-01

## 2021-05-17 RX ORDER — METOPROLOL TARTRATE 50 MG/1
TABLET, FILM COATED ORAL
Qty: 180 TABLET | Refills: 3 | Status: SHIPPED | OUTPATIENT
Start: 2021-05-17 | End: 2021-09-22

## 2021-06-01 RX ORDER — ATORVASTATIN CALCIUM 80 MG/1
TABLET, FILM COATED ORAL
Qty: 90 TABLET | Refills: 3 | Status: SHIPPED | OUTPATIENT
Start: 2021-06-01 | End: 2022-05-11

## 2021-07-07 ENCOUNTER — LAB (OUTPATIENT)
Dept: LAB | Facility: HOSPITAL | Age: 61
End: 2021-07-07

## 2021-07-07 DIAGNOSIS — E11.65 TYPE 2 DIABETES MELLITUS WITH HYPERGLYCEMIA, WITH LONG-TERM CURRENT USE OF INSULIN (HCC): ICD-10-CM

## 2021-07-07 DIAGNOSIS — Z79.4 TYPE 2 DIABETES MELLITUS WITH HYPERGLYCEMIA, WITH LONG-TERM CURRENT USE OF INSULIN (HCC): ICD-10-CM

## 2021-07-07 DIAGNOSIS — E78.2 HYPERLIPIDEMIA, MIXED: ICD-10-CM

## 2021-07-07 LAB
ALBUMIN SERPL-MCNC: 4.1 G/DL (ref 3.5–5.2)
ALBUMIN/GLOB SERPL: 1.4 G/DL
ALP SERPL-CCNC: 118 U/L (ref 39–117)
ALT SERPL W P-5'-P-CCNC: 30 U/L (ref 1–41)
ANION GAP SERPL CALCULATED.3IONS-SCNC: 13.8 MMOL/L (ref 5–15)
AST SERPL-CCNC: 17 U/L (ref 1–40)
BILIRUB SERPL-MCNC: 0.2 MG/DL (ref 0–1.2)
BUN SERPL-MCNC: 17 MG/DL (ref 8–23)
BUN/CREAT SERPL: 13.8 (ref 7–25)
CALCIUM SPEC-SCNC: 9.2 MG/DL (ref 8.6–10.5)
CHLORIDE SERPL-SCNC: 96 MMOL/L (ref 98–107)
CO2 SERPL-SCNC: 24.2 MMOL/L (ref 22–29)
CREAT SERPL-MCNC: 1.23 MG/DL (ref 0.76–1.27)
GFR SERPL CREATININE-BSD FRML MDRD: 59 ML/MIN/1.73
GLOBULIN UR ELPH-MCNC: 2.9 GM/DL
GLUCOSE SERPL-MCNC: 287 MG/DL (ref 65–99)
HBA1C MFR BLD: 12.4 % (ref 3.5–5.6)
POTASSIUM SERPL-SCNC: 4.7 MMOL/L (ref 3.5–5.2)
PROT SERPL-MCNC: 7 G/DL (ref 6–8.5)
SODIUM SERPL-SCNC: 134 MMOL/L (ref 136–145)

## 2021-07-07 PROCEDURE — 83036 HEMOGLOBIN GLYCOSYLATED A1C: CPT

## 2021-07-07 PROCEDURE — 80053 COMPREHEN METABOLIC PANEL: CPT

## 2021-07-07 PROCEDURE — 36415 COLL VENOUS BLD VENIPUNCTURE: CPT

## 2021-07-14 ENCOUNTER — OFFICE VISIT (OUTPATIENT)
Dept: ENDOCRINOLOGY | Facility: CLINIC | Age: 61
End: 2021-07-14

## 2021-07-14 VITALS
HEART RATE: 73 BPM | WEIGHT: 183 LBS | SYSTOLIC BLOOD PRESSURE: 100 MMHG | BODY MASS INDEX: 24.25 KG/M2 | HEIGHT: 73 IN | DIASTOLIC BLOOD PRESSURE: 70 MMHG

## 2021-07-14 DIAGNOSIS — Z79.4 TYPE 2 DIABETES MELLITUS WITH HYPERGLYCEMIA, WITH LONG-TERM CURRENT USE OF INSULIN (HCC): Primary | ICD-10-CM

## 2021-07-14 DIAGNOSIS — E11.65 TYPE 2 DIABETES MELLITUS WITH HYPERGLYCEMIA, WITH LONG-TERM CURRENT USE OF INSULIN (HCC): Primary | ICD-10-CM

## 2021-07-14 DIAGNOSIS — E78.2 HYPERLIPIDEMIA, MIXED: ICD-10-CM

## 2021-07-14 DIAGNOSIS — E55.9 VITAMIN D DEFICIENCY: ICD-10-CM

## 2021-07-14 LAB — GLUCOSE BLDC GLUCOMTR-MCNC: 243 MG/DL (ref 70–105)

## 2021-07-14 PROCEDURE — 82962 GLUCOSE BLOOD TEST: CPT | Performed by: INTERNAL MEDICINE

## 2021-07-14 PROCEDURE — 99214 OFFICE O/P EST MOD 30 MIN: CPT | Performed by: INTERNAL MEDICINE

## 2021-07-14 RX ORDER — HUMAN INSULIN 100 [IU]/ML
INJECTION, SUSPENSION SUBCUTANEOUS
Start: 2021-07-14 | End: 2021-09-22

## 2021-07-14 NOTE — PATIENT INSTRUCTIONS
Increase exercise as able.  Drink plenty of water.  Take 70/30 insulin 60 units @ 5 am, 3pm & 9 pm.  Call if blood sugars are running under 100 or over 200.  Continue metformin, atorvastatin & vit D supplements.  F/u in 6 months, with fasting labs prior.

## 2021-07-17 NOTE — PROGRESS NOTES
[facility]        Patient Care Team:  Al Kimbrough MD as PCP - General  Al Kimbrough MD as PCP - Family Medicine    Chief Complaint:   Chief Complaint   Patient presents with   • Diabetes     Type 2, BS  243 fasting         Subjective   Here for diabetes f/u  Blood sugars: 300's  Misses insulin doses a couple x / week  Exercise program: walking some  Taking vit D    Interval History:     Patient Complaints: vision has not improved since stroke  Patient Denies:  hypoglycemia  History taken from: patient    Review of Systems:   Review of Systems   Eyes: Positive for blurred vision.   Gastrointestinal: Negative for nausea.   Endocrine: Positive for polydipsia and polyuria.   Neurological: Negative for headache.     Lost  2 lb since last visit    Objective     Vital Signs      Vitals:    07/14/21 1303   BP: 100/70   Pulse: 73         Physical Exam:     General Appearance:    Alert, cooperative, in no acute distress   Head:    Normocephalic, without obvious abnormality, atraumatic   Eyes:            Lids and lashes normal, conjunctivae and sclerae normal, no   icterus, no pallor, corneas clear, PERRLA   Throat:   No oral lesions,  oral mucosa moist. Missing teeth   Neck:   No adenopathy, supple,  no thyromegaly, no   carotid bruit   Lungs:     Clear    Heart:    Regular rhythm and normal rate   Chest Wall:    No abnormalities observed   Abdomen:     Normal bowel sounds, soft                 Extremities:   Moves all extremities well, no edema               Pulses:   Pulses palpable and equal bilaterally   Skin:   Dry   Neurologic:  DTR absent, unable to feel the 10g monofilament          Results Review:    I have reviewed the patient's new clinical results, labs & imaging.    Medication Review:   Prior to Admission medications    Medication Sig Start Date End Date Taking? Authorizing Provider   Accu-Chek FastClix Lancets misc For finger stick 4x/d 7/14/20  Yes Radha Manriquez MD    aspirin 81 MG EC tablet Take 1 tablet by mouth Daily. 12/12/19  Yes Farrah Crain MD   atorvastatin (LIPITOR) 80 MG tablet TAKE 1 TABLET BY MOUTH EVERY DAY 6/1/21  Yes Al Kimbrough MD   B-D UF III MINI PEN NEEDLES 31G X 5 MM misc USE TO INJECT INSULIN 4 TIMES DAILY DX E11.65 7/14/20  Yes Radha Manriquez MD   Eliquis 5 MG tablet tablet TAKE 1 TABLET BY MOUTH EVERY 12 (TWELVE) HOURS FOR 30 DAYS. 12/21/20  Yes Al Kimbrough MD   glucose blood test strip ACCU-CHEK GUIDE ME TEST STRIPS....Use to check blood sugar twice daily 7/14/20  Yes Radha Manriquez MD   Insulin NPH Isophane & Regular (NovoLIN 70/30 FlexPen Relion) (70-30) 100 UNIT/ML suspension pen-injector Inject 60 units @ 5a & 3p & @ 9p 7/14/21  Yes Radha Manriquez MD   metFORMIN ER (GLUCOPHAGE-XR) 500 MG 24 hr tablet TAKE 1 TABLET BY MOUTH THREE TIMES A DAY 3/8/21  Yes Radha Manriquez MD   metoclopramide (REGLAN) 10 MG tablet TAKE 1 TABLET BY MOUTH 4 (FOUR) TIMES A DAY BEFORE MEALS & AT BEDTIME FOR 30 DAYS. 2/15/21  Yes Al Kimbrough MD   metoprolol tartrate (LOPRESSOR) 50 MG tablet TAKE 1 TABLET BY MOUTH TWICE A DAY 5/17/21  Yes Al Kimbrough MD   esomeprazole (nexIUM) 40 MG capsule Take 1 capsule by mouth Every Morning Before Breakfast for 90 days. 6/17/20 9/15/20  Al Kimbrough MD       Lab Results (most recent)     Procedure Component Value Units Date/Time    POC Glucose [[id number]]  (Abnormal) Collected: 07/14/21 1307    Specimen: Blood Updated: 07/14/21 1309     Glucose 243 mg/dL      Comment: Serial Number: [id number]:  [id number]           Lab Results   Component Value Date    HGBA1C 12.4 (H) 07/07/2021    HGBA1C 12.5 (H) 01/06/2021    HGBA1C 12.0 (H) 07/07/2020      Lab Results   Component Value Date    GLUCOSE 287 (H) 07/07/2021    BUN 17 07/07/2021    CREATININE 1.23 07/07/2021    EGFRIFNONA 59 (L) 07/07/2021    BCR 13.8 07/07/2021    K 4.7 07/07/2021    CO2 24.2 07/07/2021    CALCIUM 9.2 07/07/2021     ALBUMIN 4.10 07/07/2021    LABIL2 0.8 (L) 05/26/2019    AST 17 07/07/2021    ALT 30 07/07/2021    CHOL 128 01/06/2021    LDL 39 01/06/2021    HDL 29 (L) 01/06/2021    TRIG 412 (H) 01/06/2021     Lab Results   Component Value Date    TSH 2.690 01/06/2021    [id number] 52.4 01/06/2021       Assessment/Plan     Diagnoses and all orders for this visit:    1. Type 2 diabetes mellitus with hyperglycemia, with long-term current use of insulin (CMS/[facility]) (Primary)  -     Insulin NPH Isophane & Regular (NovoLIN 70/30 FlexPen Relion) (70-30) 100 UNIT/ML suspension pen-injector; Inject 60 units @ 5a & 3p & @ 9p  -     Hemoglobin A1c; Future  -     Microalbumin / Creatinine Urine Ratio - Urine, Clean Catch; Future    2. Hyperlipidemia, mixed  -     Comprehensive Metabolic Panel; Future  -     Lipid Panel; Future  -     TSH; Future    3. Vitamin D deficiency  -     Vitamin D 25 Hydroxy; Future    Other orders  -     POC Glucose    Glucose control not at goal. Will check lipids & vit D status next visit.    Increase exercise as able.  Drink plenty of water.  Take 70/30 insulin 60 units @ 5 am, 3pm & 9 pm.  Call if blood sugars are running under 100 or over 200.  Continue metformin, atorvastatin & vit D supplements.        Radha Manriquez MD  07/16/21  22:33 EDT

## 2021-09-09 RX ORDER — METFORMIN HYDROCHLORIDE 500 MG/1
TABLET, EXTENDED RELEASE ORAL
Qty: 270 TABLET | Refills: 3 | Status: SHIPPED | OUTPATIENT
Start: 2021-09-09 | End: 2021-09-22

## 2021-09-22 ENCOUNTER — HOSPITAL ENCOUNTER (OUTPATIENT)
Facility: HOSPITAL | Age: 61
Setting detail: OBSERVATION
Discharge: HOME | End: 2021-09-23
Attending: HOSPITALIST | Admitting: HOSPITALIST

## 2021-09-22 ENCOUNTER — APPOINTMENT (OUTPATIENT)
Dept: CARDIOLOGY | Facility: HOSPITAL | Age: 61
End: 2021-09-22

## 2021-09-22 ENCOUNTER — APPOINTMENT (OUTPATIENT)
Dept: CT IMAGING | Facility: HOSPITAL | Age: 61
End: 2021-09-22

## 2021-09-22 ENCOUNTER — APPOINTMENT (OUTPATIENT)
Dept: MRI IMAGING | Facility: HOSPITAL | Age: 61
End: 2021-09-22

## 2021-09-22 ENCOUNTER — APPOINTMENT (OUTPATIENT)
Dept: GENERAL RADIOLOGY | Facility: HOSPITAL | Age: 61
End: 2021-09-22

## 2021-09-22 DIAGNOSIS — R41.82 ALTERED MENTAL STATUS, UNSPECIFIED ALTERED MENTAL STATUS TYPE: ICD-10-CM

## 2021-09-22 DIAGNOSIS — D72.829 LEUKOCYTOSIS, UNSPECIFIED TYPE: ICD-10-CM

## 2021-09-22 DIAGNOSIS — R53.1 LEFT-SIDED WEAKNESS: ICD-10-CM

## 2021-09-22 DIAGNOSIS — E16.2 HYPOGLYCEMIA: Primary | ICD-10-CM

## 2021-09-22 PROBLEM — E83.42 HYPOMAGNESEMIA: Status: ACTIVE | Noted: 2021-09-22

## 2021-09-22 PROBLEM — I10 ESSENTIAL HYPERTENSION: Chronic | Status: ACTIVE | Noted: 2019-06-28

## 2021-09-22 PROBLEM — E78.2 HYPERLIPIDEMIA, MIXED: Chronic | Status: ACTIVE | Noted: 2017-04-03

## 2021-09-22 PROBLEM — J45.909 ASTHMA: Chronic | Status: ACTIVE | Noted: 2018-03-22

## 2021-09-22 PROBLEM — I25.10 CORONARY ARTERY DISEASE INVOLVING NATIVE CORONARY ARTERY OF NATIVE HEART WITHOUT ANGINA PECTORIS: Chronic | Status: ACTIVE | Noted: 2020-08-27

## 2021-09-22 PROBLEM — Z86.73 HISTORY OF CVA (CEREBROVASCULAR ACCIDENT): Chronic | Status: ACTIVE | Noted: 2021-09-22

## 2021-09-22 LAB
ABO GROUP BLD: NORMAL
ALBUMIN SERPL-MCNC: 4.1 G/DL (ref 3.5–5.2)
ALBUMIN/GLOB SERPL: 1.2 G/DL
ALP SERPL-CCNC: 119 U/L (ref 39–117)
ALT SERPL W P-5'-P-CCNC: 32 U/L (ref 1–41)
ANION GAP SERPL CALCULATED.3IONS-SCNC: 17 MMOL/L (ref 5–15)
AST SERPL-CCNC: 24 U/L (ref 1–40)
BACTERIA UR QL AUTO: ABNORMAL /HPF
BASOPHILS # BLD AUTO: 0.1 10*3/MM3 (ref 0–0.2)
BASOPHILS NFR BLD AUTO: 0.9 % (ref 0–1.5)
BILIRUB SERPL-MCNC: 0.3 MG/DL (ref 0–1.2)
BILIRUB UR QL STRIP: NEGATIVE
BLD GP AB SCN SERPL QL: NEGATIVE
BUN SERPL-MCNC: 16 MG/DL (ref 8–23)
BUN/CREAT SERPL: 13.2 (ref 7–25)
CALCIUM SPEC-SCNC: 9.6 MG/DL (ref 8.6–10.5)
CHLORIDE SERPL-SCNC: 93 MMOL/L (ref 98–107)
CHOLEST SERPL-MCNC: 129 MG/DL (ref 0–200)
CLARITY UR: CLEAR
CO2 SERPL-SCNC: 24 MMOL/L (ref 22–29)
COLOR UR: YELLOW
CREAT SERPL-MCNC: 1.21 MG/DL (ref 0.76–1.27)
CRP SERPL-MCNC: <0.3 MG/DL (ref 0–0.5)
DEPRECATED RDW RBC AUTO: 41.6 FL (ref 37–54)
EOSINOPHIL # BLD AUTO: 0.2 10*3/MM3 (ref 0–0.4)
EOSINOPHIL NFR BLD AUTO: 1.4 % (ref 0.3–6.2)
ERYTHROCYTE [DISTWIDTH] IN BLOOD BY AUTOMATED COUNT: 13.1 % (ref 12.3–15.4)
GFR SERPL CREATININE-BSD FRML MDRD: 61 ML/MIN/1.73
GLOBULIN UR ELPH-MCNC: 3.5 GM/DL
GLUCOSE BLDC GLUCOMTR-MCNC: 215 MG/DL (ref 70–105)
GLUCOSE BLDC GLUCOMTR-MCNC: 243 MG/DL (ref 70–105)
GLUCOSE BLDC GLUCOMTR-MCNC: 253 MG/DL (ref 70–105)
GLUCOSE SERPL-MCNC: 232 MG/DL (ref 65–99)
GLUCOSE UR STRIP-MCNC: ABNORMAL MG/DL
HCT VFR BLD AUTO: 40.8 % (ref 37.5–51)
HDLC SERPL-MCNC: 30 MG/DL (ref 40–60)
HGB BLD-MCNC: 14.1 G/DL (ref 13–17.7)
HGB UR QL STRIP.AUTO: ABNORMAL
HOLD SPECIMEN: NORMAL
HYALINE CASTS UR QL AUTO: ABNORMAL /LPF
INR PPP: 0.93 (ref 0.93–1.1)
KETONES UR QL STRIP: ABNORMAL
LDLC SERPL CALC-MCNC: 47 MG/DL (ref 0–100)
LDLC/HDLC SERPL: 0.99 {RATIO}
LEUKOCYTE ESTERASE UR QL STRIP.AUTO: NEGATIVE
LYMPHOCYTES # BLD AUTO: 1.8 10*3/MM3 (ref 0.7–3.1)
LYMPHOCYTES NFR BLD AUTO: 15.4 % (ref 19.6–45.3)
MAGNESIUM SERPL-MCNC: 1.3 MG/DL (ref 1.6–2.4)
MAGNESIUM SERPL-MCNC: 1.8 MG/DL (ref 1.6–2.4)
MCH RBC QN AUTO: 31 PG (ref 26.6–33)
MCHC RBC AUTO-ENTMCNC: 34.5 G/DL (ref 31.5–35.7)
MCV RBC AUTO: 89.7 FL (ref 79–97)
MONOCYTES # BLD AUTO: 1 10*3/MM3 (ref 0.1–0.9)
MONOCYTES NFR BLD AUTO: 8 % (ref 5–12)
NEUTROPHILS NFR BLD AUTO: 74.3 % (ref 42.7–76)
NEUTROPHILS NFR BLD AUTO: 8.9 10*3/MM3 (ref 1.7–7)
NITRITE UR QL STRIP: NEGATIVE
NRBC BLD AUTO-RTO: 0.1 /100 WBC (ref 0–0.2)
PH UR STRIP.AUTO: 6 [PH] (ref 5–8)
PLATELET # BLD AUTO: 364 10*3/MM3 (ref 140–450)
PMV BLD AUTO: 7.9 FL (ref 6–12)
POTASSIUM SERPL-SCNC: 4.6 MMOL/L (ref 3.5–5.2)
PROCALCITONIN SERPL-MCNC: 0.12 NG/ML (ref 0–0.25)
PROT SERPL-MCNC: 7.6 G/DL (ref 6–8.5)
PROT UR QL STRIP: ABNORMAL
PROTHROMBIN TIME: 10.4 SECONDS (ref 9.6–11.7)
QT INTERVAL: 325 MS
RBC # BLD AUTO: 4.55 10*6/MM3 (ref 4.14–5.8)
RBC # UR: ABNORMAL /HPF
REF LAB TEST METHOD: ABNORMAL
RH BLD: POSITIVE
SARS-COV-2 RNA PNL SPEC NAA+PROBE: NOT DETECTED
SODIUM SERPL-SCNC: 134 MMOL/L (ref 136–145)
SP GR UR STRIP: >=1.03 (ref 1–1.03)
SQUAMOUS #/AREA URNS HPF: ABNORMAL /HPF
T&S EXPIRATION DATE: NORMAL
TRIGL SERPL-MCNC: 346 MG/DL (ref 0–150)
TROPONIN T SERPL-MCNC: <0.01 NG/ML (ref 0–0.03)
TSH SERPL DL<=0.05 MIU/L-ACNC: 2.32 UIU/ML (ref 0.27–4.2)
UROBILINOGEN UR QL STRIP: ABNORMAL
VIT B12 BLD-MCNC: 332 PG/ML (ref 211–946)
VLDLC SERPL-MCNC: 52 MG/DL (ref 5–40)
WBC # BLD AUTO: 12 10*3/MM3 (ref 3.4–10.8)
WBC UR QL AUTO: ABNORMAL /HPF

## 2021-09-22 PROCEDURE — G0378 HOSPITAL OBSERVATION PER HR: HCPCS

## 2021-09-22 PROCEDURE — 81001 URINALYSIS AUTO W/SCOPE: CPT | Performed by: PHYSICIAN ASSISTANT

## 2021-09-22 PROCEDURE — P9612 CATHETERIZE FOR URINE SPEC: HCPCS

## 2021-09-22 PROCEDURE — 25010000002 MAGNESIUM SULFATE 2 GM/50ML SOLUTION: Performed by: PHYSICIAN ASSISTANT

## 2021-09-22 PROCEDURE — 82962 GLUCOSE BLOOD TEST: CPT

## 2021-09-22 PROCEDURE — 36415 COLL VENOUS BLD VENIPUNCTURE: CPT | Performed by: PHYSICIAN ASSISTANT

## 2021-09-22 PROCEDURE — 96365 THER/PROPH/DIAG IV INF INIT: CPT

## 2021-09-22 PROCEDURE — 83735 ASSAY OF MAGNESIUM: CPT | Performed by: PHYSICIAN ASSISTANT

## 2021-09-22 PROCEDURE — 82607 VITAMIN B-12: CPT | Performed by: NURSE PRACTITIONER

## 2021-09-22 PROCEDURE — 70498 CT ANGIOGRAPHY NECK: CPT

## 2021-09-22 PROCEDURE — 85610 PROTHROMBIN TIME: CPT | Performed by: PHYSICIAN ASSISTANT

## 2021-09-22 PROCEDURE — 99214 OFFICE O/P EST MOD 30 MIN: CPT | Performed by: PSYCHIATRY & NEUROLOGY

## 2021-09-22 PROCEDURE — 93005 ELECTROCARDIOGRAM TRACING: CPT | Performed by: PHYSICIAN ASSISTANT

## 2021-09-22 PROCEDURE — 85025 COMPLETE CBC W/AUTO DIFF WBC: CPT | Performed by: PHYSICIAN ASSISTANT

## 2021-09-22 PROCEDURE — 71045 X-RAY EXAM CHEST 1 VIEW: CPT

## 2021-09-22 PROCEDURE — 86850 RBC ANTIBODY SCREEN: CPT | Performed by: PHYSICIAN ASSISTANT

## 2021-09-22 PROCEDURE — 70496 CT ANGIOGRAPHY HEAD: CPT

## 2021-09-22 PROCEDURE — 92610 EVALUATE SWALLOWING FUNCTION: CPT

## 2021-09-22 PROCEDURE — 99285 EMERGENCY DEPT VISIT HI MDM: CPT

## 2021-09-22 PROCEDURE — C9803 HOPD COVID-19 SPEC COLLECT: HCPCS

## 2021-09-22 PROCEDURE — 63710000001 INSULIN ISOPHANE & REGULAR PER 5 UNITS: Performed by: NURSE PRACTITIONER

## 2021-09-22 PROCEDURE — 84145 PROCALCITONIN (PCT): CPT | Performed by: NURSE PRACTITIONER

## 2021-09-22 PROCEDURE — 70450 CT HEAD/BRAIN W/O DYE: CPT

## 2021-09-22 PROCEDURE — 86901 BLOOD TYPING SEROLOGIC RH(D): CPT | Performed by: PHYSICIAN ASSISTANT

## 2021-09-22 PROCEDURE — 93306 TTE W/DOPPLER COMPLETE: CPT

## 2021-09-22 PROCEDURE — 97165 OT EVAL LOW COMPLEX 30 MIN: CPT

## 2021-09-22 PROCEDURE — 0 IOPAMIDOL PER 1 ML: Performed by: PHYSICIAN ASSISTANT

## 2021-09-22 PROCEDURE — 97530 THERAPEUTIC ACTIVITIES: CPT

## 2021-09-22 PROCEDURE — 93306 TTE W/DOPPLER COMPLETE: CPT | Performed by: INTERNAL MEDICINE

## 2021-09-22 PROCEDURE — 83735 ASSAY OF MAGNESIUM: CPT | Performed by: HOSPITALIST

## 2021-09-22 PROCEDURE — 96366 THER/PROPH/DIAG IV INF ADDON: CPT

## 2021-09-22 PROCEDURE — 87635 SARS-COV-2 COVID-19 AMP PRB: CPT | Performed by: PHYSICIAN ASSISTANT

## 2021-09-22 PROCEDURE — 25010000002 MAGNESIUM SULFATE IN D5W 1G/100ML (PREMIX) 1-5 GM/100ML-% SOLUTION: Performed by: NURSE PRACTITIONER

## 2021-09-22 PROCEDURE — 86900 BLOOD TYPING SEROLOGIC ABO: CPT | Performed by: PHYSICIAN ASSISTANT

## 2021-09-22 PROCEDURE — 80053 COMPREHEN METABOLIC PANEL: CPT | Performed by: PHYSICIAN ASSISTANT

## 2021-09-22 PROCEDURE — 63710000001 INSULIN LISPRO (HUMAN) PER 5 UNITS: Performed by: HOSPITALIST

## 2021-09-22 PROCEDURE — 80061 LIPID PANEL: CPT | Performed by: NURSE PRACTITIONER

## 2021-09-22 PROCEDURE — 70551 MRI BRAIN STEM W/O DYE: CPT

## 2021-09-22 PROCEDURE — 86140 C-REACTIVE PROTEIN: CPT | Performed by: NURSE PRACTITIONER

## 2021-09-22 PROCEDURE — 84484 ASSAY OF TROPONIN QUANT: CPT | Performed by: PHYSICIAN ASSISTANT

## 2021-09-22 PROCEDURE — 83036 HEMOGLOBIN GLYCOSYLATED A1C: CPT | Performed by: NURSE PRACTITIONER

## 2021-09-22 PROCEDURE — 84443 ASSAY THYROID STIM HORMONE: CPT | Performed by: NURSE PRACTITIONER

## 2021-09-22 RX ORDER — INSULIN LISPRO 100 [IU]/ML
0-9 INJECTION, SOLUTION INTRAVENOUS; SUBCUTANEOUS
Status: DISCONTINUED | OUTPATIENT
Start: 2021-09-22 | End: 2021-09-22 | Stop reason: DRUGHIGH

## 2021-09-22 RX ORDER — ONDANSETRON 4 MG/1
4 TABLET, FILM COATED ORAL EVERY 6 HOURS PRN
Status: DISCONTINUED | OUTPATIENT
Start: 2021-09-22 | End: 2021-09-23 | Stop reason: HOSPADM

## 2021-09-22 RX ORDER — MAGNESIUM SULFATE HEPTAHYDRATE 40 MG/ML
2 INJECTION, SOLUTION INTRAVENOUS AS NEEDED
Status: DISCONTINUED | OUTPATIENT
Start: 2021-09-22 | End: 2021-09-23 | Stop reason: HOSPADM

## 2021-09-22 RX ORDER — OLANZAPINE 10 MG/2ML
1 INJECTION, POWDER, LYOPHILIZED, FOR SOLUTION INTRAMUSCULAR AS NEEDED
Status: DISCONTINUED | OUTPATIENT
Start: 2021-09-22 | End: 2021-09-23 | Stop reason: HOSPADM

## 2021-09-22 RX ORDER — PANTOPRAZOLE SODIUM 40 MG/1
40 TABLET, DELAYED RELEASE ORAL EVERY MORNING
Status: DISCONTINUED | OUTPATIENT
Start: 2021-09-22 | End: 2021-09-23 | Stop reason: HOSPADM

## 2021-09-22 RX ORDER — ACETAMINOPHEN 650 MG/1
650 SUPPOSITORY RECTAL EVERY 4 HOURS PRN
Status: DISCONTINUED | OUTPATIENT
Start: 2021-09-22 | End: 2021-09-23 | Stop reason: HOSPADM

## 2021-09-22 RX ORDER — SODIUM CHLORIDE 0.9 % (FLUSH) 0.9 %
10 SYRINGE (ML) INJECTION AS NEEDED
Status: DISCONTINUED | OUTPATIENT
Start: 2021-09-22 | End: 2021-09-23 | Stop reason: HOSPADM

## 2021-09-22 RX ORDER — ATORVASTATIN CALCIUM 40 MG/1
80 TABLET, FILM COATED ORAL NIGHTLY
Status: DISCONTINUED | OUTPATIENT
Start: 2021-09-22 | End: 2021-09-23 | Stop reason: HOSPADM

## 2021-09-22 RX ORDER — INSULIN LISPRO 100 [IU]/ML
0-14 INJECTION, SOLUTION INTRAVENOUS; SUBCUTANEOUS AS NEEDED
Status: DISCONTINUED | OUTPATIENT
Start: 2021-09-22 | End: 2021-09-23 | Stop reason: HOSPADM

## 2021-09-22 RX ORDER — POTASSIUM CHLORIDE 20 MEQ/1
40 TABLET, EXTENDED RELEASE ORAL AS NEEDED
Status: DISCONTINUED | OUTPATIENT
Start: 2021-09-22 | End: 2021-09-23 | Stop reason: HOSPADM

## 2021-09-22 RX ORDER — ASPIRIN 325 MG
325 TABLET ORAL DAILY
Status: DISCONTINUED | OUTPATIENT
Start: 2021-09-22 | End: 2021-09-23 | Stop reason: HOSPADM

## 2021-09-22 RX ORDER — IPRATROPIUM BROMIDE AND ALBUTEROL SULFATE 2.5; .5 MG/3ML; MG/3ML
3 SOLUTION RESPIRATORY (INHALATION) EVERY 6 HOURS PRN
Status: DISCONTINUED | OUTPATIENT
Start: 2021-09-22 | End: 2021-09-23 | Stop reason: HOSPADM

## 2021-09-22 RX ORDER — METOCLOPRAMIDE 10 MG/1
10 TABLET ORAL
COMMUNITY
End: 2022-04-05

## 2021-09-22 RX ORDER — ALUMINA, MAGNESIA, AND SIMETHICONE 2400; 2400; 240 MG/30ML; MG/30ML; MG/30ML
15 SUSPENSION ORAL EVERY 6 HOURS PRN
Status: DISCONTINUED | OUTPATIENT
Start: 2021-09-22 | End: 2021-09-23 | Stop reason: HOSPADM

## 2021-09-22 RX ORDER — MAGNESIUM SULFATE 1 G/100ML
1 INJECTION INTRAVENOUS AS NEEDED
Status: DISCONTINUED | OUTPATIENT
Start: 2021-09-22 | End: 2021-09-23 | Stop reason: HOSPADM

## 2021-09-22 RX ORDER — ONDANSETRON 2 MG/ML
4 INJECTION INTRAMUSCULAR; INTRAVENOUS EVERY 6 HOURS PRN
Status: DISCONTINUED | OUTPATIENT
Start: 2021-09-22 | End: 2021-09-23 | Stop reason: HOSPADM

## 2021-09-22 RX ORDER — NICOTINE POLACRILEX 4 MG
15 LOZENGE BUCCAL
Status: DISCONTINUED | OUTPATIENT
Start: 2021-09-22 | End: 2021-09-23 | Stop reason: HOSPADM

## 2021-09-22 RX ORDER — DEXTROSE MONOHYDRATE 25 G/50ML
25 INJECTION, SOLUTION INTRAVENOUS
Status: DISCONTINUED | OUTPATIENT
Start: 2021-09-22 | End: 2021-09-23 | Stop reason: HOSPADM

## 2021-09-22 RX ORDER — METFORMIN HYDROCHLORIDE 500 MG/1
500 TABLET, EXTENDED RELEASE ORAL 2 TIMES DAILY
COMMUNITY
End: 2022-06-08 | Stop reason: SDUPTHER

## 2021-09-22 RX ORDER — ACETAMINOPHEN 160 MG/5ML
650 SOLUTION ORAL EVERY 4 HOURS PRN
Status: DISCONTINUED | OUTPATIENT
Start: 2021-09-22 | End: 2021-09-22 | Stop reason: SDUPTHER

## 2021-09-22 RX ORDER — ONDANSETRON 2 MG/ML
4 INJECTION INTRAMUSCULAR; INTRAVENOUS EVERY 6 HOURS PRN
Status: DISCONTINUED | OUTPATIENT
Start: 2021-09-22 | End: 2021-09-22 | Stop reason: SDUPTHER

## 2021-09-22 RX ORDER — MAGNESIUM SULFATE HEPTAHYDRATE 40 MG/ML
2 INJECTION, SOLUTION INTRAVENOUS ONCE
Status: COMPLETED | OUTPATIENT
Start: 2021-09-22 | End: 2021-09-22

## 2021-09-22 RX ORDER — INSULIN LISPRO 100 [IU]/ML
0-14 INJECTION, SOLUTION INTRAVENOUS; SUBCUTANEOUS
Status: DISCONTINUED | OUTPATIENT
Start: 2021-09-22 | End: 2021-09-23 | Stop reason: HOSPADM

## 2021-09-22 RX ORDER — ACETAMINOPHEN 325 MG/1
650 TABLET ORAL EVERY 4 HOURS PRN
Status: DISCONTINUED | OUTPATIENT
Start: 2021-09-22 | End: 2021-09-22 | Stop reason: SDUPTHER

## 2021-09-22 RX ORDER — ACETAMINOPHEN 325 MG/1
650 TABLET ORAL EVERY 4 HOURS PRN
Status: DISCONTINUED | OUTPATIENT
Start: 2021-09-22 | End: 2021-09-23 | Stop reason: HOSPADM

## 2021-09-22 RX ORDER — OMEPRAZOLE 20 MG/1
40 CAPSULE, DELAYED RELEASE ORAL 2 TIMES DAILY
COMMUNITY

## 2021-09-22 RX ORDER — BISACODYL 10 MG
10 SUPPOSITORY, RECTAL RECTAL DAILY PRN
Status: DISCONTINUED | OUTPATIENT
Start: 2021-09-22 | End: 2021-09-23 | Stop reason: HOSPADM

## 2021-09-22 RX ORDER — INSULIN LISPRO 100 [IU]/ML
0-9 INJECTION, SOLUTION INTRAVENOUS; SUBCUTANEOUS AS NEEDED
Status: DISCONTINUED | OUTPATIENT
Start: 2021-09-22 | End: 2021-09-22 | Stop reason: DRUGHIGH

## 2021-09-22 RX ORDER — CHOLECALCIFEROL (VITAMIN D3) 50 MCG
2000 CAPSULE ORAL DAILY
COMMUNITY

## 2021-09-22 RX ORDER — ACETAMINOPHEN 650 MG/1
650 SUPPOSITORY RECTAL EVERY 4 HOURS PRN
Status: DISCONTINUED | OUTPATIENT
Start: 2021-09-22 | End: 2021-09-22 | Stop reason: SDUPTHER

## 2021-09-22 RX ORDER — ASPIRIN 300 MG/1
300 SUPPOSITORY RECTAL DAILY
Status: DISCONTINUED | OUTPATIENT
Start: 2021-09-22 | End: 2021-09-23 | Stop reason: HOSPADM

## 2021-09-22 RX ORDER — HYDRALAZINE HYDROCHLORIDE 20 MG/ML
10 INJECTION INTRAMUSCULAR; INTRAVENOUS EVERY 6 HOURS PRN
Status: DISCONTINUED | OUTPATIENT
Start: 2021-09-22 | End: 2021-09-23 | Stop reason: HOSPADM

## 2021-09-22 RX ORDER — CHOLECALCIFEROL (VITAMIN D3) 125 MCG
5 CAPSULE ORAL NIGHTLY PRN
Status: DISCONTINUED | OUTPATIENT
Start: 2021-09-22 | End: 2021-09-23 | Stop reason: HOSPADM

## 2021-09-22 RX ADMIN — PANTOPRAZOLE SODIUM 40 MG: 40 TABLET, DELAYED RELEASE ORAL at 12:10

## 2021-09-22 RX ADMIN — Medication 10 ML: at 20:22

## 2021-09-22 RX ADMIN — APIXABAN 5 MG: 5 TABLET, FILM COATED ORAL at 20:12

## 2021-09-22 RX ADMIN — MAGNESIUM SULFATE IN DEXTROSE 1 G: 10 INJECTION, SOLUTION INTRAVENOUS at 20:12

## 2021-09-22 RX ADMIN — INSULIN HUMAN 35 UNITS: 100 INJECTION, SUSPENSION SUBCUTANEOUS at 17:49

## 2021-09-22 RX ADMIN — APIXABAN 5 MG: 5 TABLET, FILM COATED ORAL at 14:18

## 2021-09-22 RX ADMIN — MAGNESIUM SULFATE HEPTAHYDRATE 2 G: 2 INJECTION, SOLUTION INTRAVENOUS at 09:19

## 2021-09-22 RX ADMIN — SODIUM CHLORIDE 500 ML: 9 INJECTION, SOLUTION INTRAVENOUS at 09:19

## 2021-09-22 RX ADMIN — INSULIN HUMAN 35 UNITS: 100 INJECTION, SUSPENSION SUBCUTANEOUS at 12:14

## 2021-09-22 RX ADMIN — INSULIN LISPRO 5 UNITS: 100 INJECTION, SOLUTION INTRAVENOUS; SUBCUTANEOUS at 17:49

## 2021-09-22 RX ADMIN — ATORVASTATIN CALCIUM 80 MG: 40 TABLET, FILM COATED ORAL at 20:12

## 2021-09-22 RX ADMIN — ASPIRIN 325 MG ORAL TABLET 325 MG: 325 PILL ORAL at 12:10

## 2021-09-22 RX ADMIN — IOPAMIDOL 100 ML: 755 INJECTION, SOLUTION INTRAVENOUS at 07:58

## 2021-09-22 NOTE — ED PROVIDER NOTES
Subjective   Patient is a 61-year-old male who presents via EMS with complaints of increased left-sided facial droop and left-sided weakness along with confusion per EMS report. Patient's family at bedside states his last known well was around 2100 on 9/21/21. Daughter states she woke up around 230 this morning and checked his sugar and it was in the 200s she did give him insulin. Per EMS report patient's initial blood glucose was less than 30 he gave him oral glucose and on repeat glucose was 270s and some of his weakness improved. 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 Patient is currently on Eliquis and Plavix. Patient is somewhat disoriented to situation but denies any complaints such as headache, visual changes, abdominal pain, chest pain, shortness of breath, cough. No known fever or urinary complaints per daughter. No recent travel or known sick contacts. Patient is alert to person and place. Patient's daughter denies any known falls.          Review of Systems   Unable to perform ROS: Mental status change   Constitutional: Negative for fever.   Respiratory: Negative for shortness of breath.    Cardiovascular: Negative for chest pain.   Gastrointestinal: Negative for abdominal distention.   Neurological: Negative for headaches.       Past Medical History:   Diagnosis Date   • Hypoglycemia    • Type 2 diabetes mellitus (CMS/HCC)        No Known Allergies    Past Surgical History:   Procedure Laterality Date   • CARDIAC CATHETERIZATION     • CHOLECYSTECTOMY  2004   • COLON RESECTION      [id number]   • COLON RESECTION SMALL BOWEL Right 12/5/2019    Procedure: open right hemicolectomy;  Surgeon: Ronaldo Ardon MD;  Location: [facility] OR;  Service: General   • COLONOSCOPY  2019    x2    • CORONARY ANGIOPLASTY WITH STENT PLACEMENT  04/2017   • ECHO - CONVERTED  2019   • ECHO -  CONVERTED     • FRACTURE SURGERY      collar bone as a child    • KNEE ARTHROSCOPY Left 2003   • KNEE JOINT MANIPULATION Left 2010   • TOTAL KNEE ARTHROPLASTY Bilateral            Family History   Problem Relation Age of Onset   • Irritable bowel syndrome Mother    • Anxiety disorder Mother    • Depression Father    • Hypertension Father    • Mental illness Father         committed suicide   • Other Sister         back problems   • Other Brother         back problems       Social History     Socioeconomic History   • Marital status:      Spouse name: Not on file   • Number of children: Not on file   • Years of education: Not on file   • Highest education level: Not on file   Tobacco Use   • Smoking status: Former Smoker     Quit date:      Years since quittin.7   • Smokeless tobacco: Former User   Vaping Use   • Vaping Use: Never used   Substance and Sexual Activity   • Alcohol use: Yes     Comment: occasionally   • Drug use: No   • Sexual activity: Defer           Objective   Physical Exam  Vitals and nursing note reviewed.   Constitutional:       General: He is not in acute distress.     Appearance: He is well-developed. He is not ill-appearing, toxic-appearing or diaphoretic.   HENT:      Head: Normocephalic and atraumatic.      Nose: Nose normal.      Mouth/Throat:      Mouth: Mucous membranes are moist.      Pharynx: Oropharynx is clear.   Eyes:      General: No scleral icterus.     Extraocular Movements: Extraocular movements intact.      Pupils: Pupils are equal, round, and reactive to light.   Cardiovascular:      Rate and Rhythm: Normal rate and regular rhythm.      Pulses: Normal pulses.      Heart sounds: No murmur heard.   No friction rub. No gallop.    Pulmonary:      Effort: Pulmonary effort is normal. No tachypnea or accessory muscle usage.      Breath sounds: Normal breath sounds. No decreased breath sounds, wheezing, rhonchi or rales.   Chest:      Chest wall: No  "mass, deformity, tenderness or crepitus.   Abdominal:      General: Bowel sounds are normal. There is no distension.      Palpations: Abdomen is soft. There is no mass.      Tenderness: There is no abdominal tenderness. There is no guarding or rebound.   Musculoskeletal:      Cervical back: Normal range of motion and neck supple. No rigidity.   Skin:     General: Skin is warm.      Capillary Refill: Capillary refill takes less than 2 seconds.      Findings: No rash.   Neurological:      Mental Status: He is alert. He is disoriented.      Sensory: Sensory deficit present.      Comments: NIH8: Patient has known prior CVA with left-sided deficit left-sided weakness and facial droop noted on today's exam.    Alert to person and place.    Psychiatric:         Mood and Affect: Mood normal.         Behavior: Behavior normal.         Procedures           ED Course      /69   Pulse 94   Temp 98.5 °F (36.9 °C) (Oral)   Resp 18   Ht 185.4 cm (73\")   Wt 86.6 kg (191 lb)   SpO2 96%   BMI 25.20 kg/m²   Medications   sodium chloride 0.9 % flush 10 mL (has no administration in time range)   sodium chloride 0.9 % bolus 500 mL (500 mL Intravenous New Bag 9/22/21 0919)   iopamidol (ISOVUE-370) 76 % injection 100 mL (100 mL Intravenous Given 9/22/21 0758)   magnesium sulfate 2g/50 mL (PREMIX) infusion (2 g Intravenous New Bag 9/22/21 0919)     Labs Reviewed   COMPREHENSIVE METABOLIC PANEL - Abnormal; Notable for the following components:       Result Value    Glucose 232 (*)     Sodium 134 (*)     Chloride 93 (*)     Alkaline Phosphatase 119 (*)     Anion Gap 17.0 (*)     All other components within normal limits    Narrative:     GFR Normal >60  Chronic Kidney Disease <60  Kidney Failure <15     URINALYSIS W/ CULTURE IF INDICATED - Abnormal; Notable for the following components:    Glucose, UA >=1000 mg/dL (3+) (*)     Ketones, UA Trace (*)     Blood, UA Trace (*)     Protein,  mg/dL (2+) (*)     All other " components within normal limits   MAGNESIUM - Abnormal; Notable for the following components:    Magnesium 1.3 (*)     All other components within normal limits   CBC WITH AUTO DIFFERENTIAL - Abnormal; Notable for the following components:    WBC 12.00 (*)     Lymphocyte % 15.4 (*)     Neutrophils, Absolute 8.90 (*)     Monocytes, Absolute 1.00 (*)     All other components within normal limits   URINALYSIS, MICROSCOPIC ONLY - Abnormal; Notable for the following components:    RBC, UA 0-2 (*)     WBC, UA 0-2 (*)     All other components within normal limits   POCT GLUCOSE FINGERSTICK - Abnormal; Notable for the following components:    Glucose 253 (*)     All other components within normal limits   COVID-19,CEPHEID/JOANNA/BDMAX,COR/PHILLIP/PAD/CELINA IN-HOUSE,NP SWAB IN TRANSPORT MEDIA 3-4 HR TAT, RT-PCR - Normal    Narrative:     Fact sheet for providers: https://www.fda.gov/media/576138/download     Fact sheet for patients: https://www.fda.gov/media/100780/download  Fact sheet for providers: https://www.fda.gov/media/819045/download    Fact sheet for patients: https://www.fda.gov/media/824185/download    Test performed by PCR.   PROTIME-INR - Normal   TROPONIN (IN-HOUSE) - Normal    Narrative:     Troponin T Reference Range:  <= 0.03 ng/mL-   Negative for AMI  >0.03 ng/mL-     Abnormal for myocardial necrosis.  Clinicians would have to utilize clinical acumen, EKG, Troponin and serial changes to determine if it is an Acute Myocardial Infarction or myocardial injury due to an underlying chronic condition.       Results may be falsely decreased if patient taking Biotin.     TYPE AND SCREEN   BB ARMBAND CHECK   CBC AND DIFFERENTIAL    Narrative:     The following orders were created for panel order CBC & Differential.  Procedure                               Abnormality         Status                     ---------                               -----------         ------                     CBC Auto Differential[[id number]]         Abnormal            Final result                 Please view results for these tests on the individual orders.   EXTRA TUBES    Narrative:     The following orders were created for panel order Extra Tubes.  Procedure                               Abnormality         Status                     ---------                               -----------         ------                     Gold Top - [facility][[id number]]                                   Final result                 Please view results for these tests on the individual orders.   GOLD TOP - [facility]     CT Angiogram Neck    Result Date: 9/22/2021   1. Old area of infarction in the right occipital lobe with chronic occlusion of the right P2 segment of the posterior cerebral artery. 2. No additional hemodynamically significant intracranial or extracranial vascular stenoses or additional occlusions. 3. Chronic lung disease in both apices.  Electronically Signed By-Ronaldo Sheikh MD On:9/22/2021 9:34 AM This report was finalized on [id number] by  Ronaldo Sheikh MD.    XR Chest 1 View    Result Date: 9/22/2021  Hypoinflated lungs with patchy bibasilar airspace disease left greater than right that may relate to pneumonia and/or atelectasis.  Electronically Signed By-Kartik Macias MD On:9/22/2021 7:40 AM This report was finalized on [id number] by  Kartik Macias MD.    CT Angiogram Head    Result Date: 9/22/2021   1. Old area of infarction in the right occipital lobe with chronic occlusion of the right P2 segment of the posterior cerebral artery. 2. No additional hemodynamically significant intracranial or extracranial vascular stenoses or additional occlusions. 3. Chronic lung disease in both apices.  Electronically Signed By-Ronaldo Sheikh MD On:9/22/2021 9:34 AM This report was finalized on [id number] by  Ronaldo Sheikh MD.    CT Head Without Contrast Stroke Protocol    Result Date: 9/22/2021  IMPRESSION : 1. No definite acute infarct. 2. Sequela of prior right PCA infarct seen  on prior study from 2/20/2020. Areas of cortical hyperdensity likely related to cortical laminar necrosis and/or calcification. 3. Generalized cerebral atrophy and mild white matter findings of chronic small vessel disease.  Electronically Signed By-Kartik Macias MD On:9/22/2021 8:16 AM This report was finalized on [id number] by  Kartik Macias MD.                                         MDM  Number of Diagnoses or Management Options  Altered mental status, unspecified altered mental status type  Hypoglycemia  Diagnosis management comments: Chart Review:  Comorbidity: As per past medical history  ECG: Interpreted by myself and Dr. Bryant shows sinus rhythm rate 98 abnormal T waves in lateral leads previous EKG reviewed from 12/6/2019 with no significant change from prior.  Labs:as above   Imaging: Was interpreted by physician and reviewed by myself:  XR Chest 1 View  Result Date: 9/22/2021  Hypoinflated lungs with patchy bibasilar airspace disease left greater than right that may relate to pneumonia and/or atelectasis.  Electronically Signed By-Kartik Macias MD On:9/22/2021 7:40 AM This report was finalized on [id number] by  Kartik Macias MD.    CT Head Without Contrast Stroke Protocol  Result Date: 9/22/2021  IMPRESSION : 1. No definite acute infarct. 2. Sequela of prior right PCA infarct seen on prior study from 2/20/2020. Areas of cortical hyperdensity likely related to cortical laminar necrosis and/or calcification. 3. Generalized cerebral atrophy and mild white matter findings of chronic small vessel disease.  Electronically Signed By-Kartik Macias MD On:9/22/2021 8:16 AM This report was finalized on [id number] by  Kartik Macias MD.      Disposition/Treatment:  Appropriate PPE was worn during exam and throughout all encounters with the patient. IV was placed and labs were obtained patient was somewhat disoriented but did answer questions upon arrival to the ED. Patient presented to the ED via EMS  for  stroke-like symptoms per family member. Patient does have a history of CVA in 2019 with left-sided deficits upon EMS arrival to the ED patient's glucose was below 30 he was given D50 with some improvement of his symptoms.  Lab results: urinalysis unremarkable for UTI COVID-19 swab negative CBC shows WBC 12 otherwise fairly unremarkable as above.  CMP shows glucose 232 no signs of DKA with a bicarb of 24. troponin within normal limits magnesium low at 1.3 which was replaced while in the ED today.  CT of head showed no acute intracranial abnormality.  Preliminary of CTA shows chronic findings which were discussed with the radiologist Dr. Calle. (PRELIM:  CHRONIC OCCLUSION OF RIGHT PCA, WHICH WAS PRESENT ON THE 2019 CTA.  NO ACUTE THROMBUS OR HIGH GRADE STENOSIS)     Case was also discussed with neurologist Dr Burr. chest x-ray showed signs of atelectasis vs pneumonia.  Pneumonia was thought to be less likely as patient has not had symptoms including cough or fever.  Lab results and findings were discussed with the patient and family at bedside who voiced understanding admission for further work-up and evaluation.  They were in agreement with plan.  Patient symptoms continuing to improve throughout his ED stay but is still experiencing some slowed speech.  Patient will be admitted to hospitalist group.  Spoke to Yamileth MOREIRA who agreed for admission with hospitalist group.       Amount and/or Complexity of Data Reviewed  Clinical lab tests: reviewed  Tests in the radiology section of CPT®: reviewed  Tests in the medicine section of CPT®: reviewed        Final diagnoses:   Hypoglycemia   Altered mental status, unspecified altered mental status type       ED Disposition  ED Disposition     ED Disposition Condition Comment    Decision to Admit  Level of Care: Telemetry [5]   Admitting Physician: WENDY WILKINSON [[id number]]   Attending Physician: WENDY WILKINSON [[id number]]   Bed Request Comments: IRMA            No follow-up  provider specified.       Medication List      No changes were made to your prescriptions during this visit.          Delilah Ferrer PA  09/22/21 6440

## 2021-09-22 NOTE — H&P
[facility] Medicine Services      Patient Name: Chao Lazar  : 1960  MRN: [id number]  Primary Care Physician:  Al Kimbrough MD  Date of admission: 2021      Subjective      Chief Complaint: Left-sided weakness    Information obtained from wife at bedside.    History of Present Illness: Chao Lazar is a 61 y.o. male with a past medical history of asthma, CAD, hypertension, gastroparesis, CVA, hyperlipidemia and type 2 diabetes mellitus who presented to [facility] on 2021 complaining of left-sided weakness.  Around 2 AM the patient's wife was awoken because the patient was moving stuff around the house.  She noticed that he had increased left-sided weakness.  She reports normally he is usually able to walk and has no difficulty, but this time he seemed very disoriented and he had urinated on himself.  She explains the last normal was 10 PM last night.  She did check his blood glucose this morning when this occurred it was 375 and she gave him some insulin.  She reports when EMS got there and checked his blood glucose was 285.  She does report lately he has had some chills.  Patient does deny any recent nausea, vomiting diarrhea, fever, cough, shortness, or chest pain.    In the ED, CT head showed No definite acute infarct.   Sequela of prior right PCA infarct seen on prior study from  2020. Areas of cortical hyperdensity likely related to cortical  laminar necrosis and/or calcification.  Generalized cerebral atrophy and mild white matter findings of  chronic small vessel disease. CTA showed Old area of infarction in the right occipital lobe with chronic occlusion of the right P2 segment of the posterior cerebral artery. No additional hemodynamically significant intracranial or extracranial vascular stenoses or additional occlusions.  Chronic lung disease in both apices.. CXR showed IMPRESSION:  Hypoinflated lungs with patchy bibasilar airspace  disease left greater  than right that may relate to pneumonia and/or atelectasis. EKG showed Sinus rhythm, Abnormal T, consider ischemia, lateral leads.  All labs unremarkable upon admission except blood glucose 232, anion gap 17, mag 1.3, white blood cells 12.0.  Covid not detected.  All vital signs stable upon admission except patient placed on 4 L nasal cannula of oxygen.  Patient received magnesium and normal saline 500 mL bolus in the ED.  Neurology consulted.Per ED provider-  EMS report patient's initial blood glucose was less than 30 he gave him oral glucose and on repeat glucose was 270s and some of his weakness improved    Review of Systems   Constitutional: Negative.   HENT: Negative.    Eyes: Negative.    Cardiovascular: Negative.    Respiratory: Negative.    Skin: Negative.    Musculoskeletal: Negative.    Gastrointestinal: Negative.    Genitourinary: Negative.    Neurological: Negative.    Psychiatric/Behavioral: Negative.         Personal History     Past Medical History:   Diagnosis Date   • Asthma 3/22/2018   • Coronary artery disease involving native coronary artery of native heart without angina pectoris 8/27/2020   • Essential hypertension 6/28/2019   • Gastroparesis 12/23/2013   • History of CVA (cerebrovascular accident) 9/22/2021   • Hyperlipidemia, mixed 4/3/2017   • Hypoglycemia    • Type 2 diabetes mellitus (CMS/HCC)        Past Surgical History:   Procedure Laterality Date   • CARDIAC CATHETERIZATION     • CHOLECYSTECTOMY  2004   • COLON RESECTION      [id number]   • COLON RESECTION SMALL BOWEL Right 12/5/2019    Procedure: open right hemicolectomy;  Surgeon: Ronaldo Ardon MD;  Location: [facility] OR;  Service: General   • COLONOSCOPY  2019    x2    • CORONARY ANGIOPLASTY WITH STENT PLACEMENT  04/2017   • ECHO - CONVERTED  2019   • ECHO - CONVERTED  2020   • FRACTURE SURGERY      collar bone as a child    • KNEE ARTHROSCOPY Left 08/2003   • KNEE JOINT MANIPULATION Left 04/2010    • TOTAL KNEE ARTHROPLASTY Bilateral     2013,2011       Family History: family history includes Anxiety disorder in his mother; Depression in his father; Hypertension in his father; Irritable bowel syndrome in his mother; Mental illness in his father; Other in his brother and sister. Otherwise pertinent FHx was reviewed and not pertinent to current issue.    Social History:  reports that he quit smoking about 14 years ago. He has quit using smokeless tobacco. He reports current alcohol use. He reports that he does not use drugs.    Home Medications:  Prior to Admission Medications     Prescriptions Last Dose Informant Patient Reported? Taking?    Accu-Chek FastClix Lancets misc   No No    For finger stick 4x/d    aspirin 81 MG EC tablet   No No    Take 1 tablet by mouth Daily.    atorvastatin (LIPITOR) 80 MG tablet   No No    TAKE 1 TABLET BY MOUTH EVERY DAY    B-D UF III MINI PEN NEEDLES 31G X 5 MM misc   No No    USE TO INJECT INSULIN 4 TIMES DAILY DX E11.65    Eliquis 5 MG tablet tablet   No No    TAKE 1 TABLET BY MOUTH EVERY 12 (TWELVE) HOURS FOR 30 DAYS.    esomeprazole (nexIUM) 40 MG capsule   No No    Take 1 capsule by mouth Every Morning Before Breakfast for 90 days.    glucose blood test strip   No No    ACCU-CHEK GUIDE ME TEST STRIPS....Use to check blood sugar twice daily    Insulin NPH Isophane & Regular (NovoLIN 70/30 FlexPen Relion) (70-30) 100 UNIT/ML suspension pen-injector   No No    Inject 60 units @ 5a & 3p & @ 9p    metFORMIN ER (GLUCOPHAGE-XR) 500 MG 24 hr tablet   No No    TAKE 1 TABLET BY MOUTH THREE TIMES A DAY    metoclopramide (REGLAN) 10 MG tablet   No No    TAKE 1 TABLET BY MOUTH 4 (FOUR) TIMES A DAY BEFORE MEALS & AT BEDTIME FOR 30 DAYS.    metoprolol tartrate (LOPRESSOR) 50 MG tablet   No No    TAKE 1 TABLET BY MOUTH TWICE A DAY            Allergies:  No Known Allergies    Objective      Vitals:   Temp:  [98.5 °F (36.9 °C)] 98.5 °F (36.9 °C)  Heart Rate:  [83-99] 83  Resp:  [16-18]  17  BP: (128-158)/(68-96) 128/69  Flow (L/min):  [4] 4    Physical Exam  Vitals reviewed.   Constitutional:       Appearance: Normal appearance. He is normal weight.   HENT:      Head: Normocephalic and atraumatic.      Nose: Nose normal.      Mouth/Throat:      Mouth: Mucous membranes are moist.      Pharynx: Oropharynx is clear.   Eyes:      Extraocular Movements: Extraocular movements intact.      Conjunctiva/sclera: Conjunctivae normal.      Pupils: Pupils are equal, round, and reactive to light.   Cardiovascular:      Rate and Rhythm: Normal rate and regular rhythm.      Pulses: Normal pulses.      Heart sounds: Normal heart sounds.      Comments: S1, S2 audible  Pulmonary:      Effort: Pulmonary effort is normal.      Breath sounds: Normal breath sounds.      Comments: On room air   Abdominal:      General: Abdomen is flat. Bowel sounds are normal.      Palpations: Abdomen is soft.   Musculoskeletal:      Cervical back: Normal range of motion.      Comments: LUE weakness, slight LLE weakness but not as weak at LUE   Skin:     General: Skin is warm and dry.   Neurological:      Mental Status: He is alert and oriented to person, place, and time.      Comments: LUE weakness   Psychiatric:         Mood and Affect: Mood normal.         Behavior: Behavior normal.         Result Review    Result Review:  I have personally reviewed the results from the time of this admission to 9/22/2021 11:26 EDT and agree with these findings:  [x]  Laboratory  []  Microbiology  []  Radiology  [x]  EKG/Telemetry   []  Cardiology/Vascular   []  Pathology  []  Old records  []  Other:  Most notable findings include:  CT head showed No definite acute infarct.Sequela of prior right PCA infarct seen on prior study from  2/20/2020. Areas of cortical hyperdensity likely related to cortical  laminar necrosis and/or calcification.  Generalized cerebral atrophy and mild white matter findings of  chronic small vessel disease. CTA Old area of  infarction in the right occipital lobe with chronic  occlusion of the right P2 segment of the posterior cerebral artery.  No additional hemodynamically significant intracranial or  extracranial vascular stenoses or additional occlusions.   Chronic lung disease in both apices.. CXR showed IMPRESSION:  Hypoinflated lungs with patchy bibasilar airspace disease left greater  than right that may relate to pneumonia and/or atelectasis. EKG showed Sinus rhythm, Abnormal T, consider ischemia, lateral leads.  All labs unremarkable upon admission except blood glucose 232, anion gap 17, mag 1.3, white blood cells 12.0.      Assessment/Plan        Active Hospital Problems:  Active Hospital Problems    Diagnosis    • **Left-sided weakness    • Leukocytosis    • Hypomagnesemia    • History of CVA (cerebrovascular accident)    • Coronary artery disease involving native coronary artery of native heart without angina pectoris    • Essential hypertension    • Asthma    • Hyperlipidemia, mixed    • Gastroparesis    • Type 2 diabetes mellitus with hyperglycemia, with long-term current use of insulin (CMS/[facility])      Plan:     Acute left sided weakness  - CT head reviewed  - CTA reviewed  - EKG reviewed  - Neurochecks  - Fall risk precautions  - MRI brain and 2D echo ordered  - Check lipid panel, Hbg A1C, B12, and TSH  - PT/OT/ST ordered  - 2D echo and MRI brain w/o contrast (flagged metal implants- please verify, per wife she reports he has and knee replacements and cardiac stents, denies any other metal in body) ordered  - Aspirin and statin ordered  - Neurology consulted     Acute leukocytosis  - CXR reviewed  - UA reviewed  - WBC 12.0, monitor   - COVID not detected   - Check procalcitonin and CRP     Acute hypomagnesemia  - Mag 1.3, monitor  - Received magnesium in ED   - Electrolyte protocol ordered    Essential HTN  - moderately controlled  - Monitor blood pressure  - Continue metoprolol   - IV hydralazine ordered PRN     HLD  -  Continue statin     CAD S/P cardiac stent  - Continue aspirin, statin, and metoprolol    Type II DM with hyperglycemia  - Questionable hypoglycemia- EMS reports BG <30 upon arrivable to patients house, however wife denies this   - Start sliding scale, Accuchecks ACHS  - Check hbg A1C  - Holding home metformin   - Continue home 70/30 novolog (dose decreased from 64 to 35 units due to diet changes while hospitalized)  - Diabetes educator consulted      Gastropresis secondary to diabetes  - Continue PPI     History of CVA  - R occipital lobe ischemic CVA (12/2019)  - Residual left sided weakness, but patient able to walk  - Continue eliquis     Asthma  - Not in exacerbation  - Currently on room air   - Breathing treatments ordered PRN     DVT prophylaxis: Eliquis     CODE STATUS:    Level Of Support Discussed With: Patient  Code Status: CPR  Medical Interventions (Level of Support Prior to Arrest): Full    Admission Status:  I believe this patient meets Inpatient status.    I discussed the patient's findings and my recommendations with patient, family and nursing staff.    This patient has been examined wearing appropriate Personal Protective Equipment. 09/22/21      Signature: Electronically signed by EMRE Willett, 09/22/21, 10:46 AM EDT.

## 2021-09-22 NOTE — PLAN OF CARE
Goal Outcome Evaluation:  Plan of Care Reviewed With: patient        Progress: no change  Outcome Summary: pt. is 60 y/o male admit w/ hyperglycemia, fmaily reports L sided weakness and MRI (-) acute process (+) previous CVA. Pt. does demonstrate some L sided weakness this date, possible unmasking of symptoms. Pt. requires moderate assist for bed mobility and sit to stand transfers, also provided max A for all LB ADLs. Pt. w/ moderate posterior lean this date seated at EOB. Spouse reports ADL independence at baseline and pt. is home alone during the date. Recommend IP rehab pending progress to address aforementioned deficits.

## 2021-09-22 NOTE — THERAPY EVALUATION
Acute Care - Speech Language Pathology   Swallow Initial Evaluation [facility]     Patient Name: Chao Lazar  : 1960  MRN: [id number]  Today's Date: 2021               Admit Date: 2021    Visit Dx:     ICD-10-CM ICD-9-CM   1. Hypoglycemia  E16.2 251.2   2. Altered mental status, unspecified altered mental status type  R41.82 780.97     Patient Active Problem List   Diagnosis   • Allergic state   • Asthma   • Chronic cough   • Degenerative joint disease   • Type 2 diabetes mellitus with hyperglycemia, with long-term current use of insulin (CMS/HCC)   • Gastroparesis   • Hyperlipidemia, mixed   • Essential hypertension   • Nausea and vomiting   • Vitamin D deficiency   • Combined forms of age-related cataract, bilateral   • Presbyopia   • Acute ischemic right PCA stroke involving the right occipital lobe (CMS/HCC)   • Sphenoid sinusitis   • Retinal disorder   • Coronary artery disease involving native coronary artery of native heart without angina pectoris   • Cutaneous abscess of head excluding face   • History of CVA (cerebrovascular accident)   • Leukocytosis   • Hypomagnesemia   • Left-sided weakness     Past Medical History:   Diagnosis Date   • Asthma 3/22/2018   • Coronary artery disease involving native coronary artery of native heart without angina pectoris 2020   • Essential hypertension 2019   • Gastroparesis 2013   • History of CVA (cerebrovascular accident) 2021   • Hyperlipidemia, mixed 4/3/2017   • Hypoglycemia    • Type 2 diabetes mellitus (CMS/HCC)      Past Surgical History:   Procedure Laterality Date   • CARDIAC CATHETERIZATION     • CHOLECYSTECTOMY     • COLON RESECTION      [id number]   • COLON RESECTION SMALL BOWEL Right 2019    Procedure: open right hemicolectomy;  Surgeon: Ronaldo Ardon MD;  Location: [facility] OR;  Service: General   • COLONOSCOPY  2019    x2    • CORONARY ANGIOPLASTY WITH STENT PLACEMENT  2017   • ECHO -  CONVERTED  2019   • ECHO - CONVERTED  2020   • FRACTURE SURGERY      collar bone as a child    • KNEE ARTHROSCOPY Left 08/2003   • KNEE JOINT MANIPULATION Left 04/2010   • TOTAL KNEE ARTHROPLASTY Bilateral     2013,2011       SLP Recommendation and Plan  SLP Swallowing Diagnosis: swallow WFL, functional oral phase, functional pharyngeal phase (09/22/21 1400)  SLP Diet Recommendation: regular textures, thin liquids (09/22/21 1400)  Recommended Precautions and Strategies: upright posture during/after eating, small bites of food and sips of liquid (09/22/21 1400)  SLP Rec. for Method of Medication Administration: meds whole, with thin liquids, with pudding or applesauce, as tolerated (09/22/21 1400)     Monitor for Signs of Aspiration: yes, notify SLP if any concerns (09/22/21 1400)     Swallow Criteria for Skilled Therapeutic Interventions Met: demonstrates skilled criteria (09/22/21 1400)     Rehab Potential/Prognosis, Swallowing: good, to achieve stated therapy goals (09/22/21 1400)  Therapy Frequency (Swallow): PRN (09/22/21 1400)  Predicted Duration Therapy Intervention (Days): until discharge (09/22/21 1400)                                   SWALLOW EVALUATION (last 72 hours)      SLP Adult Swallow Evaluation     Row Name 09/22/21 1400       Rehab Evaluation    Document Type  evaluation  -EC    Subjective Information  no complaints  -EC    Patient Observations  lethargic;cooperative;agree to therapy  -EC    Care Plan Review  evaluation/treatment results reviewed;care plan/treatment goals reviewed;patient/other agree to care plan  -EC    Care Plan Review, Other Participant(s)  spouse  -EC    Patient Effort  good  -EC    Symptoms Noted During/After Treatment  none  -EC       General Information    Patient Profile Reviewed  yes  -EC    Current Method of Nutrition  NPO failed stroke protocol swallow screen  -EC    Prior Level of Function-Swallowing  no diet consistency restrictions  -EC    Plans/Goals Discussed with   patient;spouse/S.O.  -EC       Oral Motor Structure and Function    Dentition Assessment  has dentures but does not use;edentulous  -EC    Secretion Management  WNL/WFL  -EC    Mucosal Quality  moist, healthy  -EC       Oral Musculature and Cranial Nerve Assessment    Oral Motor General Assessment  generalized oral motor weakness;oral labial or buccal impairment  -EC    Oral Labial or Buccal Impairment, Detail, Cranial Nerve VII (Facial):  left labial droop  -EC       General Eating/Swallowing Observations    Respiratory Support Currently in Use  room air  -EC    Eating/Swallowing Skills  self-fed;fed by SLP;appropriate self-feeding skills observed  -EC    Positioning During Eating  upright 90 degree;upright in bed  -EC    Utensils Used  spoon;straw  -EC    Consistencies Trialed  regular textures;ground;mixed consistency;pureed;thin liquids  -EC       Respiratory    Respiratory Status  WFL  -EC       Clinical Swallow Eval    Clinical Swallow Evaluation Summary  Swallow evaluation completed w/trials of thin water by straw, puree applesauce, mechanical soft mixed consistency peaches and regular solid cracker. L labial weakness noted w/retraction though pt forms adequate labial seal to draw liquid through straw and no anterior spillage noted during evaluation w/any consistency. Oral transit is slow but functional for solids. No overt s/s of aspiration are demonstrated at any time during assessment. Recommend a regular/thin liquid diet. ST to continue to follow for diet tolerance w/further recommendations as indicated.  -EC       Clinical Impression    SLP Swallowing Diagnosis  swallow WFL;functional oral phase;functional pharyngeal phase  -EC    Functional Impact  no impact on function  -EC    Rehab Potential/Prognosis, Swallowing  good, to achieve stated therapy goals  -EC    Swallow Criteria for Skilled Therapeutic Interventions Met  demonstrates skilled criteria  -EC       Recommendations    Therapy Frequency  (Swallow)  PRN  -EC    Predicted Duration Therapy Intervention (Days)  until discharge  -EC    SLP Diet Recommendation  regular textures;thin liquids  -EC    Recommended Precautions and Strategies  upright posture during/after eating;small bites of food and sips of liquid  -EC    Oral Care Recommendations  Oral Care BID/PRN  -EC    SLP Rec. for Method of Medication Administration  meds whole;with thin liquids;with pudding or applesauce;as tolerated  -EC    Monitor for Signs of Aspiration  yes;notify SLP if any concerns  -EC       Swallow Goals (SLP)    Oral Nutrition/Hydration Goal Selection (SLP)  oral nutrition/hydration, SLP goal 1;oral nutrition/hydration, SLP goal 2  -EC       Oral Nutrition/Hydration Goal 1 (SLP)    Oral Nutrition/Hydration Goal 1, SLP  Pt to be seen for a meal assessment to ensure continued diet tolerance w/further recommendations as indicated.  -EC    Time Frame (Oral Nutrition/Hydration Goal 1, SLP)  2 days  -EC       Oral Nutrition/Hydration Goal 2 (SLP)    Oral Nutrition/Hydration Goal 2, SLP  Pt to tolerate safest & least restrictive diet recommendations w/no complications from aspiration.  -EC    Time Frame (Oral Nutrition/Hydration Goal 2, SLP)  by discharge  -EC      User Key  (r) = Recorded By, (t) = Taken By, (c) = Cosigned By    Initials Name Effective Dates    EC Nicolle Castro 06/16/21 -           EDUCATION  The patient has been educated in the following areas:   Dysphagia (Swallowing Impairment).       SLP GOALS     Row Name 09/22/21 1400             Oral Nutrition/Hydration Goal 1 (SLP)    Oral Nutrition/Hydration Goal 1, SLP  Pt to be seen for a meal assessment to ensure continued diet tolerance w/further recommendations as indicated.  -EC      Time Frame (Oral Nutrition/Hydration Goal 1, SLP)  2 days  -EC         Oral Nutrition/Hydration Goal 2 (SLP)    Oral Nutrition/Hydration Goal 2, SLP  Pt to tolerate safest & least restrictive diet recommendations w/no complications  from aspiration.  -EC      Time Frame (Oral Nutrition/Hydration Goal 2, SLP)  by discharge  -EC        User Key  (r) = Recorded By, (t) = Taken By, (c) = Cosigned By    Initials Name Provider Type    EC Nicolle Castro Speech and Language Pathologist             Time Calculation:                Nicolle Castro  9/22/2021

## 2021-09-22 NOTE — PLAN OF CARE
Goal Outcome Evaluation:         Swallow eval completed this date w/recommendation of regular diet w/thin liquids. Please see eval for further details. ST to continue to follow for diet tolerance w/further recommendations as indicated.

## 2021-09-22 NOTE — THERAPY EVALUATION
Patient Name: Chao Lazar  : 1960    MRN: [id number]                              Today's Date: 2021       Admit Date: 2021    Visit Dx:     ICD-10-CM ICD-9-CM   1. Hypoglycemia  E16.2 251.2   2. Altered mental status, unspecified altered mental status type  R41.82 780.97     Patient Active Problem List   Diagnosis   • Allergic state   • Asthma   • Chronic cough   • Degenerative joint disease   • Type 2 diabetes mellitus with hyperglycemia, with long-term current use of insulin (CMS/HCC)   • Gastroparesis   • Hyperlipidemia, mixed   • Essential hypertension   • Nausea and vomiting   • Vitamin D deficiency   • Combined forms of age-related cataract, bilateral   • Presbyopia   • Acute ischemic right PCA stroke involving the right occipital lobe (CMS/HCC)   • Sphenoid sinusitis   • Retinal disorder   • Coronary artery disease involving native coronary artery of native heart without angina pectoris   • Cutaneous abscess of head excluding face   • History of CVA (cerebrovascular accident)   • Leukocytosis   • Hypomagnesemia   • Left-sided weakness     Past Medical History:   Diagnosis Date   • Asthma 3/22/2018   • Coronary artery disease involving native coronary artery of native heart without angina pectoris 2020   • Essential hypertension 2019   • Gastroparesis 2013   • History of CVA (cerebrovascular accident) 2021   • Hyperlipidemia, mixed 4/3/2017   • Hypoglycemia    • Type 2 diabetes mellitus (CMS/HCC)      Past Surgical History:   Procedure Laterality Date   • CARDIAC CATHETERIZATION     • CHOLECYSTECTOMY     • COLON RESECTION      [id number]   • COLON RESECTION SMALL BOWEL Right 2019    Procedure: open right hemicolectomy;  Surgeon: Ronaldo Ardon MD;  Location: [facility];  Service: General   • COLONOSCOPY  2019    x2    • CORONARY ANGIOPLASTY WITH STENT PLACEMENT  2017   • ECHO - CONVERTED     • ECHO - CONVERTED     • FRACTURE SURGERY       collar bone as a child    • KNEE ARTHROSCOPY Left 08/2003   • KNEE JOINT MANIPULATION Left 04/2010   • TOTAL KNEE ARTHROPLASTY Bilateral     2013,2011     General Information     [facility] Name 09/22/21 1638          OT Time and Intention    Document Type  evaluation  -     Mode of Treatment  individual therapy  -[facility] Name 09/22/21 1638          General Information    Patient Profile Reviewed  yes  -MP     Prior Level of Function  independent:;ADL's  -[facility] Name 09/22/21 1638          Living Environment    Lives With  spouse  -MP     Row Name 09/22/21 1638          Cognition    Orientation Status (Cognition)  oriented to;person;place  -[facility] Name 09/22/21 1638          Safety Issues, Functional Mobility    Safety Issues Affecting Function (Mobility)  insight into deficits/self-awareness;judgment  -MP     Impairments Affecting Function (Mobility)  balance;strength;postural/trunk control  -       User Key  (r) = Recorded By, (t) = Taken By, (c) = Cosigned By    Initials Name Provider Type    MP Arnoldo Hoffman OT Occupational Therapist          Mobility/ADL's     Row Name 09/22/21 1638          Bed Mobility    Bed Mobility  bed mobility (all) activities  -     All Activities, [place] (Bed Mobility)  moderate assist (50% patient effort);1 person assist  -[facility] Name 09/22/21 1638          Transfers    Transfers  sit-stand transfer  -     Sit-Stand [place] (Transfers)  moderate assist (50% patient effort);1 person assist  -MP     Row Name 09/22/21 1638          Functional Mobility    Functional Mobility- Ind. Level  minimum assist (75% patient effort)  -[facility] Name 09/22/21 1638          Activities of Daily Living    BADL Assessment/Intervention  lower body dressing  -MP     Row Name 09/22/21 1638          Lower Body Dressing Assessment/Training    [place] Level (Lower Body Dressing)  don;doff;socks;maximum assist (25% patient effort)  -       User Key  (r) = Recorded  By, (t) = Taken By, (c) = Cosigned By    Initials Name Provider Type    Arnoldo Alexander OT Occupational Therapist        Obj/Interventions     Row Name 09/22/21 1639          Range of Motion Comprehensive    Comment, General Range of Motion  BUE WFL  -MP     Row Name 09/22/21 1639          Strength Comprehensive (MMT)    Comment, General Manual Muscle Testing (MMT) Assessment  BUE 4-/5  -MP     Row Name 09/22/21 1639          Balance    Balance Interventions  sitting;standing;sit to stand;supported;static;dynamic  -MP       User Key  (r) = Recorded By, (t) = Taken By, (c) = Cosigned By    Initials Name Provider Type    Arnoldo Alexander OT Occupational Therapist        Goals/Plan     Row Name 09/22/21 1645          Bed Mobility Goal 1 (OT)    Activity/Assistive Device (Bed Mobility Goal 1, OT)  bed mobility activities, all  -MP     [place] Level/Cues Needed (Bed Mobility Goal 1, OT)  minimum assist (75% or more patient effort)  -MP     Time Frame (Bed Mobility Goal 1, OT)  long term goal (LTG);2 weeks  -MP     Row Name 09/22/21 1645          Transfer Goal 1 (OT)    Activity/Assistive Device (Transfer Goal 1, OT)  sit-to-stand/stand-to-sit;toilet  -MP     [place] Level/Cues Needed (Transfer Goal 1, OT)  minimum assist (75% or more patient effort)  -MP     Time Frame (Transfer Goal 1, OT)  long term goal (LTG);2 weeks  -MP     Row Name 09/22/21 1645          Dressing Goal 1 (OT)    Activity/Device (Dressing Goal 1, OT)  lower body dressing  -MP     [place]/Cues Needed (Dressing Goal 1, OT)  minimum assist (75% or more patient effort);1 person assist  -MP     Time Frame (Dressing Goal 1, OT)  long term goal (LTG);2 weeks  -MP       User Key  (r) = Recorded By, (t) = Taken By, (c) = Cosigned By    Initials Name Provider Type    Arnoldo Alexander OT Occupational Therapist        Clinical Impression     Row Name 09/22/21 1641          Pain Assessment    Additional Documentation  Pain Scale:  FACES Pre/Post-Treatment (Group)  -MP     Row Name 09/22/21 1641          Pain Scale: FACES Pre/Post-Treatment    Pain: FACES Scale, Pretreatment  0-->no hurt  -MP     Posttreatment Pain Rating  0-->no hurt  -MP     Row Name 09/22/21 1641          Plan of Care Review    Plan of Care Reviewed With  patient  -MP     Progress  no change  -MP     Outcome Summary  pt. is 60 y/o male admit w/ hyperglycemia, fmaily reports L sided weakness and MRI (-) acute process (+) previous CVA. Pt. does demonstrate some L sided weakness this date, possible unmasking of symptoms. Pt. requires moderate assist for bed mobility and sit to stand transfers, also provided max A for all LB ADLs. Pt. w/ moderate posterior lean this date seated at EOB. Spouse reports ADL independence at baseline and pt. is home alone during the date. Recommend IP rehab pending progress to address aforementioned deficits.  -     Row Name 09/22/21 1641          Therapy Assessment/Plan (OT)    Rehab Potential (OT)  good, to achieve stated therapy goals  -     Criteria for Skilled Therapeutic Interventions Met (OT)  yes;skilled treatment is necessary  -     Therapy Frequency (OT)  3 times/wk  -MP     Row Name 09/22/21 1641          Therapy Plan Review/Discharge Plan (OT)    Anticipated Discharge Disposition (OT)  inpatient rehabilitation facility  -     Row Name 09/22/21 1641          Vital Signs    Pre Patient Position  Supine  -MP     Intra Patient Position  Standing  -MP     Post Patient Position  Supine  -MP     Row Name 09/22/21 1641          Positioning and Restraints    Pre-Treatment Position  in bed  -MP     Post Treatment Position  bed  -MP     In Bed  supine;call light within reach;encouraged to call for assist;exit alarm on  -MP       User Key  (r) = Recorded By, (t) = Taken By, (c) = Cosigned By    Initials Name Provider Type    Arnoldo Alexander, ABI Occupational Therapist        Outcome Measures    No documentation.         Occupational  Therapy Education                 Title: PT OT SLP Therapies (In Progress)     Topic: Occupational Therapy (In Progress)     Point: ADL training (Not Started)     Description:   Instruct learner(s) on proper safety adaptation and remediation techniques during self care or transfers.   Instruct in proper use of assistive devices.              Learner Progress:  Not documented in this visit.          Point: Home exercise program (Not Started)     Description:   Instruct learner(s) on appropriate technique for monitoring, assisting and/or progressing therapeutic exercises/activities.              Learner Progress:  Not documented in this visit.          Point: Precautions (Not Started)     Description:   Instruct learner(s) on prescribed precautions during self-care and functional transfers.              Learner Progress:  Not documented in this visit.          Point: Body mechanics (Done)     Description:   Instruct learner(s) on proper positioning and spine alignment during self-care, functional mobility activities and/or exercises.              Learning Progress Summary           Patient Acceptance, E,TB, VU by  at 9/22/2021 1645                               User Key     Initials Effective Dates Name Provider Type Discipline    ERIN 06/16/21 -  Arnoldo Hoffman OT Occupational Therapist OT              OT Recommendation and Plan  Therapy Frequency (OT): 3 times/wk  Plan of Care Review  Plan of Care Reviewed With: patient  Progress: no change  Outcome Summary: pt. is 60 y/o male admit w/ hyperglycemia, fmaily reports L sided weakness and MRI (-) acute process (+) previous CVA. Pt. does demonstrate some L sided weakness this date, possible unmasking of symptoms. Pt. requires moderate assist for bed mobility and sit to stand transfers, also provided max A for all LB ADLs. Pt. w/ moderate posterior lean this date seated at EOB. Spouse reports ADL independence at baseline and pt. is home alone during the date.  Recommend IP rehab pending progress to address aforementioned deficits.     Time Calculation:   Time Calculation- OT     Row Name 09/22/21 1646             Time Calculation- OT    OT Start Time  1440  -MP      OT Stop Time  1502  -MP      OT Time Calculation (min)  22 min  -      Total Timed Code Minutes- OT  10 minute(s)  -      OT Received On  09/22/21  -      OT - Next Appointment  09/24/21  -      OT Goal Re-Cert Due Date  10/06/21  -        User Key  (r) = Recorded By, (t) = Taken By, (c) = Cosigned By    Initials Name Provider Type    MP Arnoldo Hoffman OT Occupational Therapist        Therapy Charges for Today     Code Description Service Date Service Provider Modifiers Qty    [id number]  OT EVAL LOW COMPLEXITY 3 9/22/2021 Arnoldo Hoffman OT GO 1    [id number]  OT THERAPEUTIC ACT EA 15 MIN 9/22/2021 Arnoldo Hoffman OT GO 1               Arnoldo Hoffman OT  9/22/2021

## 2021-09-22 NOTE — CONSULTS
"Diabetes Education  Assessment/Teaching    Patient Name:  Chao Lazar  YOB: 1960  MRN: [id number]  Admit Date:  9/22/2021      Assessment Date:  9/22/2021    Most Recent Value   General Information    Referral From:  A1c, Blood glucose, MD order [MD consult for blood glucose >200, admission blood sugar 253, and on 7/7/2021 A1c was 12.4%.]   Height  185.4 cm (73\")   Height Method  Stated   Weight  84.3 kg (185 lb 12.8 oz)   Weight Method  Bed scale   Pregnancy Assessment   Diabetes History   What type of diabetes do you have?  Type 2   Length of Diabetes Diagnosis  10 + years [Diagnosed 1997.]   Current DM knowledge  fair   Have you had diabetes education/teaching in the past?  yes   When and where was your diabetes education?  Inpatient hospitalization at [facility] on 12/9/2019   Do you test your blood sugar at home?  yes   Frequency of checks  3-4X a day   Meter type  Accu-chek about 3 years old   Who performs the test?  patient   Typical readings  200s-300s   Have you had low blood sugar? (<70mg/dl)  no   Have you had high blood sugar? (>140mg/dl)  yes   How often do you have high blood sugar?  frequently   When was your last high blood sugar?  Admission blood sugar 253   Education Preferences   What areas of diabetes would you like to learn about?  avoiding high blood sugar, diabetes complications, medications for diabetes   Nutrition Information   Assessment Topics   Taking Medication - Assessment  Needs education   Problem Solving - Assessment  Needs education   Reducing Risk - Assessment  Needs education   DM Goals   Taking Medication - Goal  Today   Problem Solving - Goal  Today   Reducing Risk - Goal  Today            Most Recent Value   DM Education Needs   Meter  Has own   Meter Type  Accuchek   Frequency of Testing  3 times a day   Medication  Oral, Insulin [Patient stated he takes Metformin  mg BID and Novolin 70/30 60 units BID but is supposed to be taking TID.]   Problem Solving  " Hyperglycemia, Signs, Symptoms, Treatment   Reducing Risks  A1C testing [On 7/7/2021 A1c was 12.4%. New A1c ordered but not resulted.]   Discharge Plan  Home   Motivation  Moderate   Teaching Method  Discussion, Handouts   Patient Response  Verbalized understanding [Wife at bedside and involved with discussion.]            Other Comments:  A1c info sheet given with discussion on A1c target and healthy blood sugar range. Patient sees Dr. Manriquez at the [facility] with last visit being 7/14/2021. Dr. SAGE prescribed patient to take insulin at 5am, 3pm, and 9pm. Wife stated that patient doesn't get up at 5am. The earliest patient gets up is 9am and the earliest he goes to bed is 2am. He likes to stay up watching TV. Wife stated patient will eat in the morning after he gets up and supper is around 6-6:30pm. Discussed with patient and wife about giving insulin at 9am, 6pm, and midnight. Instructed patient that he must eat when taking the midnight dose of insulin. Explained that taking the insulin 3X a day would give better blood sugar control. Also explained to patient that if he is taking his insulin as ordered and checking his blood sugar 3X a day and cannot get his blood sugar to be <200 to notify Dr. SAGE. Wife stated that they would do that. Patient stated he drinks water, Gatorade Zero, and diet Dr. Pepper. Patient does not get much exercise but does walk around the house some. Patient and wife have no further questions or concerns related to diabetes at this time.        Electronically signed by:  Veronica Rivera RN  09/22/21 15:41 EDT

## 2021-09-22 NOTE — CONSULTS
Primary Care Provider: Provider, No Known     Consult requested by: MJ Villalpando    Reason for Consultation: Neurological evaluation for stroke    Chao Lazar is a 61 y.o. male *    History taken from: patient chart family    Chief complaint:  Left sided weakness.        SUBJECTIVE:    History of present illness:  The patient is a 61 year old gentleman with multiple medical problems including DM, CAD, asthma, HTN, CVA in the past who presented to ER of [facility] secondary to weakness of the left side.  His family states that his last normal was around 10:00 pm last night.  The patient was noticed to have some weakness of the left side.  His blood sugar was around 200s and insulin was given to him.  Repeat blood sugar was 270.  However EMS reported blood sugar of 30.  He had more weakness and sugar replacement was given to him.  He was brought to ER of [facility] and his weakness had improved.  He has been taking Eliquis and Plavix at home.  His condition has improved.  He is able to move left side without much difficulty.     Review of Systems   Constitutional: Negative  HENT: Negative.    Eyes: Negative.    Respiratory: asthma.    Cardiovascular: CAD.    Gastrointestinal: Negative.    Genitourinary: Negative.    Musculoskeletal: Negative  Skin: Negative.    Neurological: stroke. .    Hematological: Negative.    Psychiatric/Behavioral: Negative.        PATIENT HISTORY:  Past Medical History:   Diagnosis Date   • Asthma 3/22/2018   • Coronary artery disease involving native coronary artery of native heart without angina pectoris 8/27/2020   • Essential hypertension 6/28/2019   • Gastroparesis 12/23/2013   • History of CVA (cerebrovascular accident) 9/22/2021   • Hyperlipidemia, mixed 4/3/2017   • Hypoglycemia    • Type 2 diabetes mellitus (CMS/HCC)    ,   Past Surgical History:   Procedure Laterality Date   • CARDIAC CATHETERIZATION     • CHOLECYSTECTOMY  2004   • COLON RESECTION      [id number]   • COLON RESECTION  SMALL BOWEL Right 2019    Procedure: open right hemicolectomy;  Surgeon: Ronaldo Ardon MD;  Location: [facility] MAIN OR;  Service: General   • COLONOSCOPY  2019    x2    • CORONARY ANGIOPLASTY WITH STENT PLACEMENT  2017   • ECHO - CONVERTED     • ECHO - CONVERTED     • FRACTURE SURGERY      collar bone as a child    • KNEE ARTHROSCOPY Left 2003   • KNEE JOINT MANIPULATION Left 2010   • TOTAL KNEE ARTHROPLASTY Bilateral     ,   ,   Family History   Problem Relation Age of Onset   • Irritable bowel syndrome Mother    • Anxiety disorder Mother    • Depression Father    • Hypertension Father    • Mental illness Father         committed suicide   • Other Sister         back problems   • Other Brother         back problems   ,   Social History     Tobacco Use   • Smoking status: Former Smoker     Quit date:      Years since quittin.7   • Smokeless tobacco: Former User   Vaping Use   • Vaping Use: Never used   Substance Use Topics   • Alcohol use: Yes     Comment: occasionally   • Drug use: No   , (Not in a hospital admission)  , Scheduled Meds:  apixaban, 5 mg, Oral, Q12H  aspirin, 325 mg, Oral, Daily   Or  aspirin, 300 mg, Rectal, Daily  atorvastatin, 80 mg, Oral, Nightly  insulin lispro, 0-9 Units, Subcutaneous, TID AC  insulin NPH-insulin regular, 35 Units, Subcutaneous, BID With Meals  pantoprazole, 40 mg, Oral, QAM    , Continuous Infusions:   , PRN Meds:  •  acetaminophen **OR** acetaminophen  •  aluminum-magnesium hydroxide-simethicone  •  bisacodyl  •  dextrose  •  dextrose  •  glucagon (human recombinant)  •  hydrALAZINE  •  insulin lispro **AND** insulin lispro  •  ipratropium-albuterol  •  magnesium sulfate **OR** magnesium sulfate in D5W 1g/100mL (PREMIX)  •  melatonin  •  ondansetron **OR** ondansetron  •  potassium chloride  •  sodium chloride, Allergies:  Patient has no known allergies.    ________________________________________________________         OBJECTIVE:  Upon today's exam, The patient is lying in bed in no apparent distress. Head NC, AT, Neck supple, trachea midline. Lungs CTA, good pulmonary effort. CV  S1-S2 no murmur.  Abdomen soft, non tender.  Ext no edema, no cyanosis.          Neurologic Exam  The patient is awake, alert, oriented x 3, speech is fluent with good comprehension.  CN Left temporal field cut noted.  EOMI, mild left facial droop. Tongue midline.  Motor right upper and lower extremity 5/5.  Left upper and lower extremity 4+/5.  Reflexes absent, plantar mute.  Sensory light touch intact.  Cerebellum finger to nose intact.   ________________________________________________________   RESULTS REVIEW:    VITAL SIGNS:   Temp:  [98.5 °F (36.9 °C)] 98.5 °F (36.9 °C)  Heart Rate:  [83-99] 86  Resp:  [16-18] 16  BP: (128-158)/(68-96) 135/75     LABS:  WBC   Date Value Ref Range Status   09/22/2021 12.00 (H) 3.40 - 10.80 10*3/mm3 Final     RBC   Date Value Ref Range Status   09/22/2021 4.55 4.14 - 5.80 10*6/mm3 Final     Hemoglobin   Date Value Ref Range Status   09/22/2021 14.1 13.0 - 17.7 g/dL Final     Hematocrit   Date Value Ref Range Status   09/22/2021 40.8 37.5 - 51.0 % Final     MCV   Date Value Ref Range Status   09/22/2021 89.7 79.0 - 97.0 fL Final     MCH   Date Value Ref Range Status   09/22/2021 31.0 26.6 - 33.0 pg Final     MCHC   Date Value Ref Range Status   09/22/2021 34.5 31.5 - 35.7 g/dL Final     RDW   Date Value Ref Range Status   09/22/2021 13.1 12.3 - 15.4 % Final     RDW-SD   Date Value Ref Range Status   09/22/2021 41.6 37.0 - 54.0 fl Final     MPV   Date Value Ref Range Status   09/22/2021 7.9 6.0 - 12.0 fL Final     Platelets   Date Value Ref Range Status   09/22/2021 364 140 - 450 10*3/mm3 Final     Neutrophil %   Date Value Ref Range Status   09/22/2021 74.3 42.7 - 76.0 % Final     Lymphocyte %   Date Value Ref Range Status   09/22/2021 15.4 (L) 19.6 - 45.3 % Final     Monocyte %   Date Value Ref Range Status    09/22/2021 8.0 5.0 - 12.0 % Final     Eosinophil %   Date Value Ref Range Status   09/22/2021 1.4 0.3 - 6.2 % Final     Basophil %   Date Value Ref Range Status   09/22/2021 0.9 0.0 - 1.5 % Final     Neutrophils, Absolute   Date Value Ref Range Status   09/22/2021 8.90 (H) 1.70 - 7.00 10*3/mm3 Final     Lymphocytes, Absolute   Date Value Ref Range Status   09/22/2021 1.80 0.70 - 3.10 10*3/mm3 Final     Monocytes, Absolute   Date Value Ref Range Status   09/22/2021 1.00 (H) 0.10 - 0.90 10*3/mm3 Final     Eosinophils, Absolute   Date Value Ref Range Status   09/22/2021 0.20 0.00 - 0.40 10*3/mm3 Final     Basophils, Absolute   Date Value Ref Range Status   09/22/2021 0.10 0.00 - 0.20 10*3/mm3 Final     nRBC   Date Value Ref Range Status   09/22/2021 0.1 0.0 - 0.2 /100 WBC Final     Glucose   Date Value Ref Range Status   09/22/2021 232 (H) 65 - 99 mg/dL Final     BUN   Date Value Ref Range Status   09/22/2021 16 8 - 23 mg/dL Final     Creatinine   Date Value Ref Range Status   09/22/2021 1.21 0.76 - 1.27 mg/dL Final     Sodium   Date Value Ref Range Status   09/22/2021 134 (L) 136 - 145 mmol/L Final     Potassium   Date Value Ref Range Status   09/22/2021 4.6 3.5 - 5.2 mmol/L Final     Chloride   Date Value Ref Range Status   09/22/2021 93 (L) 98 - 107 mmol/L Final     CO2   Date Value Ref Range Status   09/22/2021 24.0 22.0 - 29.0 mmol/L Final     Calcium   Date Value Ref Range Status   09/22/2021 9.6 8.6 - 10.5 mg/dL Final     Total Protein   Date Value Ref Range Status   09/22/2021 7.6 6.0 - 8.5 g/dL Final     Albumin   Date Value Ref Range Status   09/22/2021 4.10 3.50 - 5.20 g/dL Final     ALT (SGPT)   Date Value Ref Range Status   09/22/2021 32 1 - 41 U/L Final     AST (SGOT)   Date Value Ref Range Status   09/22/2021 24 1 - 40 U/L Final     Alkaline Phosphatase   Date Value Ref Range Status   09/22/2021 119 (H) 39 - 117 U/L Final     Total Bilirubin   Date Value Ref Range Status   09/22/2021 0.3 0.0 - 1.2  mg/dL Final     eGFR Non  Amer   Date Value Ref Range Status   09/22/2021 61 >60 mL/min/1.73 Final     BUN/Creatinine Ratio   Date Value Ref Range Status   09/22/2021 13.2 7.0 - 25.0 Final     Anion Gap   Date Value Ref Range Status   09/22/2021 17.0 (H) 5.0 - 15.0 mmol/L Final       Lab Results   Component Value Date    TSH 2.690 01/06/2021    LDL 39 01/06/2021    HGBA1C 12.4 (H) 07/07/2021    GFRNYGFD81 403 12/06/2019         IMAGING STUDIES:  CT Angiogram Neck    Result Date: 9/22/2021   1. Old area of infarction in the right occipital lobe with chronic occlusion of the right P2 segment of the posterior cerebral artery. 2. No additional hemodynamically significant intracranial or extracranial vascular stenoses or additional occlusions. 3. Chronic lung disease in both apices.  Electronically Signed By-Ronaldo Sheikh MD On:9/22/2021 9:34 AM This report was finalized on [id number] by  Ronaldo Sheikh MD.    XR Chest 1 View    Result Date: 9/22/2021  Hypoinflated lungs with patchy bibasilar airspace disease left greater than right that may relate to pneumonia and/or atelectasis.  Electronically Signed By-Kartik Macias MD On:9/22/2021 7:40 AM This report was finalized on [id number] by  Kartik Macias MD.    CT Angiogram Head    Result Date: 9/22/2021   1. Old area of infarction in the right occipital lobe with chronic occlusion of the right P2 segment of the posterior cerebral artery. 2. No additional hemodynamically significant intracranial or extracranial vascular stenoses or additional occlusions. 3. Chronic lung disease in both apices.  Electronically Signed By-Ronaldo Sheikh MD On:9/22/2021 9:34 AM This report was finalized on [id number] by  Ronaldo Sheikh MD.    CT Head Without Contrast Stroke Protocol    Result Date: 9/22/2021  IMPRESSION : 1. No definite acute infarct. 2. Sequela of prior right PCA infarct seen on prior study from 2/20/2020. Areas of cortical hyperdensity likely related to cortical laminar  necrosis and/or calcification. 3. Generalized cerebral atrophy and mild white matter findings of chronic small vessel disease.  Electronically Signed By-Kartik Macias MD On:9/22/2021 8:16 AM This report was finalized on [id number] by  Kartik Macias MD.      I reviewed the patient's new clinical results.      ________________________________________________________     PROBLEM LIST:    Left-sided weakness    Asthma    Type 2 diabetes mellitus with hyperglycemia, with long-term current use of insulin (CMS/[facility])    Gastroparesis    Hyperlipidemia, mixed    Essential hypertension    Coronary artery disease involving native coronary artery of native heart without angina pectoris    History of CVA (cerebrovascular accident)    Leukocytosis    Hypomagnesemia          Assessment/Plan   ASSESSMENT/PLAN:  The patient is a 61 year old gentleman with history of DM, CAD, HTN history of stroke who had developed an increase in weakness of left side.  He had profound hypoglycemia which was corrected.  The diagnostic considerations include metabolic decompensation of old stroke vs., TIA.   Rec:  Will continue Eliquis.  Agree with work up including MRI of Brain.  Will follow.   DM, HTN, HLD, CAD, asthma as per hospitalist, MD.     Modification of stroke risk factors:   - Blood pressure should be less than 130/80 outpatient, HbA1c less than 6.5, LDL less than 70; b12>500 and smoking cessation if applicable. We would be grateful if the primary team / primary care physician would keep a close watch on the above targets.  - Stroke education  - Follow up with neurologist of choice      I discussed the patient's findings and my recommendations with patient, family and nursing staff    Stevie Burr MD  09/22/21  11:41 EDT

## 2021-09-22 NOTE — PLAN OF CARE
Problem: Skin Injury Risk Increased  Goal: Skin Health and Integrity  Outcome: Ongoing, Progressing     Problem: Diabetes Comorbidity  Goal: Blood Glucose Level Within Desired Range  Outcome: Ongoing, Progressing     Problem: Hypertension Comorbidity  Goal: Blood Pressure in Desired Range  Outcome: Ongoing, Progressing   Goal Outcome Evaluation:  Patient eating without difficulty.

## 2021-09-23 ENCOUNTER — READMISSION MANAGEMENT (OUTPATIENT)
Dept: CALL CENTER | Facility: HOSPITAL | Age: 61
End: 2021-09-23

## 2021-09-23 VITALS
DIASTOLIC BLOOD PRESSURE: 67 MMHG | OXYGEN SATURATION: 97 % | TEMPERATURE: 98 F | SYSTOLIC BLOOD PRESSURE: 113 MMHG | HEIGHT: 73 IN | WEIGHT: 185.19 LBS | RESPIRATION RATE: 12 BRPM | HEART RATE: 83 BPM | BODY MASS INDEX: 24.54 KG/M2

## 2021-09-23 PROBLEM — E16.2 HYPOGLYCEMIA: Status: ACTIVE | Noted: 2021-09-23

## 2021-09-23 LAB
BH CV ECHO MEAS - ACS: 2.1 CM
BH CV ECHO MEAS - AO MAX PG (FULL): 4.4 MMHG
BH CV ECHO MEAS - AO MAX PG: 6.9 MMHG
BH CV ECHO MEAS - AO MEAN PG (FULL): 2.8 MMHG
BH CV ECHO MEAS - AO MEAN PG: 4.3 MMHG
BH CV ECHO MEAS - AO ROOT AREA (BSA CORRECTED): 1.5
BH CV ECHO MEAS - AO ROOT AREA: 8 CM^2
BH CV ECHO MEAS - AO ROOT DIAM: 3.2 CM
BH CV ECHO MEAS - AO V2 MAX: 131.4 CM/SEC
BH CV ECHO MEAS - AO V2 MEAN: 98.4 CM/SEC
BH CV ECHO MEAS - AO V2 VTI: 27 CM
BH CV ECHO MEAS - AORTIC HR: 71.7 BPM
BH CV ECHO MEAS - AORTIC R-R: 0.84 SEC
BH CV ECHO MEAS - ASC AORTA: 3.2 CM
BH CV ECHO MEAS - AVA(I,A): 2.3 CM^2
BH CV ECHO MEAS - AVA(I,D): 2.3 CM^2
BH CV ECHO MEAS - AVA(V,A): 1.9 CM^2
BH CV ECHO MEAS - AVA(V,D): 1.9 CM^2
BH CV ECHO MEAS - BSA(HAYCOCK): 2.1 M^2
BH CV ECHO MEAS - BSA: 2.1 M^2
BH CV ECHO MEAS - BZI_BMI: 24.4 KILOGRAMS/M^2
BH CV ECHO MEAS - BZI_METRIC_HEIGHT: 185.4 CM
BH CV ECHO MEAS - BZI_METRIC_WEIGHT: 83.9 KG
BH CV ECHO MEAS - CI(AO): 7.4 L/MIN/M^2
BH CV ECHO MEAS - CI(LVOT): 2.1 L/MIN/M^2
BH CV ECHO MEAS - CO(AO): 15.5 L/MIN
BH CV ECHO MEAS - CO(LVOT): 4.4 L/MIN
BH CV ECHO MEAS - EDV(CUBED): 95.5 ML
BH CV ECHO MEAS - EDV(MOD-SP4): 77.7 ML
BH CV ECHO MEAS - EDV(TEICH): 95.9 ML
BH CV ECHO MEAS - EF(CUBED): 74.6 %
BH CV ECHO MEAS - EF(MOD-BP): 56 %
BH CV ECHO MEAS - EF(MOD-SP4): 55.5 %
BH CV ECHO MEAS - EF(TEICH): 66.5 %
BH CV ECHO MEAS - ESV(CUBED): 24.3 ML
BH CV ECHO MEAS - ESV(MOD-SP4): 34.5 ML
BH CV ECHO MEAS - ESV(TEICH): 32.1 ML
BH CV ECHO MEAS - FS: 36.6 %
BH CV ECHO MEAS - IVS/LVPW: 0.92
BH CV ECHO MEAS - IVSD: 1.1 CM
BH CV ECHO MEAS - LA DIMENSION(2D): 3.6 CM
BH CV ECHO MEAS - LV DIASTOLIC VOL/BSA (35-75): 37.3 ML/M^2
BH CV ECHO MEAS - LV MASS(C)D: 187.8 GRAMS
BH CV ECHO MEAS - LV MASS(C)DI: 90.2 GRAMS/M^2
BH CV ECHO MEAS - LV MAX PG: 2.5 MMHG
BH CV ECHO MEAS - LV MEAN PG: 1.5 MMHG
BH CV ECHO MEAS - LV SYSTOLIC VOL/BSA (12-30): 16.6 ML/M^2
BH CV ECHO MEAS - LV V1 MAX: 79.5 CM/SEC
BH CV ECHO MEAS - LV V1 MEAN: 59 CM/SEC
BH CV ECHO MEAS - LV V1 VTI: 19.5 CM
BH CV ECHO MEAS - LVIDD: 4.6 CM
BH CV ECHO MEAS - LVIDS: 2.9 CM
BH CV ECHO MEAS - LVOT AREA: 3.2 CM^2
BH CV ECHO MEAS - LVOT DIAM: 2 CM
BH CV ECHO MEAS - LVPWD: 1.2 CM
BH CV ECHO MEAS - MR MAX PG: 15.9 MMHG
BH CV ECHO MEAS - MR MAX VEL: 197.3 CM/SEC
BH CV ECHO MEAS - MV A MAX VEL: 86 CM/SEC
BH CV ECHO MEAS - MV DEC SLOPE: 383.1 CM/SEC^2
BH CV ECHO MEAS - MV DEC TIME: 0.23 SEC
BH CV ECHO MEAS - MV E MAX VEL: 86.5 CM/SEC
BH CV ECHO MEAS - MV E/A: 1
BH CV ECHO MEAS - MV MAX PG: 4.3 MMHG
BH CV ECHO MEAS - MV MEAN PG: 2 MMHG
BH CV ECHO MEAS - MV V2 MAX: 104.1 CM/SEC
BH CV ECHO MEAS - MV V2 MEAN: 67.1 CM/SEC
BH CV ECHO MEAS - MV V2 VTI: 30.5 CM
BH CV ECHO MEAS - MVA(VTI): 2 CM^2
BH CV ECHO MEAS - PA MAX PG (FULL): 2.5 MMHG
BH CV ECHO MEAS - PA MAX PG: 5 MMHG
BH CV ECHO MEAS - PA MEAN PG (FULL): 1.8 MMHG
BH CV ECHO MEAS - PA MEAN PG: 3 MMHG
BH CV ECHO MEAS - PA V2 MAX: 112.3 CM/SEC
BH CV ECHO MEAS - PA V2 MEAN: 82.2 CM/SEC
BH CV ECHO MEAS - PA V2 VTI: 20.6 CM
BH CV ECHO MEAS - PULM A REVS DUR: 0.13 SEC
BH CV ECHO MEAS - PULM A REVS VEL: 24.9 CM/SEC
BH CV ECHO MEAS - PULM DIAS VEL: 36.6 CM/SEC
BH CV ECHO MEAS - PULM S/D: 1.8
BH CV ECHO MEAS - PULM SYS VEL: 65.3 CM/SEC
BH CV ECHO MEAS - RAP SYSTOLE: 8 MMHG
BH CV ECHO MEAS - RV MAX PG: 2.5 MMHG
BH CV ECHO MEAS - RV MEAN PG: 1.3 MMHG
BH CV ECHO MEAS - RV V1 MAX: 79.7 CM/SEC
BH CV ECHO MEAS - RV V1 MEAN: 50 CM/SEC
BH CV ECHO MEAS - RV V1 VTI: 13.5 CM
BH CV ECHO MEAS - RVDD: 2.8 CM
BH CV ECHO MEAS - SI(AO): 103.5 ML/M^2
BH CV ECHO MEAS - SI(CUBED): 34.2 ML/M^2
BH CV ECHO MEAS - SI(LVOT): 29.8 ML/M^2
BH CV ECHO MEAS - SI(MOD-SP4): 20.7 ML/M^2
BH CV ECHO MEAS - SI(TEICH): 30.6 ML/M^2
BH CV ECHO MEAS - SV(AO): 215.4 ML
BH CV ECHO MEAS - SV(CUBED): 71.2 ML
BH CV ECHO MEAS - SV(LVOT): 62 ML
BH CV ECHO MEAS - SV(MOD-SP4): 43.1 ML
BH CV ECHO MEAS - SV(TEICH): 63.8 ML
GLUCOSE BLDC GLUCOMTR-MCNC: 236 MG/DL (ref 70–105)
GLUCOSE BLDC GLUCOMTR-MCNC: 284 MG/DL (ref 70–105)
HBA1C MFR BLD: 11.1 % (ref 3.5–5.6)
MAGNESIUM SERPL-MCNC: 2 MG/DL (ref 1.6–2.4)
POTASSIUM SERPL-SCNC: 4.5 MMOL/L (ref 3.5–5.2)
WHOLE BLOOD HOLD SPECIMEN: NORMAL

## 2021-09-23 PROCEDURE — 83735 ASSAY OF MAGNESIUM: CPT | Performed by: NURSE PRACTITIONER

## 2021-09-23 PROCEDURE — 84132 ASSAY OF SERUM POTASSIUM: CPT | Performed by: NURSE PRACTITIONER

## 2021-09-23 PROCEDURE — 63710000001 INSULIN ISOPHANE & REGULAR PER 5 UNITS: Performed by: NURSE PRACTITIONER

## 2021-09-23 PROCEDURE — G0378 HOSPITAL OBSERVATION PER HR: HCPCS

## 2021-09-23 PROCEDURE — 36415 COLL VENOUS BLD VENIPUNCTURE: CPT | Performed by: NURSE PRACTITIONER

## 2021-09-23 PROCEDURE — 99217 PR OBSERVATION CARE DISCHARGE MANAGEMENT: CPT | Performed by: HOSPITALIST

## 2021-09-23 PROCEDURE — 99213 OFFICE O/P EST LOW 20 MIN: CPT | Performed by: PSYCHIATRY & NEUROLOGY

## 2021-09-23 PROCEDURE — 97161 PT EVAL LOW COMPLEX 20 MIN: CPT

## 2021-09-23 PROCEDURE — 92526 ORAL FUNCTION THERAPY: CPT

## 2021-09-23 PROCEDURE — 25010000002 INFLUENZA VAC SPLIT QUAD 0.5 ML SUSPENSION PREFILLED SYRINGE: Performed by: HOSPITALIST

## 2021-09-23 PROCEDURE — G0008 ADMIN INFLUENZA VIRUS VAC: HCPCS | Performed by: HOSPITALIST

## 2021-09-23 PROCEDURE — 90686 IIV4 VACC NO PRSV 0.5 ML IM: CPT | Performed by: HOSPITALIST

## 2021-09-23 PROCEDURE — 63710000001 INSULIN LISPRO (HUMAN) PER 5 UNITS: Performed by: HOSPITALIST

## 2021-09-23 PROCEDURE — 82962 GLUCOSE BLOOD TEST: CPT

## 2021-09-23 RX ADMIN — INFLUENZA VIRUS VACCINE 0.5 ML: 15; 15; 15; 15 SUSPENSION INTRAMUSCULAR at 13:06

## 2021-09-23 RX ADMIN — PANTOPRAZOLE SODIUM 40 MG: 40 TABLET, DELAYED RELEASE ORAL at 08:25

## 2021-09-23 RX ADMIN — INSULIN LISPRO 5 UNITS: 100 INJECTION, SOLUTION INTRAVENOUS; SUBCUTANEOUS at 08:25

## 2021-09-23 RX ADMIN — ASPIRIN 325 MG ORAL TABLET 325 MG: 325 PILL ORAL at 08:25

## 2021-09-23 RX ADMIN — INSULIN HUMAN 35 UNITS: 100 INJECTION, SUSPENSION SUBCUTANEOUS at 08:25

## 2021-09-23 RX ADMIN — APIXABAN 5 MG: 5 TABLET, FILM COATED ORAL at 08:25

## 2021-09-23 RX ADMIN — INSULIN LISPRO 8 UNITS: 100 INJECTION, SOLUTION INTRAVENOUS; SUBCUTANEOUS at 12:02

## 2021-09-23 NOTE — PLAN OF CARE
Problem: Communication Impairment (Stroke, Ischemic/Transient Ischemic Attack)  Goal: Improved Communication Skills  Intervention: Optimize Cognitive and Communication Skills  Recent Flowsheet Documentation  Taken 9/23/2021 0400 by Babs Vaughn, RN  Communication Enhancement Strategies: extra time allowed for response  Taken 9/22/2021 2000 by Babs Vaughn, RN  Communication Enhancement Strategies: extra time allowed for response   Goal Outcome Evaluation:

## 2021-09-23 NOTE — THERAPY EVALUATION
Patient Name: Chao Lazar  : 1960    MRN: [id number]                              Today's Date: 2021       Admit Date: 2021    Visit Dx:     ICD-10-CM ICD-9-CM   1. Hypoglycemia  E16.2 251.2   2. Altered mental status, unspecified altered mental status type  R41.82 780.97   3. Left-sided weakness  R53.1 728.87   4. Leukocytosis, unspecified type  D72.829 288.60     Patient Active Problem List   Diagnosis   • Allergic state   • Asthma   • Chronic cough   • Degenerative joint disease   • Type 2 diabetes mellitus with hyperglycemia, with long-term current use of insulin (CMS/HCC)   • Gastroparesis   • Hyperlipidemia, mixed   • Essential hypertension   • Nausea and vomiting   • Vitamin D deficiency   • Combined forms of age-related cataract, bilateral   • Presbyopia   • Acute ischemic right PCA stroke involving the right occipital lobe (CMS/HCC)   • Sphenoid sinusitis   • Retinal disorder   • Coronary artery disease involving native coronary artery of native heart without angina pectoris   • Cutaneous abscess of head excluding face   • History of CVA (cerebrovascular accident)   • Leukocytosis   • Hypomagnesemia   • Left-sided weakness   • Hypoglycemia     Past Medical History:   Diagnosis Date   • Asthma 3/22/2018   • Coronary artery disease involving native coronary artery of native heart without angina pectoris 2020   • Essential hypertension 2019   • Gastroparesis 2013   • History of CVA (cerebrovascular accident) 2021   • Hyperlipidemia, mixed 4/3/2017   • Hypoglycemia    • Type 2 diabetes mellitus (CMS/HCC)      Past Surgical History:   Procedure Laterality Date   • CARDIAC CATHETERIZATION     • CHOLECYSTECTOMY     • COLON RESECTION      [id number]   • COLON RESECTION SMALL BOWEL Right 2019    Procedure: open right hemicolectomy;  Surgeon: Ronaldo Ardon MD;  Location: [facility] OR;  Service: General   • COLONOSCOPY  2019    x2    • CORONARY ANGIOPLASTY  WITH STENT PLACEMENT  04/2017   • ECHO - CONVERTED  2019   • ECHO - CONVERTED  2020   • FRACTURE SURGERY      collar bone as a child    • KNEE ARTHROSCOPY Left 08/2003   • KNEE JOINT MANIPULATION Left 04/2010   • TOTAL KNEE ARTHROPLASTY Bilateral     2013,2011     General Information     Row Name 09/23/21 1240          Physical Therapy Time and Intention    Document Type  evaluation  -CR     Mode of Treatment  physical therapy  -CR     Row Name 09/23/21 1240          General Information    Patient Profile Reviewed  yes  -CR     Prior Level of Function  independent:  -CR     Barriers to Rehab  previous functional deficit;medically complex;visual deficit  -CR     Row Name 09/23/21 1240          Home Main Entrance    Number of Stairs, Main Entrance  one  -CR     Row Name 09/23/21 1240          Stairs Within Home, Primary    Number of Stairs, Within Home, Primary  none  -CR     Row Name 09/23/21 1240          Cognition    Orientation Status (Cognition)  oriented x 3  -CR     Row Name 09/23/21 1240          Safety Issues, Functional Mobility    Safety Issues Affecting Function (Mobility)  insight into deficits/self-awareness  -CR     Impairments Affecting Function (Mobility)  balance;strength;visual/perceptual  -CR       User Key  (r) = Recorded By, (t) = Taken By, (c) = Cosigned By    Initials Name Provider Type    CR Reyes, Carmela, PT Physical Therapist        Mobility     Row Name 09/23/21 1241          Bed Mobility    Bed Mobility  supine-sit  -CR     Supine-Sit [place] (Bed Mobility)  minimum assist (75% patient effort);verbal cues  -CR     Row Name 09/23/21 1241          Bed-Chair Transfer    Bed-Chair [place] (Transfers)  standby assist  -CR     Row Name 09/23/21 1241          Sit-Stand Transfer    Sit-Stand [place] (Transfers)  contact guard  -CR     Row Name 09/23/21 1241          Gait/Stairs (Locomotion)    [place] Level (Gait)  minimum assist (75% patient effort);contact guard  -CR      Assistive Device (Gait)  -- HHA  -CR     Distance in Feet (Gait)  60 ft x 2, 20 ft x 1  -CR     Deviations/Abnormal Patterns (Gait)  gait speed decreased  -CR       User Key  (r) = Recorded By, (t) = Taken By, (c) = Cosigned By    Initials Name Provider Type    CR Reyes, Carmela, PT Physical Therapist        Obj/Interventions     Row Name 09/23/21 1243          Range of Motion Comprehensive    Comment, General Range of Motion  AROM LLE min limit, AROM RLE wf  -CR     Row Name 09/23/21 1243          Strength Comprehensive (MMT)    Comment, General Manual Muscle Testing (MMT) Assessment  LLE grossly3/5, RLE grossly wfl  -CR     Row Name 09/23/21 1243          Balance    Balance Assessment  sitting static balance;sitting dynamic balance;standing static balance;standing dynamic balance  -CR     Static Sitting Balance  WNL;sitting, edge of bed  -CR     Dynamic Sitting Balance  mild impairment  -CR     Static Standing Balance  mild impairment  -CR     Dynamic Standing Balance  mild impairment;supported  -CR     Balance Interventions  occupation based/functional task  -CR     Row Name 09/23/21 1243          Sensory Assessment (Somatosensory)    Sensory Assessment (Somatosensory)  sensation intact  -CR       User Key  (r) = Recorded By, (t) = Taken By, (c) = Cosigned By    Initials Name Provider Type    CR Reyes, Carmela, PT Physical Therapist        Goals/Plan    No documentation.       Clinical Impression     Row Name 09/23/21 1245          Pain    Additional Documentation  Pain Scale: Numbers Pre/Post-Treatment (Group)  -CR     Row Name 09/23/21 1245          Pain Scale: Numbers Pre/Post-Treatment    Pretreatment Pain Rating  0/10 - no pain  -CR     Posttreatment Pain Rating  0/10 - no pain  -CR     Row Name 09/23/21 1240          Plan of Care Review    Plan of Care Reviewed With  patient;spouse  -CR     Outcome Summary  60 y/o male brought in hospital due to worsening L facial droop, weakness as well as hyperglycemia.  CT/MRI (-) acute changes, old infarct. PMH includes cva with L hemiparesis and Leye blindness, DM, hx of knee sx. Pt normally ambulating without a.d in home but does have cane, rw. Pt needed min A for supine to sit transfer. CG/SBA to come to standing. Ambulated this session with HHA for 60 ft x 2, 20 ft x 1. + sway noted when turning or with sudden stop/start. Educated pt/spouse to use a.d. at home and follow up with HH PT. Pt needed cues when turning to L due to blind L eye. Pt is safe to dc home with spouse and HH follow up.  -CR     Row Name 09/23/21 1245          Therapy Assessment/Plan (PT)    Patient/Family Therapy Goals Statement (PT)  home  -CR       User Key  (r) = Recorded By, (t) = Taken By, (c) = Cosigned By    Initials Name Provider Type    CR Reyes, Carmela, PT Physical Therapist        Outcome Measures     Row Name 09/23/21 1309          Modified [place] Scale    Modified [place] Scale  4 - Moderately severe disability.  Unable to walk without assistance, and unable to attend to own bodily needs without assistance.  -CR     Row Name 09/23/21 1309          Functional Assessment    Outcome Measure Options  Modified [place]  -CR       User Key  (r) = Recorded By, (t) = Taken By, (c) = Cosigned By    Initials Name Provider Type    CR Reyes, Carmela, PT Physical Therapist                         PT Recommendation and Plan     Plan of Care Reviewed With: patient, spouse  Outcome Summary: 60 y/o male brought in hospital due to worsening L facial droop, weakness as well as hyperglycemia. CT/MRI (-) acute changes, old infarct. PMH includes cva with L hemiparesis and Leye blindness, DM, hx of knee sx. Pt normally ambulating without a.d in home but does have cane, rw. Pt needed min A for supine to sit transfer. CG/SBA to come to standing. Ambulated this session with HHA for 60 ft x 2, 20 ft x 1. + sway noted when turning or with sudden stop/start. Educated pt/spouse to use a.d. at home and follow up with HH PT. Pt  needed cues when turning to L due to blind L eye. Pt is safe to dc home with spouse and HH follow up.     Time Calculation:   PT Charges     Row Name 09/23/21 1310             Time Calculation    Start Time  1041  -CR      Stop Time  1101  -CR      Time Calculation (min)  20 min  -CR      PT Received On  09/23/21  -CR         Time Calculation- PT    Total Timed Code Minutes- PT  0 minute(s)  -CR        User Key  (r) = Recorded By, (t) = Taken By, (c) = Cosigned By    Initials Name Provider Type    CR Reyes, Carmela, PT Physical Therapist        Therapy Charges for Today     Code Description Service Date Service Provider Modifiers Qty    70546495424 HC PT EVAL LOW COMPLEXITY 4 9/23/2021 Reyes, Carmela, PT GP 1          PT G-Codes  Outcome Measure Options: Modified [place]  Modified [place] Scale: 4 - Moderately severe disability.  Unable to walk without assistance, and unable to attend to own bodily needs without assistance.    Carmela Reyes, PT  9/23/2021

## 2021-09-23 NOTE — CASE MANAGEMENT/SOCIAL WORK
Discharge Planning Assessment  [facility]     Patient Name: Chao Lazar  MRN: [id number]  Today's Date: 9/23/2021    Admit Date: 9/22/2021    Discharge Needs Assessment     Row Name 09/23/21 1155       Living Environment    Lives With  spouse    Name(s) of Who Lives With Patient  Debbi    Current Living Arrangements  home/apartment/condo    Primary Care Provided by  self    Provides Primary Care For  no one    Family Caregiver if Needed  spouse    Family Caregiver Names  Debbi    Able to Return to Prior Arrangements  yes       Resource/Environmental Concerns    Resource/Environmental Concerns  none    Transportation Concerns  car, none       Transition Planning    Patient/Family Anticipates Transition to  home with family    Patient/Family Anticipated Services at Transition  none    Transportation Anticipated  family or friend will provide       Discharge Needs Assessment    Readmission Within the Last 30 Days  no previous admission in last 30 days    Equipment Currently Used at Home  none    Concerns to be Addressed  denies needs/concerns at this time    Anticipated Changes Related to Illness  none    Equipment Needed After Discharge  none    Provided Post Acute Provider List?  Yes    Post Acute Provider List  Inpatient Rehab;Home Health    Provided Post Acute Provider Quality & Resource List?  Yes    Patient's Choice of Community Agency(s)  provided website for wife to access        Discharge Plan     Row Name 09/23/21 1158       Plan    Plan  Home with Wife    Patient/Family in Agreement with Plan  yes    Plan Comments  met with patient and wife at bedside. Patient normally independent with ADL's has walker at home he can use if needed. Wife also available to assit with any needs.  pcp and pharmacy verified. Spoke about discharge plan and what the recommendations were from OT for inpatient rehab. Denclined inpatient rehab and home health, or outpatient therapy. Lists were provided of inpatient rehab and home  health for them to review and to discuss. Followed back up and they still declined both, Plan to return home with wife. Wife to provide transportation at [employer]. Discharge orders noted.    Final Discharge Disposition Code  01 - home or self-care    Final Note  home        Continued Care and Services - Admitted Since 9/22/2021    Coordination has not been started for this encounter.       Expected Discharge Date and Time     Expected Discharge Date Expected Discharge Time    Sep 23, 2021         Demographic Summary     Row Name 09/23/21 1154       General Information    Admission Type  inpatient    Referral Source  admission list    Reason for Consult  discharge planning    Preferred Language  English        Functional Status     Row Name 09/23/21 1155       Functional Status    Usual Activity Tolerance  good    Current Activity Tolerance  good       Functional Status, IADL    Medications  independent    Meal Preparation  independent    Housekeeping  independent    Laundry  independent    Shopping  independent       Mental Status    General Appearance WDL  WDL       Mental Status Summary    Recent Changes in Mental Status/Cognitive Functioning  no changes        Met with patient at bedside wearing mask and goggles, Spent less than 15 minutes in room at greater than 6 feet distance.           Loulou Blevins RN

## 2021-09-23 NOTE — PROGRESS NOTES
LOS: 0 days     Chao Lazar is a 61 y.o. male     Chief Complaint:  Left sided weakness.        SUBJECTIVE:  History taken from: patient chart family    Interval History:  The patient is a 61 year old gentleman with DM, CAD, asthma, HTN, CVA in the past who presented with weakness of the left side.  His blood sugar was low at the time of arrival of EMS.  He was brought to ER of [facility] and admitted for further work up.  The MRI of Brain was unremarkable for acute stroke.  The patient seems to be at his baseline.            Patient Complaints: none      Review of Systems   Review of Systems   Review of Systems   Constitutional: Negative  HENT: Negative.    Eyes: Negative.    Respiratory: asthma.    Cardiovascular: CAD    Gastrointestinal: Negative.    Genitourinary: Negative.    Musculoskeletal: Negative  Skin: Negative.    Neurological: stroke.    Hematological: Negative.    Psychiatric/Behavioral: Negative.      Pertinent PMH:  has a past medical history of Asthma (3/22/2018), Coronary artery disease involving native coronary artery of native heart without angina pectoris (8/27/2020), Essential hypertension (6/28/2019), Gastroparesis (12/23/2013), History of CVA (cerebrovascular accident) (9/22/2021), Hyperlipidemia, mixed (4/3/2017), Hypoglycemia, and Type 2 diabetes mellitus (CMS/[facility]).   ________________________________________________     OBJECTIVE:  The patient is sitting in bed in on apparent distress.  Head NC, AT, Neck supple, trachea midline. Lungs CTA,  Good pulmonary effort. CV  S1-S2 no murmur.  Abdomen soft, non tender. Ext no edema, no cyanosis.       Neurologic Exam    The patient is awake, alert, oriented x 3, speech is good follow commands.  CN left field cut noted. EOMI, mild left facial droop. Tongue midline. Motor right side 5/5.  Left upper and lower extremities 5-/5.  Sensory light touch intact.      ________________________________________________   RESULTS REVIEW    VITAL SIGNS:  Temp:   [97.9 °F (36.6 °C)-98.7 °F (37.1 °C)] 98 °F (36.7 °C)  Heart Rate:  [75-86] 83  Resp:  [11-17] 12  BP: (106-135)/(63-80) 113/67    LABS:   Lab Results   Component Value Date    WBC 12.00 (H) 09/22/2021    HGB 14.1 09/22/2021    HCT 40.8 09/22/2021    MCV 89.7 09/22/2021     09/22/2021     Lab Results   Component Value Date    GLUCOSE 232 (H) 09/22/2021    BUN 16 09/22/2021    CREATININE 1.21 09/22/2021    EGFRIFNONA 61 09/22/2021    BCR 13.2 09/22/2021    K 4.6 09/22/2021    CO2 24.0 09/22/2021    CALCIUM 9.6 09/22/2021    ALBUMIN 4.10 09/22/2021    LABIL2 0.8 (L) 05/26/2019    AST 24 09/22/2021    ALT 32 09/22/2021       Lab Results   Component Value Date    TSH 2.320 09/22/2021    LDL 47 09/22/2021    HGBA1C 11.1 (H) 09/22/2021    [id number] 332 09/22/2021         IMAGING STUDIES:  CT Angiogram Neck    Result Date: 9/22/2021   1. Old area of infarction in the right occipital lobe with chronic occlusion of the right P2 segment of the posterior cerebral artery. 2. No additional hemodynamically significant intracranial or extracranial vascular stenoses or additional occlusions. 3. Chronic lung disease in both apices.  Electronically Signed By-Ronaldo Sheikh MD On:9/22/2021 9:34 AM This report was finalized on [id number] by  Ronaldo Sheikh MD.    MRI Brain Without Contrast    Result Date: 9/22/2021  1. Negative for acute ischemia. 2. Motion limited exam despite multiple attempts at imaging. 3. Sequela of prior right PCA distribution infarct again noted. 4. Generalized cerebral atrophy.  Electronically Signed By-Kartik Macias MD On:9/22/2021 1:44 PM This report was finalized on [id number] by  Kartik Macias MD.    XR Chest 1 View    Result Date: 9/22/2021  Hypoinflated lungs with patchy bibasilar airspace disease left greater than right that may relate to pneumonia and/or atelectasis.  Electronically Signed By-Kartik Macias MD On:9/22/2021 7:40 AM This report was finalized on [id number] by  Kartik Macias,  MD.    CT Angiogram Head    Result Date: 9/22/2021   1. Old area of infarction in the right occipital lobe with chronic occlusion of the right P2 segment of the posterior cerebral artery. 2. No additional hemodynamically significant intracranial or extracranial vascular stenoses or additional occlusions. 3. Chronic lung disease in both apices.  Electronically Signed By-Ronaldo Sheikh MD On:9/22/2021 9:34 AM This report was finalized on [id number] by  Ronaldo Sheikh MD.    CT Head Without Contrast Stroke Protocol    Result Date: 9/22/2021  IMPRESSION : 1. No definite acute infarct. 2. Sequela of prior right PCA infarct seen on prior study from 2/20/2020. Areas of cortical hyperdensity likely related to cortical laminar necrosis and/or calcification. 3. Generalized cerebral atrophy and mild white matter findings of chronic small vessel disease.  Electronically Signed By-Kartik Macias MD On:9/22/2021 8:16 AM This report was finalized on [id number] by  Kartik Macias MD.      I reviewed the patient's new clinical results.    ________________________________________________      PROBLEM LIST:    Left-sided weakness    Asthma    Type 2 diabetes mellitus with hyperglycemia, with long-term current use of insulin (CMS/[facility])    Gastroparesis    Hyperlipidemia, mixed    Essential hypertension    Coronary artery disease involving native coronary artery of native heart without angina pectoris    History of CVA (cerebrovascular accident)    Leukocytosis    Hypomagnesemia    Hypoglycemia        Assessment/Plan   ASSESSMENT/PLAN:  61 year old gentleman with DM , CAD, HTN, history of right sided stroke who had developed increase in weakness of left side.  The work up revealed MRI of Brain unremarkable for acute stroke. The clinical features are suggestive of metabolic decompensation of old stroke.   Rec:  Will continue Eliquis and supportive care.    DM, HTN, HLD, CAD, asthma as per hospitalist, MD.  Patient is signed off. Please call  for further assistance.     **Please refer to previous notes for further details and recommendations.     I discussed the patients findings and my recommendations with patient and family    Stevie Burr MD  09/23/21  12:13 EDT

## 2021-09-23 NOTE — DISCHARGE SUMMARY
[facility] Medicine Services  DISCHARGE SUMMARY    Patient Name: Chao Lazar  : 1960  MRN: [id number]    Date of Admission: 2021  Date of Discharge: 2021  Primary Care Physician: Al Kimbrough MD      Presenting Problem:   Hypoglycemia [E16.2]  Left-sided weakness [R53.1]  Altered mental status, unspecified altered mental status type [R41.82]    Active and Resolved Hospital Problems:  Active Hospital Problems    Diagnosis POA   • **Left-sided weakness [R53.1] Yes   • History of CVA (cerebrovascular accident) [Z86.73] Not Applicable   • Leukocytosis [D72.829] Yes   • Hypomagnesemia [E83.42] Yes   • Coronary artery disease involving native coronary artery of native heart without angina pectoris [I25.10] Yes   • Essential hypertension [I10] Yes   • Asthma [J45.909] Yes   • Hyperlipidemia, mixed [E78.2] Yes   • Gastroparesis [K31.84] Yes   • Type 2 diabetes mellitus with hyperglycemia, with long-term current use of insulin (CMS/[facility]) [E11.65, Z79.4] Not Applicable      Resolved Hospital Problems   No resolved problems to display.     Acute on chronic left sided weakness (history of CVA with left-sided residual weakness, likely from decompensation from profound hypoglycemia which is corrected  - CT head reviewed  - CTA reviewed  - EKG reviewed  - Neurochecks  - Fall risk precautions  - MRI brain negative for acute ischemia, sequelae of poor right PCA distribution infarct again noted, and 2D echo ordered  - PT/OT/ST ordered  - Aspirin and statin ordered  - Neurology consult appreciated and said okay to discharge     Acute leukocytosis  - CXR reviewed  - UA reviewed  - WBC 12.0, monitor   - COVID not detected   - Check procalcitonin and CRP, both normal, recheck CBC in 3 to 4 days as outpatient.     Acute hypomagnesemia  - Mag 1.3, monitor  - Received magnesium in ED   - Electrolyte protocol ordered     Essential HTN  - moderately controlled  - Monitor blood  pressure  - Continue metoprolol   - IV hydralazine ordered PRN      HLD  - Continue statin      CAD S/P cardiac stent  - Continue aspirin, statin, and metoprolol     Type II DM with hyperglycemia,  - Questionable hypoglycemia- EMS reports BG <30 upon arrivable to patients house, however wife denies this   - Start sliding scale, Accuchecks ACHS  - Check hbg A1C 11.1  -  home metformin   - Continue home 70/30 novolog (dose decreased from 64 to 35 units due to diet changes while hospitalized)  - Diabetes educator consulted   follow-up with endocrinology as outpatient     Gastropresis secondary to diabetes  - Continue PPI      History of CVA  - R occipital lobe ischemic CVA (12/2019)  - Residual left sided weakness, but patient able to walk and talk  - Continue eliquis      Asthma  - Not in exacerbation  - Currently on room air   - Breathing treatments ordered PRN      DVT prophylaxis: [facility] Course     Hospital Course:  Chao Lazar is a 61 y.o. male with a past medical history of asthma, CAD, hypertension, gastroparesis, CVA, hyperlipidemia and type 2 diabetes mellitus who presented to [facility] on 9/22/2021 complaining of left-sided weakness.  Around 2 AM the patient's wife was awoken because the patient was moving stuff around the house.  She noticed that he had increased left-sided weakness.  She reports normally he is usually able to walk and has no difficulty, but this time he seemed very disoriented and he had urinated on himself.  She explains the last normal was 10 PM last night.  She did check his blood glucose this morning when this occurred it was 375 and she gave him some insulin.  She reports when EMS got there and checked his blood glucose was 285.  She does report lately he has had some chills.  Patient does deny any recent nausea, vomiting diarrhea, fever, cough, shortness, or chest pain.     In the ED, CT head showed No definite acute infarct.   Sequela of prior right PCA  infarct seen on prior study from  2/20/2020. Areas of cortical hyperdensity likely related to cortical  laminar necrosis and/or calcification.  Generalized cerebral atrophy and mild white matter findings of  chronic small vessel disease. CTA showed Old area of infarction in the right occipital lobe with chronic occlusion of the right P2 segment of the posterior cerebral artery. No additional hemodynamically significant intracranial or extracranial vascular stenoses or additional occlusions.  Chronic lung disease in both apices.. CXR showed IMPRESSION:  Hypoinflated lungs with patchy bibasilar airspace disease left greater  than right that may relate to pneumonia and/or atelectasis. EKG showed Sinus rhythm, Abnormal T, consider ischemia, lateral leads.  All labs unremarkable upon admission except blood glucose 232, anion gap 17, mag 1.3, white blood cells 12.0.  Covid not detected.  All vital signs stable upon admission except patient placed on 4 L nasal cannula of oxygen.  Patient received magnesium and normal saline 500 mL bolus in the ED.  Neurology consulted.Per ED provider-  EMS report patient's initial blood glucose was less than 30 he gave him oral glucose and on repeat glucose was 270s and some of his weakness improved    9/23/2021; no new symptoms endorsed hypoglycemia resolved, back to baseline as per spouse at the bedside, will discharge home.  Neurology signed off      DISCHARGE Follow Up Recommendations for labs and diagnostics: Follow-up with neurology and PCP as outpatient      Reasons For Change In Medications and Indications for New Medications:      Day of Discharge     Vital Signs:  Temp:  [97.9 °F (36.6 °C)-98.7 °F (37.1 °C)] 98 °F (36.7 °C)  Heart Rate:  [75-86] 83  Resp:  [11-17] 12  BP: (106-135)/(63-80) 113/67    Physical Exam:  Physical Exam  Vitals and nursing note reviewed.   Constitutional:       Appearance: Normal appearance.   HENT:      Head: Normocephalic and atraumatic.      Nose:  Nose normal.   Eyes:      Extraocular Movements: Extraocular movements intact.      Pupils: Pupils are equal, round, and reactive to light.   Cardiovascular:      Rate and Rhythm: Normal rate and regular rhythm.      Pulses: Normal pulses.   Pulmonary:      Effort: Pulmonary effort is normal.      Breath sounds: Normal breath sounds.   Abdominal:      General: There is no distension.      Palpations: Abdomen is soft. There is no mass.      Tenderness: There is no abdominal tenderness.   Musculoskeletal:      Cervical back: Normal range of motion and neck supple.   Skin:     General: Skin is warm.   Neurological:      Mental Status: He is alert and oriented to person, place, and time.      Comments: Left-sided motor weakness           Pertinent  and/or Most Recent Results     LAB RESULTS:      Lab 09/22/21  0651 09/22/21  0650   WBC  --  12.00*   HEMOGLOBIN  --  14.1   HEMATOCRIT  --  40.8   PLATELETS  --  364   NEUTROS ABS  --  8.90*   LYMPHS ABS  --  1.80   MONOS ABS  --  1.00*   EOS ABS  --  0.20   MCV  --  89.7   CRP <0.30  --    PROCALCITONIN 0.12  --    PROTIME 10.4  --          Lab 09/22/21  1815 09/22/21  0651 09/22/21  0650   SODIUM  --  134*  --    POTASSIUM  --  4.6  --    CHLORIDE  --  93*  --    CO2  --  24.0  --    ANION GAP  --  17.0*  --    BUN  --  16  --    CREATININE  --  1.21  --    GLUCOSE  --  232*  --    CALCIUM  --  9.6  --    MAGNESIUM 1.8 1.3*  --    HEMOGLOBIN A1C  --   --  11.1*   TSH  --  2.320  --          Lab 09/22/21  0651   TOTAL PROTEIN 7.6   ALBUMIN 4.10   GLOBULIN 3.5   ALT (SGPT) 32   AST (SGOT) 24   BILIRUBIN 0.3   ALK PHOS 119*         Lab 09/22/21  0651   TROPONIN T <0.010   PROTIME 10.4   INR 0.93         Lab 09/22/21  0651   CHOLESTEROL 129   LDL CHOL 47   HDL CHOL 30*   TRIGLYCERIDES 346*         Lab 09/22/21  0650   VITAMIN B 12 332   ABO TYPING B   RH TYPING Positive   ANTIBODY SCREEN Negative         Brief Urine Lab Results  (Last result in the past 365 days)      Color    Clarity   Blood   Leuk Est   Nitrite   Protein   CREAT   Urine HCG        09/22/21 0831 Yellow Clear Trace Negative Negative 100 mg/dL (2+)             Microbiology Results (last 10 days)     Procedure Component Value - Date/Time    COVID-19,CEPHEID/JOANNA/BDMAX,COR/[facility]/PAD/[facility] IN-HOUSE(OR EMERGENT/ADD-ON),NP SWAB IN TRANSPORT MEDIA 3-4 HR TAT, RT-PCR - Swab, Nasopharynx [[id number]]  (Normal) Collected: 09/22/21 0650    Lab Status: Final result Specimen: Swab from Nasopharynx Updated: 09/22/21 0722     COVID19 Not Detected    Narrative:      Fact sheet for providers: https://www.fda.gov/media/627254/download     Fact sheet for patients: https://www.fda.gov/media/711080/download  Fact sheet for providers: https://www.fda.gov/media/854366/download    Fact sheet for patients: https://www.fda.gov/media/151692/download    Test performed by PCR.          CT Angiogram Neck    Result Date: 9/22/2021  Impression:  1. Old area of infarction in the right occipital lobe with chronic occlusion of the right P2 segment of the posterior cerebral artery. 2. No additional hemodynamically significant intracranial or extracranial vascular stenoses or additional occlusions. 3. Chronic lung disease in both apices.  Electronically Signed By-Ronaldo Sheikh MD On:9/22/2021 9:34 AM This report was finalized on [id number] by  Ronaldo Sheikh MD.    MRI Brain Without Contrast    Result Date: 9/22/2021  Impression: 1. Negative for acute ischemia. 2. Motion limited exam despite multiple attempts at imaging. 3. Sequela of prior right PCA distribution infarct again noted. 4. Generalized cerebral atrophy.  Electronically Signed By-Kartik Macias MD On:9/22/2021 1:44 PM This report was finalized on [id number] by  Kartik Macias MD.    XR Chest 1 View    Result Date: 9/22/2021  Impression: Hypoinflated lungs with patchy bibasilar airspace disease left greater than right that may relate to pneumonia and/or atelectasis.  Electronically Signed By-Kartik  MD Gilberto On:9/22/2021 7:40 AM This report was finalized on [id number] by  Kartik Macias MD.    CT Angiogram Head    Result Date: 9/22/2021  Impression:  1. Old area of infarction in the right occipital lobe with chronic occlusion of the right P2 segment of the posterior cerebral artery. 2. No additional hemodynamically significant intracranial or extracranial vascular stenoses or additional occlusions. 3. Chronic lung disease in both apices.  Electronically Signed By-Ronaldo Sheikh MD On:9/22/2021 9:34 AM This report was finalized on [id number] by  Ronaldo Sheikh MD.    CT Head Without Contrast Stroke Protocol    Result Date: 9/22/2021  Impression: IMPRESSION : 1. No definite acute infarct. 2. Sequela of prior right PCA infarct seen on prior study from 2/20/2020. Areas of cortical hyperdensity likely related to cortical laminar necrosis and/or calcification. 3. Generalized cerebral atrophy and mild white matter findings of chronic small vessel disease.  Electronically Signed By-Kartik Macias MD On:9/22/2021 8:16 AM This report was finalized on [id number] by  Kartik Macias MD.              Results for orders placed during the hospital encounter of 12/05/19    Adult Transesophageal Echo (LINH) W/ Cont if Necessary Per Protocol    Interpretation Summary  · Left ventricular systolic function is normal.  · Mild mitral valve regurgitation is present  · Estimated EF appears to be in the range of 56 - 60%.      Labs Pending at Discharge: CBC      Procedures Performed           Consults:   Consults     Date and Time Order Name Status Description    9/22/2021 10:49 AM Inpatient Neurology Consult Stroke Completed     9/22/2021  9:06 AM Hospitalist (on-call MD unless specified) Completed     9/22/2021  9:05 AM Inpatient Neurology Consult General Completed             Discharge Details        Discharge Medications      Continue These Medications      Instructions Start Date   aspirin 81 MG EC tablet   81 mg, Oral, Daily       atorvastatin 80 MG tablet  Commonly known as: LIPITOR   TAKE 1 TABLET BY MOUTH EVERY DAY      Eliquis 5 MG tablet tablet  Generic drug: apixaban   TAKE 1 TABLET BY MOUTH EVERY 12 (TWELVE) HOURS FOR 30 DAYS.      HM Vitamin D3 50 MCG (2000 UT) capsule  Generic drug: Cholecalciferol   2,000 Units, Oral, Daily      metFORMIN  MG 24 hr tablet  Commonly known as: GLUCOPHAGE-XR   500 mg, Oral, 2 times daily      metoclopramide 10 MG tablet  Commonly known as: REGLAN   10 mg, Oral, 4 Times Daily Before Meals & Nightly      NOVOLIN 70/30 FLEXPEN SC   64 Units, Subcutaneous, 2 times daily      omeprazole 20 MG capsule  Commonly known as: priLOSEC   20 mg, Oral, Every Evening             No Known Allergies      Discharge Disposition: Home in stable condition  Home or Self Care    Diet:  Hospital:  Diet Order   Procedures   • Diet Diabetic/Consistent Carbs; Diabetic - Consistent Carb         Discharge Activity: As before        CODE STATUS:  Code Status and Medical Interventions:   Ordered at: 09/22/21 1019     Level Of Support Discussed With:    Patient     Code Status:    CPR     Medical Interventions (Level of Support Prior to Arrest):    Full         Future Appointments   Date Time Provider Department Center   1/12/2022 11:00 AM LAB  [facility]  [facility] None   1/19/2022 12:45 PM Radha aMnriquez MD [facility]       Additional Instructions for the Follow-ups that You Need to Schedule     CBC & Differential    Sep 27, 2021 (Approximate)      Manual Differential: No    Release to patient: Immediate               Time spent on Discharge including face to face service: More than 35 minutes    This patient has been examined wearing appropriate Personal Protective Equipment and discussed with hospital infection control department. 09/23/21      Signature:   Electronically signed by Randall Stewart MD, 09/23/21, 11:38 AM EDT.

## 2021-09-23 NOTE — PLAN OF CARE
Goal Outcome Evaluation:  Plan of Care Reviewed With: patient         NIH score of a 4. No complaints of pain. Patient refusing IP rehab and home health and plans to discharge home this afternoon. Information given regarding home health care. Discharge teaching done. Vital signs stable.      Problem: Skin Injury Risk Increased  Goal: Skin Health and Integrity  Outcome: Ongoing, Progressing  Intervention: Optimize Skin Protection  Recent Flowsheet Documentation  Taken 9/23/2021 1200 by Toshia Sanches RN  Pressure Reduction Techniques:   frequent weight shift encouraged   weight shift assistance provided  Pressure Reduction Devices: pressure-redistributing mattress utilized  Skin Protection:   adhesive use limited   tubing/devices free from skin contact   transparent dressing maintained   skin-to-device areas padded  Taken 9/23/2021 0815 by Toshia Sanches RN  Pressure Reduction Techniques: frequent weight shift encouraged  Pressure Reduction Devices: pressure-redistributing mattress utilized  Skin Protection:   adhesive use limited   tubing/devices free from skin contact   transparent dressing maintained   skin-to-device areas padded     Problem: Adult Inpatient Plan of Care  Goal: Plan of Care Review  Outcome: Ongoing, Progressing  Flowsheets (Taken 9/23/2021 1300)  Plan of Care Reviewed With: patient  Goal: Patient-Specific Goal (Individualized)  Outcome: Ongoing, Progressing  Goal: Absence of Hospital-Acquired Illness or Injury  Outcome: Ongoing, Progressing  Intervention: Identify and Manage Fall Risk  Recent Flowsheet Documentation  Taken 9/23/2021 1200 by Toshia Sanches RN  Safety Promotion/Fall Prevention:   safety round/check completed   nonskid shoes/slippers when out of bed   fall prevention program maintained   clutter free environment maintained   assistive device/personal items within reach   activity supervised  Taken 9/23/2021 1100 by Toshia Sanches RN  Safety Promotion/Fall Prevention:   safety round/check  completed   nonskid shoes/slippers when out of bed   fall prevention program maintained   clutter free environment maintained   assistive device/personal items within reach   activity supervised  Taken 9/23/2021 1000 by Toshia Sanches RN  Safety Promotion/Fall Prevention:   safety round/check completed   nonskid shoes/slippers when out of bed   fall prevention program maintained   clutter free environment maintained   assistive device/personal items within reach   activity supervised  Taken 9/23/2021 0815 by Toshia Sanches RN  Safety Promotion/Fall Prevention:   safety round/check completed   nonskid shoes/slippers when out of bed   fall prevention program maintained   clutter free environment maintained   assistive device/personal items within reach   activity supervised  Intervention: Prevent Skin Injury  Recent Flowsheet Documentation  Taken 9/23/2021 1200 by Toshia Sanches RN  Body Position: position changed independently  Skin Protection:   adhesive use limited   tubing/devices free from skin contact   transparent dressing maintained   skin-to-device areas padded  Taken 9/23/2021 0815 by Toshia Sanches RN  Skin Protection:   adhesive use limited   tubing/devices free from skin contact   transparent dressing maintained   skin-to-device areas padded  Intervention: Prevent and Manage VTE (venous thromboembolism) Risk  Recent Flowsheet Documentation  Taken 9/23/2021 0815 by Toshia Sanches RN  VTE Prevention/Management:   bilateral   sequential compression devices on  Intervention: Prevent Infection  Recent Flowsheet Documentation  Taken 9/23/2021 1200 by Toshia Sanches RN  Infection Prevention:   single patient room provided   rest/sleep promoted   personal protective equipment utilized   hand hygiene promoted  Taken 9/23/2021 1100 by Toshia Sanches RN  Infection Prevention:   single patient room provided   rest/sleep promoted   personal protective equipment utilized   hand hygiene promoted  Taken 9/23/2021 1000 by Mryon  CAROLEE Pope  Infection Prevention:   single patient room provided   rest/sleep promoted   personal protective equipment utilized   hand hygiene promoted  Taken 9/23/2021 0815 by Toshia Sanches RN  Infection Prevention:   single patient room provided   rest/sleep promoted   personal protective equipment utilized   hand hygiene promoted  Goal: Optimal Comfort and Wellbeing  Outcome: Ongoing, Progressing  Intervention: Provide Person-Centered Care  Recent Flowsheet Documentation  Taken 9/23/2021 1200 by Toshia Sanches RN  Trust Relationship/Rapport:   care explained   thoughts/feelings acknowledged   emotional support provided  Taken 9/23/2021 0815 by Toshia Sanches RN  Trust Relationship/Rapport:   care explained   thoughts/feelings acknowledged   emotional support provided  Goal: Readiness for Transition of Care  Outcome: Ongoing, Progressing     Problem: Diabetes Comorbidity  Goal: Blood Glucose Level Within Desired Range  Outcome: Ongoing, Progressing  Intervention: Maintain Glycemic Control  Recent Flowsheet Documentation  Taken 9/23/2021 1200 by Toshia Sanches RN  Glycemic Management: blood glucose monitoring  Taken 9/23/2021 0815 by Toshia Sanches RN  Glycemic Management: blood glucose monitoring     Problem: Hypertension Comorbidity  Goal: Blood Pressure in Desired Range  Outcome: Ongoing, Progressing  Intervention: Maintain Hypertension-Management Strategies  Recent Flowsheet Documentation  Taken 9/23/2021 1200 by Toshia Sanches RN  Medication Review/Management: medications reviewed  Taken 9/23/2021 1100 by Toshia Sanches RN  Medication Review/Management: medications reviewed  Taken 9/23/2021 1000 by Toshia Sanches RN  Medication Review/Management: medications reviewed  Taken 9/23/2021 0815 by Toshia Sanches RN  Medication Review/Management: medications reviewed     Problem: Fall Injury Risk  Goal: Absence of Fall and Fall-Related Injury  Outcome: Ongoing, Progressing  Intervention: Identify and Manage Contributors to  Fall Injury Risk  Recent Flowsheet Documentation  Taken 9/23/2021 1200 by Toshia Sanches RN  Medication Review/Management: medications reviewed  Taken 9/23/2021 1100 by Toshia Sanches RN  Medication Review/Management: medications reviewed  Taken 9/23/2021 1000 by Toshia Sanches RN  Medication Review/Management: medications reviewed  Taken 9/23/2021 0815 by Toshia Sanches RN  Medication Review/Management: medications reviewed  Intervention: Promote Injury-Free Environment  Recent Flowsheet Documentation  Taken 9/23/2021 1200 by Toshia Sanches RN  Safety Promotion/Fall Prevention:   safety round/check completed   nonskid shoes/slippers when out of bed   fall prevention program maintained   clutter free environment maintained   assistive device/personal items within reach   activity supervised  Taken 9/23/2021 1100 by Toshia Sanches RN  Safety Promotion/Fall Prevention:   safety round/check completed   nonskid shoes/slippers when out of bed   fall prevention program maintained   clutter free environment maintained   assistive device/personal items within reach   activity supervised  Taken 9/23/2021 1000 by Toshia Sanches RN  Safety Promotion/Fall Prevention:   safety round/check completed   nonskid shoes/slippers when out of bed   fall prevention program maintained   clutter free environment maintained   assistive device/personal items within reach   activity supervised  Taken 9/23/2021 0815 by Toshia Sanches RN  Safety Promotion/Fall Prevention:   safety round/check completed   nonskid shoes/slippers when out of bed   fall prevention program maintained   clutter free environment maintained   assistive device/personal items within reach   activity supervised     Problem: Adjustment to Illness (Stroke, Ischemic/Transient Ischemic Attack)  Goal: Optimal Coping  Outcome: Ongoing, Progressing  Intervention: Support Patient/Family Psychosocial Response to Stroke  Recent Flowsheet Documentation  Taken 9/23/2021 1200 by Toshia Sanches  RN  Supportive Measures: active listening utilized  Family/Support System Care: support provided  Taken 9/23/2021 0815 by Toshia Sanches RN  Supportive Measures: active listening utilized  Family/Support System Care: support provided     Problem: Bowel Elimination Impaired (Stroke, Ischemic/Transient Ischemic Attack)  Goal: Effective Bowel Elimination  Outcome: Ongoing, Progressing     Problem: Cerebral Tissue Perfusion Risk (Stroke, Ischemic/Transient Ischemic Attack)  Goal: Optimal Cerebral Tissue Perfusion  Outcome: Ongoing, Progressing  Intervention: Protect and Optimize Cerebral Perfusion  Recent Flowsheet Documentation  Taken 9/23/2021 1200 by Toshia Sanches RN  Sensory Stimulation Regulation: care clustered  Taken 9/23/2021 0815 by Toshia Sanches RN  Sensory Stimulation Regulation: care clustered  Cerebral Perfusion Promotion: blood pressure monitored     Problem: Communication Impairment (Stroke, Ischemic/Transient Ischemic Attack)  Goal: Improved Communication Skills  Outcome: Ongoing, Progressing  Intervention: Optimize Cognitive and Communication Skills  Recent Flowsheet Documentation  Taken 9/23/2021 1200 by Toshia Sanches RN  Reorientation Measures: clock in view  Communication Enhancement Strategies: call light answered in person  Taken 9/23/2021 0815 by Toshia Sanches RN  Reorientation Measures:   clock in view   reorientation provided  Communication Enhancement Strategies: call light answered in person     Problem: Eating/Swallowing Impairment (Stroke, Ischemic/Transient Ischemic Attack)  Goal: Oral Intake without Aspiration  Outcome: Ongoing, Progressing     Problem: Functional Ability Impaired (Stroke, Ischemic/Transient Ischemic Attack)  Goal: Optimal Functional Ability  Outcome: Ongoing, Progressing  Intervention: Optimize Functional Ability  Recent Flowsheet Documentation  Taken 9/23/2021 1200 by Toshia Sanches RN  Activity Management:   activity adjusted per tolerance   activity encouraged      Problem: Hemodynamic Instability (Stroke, Ischemic/Transient Ischemic Attack)  Goal: Vital Signs Remain in Desired Range  Outcome: Ongoing, Progressing     Problem: Pain (Stroke, Ischemic/Transient Ischemic Attack)  Goal: Acceptable Pain Control  Outcome: Ongoing, Progressing     Problem: Sensorimotor Impairment (Stroke, Ischemic/Transient Ischemic Attack)  Goal: Improved Sensorimotor Function  Outcome: Ongoing, Progressing  Intervention: Optimize Sensory and Perceptual Abilities  Recent Flowsheet Documentation  Taken 9/23/2021 1200 by Toshia Sanches RN  Pressure Reduction Techniques:   frequent weight shift encouraged   weight shift assistance provided  Pressure Reduction Devices: pressure-redistributing mattress utilized  Taken 9/23/2021 0815 by Toshia Sanches RN  Pressure Reduction Techniques: frequent weight shift encouraged  Pressure Reduction Devices: pressure-redistributing mattress utilized     Problem: Urinary Elimination Impaired (Stroke, Ischemic/Transient Ischemic Attack)  Goal: Effective Urinary Elimination  Outcome: Ongoing, Progressing

## 2021-09-23 NOTE — OUTREACH NOTE
Prep Survey      Responses   [facility] patient discharged from?  [facility]   Is LACE score < 7 ?  No   Emergency Room discharge w/ pulse ox?  No   Eligibility  TCM   Hospital  [facility]   Date of Admission  09/22/21   Date of Discharge  09/23/21   Discharge Disposition  Home or Self Care   Discharge diagnosis  A/C left sided weakness, AMS, hypoglycemia, T2DM, Acute leukocytosis, acute hypomagnesemia   Does the patient have one of the following disease processes/diagnoses(primary or secondary)?  Other   Does the patient have Home health ordered?  No   Is there a DME ordered?  No   Prep survey completed?  Yes          Nancy Jolley RN

## 2021-09-23 NOTE — THERAPY DISCHARGE NOTE
Acute Care - Speech Language Pathology   Swallow Treatment Note/Discharge RIGOBERTO Mcdermott     Patient Name: Chao Lazar  : 1960  MRN: [id number]  Today's Date: 2021               Admit Date: 2021    Visit Dx:    ICD-10-CM ICD-9-CM   1. Hypoglycemia  E16.2 251.2   2. Altered mental status, unspecified altered mental status type  R41.82 780.97   3. Left-sided weakness  R53.1 728.87   4. Leukocytosis, unspecified type  D72.829 288.60     Patient Active Problem List   Diagnosis   • Allergic state   • Asthma   • Chronic cough   • Degenerative joint disease   • Type 2 diabetes mellitus with hyperglycemia, with long-term current use of insulin (CMS/HCC)   • Gastroparesis   • Hyperlipidemia, mixed   • Essential hypertension   • Nausea and vomiting   • Vitamin D deficiency   • Combined forms of age-related cataract, bilateral   • Presbyopia   • Acute ischemic right PCA stroke involving the right occipital lobe (CMS/HCC)   • Sphenoid sinusitis   • Retinal disorder   • Coronary artery disease involving native coronary artery of native heart without angina pectoris   • Cutaneous abscess of head excluding face   • History of CVA (cerebrovascular accident)   • Leukocytosis   • Hypomagnesemia   • Left-sided weakness     Past Medical History:   Diagnosis Date   • Asthma 3/22/2018   • Coronary artery disease involving native coronary artery of native heart without angina pectoris 2020   • Essential hypertension 2019   • Gastroparesis 2013   • History of CVA (cerebrovascular accident) 2021   • Hyperlipidemia, mixed 4/3/2017   • Hypoglycemia    • Type 2 diabetes mellitus (CMS/HCC)      Past Surgical History:   Procedure Laterality Date   • CARDIAC CATHETERIZATION     • CHOLECYSTECTOMY     • COLON RESECTION      [id number]   • COLON RESECTION SMALL BOWEL Right 2019    Procedure: open right hemicolectomy;  Surgeon: Ronaldo Ardon MD;  Location: [facility] OR;  Service: General   •  COLONOSCOPY  2019    x2    • CORONARY ANGIOPLASTY WITH STENT PLACEMENT  04/2017   • ECHO - CONVERTED  2019   • ECHO - CONVERTED  2020   • FRACTURE SURGERY      collar bone as a child    • KNEE ARTHROSCOPY Left 08/2003   • KNEE JOINT MANIPULATION Left 04/2010   • TOTAL KNEE ARTHROPLASTY Bilateral     2013,2011             SWALLOW EVALUATION (last 72 hours)      SLP Adult Swallow Evaluation     Row Name 09/23/21 1100       Rehab Evaluation    Document Type  discharge treatment  -LF    Subjective Information  no complaints  -LF    Patient Observations  alert;cooperative;agree to therapy  -LF    Care Plan Review  evaluation/treatment results reviewed;patient/other agree to care plan  -LF    Patient Effort  good  -LF    Symptoms Noted During/After Treatment  none  -LF       General Information    Patient Profile Reviewed  yes  -LF       Swallow Goals (SLP)    Oral Nutrition/Hydration Goal Selection (SLP)  oral nutrition/hydration, SLP goal 1;oral nutrition/hydration, SLP goal 2  -LF       Oral Nutrition/Hydration Goal 1 (SLP)    Oral Nutrition/Hydration Goal 1, SLP  Pt to be seen for a meal assessment to ensure continued diet tolerance w/further recommendations as indicated.  -LF    Time Frame (Oral Nutrition/Hydration Goal 1, SLP)  2 days  -LF    Barriers (Oral Nutrition/Hydration Goal 1, SLP) Pt seen with lunch tray this date to assess diet tolerance. Upon entry, pt sitting upright on side of bed with meal tray. Lunch tray included turkey, dressing, strawberry ice box pie, and thin liquids by straw. Pt self fed and independently demonstrated safe swallow strategies. Oral phase WFL. No overt s/s of aspiration observed at any time. No oral pocketing/residue noted. Recommend pt continue current diet.  -LF    Progress/Outcomes (Oral Nutrition/Hydration Goal 1, SLP)  goal met  -LF       Oral Nutrition/Hydration Goal 2 (SLP)    Oral Nutrition/Hydration Goal 2, SLP  Pt to tolerate safest & least restrictive diet  recommendations w/no complications from aspiration.  -LF    Time Frame (Oral Nutrition/Hydration Goal 2, SLP)  by discharge  -LF    Barriers (Oral Nutrition/Hydration Goal 2, SLP) Pt tolerating safest and least restrictive diet with no complications from aspiration. No further skilled ST intervention warranted at this time. ST will sign off.   -LF    Progress/Outcomes (Oral Nutrition/Hydration Goal 2, SLP)  goal met  -LF      User Key  (r) = Recorded By, (t) = Taken By, (c) = Cosigned By    Initials Name Effective Dates    Nicolle Green 06/16/21 -     Kinjal Boggs SLP 06/16/21 -           EDUCATION  The patient has been educated in the following areas:   Safe swallow strategies.           User Key  (r) = Recorded By, (t) = Taken By, (c) = Cosigned By    Initials Name Provider Type    Nicolle Green Speech and Language Pathologist    Kinjal Boggs, SLP Speech and Language Pathologist               Time Calculation:                     JAIDA Smith  9/23/2021

## 2021-09-24 ENCOUNTER — TRANSITIONAL CARE MANAGEMENT TELEPHONE ENCOUNTER (OUTPATIENT)
Dept: CALL CENTER | Facility: HOSPITAL | Age: 61
End: 2021-09-24

## 2021-09-24 NOTE — OUTREACH NOTE
Call Center TCM Note      Responses   [facility] patient discharged from?  [facility]   Does the patient have one of the following disease processes/diagnoses(primary or secondary)?  Other   TCM attempt successful?  Yes   Call start time  1030   Call end time  1040   Discharge diagnosis  A/C left sided weakness, AMS, hypoglycemia, T2DM, Acute leukocytosis, acute hypomagnesemia   Is patient permission given to speak with other caregiver?  Yes   Person spoke with today (if not patient) and relationship  wife Debbi   Meds reviewed with patient/caregiver?  Yes   Is the patient having any side effects they believe may be caused by any medication additions or changes?  No   Does the patient have all medications ordered at discharge?  N/A [no changes to meds]   Is the patient taking all medications as directed (includes completed medication regime)?  N/A   Does the patient have a primary care provider?   Yes   Does the patient have an appointment with their PCP within 7 days of discharge?  Greater than 7 days   Comments regarding PCP  I could not find availablility with Dr Kimbrough but scheduled a hospital followup on Oct 1 with Dell Lucas. This will meet TCM guideline.    What is preventing the patient from scheduling follow up appointments within 7 days of discharge?  Earlier appointment not available   Nursing Interventions  Verified appointment date/time/provider   Has the patient kept scheduled appointments due by today?  N/A   Has home health visited the patient within 72 hours of discharge?  N/A   Psychosocial issues?  No   Did the patient receive a copy of their discharge instructions?  Yes   Nursing interventions  Reviewed instructions with patient   What is the patient's perception of their health status since discharge?  Improving   Is the patient/caregiver able to teach back signs and symptoms related to disease process for when to call PCP?  Yes   Is the patient/caregiver able to teach back signs and  symptoms related to disease process for when to call 911?  Yes   Is the patient/caregiver able to teach back the hierarchy of who to call/visit for symptoms/problems? PCP, Specialist, Home health nurse, Urgent Care, ED, 911  Yes   If the patient is a current smoker, are they able to teach back resources for cessation?  Not a smoker   TCM call completed?  Yes          Tonio Hernandez RN    9/24/2021, 10:40 EDT

## 2021-09-30 ENCOUNTER — READMISSION MANAGEMENT (OUTPATIENT)
Dept: CALL CENTER | Facility: HOSPITAL | Age: 61
End: 2021-09-30

## 2021-09-30 NOTE — OUTREACH NOTE
Medical Week 2 Survey      Responses   [facility] patient discharged from?  [facility]   Does the patient have one of the following disease processes/diagnoses(primary or secondary)?  Other   Week 2 attempt successful?  Yes   Call start time  1743   Discharge diagnosis  A/C left sided weakness, AMS, hypoglycemia, T2DM, Acute leukocytosis, acute hypomagnesemia   Call end time  1744   Meds reviewed with patient/caregiver?  Yes   Is the patient taking all medications as directed (includes completed medication regime)?  Yes   Has the patient kept scheduled appointments due by today?  N/A   Comments  Appt tomorrow   What is the patient's perception of their health status since discharge?  Improving   Week 2 Call Completed?  Yes   Wrap up additional comments  Improving          Christina Leong RN

## 2021-10-01 ENCOUNTER — OFFICE VISIT (OUTPATIENT)
Dept: FAMILY MEDICINE CLINIC | Facility: CLINIC | Age: 61
End: 2021-10-01

## 2021-10-01 VITALS
HEIGHT: 73 IN | WEIGHT: 186 LBS | RESPIRATION RATE: 16 BRPM | SYSTOLIC BLOOD PRESSURE: 132 MMHG | BODY MASS INDEX: 24.65 KG/M2 | OXYGEN SATURATION: 95 % | HEART RATE: 76 BPM | DIASTOLIC BLOOD PRESSURE: 84 MMHG

## 2021-10-01 DIAGNOSIS — Z86.73 HISTORY OF CVA (CEREBROVASCULAR ACCIDENT): Chronic | ICD-10-CM

## 2021-10-01 DIAGNOSIS — Z79.4 TYPE 2 DIABETES MELLITUS WITH HYPERGLYCEMIA, WITH LONG-TERM CURRENT USE OF INSULIN (HCC): Primary | Chronic | ICD-10-CM

## 2021-10-01 DIAGNOSIS — E11.65 TYPE 2 DIABETES MELLITUS WITH HYPERGLYCEMIA, WITH LONG-TERM CURRENT USE OF INSULIN (HCC): Primary | Chronic | ICD-10-CM

## 2021-10-01 PROCEDURE — 99496 TRANSJ CARE MGMT HIGH F2F 7D: CPT | Performed by: PHYSICIAN ASSISTANT

## 2021-10-01 NOTE — PROGRESS NOTES
Transitional Care Follow Up Visit  Subjective     Chao Lazar is a 61 y.o. male who presents for a transitional care management visit.    Within 48 business hours after discharge our office contacted him via telephone to coordinate his care and needs.      I reviewed and discussed the details of that call along with the discharge summary, hospital problems, inpatient lab results, inpatient diagnostic studies, and consultation reports with Chao.     Current outpatient and discharge medications have been reconciled for the patient.  Reviewed by: Dell Leong PA-C      Date of TCM Phone Call 9/23/2021   Hospital [facility]   Date of Admission 9/22/2021   Date of Discharge 9/23/2021   Discharge Disposition Home or Self Care     Risk for Readmission (LACE) Score: 8 (9/23/2021  6:01 AM)      History of Present Illness   Course During Hospital Stay:  Patient presents to the clinic for follow up from a recent hospital stay. He has left sided chronically but the was exacerbated by having high blood sugars. His blood sugar was very high and he began having these symptoms. It was said that he had a low blood sugar but his wife says that this did not happen. Feels better now.      The following portions of the patient's history were reviewed and updated as appropriate: allergies, current medications, past family history, past medical history, past social history, past surgical history and problem list.    Review of Systems    Objective   Physical Exam  Constitutional:       Appearance: He is well-developed.      Comments: Older than state age.    HENT:      Head: Normocephalic and atraumatic.   Eyes:      Conjunctiva/sclera: Conjunctivae normal.      Pupils: Pupils are equal, round, and reactive to light.   Cardiovascular:      Rate and Rhythm: Normal rate and regular rhythm.      Heart sounds: No murmur heard.     Pulmonary:      Effort: Pulmonary effort is normal.      Breath sounds: Normal breath sounds.   Abdominal:       General: Bowel sounds are normal.      Palpations: Abdomen is soft.      Tenderness: There is no abdominal tenderness.   Musculoskeletal:         General: No deformity. Normal range of motion.      Cervical back: Normal range of motion and neck supple.   Lymphadenopathy:      Cervical: No cervical adenopathy.   Skin:     General: Skin is warm and dry.      Capillary Refill: Capillary refill takes less than 2 seconds.      Findings: No rash.   Neurological:      Mental Status: He is alert and oriented to person, place, and time.      Cranial Nerves: No cranial nerve deficit.      Comments: Residual left sided weakness   Psychiatric:         Behavior: Behavior normal.         Thought Content: Thought content normal.         Judgment: Judgment normal.         Assessment/Plan   Diagnoses and all orders for this visit:    1. Type 2 diabetes mellitus with hyperglycemia, with long-term current use of insulin (HCC) (Primary)  Comments:  long discussion about diabetes management. Family is interested in basal bolus regimen. Recommend discussing this with . Majority of his issues.     2. History of CVA (cerebrovascular accident)  Comments:  Left sided weakness has improved since hospitilization.

## 2021-10-06 ENCOUNTER — READMISSION MANAGEMENT (OUTPATIENT)
Dept: CALL CENTER | Facility: HOSPITAL | Age: 61
End: 2021-10-06

## 2021-10-06 NOTE — OUTREACH NOTE
Medical Week 3 Survey      Responses   [facility] patient discharged from?  [facility]   Does the patient have one of the following disease processes/diagnoses(primary or secondary)?  Other   Week 3 attempt successful?  Yes   Call start time  1744   Call end time  1745   Meds reviewed with patient/caregiver?  Yes   Is the patient taking all medications as directed (includes completed medication regime)?  Yes   Has the patient kept scheduled appointments due by today?  Yes   Comments  left sided weakness arm and leg remains, walking fine   What is the patient's perception of their health status since discharge?  Improving   Week 3 Call Completed?  Yes   Graduated  Yes   Did the patient feel the follow up calls were helpful during their recovery period?  Yes   Was the number of calls appropriate?  Yes   Graduated/Revoked comments  He states, he si doing well, no need for futher calls, still having left sided weakness is improving, no questions. today          Megan Tran RN

## 2021-10-15 DIAGNOSIS — E11.65 TYPE 2 DIABETES MELLITUS WITH HYPERGLYCEMIA, WITH LONG-TERM CURRENT USE OF INSULIN (HCC): ICD-10-CM

## 2021-10-15 DIAGNOSIS — Z79.4 TYPE 2 DIABETES MELLITUS WITH HYPERGLYCEMIA, WITH LONG-TERM CURRENT USE OF INSULIN (HCC): ICD-10-CM

## 2021-10-15 RX ORDER — BLOOD SUGAR DIAGNOSTIC
STRIP MISCELLANEOUS
Qty: 200 EACH | Refills: 4 | Status: SHIPPED | OUTPATIENT
Start: 2021-10-15 | End: 2022-05-12

## 2021-10-21 NOTE — SIGNIFICANT NOTE
At the bedside with the patient and his wife.  Initial work up so far has not shown any acute findings. ABG showed some decrease in Po2, but no acute distress noted on physical exam.  CXR did not show any obvious infiltrate.  Procal is >6, but this could be secondary to stroke.  There would be no obvious source at this time as general surgery thought it would be too early since his surgery to have any abdominal etiology.  The patient is not complaining of any abdominal symptoms. He is currently alert and oriented and per his wife he is back at baseline. She believes his confusion is due to lack of sleep from frequent interruptions and neuro checks.  Repeat CT of the head did not show any new or worsening findings.    Laparoscopic left inguinal hernia repair with mesh and umbilical hernia

## 2021-11-08 RX ORDER — ATORVASTATIN CALCIUM 20 MG/1
TABLET, FILM COATED ORAL
Qty: 90 TABLET | Refills: 1 | OUTPATIENT
Start: 2021-11-08

## 2021-12-08 ENCOUNTER — TELEPHONE (OUTPATIENT)
Dept: FAMILY MEDICINE CLINIC | Facility: CLINIC | Age: 61
End: 2021-12-08

## 2021-12-22 RX ORDER — APIXABAN 5 MG/1
TABLET, FILM COATED ORAL
Qty: 60 TABLET | Refills: 3 | Status: SHIPPED | OUTPATIENT
Start: 2021-12-22 | End: 2022-02-07 | Stop reason: SDUPTHER

## 2022-01-14 RX ORDER — METOPROLOL TARTRATE 50 MG/1
TABLET, FILM COATED ORAL
COMMUNITY
Start: 2021-11-24 | End: 2022-01-19

## 2022-01-19 ENCOUNTER — LAB (OUTPATIENT)
Dept: LAB | Facility: HOSPITAL | Age: 62
End: 2022-01-19

## 2022-01-19 ENCOUNTER — OFFICE VISIT (OUTPATIENT)
Dept: ENDOCRINOLOGY | Facility: CLINIC | Age: 62
End: 2022-01-19

## 2022-01-19 VITALS
BODY MASS INDEX: 26.32 KG/M2 | HEART RATE: 70 BPM | WEIGHT: 198.6 LBS | SYSTOLIC BLOOD PRESSURE: 100 MMHG | TEMPERATURE: 96.9 F | DIASTOLIC BLOOD PRESSURE: 72 MMHG | HEIGHT: 73 IN

## 2022-01-19 DIAGNOSIS — E55.9 VITAMIN D DEFICIENCY: ICD-10-CM

## 2022-01-19 DIAGNOSIS — Z79.4 TYPE 2 DIABETES MELLITUS WITH HYPERGLYCEMIA, WITH LONG-TERM CURRENT USE OF INSULIN: ICD-10-CM

## 2022-01-19 DIAGNOSIS — Z79.4 TYPE 2 DIABETES MELLITUS WITH HYPERGLYCEMIA, WITH LONG-TERM CURRENT USE OF INSULIN: Primary | Chronic | ICD-10-CM

## 2022-01-19 DIAGNOSIS — E11.65 TYPE 2 DIABETES MELLITUS WITH HYPERGLYCEMIA, WITH LONG-TERM CURRENT USE OF INSULIN: ICD-10-CM

## 2022-01-19 DIAGNOSIS — E78.2 HYPERLIPIDEMIA, MIXED: Chronic | ICD-10-CM

## 2022-01-19 DIAGNOSIS — E78.2 HYPERLIPIDEMIA, MIXED: ICD-10-CM

## 2022-01-19 DIAGNOSIS — E11.65 TYPE 2 DIABETES MELLITUS WITH HYPERGLYCEMIA, WITH LONG-TERM CURRENT USE OF INSULIN: Primary | Chronic | ICD-10-CM

## 2022-01-19 LAB
25(OH)D3 SERPL-MCNC: 38.4 NG/ML
ALBUMIN SERPL-MCNC: 4.3 G/DL (ref 3.5–5.2)
ALBUMIN UR-MCNC: 44.9 MG/DL
ALBUMIN/GLOB SERPL: 1.4 G/DL
ALP SERPL-CCNC: 116 U/L (ref 39–117)
ALT SERPL W P-5'-P-CCNC: 48 U/L (ref 1–41)
ANION GAP SERPL CALCULATED.3IONS-SCNC: 12.4 MMOL/L (ref 5–15)
AST SERPL-CCNC: 37 U/L (ref 1–40)
BILIRUB SERPL-MCNC: 0.3 MG/DL (ref 0–1.2)
BUN SERPL-MCNC: 23 MG/DL (ref 8–23)
BUN/CREAT SERPL: 17.8 (ref 7–25)
CALCIUM SPEC-SCNC: 10.3 MG/DL (ref 8.6–10.5)
CHLORIDE SERPL-SCNC: 98 MMOL/L (ref 98–107)
CHOLEST SERPL-MCNC: 136 MG/DL (ref 0–200)
CO2 SERPL-SCNC: 23.6 MMOL/L (ref 22–29)
CREAT SERPL-MCNC: 1.29 MG/DL (ref 0.76–1.27)
CREAT UR-MCNC: 89.4 MG/DL
GFR SERPL CREATININE-BSD FRML MDRD: 56 ML/MIN/1.73
GLOBULIN UR ELPH-MCNC: 3.1 GM/DL
GLUCOSE BLDC GLUCOMTR-MCNC: 236 MG/DL (ref 70–105)
GLUCOSE SERPL-MCNC: 229 MG/DL (ref 65–99)
HBA1C MFR BLD: 7.9 % (ref 3.5–5.6)
HDLC SERPL-MCNC: 34 MG/DL (ref 40–60)
LDLC SERPL CALC-MCNC: 55 MG/DL (ref 0–100)
LDLC/HDLC SERPL: 1.24 {RATIO}
MICROALBUMIN/CREAT UR: 502.2 MG/G
POTASSIUM SERPL-SCNC: 4.9 MMOL/L (ref 3.5–5.2)
PROT SERPL-MCNC: 7.4 G/DL (ref 6–8.5)
SODIUM SERPL-SCNC: 134 MMOL/L (ref 136–145)
TRIGL SERPL-MCNC: 300 MG/DL (ref 0–150)
TSH SERPL DL<=0.05 MIU/L-ACNC: 2.44 UIU/ML (ref 0.27–4.2)
VLDLC SERPL-MCNC: 47 MG/DL (ref 5–40)

## 2022-01-19 PROCEDURE — 99214 OFFICE O/P EST MOD 30 MIN: CPT | Performed by: INTERNAL MEDICINE

## 2022-01-19 PROCEDURE — 80053 COMPREHEN METABOLIC PANEL: CPT

## 2022-01-19 PROCEDURE — 82962 GLUCOSE BLOOD TEST: CPT | Performed by: INTERNAL MEDICINE

## 2022-01-19 PROCEDURE — 82570 ASSAY OF URINE CREATININE: CPT

## 2022-01-19 PROCEDURE — 82306 VITAMIN D 25 HYDROXY: CPT

## 2022-01-19 PROCEDURE — 82043 UR ALBUMIN QUANTITATIVE: CPT

## 2022-01-19 PROCEDURE — 80061 LIPID PANEL: CPT

## 2022-01-19 PROCEDURE — 36415 COLL VENOUS BLD VENIPUNCTURE: CPT

## 2022-01-19 PROCEDURE — 83036 HEMOGLOBIN GLYCOSYLATED A1C: CPT

## 2022-01-19 PROCEDURE — 84443 ASSAY THYROID STIM HORMONE: CPT

## 2022-01-19 NOTE — PATIENT INSTRUCTIONS
Labs drawn today.  Will call you with the lab results.  Increase metformin to 3x/d with meals.  Add Novolin 70/30 10 units with bedtime snack.  Continue atorvastatin & vit D supplements.  Do chair exercises.  Call if blood sugars are running under 100 or over 200.  F/u in 6 months, with labs prior.

## 2022-01-23 NOTE — PROGRESS NOTES
[facility]        Patient Care Team:  Al Kimbrough MD as PCP - General  Al Kimbrough MD as PCP - Family Medicine    Chief Complaint:    Chief Complaint   Patient presents with   • Diabetes     follow up, Type 2, BS  236 coffee w/cream 1  hr ago, insulin 60 units this am         Subjective   Here for diabetes f/u  Blood sugars: in the low 200's lately  Taking insulin regularly   Exercise program: not much  Taking vit D    Interval History:     Patient Complaints: hospitalized in Sept with low blood sugar  Patient Denies: chest pain  History taken from: patient & wife    Review of Systems:   Review of Systems   Constitutional: Positive for unexpected weight gain.   HENT: Negative for trouble swallowing.    Eyes: Positive for blurred vision.   Gastrointestinal: Negative for nausea.   Endocrine: Negative for polyuria.   Genitourinary: Positive for nocturia.   Musculoskeletal: Positive for gait problem.   Neurological: Positive for speech difficulty and weakness. Negative for headache.     Gained 15 lb since last visit    Objective     Vital Signs      Vitals:    01/19/22 1253   BP: 100/72   Pulse: 70   Temp: 96.9 °F (36.1 °C)         Physical Exam:     General Appearance:    Alert, cooperative, in no acute distress   Head:    Normocephalic, without obvious abnormality, atraumatic   Eyes:            Lids and lashes normal, conjunctivae and sclerae normal, no   icterus, no pallor, corneas clear, PERRLA   Throat:   No oral lesions,  oral mucosa moist   Neck:   No adenopathy, supple,  no thyromegaly, no   carotid bruit   Lungs:     Clear     Heart:    Regular rhythm and normal rate   Chest Wall:    No abnormalities observed   Abdomen:     Normal bowel sounds, soft                 Extremities:   Hammer toes, no edema               Pulses:   Pulses palpable and equal bilaterally   Skin:   Dry   Neurologic:  DTR absent in ankles, absent vibratory sense in toes, unable to feel the 10g  monofilament ( same as 1y ago )            Results Review:    I have reviewed the patient's new clinical results, labs & imaging.    Medication Review:   Prior to Admission medications    Medication Sig Start Date End Date Taking? Authorizing Provider   Accu-Chek Guide test strip USE TO CHECK BLOOD SUGAR TWICE DAILY 10/15/21  Yes Radha Manriquez MD   aspirin 81 MG EC tablet Take 1 tablet by mouth Daily. 12/12/19  Yes Farrah Crain MD   atorvastatin (LIPITOR) 80 MG tablet TAKE 1 TABLET BY MOUTH EVERY DAY 6/1/21  Yes Al Kimbrough MD   Cholecalciferol (HM Vitamin D3) 50 MCG (2000 UT) capsule Take 2,000 Units by mouth Daily.   Yes aJylene Berger MD   Eliquis 5 MG tablet tablet TAKE 1 TABLET BY MOUTH EVERY 12 (TWELVE) HOURS FOR 30 DAYS. 12/22/21  Yes Al Kimbrough MD   Insulin NPH Isophane & Regular (NOVOLIN 70/30 FLEXPEN SC) Inject 60 units ac breakfast, liunch & supper, 10 units ac HS snack.   Yes Jaylene Berger MD   metFORMIN ER (GLUCOPHAGE-XR) 500 MG 24 hr tablet Take one tab ac breakfast, lunch & supper.   Yes Jaylene Berger MD   metoclopramide (REGLAN) 10 MG tablet Take 10 mg by mouth 4 (Four) Times a Day Before Meals & at Bedtime.   Yes Jaylene Berger MD   omeprazole (priLOSEC) 20 MG capsule Take 20 mg by mouth Every Evening.   Yes Jaylene Berger MD       Lab Results (most recent)     Procedure Component Value Units Date/Time    POC Glucose [[id number]]  (Abnormal) Collected: 01/19/22 1258    Specimen: Blood Updated: 01/19/22 1300     Glucose 236 mg/dL      Comment: Serial Number: [id number]:  [id number]           Lab Results   Component Value Date    HGBA1C 7.9 (H) 01/19/2022    HGBA1C 11.1 (H) 09/22/2021    HGBA1C 12.4 (H) 07/07/2021      Lab Results   Component Value Date    GLUCOSE 229 (H) 01/19/2022    BUN 23 01/19/2022    CREATININE 1.29 (H) 01/19/2022    EGFRIFNONA 56 (L) 01/19/2022    BCR 17.8 01/19/2022    K 4.9 01/19/2022    CO2 23.6 01/19/2022     CALCIUM 10.3 01/19/2022    ALBUMIN 4.30 01/19/2022    LABIL2 0.8 (L) 05/26/2019    AST 37 01/19/2022    ALT 48 (H) 01/19/2022    CHOL 136 01/19/2022    LDL 55 01/19/2022    HDL 34 (L) 01/19/2022    TRIG 300 (H) 01/19/2022     Lab Results   Component Value Date    TSH 2.440 01/19/2022    LDKB27KU 38.4 01/19/2022     Microalb/cr ratio 502.2    Assessment/Plan     Diagnoses and all orders for this visit:    1. Type 2 diabetes mellitus with hyperglycemia, with long-term current use of insulin (HCC) (Primary)  -     Hemoglobin A1c; Future    2. Hyperlipidemia, mixed  -     Comprehensive Metabolic Panel; Future    3. Vitamin D deficiency    Other orders  -     POC Glucose    Glucose control improved. Lipids & vit D stable.    Increase metformin to 3x/d with meals.  Add Novolin 70/30 10 units with bedtime snack.  Continue atorvastatin & vit D supplements.  Do chair exercises.  Call if blood sugars are running under 100 or over 200.  Keep glucose gel close by to treat hypoglycemia if needed.        Radha Manriquez MD  01/23/22  16:24 EST

## 2022-02-07 RX ORDER — ATORVASTATIN CALCIUM 20 MG/1
TABLET, FILM COATED ORAL
Qty: 90 TABLET | Refills: 1 | Status: SHIPPED | OUTPATIENT
Start: 2022-02-07 | End: 2022-02-09

## 2022-02-07 NOTE — TELEPHONE ENCOUNTER
Caller: ANURAGCRISTO SHIV    Relationship: Emergency Contact    Best call back number: [phone]   Requested Prescriptions:   Requested Prescriptions     Pending Prescriptions Disp Refills   • apixaban (Eliquis) 5 MG tablet tablet 60 tablet 3     Sig: Take 1 tablet by mouth Every 12 (Twelve) Hours.        Pharmacy where request should be sent: [facility]ACY #[id number] - [facility] [place] [address] [phone] [facility] [phone]      Additional details provided by patient:      PABLO TOOK LAST TABLET 2/6/2022, NEEDING REFILL       Does the patient have less than a 3 day supply:  [x] Yes  [] No    Tara Fine   02/07/22 12:59 EST

## 2022-02-09 ENCOUNTER — OFFICE VISIT (OUTPATIENT)
Dept: FAMILY MEDICINE CLINIC | Facility: CLINIC | Age: 62
End: 2022-02-09

## 2022-02-09 VITALS
TEMPERATURE: 96.9 F | HEIGHT: 73 IN | SYSTOLIC BLOOD PRESSURE: 110 MMHG | OXYGEN SATURATION: 99 % | RESPIRATION RATE: 16 BRPM | BODY MASS INDEX: 26.24 KG/M2 | HEART RATE: 76 BPM | DIASTOLIC BLOOD PRESSURE: 78 MMHG | WEIGHT: 198 LBS

## 2022-02-09 DIAGNOSIS — Z79.4 TYPE 2 DIABETES MELLITUS WITH HYPERGLYCEMIA, WITH LONG-TERM CURRENT USE OF INSULIN: Primary | Chronic | ICD-10-CM

## 2022-02-09 DIAGNOSIS — I10 ESSENTIAL HYPERTENSION: Chronic | ICD-10-CM

## 2022-02-09 DIAGNOSIS — E78.2 HYPERLIPIDEMIA, MIXED: Chronic | ICD-10-CM

## 2022-02-09 DIAGNOSIS — I25.10 CORONARY ARTERY DISEASE INVOLVING NATIVE CORONARY ARTERY OF NATIVE HEART WITHOUT ANGINA PECTORIS: Chronic | ICD-10-CM

## 2022-02-09 DIAGNOSIS — E11.65 TYPE 2 DIABETES MELLITUS WITH HYPERGLYCEMIA, WITH LONG-TERM CURRENT USE OF INSULIN: Primary | Chronic | ICD-10-CM

## 2022-02-09 DIAGNOSIS — I63.531 ACUTE ISCHEMIC RIGHT PCA STROKE: ICD-10-CM

## 2022-02-09 PROBLEM — Z86.73 HISTORY OF CVA (CEREBROVASCULAR ACCIDENT): Chronic | Status: RESOLVED | Noted: 2021-09-22 | Resolved: 2022-02-09

## 2022-02-09 PROCEDURE — 99214 OFFICE O/P EST MOD 30 MIN: CPT | Performed by: FAMILY MEDICINE

## 2022-02-09 NOTE — PROGRESS NOTES
"Chief Complaint  Hyperlipidemia (f/u meds)    Subjective         Chao Lazar presents to [facility]  For follow up on medications needing refill on eliquis.    Hyperlipidemia    The patient presents today for a follow-up after being admitted to the hospital. He is accompanied by an adult female.    The patient states that he was in the hospital several weeks ago. He was admitted to the hospital due to low blood glucose, and reports that his blood glucose readings have improved. The patient states that he has been doing well. He reports that his appetite is good.    He denies having contracted COVID-19. He has received 2 COVID-19 vaccinations, but not the booster.     The patient states that he checks his feet, and skin. His pulses are slightly weak. The patient follows with an endocrinologist, Dr. Manriquez. His recent A1c was 7.9 percent. The adult female states that he does not have a continuous glucose monitor at home. He takes insulin 3 times per day, and is on 70/30 insulin.     The adult female states that he has not received his Eliquis, and was informed that there should be a shipment coming in today, but has not checked yet. He has not had Eliquis since Monday, 02/07/2022. His insurance no longer pays for the Eliquis. The patient states that he occasionally has angina. He states that his blood pressure at home is 110/70 mmHg. He denies any changes in his eyes since his stroke other than he has a visual field cut. His arms, and legs are doing well. He has not taken Coumadin in the past.    The patient states that he is not working out. He does have good strength.         Objective   Vital Signs:   /78 (BP Location: Right arm)   Pulse 76   Temp 96.9 °F (36.1 °C) (Infrared)   Resp 16   Ht 185.4 cm (73\")   Wt 89.8 kg (198 lb)   SpO2 99%   BMI 26.12 kg/m²     Physical Exam  Vitals reviewed.   Constitutional:       Appearance: He is well-developed and normal " weight. He is ill-appearing.   HENT:      Head: Normocephalic and atraumatic.      Right Ear: Tympanic membrane, ear canal and external ear normal.      Left Ear: Tympanic membrane, ear canal and external ear normal.      Nose: Nose normal.      Mouth/Throat:      Mouth: Mucous membranes are dry.      Pharynx: Oropharynx is clear. No oropharyngeal exudate.   Eyes:      Extraocular Movements: Extraocular movements intact.      Conjunctiva/sclera: Conjunctivae normal.      Pupils: Pupils are equal, round, and reactive to light.      Comments: Poor visual acuity   Cardiovascular:      Rate and Rhythm: Normal rate and regular rhythm.      Pulses: Normal pulses.      Heart sounds: Normal heart sounds.   Pulmonary:      Effort: Pulmonary effort is normal.      Breath sounds: Normal breath sounds.   Abdominal:      General: Bowel sounds are normal.      Palpations: Abdomen is soft.   Musculoskeletal:         General: Tenderness and deformity present. Normal range of motion.      Cervical back: Normal range of motion. Rigidity present.   Skin:     General: Skin is warm and dry.   Neurological:      General: No focal deficit present.      Mental Status: He is alert and oriented to person, place, and time. Mental status is at baseline.      Motor: Weakness present.      Gait: Gait abnormal.   Psychiatric:         Mood and Affect: Mood normal.         Behavior: Behavior normal.        Result Review :                 Assessment and Plan    Diagnoses and all orders for this visit:      1. Type 2 diabetes with hyperglycemia on long term insulin  - The patient was recently seen by his endocrinologist, Dr. Manriquez, and his A1c was down to 7.9 percent. That is down from 11 percent about 4 months ago. He is doing much better with control, and he is on NovoLog 70/30  (verify times per day) per day.    2. Coronary artery disease, currently without any angina  - The patient denies any angina, although at times he does feel like he  has some pressure in his chest, and shortness of breath.    3. Previous, and recent acute ischemic right posterior cerebral artery stroke involving the right occipital lobe  - The patient developed a visual field cut, and that has persisted. He had some transient weakness of an arm, and leg, but that is all gone. The patient's visual field cut has remained, but otherwise doing better.    4. Essential hypertension  - The patient's blood pressure was excellent today at 110/78 mmHg, so no changes in his medicines today are needed.    5. Hyperlipidemia  - The patient has atorvastatin 80 mg, and it has got his total cholesterol, and his LDL cholesterol in excellent range. Triglycerides are high mainly because his blood glucose readings are still a little high, but for the most part, they are much better than they have been. His HDL was low because his triglycerides were very high. He is going to continue current care of his blood glucose.    Return for follow-up visit in 3 months.      Follow Up   Return in about 3 months (around 5/9/2022) for Recheck.  Patient was given instructions and counseling regarding his condition or for health maintenance advice. Please see specific information pulled into the AVS if appropriate.     Transcribed from ambient dictation for Al Kimbrough MD by Su Castaneda.  02/10/22   14:30 EST    Patient verbalized consent to the visit recording.  I have personally performed the services described in this document as transcribed by the above individual, and it is both accurate and complete.  Al Kimbrough MD  2/14/2022  11:29 EST

## 2022-02-15 ENCOUNTER — TELEPHONE (OUTPATIENT)
Dept: FAMILY MEDICINE CLINIC | Facility: CLINIC | Age: 62
End: 2022-02-15

## 2022-02-15 NOTE — TELEPHONE ENCOUNTER
Caller: [facility]    Best call back number: [phone]    REF #[id number]    Which medication are you concerned about: apixaban (Eliquis) 5 MG tablet tablet    What are your concerns: AWAITING ON A PRIOR AUTHORIZATION    [facility] WOULD LIKE TO KNOW IF WE RECEIVED AUTHORIZATION AND PLAN ON MOVING FORWARD WITH PRESCRIBING MEDICATION TO PATIENT    REQUESTING A CALL BACK ON STATUS.

## 2022-04-05 RX ORDER — METOCLOPRAMIDE 10 MG/1
TABLET ORAL
Qty: 120 TABLET | Refills: 6 | Status: SHIPPED | OUTPATIENT
Start: 2022-04-05 | End: 2022-05-12

## 2022-05-03 ENCOUNTER — TELEPHONE (OUTPATIENT)
Dept: FAMILY MEDICINE CLINIC | Facility: CLINIC | Age: 62
End: 2022-05-03

## 2022-05-03 NOTE — TELEPHONE ENCOUNTER
HUB TO READ    [facility] is not here this Friday and wont be back until next Wed and he is already too full to move up unless someone cancels.

## 2022-05-03 NOTE — TELEPHONE ENCOUNTER
[facility] is not here this Friday and wont be back until next Wed and he is already too full to move up unless someone cancels

## 2022-05-03 NOTE — TELEPHONE ENCOUNTER
Caller: SHIV MALDONADO    Relationship to patient: Emergency Contact    Best call back number: [phone]    Patient is needing: PATIENT'S WIFE CALLED AND ASKED FOR HER 'S APPOINTMENT WITH DR. GIRON TO BE MOVED FROM NEXT Friday TO THIS WEEK    HUB ADVISED SHE COULD SCHEDULE WITH ANOTHER PROVIDER FOR THIS WEEK POSSIBLY    THE PATIENT'S WIFE DECLINED AND WHEN ASKED WHY HE NEEDED TO BE SEEN, SHE SAID IT WAS FOR PERSONAL REASONS     PATIENT'S WIFE SAID SHE WOULD CALLBACK LATER AFTER CHECKING ON SOMETHING

## 2022-05-11 RX ORDER — ATORVASTATIN CALCIUM 80 MG/1
TABLET, FILM COATED ORAL
Qty: 90 TABLET | Refills: 1 | Status: SHIPPED | OUTPATIENT
Start: 2022-05-11 | End: 2022-09-27

## 2022-05-12 ENCOUNTER — APPOINTMENT (OUTPATIENT)
Dept: MRI IMAGING | Facility: HOSPITAL | Age: 62
End: 2022-05-12

## 2022-05-12 ENCOUNTER — APPOINTMENT (OUTPATIENT)
Dept: CT IMAGING | Facility: HOSPITAL | Age: 62
End: 2022-05-12

## 2022-05-12 ENCOUNTER — HOSPITAL ENCOUNTER (OUTPATIENT)
Facility: HOSPITAL | Age: 62
Setting detail: OBSERVATION
Discharge: NURSING, REHAB OR LONG-TERM CARE | End: 2022-05-16
Attending: EMERGENCY MEDICINE | Admitting: INTERNAL MEDICINE

## 2022-05-12 DIAGNOSIS — R42 DIZZINESS: Primary | ICD-10-CM
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
## 2022-05-12 PROBLEM — N18.2 CKD (CHRONIC KIDNEY DISEASE), STAGE II: Chronic | Status: ACTIVE | Noted: 2022-05-12

## 2022-05-12 LAB
ALBUMIN SERPL-MCNC: 4.2 G/DL (ref 3.5–5.2)
ALBUMIN/GLOB SERPL: 1.1 G/DL
ALP SERPL-CCNC: 127 U/L (ref 39–117)
ALT SERPL W P-5'-P-CCNC: 51 U/L (ref 1–41)
ANION GAP SERPL CALCULATED.3IONS-SCNC: 14 MMOL/L (ref 5–15)
AST SERPL-CCNC: 34 U/L (ref 1–40)
BACTERIA UR QL AUTO: ABNORMAL /HPF
BASOPHILS # BLD AUTO: 0.1 10*3/MM3 (ref 0–0.2)
BASOPHILS NFR BLD AUTO: 0.5 % (ref 0–1.5)
BILIRUB SERPL-MCNC: 0.2 MG/DL (ref 0–1.2)
BILIRUB UR QL STRIP: NEGATIVE
BUN SERPL-MCNC: 20 MG/DL (ref 8–23)
BUN/CREAT SERPL: 14.9 (ref 7–25)
CALCIUM SPEC-SCNC: 9.7 MG/DL (ref 8.6–10.5)
CHLORIDE SERPL-SCNC: 99 MMOL/L (ref 98–107)
CHOLEST SERPL-MCNC: 118 MG/DL (ref 0–200)
CLARITY UR: CLEAR
CO2 SERPL-SCNC: 24 MMOL/L (ref 22–29)
COLOR UR: YELLOW
CREAT SERPL-MCNC: 1.34 MG/DL (ref 0.76–1.27)
DEPRECATED RDW RBC AUTO: 41.1 FL (ref 37–54)
EGFRCR SERPLBLD CKD-EPI 2021: 59.2 ML/MIN/1.73
EOSINOPHIL # BLD AUTO: 0.3 10*3/MM3 (ref 0–0.4)
EOSINOPHIL NFR BLD AUTO: 3 % (ref 0.3–6.2)
ERYTHROCYTE [DISTWIDTH] IN BLOOD BY AUTOMATED COUNT: 12.9 % (ref 12.3–15.4)
GLOBULIN UR ELPH-MCNC: 3.7 GM/DL
GLUCOSE BLDC GLUCOMTR-MCNC: 163 MG/DL (ref 70–105)
GLUCOSE BLDC GLUCOMTR-MCNC: 239 MG/DL (ref 70–105)
GLUCOSE BLDC GLUCOMTR-MCNC: 271 MG/DL (ref 70–105)
GLUCOSE SERPL-MCNC: 171 MG/DL (ref 65–99)
GLUCOSE UR STRIP-MCNC: ABNORMAL MG/DL
HBA1C MFR BLD: 7.6 % (ref 3.5–5.6)
HCT VFR BLD AUTO: 42.8 % (ref 37.5–51)
HDLC SERPL-MCNC: 36 MG/DL (ref 40–60)
HGB BLD-MCNC: 14.1 G/DL (ref 13–17.7)
HGB UR QL STRIP.AUTO: NEGATIVE
HYALINE CASTS UR QL AUTO: ABNORMAL /LPF
KETONES UR QL STRIP: ABNORMAL
LDLC SERPL CALC-MCNC: 49 MG/DL (ref 0–100)
LDLC/HDLC SERPL: 1.16 {RATIO}
LEUKOCYTE ESTERASE UR QL STRIP.AUTO: NEGATIVE
LYMPHOCYTES # BLD AUTO: 2.6 10*3/MM3 (ref 0.7–3.1)
LYMPHOCYTES NFR BLD AUTO: 24.2 % (ref 19.6–45.3)
MAGNESIUM SERPL-MCNC: 1.3 MG/DL (ref 1.6–2.4)
MCH RBC QN AUTO: 30.2 PG (ref 26.6–33)
MCHC RBC AUTO-ENTMCNC: 33 G/DL (ref 31.5–35.7)
MCV RBC AUTO: 91.6 FL (ref 79–97)
MONOCYTES # BLD AUTO: 0.7 10*3/MM3 (ref 0.1–0.9)
MONOCYTES NFR BLD AUTO: 6.4 % (ref 5–12)
MUCOUS THREADS URNS QL MICRO: ABNORMAL /HPF
NEUTROPHILS NFR BLD AUTO: 65.9 % (ref 42.7–76)
NEUTROPHILS NFR BLD AUTO: 7.2 10*3/MM3 (ref 1.7–7)
NITRITE UR QL STRIP: NEGATIVE
NRBC BLD AUTO-RTO: 0.1 /100 WBC (ref 0–0.2)
PH UR STRIP.AUTO: 6.5 [PH] (ref 5–8)
PLATELET # BLD AUTO: 355 10*3/MM3 (ref 140–450)
PMV BLD AUTO: 7.5 FL (ref 6–12)
POTASSIUM SERPL-SCNC: 4.9 MMOL/L (ref 3.5–5.2)
PROT SERPL-MCNC: 7.9 G/DL (ref 6–8.5)
PROT UR QL STRIP: ABNORMAL
RBC # BLD AUTO: 4.67 10*6/MM3 (ref 4.14–5.8)
RBC # UR STRIP: ABNORMAL /HPF
REF LAB TEST METHOD: ABNORMAL
SARS-COV-2 RNA RESP QL NAA+PROBE: NOT DETECTED
SODIUM SERPL-SCNC: 137 MMOL/L (ref 136–145)
SP GR UR STRIP: 1.02 (ref 1–1.03)
SPERM URNS QL MICRO: ABNORMAL /HPF
SQUAMOUS #/AREA URNS HPF: ABNORMAL /HPF
TRIGL SERPL-MCNC: 202 MG/DL (ref 0–150)
TROPONIN T SERPL-MCNC: <0.01 NG/ML (ref 0–0.03)
TROPONIN T SERPL-MCNC: <0.01 NG/ML (ref 0–0.03)
UROBILINOGEN UR QL STRIP: ABNORMAL
VLDLC SERPL-MCNC: 33 MG/DL (ref 5–40)
WBC # UR STRIP: ABNORMAL /HPF
WBC NRBC COR # BLD: 10.9 10*3/MM3 (ref 3.4–10.8)

## 2022-05-12 PROCEDURE — 80053 COMPREHEN METABOLIC PANEL: CPT | Performed by: EMERGENCY MEDICINE

## 2022-05-12 PROCEDURE — 63710000001 INSULIN ISOPHANE & REGULAR PER 5 UNITS: Performed by: NURSE PRACTITIONER

## 2022-05-12 PROCEDURE — 70450 CT HEAD/BRAIN W/O DYE: CPT

## 2022-05-12 PROCEDURE — U0003 INFECTIOUS AGENT DETECTION BY NUCLEIC ACID (DNA OR RNA); SEVERE ACUTE RESPIRATORY SYNDROME CORONAVIRUS 2 (SARS-COV-2) (CORONAVIRUS DISEASE [COVID-19]), AMPLIFIED PROBE TECHNIQUE, MAKING USE OF HIGH THROUGHPUT TECHNOLOGIES AS DESCRIBED BY CMS-2020-01-R: HCPCS | Performed by: EMERGENCY MEDICINE

## 2022-05-12 PROCEDURE — 99285 EMERGENCY DEPT VISIT HI MDM: CPT

## 2022-05-12 PROCEDURE — 81001 URINALYSIS AUTO W/SCOPE: CPT | Performed by: EMERGENCY MEDICINE

## 2022-05-12 PROCEDURE — 63710000001 INSULIN LISPRO (HUMAN) PER 5 UNITS: Performed by: NURSE PRACTITIONER

## 2022-05-12 PROCEDURE — 93005 ELECTROCARDIOGRAM TRACING: CPT | Performed by: EMERGENCY MEDICINE

## 2022-05-12 PROCEDURE — 84484 ASSAY OF TROPONIN QUANT: CPT | Performed by: EMERGENCY MEDICINE

## 2022-05-12 PROCEDURE — C9803 HOPD COVID-19 SPEC COLLECT: HCPCS

## 2022-05-12 PROCEDURE — 96361 HYDRATE IV INFUSION ADD-ON: CPT

## 2022-05-12 PROCEDURE — G0378 HOSPITAL OBSERVATION PER HR: HCPCS

## 2022-05-12 PROCEDURE — 84484 ASSAY OF TROPONIN QUANT: CPT | Performed by: NURSE PRACTITIONER

## 2022-05-12 PROCEDURE — 85025 COMPLETE CBC W/AUTO DIFF WBC: CPT | Performed by: EMERGENCY MEDICINE

## 2022-05-12 PROCEDURE — 97162 PT EVAL MOD COMPLEX 30 MIN: CPT

## 2022-05-12 PROCEDURE — 83735 ASSAY OF MAGNESIUM: CPT | Performed by: NURSE PRACTITIONER

## 2022-05-12 PROCEDURE — 70551 MRI BRAIN STEM W/O DYE: CPT

## 2022-05-12 PROCEDURE — 99219 PR INITIAL OBSERVATION CARE/DAY 50 MINUTES: CPT | Performed by: NURSE PRACTITIONER

## 2022-05-12 PROCEDURE — 25010000002 MAGNESIUM SULFATE 2 GM/50ML SOLUTION: Performed by: NURSE PRACTITIONER

## 2022-05-12 PROCEDURE — 96366 THER/PROPH/DIAG IV INF ADDON: CPT

## 2022-05-12 PROCEDURE — 97530 THERAPEUTIC ACTIVITIES: CPT

## 2022-05-12 PROCEDURE — 96365 THER/PROPH/DIAG IV INF INIT: CPT

## 2022-05-12 PROCEDURE — 36415 COLL VENOUS BLD VENIPUNCTURE: CPT | Performed by: NURSE PRACTITIONER

## 2022-05-12 PROCEDURE — 83036 HEMOGLOBIN GLYCOSYLATED A1C: CPT | Performed by: NURSE PRACTITIONER

## 2022-05-12 PROCEDURE — 82962 GLUCOSE BLOOD TEST: CPT

## 2022-05-12 PROCEDURE — 80061 LIPID PANEL: CPT | Performed by: NURSE PRACTITIONER

## 2022-05-12 RX ORDER — CHOLECALCIFEROL (VITAMIN D3) 125 MCG
5 CAPSULE ORAL NIGHTLY PRN
Status: DISCONTINUED | OUTPATIENT
Start: 2022-05-12 | End: 2022-05-16 | Stop reason: HOSPADM

## 2022-05-12 RX ORDER — MECLIZINE HYDROCHLORIDE 25 MG/1
25 TABLET ORAL ONCE
Status: COMPLETED | OUTPATIENT
Start: 2022-05-12 | End: 2022-05-12

## 2022-05-12 RX ORDER — ACETAMINOPHEN 160 MG/5ML
650 SOLUTION ORAL EVERY 4 HOURS PRN
Status: DISCONTINUED | OUTPATIENT
Start: 2022-05-12 | End: 2022-05-16 | Stop reason: HOSPADM

## 2022-05-12 RX ORDER — PANTOPRAZOLE SODIUM 40 MG/1
40 TABLET, DELAYED RELEASE ORAL EVERY MORNING
Status: DISCONTINUED | OUTPATIENT
Start: 2022-05-12 | End: 2022-05-16 | Stop reason: HOSPADM

## 2022-05-12 RX ORDER — SODIUM CHLORIDE 0.9 % (FLUSH) 0.9 %
10 SYRINGE (ML) INJECTION EVERY 12 HOURS SCHEDULED
Status: DISCONTINUED | OUTPATIENT
Start: 2022-05-12 | End: 2022-05-16 | Stop reason: HOSPADM

## 2022-05-12 RX ORDER — ALUMINA, MAGNESIA, AND SIMETHICONE 2400; 2400; 240 MG/30ML; MG/30ML; MG/30ML
15 SUSPENSION ORAL EVERY 6 HOURS PRN
Status: DISCONTINUED | OUTPATIENT
Start: 2022-05-12 | End: 2022-05-16 | Stop reason: HOSPADM

## 2022-05-12 RX ORDER — ATORVASTATIN CALCIUM 40 MG/1
80 TABLET, FILM COATED ORAL DAILY
Status: DISCONTINUED | OUTPATIENT
Start: 2022-05-12 | End: 2022-05-16 | Stop reason: HOSPADM

## 2022-05-12 RX ORDER — OLANZAPINE 10 MG/2ML
1 INJECTION, POWDER, LYOPHILIZED, FOR SOLUTION INTRAMUSCULAR AS NEEDED
Status: DISCONTINUED | OUTPATIENT
Start: 2022-05-12 | End: 2022-05-16 | Stop reason: HOSPADM

## 2022-05-12 RX ORDER — POTASSIUM CHLORIDE 20 MEQ/1
40 TABLET, EXTENDED RELEASE ORAL AS NEEDED
Status: DISCONTINUED | OUTPATIENT
Start: 2022-05-12 | End: 2022-05-16 | Stop reason: HOSPADM

## 2022-05-12 RX ORDER — ASPIRIN 81 MG/1
81 TABLET ORAL DAILY
Status: DISCONTINUED | OUTPATIENT
Start: 2022-05-12 | End: 2022-05-16 | Stop reason: HOSPADM

## 2022-05-12 RX ORDER — METOCLOPRAMIDE 10 MG/1
10 TABLET ORAL
COMMUNITY
End: 2023-03-07

## 2022-05-12 RX ORDER — METOCLOPRAMIDE 10 MG/1
10 TABLET ORAL
Status: DISCONTINUED | OUTPATIENT
Start: 2022-05-12 | End: 2022-05-16 | Stop reason: HOSPADM

## 2022-05-12 RX ORDER — ACETAMINOPHEN 325 MG/1
650 TABLET ORAL EVERY 4 HOURS PRN
Status: DISCONTINUED | OUTPATIENT
Start: 2022-05-12 | End: 2022-05-16 | Stop reason: HOSPADM

## 2022-05-12 RX ORDER — POTASSIUM CHLORIDE 1.5 G/1.77G
40 POWDER, FOR SOLUTION ORAL AS NEEDED
Status: DISCONTINUED | OUTPATIENT
Start: 2022-05-12 | End: 2022-05-16 | Stop reason: HOSPADM

## 2022-05-12 RX ORDER — MECLIZINE HYDROCHLORIDE 25 MG/1
25 TABLET ORAL 3 TIMES DAILY PRN
Status: DISCONTINUED | OUTPATIENT
Start: 2022-05-12 | End: 2022-05-16 | Stop reason: HOSPADM

## 2022-05-12 RX ORDER — MAGNESIUM SULFATE HEPTAHYDRATE 40 MG/ML
2 INJECTION, SOLUTION INTRAVENOUS AS NEEDED
Status: DISCONTINUED | OUTPATIENT
Start: 2022-05-12 | End: 2022-05-16 | Stop reason: HOSPADM

## 2022-05-12 RX ORDER — ONDANSETRON 4 MG/1
4 TABLET, FILM COATED ORAL EVERY 6 HOURS PRN
Status: DISCONTINUED | OUTPATIENT
Start: 2022-05-12 | End: 2022-05-16 | Stop reason: HOSPADM

## 2022-05-12 RX ORDER — NICOTINE POLACRILEX 4 MG
15 LOZENGE BUCCAL
Status: DISCONTINUED | OUTPATIENT
Start: 2022-05-12 | End: 2022-05-16 | Stop reason: HOSPADM

## 2022-05-12 RX ORDER — HYDRALAZINE HYDROCHLORIDE 20 MG/ML
10 INJECTION INTRAMUSCULAR; INTRAVENOUS EVERY 6 HOURS PRN
Status: DISCONTINUED | OUTPATIENT
Start: 2022-05-12 | End: 2022-05-16 | Stop reason: HOSPADM

## 2022-05-12 RX ORDER — MAGNESIUM SULFATE HEPTAHYDRATE 40 MG/ML
4 INJECTION, SOLUTION INTRAVENOUS AS NEEDED
Status: DISCONTINUED | OUTPATIENT
Start: 2022-05-12 | End: 2022-05-16 | Stop reason: HOSPADM

## 2022-05-12 RX ORDER — POTASSIUM CHLORIDE 7.45 MG/ML
10 INJECTION INTRAVENOUS
Status: DISCONTINUED | OUTPATIENT
Start: 2022-05-12 | End: 2022-05-16 | Stop reason: HOSPADM

## 2022-05-12 RX ORDER — INSULIN LISPRO 100 [IU]/ML
0-9 INJECTION, SOLUTION INTRAVENOUS; SUBCUTANEOUS
Status: DISCONTINUED | OUTPATIENT
Start: 2022-05-12 | End: 2022-05-16 | Stop reason: HOSPADM

## 2022-05-12 RX ORDER — METOCLOPRAMIDE 10 MG/1
10 TABLET ORAL
Status: DISCONTINUED | OUTPATIENT
Start: 2022-05-12 | End: 2022-05-12

## 2022-05-12 RX ORDER — ACETAMINOPHEN 650 MG/1
650 SUPPOSITORY RECTAL EVERY 4 HOURS PRN
Status: DISCONTINUED | OUTPATIENT
Start: 2022-05-12 | End: 2022-05-16 | Stop reason: HOSPADM

## 2022-05-12 RX ORDER — SODIUM CHLORIDE 0.9 % (FLUSH) 0.9 %
10 SYRINGE (ML) INJECTION AS NEEDED
Status: DISCONTINUED | OUTPATIENT
Start: 2022-05-12 | End: 2022-05-16 | Stop reason: HOSPADM

## 2022-05-12 RX ORDER — DEXTROSE MONOHYDRATE 25 G/50ML
25 INJECTION, SOLUTION INTRAVENOUS
Status: DISCONTINUED | OUTPATIENT
Start: 2022-05-12 | End: 2022-05-16 | Stop reason: HOSPADM

## 2022-05-12 RX ORDER — ONDANSETRON 2 MG/ML
4 INJECTION INTRAMUSCULAR; INTRAVENOUS EVERY 6 HOURS PRN
Status: DISCONTINUED | OUTPATIENT
Start: 2022-05-12 | End: 2022-05-16 | Stop reason: HOSPADM

## 2022-05-12 RX ORDER — INSULIN LISPRO 100 [IU]/ML
0-9 INJECTION, SOLUTION INTRAVENOUS; SUBCUTANEOUS AS NEEDED
Status: DISCONTINUED | OUTPATIENT
Start: 2022-05-12 | End: 2022-05-16 | Stop reason: HOSPADM

## 2022-05-12 RX ADMIN — INSULIN LISPRO 6 UNITS: 100 INJECTION, SOLUTION INTRAVENOUS; SUBCUTANEOUS at 16:33

## 2022-05-12 RX ADMIN — RIVAROXABAN 5 MG: 2.5 TABLET, FILM COATED ORAL at 19:10

## 2022-05-12 RX ADMIN — INSULIN HUMAN 30 UNITS: 100 INJECTION, SUSPENSION SUBCUTANEOUS at 20:08

## 2022-05-12 RX ADMIN — INSULIN HUMAN 5 UNITS: 100 INJECTION, SUSPENSION SUBCUTANEOUS at 20:09

## 2022-05-12 RX ADMIN — MECLIZINE HYDROCHLORIDE 25 MG: 25 TABLET ORAL at 16:33

## 2022-05-12 RX ADMIN — METOCLOPRAMIDE 10 MG: 10 TABLET ORAL at 16:33

## 2022-05-12 RX ADMIN — Medication 10 ML: at 20:47

## 2022-05-12 RX ADMIN — PANTOPRAZOLE SODIUM 40 MG: 40 TABLET, DELAYED RELEASE ORAL at 16:33

## 2022-05-12 RX ADMIN — MECLIZINE HYDROCHLORIDE 25 MG: 25 TABLET ORAL at 06:18

## 2022-05-12 RX ADMIN — SODIUM CHLORIDE 1000 ML: 9 INJECTION, SOLUTION INTRAVENOUS at 07:23

## 2022-05-12 RX ADMIN — INSULIN LISPRO 2 UNITS: 100 INJECTION, SOLUTION INTRAVENOUS; SUBCUTANEOUS at 10:22

## 2022-05-12 RX ADMIN — Medication 10 ML: at 10:23

## 2022-05-12 RX ADMIN — METOCLOPRAMIDE 10 MG: 10 TABLET ORAL at 13:12

## 2022-05-12 RX ADMIN — ASPIRIN 81 MG: 81 TABLET, COATED ORAL at 16:33

## 2022-05-12 RX ADMIN — INSULIN HUMAN 30 UNITS: 100 INJECTION, SUSPENSION SUBCUTANEOUS at 16:33

## 2022-05-12 RX ADMIN — ATORVASTATIN CALCIUM 80 MG: 40 TABLET, FILM COATED ORAL at 16:33

## 2022-05-12 RX ADMIN — MAGNESIUM SULFATE HEPTAHYDRATE 2 G: 2 INJECTION, SOLUTION INTRAVENOUS at 16:47

## 2022-05-12 RX ADMIN — INSULIN LISPRO 4 UNITS: 100 INJECTION, SOLUTION INTRAVENOUS; SUBCUTANEOUS at 13:10

## 2022-05-12 NOTE — NURSING NOTE
Contacted MD Chawla, for OK to use NG tube.  MD gave approval at this time based on results.

## 2022-05-12 NOTE — PLAN OF CARE
Goal Outcome Evaluation:  Plan of Care Reviewed With: patient        63 y/o M who presented with dizziness and falls with PMH R PCA CVA in 2019. Dizziness is episodic and leads to being off balance. Denies room spinning, tinnitus, fullness. No other falls besides the ones related to this dizziness prior to admission. Pt typically ambulates with STC in community and no AD needed at home. Spouse works. Pt is at home alone but brother checks in on him.   Vestibular exam results below:       Smooth pursuit: normal  Gaze holding nystagmus: absent  Spontaneous nystagmus: absent  Saccades: hypometric on L with history of R PCA CVA  Convergence/divergence: WNL  Cover/cross cover: no skew  VOR slow: WNL  Head thrust test: 2 beats of nystagmus bilaterally  Head shaking nystagmus test: WNL  Willard hallpike for posterior canal: downbeat nystagmus in L and R positions   No notable torsion- appears pure vertical however is symptomatic, positional and resolves after 30 sec   Unclear if this is central remaining from previous CVA or anterior canal BPPV  Roll test for horizontal canal: (-)  Orthostatic vitals: WNL  Standing balance: LOB on item 1 mCTSIB  Gait: moderate postural instability with RW 10' req min A     Notable results include pure downbeat nystagmus with R and L mami hallpike positions. Pt is symptomatic. Could indicate anterior canal involvement which is rare and only occurs in 1% of cases of BPPV or be residual from R PCA CVA in 2019 but the fact that it appears bilaterally is atypical. MRI was (-) for acute CVA. Pt with postural instability and gait dysfunction related to balance. Requiring increased assist compared to normal. High falls risk. Not treated for BPPV this date due to lack of clarity on canal involvement. Will retest tomorrow morning and treat as indicated. Recommending acute rehab at d/c due to falls risk and decline from baseline function.

## 2022-05-12 NOTE — CASE MANAGEMENT/SOCIAL WORK
Discharge Planning Assessment   [facility]     Patient Name: Chao Lazar  MRN: [id number]  Today's Date: 5/12/2022    Admit Date: 5/12/2022     Discharge Needs Assessment     Row Name 05/12/22 1531       Living Environment    People in Home spouse    Current Living Arrangements home    Primary Care Provided by self    Provides Primary Care For no one    Family Caregiver if Needed spouse    Family Caregiver Names Debbi    Quality of Family Relationships supportive;involved       Resource/Environmental Concerns    Resource/Environmental Concerns none    Transportation Concerns none       Transition Planning    Patient/Family Anticipates Transition to home with family    Patient/Family Anticipated Services at Transition none    Transportation Anticipated family or friend will provide       Discharge Needs Assessment    Readmission Within the Last 30 Days no previous admission in last 30 days    Equipment Currently Used at Home walker, rolling;cane, straight;glucometer;bp cuff    Concerns to be Addressed discharge planning    Anticipated Changes Related to Illness none    Equipment Needed After Discharge none    Discharge Facility/Level of Care Needs --  Pending PT eval               Discharge Plan     Row Name 05/12/22 1535       Plan    Plan From Home with wife PT eval pending    Patient/Family in Agreement with Plan yes    Plan Comments Met with patient at bedside, Lives at home with wife. IADL's. Wife can provide transportation at discharge. PCP and Pharmacy verified, able to afford medications. Patient reports if he needs IP rehab he wants [facility], as he has been there in past and that is his first choice. d/c barriers: PT eval pending.                   Demographic Summary     Row Name 05/12/22 1531       General Information    Admission Type observation    Arrived From emergency department    Referral Source admission list    Reason for Consult discharge planning    Preferred Language English                Functional Status     Row Name 05/12/22 1531       Functional Status    Usual Activity Tolerance good    Current Activity Tolerance moderate       Functional Status, IADL    Medications independent    Meal Preparation independent    Housekeeping independent    Laundry independent    Shopping independent       Mental Status    General Appearance WDL WDL              Met with patient at bedside wearing mask and goggles, Spent less than 15 minutes in room at greater than 6 feet distance.           Loulou Blevins RN

## 2022-05-12 NOTE — ED PROVIDER NOTES
Subjective   62-year-old male with episodic dizziness for the past several months worse since yesterday afternoon.  Patient states he got up this evening and felt dizzy and fell, patient denies any injury, patient has a previous CVA with residual left-sided arm weakness.  Patient has chronic lower extremity neuropathy and ambulates with a cane.  Patient is a diabetic.  Dizziness is moderate and worse with upright position, there is no movement appreciated.          Review of Systems   Constitutional: Negative.    HENT: Negative.    Respiratory: Negative.    Cardiovascular: Negative.    Gastrointestinal: Negative.    Genitourinary: Negative.    Musculoskeletal: Negative.    Skin: Negative.    Neurological:        As per HPI   Psychiatric/Behavioral: Negative.    All other systems reviewed and are negative.      Past Medical History:   Diagnosis Date   • Asthma 3/22/2018   • Coronary artery disease involving native coronary artery of native heart without angina pectoris 8/27/2020   • Essential hypertension 6/28/2019   • Gastroparesis 12/23/2013   • History of CVA (cerebrovascular accident) 9/22/2021   • Hyperlipidemia, mixed 4/3/2017   • Hypoglycemia    • Type 2 diabetes mellitus (HCC)        No Known Allergies    Past Surgical History:   Procedure Laterality Date   • CARDIAC CATHETERIZATION     • CHOLECYSTECTOMY  2004   • COLON RESECTION      [id number]   • COLON RESECTION SMALL BOWEL Right 12/5/2019    Procedure: open right hemicolectomy;  Surgeon: Ronaldo Ardon MD;  Location: [facility];  Service: General   • COLONOSCOPY  2019    x2    • CORONARY ANGIOPLASTY WITH STENT PLACEMENT  04/2017   • ECHO - CONVERTED  2019   • ECHO - CONVERTED  2020   • FRACTURE SURGERY      collar bone as a child    • KNEE ARTHROSCOPY Left 08/2003   • KNEE JOINT MANIPULATION Left 04/2010   • TOTAL KNEE ARTHROPLASTY Bilateral     2013,2011       Family History   Problem Relation Age of Onset   • Irritable bowel syndrome Mother     • Anxiety disorder Mother    • Depression Father    • Hypertension Father    • Mental illness Father         committed suicide   • Other Sister         back problems   • Other Brother         back problems       Social History     Socioeconomic History   • Marital status:    Tobacco Use   • Smoking status: Former Smoker     Quit date: 2007     Years since quitting: 15.3   • Smokeless tobacco: Former User   Vaping Use   • Vaping Use: Never used   Substance and Sexual Activity   • Alcohol use: Yes     Comment: occasionally   • Drug use: No   • Sexual activity: Defer           Objective   Physical Exam  Constitutional:       Appearance: Normal appearance.   HENT:      Head: Normocephalic and atraumatic.      Mouth/Throat:      Mouth: Mucous membranes are moist.      Pharynx: Oropharynx is clear.   Eyes:      Extraocular Movements: Extraocular movements intact.      Conjunctiva/sclera: Conjunctivae normal.      Pupils: Pupils are equal, round, and reactive to light.   Cardiovascular:      Rate and Rhythm: Normal rate and regular rhythm.      Heart sounds: Normal heart sounds.   Pulmonary:      Effort: Pulmonary effort is normal.      Breath sounds: Normal breath sounds.   Abdominal:      General: Bowel sounds are normal. There is no distension.      Palpations: Abdomen is soft.      Tenderness: There is no abdominal tenderness.   Musculoskeletal:         General: Normal range of motion.      Cervical back: Normal range of motion and neck supple.   Skin:     General: Skin is warm and dry.      Capillary Refill: Capillary refill takes less than 2 seconds.   Neurological:      Mental Status: He is alert.      Comments: Chronic left upper extremity weakness with tremor, no other focal deficits, cranial nerves II through XII intact   Psychiatric:         Mood and Affect: Mood normal.         Behavior: Behavior normal.         Procedures           ED Course  ED Course as of 05/12/22 0742   Thu May 12, 2022   0541 EKG  interpretation: Normal sinus rhythm, rate 68 with no acute ST or T wave change as compared with tracing done 9/22/2021 [JR]      ED Course User Index  [JR] Ronaldo Mclaughlin MD                                                 [facility]  Number of Diagnoses or Management Options  Dizziness  Diagnosis management comments: Results for orders placed or performed during the hospital encounter of 05/12/22  -Comprehensive Metabolic Panel:   Specimen: Blood       Result                      Value             Ref Range           Glucose                     171 (H)           65 - 99 mg/dL       BUN                         20                8 - 23 mg/dL        Creatinine                  1.34 (H)          0.76 - 1.27 *       Sodium                      137               136 - 145 mm*       Potassium                   4.9               3.5 - 5.2 mm*       Chloride                    99                98 - 107 mmo*       CO2                         24.0              22.0 - 29.0 *       Calcium                     9.7               8.6 - 10.5 m*       Total Protein               7.9               6.0 - 8.5 g/*       Albumin                     4.20              3.50 - 5.20 *       ALT (SGPT)                  51 (H)            1 - 41 U/L          AST (SGOT)                  34                1 - 40 U/L          Alkaline Phosphatase        127 (H)           39 - 117 U/L        Total Bilirubin             0.2               0.0 - 1.2 mg*       Globulin                    3.7               gm/dL               A/G Ratio                   1.1               g/dL                BUN/Creatinine Ratio        14.9              7.0 - 25.0          Anion Gap                   14.0              5.0 - 15.0 m*       eGFR                        59.2 (L)          >60.0 mL/min*  -Troponin:   Specimen: Blood       Result                      Value             Ref Range           Troponin T                  <0.010            0.000 - 0.03*  -Urinalysis With  Culture If Indicated - Urine, Clean Catch:   Specimen: Urine, Clean Catch       Result                      Value             Ref Range           Color, UA                   Yellow            Yellow, Straw       Appearance, UA              Clear             Clear               pH, UA                      6.5               5.0 - 8.0           Specific Gravity, UA        1.020             1.005 - 1.030       Glucose, UA                                   Negative        >=1000 mg/dL (3+) (A)       Ketones, UA                 Trace (A)         Negative            Bilirubin, UA               Negative          Negative            Blood, UA                   Negative          Negative            Protein, UA                                   Negative        100 mg/dL (2+) (A)       Leuk Esterase, UA           Negative          Negative            Nitrite, UA                 Negative          Negative            Urobilinogen, UA            1.0 E.U./dL       0.2 - 1.0 E.*  -CBC Auto Differential:   Specimen: Blood       Result                      Value             Ref Range           WBC                         10.90 (H)         3.40 - 10.80*       RBC                         4.67              4.14 - 5.80 *       Hemoglobin                  14.1              13.0 - 17.7 *       Hematocrit                  42.8              37.5 - 51.0 %       MCV                         91.6              79.0 - 97.0 *       MCH                         30.2              26.6 - 33.0 *       MCHC                        33.0              31.5 - 35.7 *       RDW                         12.9              12.3 - 15.4 %       RDW-SD                      41.1              37.0 - 54.0 *       MPV                         7.5               6.0 - 12.0 fL       Platelets                   355               140 - 450 10*       Neutrophil %                65.9              42.7 - 76.0 %       Lymphocyte %                24.2              19.6 - 45.3 %        Monocyte %                  6.4               5.0 - 12.0 %        Eosinophil %                3.0               0.3 - 6.2 %         Basophil %                  0.5               0.0 - 1.5 %         Neutrophils, Absolute       7.20 (H)          1.70 - 7.00 *       Lymphocytes, Absolute       2.60              0.70 - 3.10 *       Monocytes, Absolute         0.70              0.10 - 0.90 *       Eosinophils, Absolute       0.30              0.00 - 0.40 *       Basophils, Absolute         0.10              0.00 - 0.20 *       nRBC                        0.1               0.0 - 0.2 /1*  -Urinalysis, Microscopic Only - Urine, Clean Catch:   Specimen: Urine, Clean Catch       Result                      Value             Ref Range           RBC, UA                     0-2 (A)           None Seen /H*       WBC, UA                     0-2 (A)           None Seen /H*       Bacteria, UA                None Seen         None Seen /H*       Squamous Epithelial Ce*     0-2               None Seen, 0*       Hyaline Casts, UA           None Seen         None Seen /L*       Sperm, UA                                     None Seen /H*   Occasional (A)       Mucus, UA                   Trace             None Seen, T*       Methodology                                                   Manual Light Microscopy  -ECG 12 Lead:        Result                      Value             Ref Range           QT Interval                 400               ms               CT Head Without Contrast   Final Result         1. Generalized atrophy with chronic periventricular and thalamic deep    white matter ischemic changes.    2. Focal area of chronic encephalomalacia in the right parieto-occipital    lobe.    3. No acute intracranial findings.         Electronically Signed By-Ronaldo Sheikh MD On:5/12/2022 7:14 AM    This report was finalized on [id number] by  Ronaldo Sheikh MD.     Patient with some improvement in dizziness but when attempted to stand  patient was unable to ambulate.  Patient does have some nystagmus on exam.  I cannot fully visualize the tympanic membrane secondary to cerumen though patient has no tinnitus or ear pain.  Hopefully this is a peripheral etiology as it does appear to be isolated dizziness but a central cause is certainly in the differential given the patient's history.       Amount and/or Complexity of Data Reviewed  Clinical lab tests: ordered and reviewed  Tests in the radiology section of CPT®: ordered and reviewed  Tests in the medicine section of CPT®: reviewed and ordered  Decide to obtain previous medical records or to obtain history from someone other than the patient: yes  Discuss the patient with other providers: yes        Final diagnoses:   Dizziness       ED Disposition  ED Disposition     ED Disposition   Decision to Admit    Condition   --    Comment   Level of Care: Telemetry [5]   Admitting Physician: TOMY MIX [[id number]]   Bed Request Comments: IRMA               No follow-up provider specified.       Medication List      No changes were made to your prescriptions during this visit.          Ronaldo Mclaughlin MD  05/12/22 0737

## 2022-05-12 NOTE — THERAPY EVALUATION
Patient Name: Chao Lazar  : 1960    MRN: [id number]                              Today's Date: 2022       Admit Date: 2022    Visit Dx:     ICD-10-CM ICD-9-CM   1. Dizziness  R42 780.4     Patient Active Problem List   Diagnosis   • Allergic state   • Asthma   • Chronic cough   • Degenerative joint disease   • Type 2 diabetes mellitus with hyperglycemia, with long-term current use of insulin (HCC)   • Gastroparesis   • Hyperlipidemia, mixed   • Essential hypertension   • Nausea and vomiting   • Vitamin D deficiency   • Combined forms of age-related cataract, bilateral   • Presbyopia   • Acute ischemic right PCA stroke involving the right occipital lobe (CMS/HCC)   • Sphenoid sinusitis   • Retinal disorder   • Coronary artery disease involving native coronary artery of native heart without angina pectoris   • Cutaneous abscess of head excluding face   • History of CVA (cerebrovascular accident)   • Leukocytosis   • Hypomagnesemia   • Left-sided weakness   • Hypoglycemia   • Dizziness   • CKD (chronic kidney disease), stage II   • Fall     Past Medical History:   Diagnosis Date   • Asthma 3/22/2018   • Coronary artery disease involving native coronary artery of native heart without angina pectoris 2020   • Essential hypertension 2019   • Gastroparesis 2013   • History of CVA (cerebrovascular accident) 2021   • Hyperlipidemia, mixed 4/3/2017   • Hypoglycemia    • Type 2 diabetes mellitus (HCC)      Past Surgical History:   Procedure Laterality Date   • CARDIAC CATHETERIZATION     • CHOLECYSTECTOMY     • COLON RESECTION      [id number]   • COLON RESECTION SMALL BOWEL Right 2019    Procedure: open right hemicolectomy;  Surgeon: Ronaldo Ardon MD;  Location: [facility] OR;  Service: General   • COLONOSCOPY  2019    x2    • CORONARY ANGIOPLASTY WITH STENT PLACEMENT  2017   • ECHO - CONVERTED     • ECHO - CONVERTED     • FRACTURE SURGERY      collar  bone as a child    • KNEE ARTHROSCOPY Left 08/2003   • KNEE JOINT MANIPULATION Left 04/2010   • TOTAL KNEE ARTHROPLASTY Bilateral     2013,2011      General Information     Row Name 05/12/22 1639          Physical Therapy Time and Intention    Document Type evaluation  -     Mode of Treatment physical therapy  -     Row Name 05/12/22 1639          General Information    Patient Profile Reviewed yes  -SS     Prior Level of Function independent:;ADL's;all household mobility  -     Existing Precautions/Restrictions fall  -     Barriers to Rehab previous functional deficit  -     Row Name 05/12/22 1639          Living Environment    People in Home spouse  -     Row Name 05/12/22 1639          Home Main Entrance    Number of Stairs, Main Entrance two  -SS     Row Name 05/12/22 1639          Cognition    Orientation Status (Cognition) oriented x 4  -SS     Row Name 05/12/22 1639          Safety Issues, Functional Mobility    Impairments Affecting Function (Mobility) balance;endurance/activity tolerance;strength  -           User Key  (r) = Recorded By, (t) = Taken By, (c) = Cosigned By    Initials Name Provider Type     Sunni Kwan, PT Physical Therapist               Mobility     Row Name 05/12/22 1648          Bed Mobility    Bed Mobility bed mobility (all) activities  -     All Activities, [place] (Bed Mobility) minimum assist (75% patient effort)  -     Row Name 05/12/22 1648          Sit-Stand Transfer    Sit-Stand [place] (Transfers) contact guard;minimum assist (75% patient effort)  -     Assistive Device (Sit-Stand Transfers) walker, front-wheeled  -     Row Name 05/12/22 1648          Gait/Stairs (Locomotion)    [place] Level (Gait) minimum assist (75% patient effort)  -     Assistive Device (Gait) walker, front-wheeled  -     Distance in Feet (Gait) 10'  -     Deviations/Abnormal Patterns (Gait) ataxic;base of support, wide  -     Comment, (Gait/Stairs)  moderate postural instability  -           User Key  (r) = Recorded By, (t) = Taken By, (c) = Cosigned By    Initials Name Provider Type     Sunni Kwan PT Physical Therapist               Obj/Interventions     [facility] Name 05/12/22 1649          Range of Motion Comprehensive    General Range of Motion no range of motion deficits identified  -[facility] Name 05/12/22 1649          Strength Comprehensive (MMT)    Comment, General Manual Muscle Testing (MMT) Assessment previous and stable L sided weakness UE and LE noted  -[facility] Name 05/12/22 1649          Balance    Balance Assessment sitting static balance;standing static balance  -     Static Sitting Balance supervision  -     Static Standing Balance minimal assist  -SS     Position/Device Used, Standing Balance walker, rolling  -     Comment, Balance loses balance immediately in mCTSIB position 1  -[facility] Name 05/12/22 1649          Sensory Assessment (Somatosensory)    Sensory Assessment (Somatosensory) --  neuropathy in B LE  -           User Key  (r) = Recorded By, (t) = Taken By, (c) = Cosigned By    Initials Name Provider Type     Sunni Kwan PT Physical Therapist               Goals/Plan     [facility] Name 05/12/22 1702          Bed Mobility Goal 1 (PT)    Activity/Assistive Device (Bed Mobility Goal 1, PT) bed mobility activities, all  -SS     [place] Level/Cues Needed (Bed Mobility Goal 1, PT) modified independence  -SS     Time Frame (Bed Mobility Goal 1, PT) long term goal (LTG);2 weeks  -[facility] Name 05/12/22 1702          Transfer Goal 1 (PT)    Activity/Assistive Device (Transfer Goal 1, PT) transfers, all  -SS     [place] Level/Cues Needed (Transfer Goal 1, PT) modified independence  -SS     Time Frame (Transfer Goal 1, PT) long term goal (LTG);2 weeks  -[facility] Name 05/12/22 1702          Gait Training Goal 1 (PT)    Activity/Assistive Device (Gait Training Goal 1, PT) gait (walking locomotion);cane,  straight  -SS     [place] Level (Gait Training Goal 1, PT) modified independence  -SS     Distance (Gait Training Goal 1, PT) 75'  -SS     Time Frame (Gait Training Goal 1, PT) long term goal (LTG);2 weeks  -SS     Row Name 05/12/22 1702          Therapy Assessment/Plan (PT)    Planned Therapy Interventions (PT) vestibular therapy;balance training;bed mobility training;gait training;home exercise program;neuromuscular re-education;patient/family education;transfer training  -SS           User Key  (r) = Recorded By, (t) = Taken By, (c) = Cosigned By    Initials Name Provider Type    SS Sunni Kwan, PT Physical Therapist               Clinical Impression     Row Name 05/12/22 1656          Pain    Pretreatment Pain Rating 0/10 - no pain  -SS     Posttreatment Pain Rating 0/10 - no pain  -SS     Row Name 05/12/22 0664          Plan of Care Review    Plan of Care Reviewed With patient  -SS     Outcome Evaluation 63 y/o M who presented with dizziness and falls with PMH R PCA CVA in 2019. Dizziness is episodic and leads to being off balance. Denies room spinning, tinnitus, fullness. No other falls besides the ones related to this dizziness prior to admission. Pt typically ambulates with STC in community and no AD needed at home. Spouse works. Pt is at home alone but brother checks in on him. Notable results include pure downbeat nystagmus with R and L mami hallpike positions. Pt is symptomatic. Could indicate anterior canal involvement which is rare and only occurs in 1% of cases of BPPV or be residual from R PCA CVA in 2019 but the fact that it appears bilaterally is atypical. MRI was (-) for acute CVA. Pt with postural instability and gait dysfunction related to balance. Requiring increased assist compared to normal. High falls risk. Not treated for BPPV this date due to lack of clarity on canal involvement. Will retest tomorrow morning and treat as indicated. Recommending acute rehab at d/c due to falls  risk and decline from baseline function.  -     Row Name 05/12/22 1651          Therapy Assessment/Plan (PT)    Rehab Potential (PT) good, to achieve stated therapy goals  -     Criteria for Skilled Interventions Met (PT) yes;meets criteria  -     Therapy Frequency (PT) 5 times/wk  -     Predicted Duration of Therapy Intervention (PT) until d/c  -     Row Name 05/12/22 1651          Positioning and Restraints    Pre-Treatment Position in bed  -     Post Treatment Position bed  -           User Key  (r) = Recorded By, (t) = Taken By, (c) = Cosigned By    Initials Name Provider Type     Sunni Kwan, PT Physical Therapist               Outcome Measures     Row Name 05/12/22 1703          Modified [place] Scale    Pre-Stroke Modified [place] Scale 2 - Slight disability.  Unable to carry out all previous activities but able to look after own affairs without assistance.  -     Modified [place] Scale 4 - Moderately severe disability.  Unable to walk without assistance, and unable to attend to own bodily needs without assistance.  -     Row Name 05/12/22 1703          Functional Assessment    Outcome Measure Options Modified [place]  -           User Key  (r) = Recorded By, (t) = Taken By, (c) = Cosigned By    Initials Name Provider Type     Sunni Kwan PT Physical Therapist                             Physical Therapy Education                 Title: PT OT SLP Therapies (Done)     Topic: Physical Therapy (Done)     Point: Mobility training (Done)     Learning Progress Summary           Patient Acceptance, E, VU by  at 5/12/2022 1704                               User Key     Initials Effective Dates Name Provider Type [facility] 06/16/21 -  Sunni Kwan PT Physical Therapist PT              PT Recommendation and Plan  Planned Therapy Interventions (PT): vestibular therapy, balance training, bed mobility training, gait training, home exercise program, neuromuscular  re-education, patient/family education, transfer training  Plan of Care Reviewed With: patient  Outcome Evaluation: 63 y/o M who presented with dizziness and falls with PMH R PCA CVA in 2019. Dizziness is episodic and leads to being off balance. Denies room spinning, tinnitus, fullness. No other falls besides the ones related to this dizziness prior to admission. Pt typically ambulates with STC in community and no AD needed at home. Spouse works. Pt is at home alone but brother checks in on him. Notable results include pure downbeat nystagmus with R and L mami hallpike positions. Pt is symptomatic. Could indicate anterior canal involvement which is rare and only occurs in 1% of cases of BPPV or be residual from R PCA CVA in 2019 but the fact that it appears bilaterally is atypical. MRI was (-) for acute CVA. Pt with postural instability and gait dysfunction related to balance. Requiring increased assist compared to normal. High falls risk. Not treated for BPPV this date due to lack of clarity on canal involvement. Will retest tomorrow morning and treat as indicated. Recommending acute rehab at d/c due to falls risk and decline from baseline function.     Time Calculation:    PT Charges     Row Name 05/12/22 1704             Time Calculation    Start Time 1400  -      Stop Time 1504  -      Time Calculation (min) 64 min  -      PT Received On 05/12/22  -      PT - Next Appointment 05/13/22  -      PT Goal Re-Cert Due Date 05/26/22  -              Time Calculation- PT    Total Timed Code Minutes- PT 20 minute(s)  -            User Key  (r) = Recorded By, (t) = Taken By, (c) = Cosigned By    Initials Name Provider Type     Sunni Kwan PT Physical Therapist              Therapy Charges for Today     Code Description Service Date Service Provider Modifiers Qty    [id number]  PT THERAPEUTIC ACT EA 15 MIN 5/12/2022 Sunni Kwan PT GP 1    [id number] HC PT EVAL MOD COMPLEXITY 4 5/12/2022  Sunni Kwan, PT GP 1          PT G-Codes  Outcome Measure Options: Modified [place]  Modified [place] Scale: 4 - Moderately severe disability.  Unable to walk without assistance, and unable to attend to own bodily needs without assistance.    Sunni Kwan, PT  5/12/2022

## 2022-05-12 NOTE — H&P
[facility] Medicine Services      Patient Name: Chao Lazar  : 1960  MRN: [id number]  Primary Care Physician:  Al Kimbrough MD  Date of admission: 2022      Subjective      Chief Complaint: Dizziness    History of Present Illness: Chao Lazar is a 62 y.o. male with a past medical history of CAD, CKD stage II, asthma, hypertension, gastroparesis, hyperlipidemia, CVA, and type 2 diabetes mellitus who presented to [facility] on 2022 complaining of dizziness.  Patient reports his dizziness started yesterday.  He explains that this is happened before but usually does not last long and goes away.  This time is dizziness progressively got worse to the point that he fell on his back.  He denies any back pain at this time.  He explains his dizziness is worse when he is up walking and when he moves his head.  Resting and meclizine given in the ED makes his dizziness better.  He denies any recent syncope, nausea, vomiting, diarrhea, fever, chills, cough, shortness of breath, or chest pain.  Patient does have a history of a stroke with left-sided weakness.    In the ED, CT head showed Generalized atrophy with chronic periventricular and thalamic deep   white matter ischemic changes.  Focal area of chronic encephalomalacia in the right parieto-occipital   lobe. No acute intracranial findings. EKG showed Sinus rhythm  Abnormal T, consider ischemia, lateral leads.  All vital signs stable upon admission.  All labs unremarkable upon admission except creatinine 1.3, blood glucose 171, alk phos 127.  Patient received meclizine and normal saline 1 L bolus in the ED.  Hospitalist were contacted to admit patient for further care management.    Review of Systems   Constitutional: Negative.   HENT: Negative.    Eyes: Negative.    Cardiovascular: Negative.    Respiratory: Negative.    Skin: Negative.    Musculoskeletal: Positive for falls.   Gastrointestinal: Negative.     Genitourinary: Negative.    Neurological: Positive for dizziness.   Psychiatric/Behavioral: Negative.         Personal History     Past Medical History:   Diagnosis Date   • Asthma 3/22/2018   • Coronary artery disease involving native coronary artery of native heart without angina pectoris 8/27/2020   • Essential hypertension 6/28/2019   • Gastroparesis 12/23/2013   • History of CVA (cerebrovascular accident) 9/22/2021   • Hyperlipidemia, mixed 4/3/2017   • Hypoglycemia    • Type 2 diabetes mellitus (HCC)        Past Surgical History:   Procedure Laterality Date   • CARDIAC CATHETERIZATION     • CHOLECYSTECTOMY  2004   • COLON RESECTION      [id number]   • COLON RESECTION SMALL BOWEL Right 12/5/2019    Procedure: open right hemicolectomy;  Surgeon: Ronaldo Ardon MD;  Location: [facility] OR;  Service: General   • COLONOSCOPY  2019    x2    • CORONARY ANGIOPLASTY WITH STENT PLACEMENT  04/2017   • ECHO - CONVERTED  2019   • ECHO - CONVERTED  2020   • FRACTURE SURGERY      collar bone as a child    • KNEE ARTHROSCOPY Left 08/2003   • KNEE JOINT MANIPULATION Left 04/2010   • TOTAL KNEE ARTHROPLASTY Bilateral     2013,2011       Family History: family history includes Anxiety disorder in his mother; Depression in his father; Hypertension in his father; Irritable bowel syndrome in his mother; Mental illness in his father; Other in his brother and sister. Otherwise pertinent FHx was reviewed and not pertinent to current issue.    Social History:  reports that he quit smoking about 15 years ago. He has quit using smokeless tobacco. He reports current alcohol use. He reports that he does not use drugs.    Home Medications:  Prior to Admission Medications     Prescriptions Last Dose Informant Patient Reported? Taking?    Accu-Chek Guide test strip   No No    USE TO CHECK BLOOD SUGAR TWICE DAILY    aspirin 81 MG EC tablet   No No    Take 1 tablet by mouth Daily.    atorvastatin (LIPITOR) 80 MG tablet   No No     TAKE 1 TABLET BY MOUTH EVERY DAY    Cholecalciferol (HM Vitamin D3) 50 MCG (2000 UT) capsule   Yes No    Take 2,000 Units by mouth Daily.    Insulin NPH Isophane & Regular (NOVOLIN 70/30 FLEXPEN SC)   Yes No    Inject 60 units ac breakfast, liunch & supper, 10 units ac HS snack.    metFORMIN ER (GLUCOPHAGE-XR) 500 MG 24 hr tablet   Yes No    Take one tab ac breakfast, lunch & supper.    metoclopramide (REGLAN) 10 MG tablet   No No    TAKE 1 TABLET BY MOUTH 4 (FOUR) TIMES A DAY BEFORE MEALS & AT BEDTIME FOR 30 DAYS.    omeprazole (priLOSEC) 20 MG capsule   Yes No    Take 20 mg by mouth Every Evening.    Rivaroxaban (Xarelto) 2.5 MG tablet   No No    Take 1 tablet by mouth 2 (Two) Times a Day for 30 days.            Allergies:  No Known Allergies    Objective      Vitals:   Temp:  [97.7 °F (36.5 °C)] 97.7 °F (36.5 °C)  Heart Rate:  [65-74] 65  Resp:  [11-25] 25  BP: (114-137)/(75-88) 114/75    Physical Exam  Vitals reviewed.   Constitutional:       Appearance: Normal appearance. He is normal weight.   HENT:      Head: Normocephalic and atraumatic.      Nose: Nose normal.      Mouth/Throat:      Mouth: Mucous membranes are moist.      Pharynx: Oropharynx is clear.   Eyes:      Extraocular Movements: Extraocular movements intact.      Conjunctiva/sclera: Conjunctivae normal.      Pupils: Pupils are equal, round, and reactive to light.   Cardiovascular:      Rate and Rhythm: Normal rate and regular rhythm.      Pulses: Normal pulses.      Heart sounds: Normal heart sounds.      Comments: S1, S2 audible  Pulmonary:      Effort: Pulmonary effort is normal.      Breath sounds: Normal breath sounds.      Comments: On room air   Abdominal:      General: Abdomen is flat. Bowel sounds are normal.      Palpations: Abdomen is soft.   Musculoskeletal:         General: Normal range of motion.      Cervical back: Normal range of motion.   Skin:     General: Skin is warm and dry.   Neurological:      Mental Status: He is alert and  oriented to person, place, and time. Mental status is at baseline.      Comments: Left sided weakness, tremors   Psychiatric:         Mood and Affect: Mood normal.         Behavior: Behavior normal.         Thought Content: Thought content normal.         Judgment: Judgment normal.         Result Review    Result Review:  I have personally reviewed the results from the time of this admission to 5/12/2022 08:39 EDT and agree with these findings:  [x]  Laboratory  []  Microbiology  [x]  Radiology  [x]  EKG/Telemetry   []  Cardiology/Vascular   []  Pathology  []  Old records  []  Other:  Most notable findings include: CT head showed Generalized atrophy with chronic periventricular and thalamic deep   white matter ischemic changes.  Focal area of chronic encephalomalacia in the right parieto-occipital   lobe. No acute intracranial findings. EKG showed Sinus rhythm  Abnormal T, consider ischemia, lateral leads.  All vital signs stable upon admission.  All labs unremarkable upon admission except creatinine 1.3, blood glucose 171, alk phos 127.    Assessment & Plan        Active Hospital Problems:  Active Hospital Problems    Diagnosis    • **Dizziness    • Fall    • CKD (chronic kidney disease), stage II    • History of CVA (cerebrovascular accident)    • Coronary artery disease involving native coronary artery of native heart without angina pectoris    • Essential hypertension    • Asthma    • Hyperlipidemia, mixed    • Gastroparesis    • Type 2 diabetes mellitus with hyperglycemia, with long-term current use of insulin ([facility])      Plan:   --- Home medications not verified at time of assessment and plan---    Acute dizziness  - Likely BPPV, cannot rule out CVA due to patient's history of CVA  - CT head reviewed  - EKG reviewed  - Neurochecks  - Orthostatics unremarkable  - Fall risk precautions  - Meclizine ordered   - MRI brain ordered - Verify patient's metal implant prior to proceeding with MRI, noted patient has had  MRI in past     Fall  - Secondary to above  - Patient denies injuries  - Fall risk precautions  - PT ordered    Essential HTN  - Controlled  - Monitor blood pressure  - Continue metoprolol   - IV hydralazine ordered PRN      HLD  - Continue statin   - Check lipid panel      CAD S/P cardiac stent  - Continue aspirin, statin, and metoprolol     Type II DM with hyperglycemia,  - Start sliding scale, Accuchecks ACHS  - Check hbg A1C   -  Holding home metformin   - Continue home 70/30 novolog (dose decreased from 60 To 30 TID and from 10 units to 5 units nightly due to diet changes while hospitalized)    CKD Stage II  - CR 1.3, monitor  - Baseline CR 1.1-1.4     Gastropresis secondary to diabetes  - Continue PPI      History of CVA  - R occipital lobe ischemic CVA (12/2019)  - Residual left sided weakness, but patient able to walk and talk  - Continue Xarelto     Asthma  - Not in exacerbation  - Currently on room air   - Breathing treatments ordered PRN      DVT prophylaxis: Xarelto    CODE STATUS:    Level Of Support Discussed With: Patient  Code Status (Patient has no pulse and is not breathing): CPR (Attempt to Resuscitate)  Medical Interventions (Patient has pulse or is breathing): Full Support    Admission Status:  I believe this patient meets Observation status.    I discussed the patient's findings and my recommendations with patient, family and nursing staff.    This patient has been examined wearing appropriate Personal Protective Equipment . 05/12/22      Signature: Electronically signed by EMRE Willett, 05/12/22, 8:39 AM EDT.

## 2022-05-13 LAB
ANION GAP SERPL CALCULATED.3IONS-SCNC: 15 MMOL/L (ref 5–15)
BASOPHILS # BLD AUTO: 0 10*3/MM3 (ref 0–0.2)
BASOPHILS NFR BLD AUTO: 0.5 % (ref 0–1.5)
BUN SERPL-MCNC: 17 MG/DL (ref 8–23)
BUN/CREAT SERPL: 14.7 (ref 7–25)
CALCIUM SPEC-SCNC: 9.4 MG/DL (ref 8.6–10.5)
CHLORIDE SERPL-SCNC: 102 MMOL/L (ref 98–107)
CO2 SERPL-SCNC: 20 MMOL/L (ref 22–29)
CREAT SERPL-MCNC: 1.16 MG/DL (ref 0.76–1.27)
DEPRECATED RDW RBC AUTO: 41.6 FL (ref 37–54)
EGFRCR SERPLBLD CKD-EPI 2021: 70.3 ML/MIN/1.73
EOSINOPHIL # BLD AUTO: 0.3 10*3/MM3 (ref 0–0.4)
EOSINOPHIL NFR BLD AUTO: 3.6 % (ref 0.3–6.2)
ERYTHROCYTE [DISTWIDTH] IN BLOOD BY AUTOMATED COUNT: 13.1 % (ref 12.3–15.4)
GLUCOSE BLDC GLUCOMTR-MCNC: 211 MG/DL (ref 70–105)
GLUCOSE BLDC GLUCOMTR-MCNC: 234 MG/DL (ref 70–105)
GLUCOSE BLDC GLUCOMTR-MCNC: 256 MG/DL (ref 70–105)
GLUCOSE BLDC GLUCOMTR-MCNC: 309 MG/DL (ref 70–105)
GLUCOSE SERPL-MCNC: 160 MG/DL (ref 65–99)
HCT VFR BLD AUTO: 37.7 % (ref 37.5–51)
HGB BLD-MCNC: 12.9 G/DL (ref 13–17.7)
LYMPHOCYTES # BLD AUTO: 2.8 10*3/MM3 (ref 0.7–3.1)
LYMPHOCYTES NFR BLD AUTO: 32.4 % (ref 19.6–45.3)
MAGNESIUM SERPL-MCNC: 1.7 MG/DL (ref 1.6–2.4)
MCH RBC QN AUTO: 30.6 PG (ref 26.6–33)
MCHC RBC AUTO-ENTMCNC: 34.1 G/DL (ref 31.5–35.7)
MCV RBC AUTO: 89.6 FL (ref 79–97)
MONOCYTES # BLD AUTO: 0.6 10*3/MM3 (ref 0.1–0.9)
MONOCYTES NFR BLD AUTO: 6.4 % (ref 5–12)
NEUTROPHILS NFR BLD AUTO: 5 10*3/MM3 (ref 1.7–7)
NEUTROPHILS NFR BLD AUTO: 57.1 % (ref 42.7–76)
NRBC BLD AUTO-RTO: 0.1 /100 WBC (ref 0–0.2)
PLATELET # BLD AUTO: 274 10*3/MM3 (ref 140–450)
PMV BLD AUTO: 8 FL (ref 6–12)
POTASSIUM SERPL-SCNC: 4.6 MMOL/L (ref 3.5–5.2)
RBC # BLD AUTO: 4.21 10*6/MM3 (ref 4.14–5.8)
SODIUM SERPL-SCNC: 137 MMOL/L (ref 136–145)
WBC NRBC COR # BLD: 8.7 10*3/MM3 (ref 3.4–10.8)

## 2022-05-13 PROCEDURE — 97116 GAIT TRAINING THERAPY: CPT

## 2022-05-13 PROCEDURE — 80048 BASIC METABOLIC PNL TOTAL CA: CPT | Performed by: NURSE PRACTITIONER

## 2022-05-13 PROCEDURE — G0378 HOSPITAL OBSERVATION PER HR: HCPCS

## 2022-05-13 PROCEDURE — 97530 THERAPEUTIC ACTIVITIES: CPT

## 2022-05-13 PROCEDURE — 99225 PR SBSQ OBSERVATION CARE/DAY 25 MINUTES: CPT | Performed by: INTERNAL MEDICINE

## 2022-05-13 PROCEDURE — 63710000001 INSULIN ISOPHANE & REGULAR PER 5 UNITS: Performed by: NURSE PRACTITIONER

## 2022-05-13 PROCEDURE — 85025 COMPLETE CBC W/AUTO DIFF WBC: CPT | Performed by: NURSE PRACTITIONER

## 2022-05-13 PROCEDURE — 83735 ASSAY OF MAGNESIUM: CPT | Performed by: NURSE PRACTITIONER

## 2022-05-13 PROCEDURE — 97166 OT EVAL MOD COMPLEX 45 MIN: CPT

## 2022-05-13 PROCEDURE — 99214 OFFICE O/P EST MOD 30 MIN: CPT | Performed by: PSYCHIATRY & NEUROLOGY

## 2022-05-13 PROCEDURE — 63710000001 INSULIN LISPRO (HUMAN) PER 5 UNITS: Performed by: NURSE PRACTITIONER

## 2022-05-13 PROCEDURE — 82962 GLUCOSE BLOOD TEST: CPT

## 2022-05-13 RX ADMIN — INSULIN HUMAN 30 UNITS: 100 INJECTION, SUSPENSION SUBCUTANEOUS at 17:22

## 2022-05-13 RX ADMIN — ASPIRIN 81 MG: 81 TABLET, COATED ORAL at 08:37

## 2022-05-13 RX ADMIN — INSULIN HUMAN 30 UNITS: 100 INJECTION, SUSPENSION SUBCUTANEOUS at 21:40

## 2022-05-13 RX ADMIN — INSULIN LISPRO 6 UNITS: 100 INJECTION, SOLUTION INTRAVENOUS; SUBCUTANEOUS at 21:36

## 2022-05-13 RX ADMIN — METOCLOPRAMIDE 10 MG: 10 TABLET ORAL at 08:36

## 2022-05-13 RX ADMIN — METOCLOPRAMIDE 10 MG: 10 TABLET ORAL at 17:22

## 2022-05-13 RX ADMIN — INSULIN LISPRO 4 UNITS: 100 INJECTION, SOLUTION INTRAVENOUS; SUBCUTANEOUS at 08:37

## 2022-05-13 RX ADMIN — INSULIN HUMAN 30 UNITS: 100 INJECTION, SUSPENSION SUBCUTANEOUS at 08:39

## 2022-05-13 RX ADMIN — INSULIN HUMAN 5 UNITS: 100 INJECTION, SUSPENSION SUBCUTANEOUS at 21:50

## 2022-05-13 RX ADMIN — MECLIZINE HYDROCHLORIDE 25 MG: 25 TABLET ORAL at 08:36

## 2022-05-13 RX ADMIN — INSULIN LISPRO 4 UNITS: 100 INJECTION, SOLUTION INTRAVENOUS; SUBCUTANEOUS at 12:59

## 2022-05-13 RX ADMIN — METOCLOPRAMIDE 10 MG: 10 TABLET ORAL at 13:00

## 2022-05-13 RX ADMIN — Medication 10 ML: at 08:37

## 2022-05-13 RX ADMIN — RIVAROXABAN 5 MG: 2.5 TABLET, FILM COATED ORAL at 05:55

## 2022-05-13 RX ADMIN — RIVAROXABAN 5 MG: 2.5 TABLET, FILM COATED ORAL at 17:22

## 2022-05-13 RX ADMIN — INSULIN LISPRO 7 UNITS: 100 INJECTION, SOLUTION INTRAVENOUS; SUBCUTANEOUS at 17:22

## 2022-05-13 RX ADMIN — PANTOPRAZOLE SODIUM 40 MG: 40 TABLET, DELAYED RELEASE ORAL at 05:55

## 2022-05-13 RX ADMIN — ATORVASTATIN CALCIUM 80 MG: 40 TABLET, FILM COATED ORAL at 08:36

## 2022-05-13 NOTE — PLAN OF CARE
Problem: Adult Inpatient Plan of Care  Goal: Plan of Care Review  Outcome: Ongoing, Progressing  Goal: Patient-Specific Goal (Individualized)  Outcome: Ongoing, Progressing  Goal: Absence of Hospital-Acquired Illness or Injury  Outcome: Ongoing, Progressing  Intervention: Identify and Manage Fall Risk  Recent Flowsheet Documentation  Taken 5/13/2022 0400 by Nina Decker RN  Safety Promotion/Fall Prevention:   safety round/check completed   nonskid shoes/slippers when out of bed   fall prevention program maintained   clutter free environment maintained   assistive device/personal items within reach  Taken 5/13/2022 0000 by Nina Decker RN  Safety Promotion/Fall Prevention:   safety round/check completed   nonskid shoes/slippers when out of bed   fall prevention program maintained   clutter free environment maintained   assistive device/personal items within reach   activity supervised  Taken 5/12/2022 2000 by Nina Decker RN  Safety Promotion/Fall Prevention:   safety round/check completed   room organization consistent   nonskid shoes/slippers when out of bed   fall prevention program maintained   clutter free environment maintained   assistive device/personal items within reach   activity supervised  Intervention: Prevent Skin Injury  Recent Flowsheet Documentation  Taken 5/13/2022 0400 by Nina Decker RN  Body Position:   turned   side-lying   left  Taken 5/12/2022 2000 by Nina Decker RN  Body Position:   weight shifting   turned  Skin Protection:   adhesive use limited   tubing/devices free from skin contact  Intervention: Prevent and Manage VTE (Venous Thromboembolism) Risk  Recent Flowsheet Documentation  Taken 5/13/2022 0000 by Nina Decker RN  Activity Management: activity adjusted per tolerance  Intervention: Prevent Infection  Recent Flowsheet Documentation  Taken 5/13/2022 0000 by Nina Decker RN  Infection Prevention:   single patient room provided   rest/sleep promoted   personal protective equipment  utilized   hand hygiene promoted   environmental surveillance performed  Goal: Optimal Comfort and Wellbeing  Outcome: Ongoing, Progressing  Goal: Readiness for Transition of Care  Outcome: Ongoing, Progressing     Problem: Diabetes Comorbidity  Goal: Blood Glucose Level Within Targeted Range  Outcome: Ongoing, Progressing     Problem: Hypertension Comorbidity  Goal: Blood Pressure in Desired Range  Outcome: Ongoing, Progressing  Intervention: Maintain Blood Pressure Management  Recent Flowsheet Documentation  Taken 5/12/2022 2000 by Nina Decker RN  Medication Review/Management: medications reviewed     Problem: Skin Injury Risk Increased  Goal: Skin Health and Integrity  Outcome: Ongoing, Progressing  Intervention: Optimize Skin Protection  Recent Flowsheet Documentation  Taken 5/13/2022 0400 by Nina Decker RN  Pressure Reduction Techniques: frequent weight shift encouraged  Pressure Reduction Devices: pressure-redistributing mattress utilized  Taken 5/13/2022 0000 by Nina Decker RN  Head of Bed (HOB) Positioning: HOB elevated  Pressure Reduction Devices: pressure-redistributing mattress utilized  Taken 5/12/2022 2000 by Nina Decker RN  Pressure Reduction Techniques:   weight shift assistance provided   frequent weight shift encouraged  Pressure Reduction Devices: pressure-redistributing mattress utilized  Skin Protection:   adhesive use limited   tubing/devices free from skin contact     Problem: Fall Injury Risk  Goal: Absence of Fall and Fall-Related Injury  Outcome: Ongoing, Progressing  Intervention: Identify and Manage Contributors  Recent Flowsheet Documentation  Taken 5/12/2022 2000 by Nina Decker RN  Medication Review/Management: medications reviewed  Intervention: Promote Injury-Free Environment  Recent Flowsheet Documentation  Taken 5/13/2022 0400 by Nina Decker RN  Safety Promotion/Fall Prevention:   safety round/check completed   nonskid shoes/slippers when out of bed   fall prevention program  maintained   clutter free environment maintained   assistive device/personal items within reach  Taken 5/13/2022 0000 by Nina Decker RN  Safety Promotion/Fall Prevention:   safety round/check completed   nonskid shoes/slippers when out of bed   fall prevention program maintained   clutter free environment maintained   assistive device/personal items within reach   activity supervised  Taken 5/12/2022 2000 by Nina Decker RN  Safety Promotion/Fall Prevention:   safety round/check completed   room organization consistent   nonskid shoes/slippers when out of bed   fall prevention program maintained   clutter free environment maintained   assistive device/personal items within reach   activity supervised   Goal Outcome Evaluation:

## 2022-05-13 NOTE — CASE MANAGEMENT/SOCIAL WORK
Continued Stay Note   [facility]     Patient Name: Chao Lazar  MRN: [id number]  Today's Date: 5/13/2022    Admit Date: 5/12/2022     Discharge Plan     Row Name 05/13/22 1112       Plan    Plan Referral to [facility] (accepted and Shonna with request precert today (5/13/22). No PASRR required. Barrier: Need precert approval.    Provided Post Acute Provider Quality & Resource List? Yes    Post Acute Provider Quality and Resource List Inpatient Rehab    Delivered To Patient;Support Person    Support Person Wife (Debbi)    Method of Delivery In person    Plan Comments CM to patient's room to discuss PT recommendation for acute rehab. Family prefers [facility]. CM sent referral and notified liaison (Shonna) that patient should be ready for discharge tomorrow and asked her, if she accepts, to start precert today.  UPDATE: 5/13/22 11:34 am: Shonna at [facility] accepts, and once the OT (just completed assessing)note is updated, she will send for precert (PT note already in epic).                    Expected Discharge Date and Time     Expected Discharge Date Expected Discharge Time    May 16, 2022           Met with patient in room wearing PPE: mask, face shield/goggles, gloves, gown.      Maintained distance greater than six feet and spent less than 15 minutes in the room.    Odalis London RN, MSN  Care Manager  [phone]

## 2022-05-13 NOTE — DISCHARGE PLACEMENT REQUEST
"Chao Maldonado EMILE (62 y.o. Male)             Date of Birth   1960    Social Security Number       Address   [address] [place] [address]    Home Phone   [phone]    MRN   [id number]       Yarsanism   Sikh    Marital Status                               Admission Date   5/12/22    Admission Type   Emergency    Admitting Provider   Tong Hanks MD    Attending Provider   Tong Hanks MD    Department, Room/Bed   [facility] HEART, 270/1       Discharge Date       Discharge Disposition       Discharge Destination                               Attending Provider: oTng Hanks MD    Allergies: No Known Allergies    Isolation: None   Infection: None   Code Status: CPR   Advance Care Planning Activity    Ht: 185.4 cm (73\")   Wt: 93.6 kg (206 lb 5.6 oz)    Admission Cmt: None   Principal Problem: Dizziness [R42]                 Active Insurance as of 5/12/2022     Primary Coverage     Payor Plan Insurance Group Employer/Plan Group    [facility] opinions.h [facility] opinions.h [facility] PPO [id number]     Payor Plan Address Payor Plan Phone Number Payor Plan Fax Number Effective Dates    [address] [phone]  1/1/2019 - None Entered    [facility] [id number]       Subscriber Name Subscriber Birth Date Member ID       SHIV MALDONADO [birth year]1963 [id number]                 Emergency Contacts      (Rel.) Home Phone Work Phone Mobile Phone    SHIV MALDONADO (Spouse) [phone] -- [phone]              "

## 2022-05-13 NOTE — CASE MANAGEMENT/SOCIAL WORK
Continued Stay Note  RIGOBERTO Dianeyd     Patient Name: Chao Lazar  MRN: [id number]  Today's Date: 5/13/2022    Admit Date: 5/12/2022     Discharge Plan     Row Name 05/13/22 1112       Plan    Plan Referral to [facility] (pending). Will need precert. No PASRR required. Barrier: Need precert.    Provided Post Acute Provider Quality & Resource List? Yes    Post Acute Provider Quality and Resource List Inpatient Rehab    Delivered To Patient;Support Person    Support Person Wife (Debbi)    Method of Delivery In person    Plan Comments CM to patient's room to discuss PT recommendation for acute rehab. Family prefers [facility]. CM sent referral and notified liaison (Shonna) that patient should be ready for discharge tomorrow and asked her, if she accepts, to start precert today.                    Expected Discharge Date and Time     Expected Discharge Date Expected Discharge Time    May 16, 2022         Met with patient in room wearing PPE: mask, face shield/goggles, gloves, gown.      Maintained distance greater than six feet and spent less than 15 minutes in the room.    Odalis London RN, MSN  Care Manager  [phone]

## 2022-05-13 NOTE — PROGRESS NOTES
[facility] Medicine Services Daily Progress Note    Patient Name: Chao Lazar  : 1960  MRN: [id number]  Primary Care Physician:  Al Kimbrough MD  Date of admission: 2022      Subjective    Brief interim history: 62-year-old pleasant male with history of CVA, involving right PCA with left homonymous hemianopsia (spends months in rehab facility), unsteady gait, CAD, T2DM with peripheral neuropathy, HLD, and gastropathy.  According to admission note, he presented to [facility] ED on 2022 complaining of dizziness and wife reported generalized weakness with unsteady gait.  No headache, visual changes, no dysphagia, dysarthria or speech impediment.  Concern for possible recurrent stroke, patient underwent CT of the head without contrast and MRI of the brain () were unrevealing.  Seen and followed by PT/OT due to unsteady gait with lateralization.  Patient was also seen by neurologist in consultation today due to unsteady gait and lateralizing to the left side with ambulation.    2022: Patient seen and examined and follow-up.  Ambulated with noted unsteady gait.      Objective      Vitals:   Temp:  [97.7 °F (36.5 °C)-98.4 °F (36.9 °C)] 98.2 °F (36.8 °C)  Heart Rate:  [73-92] 91  Resp:  [9-17] 17  BP: (119-135)/(66-82) 135/68    Physical Exam  Constitutional:       General: He is not in acute distress.     Appearance: He is obese. He is not ill-appearing.   HENT:      Head: Normocephalic.      Mouth/Throat:      Mouth: Mucous membranes are moist.   Eyes:      Pupils: Pupils are equal, round, and reactive to light.   Cardiovascular:      Rate and Rhythm: Normal rate.      Pulses: Normal pulses.   Pulmonary:      Effort: Pulmonary effort is normal.   Abdominal:      General: Abdomen is flat.   Musculoskeletal:         General: Normal range of motion.      Cervical back: Normal range of motion and neck supple.   Skin:     General: Skin is warm.   Neurological:      Mental  Status: He is alert. Mental status is at baseline.   Psychiatric:         Mood and Affect: Mood normal.               Result Review    Result Review:  I have personally reviewed the results from the time of this admission to 5/13/2022 13:58 EDT and agree with these findings:  []  Laboratory  []  Microbiology  []  Radiology  []  EKG/Telemetry   []  Cardiology/Vascular   []  Pathology  []  Old records  []  Other:  Most notable findings include:  Wounds (last 24 hours)     LDA Wound     Row Name               [REMOVED] Wound 12/05/19 abdomen Incision    Wound - Properties Group Placement Date: 12/05/19  -AL Present on Hospital Admission: N  -AL Location: abdomen  -AL Primary Wound Type: Incision  -AL, SURGICAL INCISION  Removal Date: 05/13/22  -KARI      Retired Wound - Properties Group Placement Date: 12/05/19  -AL Present on Hospital Admission: N  -AL Location: abdomen  -AL Primary Wound Type: Incision  -AL, SURGICAL INCISION  Removal Date: 05/13/22  -KARI      Retired Wound - Properties Group Date first assessed: 12/05/19  -AL Present on Hospital Admission: N  -AL Retired Orientation: midline  -AL Location: abdomen  -AL Primary Wound Type: Incision  -AL, SURGICAL INCISION  Resolution Date: 05/13/22  -KARI              [REMOVED] Wound 12/05/19 1551 Other (See comments) abdomen Incision    Wound - Properties Group Placement Date: 12/05/19  -LEONARDO Placement Time: 1551  -LEONARDO Side: Other (See comments)  -LEONARDO Location: abdomen  -LEONARDO Primary Wound Type: Incision  -LEONARDO Removal Date: 05/13/22  -KARI      Retired Wound - Properties Group Placement Date: 12/05/19  -LEONARDO Placement Time: 1551  -LEONARDO Side: Other (See comments)  -LEONARDO Location: abdomen  -LEONARDO Primary Wound Type: Incision  -LEONARDO Removal Date: 05/13/22  -KARI      Retired Wound - Properties Group Date first assessed: 12/05/19  -LEONARDO Time first assessed: 1551  -LEONARDO Side: Other (See comments)  -LEONARDO Location: abdomen  -LEONARDO Primary Wound Type: Incision  -LEONARDO Resolution Date: 05/13/22  -KARI             User Key  (r) = Recorded By, (t) = Taken By, (c) = Cosigned By    Initials Name Provider Type    Viktoria Harvey, RN Registered Nurse    Uma Duque, RN Registered Nurse    Patt Holt RN Registered Nurse                  Assessment & Plan      aspirin, 81 mg, Oral, Daily  atorvastatin, 80 mg, Oral, Daily  insulin lispro, 0-9 Units, Subcutaneous, TID AC  insulin NPH-insulin regular, 30 Units, Subcutaneous, TID  insulin NPH-insulin regular, 5 Units, Subcutaneous, Nightly  metoclopramide, 10 mg, Oral, TID AC  pantoprazole, 40 mg, Oral, QAM  Rivaroxaban, 5 mg, Oral, Q12H  sodium chloride, 10 mL, Intravenous, Q12H         Active Hospital Problems:  Active Hospital Problems    Diagnosis    • **Dizziness    • CKD (chronic kidney disease), stage II    • Fall    • History of CVA (cerebrovascular accident)    • Coronary artery disease involving native coronary artery of native heart without angina pectoris    • Essential hypertension    • Asthma    • Hyperlipidemia, mixed    • Gastroparesis    • Type 2 diabetes mellitus with hyperglycemia, with long-term current use of insulin ([facility])      Assessment/plan       Dizziness         -resolving, no evidence of orthostatic hypotension          -CT head/MRI of the brain (5/12) negative for CVA    Unsteady gait/lateralizing      -likely 2/2 autonomic/peripheral neuropathy       -appreciate neurologist,  evaluation and recommendation        -optimize glycemic control, PT/OT as tolerated    History of CVA with left homonymous hemianopsia       -aspirin/Lipitor/Xarelto and control blood pressure    HLD     -Lipitor    T2DM (recent A1c of 7.6<--- 12.5)       -70/30 insulin and SSI, monitor closely for hypoglycemia    CKD, stage II       -Hydrate gently and monitor renal function closely    History of fall         -cont fall precaution                DVT prophylaxis:  Medical and mechanical DVT prophylaxis orders are present.    CODE STATUS:    Level Of Support  Discussed With: Patient  Code Status (Patient has no pulse and is not breathing): CPR (Attempt to Resuscitate)  Medical Interventions (Patient has pulse or is breathing): Full Support      Disposition:  I expect patient to be discharged 48-72 hours    Electronically signed by Tong Hanks MD, 05/13/22, 13:58 EDT.  [facility] Hospitalist Team

## 2022-05-13 NOTE — PLAN OF CARE
Goal Outcome Evaluation:    Pt is a 63 y/o M admitted for dizziness and falls. PMHx: R PCA CVA in 2019 with L visual field cut. Prior to admission, pt lives with wife, independent with all func mob and transfers without AD, min to mod A for LB dressing, UB dressing with MOD I, bathing with MOD I in standing in tub/shower combo, HHA for tub/shower transfers, 6 falls in the last year. Pt presents with flat affect, delayed response to commands ~5 seconds, impaired visual tracking with L eye to the L. Pt req min A for func mob and transfers this date and is unsteady on feet, pt with one LOB during turn. Pt educated on L sided head turns 2/2 L visual deficit to increase safety and avoid running into objects in room. Pt presents with minimal L sided FMC deficits, L UE strength, decreased endurance, and decline in ADL function.  Pt will benefit from skilled OT to address above deficits. REC acute IP rehab at [place] as pt is a high falls risk and not safe for home.     Marva Santos, KWAN, OTR

## 2022-05-13 NOTE — THERAPY EVALUATION
Patient Name: Chao Lazar  : 1960    MRN: [id number]                              Today's Date: 2022       Admit Date: 2022    Visit Dx:     ICD-10-CM ICD-9-CM   1. Dizziness  R42 780.4     Patient Active Problem List   Diagnosis   • Allergic state   • Asthma   • Chronic cough   • Degenerative joint disease   • Type 2 diabetes mellitus with hyperglycemia, with long-term current use of insulin (HCC)   • Gastroparesis   • Hyperlipidemia, mixed   • Essential hypertension   • Nausea and vomiting   • Vitamin D deficiency   • Combined forms of age-related cataract, bilateral   • Presbyopia   • Acute ischemic right PCA stroke involving the right occipital lobe (CMS/HCC)   • Sphenoid sinusitis   • Retinal disorder   • Coronary artery disease involving native coronary artery of native heart without angina pectoris   • Cutaneous abscess of head excluding face   • History of CVA (cerebrovascular accident)   • Leukocytosis   • Hypomagnesemia   • Left-sided weakness   • Hypoglycemia   • Dizziness   • CKD (chronic kidney disease), stage II   • Fall     Past Medical History:   Diagnosis Date   • Asthma 3/22/2018   • Coronary artery disease involving native coronary artery of native heart without angina pectoris 2020   • Essential hypertension 2019   • Gastroparesis 2013   • History of CVA (cerebrovascular accident) 2021   • Hyperlipidemia, mixed 4/3/2017   • Hypoglycemia    • Type 2 diabetes mellitus (HCC)      Past Surgical History:   Procedure Laterality Date   • CARDIAC CATHETERIZATION     • CHOLECYSTECTOMY     • COLON RESECTION      [id number]   • COLON RESECTION SMALL BOWEL Right 2019    Procedure: open right hemicolectomy;  Surgeon: Ronaldo Ardon MD;  Location: [facility] OR;  Service: General   • COLONOSCOPY  2019    x2    • CORONARY ANGIOPLASTY WITH STENT PLACEMENT  2017   • ECHO - CONVERTED     • ECHO - CONVERTED     • FRACTURE SURGERY      collar  bone as a child    • KNEE ARTHROSCOPY Left 08/2003   • KNEE JOINT MANIPULATION Left 04/2010   • TOTAL KNEE ARTHROPLASTY Bilateral     2013,2011      General Information     Row Name 05/13/22 1148          OT Time and Intention    Document Type evaluation  -NA     Mode of Treatment occupational therapy  -NA     Row Name 05/13/22 1148          General Information    Patient Profile Reviewed yes  -NA     Prior Level of Function independent:;feeding;grooming;bathing;bed mobility;ADL's  -NA     Existing Precautions/Restrictions fall  -NA     Barriers to Rehab previous functional deficit;visual deficit  -NA     Row Name 05/13/22 1518          Occupational Profile    Reason for Services/Referral (Occupational Profile) evolving CVA  -NA     [facility] Name 05/13/22 1148          Living Environment    People in Home spouse  -NA           User Key  (r) = Recorded By, (t) = Taken By, (c) = Cosigned By    Initials Name Provider Type    NA Marva Santos OT Occupational Therapist                 Mobility/ADL's     [facility] Name 05/13/22 1524          Bed Mobility    All Activities, [place] (Bed Mobility) minimum assist (75% patient effort)  -NA     [facility] Name 05/13/22 1524          Transfers    Transfers stand-sit transfer;sit-stand transfer;bed-chair transfer  -NA     Bed-Chair [place] (Transfers) minimum assist (75% patient effort);verbal cues;moderate assist (50% patient effort)  -NA     Assistive Device (Bed-Chair Transfers) walker, front-wheeled  -NA     Sit-Stand [place] (Transfers) contact guard;minimum assist (75% patient effort);verbal cues;nonverbal cues (demo/gesture);1 person assist  -NA     Stand-Sit [place] (Transfers) verbal cues;nonverbal cues (demo/gesture);moderate assist (50% patient effort)  -NA     [facility] Name 05/13/22 1524          Sit-Stand Transfer    Assistive Device (Sit-Stand Transfers) walker, front-wheeled  -NA     Row Name 05/13/22 1524          Stand-Sit Transfer    Assistive Device  (Stand-Sit Transfers) walker, front-wheeled  -[facility] Name 05/13/22 1524          Functional Mobility    Functional Mobility- Ind. Level minimum assist (75% patient effort);nonverbal cues required (demo/gesture);verbal cues required  veering to R. pt educated on head turns during func mob to the L to scan enviroment  -NA     Functional Mobility- Device walker, front-wheeled  -     Functional Mobility- Safety Issues step length decreased;sequencing ability decreased  -[facility] Name 05/13/22 1524          Activities of Daily Living    BADL Assessment/Intervention lower body dressing  -NA     Row Name 05/13/22 1524          Lower Body Dressing Assessment/Training    [place] Level (Lower Body Dressing) maximum assist (25% patient effort)  -NA           User Key  (r) = Recorded By, (t) = Taken By, (c) = Cosigned By    Initials Name Provider Type    NA Marva Santos OT Occupational Therapist               Obj/Interventions     [facility] Name 05/13/22 1525          Sensory Assessment (Somatosensory)    Sensory Assessment (Somatosensory) UE sensation intact  -     Sensory Subjective Reports numbness;tingling  -     Sensory Assessment in B feet  -NA     Row Name 05/13/22 1525          Vision Assessment/Intervention    Visual Motor Impairment visual tracking, left;visual tracking, up  -NA     Visual Processing Deficit k[place]-inattention/neglect, left;visual search, left  -NA     Visual Perception Impairment right/left discrimination  -NA     Row Name 05/13/22 1525          Range of Motion Comprehensive    General Range of Motion bilateral upper extremity ROM WFL  -NA     Row Name 05/13/22 1525          Strength Comprehensive (MMT)    General Manual Muscle Testing (MMT) Assessment upper extremity strength deficits identified  -NA     Row Name 05/13/22 1525          Upper Extremity (Manual Muscle Testing)    Comment, MMT: Upper Extremity LUE 4/5, RUE 5/5  -[facility] Name 05/13/22 1525          Motor Skills     Motor Skills functional endurance;coordination  -NA     Coordination fine motor deficit;gross motor deficit;left;upper extremity;minimal impairment;bimanual skills;dysmetria  -NA     Therapeutic Exercise shoulder;elbow/forearm;wrist;hand  -NA     Row Name 05/13/22 1525          Balance    Balance Assessment sitting dynamic balance;sitting static balance;sit to stand dynamic balance;standing static balance;standing dynamic balance;system impairments;contributing impairments  -NA     Static Sitting Balance supervision  -NA     Dynamic Sitting Balance contact guard  -NA     Static Standing Balance minimal assist  -NA     Dynamic Standing Balance minimal assist  -NA     Position/Device Used, Standing Balance walker, rolling  -NA     Balance Interventions sitting;dynamic;occupation based/functional task;minimal challenge  -NA     Comment, Balance mod A  -NA           User Key  (r) = Recorded By, (t) = Taken By, (c) = Cosigned By    Initials Name Provider Type    Marva Casillas OT Occupational Therapist               Goals/Plan     Row Name 05/13/22 1531          Transfer Goal 1 (OT)    Activity/Assistive Device (Transfer Goal 1, OT) toilet  -NA     [place] Level/Cues Needed (Transfer Goal 1, OT) standby assist  -NA     Time Frame (Transfer Goal 1, OT) 2 weeks  -NA     Row Name 05/13/22 1531          Dressing Goal 1 (OT)    Activity/Device (Dressing Goal 1, OT) dressing skills, all  -NA     [place]/Cues Needed (Dressing Goal 1, OT) standby assist  -NA     Time Frame (Dressing Goal 1, OT) 2 weeks  -NA     Row Name 05/13/22 1531          Toileting Goal 1 (OT)    Activity/Device (Toileting Goal 1, OT) toileting skills, all  -NA     [place] Level/Cues Needed (Toileting Goal 1, OT) standby assist  -NA     Time Frame (Toileting Goal 1, OT) 2 weeks  -NA           User Key  (r) = Recorded By, (t) = Taken By, (c) = Cosigned By    Initials Name Provider Type    Marva Casillas OT Occupational Therapist                Clinical Impression     Row Name 05/13/22 1530          Pain Assessment    Pretreatment Pain Rating 0/10 - no pain  -NA     Posttreatment Pain Rating 0/10 - no pain  -NA     Pain Intervention(s) Therapeutic presence  -NA     Row Name 05/13/22 1530 05/13/22 1151       Plan of Care Review    Plan of Care Reviewed With patient;spouse;other (see comments)  RN  -NA --    Outcome Evaluation -- Pt is a 61 y/o M admitted for dizziness and falls. PMHx: R PCA CVA in 2019 with L visual field cut. Prior to admission, pt lives with wife, independent with all func mob and transfers without AD, min to mod A for LB dressing, UB dressing with MOD I, bathing with MOD I in standing in tub/shower combo, HHA for tub/shower transfers, 6 falls in the last year. Pt presents with flat affect, delayed response to commands ~5 seconds, impaired visual tracking with L eye to the L. Pt req min A for func mob and transfers this date and is unsteady on feet, pt with one LOB during turn. Pt educated on L sided head turns 2/2 L visual deficit to increase safety and avoid running into objects in room. Pt presents with minimal L sided FMC deficits, L UE strength, decreased endurance, and decline in ADL function.  Pt will benefit from skilled OT to address above deficits. REC acute IP rehab at [facility] as pt is a high falls risk and not safe for home.  -NA    Row Name 05/13/22 1530          Therapy Assessment/Plan (OT)    Rehab Potential (OT) good, to achieve stated therapy goals  -NA     Criteria for Skilled Therapeutic Interventions Met (OT) yes;meets criteria;skilled treatment is necessary  -NA     Therapy Frequency (OT) 5 times/wk  -NA     Row Name 05/13/22 1530          Vital Signs    O2 Delivery Pre Treatment room air  -NA     O2 Delivery Intra Treatment room air  -NA     O2 Delivery Post Treatment room air  -NA     Pre Patient Position Standing  -NA     Intra Patient Position Standing  -NA     Post Patient Position Sitting  -NA      Recovery Time all vitals stable  -NA     Rest Breaks  2  -NA     Activity Duration 10  -NA     Row Name 05/13/22 1530          Positioning and Restraints    Pre-Treatment Position other (comment)  with DR. JULES  -CLARISSA     Post Treatment Position chair  -NA     In Chair notified nsg;reclined;call light within reach;encouraged to call for assist;exit alarm on;with family/caregiver  -NA           User Key  (r) = Recorded By, (t) = Taken By, (c) = Cosigned By    Initials Name Provider Type    Marva Casillas OT Occupational Therapist               Outcome Measures     Row Name 05/13/22 1532          How much help from another is currently needed...    Putting on and taking off regular lower body clothing? 2  -NA     Bathing (including washing, rinsing, and drying) 2  -NA     Toileting (which includes using toilet bed pan or urinal) 3  -NA     Putting on and taking off regular upper body clothing 3  -NA     Taking care of personal grooming (such as brushing teeth) 3  -NA     Eating meals 3  -NA     AM-PAC 6 Clicks Score (OT) 16  -NA     Row Name 05/13/22 1532          How much help from another person do you currently need...    Turning from your back to your side while in flat bed without using bedrails? 3  -NA     Moving from lying on back to sitting on the side of a flat bed without bedrails? 3  -NA     Moving to and from a bed to a chair (including a wheelchair)? 3  -NA     Standing up from a chair using your arms (e.g., wheelchair, bedside chair)? 3  -NA     Climbing 3-5 steps with a railing? 2  -NA     To walk in hospital room? 2  -NA     AM-PAC 6 Clicks Score (PT) 16  -NA     Highest level of mobility 5 --> Static standing  -NA     Row Name 05/13/22 1532          Modified Adams Scale    Pre-Stroke Modified Jenaro Scale 6 - Unable to determine (UTD) from the medical record documentation  -NA     Modified Adams Scale 4 - Moderately severe disability.  Unable to walk without assistance, and unable to attend to  own bodily needs without assistance.  -CLARISSA     Row Name 05/13/22 1532          Functional Assessment    Outcome Measure Options AM-PAC 6 Clicks Basic Mobility (PT);AM-PAC 6 Clicks Daily Activity (OT);Modified Chemult  -NA           User Key  (r) = Recorded By, (t) = Taken By, (c) = Cosigned By    Initials Name Provider Type    Marva Casillas OT Occupational Therapist                Occupational Therapy Education                 Title: PT OT SLP Therapies (In Progress)     Topic: Occupational Therapy (In Progress)     Point: ADL training (In Progress)     Description:   Instruct learner(s) on proper safety adaptation and remediation techniques during self care or transfers.   Instruct in proper use of assistive devices.              Learning Progress Summary           Patient Acceptance, TB,E,D, NR by NA at 5/13/2022 1532    Comment: role and purpose of OT, DC rec, using call light   Family Acceptance, TB,E,D, NR by NA at 5/13/2022 1532    Comment: role and purpose of OT, DC rec, using call light                   Point: Home exercise program (In Progress)     Description:   Instruct learner(s) on appropriate technique for monitoring, assisting and/or progressing therapeutic exercises/activities.              Learning Progress Summary           Patient Acceptance, TB,E,D, NR by NA at 5/13/2022 1532    Comment: role and purpose of OT, DC rec, using call light   Family Acceptance, TB,E,D, NR by NA at 5/13/2022 1532    Comment: role and purpose of OT, DC rec, using call light                   Point: Precautions (In Progress)     Description:   Instruct learner(s) on prescribed precautions during self-care and functional transfers.              Learning Progress Summary           Patient Acceptance, TB,E,D, NR by NA at 5/13/2022 1532    Comment: role and purpose of OT, DC rec, using call light   Family Acceptance, TB,E,D, NR by NA at 5/13/2022 1532    Comment: role and purpose of OT, DC rec, using call light                    Point: Body mechanics (In Progress)     Description:   Instruct learner(s) on proper positioning and spine alignment during self-care, functional mobility activities and/or exercises.              Learning Progress Summary           Patient Acceptance, TB,E,D, NR by CLARISSA at 5/13/2022 1532    Comment: role and purpose of OT, DC rec, using call light   Family Acceptance, TB,E,D, NR by CLARISSA at 5/13/2022 1532    Comment: role and purpose of OT, DC rec, using call light                               User Key     Initials Effective Dates Name Provider Type Discipline    CLARISSA 09/30/20 -  Marva Santos OT Occupational Therapist OT              OT Recommendation and Plan  Therapy Frequency (OT): 5 times/wk  Plan of Care Review  Plan of Care Reviewed With: patient, spouse, other (see comments) (RN)  Outcome Evaluation: Pt is a 63 y/o M admitted for dizziness and falls. PMHx: R PCA CVA in 2019 with L visual field cut. Prior to admission, pt lives with wife, independent with all func mob and transfers without AD, min to mod A for LB dressing, UB dressing with MOD I, bathing with MOD I in standing in tub/shower combo, HHA for tub/shower transfers, 6 falls in the last year. Pt presents with flat affect, delayed response to commands ~5 seconds, impaired visual tracking with L eye to the L. Pt req min A for func mob and transfers this date and is unsteady on feet, pt with one LOB during turn. Pt educated on L sided head turns 2/2 L visual deficit to increase safety and avoid running into objects in room. Pt presents with minimal L sided FMC deficits, L UE strength, decreased endurance, and decline in ADL function.  Pt will benefit from skilled OT to address above deficits. REC acute IP rehab at [facility] as pt is a high falls risk and not safe for home.     Time Calculation:    Time Calculation- OT     Row Name 05/13/22 1404             Time Calculation- OT    OT Start Time 1058  -NA      OT Stop Time 1125  -NA      OT Time  Calculation (min) 27 min  -NA      Total Timed Code Minutes- OT 0 minute(s)  -NA      OT Received On 05/13/22  -NA      OT - Next Appointment 05/16/22  -NA      OT Goal Re-Cert Due Date 05/27/22  -NA            User Key  (r) = Recorded By, (t) = Taken By, (c) = Cosigned By    Initials Name Provider Type    NA Marva Santos OT Occupational Therapist              Therapy Charges for Today     Code Description Service Date Service Provider Modifiers Qty    [id number] HC OT EVAL MOD COMPLEXITY 4 5/13/2022 Marva Santos OT GO 1               Marva Santos OT  5/13/2022

## 2022-05-13 NOTE — PLAN OF CARE
Goal Outcome Evaluation:  Plan of Care Reviewed With: patient        Progress: no change     Patient resting abed, wife at bedside, neurology consulted r/t decrease in right peripheral vision and leaning to right, questions and concerns answered      Problem: Adult Inpatient Plan of Care  Goal: Plan of Care Review  Outcome: Ongoing, Progressing  Flowsheets (Taken 5/13/2022 1316)  Progress: no change  Plan of Care Reviewed With: patient  Goal: Patient-Specific Goal (Individualized)  Outcome: Ongoing, Progressing  Goal: Absence of Hospital-Acquired Illness or Injury  Outcome: Ongoing, Progressing  Intervention: Identify and Manage Fall Risk  Recent Flowsheet Documentation  Taken 5/13/2022 1225 by Patt Abraham RN  Safety Promotion/Fall Prevention: safety round/check completed  Taken 5/13/2022 0951 by Patt Abraham RN  Safety Promotion/Fall Prevention: safety round/check completed  Taken 5/13/2022 0815 by Patt Abraham RN  Safety Promotion/Fall Prevention: safety round/check completed  Intervention: Prevent Infection  Recent Flowsheet Documentation  Taken 5/13/2022 1225 by Patt Abraham RN  Infection Prevention: personal protective equipment utilized  Taken 5/13/2022 0951 by Patt Abraham RN  Infection Prevention: personal protective equipment utilized  Taken 5/13/2022 0815 by Patt Abraham RN  Infection Prevention: personal protective equipment utilized  Goal: Optimal Comfort and Wellbeing  Outcome: Ongoing, Progressing  Intervention: Provide Person-Centered Care  Recent Flowsheet Documentation  Taken 5/13/2022 0815 by Patt Abraham RN  Trust Relationship/Rapport:   care explained   questions answered   questions encouraged   reassurance provided  Goal: Readiness for Transition of Care  Outcome: Ongoing, Progressing     Problem: Diabetes Comorbidity  Goal: Blood Glucose Level Within Targeted Range  Outcome: Ongoing, Progressing  Intervention: Monitor and Manage Glycemia  Recent Flowsheet  Documentation  Taken 5/13/2022 0815 by Patt Abraham RN  Glycemic Management: blood glucose monitored     Problem: Hypertension Comorbidity  Goal: Blood Pressure in Desired Range  Outcome: Ongoing, Progressing     Problem: Skin Injury Risk Increased  Goal: Skin Health and Integrity  Outcome: Ongoing, Progressing     Problem: Fall Injury Risk  Goal: Absence of Fall and Fall-Related Injury  Outcome: Ongoing, Progressing  Intervention: Promote Injury-Free Environment  Recent Flowsheet Documentation  Taken 5/13/2022 1225 by Patt Abraham RN  Safety Promotion/Fall Prevention: safety round/check completed  Taken 5/13/2022 0951 by Patt Abraham RN  Safety Promotion/Fall Prevention: safety round/check completed  Taken 5/13/2022 0815 by Patt Abraham RN  Safety Promotion/Fall Prevention: safety round/check completed

## 2022-05-13 NOTE — CONSULTS
"Diabetes Education  Assessment/Teaching    Patient Name:  Chao Lazar  YOB: 1960  MRN: [id number]  Admit Date:  5/12/2022      Assessment Date:  5/13/2022  Flowsheet Row Most Recent Value   General Information     Referral From: A1c  [On 5/12/2022 A1c was 7.6%.]   Height 185.4 cm (73\")   Weight 93.6 kg (206 lb 5.6 oz)   Weight Method Bed scale   Pregnancy Assessment    Diabetes History    What type of diabetes do you have? Type 2   Length of Diabetes Diagnosis 10 + years  [Diagnosed 1997.]   Current DM knowledge fair   Have you had diabetes education/teaching in the past? yes   When and where was your diabetes education? Inpatient hospitalization at [facility] on 9/22/2021   Do you test your blood sugar at home? yes   Frequency of checks 3-4X a day   Meter type Accu-chek 4-5 years old   Who performs the test? patient or wife   Typical readings 150-200   Have you had high blood sugar? (>140mg/dl) yes   How often do you have high blood sugar? frequently   When was your last high blood sugar? Admission blood sugar 171   Education Preferences    What areas of diabetes would you like to learn about? avoiding high blood sugar, diabetes complications   Nutrition Information    Assessment Topics    Problem Solving - Assessment Needs education   Reducing Risk - Assessment Needs education   DM Goals    Problem Solving - Goal Today   Reducing Risk - Goal Today   Monitoring - Goal Today          Flowsheet Row Most Recent Value   DM Education Needs    Meter Meter provided   Meter Type One Touch  [One Touch Verio Reflect given to patient with patient doing return demonstration using the lancing devcie, inserting the test strip into the meter, and applying blood to the test strip. Blood sugar was 347.]   Frequency of Testing 3 times a day  [Patient's wife stated that they would download the latoya on his phone to log blood sugars.]   Medication Oral, Insulin  [At home patient stated he takes Novolin 70/30 60 units 3X a " day before meals and Metformin 500 mg BID.]   Problem Solving Hyperglycemia, Signs, Symptoms, Treatment   Reducing Risks A1C testing  [On 5/12/2022 A1c was 7.6%.]   Discharge Plan Home   Motivation Moderate   Teaching Method Discussion, Handouts, Explanation, Demonstration, Teach back   Patient Response Verbalized understanding, Demonstrates adequately  [Wife at bedside and involved with discussion.]            Other Comments:  A1c info sheet given with discussion on A1c target and healthy blood sugar range.  Patient stated he drinks diet Dr. Pepper and Seth Zero but he doesn't care for water. Patient stated he doesn't get much exercise. Patient seen for assessment of needs related to diabetes. Patient stated he sees Dr. Manriquez at the [facility] with next appointment 8/1/2022 and last appointment 1/19/2022. Patient and wife have no further questions or concerns related to diabetes at this time.          Electronically signed by:  Veronica Rivera RN  05/13/22 18:54 EDT

## 2022-05-13 NOTE — CONSULTS
Primary Care Provider: Provider, No Known     Consult requested by: Franklyn Hanks MD    Reason for Consultation: Neurological evaluation for worsening dizziness, CT/MRI of Brain done yesterday.     Chao aLzar is a 62 y.o. male *    History taken from: patient chart family RN    Chief complaint: Dizziness.        SUBJECTIVE:    History of present illness:   The patient is a 62 yr old gentleman with multiple medical problems including CAD, CKD II, HTN, DM II, gastroparesis, HLD, s/p right PCA stroke in the past with left field cut who presented to ER of [facility] secondary to dizziness.  It was described as progressive and got worse that he had a fall.  The dizziness being described being light headed with off balance.  He denies double vision, speech and swallowing problems, focal weakness, bowel and bladder problems. He had responded to Meclazine.  The patient states that he has been having dizziness since his stroke.      Review of Systems   Constitutional: Negative  HENT: Negative.    Eyes: Negative.    Respiratory: Negative.    Cardiovascular: CAD.    Gastrointestinal: gastroparesis.    Genitourinary: CKD II.    Musculoskeletal: Negative  Skin: Negative.    Neurological: stroke.    Hematological: Negative.    Psychiatric/Behavioral: Negative.        PATIENT HISTORY:  Past Medical History:   Diagnosis Date   • Asthma 3/22/2018   • Coronary artery disease involving native coronary artery of native heart without angina pectoris 8/27/2020   • Essential hypertension 6/28/2019   • Gastroparesis 12/23/2013   • History of CVA (cerebrovascular accident) 9/22/2021   • Hyperlipidemia, mixed 4/3/2017   • Hypoglycemia    • Type 2 diabetes mellitus (HCC)    ,   Past Surgical History:   Procedure Laterality Date   • CARDIAC CATHETERIZATION     • CHOLECYSTECTOMY  2004   • COLON RESECTION      [id number]   • COLON RESECTION SMALL BOWEL Right 12/5/2019    Procedure: open right hemicolectomy;  Surgeon: Ronaldo Ardon  MD Sridhar;  Location: [facility] OR;  Service: General   • COLONOSCOPY  2019    x2    • CORONARY ANGIOPLASTY WITH STENT PLACEMENT  04/2017   • ECHO - CONVERTED  2019   • ECHO - CONVERTED  2020   • FRACTURE SURGERY      collar bone as a child    • KNEE ARTHROSCOPY Left 08/2003   • KNEE JOINT MANIPULATION Left 04/2010   • TOTAL KNEE ARTHROPLASTY Bilateral     2013,2011   ,   Family History   Problem Relation Age of Onset   • Irritable bowel syndrome Mother    • Anxiety disorder Mother    • Depression Father    • Hypertension Father    • Mental illness Father         committed suicide   • Other Sister         back problems   • Other Brother         back problems   ,   Social History     Tobacco Use   • Smoking status: Former Smoker     Quit date: 2007     Years since quitting: 15.3   • Smokeless tobacco: Former User   Vaping Use   • Vaping Use: Never used   Substance Use Topics   • Alcohol use: Yes     Comment: occasionally   • Drug use: No   ,   Medications Prior to Admission   Medication Sig Dispense Refill Last Dose   • aspirin 81 MG EC tablet Take 1 tablet by mouth Daily.   5/11/2022 at Unknown time   • atorvastatin (LIPITOR) 80 MG tablet TAKE 1 TABLET BY MOUTH EVERY DAY 90 tablet 1 5/11/2022 at Unknown time   • Cholecalciferol (HM Vitamin D3) 50 MCG (2000 UT) capsule Take 2,000 Units by mouth Daily.   5/11/2022 at Unknown time   • Insulin NPH Isophane & Regular (NOVOLIN 70/30 FLEXPEN SC) Inject 60 Units under the skin into the appropriate area as directed 3 (Three) Times a Day. Inject 60 units under the skin TID   5/11/2022 at Unknown time   • metFORMIN ER (GLUCOPHAGE-XR) 500 MG 24 hr tablet Take 500 mg by mouth 2 (Two) Times a Day. Take one tab in morning and one in the evening   5/11/2022 at Unknown time   • omeprazole (priLOSEC) 20 MG capsule Take 40 mg by mouth 2 (Two) Times a Day.   5/11/2022 at Unknown time   • rivaroxaban (XARELTO) 10 MG tablet Take 5 mg by mouth 2 (Two) Times a Day.   5/11/2022 at  Unknown time   • metoclopramide (REGLAN) 10 MG tablet Take 10 mg by mouth 3 (Three) Times a Day Before Meals.      , Scheduled Meds:  aspirin, 81 mg, Oral, Daily  atorvastatin, 80 mg, Oral, Daily  insulin lispro, 0-9 Units, Subcutaneous, TID AC  insulin NPH-insulin regular, 30 Units, Subcutaneous, TID  insulin NPH-insulin regular, 5 Units, Subcutaneous, Nightly  metoclopramide, 10 mg, Oral, TID AC  pantoprazole, 40 mg, Oral, QAM  Rivaroxaban, 5 mg, Oral, Q12H  sodium chloride, 10 mL, Intravenous, Q12H    , Continuous Infusions:   , PRN Meds:  •  acetaminophen **OR** acetaminophen **OR** acetaminophen  •  aluminum-magnesium hydroxide-simethicone  •  dextrose  •  dextrose  •  glucagon (human recombinant)  •  hydrALAZINE  •  insulin lispro **AND** insulin lispro  •  magnesium sulfate **OR** magnesium sulfate **OR** magnesium sulfate  •  meclizine  •  melatonin  •  ondansetron **OR** ondansetron  •  potassium chloride **OR** potassium chloride **OR** potassium chloride  •  sodium chloride, Allergies:  Patient has no known allergies.    ________________________________________________________        OBJECTIVE:  Upon today's exam, The patient is laying in bed in no apparent distress.  Head NC, AT, Neck supple,  Lungs CTA,  Good pulmonary effort.  CV  S1-S2 no murmur.  Abdomen soft, non tender.  Ext no edema.          Neurologic Exam  The patient is awake, alert, oriented to person, place and time.  Follow commands name common objects.  CN VF left homonomous hemianopia noted.  EOMI, no facial droop. Tongue midline.  Motor right upper and lower extremities 5/5.  Left upper and lower extremities 5-/5.  Reflexes absent, plantar mute.  Sensory light touch intact.  Cerebellum finger to nose intact.    ________________________________________________________   RESULTS REVIEW:    VITAL SIGNS:   Temp:  [97.7 °F (36.5 °C)-98.4 °F (36.9 °C)] 98.2 °F (36.8 °C)  Heart Rate:  [73-92] 91  Resp:  [9-17] 17  BP: (119-135)/(66-82) 135/68      LABS:  WBC   Date Value Ref Range Status   05/13/2022 8.70 3.40 - 10.80 10*3/mm3 Final     RBC   Date Value Ref Range Status   05/13/2022 4.21 4.14 - 5.80 10*6/mm3 Final     Hemoglobin   Date Value Ref Range Status   05/13/2022 12.9 (L) 13.0 - 17.7 g/dL Final     Hematocrit   Date Value Ref Range Status   05/13/2022 37.7 37.5 - 51.0 % Final     MCV   Date Value Ref Range Status   05/13/2022 89.6 79.0 - 97.0 fL Final     MCH   Date Value Ref Range Status   05/13/2022 30.6 26.6 - 33.0 pg Final     MCHC   Date Value Ref Range Status   05/13/2022 34.1 31.5 - 35.7 g/dL Final     RDW   Date Value Ref Range Status   05/13/2022 13.1 12.3 - 15.4 % Final     RDW-SD   Date Value Ref Range Status   05/13/2022 41.6 37.0 - 54.0 fl Final     MPV   Date Value Ref Range Status   05/13/2022 8.0 6.0 - 12.0 fL Final     Platelets   Date Value Ref Range Status   05/13/2022 274 140 - 450 10*3/mm3 Final     Neutrophil %   Date Value Ref Range Status   05/13/2022 57.1 42.7 - 76.0 % Final     Lymphocyte %   Date Value Ref Range Status   05/13/2022 32.4 19.6 - 45.3 % Final     Monocyte %   Date Value Ref Range Status   05/13/2022 6.4 5.0 - 12.0 % Final     Eosinophil %   Date Value Ref Range Status   05/13/2022 3.6 0.3 - 6.2 % Final     Basophil %   Date Value Ref Range Status   05/13/2022 0.5 0.0 - 1.5 % Final     Neutrophils, Absolute   Date Value Ref Range Status   05/13/2022 5.00 1.70 - 7.00 10*3/mm3 Final     Lymphocytes, Absolute   Date Value Ref Range Status   05/13/2022 2.80 0.70 - 3.10 10*3/mm3 Final     Monocytes, Absolute   Date Value Ref Range Status   05/13/2022 0.60 0.10 - 0.90 10*3/mm3 Final     Eosinophils, Absolute   Date Value Ref Range Status   05/13/2022 0.30 0.00 - 0.40 10*3/mm3 Final     Basophils, Absolute   Date Value Ref Range Status   05/13/2022 0.00 0.00 - 0.20 10*3/mm3 Final     nRBC   Date Value Ref Range Status   05/13/2022 0.1 0.0 - 0.2 /100 WBC Final     Glucose   Date Value Ref Range Status   05/13/2022  160 (H) 65 - 99 mg/dL Final     BUN   Date Value Ref Range Status   05/13/2022 17 8 - 23 mg/dL Final     Creatinine   Date Value Ref Range Status   05/13/2022 1.16 0.76 - 1.27 mg/dL Final     Sodium   Date Value Ref Range Status   05/13/2022 137 136 - 145 mmol/L Final     Potassium   Date Value Ref Range Status   05/13/2022 4.6 3.5 - 5.2 mmol/L Final     Comment:     Slight hemolysis detected by analyzer. Results may be affected.     Chloride   Date Value Ref Range Status   05/13/2022 102 98 - 107 mmol/L Final     CO2   Date Value Ref Range Status   05/13/2022 20.0 (L) 22.0 - 29.0 mmol/L Final     Calcium   Date Value Ref Range Status   05/13/2022 9.4 8.6 - 10.5 mg/dL Final     Total Protein   Date Value Ref Range Status   05/12/2022 7.9 6.0 - 8.5 g/dL Final     Albumin   Date Value Ref Range Status   05/12/2022 4.20 3.50 - 5.20 g/dL Final     ALT (SGPT)   Date Value Ref Range Status   05/12/2022 51 (H) 1 - 41 U/L Final     AST (SGOT)   Date Value Ref Range Status   05/12/2022 34 1 - 40 U/L Final     Alkaline Phosphatase   Date Value Ref Range Status   05/12/2022 127 (H) 39 - 117 U/L Final     Total Bilirubin   Date Value Ref Range Status   05/12/2022 0.2 0.0 - 1.2 mg/dL Final     BUN/Creatinine Ratio   Date Value Ref Range Status   05/13/2022 14.7 7.0 - 25.0 Final     Anion Gap   Date Value Ref Range Status   05/13/2022 15.0 5.0 - 15.0 mmol/L Final       Lab Results   Component Value Date    TSH 2.440 01/19/2022    LDL 49 05/12/2022    HGBA1C 7.6 (H) 05/12/2022    [id number] 332 09/22/2021         IMAGING STUDIES:  CT Head Without Contrast    Result Date: 5/12/2022   1. Generalized atrophy with chronic periventricular and thalamic deep white matter ischemic changes. 2. Focal area of chronic encephalomalacia in the right parieto-occipital lobe. 3. No acute intracranial findings.  Electronically Signed By-Ronaldo Sheikh MD On:5/12/2022 7:14 AM This report was finalized on [id number] by  Ronaldo Sheikh MD.    MRI Brain  Without Contrast    Result Date: 5/12/2022   1. Chronic right PCA distribution infarct. 2. No acute intracranial pathology 3. Changes of mild atrophy and microvascular ischemic disease.   Electronically Signed By-Jerome Bush MD On:5/12/2022 11:08 AM This report was finalized on [id number] by  Jerome Bush MD.      I reviewed the patient's new clinical results.      ________________________________________________________     PROBLEM LIST:    Dizziness    Asthma    Type 2 diabetes mellitus with hyperglycemia, with long-term current use of insulin (HCC)    Gastroparesis    Hyperlipidemia, mixed    Essential hypertension    Coronary artery disease involving native coronary artery of native heart without angina pectoris    History of CVA (cerebrovascular accident)    CKD (chronic kidney disease), stage II    Fall          Assessment & Plan   ASSESSMENT/PLAN:  The patient is a 62 year old gentleman with multiple medical problems with DM II, HLD, HTN, CAD, CKD with history of right PCA stroke with left homonomous hemianopia who has been having dizziness.  It is probably multi-factorial due to autonomic dysfunction as well as effecst of previous stroke.    Rec:  Will obtain orthostatics.  Will continue current anti platelet medications.  Discussed with Dr. Myers. Will follow.   CAD, DM II, CKD, HTN, gastroparesis, HLD as per hospitalist, MD.    Modification of stroke risk factors:   - Blood pressure should be less than 130/80 outpatient, HbA1c less than 6.5, LDL less than 70; b12>500 and smoking cessation if applicable. We would be grateful if the primary team / primary care physician would keep a close watch on the above targets.  - Stroke education  - Follow up with neurologist of choice      I discussed the patient's findings and my recommendations with patient, family and nursing staff    Stevie Burr MD  05/13/22  12:28 EDT

## 2022-05-13 NOTE — PLAN OF CARE
Goal Outcome Evaluation:  Plan of Care Reviewed With: patient           Chao Lazar presented with dizziness and fall with history of R PCA CVA in 2019. Patient with pure downbeat nystagmus in R and L mami hallpike positions yesterday and was symptomatic. Not restested today due to increasing central signs. Noted impaired R peripheral vision which is new. Severe veering to R with ambulation and running into all objects without cues. Also runs into own R foot and doesn't identify until cues. Messaged physician with increased central findings. MRI (-) yesterday. No neuro consult. Will continue to follow and progress as tolerated.

## 2022-05-13 NOTE — THERAPY TREATMENT NOTE
Subjective: Pt agreeable to therapeutic plan of care. Continued dizziness 7/10 when mobilizing.     Objective:     Bed mobility - N/A or Not attempted.  Transfers - Min-A  Ambulation - 60 feet Min-A and with rolling walker  Constant veering to R with ambulation, running into wall, unable to correct  Pt runs into R foot with walker, states walker is stuck, unable to identify reason for walker not moving without cues.       Vision exam:   Smooth pursuit normal  Peripheral vision: impaired bilaterally (impaired on L at baseline)  Potential new homonomous hemianopsia on R  Strength, sensation, gross motor, coordination, speech baseline  Standing balance: continues to be significantly impaired, immediate LOB in narrow KATELIN without use of RW     Pain: 0 VAS  Education: Provided education on importance of mobility and skilled verbal / tactile cueing throughout intervention.     Assessment: Chao Lazar presented with dizziness and fall with history of R PCA CVA in 2019. Patient with pure downbeat nystagmus in R and L mami hallpike positions yesterday and was symptomatic. Not restested today due to increasing central signs. Noted impaired R peripheral vision which is new. Severe veering to R with ambulation and running into all objects without cues. Also runs into own R foot and doesn't identify until cues. Messaged physician with increased central findings. MRI (-) yesterday. No neuro consult. Will continue to follow and progress as tolerated.     Plan/Recommendations:   High Intensity Therapy recommended post-acute care. This is recommended as therapy feels the patient would require 5-6 days per week, 2-3 hours per day. At this time, inpatient rehabilitation (acute rehab) would be the first choice and SNF would be second.. Pt requires no DME at discharge.     Pt desires Inpatient Rehabilitation placement at discharge. Pt cooperative; agreeable to therapeutic recommendations and plan of care.         Basic Mobility  6-click:  Rollin = Total, A lot = 2, A little = 3; 4 = None  Supine>Sit:   1 = Total, A lot = 2, A little = 3; 4 = None   Sit>Stand with arms:  1 = Total, A lot = 2, A little = 3; 4 = None  Bed>Chair:   1 = Total, A lot = 2, A little = 3; 4 = None  Ambulate in room:  1 = Total, A lot = 2, A little = 3; 4 = None  3-5 Steps with railin = Total, A lot = 2, A little = 3; 4 = None  Score: 15    Modified [place]: 4 = Moderately severe disability (Unable to attend to own bodily needs without assistance, and unable to walk unassisted)     Post-Tx Position: Up in Chair and Call light and personal items within reach  PPE: gloves, surgical mask, eyewear protection

## 2022-05-14 LAB
GLUCOSE BLDC GLUCOMTR-MCNC: 189 MG/DL (ref 70–105)
GLUCOSE BLDC GLUCOMTR-MCNC: 289 MG/DL (ref 70–105)
GLUCOSE BLDC GLUCOMTR-MCNC: 301 MG/DL (ref 70–105)
GLUCOSE BLDC GLUCOMTR-MCNC: 317 MG/DL (ref 70–105)
MAGNESIUM SERPL-MCNC: 1.6 MG/DL (ref 1.6–2.4)
TROPONIN T SERPL-MCNC: <0.01 NG/ML (ref 0–0.03)

## 2022-05-14 PROCEDURE — 99214 OFFICE O/P EST MOD 30 MIN: CPT | Performed by: NURSE PRACTITIONER

## 2022-05-14 PROCEDURE — 63710000001 INSULIN ISOPHANE & REGULAR PER 5 UNITS: Performed by: NURSE PRACTITIONER

## 2022-05-14 PROCEDURE — 82962 GLUCOSE BLOOD TEST: CPT

## 2022-05-14 PROCEDURE — 96366 THER/PROPH/DIAG IV INF ADDON: CPT

## 2022-05-14 PROCEDURE — 36415 COLL VENOUS BLD VENIPUNCTURE: CPT | Performed by: NURSE PRACTITIONER

## 2022-05-14 PROCEDURE — G0378 HOSPITAL OBSERVATION PER HR: HCPCS

## 2022-05-14 PROCEDURE — 25010000002 MAGNESIUM SULFATE 2 GM/50ML SOLUTION: Performed by: NURSE PRACTITIONER

## 2022-05-14 PROCEDURE — 83735 ASSAY OF MAGNESIUM: CPT | Performed by: NURSE PRACTITIONER

## 2022-05-14 PROCEDURE — 84484 ASSAY OF TROPONIN QUANT: CPT | Performed by: INTERNAL MEDICINE

## 2022-05-14 PROCEDURE — 93005 ELECTROCARDIOGRAM TRACING: CPT | Performed by: INTERNAL MEDICINE

## 2022-05-14 PROCEDURE — 99225 PR SBSQ OBSERVATION CARE/DAY 25 MINUTES: CPT | Performed by: INTERNAL MEDICINE

## 2022-05-14 PROCEDURE — 63710000001 INSULIN LISPRO (HUMAN) PER 5 UNITS: Performed by: NURSE PRACTITIONER

## 2022-05-14 RX ORDER — MAGNESIUM SULFATE HEPTAHYDRATE 40 MG/ML
2 INJECTION, SOLUTION INTRAVENOUS ONCE
Status: COMPLETED | OUTPATIENT
Start: 2022-05-14 | End: 2022-05-14

## 2022-05-14 RX ADMIN — INSULIN HUMAN 5 UNITS: 100 INJECTION, SUSPENSION SUBCUTANEOUS at 20:44

## 2022-05-14 RX ADMIN — RIVAROXABAN 5 MG: 2.5 TABLET, FILM COATED ORAL at 17:12

## 2022-05-14 RX ADMIN — INSULIN HUMAN 30 UNITS: 100 INJECTION, SUSPENSION SUBCUTANEOUS at 17:12

## 2022-05-14 RX ADMIN — METOPROLOL TARTRATE 25 MG: 25 TABLET, FILM COATED ORAL at 12:19

## 2022-05-14 RX ADMIN — Medication 10 ML: at 20:45

## 2022-05-14 RX ADMIN — INSULIN LISPRO 2 UNITS: 100 INJECTION, SOLUTION INTRAVENOUS; SUBCUTANEOUS at 09:03

## 2022-05-14 RX ADMIN — MAGNESIUM SULFATE HEPTAHYDRATE 2 G: 40 INJECTION, SOLUTION INTRAVENOUS at 14:37

## 2022-05-14 RX ADMIN — INSULIN LISPRO 6 UNITS: 100 INJECTION, SOLUTION INTRAVENOUS; SUBCUTANEOUS at 11:53

## 2022-05-14 RX ADMIN — INSULIN LISPRO 7 UNITS: 100 INJECTION, SOLUTION INTRAVENOUS; SUBCUTANEOUS at 17:12

## 2022-05-14 RX ADMIN — Medication 10 ML: at 09:02

## 2022-05-14 RX ADMIN — RIVAROXABAN 5 MG: 2.5 TABLET, FILM COATED ORAL at 06:20

## 2022-05-14 RX ADMIN — INSULIN HUMAN 30 UNITS: 100 INJECTION, SUSPENSION SUBCUTANEOUS at 20:43

## 2022-05-14 RX ADMIN — PANTOPRAZOLE SODIUM 40 MG: 40 TABLET, DELAYED RELEASE ORAL at 09:02

## 2022-05-14 RX ADMIN — METOCLOPRAMIDE 10 MG: 10 TABLET ORAL at 17:12

## 2022-05-14 RX ADMIN — ATORVASTATIN CALCIUM 80 MG: 40 TABLET, FILM COATED ORAL at 09:02

## 2022-05-14 RX ADMIN — INSULIN HUMAN 30 UNITS: 100 INJECTION, SUSPENSION SUBCUTANEOUS at 09:03

## 2022-05-14 RX ADMIN — ASPIRIN 81 MG: 81 TABLET, COATED ORAL at 09:02

## 2022-05-14 RX ADMIN — METOPROLOL TARTRATE 25 MG: 25 TABLET, FILM COATED ORAL at 20:44

## 2022-05-14 RX ADMIN — METOCLOPRAMIDE 10 MG: 10 TABLET ORAL at 11:53

## 2022-05-14 RX ADMIN — METOCLOPRAMIDE 10 MG: 10 TABLET ORAL at 09:02

## 2022-05-14 NOTE — CONSULTS
Cardiology Consult Note      REQUESTING PHYSICIAN    Florentino Geronimo MD    PATIENT IDENTIFICATION  Name: Chao Lazar  Age: 62 y.o.  Sex: male  :  1960  MRN: [id number]             REASON FOR CONSULTATION:  62-year-old male with past medical history of coronary artery disease and prior stenting, history of hypertension, hyperlipidemia, diabetes mellitus, history of stroke with resultant homonymous hemianopsia and left hemiparesis.    Heart cath 2017 secondary to non-ST elevation MI with nonobstructive disease of LAD and circumflex.  RCA had high-grade stenosis and he underwent stenting of proximal RCA.  Preserved LV function.    TTE 2021: EF 55-60%, no significant valvular pathology.      CC:  Tachycardia  Dizziness  Generalized weakness    HISTORY OF PRESENT ILLNESS: Agree with narrative as discussed with nurse practitioner after my face-to-face encounter as well as with scribed additional findings below  Patient presented to the emergency department at [facility] 2022 with complaint of dizziness.  Patient is difficult historian and wife at the bedside assists with HPI on nurse practitioner as well as my encounter.  She reports patient had 1 episode of severe dizziness approximately a week ago that improved.  He had additional episode on the day of presentation and subsequently fell.  He denies any injury.  Patient reports dizziness worse when up moving or moves his head.  Improved but not resolved when he is lying still.  He denies any nausea or vomiting, no photophobia or floaters, shortness of breath or chest discomfort no palpitations or rapid heart rates,.  Upon my evaluation, patient is resting comfortably in bed.  He continues to report dizziness.  He denies any lower extremity edema, orthopnea or PND.    In the ED, he received meclizine and 1 L normal saline.    EKG and reviewed and interpreted by me demonstrates sinus tachycardia nonspecific inferolateral ST  "abnormalities, concern for ischemia at this heart rate    Telemetry review reveals sinus rhythm in the 80s    REVIEW OF SYSTEMS:  Pertinent items are noted in HPI, all other systems reviewed and negative    OBJECTIVE   EKG shows narrow complex regular tachycardia at 120-    Magnesium 1.6    ASSESSMENT  Dizziness  Fall-mechanical, secondary to dizziness  Tachycardia  Essential hypertension  History of stroke with left hemiparesis  Tremor left upper extremity  Coronary artery disease with prior stent  History of non-STEMI  Abnormal EKG    PLAN  EKG shows narrow complex tachycardia.  I believe this to be sinus tachycardia based on my interpretation but he has some nonspecific repolarization abnormalities at a rate of 125, when compared to prior he has a previous EKG with nonspecific T wave inversion in the lateral leads as well as nonspecific abnormalities in V5 and V6, this may be just a simple worsening secondary to rate of baseline abnormalities.  Again he denies any chest pain        Currently receiving metoprolol 25 mg twice daily, aspirin, statin  He is on Xarelto 10 mg twice daily for prior stroke  CT/MRI head negative for CVA    Further recommendation follow findings and noninvasive work-up  He has seen Dr. Caceres in the past    Vital Signs  Visit Vitals  /97 (BP Location: Right arm)   Pulse 117   Temp 98 °F (36.7 °C) (Oral)   Resp 18   Ht 185.4 cm (73\")   Wt 93.6 kg (206 lb 5.6 oz)   SpO2 93%   BMI 27.22 kg/m²     Oxygen Therapy  SpO2: 93 %  Pulse Oximetry Type: Continuous  Device (Oxygen Therapy): room air  Probe Placed On (Pulse Ox): Left:, finger  Flowsheet Rows    Flowsheet Row First Filed Value   Admission Height 185.4 cm (73\") Documented at 05/12/2022 0516   Admission Weight 86.2 kg (190 lb) Documented at 05/12/2022 0516        Intake & Output (last 3 days)       05/11 0701 05/12 0700 05/12 0701  05/13 0700 05/13 0701  05/14 0700 05/14 0701  05/15 0700    P.O.   640 480    Total Intake(mL/kg)   640 " (6.8) 480 (5.1)    Urine (mL/kg/hr)  300 (0.1) 1000 (0.4)     Total Output  300 1000     Net  -300 -360 +480            Urine Unmeasured Occurrence    2 x    Stool Unmeasured Occurrence    0 x        Lines, Drains & Airways     Active LDAs     Name Placement date Placement time Site Days    Peripheral IV 05/12/22 0632 Arm 05/12/22  0632  Arm  2                MEDICAL HISTORY    Past Medical History:   Diagnosis Date   • Asthma 3/22/2018   • Coronary artery disease involving native coronary artery of native heart without angina pectoris 8/27/2020   • Essential hypertension 6/28/2019   • Gastroparesis 12/23/2013   • History of CVA (cerebrovascular accident) 9/22/2021   • Hyperlipidemia, mixed 4/3/2017   • Hypoglycemia    • Type 2 diabetes mellitus (HCC)         SURGICAL HISTORY    Past Surgical History:   Procedure Laterality Date   • CARDIAC CATHETERIZATION     • CHOLECYSTECTOMY  2004   • COLON RESECTION      [id number]   • COLON RESECTION SMALL BOWEL Right 12/5/2019    Procedure: open right hemicolectomy;  Surgeon: Ronaldo Ardon MD;  Location: [facility] OR;  Service: General   • COLONOSCOPY  2019    x2    • CORONARY ANGIOPLASTY WITH STENT PLACEMENT  04/2017   • ECHO - CONVERTED  2019   • ECHO - CONVERTED  2020   • FRACTURE SURGERY      collar bone as a child    • KNEE ARTHROSCOPY Left 08/2003   • KNEE JOINT MANIPULATION Left 04/2010   • TOTAL KNEE ARTHROPLASTY Bilateral     2013,2011        FAMILY HISTORY    Family History   Problem Relation Age of Onset   • Irritable bowel syndrome Mother    • Anxiety disorder Mother    • Depression Father    • Hypertension Father    • Mental illness Father         committed suicide   • Other Sister         back problems   • Other Brother         back problems       SOCIAL HISTORY    Social History     Tobacco Use   • Smoking status: Former Smoker     Quit date: 2007     Years since quitting: 15.3   • Smokeless tobacco: Former User   Substance Use Topics   • Alcohol use:  "Yes     Comment: occasionally        ALLERGIES    No Known Allergies           /97 (BP Location: Right arm)   Pulse 117   Temp 98 °F (36.7 °C) (Oral)   Resp 18   Ht 185.4 cm (73\")   Wt 93.6 kg (206 lb 5.6 oz)   SpO2 93%   BMI 27.22 kg/m²   Intake/Output last 3 shifts:  I/O last 3 completed shifts:  In: 640 [P.O.:640]  Out: 1300 [Urine:1300]  Intake/Output this shift:  I/O this shift:  In: 480 [P.O.:480]  Out: -     PHYSICAL EXAM:    General: Well-developed, chronically ill-appearing 62-year-old male who is alert, cooperative, no distress, appears stated age  Head:  Normocephalic, atraumatic, mucous membranes moist  Eyes:  Conjunctivae/corneas clear, EOM's intact     Neck:  Supple,  no adenopathy; no JVD or bruit  Lungs: Clear to auscultation bilaterally, no wheezes, rhonchi or rales are noted  Chest wall: No tenderness  Heart::  Regular rate and rhythm, S1 and S2 normal, no murmur, rub or gallop  Abdomen: Soft, nontender, nondistended, bowel sounds active  Extremities: No cyanosis, clubbing, or edema   Pulses: 2+ and symmetric all extremities  Skin:  No rashes or lesions  Neuro/psych: A&O x3.  Left hemiparesis and left upper extremity tremor  Agree as discussed with nurse practitioner after my face-to-face encounter with scribed findings above  Scheduled Meds:      aspirin, 81 mg, Oral, Daily  atorvastatin, 80 mg, Oral, Daily  insulin lispro, 0-9 Units, Subcutaneous, TID AC  insulin NPH-insulin regular, 30 Units, Subcutaneous, TID  insulin NPH-insulin regular, 5 Units, Subcutaneous, Nightly  metoclopramide, 10 mg, Oral, TID AC  metoprolol tartrate, 25 mg, Oral, Q12H  pantoprazole, 40 mg, Oral, QAM  Rivaroxaban, 5 mg, Oral, Q12H  sodium chloride, 10 mL, Intravenous, Q12H        Continuous Infusions:         PRN Meds:    •  acetaminophen **OR** acetaminophen **OR** acetaminophen  •  aluminum-magnesium hydroxide-simethicone  •  dextrose  •  dextrose  •  glucagon (human recombinant)  •  hydrALAZINE  •  " insulin lispro **AND** insulin lispro  •  magnesium sulfate **OR** magnesium sulfate **OR** magnesium sulfate  •  meclizine  •  melatonin  •  ondansetron **OR** ondansetron  •  potassium chloride **OR** potassium chloride **OR** potassium chloride  •  sodium chloride        Results Review:     I reviewed the patient's new clinical results.    CBC    Results from last 7 days   Lab Units 05/13/22  0330 05/12/22  0629   WBC 10*3/mm3 8.70 10.90*   HEMOGLOBIN g/dL 12.9* 14.1   PLATELETS 10*3/mm3 274 355     Cr Clearance Estimated Creatinine Clearance: 85.2 mL/min (by C-G formula based on SCr of 1.16 mg/dL).  Coag     HbA1C   Lab Results   Component Value Date    HGBA1C 7.6 (H) 05/12/2022    HGBA1C 7.9 (H) 01/19/2022    HGBA1C 11.1 (H) 09/22/2021     Blood Glucose   Glucose   Date/Time Value Ref Range Status   05/14/2022 1138 289 (H) 70 - 105 mg/dL Final     Comment:     Serial Number: [id number]:  [id number]   05/14/2022 0754 189 (H) 70 - 105 mg/dL Final     Comment:     Serial Number: [id number]:  [id number]   05/13/2022 2125 256 (H) 70 - 105 mg/dL Final     Comment:     Serial Number: [id number]:  [id number]   05/13/2022 1616 309 (H) 70 - 105 mg/dL Final     Comment:     Serial Number: [id number]:  [id number]   05/13/2022 1125 234 (H) 70 - 105 mg/dL Final     Comment:     Serial Number: [id number]:  [id number]   05/13/2022 0738 211 (H) 70 - 105 mg/dL Final     Comment:     Serial Number: [id number]:  [id number]   05/12/2022 1625 271 (H) 70 - 105 mg/dL Final     Comment:     Serial Number: [id number]:  [id number]   05/12/2022 1257 239 (H) 70 - 105 mg/dL Final     Comment:     Serial Number: [id number]:  [id number]     Infection     CMP   Results from last 7 days   Lab Units 05/13/22  0330 05/12/22  0629   SODIUM mmol/L 137 137   POTASSIUM mmol/L 4.6 4.9   CHLORIDE mmol/L 102 99   CO2 mmol/L 20.0* 24.0   BUN mg/dL 17 20   CREATININE mg/dL 1.16 1.34*   GLUCOSE mg/dL 160*  171*   ALBUMIN g/dL  --  4.20   BILIRUBIN mg/dL  --  0.2   ALK PHOS U/L  --  127*   AST (SGOT) U/L  --  34   ALT (SGPT) U/L  --  51*     ABG      UA    Results from last 7 days   Lab Units 05/12/22  0644   NITRITE UA  Negative   WBC UA /HPF 0-2*   BACTERIA UA /HPF None Seen   SQUAM EPITHEL UA /HPF 0-2     NAHOMI  No results found for: POCMETH, POCAMPHET, POCBARBITUR, POCBENZO, POCCOCAINE, POCOPIATES, POCOXYCODO, POCPHENCYC, POCPROPOXY, POCTHC, POCTRICYC  Lysis Labs   Results from last 7 days   Lab Units 05/13/22  0330 05/12/22  0629   HEMOGLOBIN g/dL 12.9* 14.1   PLATELETS 10*3/mm3 274 355   CREATININE mg/dL 1.16 1.34*     Radiology(recent) No radiology results for the last day      Results from last 7 days   Lab Units 05/14/22  0336   TROPONIN T ng/mL <0.010       Xrays, labs reviewed personally by physician.    ECG/EMG Results (most recent)     Procedure Component Value Units Date/Time    ECG 12 Lead [[id number]] Collected: 05/12/22 0531     Updated: 05/12/22 0539     QT Interval 400 ms     Narrative:      HEART RATE= 68  bpm  RR Interval= 880  ms  VT Interval= 159  ms  P Horizontal Axis= 35  deg  P Front Axis= -20  deg  QRSD Interval= 86  ms  QT Interval= 400  ms  QRS Axis= -17  deg  T Wave Axis= 131  deg  - ABNORMAL ECG -  Sinus rhythm  Abnormal T, consider ischemia, lateral leads  When compared with ECG of 22-Sep-2021 6:52:27,  Significant rate decrease  Electronically Signed By:   Date and Time of Study: 2022-05-12 05:31:20    SCANNED - TELEMETRY   [[id number]] Resulted: 05/12/22     Updated: 05/12/22 2141    SCANNED - TELEMETRY   [[id number]] Resulted: 05/12/22     Updated: 05/13/22 0138    SCANNED - TELEMETRY   [[id number]] Resulted: 05/12/22     Updated: 05/13/22 1558    ECG 12 Lead [[id number]] Collected: 05/14/22 1219     Updated: 05/14/22 1220     QT Interval 301 ms     Narrative:      HEART RATE= 125  bpm  RR Interval= 480  ms  VT Interval= 143  ms  P Horizontal Axis= -10  deg  P Front Axis= 4  deg  QRSD  Interval= 83  ms  QT Interval= 301  ms  QRS Axis= -26  deg  T Wave Axis= 162  deg  - ABNORMAL ECG -  Sinus tachycardia  Consider anterior infarct  Repol abnrm suggests ischemia, lateral leads  Electronically Signed By:   Date and Time of Study: 2022-05-14 12:19:31    SCANNED - TELEMETRY   [[id number]] Resulted: 05/12/22     Updated: 05/14/22 1253    SCANNED - TELEMETRY   [[id number]] Resulted: 05/12/22     Updated: 05/14/22 1306            Medication Review:   I have reviewed the patient's current medication list  Scheduled Meds:aspirin, 81 mg, Oral, Daily  atorvastatin, 80 mg, Oral, Daily  insulin lispro, 0-9 Units, Subcutaneous, TID AC  insulin NPH-insulin regular, 30 Units, Subcutaneous, TID  insulin NPH-insulin regular, 5 Units, Subcutaneous, Nightly  metoclopramide, 10 mg, Oral, TID AC  metoprolol tartrate, 25 mg, Oral, Q12H  pantoprazole, 40 mg, Oral, QAM  Rivaroxaban, 5 mg, Oral, Q12H  sodium chloride, 10 mL, Intravenous, Q12H      Continuous Infusions:   PRN Meds:.•  acetaminophen **OR** acetaminophen **OR** acetaminophen  •  aluminum-magnesium hydroxide-simethicone  •  dextrose  •  dextrose  •  glucagon (human recombinant)  •  hydrALAZINE  •  insulin lispro **AND** insulin lispro  •  magnesium sulfate **OR** magnesium sulfate **OR** magnesium sulfate  •  meclizine  •  melatonin  •  ondansetron **OR** ondansetron  •  potassium chloride **OR** potassium chloride **OR** potassium chloride  •  sodium chloride    Imaging:  Imaging Results (Last 72 Hours)     Procedure Component Value Units Date/Time    MRI Brain Without Contrast [[id number]] Collected: 05/12/22 1059     Updated: 05/12/22 1110    Narrative:      MRI BRAIN WO CONTRAST-     Date of Exam: 5/12/2022 9:35 AM     Indication: Dizziness, non-specific     Comparison: 12/06/2019, and 09/22/2021     Technique: Multiplanar multisequence images of the brain were performed  without contrast according to routine brain MRI protocol.     FINDINGS:  Redemonstration  of chronic area of encephalomalacic changes within the  right occipital and posterior parietal region in the region of the right  PCA distribution, from prior infarct. There is susceptibility artifact  in this region suggestive chronic petechial hemorrhage. No new areas of  encephalomalacia are identified. No new diffusion-weighted abnormalities  are identified. There is mild atrophy. Periventricular hypodensities  suggestive changes of chronic microvascular ischemic disease. Orbits  paranasal sinuses and mastoid air cells are unremarkable.          Impression:         1. Chronic right PCA distribution infarct.  2. No acute intracranial pathology  3. Changes of mild atrophy and microvascular ischemic disease.        Electronically Signed By-Jerome Bush MD On:5/12/2022 11:08 AM  This report was finalized on [id number] by  Jerome Bush MD.    CT Head Without Contrast [[id number]] Collected: 05/12/22 0712     Updated: 05/12/22 0716    Narrative:         DATE OF EXAM:  5/12/2022 6:56 AM     PROCEDURE:   CT HEAD WO CONTRAST-     INDICATIONS:   dizziness     COMPARISON:  9/22/2021     TECHNIQUE:   Routine transaxial and coronal reconstruction images were obtained  through the head without the administration of contrast. Automated  exposure control and iterative reconstruction methods were used.     FINDINGS:  There is generalized enlargement of ventricles and CSF containing  spaces. There is an area of encephalomalacia in the right  parieto-occipital region with dystrophic calcifications in the cortical  sulci. No mass lesions, mass effect, acute hemorrhage or edema. There is  decreased attenuation in the thalami and in the periventricular white  matter both hemispheres. The mastoid air cells are clear. The paranasal  sinuses are clear. There is no bone destruction.        Impression:         1. Generalized atrophy with chronic periventricular and thalamic deep  white matter ischemic changes.  2. Focal area of  "chronic encephalomalacia in the right parieto-occipital  lobe.  3. No acute intracranial findings.     Electronically Signed By-Ronaldo Sheikh MD On:5/12/2022 7:14 AM  This report was finalized on [id number] by  Ronaldo Sheikh MD.            EMRE Ortiz  05/14/22  13:36 EDT       EMR Dragon/Transcription:   \"Dictated utilizing Dragon dictation\".                 Electronically signed by EMRE Ortiz, 05/14/22, 1:37 PM EDT.    "

## 2022-05-14 NOTE — PROGRESS NOTES
[facility] Medicine Services Daily Progress Note    Patient Name: Chao Lazar  : 1960  MRN: [id number]  Primary Care Physician:  Al Kimbrough MD  Date of admission: 2022      Subjective    Brief interim history: 62-year-old pleasant male with history of CVA, involving right PCA with left homonymous hemianopsia (spends months in rehab facility), unsteady gait, CAD, T2DM with peripheral neuropathy, HLD, and gastropathy.  According to admission note, he presented to [facility] ED on 2022 complaining of dizziness and wife reported generalized weakness with unsteady gait.  No headache, visual changes, no dysphagia, dysarthria or speech impediment.  Concern for possible recurrent stroke, patient underwent CT of the head without contrast and MRI of the brain () were unrevealing.  Seen and followed by PT/OT due to unsteady gait with lateralization.  Patient was also seen by neurologist in consultation today due to unsteady gait and lateralizing to the left side with ambulation.    2022: Patient seen and examined and follow-up.  Ambulated with noted unsteady gait.  22 patient seen and examined in bed no acute distress, blood pressure stable, heart rate 117, EKG ordered.  Consulted cardiology, metoprolol added.    Objective      Vitals:   Temp:  [97.7 °F (36.5 °C)-98.4 °F (36.9 °C)] 98.4 °F (36.9 °C)  Heart Rate:  [] 91  Resp:  [13-30] 22  BP: (123-149)/(68-97) 128/85    Physical Exam  Constitutional:       General: He is not in acute distress.     Appearance: He is obese. He is not ill-appearing.   HENT:      Head: Normocephalic.      Mouth/Throat:      Mouth: Mucous membranes are moist.   Eyes:      Pupils: Pupils are equal, round, and reactive to light.   Cardiovascular:      Rate and Rhythm: Normal rate.      Pulses: Normal pulses.   Pulmonary:      Effort: Pulmonary effort is normal.   Abdominal:      General: Abdomen is flat.   Musculoskeletal:          General: Normal range of motion.      Cervical back: Normal range of motion and neck supple.   Skin:     General: Skin is warm.   Neurological:      Mental Status: He is alert. Mental status is at baseline.   Psychiatric:         Mood and Affect: Mood normal.          Result Review    Result Review:  I have personally reviewed the results from the time of this admission to 5/14/2022 15:18 EDT and agree with these findings:  []  Laboratory  []  Microbiology  []  Radiology  []  EKG/Telemetry   []  Cardiology/Vascular   []  Pathology  []  Old records  []  Other:  Most notable findings include:  Wounds (last 24 hours)         CMP:        Lab 05/14/22  0336 05/13/22  0330 05/12/22  1108 05/12/22  0629   SODIUM  --  137  --  137   POTASSIUM  --  4.6  --  4.9   CHLORIDE  --  102  --  99   CO2  --  20.0*  --  24.0   ANION GAP  --  15.0  --  14.0   BUN  --  17  --  20   CREATININE  --  1.16  --  1.34*   EGFR  --  70.3  --  59.2*   GLUCOSE  --  160*  --  171*   CALCIUM  --  9.4  --  9.7   MAGNESIUM 1.6 1.7 1.3*  --    TOTAL PROTEIN  --   --   --  7.9   ALBUMIN  --   --   --  4.20   GLOBULIN  --   --   --  3.7   ALT (SGPT)  --   --   --  51*   AST (SGOT)  --   --   --  34   BILIRUBIN  --   --   --  0.2   ALK PHOS  --   --   --  127*     CBC:      Lab 05/13/22  0330 05/12/22  0629   WBC 8.70 10.90*   HEMOGLOBIN 12.9* 14.1   HEMATOCRIT 37.7 42.8   PLATELETS 274 355   NEUTROS ABS 5.00 7.20*   LYMPHS ABS 2.80 2.60   MONOS ABS 0.60 0.70   EOS ABS 0.30 0.30   MCV 89.6 91.6         Assessment & Plan      aspirin, 81 mg, Oral, Daily  atorvastatin, 80 mg, Oral, Daily  insulin lispro, 0-9 Units, Subcutaneous, TID AC  insulin NPH-insulin regular, 30 Units, Subcutaneous, TID  insulin NPH-insulin regular, 5 Units, Subcutaneous, Nightly  metoclopramide, 10 mg, Oral, TID AC  metoprolol tartrate, 25 mg, Oral, Q12H  pantoprazole, 40 mg, Oral, QAM  Rivaroxaban, 5 mg, Oral, Q12H  sodium chloride, 10 mL, Intravenous, Q12H         Active Hospital  Problems:  Active Hospital Problems    Diagnosis    • **Dizziness    • CKD (chronic kidney disease), stage II    • Fall    • History of CVA (cerebrovascular accident)    • Coronary artery disease involving native coronary artery of native heart without angina pectoris    • Essential hypertension    • Asthma    • Hyperlipidemia, mixed    • Gastroparesis    • Type 2 diabetes mellitus with hyperglycemia, with long-term current use of insulin ([facility])      Assessment/plan       Dizziness         -resolving, no evidence of orthostatic hypotension          -CT head/MRI of the brain (5/12) negative for CVA    Unsteady gait/lateralizing      -likely 2/2 autonomic/peripheral neuropathy       -appreciate neurologist,  evaluation and recommendation        -optimize glycemic control, PT/OT as tolerated    History of CVA with left homonymous hemianopsia       -aspirin/Lipitor/Xarelto and control blood pressure    HLD     -Lipitor    T2DM (recent A1c of 7.6<--- 12.5)       -70/30 insulin and SSI, monitor closely for hypoglycemia    CKD, stage II       -Hydrate gently and monitor renal function closely    History of fall         -cont fall precaution    Tachycardia, check EKG, metoprolol added.  Cardiology consulted.            DVT prophylaxis:  Medical and mechanical DVT prophylaxis orders are present.    CODE STATUS:    Level Of Support Discussed With: Patient  Code Status (Patient has no pulse and is not breathing): CPR (Attempt to Resuscitate)  Medical Interventions (Patient has pulse or is breathing): Full Support      Disposition:  I expect patient to be discharged 48-72 hours    Electronically signed by Florentino Geronimo MD, 05/14/22, 15:18 EDT.  [facility] Hospitalist Team

## 2022-05-14 NOTE — PLAN OF CARE
Goal Outcome Evaluation:        Problem: Adult Inpatient Plan of Care  Goal: Absence of Hospital-Acquired Illness or Injury  Intervention: Identify and Manage Fall Risk  Recent Flowsheet Documentation  Taken 5/14/2022 1600 by Taye, Jesi, RN  Safety Promotion/Fall Prevention:   safety round/check completed   room organization consistent   nonskid shoes/slippers when out of bed   fall prevention program maintained   clutter free environment maintained   assistive device/personal items within reach   activity supervised  Taken 5/14/2022 1400 by Taye, Jesi, RN  Safety Promotion/Fall Prevention:   safety round/check completed   room organization consistent   nonskid shoes/slippers when out of bed   fall prevention program maintained   assistive device/personal items within reach   clutter free environment maintained   activity supervised  Taken 5/14/2022 1200 by [place], Jesi, RN  Safety Promotion/Fall Prevention:   safety round/check completed   room organization consistent   nonskid shoes/slippers when out of bed   fall prevention program maintained   clutter free environment maintained   assistive device/personal items within reach   activity supervised  Taken 5/14/2022 1000 by Jesi Kothari, RN  Safety Promotion/Fall Prevention:   safety round/check completed   room organization consistent   nonskid shoes/slippers when out of bed   elopement precautions   fall prevention program maintained   clutter free environment maintained   activity supervised   assistive device/personal items within reach  Taken 5/14/2022 0800 by Jesi Kothari, RN  Safety Promotion/Fall Prevention:   safety round/check completed   room organization consistent   nonskid shoes/slippers when out of bed   fall prevention program maintained     Problem: Adult Inpatient Plan of Care  Goal: Absence of Hospital-Acquired Illness or Injury  Intervention: Prevent Skin Injury  Recent Flowsheet Documentation  Taken  5/14/2022 0800 by Jesi Kothari, RN  Skin Protection:   adhesive use limited   incontinence pads utilized

## 2022-05-14 NOTE — PLAN OF CARE
Problem: Adult Inpatient Plan of Care  Goal: Plan of Care Review  Outcome: Ongoing, Progressing  Goal: Patient-Specific Goal (Individualized)  Outcome: Ongoing, Progressing  Goal: Absence of Hospital-Acquired Illness or Injury  Outcome: Ongoing, Progressing  Intervention: Identify and Manage Fall Risk  Recent Flowsheet Documentation  Taken 5/14/2022 0400 by Elizabeth García RN  Safety Promotion/Fall Prevention:   activity supervised   fall prevention program maintained   lighting adjusted   room organization consistent  Taken 5/14/2022 0351 by Elizabeth García RN  Safety Promotion/Fall Prevention:   activity supervised   lighting adjusted   fall prevention program maintained  Taken 5/14/2022 0200 by Elizabeth García RN  Safety Promotion/Fall Prevention:   activity supervised   fall prevention program maintained   lighting adjusted   safety round/check completed  Taken 5/14/2022 0000 by Elizabeth García RN  Safety Promotion/Fall Prevention:   fall prevention program maintained   clutter free environment maintained   activity supervised   lighting adjusted   safety round/check completed  Taken 5/13/2022 2200 by Elizabeth García RN  Safety Promotion/Fall Prevention:   activity supervised   fall prevention program maintained   clutter free environment maintained   lighting adjusted   room organization consistent   safety round/check completed  Taken 5/13/2022 2000 by Elizabeth García RN  Safety Promotion/Fall Prevention:   activity supervised   fall prevention program maintained   clutter free environment maintained   lighting adjusted   room organization consistent   safety round/check completed  Intervention: Prevent Skin Injury  Recent Flowsheet Documentation  Taken 5/14/2022 0351 by Elizabeth García RN  Skin Protection: adhesive use limited  Taken 5/13/2022 2000 by Elizabeth García RN  Skin Protection: adhesive use limited  Intervention: Prevent and Manage VTE (Venous Thromboembolism) Risk  Recent  Flowsheet Documentation  Taken 5/14/2022 0351 by Elizabeth García RN  VTE Prevention/Management: (xerelto) --  Taken 5/14/2022 0000 by Elizabeth García RN  VTE Prevention/Management:   bilateral   sequential compression devices on  Taken 5/13/2022 2000 by Elizabeth García RN  VTE Prevention/Management:   bilateral   sequential compression devices on  Intervention: Prevent Infection  Recent Flowsheet Documentation  Taken 5/14/2022 0000 by Elizabeth García RN  Infection Prevention: rest/sleep promoted  Goal: Optimal Comfort and Wellbeing  Outcome: Ongoing, Progressing  Intervention: Monitor Pain and Promote Comfort  Recent Flowsheet Documentation  Taken 5/13/2022 2000 by Elizabeth García RN  Pain Management Interventions: care clustered  Intervention: Provide Person-Centered Care  Recent Flowsheet Documentation  Taken 5/14/2022 0351 by Elizabeth García RN  Trust Relationship/Rapport: care explained  Taken 5/14/2022 0000 by Elizabeth García RN  Trust Relationship/Rapport:   care explained   choices provided  Taken 5/13/2022 2000 by Elizabeth García RN  Trust Relationship/Rapport:   care explained   choices provided  Goal: Readiness for Transition of Care  Outcome: Ongoing, Progressing     Problem: Diabetes Comorbidity  Goal: Blood Glucose Level Within Targeted Range  Outcome: Ongoing, Progressing  Intervention: Monitor and Manage Glycemia  Recent Flowsheet Documentation  Taken 5/14/2022 0000 by Elizabeth García RN  Glycemic Management: blood glucose monitored  Taken 5/13/2022 2000 by Elizabeth García RN  Glycemic Management: blood glucose monitored     Problem: Skin Injury Risk Increased  Goal: Skin Health and Integrity  Outcome: Ongoing, Progressing  Intervention: Optimize Skin Protection  Recent Flowsheet Documentation  Taken 5/14/2022 0351 by Elizabeth García RN  Pressure Reduction Techniques: frequent weight shift encouraged  Pressure Reduction Devices: pressure-redistributing mattress utilized  Skin  Protection: adhesive use limited  Taken 5/13/2022 2000 by Elizabeth García, RN  Pressure Reduction Techniques: frequent weight shift encouraged  Pressure Reduction Devices: pressure-redistributing mattress utilized  Skin Protection: adhesive use limited   Goal Outcome Evaluation:

## 2022-05-15 LAB
GLUCOSE BLDC GLUCOMTR-MCNC: 216 MG/DL (ref 70–105)
GLUCOSE BLDC GLUCOMTR-MCNC: 217 MG/DL (ref 70–105)
GLUCOSE BLDC GLUCOMTR-MCNC: 222 MG/DL (ref 70–105)
GLUCOSE BLDC GLUCOMTR-MCNC: 288 MG/DL (ref 70–105)
MAGNESIUM SERPL-MCNC: 2.2 MG/DL (ref 1.6–2.4)
QT INTERVAL: 400 MS

## 2022-05-15 PROCEDURE — G0378 HOSPITAL OBSERVATION PER HR: HCPCS

## 2022-05-15 PROCEDURE — 63710000001 INSULIN LISPRO (HUMAN) PER 5 UNITS: Performed by: NURSE PRACTITIONER

## 2022-05-15 PROCEDURE — 99225 PR SBSQ OBSERVATION CARE/DAY 25 MINUTES: CPT | Performed by: INTERNAL MEDICINE

## 2022-05-15 PROCEDURE — 97116 GAIT TRAINING THERAPY: CPT

## 2022-05-15 PROCEDURE — 82962 GLUCOSE BLOOD TEST: CPT

## 2022-05-15 PROCEDURE — 97112 NEUROMUSCULAR REEDUCATION: CPT

## 2022-05-15 PROCEDURE — 63710000001 INSULIN ISOPHANE & REGULAR PER 5 UNITS: Performed by: NURSE PRACTITIONER

## 2022-05-15 PROCEDURE — 63710000001 ONDANSETRON PER 8 MG: Performed by: NURSE PRACTITIONER

## 2022-05-15 PROCEDURE — 83735 ASSAY OF MAGNESIUM: CPT | Performed by: NURSE PRACTITIONER

## 2022-05-15 PROCEDURE — 99214 OFFICE O/P EST MOD 30 MIN: CPT | Performed by: INTERNAL MEDICINE

## 2022-05-15 PROCEDURE — 97110 THERAPEUTIC EXERCISES: CPT

## 2022-05-15 RX ADMIN — INSULIN HUMAN 30 UNITS: 100 INJECTION, SUSPENSION SUBCUTANEOUS at 20:48

## 2022-05-15 RX ADMIN — MECLIZINE HYDROCHLORIDE 25 MG: 25 TABLET ORAL at 13:01

## 2022-05-15 RX ADMIN — METOCLOPRAMIDE 10 MG: 10 TABLET ORAL at 08:39

## 2022-05-15 RX ADMIN — RIVAROXABAN 5 MG: 2.5 TABLET, FILM COATED ORAL at 06:29

## 2022-05-15 RX ADMIN — Medication 10 ML: at 20:48

## 2022-05-15 RX ADMIN — ONDANSETRON HYDROCHLORIDE 4 MG: 4 TABLET, FILM COATED ORAL at 13:01

## 2022-05-15 RX ADMIN — INSULIN LISPRO 4 UNITS: 100 INJECTION, SOLUTION INTRAVENOUS; SUBCUTANEOUS at 12:55

## 2022-05-15 RX ADMIN — INSULIN HUMAN 30 UNITS: 100 INJECTION, SUSPENSION SUBCUTANEOUS at 08:40

## 2022-05-15 RX ADMIN — RIVAROXABAN 5 MG: 2.5 TABLET, FILM COATED ORAL at 18:04

## 2022-05-15 RX ADMIN — PANTOPRAZOLE SODIUM 40 MG: 40 TABLET, DELAYED RELEASE ORAL at 08:39

## 2022-05-15 RX ADMIN — METOCLOPRAMIDE 10 MG: 10 TABLET ORAL at 12:55

## 2022-05-15 RX ADMIN — INSULIN HUMAN 5 UNITS: 100 INJECTION, SUSPENSION SUBCUTANEOUS at 20:51

## 2022-05-15 RX ADMIN — INSULIN LISPRO 4 UNITS: 100 INJECTION, SOLUTION INTRAVENOUS; SUBCUTANEOUS at 18:04

## 2022-05-15 RX ADMIN — INSULIN HUMAN 30 UNITS: 100 INJECTION, SUSPENSION SUBCUTANEOUS at 18:04

## 2022-05-15 RX ADMIN — ATORVASTATIN CALCIUM 80 MG: 40 TABLET, FILM COATED ORAL at 08:39

## 2022-05-15 RX ADMIN — INSULIN LISPRO 4 UNITS: 100 INJECTION, SOLUTION INTRAVENOUS; SUBCUTANEOUS at 08:39

## 2022-05-15 RX ADMIN — METOPROLOL TARTRATE 25 MG: 25 TABLET, FILM COATED ORAL at 08:39

## 2022-05-15 RX ADMIN — METOCLOPRAMIDE 10 MG: 10 TABLET ORAL at 18:04

## 2022-05-15 RX ADMIN — ASPIRIN 81 MG: 81 TABLET, COATED ORAL at 08:39

## 2022-05-15 RX ADMIN — METOPROLOL TARTRATE 25 MG: 25 TABLET, FILM COATED ORAL at 20:48

## 2022-05-15 NOTE — THERAPY TREATMENT NOTE
"Subjective: Pt agreeable to therapeutic plan of care. Pt c/o feeling a little \"iffy\" with amb.    Objective:     Bed mobility - SBA and Min-A more assist supine >sit  Transfers - Min-A and with rolling walker  Ambulation - 50 feet Min-A and with rolling walker    Pain: 0 VAS  Education: Provided education on importance of mobility and skilled verbal / tactile cueing throughout intervention.     Assessment: Chao Lazar presents with functional mobility impairments which indicate the need for skilled intervention. Tolerating session today without incident.Pt still having some visual deficits and c/o dizziness with amb. Performed seated LE exs and worked on midline orientation with visual feedback from my phone to show him when he was in midline. Tends to sl lean post and to L. Could correct but not maintain. Req assist to guide rwx alem when changing directions. Plans on acute rehab at [place]. Will continue to follow and progress as tolerated.     Plan/Recommendations:   High Intensity Therapy recommended post-acute care. This is recommended as therapy feels the patient would require 5-6 days per week, 2-3 hours per day. At this time, inpatient rehabilitation (acute rehab) would be the first choice and SNF would be second.. Pt requires no DME at discharge.     Pt desires Inpatient Rehabilitation placement at discharge. Pt cooperative; agreeable to therapeutic recommendations and plan of care.         Basic Mobility 6-click:  Rollin = Total, A lot = 2, A little = 3; 4 = None  Supine>Sit:   1 = Total, A lot = 2, A little = 3; 4 = None   Sit>Stand with arms:  1 = Total, A lot = 2, A little = 3; 4 = None  Bed>Chair:   1 = Total, A lot = 2, A little = 3; 4 = None  Ambulate in room:  1 = Total, A lot = 2, A little = 3; 4 = None  3-5 Steps with railin = Total, A lot = 2, A little = 3; 4 = None  Score: 16    Modified [place]: 4 = Moderately severe disability (Unable to attend to own bodily needs without " assistance, and unable to walk unassisted)     Post-Tx Position: Supine with HOB Elevated, Alarms activated and Call light and personal items within reach  PPE: gloves, surgical mask, eyewear protection

## 2022-05-15 NOTE — PROGRESS NOTES
Cardiology progress note      REQUESTING PHYSICIAN    Florentino Geronimo MD    PATIENT IDENTIFICATION  Name: Chao Lazar  Age: 62 y.o.  Sex: male  :  1960  MRN: [id number]             REASON FOR CONSULTATION:  62-year-old male with past medical history of coronary artery disease and prior stenting, history of hypertension, hyperlipidemia, diabetes mellitus, history of stroke with resultant homonymous hemianopsia and left hemiparesis.    Heart cath 2017 secondary to non-ST elevation MI with nonobstructive disease of LAD and circumflex.  RCA had high-grade stenosis and he underwent stenting of proximal RCA.  Preserved LV function.    TTE 2021: EF 55-60%, no significant valvular pathology.      CC:  Tachycardia  Dizziness  Generalized weakness    HISTORY OF PRESENT ILLNESS: Agree with narrative as discussed with nurse practitioner after my face-to-face encounter as well as with scribed additional findings below  Patient presented to the emergency department at [facility] 2022 with complaint of dizziness.  Patient is difficult historian and wife at the bedside assists with HPI on nurse practitioner as well as my encounter.  She reports patient had 1 episode of severe dizziness approximately a week ago that improved.  He had additional episode on the day of presentation and subsequently fell.  He denies any injury.  Patient reports dizziness worse when up moving or moves his head.  Improved but not resolved when he is lying still.  He denies any nausea or vomiting, no photophobia or floaters, shortness of breath or chest discomfort no palpitations or rapid heart rates,.  Upon my evaluation, patient is resting comfortably in bed.  He continues to report dizziness.  He denies any lower extremity edema, orthopnea or PND.    In the ED, he received meclizine and 1 L normal saline.      He is a primary patient of Dr. Morris Duke, EMR  review  ============================================  No acute events overnight.  Hemodynamically electrically stable, remains with some vertigo and dizziness  Telemetry remained stable sinus rhythm, no heart failure signs or symptoms    EKG and reviewed and interpreted by me demonstrates sinus tachycardia nonspecific inferolateral ST abnormalities, concern for ischemia at this heart rate    Telemetry review reveals sinus rhythm in the 80s    REVIEW OF SYSTEMS:  Pertinent items are noted in HPI, all other systems reviewed and negative    OBJECTIVE   EKG shows narrow complex regular tachycardia at 120-    Magnesium 1.6    ASSESSMENT  Dizziness  Fall-mechanical, secondary to dizziness  Tachycardia  Essential hypertension  History of stroke with left hemiparesis  Tremor left upper extremity  Coronary artery disease with prior stent  History of non-STEMI  Abnormal EKG    PLAN  EKG shows narrow complex tachycardia.  I believe this to be sinus tachycardia based on my interpretation but he has some nonspecific repolarization abnormalities at a rate of 125, when compared to prior he has a previous EKG with nonspecific T wave inversion in the lateral leads as well as nonspecific abnormalities in V5 and V6, this may be just a simple worsening secondary to rate of baseline abnormalities and tachycardia.  Again he denies any chest pain or any unstable angina    Somewhat complicated with orthostatic type symptoms with dysautonomia that is known as well as vertigo symptoms    Currently receiving metoprolol 25 mg twice daily with heart rates much improved in the 70s to 80s, blood pressure is adequate in the 120 systolic range  Continue aspirin, statin  He is on Xarelto 10 mg twice daily for prior stroke  CT/MRI head negative for CVA    Further recommendation follow findings and noninvasive work-up  He has seen Dr. Caceres in the past    Vital Signs  Visit Vitals  /75 (BP Location: Left arm, Patient Position: Lying)   Pulse 72  "  Temp 98.8 °F (37.1 °C) (Oral)   Resp 19   Ht 185.4 cm (73\")   Wt 93.6 kg (206 lb 5.6 oz)   SpO2 95%   BMI 27.22 kg/m²     Oxygen Therapy  SpO2: 95 %  Pulse Oximetry Type: Continuous  Device (Oxygen Therapy): room air  Probe Placed On (Pulse Ox): Left:, finger  Flowsheet Rows    Flowsheet Row First Filed Value   Admission Height 185.4 cm (73\") Documented at 05/12/2022 0516   Admission Weight 86.2 kg (190 lb) Documented at 05/12/2022 0516        Intake & Output (last 3 days)       05/12 0701 05/13 0700 05/13 0701 05/14 0700 05/14 0701  05/15 0700 05/15 0701  05/16 0700    P.O.  640 720 300    Total Intake(mL/kg)  640 (6.8) 720 (7.7) 300 (3.2)    Urine (mL/kg/hr) 300 (0.1) 1000 (0.4)      Total Output 300 1000      Net -300 -360 +720 +300            Urine Unmeasured Occurrence   3 x     Stool Unmeasured Occurrence   0 x         Lines, Drains & Airways     Active LDAs     Name Placement date Placement time Site Days    Peripheral IV 05/12/22 0632 Arm 05/12/22  0632  Arm  2                MEDICAL HISTORY    Past Medical History:   Diagnosis Date   • Asthma 3/22/2018   • Coronary artery disease involving native coronary artery of native heart without angina pectoris 8/27/2020   • Essential hypertension 6/28/2019   • Gastroparesis 12/23/2013   • History of CVA (cerebrovascular accident) 9/22/2021   • Hyperlipidemia, mixed 4/3/2017   • Hypoglycemia    • Type 2 diabetes mellitus (HCC)         SURGICAL HISTORY    Past Surgical History:   Procedure Laterality Date   • CARDIAC CATHETERIZATION     • CHOLECYSTECTOMY  2004   • COLON RESECTION      [id number]   • COLON RESECTION SMALL BOWEL Right 12/5/2019    Procedure: open right hemicolectomy;  Surgeon: Ronaldo Ardon MD;  Location: [facility] OR;  Service: General   • COLONOSCOPY  2019    x2    • CORONARY ANGIOPLASTY WITH STENT PLACEMENT  04/2017   • ECHO - CONVERTED  2019   • ECHO - CONVERTED  2020   • FRACTURE SURGERY      collar bone as a child    • KNEE " "ARTHROSCOPY Left 08/2003   • KNEE JOINT MANIPULATION Left 04/2010   • TOTAL KNEE ARTHROPLASTY Bilateral     2013,2011        FAMILY HISTORY    Family History   Problem Relation Age of Onset   • Irritable bowel syndrome Mother    • Anxiety disorder Mother    • Depression Father    • Hypertension Father    • Mental illness Father         committed suicide   • Other Sister         back problems   • Other Brother         back problems       SOCIAL HISTORY    Social History     Tobacco Use   • Smoking status: Former Smoker     Quit date: 2007     Years since quitting: 15.3   • Smokeless tobacco: Former User   Substance Use Topics   • Alcohol use: Yes     Comment: occasionally        ALLERGIES    No Known Allergies           /75 (BP Location: Left arm, Patient Position: Lying)   Pulse 72   Temp 98.8 °F (37.1 °C) (Oral)   Resp 19   Ht 185.4 cm (73\")   Wt 93.6 kg (206 lb 5.6 oz)   SpO2 95%   BMI 27.22 kg/m²   Intake/Output last 3 shifts:  I/O last 3 completed shifts:  In: 720 [P.O.:720]  Out: -   Intake/Output this shift:  I/O this shift:  In: 300 [P.O.:300]  Out: -     PHYSICAL EXAM:    General: Well-developed, chronically ill-appearing 62-year-old male who is alert, cooperative, no distress, appears stated age  Head:  Normocephalic, atraumatic, mucous membranes moist  Eyes:  Conjunctivae/corneas clear, EOM's intact     Neck:  Supple,  no adenopathy; no JVD or bruit  Lungs: Clear to auscultation bilaterally, no wheezes, rhonchi or rales are noted  Chest wall: No tenderness  Heart::  Regular rate and rhythm, S1 and S2 normal, no murmur, rub or gallop  Abdomen: Soft, nontender, nondistended, bowel sounds active  Extremities: No cyanosis, clubbing, or edema   Pulses: 2+ and symmetric all extremities  Skin:  No rashes or lesions  Neuro/psych: A&O x3.  Left hemiparesis and left upper extremity tremor  Unchanged from prior encounter  Agree as discussed with nurse practitioner after my face-to-face encounter with " scribed findings above  Scheduled Meds:      aspirin, 81 mg, Oral, Daily  atorvastatin, 80 mg, Oral, Daily  insulin lispro, 0-9 Units, Subcutaneous, TID AC  insulin NPH-insulin regular, 30 Units, Subcutaneous, TID  insulin NPH-insulin regular, 5 Units, Subcutaneous, Nightly  metoclopramide, 10 mg, Oral, TID AC  metoprolol tartrate, 25 mg, Oral, Q12H  pantoprazole, 40 mg, Oral, QAM  Rivaroxaban, 5 mg, Oral, Q12H  sodium chloride, 10 mL, Intravenous, Q12H        Continuous Infusions:         PRN Meds:    •  acetaminophen **OR** acetaminophen **OR** acetaminophen  •  aluminum-magnesium hydroxide-simethicone  •  dextrose  •  dextrose  •  glucagon (human recombinant)  •  hydrALAZINE  •  insulin lispro **AND** insulin lispro  •  magnesium sulfate **OR** magnesium sulfate **OR** magnesium sulfate  •  meclizine  •  melatonin  •  ondansetron **OR** ondansetron  •  potassium chloride **OR** potassium chloride **OR** potassium chloride  •  sodium chloride        Results Review:     I reviewed the patient's new clinical results.    CBC    Results from last 7 days   Lab Units 05/13/22  0330 05/12/22  0629   WBC 10*3/mm3 8.70 10.90*   HEMOGLOBIN g/dL 12.9* 14.1   PLATELETS 10*3/mm3 274 355     Cr Clearance Estimated Creatinine Clearance: 85.2 mL/min (by C-G formula based on SCr of 1.16 mg/dL).  Coag     HbA1C   Lab Results   Component Value Date    HGBA1C 7.6 (H) 05/12/2022    HGBA1C 7.9 (H) 01/19/2022    HGBA1C 11.1 (H) 09/22/2021     Blood Glucose   Glucose   Date/Time Value Ref Range Status   05/15/2022 1202 216 (H) 70 - 105 mg/dL Final     Comment:     Serial Number: [id number]:  [id number]   05/15/2022 0757 222 (H) 70 - 105 mg/dL Final     Comment:     Serial Number: [id number]:  [id number]   05/14/2022 2005 301 (H) 70 - 105 mg/dL Final     Comment:     Serial Number: [id number]:  [id number]   05/14/2022 1627 317 (H) 70 - 105 mg/dL Final     Comment:     Serial Number: [id number]:  [id number]    05/14/2022 1138 289 (H) 70 - 105 mg/dL Final     Comment:     Serial Number: [id number]:  [id number]   05/14/2022 0754 189 (H) 70 - 105 mg/dL Final     Comment:     Serial Number: [id number]:  [id number]   05/13/2022 2125 256 (H) 70 - 105 mg/dL Final     Comment:     Serial Number: [id number]:  [id number]   05/13/2022 1616 309 (H) 70 - 105 mg/dL Final     Comment:     Serial Number: [id number]:  [id number]     Infection     CMP   Results from last 7 days   Lab Units 05/13/22  0330 05/12/22  0629   SODIUM mmol/L 137 137   POTASSIUM mmol/L 4.6 4.9   CHLORIDE mmol/L 102 99   CO2 mmol/L 20.0* 24.0   BUN mg/dL 17 20   CREATININE mg/dL 1.16 1.34*   GLUCOSE mg/dL 160* 171*   ALBUMIN g/dL  --  4.20   BILIRUBIN mg/dL  --  0.2   ALK PHOS U/L  --  127*   AST (SGOT) U/L  --  34   ALT (SGPT) U/L  --  51*     ABG      UA    Results from last 7 days   Lab Units 05/12/22  0644   NITRITE UA  Negative   WBC UA /HPF 0-2*   BACTERIA UA /HPF None Seen   SQUAM EPITHEL UA /HPF 0-2     NAHOMI  No results found for: POCMETH, POCAMPHET, POCBARBITUR, POCBENZO, POCCOCAINE, POCOPIATES, POCOXYCODO, POCPHENCYC, POCPROPOXY, POCTHC, POCTRICYC  Lysis Labs   Results from last 7 days   Lab Units 05/13/22  0330 05/12/22  0629   HEMOGLOBIN g/dL 12.9* 14.1   PLATELETS 10*3/mm3 274 355   CREATININE mg/dL 1.16 1.34*     Radiology(recent) No radiology results for the last day      Results from last 7 days   Lab Units 05/14/22  0336   TROPONIN T ng/mL <0.010       Xrays, labs reviewed personally by physician.    ECG/EMG Results (most recent)     Procedure Component Value Units Date/Time    SCANNED - TELEMETRY   [[id number]] Resulted: 05/12/22     Updated: 05/12/22 2141    SCANNED - TELEMETRY   [[id number]] Resulted: 05/12/22     Updated: 05/13/22 0138    SCANNED - TELEMETRY   [[id number]] Resulted: 05/12/22     Updated: 05/13/22 1558    ECG 12 Lead [[id number]] Collected: 05/14/22 1219     Updated: 05/14/22 1220     QT Interval 301 ms      Narrative:      HEART RATE= 125  bpm  RR Interval= 480  ms  VA Interval= 143  ms  P Horizontal Axis= -10  deg  P Front Axis= 4  deg  QRSD Interval= 83  ms  QT Interval= 301  ms  QRS Axis= -26  deg  T Wave Axis= 162  deg  - ABNORMAL ECG -  Sinus tachycardia  Consider anterior infarct  Repol abnrm suggests ischemia, lateral leads  Electronically Signed By:   Date and Time of Study: 2022-05-14 12:19:31    SCANNED - TELEMETRY   [[id number]] Resulted: 05/12/22     Updated: 05/14/22 1253    SCANNED - TELEMETRY   [[id number]] Resulted: 05/12/22     Updated: 05/14/22 1306    SCANNED - TELEMETRY   [[id number]] Resulted: 05/12/22     Updated: 05/14/22 1411    SCANNED - TELEMETRY   [[id number]] Resulted: 05/12/22     Updated: 05/14/22 1442    SCANNED - TELEMETRY   [[id number]] Resulted: 05/12/22     Updated: 05/14/22 1453    SCANNED - TELEMETRY   [[id number]] Resulted: 05/12/22     Updated: 05/14/22 1529    SCANNED - TELEMETRY   [[id number]] Resulted: 05/12/22     Updated: 05/14/22 1559    SCANNED - TELEMETRY   [[id number]] Resulted: 05/12/22     Updated: 05/14/22 1632    ECG 12 Lead [[id number]] Collected: 05/12/22 0531     Updated: 05/15/22 0919     QT Interval 400 ms     Narrative:      HEART RATE= 68  bpm  RR Interval= 880  ms  VA Interval= 159  ms  P Horizontal Axis= 35  deg  P Front Axis= -20  deg  QRSD Interval= 86  ms  QT Interval= 400  ms  QRS Axis= -17  deg  T Wave Axis= 131  deg  - ABNORMAL ECG -  Sinus rhythm  Abnormal T, consider ischemia, lateral leads  When compared with ECG of 22-Sep-2021 6:52:27,  Significant rate decrease  Electronically Signed By: Ronaldo Mclaughlin (Benedicto) 15-May-2022 09:19:09  Date and Time of Study: 2022-05-12 05:31:20            Medication Review:   I have reviewed the patient's current medication list  Scheduled Meds:aspirin, 81 mg, Oral, Daily  atorvastatin, 80 mg, Oral, Daily  insulin lispro, 0-9 Units, Subcutaneous, TID AC  insulin NPH-insulin regular, 30 Units, Subcutaneous, TID  insulin NPH-insulin  "regular, 5 Units, Subcutaneous, Nightly  metoclopramide, 10 mg, Oral, TID AC  metoprolol tartrate, 25 mg, Oral, Q12H  pantoprazole, 40 mg, Oral, QAM  Rivaroxaban, 5 mg, Oral, Q12H  sodium chloride, 10 mL, Intravenous, Q12H      Continuous Infusions:   PRN Meds:.•  acetaminophen **OR** acetaminophen **OR** acetaminophen  •  aluminum-magnesium hydroxide-simethicone  •  dextrose  •  dextrose  •  glucagon (human recombinant)  •  hydrALAZINE  •  insulin lispro **AND** insulin lispro  •  magnesium sulfate **OR** magnesium sulfate **OR** magnesium sulfate  •  meclizine  •  melatonin  •  ondansetron **OR** ondansetron  •  potassium chloride **OR** potassium chloride **OR** potassium chloride  •  sodium chloride    Imaging:  Imaging Results (Last 72 Hours)     ** No results found for the last 72 hours. **            Reyes Vargas MD  05/15/22  12:29 EDT       EMR Dragon/Transcription:   \"Dictated utilizing Dragon dictation\".                 Electronically signed by EMRE Ortiz, 05/14/22, 1:37 PM EDT.    "

## 2022-05-15 NOTE — PLAN OF CARE
Goal Outcome Evaluation:           Problem: Adult Inpatient Plan of Care  Goal: Absence of Hospital-Acquired Illness or Injury  Intervention: Identify and Manage Fall Risk  Recent Flowsheet Documentation  Taken 5/15/2022 1600 by Taye, Jesi, RN  Safety Promotion/Fall Prevention:   room organization consistent   safety round/check completed   nonskid shoes/slippers when out of bed   fall prevention program maintained   clutter free environment maintained   assistive device/personal items within reach   activity supervised  Taken 5/15/2022 1200 by Taye, Jesi, RN  Safety Promotion/Fall Prevention:   room organization consistent   safety round/check completed   nonskid shoes/slippers when out of bed   fall prevention program maintained   assistive device/personal items within reach   clutter free environment maintained   activity supervised  Taken 5/15/2022 0800 by Jesi Kothari RN  Safety Promotion/Fall Prevention:   safety round/check completed   room organization consistent   nonskid shoes/slippers when out of bed   fall prevention program maintained   activity supervised   assistive device/personal items within reach     Problem: Fall Injury Risk  Goal: Absence of Fall and Fall-Related Injury  Outcome: Ongoing, Progressing  Intervention: Identify and Manage Contributors  Recent Flowsheet Documentation  Taken 5/15/2022 1600 by Jesi Kothari RN  Medication Review/Management: medications reviewed  Taken 5/15/2022 1400 by Jesi Kothari RN  Medication Review/Management: medications reviewed  Taken 5/15/2022 1200 by Jesi Kothari RN  Medication Review/Management: medications reviewed  Taken 5/15/2022 0800 by Jesi Kothari RN  Medication Review/Management: medications reviewed  Intervention: Promote Injury-Free Environment  Recent Flowsheet Documentation  Taken 5/15/2022 1600 by Taye, Jesi, RN  Safety Promotion/Fall Prevention:   room organization consistent    safety round/check completed   nonskid shoes/slippers when out of bed   fall prevention program maintained   clutter free environment maintained   assistive device/personal items within reach   activity supervised  Taken 5/15/2022 1200 by Lyndon, Jesi, RN  Safety Promotion/Fall Prevention:   room organization consistent   safety round/check completed   nonskid shoes/slippers when out of bed   fall prevention program maintained   assistive device/personal items within reach   clutter free environment maintained   activity supervised  Taken 5/15/2022 0800 by Jesi Kothari RN  Safety Promotion/Fall Prevention:   safety round/check completed   room organization consistent   nonskid shoes/slippers when out of bed   fall prevention program maintained   activity supervised   assistive device/personal items within reach     Problem: Skin Injury Risk Increased  Goal: Skin Health and Integrity  Outcome: Ongoing, Progressing  Intervention: Optimize Skin Protection  Recent Flowsheet Documentation  Taken 5/15/2022 0800 by Jesi Kothari, RN  Pressure Reduction Techniques:   weight shift assistance provided   frequent weight shift encouraged  Pressure Reduction Devices: pressure-redistributing mattress utilized

## 2022-05-15 NOTE — PLAN OF CARE
Problem: Adult Inpatient Plan of Care  Goal: Plan of Care Review  Outcome: Ongoing, Progressing  Goal: Patient-Specific Goal (Individualized)  Outcome: Ongoing, Progressing  Goal: Absence of Hospital-Acquired Illness or Injury  Outcome: Ongoing, Progressing  Intervention: Identify and Manage Fall Risk  Recent Flowsheet Documentation  Taken 5/15/2022 0400 by Patricia Georges LPN  Safety Promotion/Fall Prevention:   safety round/check completed   nonskid shoes/slippers when out of bed   lighting adjusted   fall prevention program maintained   clutter free environment maintained   assistive device/personal items within reach  Taken 5/15/2022 0000 by Patricia Georges LPN  Safety Promotion/Fall Prevention:   safety round/check completed   room organization consistent   nonskid shoes/slippers when out of bed   mobility aid in reach   fall prevention program maintained   clutter free environment maintained   assistive device/personal items within reach  Taken 5/14/2022 2000 by Patricia Georges LPN  Safety Promotion/Fall Prevention:   safety round/check completed   room organization consistent   nonskid shoes/slippers when out of bed   mobility aid in reach   lighting adjusted   fall prevention program maintained   clutter free environment maintained   assistive device/personal items within reach  Intervention: Prevent Skin Injury  Recent Flowsheet Documentation  Taken 5/14/2022 2000 by Patricia Georges LPN  Body Position: position changed independently  Intervention: Prevent and Manage VTE (Venous Thromboembolism) Risk  Recent Flowsheet Documentation  Taken 5/15/2022 0400 by Patricia Georges LPN  Activity Management: activity adjusted per tolerance  Taken 5/15/2022 0000 by Patricia Georges LPN  Activity Management: activity adjusted per tolerance  Taken 5/14/2022 2000 by Patricia Georges LPN  Activity Management: activity adjusted per tolerance  Intervention: Prevent Infection  Recent Flowsheet  Documentation  Taken 5/15/2022 0400 by Patricia Georges LPN  Infection Prevention:   single patient room provided   rest/sleep promoted   hand hygiene promoted   equipment surfaces disinfected   environmental surveillance performed  Taken 5/15/2022 0000 by Patricia Georges LPN  Infection Prevention:   single patient room provided   rest/sleep promoted   hand hygiene promoted   equipment surfaces disinfected   environmental surveillance performed  Taken 5/14/2022 2000 by Patricia Georges LPN  Infection Prevention:   single patient room provided   hand hygiene promoted   equipment surfaces disinfected   environmental surveillance performed  Goal: Optimal Comfort and Wellbeing  Outcome: Ongoing, Progressing  Intervention: Provide Person-Centered Care  Recent Flowsheet Documentation  Taken 5/15/2022 0400 by Patricia Georges LPN  Trust Relationship/Rapport:   emotional support provided   empathic listening provided   reassurance provided   thoughts/feelings acknowledged  Taken 5/15/2022 0000 by Patricia Georges LPN  Trust Relationship/Rapport:   thoughts/feelings acknowledged   emotional support provided  Taken 5/14/2022 2000 by Patricia Georges LPN  Trust Relationship/Rapport:   thoughts/feelings acknowledged   reassurance provided  Goal: Readiness for Transition of Care  Outcome: Ongoing, Progressing     Problem: Diabetes Comorbidity  Goal: Blood Glucose Level Within Targeted Range  Outcome: Ongoing, Progressing     Problem: Hypertension Comorbidity  Goal: Blood Pressure in Desired Range  Outcome: Ongoing, Progressing  Intervention: Maintain Blood Pressure Management  Recent Flowsheet Documentation  Taken 5/15/2022 0400 by Patricia Georges LPN  Medication Review/Management: medications reviewed  Taken 5/15/2022 0000 by Patricia Georges LPN  Medication Review/Management: medications reviewed  Taken 5/14/2022 2000 by Patricia Georges LPN  Medication Review/Management: medications reviewed     Problem:  Skin Injury Risk Increased  Goal: Skin Health and Integrity  Outcome: Ongoing, Progressing  Intervention: Optimize Skin Protection  Recent Flowsheet Documentation  Taken 5/14/2022 2000 by Patricia Georges LPN  Head of Bed (HOB) Positioning: HOB at 30-45 degrees     Problem: Fall Injury Risk  Goal: Absence of Fall and Fall-Related Injury  Outcome: Ongoing, Progressing  Intervention: Identify and Manage Contributors  Recent Flowsheet Documentation  Taken 5/15/2022 0400 by Patricia Georges LPN  Medication Review/Management: medications reviewed  Taken 5/15/2022 0000 by Patricia Georges LPN  Medication Review/Management: medications reviewed  Taken 5/14/2022 2000 by Patricia Georges LPN  Medication Review/Management: medications reviewed  Intervention: Promote Injury-Free Environment  Recent Flowsheet Documentation  Taken 5/15/2022 0400 by Patricia Georges LPN  Safety Promotion/Fall Prevention:   safety round/check completed   nonskid shoes/slippers when out of bed   lighting adjusted   fall prevention program maintained   clutter free environment maintained   assistive device/personal items within reach  Taken 5/15/2022 0000 by Patricia Georges LPN  Safety Promotion/Fall Prevention:   safety round/check completed   room organization consistent   nonskid shoes/slippers when out of bed   mobility aid in reach   fall prevention program maintained   clutter free environment maintained   assistive device/personal items within reach  Taken 5/14/2022 2000 by Patricia Georges LPN  Safety Promotion/Fall Prevention:   safety round/check completed   room organization consistent   nonskid shoes/slippers when out of bed   mobility aid in reach   lighting adjusted   fall prevention program maintained   clutter free environment maintained   assistive device/personal items within reach   Goal Outcome Evaluation:      Safety precautions maintained, call light in reach.

## 2022-05-15 NOTE — PROGRESS NOTES
[facility] Medicine Services Daily Progress Note    Patient Name: Chao Lazar  : 1960  MRN: [id number]  Primary Care Physician:  Al Kimbrough MD  Date of admission: 2022      Subjective    Brief interim history: 62-year-old pleasant male with history of CVA, involving right PCA with left homonymous hemianopsia (spends months in rehab facility), unsteady gait, CAD, T2DM with peripheral neuropathy, HLD, and gastropathy.  According to admission note, he presented to [facility] ED on 2022 complaining of dizziness and wife reported generalized weakness with unsteady gait.  No headache, visual changes, no dysphagia, dysarthria or speech impediment.  Concern for possible recurrent stroke, patient underwent CT of the head without contrast and MRI of the brain () were unrevealing.  Seen and followed by PT/OT due to unsteady gait with lateralization.  Patient was also seen by neurologist in consultation today due to unsteady gait and lateralizing to the left side with ambulation.    2022: Patient seen and examined and follow-up.  Ambulated with noted unsteady gait.  22 patient seen and examined in bed no acute distress, blood pressure stable, heart rate 117, EKG ordered.  Consulted cardiology, metoprolol added.    5/15/22 patient seen and examine, in bed NAD, c/o dizziness, vss    Objective      Vitals:   Temp:  [98.2 °F (36.8 °C)-98.8 °F (37.1 °C)] 98.7 °F (37.1 °C)  Heart Rate:  [72-79] 76  Resp:  [-] 15  BP: (118-147)/(70-89) 118/78    Physical Exam  Constitutional:       General: He is not in acute distress.     Appearance: He is obese. He is not ill-appearing.   HENT:      Head: Normocephalic.      Mouth/Throat:      Mouth: Mucous membranes are moist.   Eyes:      Pupils: Pupils are equal, round, and reactive to light.   Cardiovascular:      Rate and Rhythm: Normal rate.      Pulses: Normal pulses.   Pulmonary:      Effort: Pulmonary effort is normal.    Abdominal:      General: Abdomen is flat.   Musculoskeletal:         General: Normal range of motion.      Cervical back: Normal range of motion and neck supple.   Skin:     General: Skin is warm.   Neurological:      Mental Status: He is alert. Mental status is at baseline.   Psychiatric:         Mood and Affect: Mood normal.          Result Review    Result Review:  I have personally reviewed the results from the time of this admission to 5/15/2022 17:20 EDT and agree with these findings:  []  Laboratory  []  Microbiology  []  Radiology  []  EKG/Telemetry   []  Cardiology/Vascular   []  Pathology  []  Old records  []  Other:  Most notable findings include:  Wounds (last 24 hours)         CMP:        Lab 05/15/22  0300 05/14/22  0336 05/13/22  0330 05/12/22  1108 05/12/22  0629   SODIUM  --   --  137  --  137   POTASSIUM  --   --  4.6  --  4.9   CHLORIDE  --   --  102  --  99   CO2  --   --  20.0*  --  24.0   ANION GAP  --   --  15.0  --  14.0   BUN  --   --  17  --  20   CREATININE  --   --  1.16  --  1.34*   EGFR  --   --  70.3  --  59.2*   GLUCOSE  --   --  160*  --  171*   CALCIUM  --   --  9.4  --  9.7   MAGNESIUM 2.2 1.6 1.7 1.3*  --    TOTAL PROTEIN  --   --   --   --  7.9   ALBUMIN  --   --   --   --  4.20   GLOBULIN  --   --   --   --  3.7   ALT (SGPT)  --   --   --   --  51*   AST (SGOT)  --   --   --   --  34   BILIRUBIN  --   --   --   --  0.2   ALK PHOS  --   --   --   --  127*     CBC:      Lab 05/13/22  0330 05/12/22  0629   WBC 8.70 10.90*   HEMOGLOBIN 12.9* 14.1   HEMATOCRIT 37.7 42.8   PLATELETS 274 355   NEUTROS ABS 5.00 7.20*   LYMPHS ABS 2.80 2.60   MONOS ABS 0.60 0.70   EOS ABS 0.30 0.30   MCV 89.6 91.6         Assessment & Plan      aspirin, 81 mg, Oral, Daily  atorvastatin, 80 mg, Oral, Daily  insulin lispro, 0-9 Units, Subcutaneous, TID AC  insulin NPH-insulin regular, 30 Units, Subcutaneous, TID  insulin NPH-insulin regular, 5 Units, Subcutaneous, Nightly  metoclopramide, 10 mg, Oral,  TID AC  metoprolol tartrate, 25 mg, Oral, Q12H  pantoprazole, 40 mg, Oral, QAM  Rivaroxaban, 5 mg, Oral, Q12H  sodium chloride, 10 mL, Intravenous, Q12H         Active Hospital Problems:  Active Hospital Problems    Diagnosis    • **Dizziness    • CKD (chronic kidney disease), stage II    • Fall    • History of CVA (cerebrovascular accident)    • Coronary artery disease involving native coronary artery of native heart without angina pectoris    • Essential hypertension    • Asthma    • Hyperlipidemia, mixed    • Gastroparesis    • Type 2 diabetes mellitus with hyperglycemia, with long-term current use of insulin ([facility])      Assessment/plan       Dizziness--resolving,  - no evidence of orthostatic hypotension          -CT head/MRI of the brain (5/12) negative for CVA    Unsteady gait/lateralizing      -likely 2/2 autonomic/peripheral neuropathy       -appreciate neurologist,  evaluation and recommendation        -optimize glycemic control, PT/OT as tolerated    History of CVA with left homonymous hemianopsia       -aspirin/Lipitor/Xarelto and control blood pressure    HLD     -Lipitor    T2DM (recent A1c of 7.6<--- 12.5)       -70/30 insulin and SSI, monitor closely for hypoglycemia    CKD, stage II       -Hydrate gently and monitor renal function closely    History of fall         -cont fall precaution    Tachycardia, check EKG, metoprolol added.  Cardiology consulted.            DVT prophylaxis:  Medical and mechanical DVT prophylaxis orders are present.    CODE STATUS:    Level Of Support Discussed With: Patient  Code Status (Patient has no pulse and is not breathing): CPR (Attempt to Resuscitate)  Medical Interventions (Patient has pulse or is breathing): Full Support      Disposition:  I expect patient to be discharged 24-48-hours    Electronically signed by Florentino Geronimo MD, 05/15/22, 17:20 EDT.  [facility] Hospitalist Team

## 2022-05-15 NOTE — PLAN OF CARE
Assessment: Chao Lazar presents with functional mobility impairments which indicate the need for skilled intervention. Tolerating session today without incident.Pt still having some visual deficits and c/o dizziness with amb. Performed seated LE exs and worked on midline orientation with visual feedback from my phone to show him when he was in midline. Tends to sl lean post and to L. Could correct but not maintain. Req assist to guide rwx alem when changing directions. Plans on acute rehab at [place]. Will continue to follow and progress as tolerated.

## 2022-05-16 VITALS
TEMPERATURE: 98.2 F | HEART RATE: 81 BPM | SYSTOLIC BLOOD PRESSURE: 117 MMHG | BODY MASS INDEX: 27.35 KG/M2 | DIASTOLIC BLOOD PRESSURE: 71 MMHG | HEIGHT: 73 IN | OXYGEN SATURATION: 92 % | WEIGHT: 206.35 LBS | RESPIRATION RATE: 23 BRPM

## 2022-05-16 LAB
GLUCOSE BLDC GLUCOMTR-MCNC: 186 MG/DL (ref 70–105)
GLUCOSE BLDC GLUCOMTR-MCNC: 204 MG/DL (ref 70–105)
GLUCOSE BLDC GLUCOMTR-MCNC: 288 MG/DL (ref 70–105)
GLUCOSE BLDC GLUCOMTR-MCNC: 292 MG/DL (ref 70–105)
MAGNESIUM SERPL-MCNC: 1.8 MG/DL (ref 1.6–2.4)
QT INTERVAL: 301 MS

## 2022-05-16 PROCEDURE — 36415 COLL VENOUS BLD VENIPUNCTURE: CPT | Performed by: NURSE PRACTITIONER

## 2022-05-16 PROCEDURE — 82962 GLUCOSE BLOOD TEST: CPT

## 2022-05-16 PROCEDURE — G0378 HOSPITAL OBSERVATION PER HR: HCPCS

## 2022-05-16 PROCEDURE — 99214 OFFICE O/P EST MOD 30 MIN: CPT | Performed by: NURSE PRACTITIONER

## 2022-05-16 PROCEDURE — 63710000001 INSULIN ISOPHANE & REGULAR PER 5 UNITS: Performed by: NURSE PRACTITIONER

## 2022-05-16 PROCEDURE — 99217 PR OBSERVATION CARE DISCHARGE MANAGEMENT: CPT | Performed by: INTERNAL MEDICINE

## 2022-05-16 PROCEDURE — 63710000001 INSULIN LISPRO (HUMAN) PER 5 UNITS: Performed by: NURSE PRACTITIONER

## 2022-05-16 PROCEDURE — 83735 ASSAY OF MAGNESIUM: CPT | Performed by: NURSE PRACTITIONER

## 2022-05-16 RX ORDER — BLOOD SUGAR DIAGNOSTIC
1 STRIP MISCELLANEOUS
Qty: 100 EACH | Refills: 2 | Status: SHIPPED | OUTPATIENT
Start: 2022-05-16 | End: 2022-11-03

## 2022-05-16 RX ORDER — MECLIZINE HYDROCHLORIDE 25 MG/1
25 TABLET ORAL 3 TIMES DAILY PRN
Start: 2022-05-16

## 2022-05-16 RX ORDER — ISOPROPYL ALCOHOL 700 MG/ML
1 CLOTH TOPICAL
Qty: 100 EACH | Refills: 2 | Status: SHIPPED | OUTPATIENT
Start: 2022-05-16

## 2022-05-16 RX ORDER — LANCETS 30 GAUGE
1 EACH MISCELLANEOUS
Qty: 100 EACH | Refills: 2 | Status: SHIPPED | OUTPATIENT
Start: 2022-05-16

## 2022-05-16 RX ADMIN — INSULIN HUMAN 30 UNITS: 100 INJECTION, SUSPENSION SUBCUTANEOUS at 09:38

## 2022-05-16 RX ADMIN — METOCLOPRAMIDE 10 MG: 10 TABLET ORAL at 09:38

## 2022-05-16 RX ADMIN — METOCLOPRAMIDE 10 MG: 10 TABLET ORAL at 17:45

## 2022-05-16 RX ADMIN — INSULIN LISPRO 6 UNITS: 100 INJECTION, SOLUTION INTRAVENOUS; SUBCUTANEOUS at 12:55

## 2022-05-16 RX ADMIN — METOCLOPRAMIDE 10 MG: 10 TABLET ORAL at 12:55

## 2022-05-16 RX ADMIN — Medication 10 ML: at 09:41

## 2022-05-16 RX ADMIN — INSULIN LISPRO 4 UNITS: 100 INJECTION, SOLUTION INTRAVENOUS; SUBCUTANEOUS at 09:38

## 2022-05-16 RX ADMIN — INSULIN LISPRO 6 UNITS: 100 INJECTION, SOLUTION INTRAVENOUS; SUBCUTANEOUS at 17:45

## 2022-05-16 RX ADMIN — ASPIRIN 81 MG: 81 TABLET, COATED ORAL at 09:37

## 2022-05-16 RX ADMIN — MECLIZINE HYDROCHLORIDE 25 MG: 25 TABLET ORAL at 09:37

## 2022-05-16 RX ADMIN — METOPROLOL TARTRATE 25 MG: 25 TABLET, FILM COATED ORAL at 09:38

## 2022-05-16 RX ADMIN — ATORVASTATIN CALCIUM 80 MG: 40 TABLET, FILM COATED ORAL at 09:37

## 2022-05-16 RX ADMIN — PANTOPRAZOLE SODIUM 40 MG: 40 TABLET, DELAYED RELEASE ORAL at 06:06

## 2022-05-16 RX ADMIN — RIVAROXABAN 5 MG: 2.5 TABLET, FILM COATED ORAL at 17:45

## 2022-05-16 RX ADMIN — RIVAROXABAN 5 MG: 2.5 TABLET, FILM COATED ORAL at 06:06

## 2022-05-16 RX ADMIN — INSULIN HUMAN 30 UNITS: 100 INJECTION, SUSPENSION SUBCUTANEOUS at 17:45

## 2022-05-16 NOTE — PLAN OF CARE
Problem: Adult Inpatient Plan of Care  Goal: Plan of Care Review  Outcome: Ongoing, Progressing  Flowsheets (Taken 5/16/2022 1453)  Plan of Care Reviewed With:   patient   spouse  Outcome Evaluation: Waiting on precert from insurance for [facility] to be completed.  Goal: Patient-Specific Goal (Individualized)  Outcome: Ongoing, Progressing  Goal: Absence of Hospital-Acquired Illness or Injury  Outcome: Ongoing, Progressing  Intervention: Identify and Manage Fall Risk  Recent Flowsheet Documentation  Taken 5/16/2022 1400 by Fanta Longoria RN  Safety Promotion/Fall Prevention:   activity supervised   assistive device/personal items within reach   clutter free environment maintained   elopement precautions   fall prevention program maintained   gait belt   lighting adjusted   mobility aid in reach   muscle strengthening facilitated   nonskid shoes/slippers when out of bed   room organization consistent   safety round/check completed  Taken 5/16/2022 1200 by aFnta Longoria, RN  Safety Promotion/Fall Prevention:   activity supervised   assistive device/personal items within reach   clutter free environment maintained   elopement precautions   fall prevention program maintained   gait belt   lighting adjusted   mobility aid in reach   muscle strengthening facilitated   nonskid shoes/slippers when out of bed   room organization consistent   safety round/check completed  Taken 5/16/2022 1000 by Fanta Longoria, RN  Safety Promotion/Fall Prevention:   activity supervised   assistive device/personal items within reach   clutter free environment maintained   elopement precautions   fall prevention program maintained   gait belt   lighting adjusted   mobility aid in reach   muscle strengthening facilitated   patient off unit   room organization consistent   safety round/check completed  Taken 5/16/2022 0800 by Fanta Longoria, CAROLEE  Safety Promotion/Fall Prevention:   activity supervised   assistive device/personal items within reach    clutter free environment maintained   elopement precautions   fall prevention program maintained   gait belt   lighting adjusted   mobility aid in reach   muscle strengthening facilitated   nonskid shoes/slippers when out of bed   room organization consistent   safety round/check completed  Intervention: Prevent Skin Injury  Recent Flowsheet Documentation  Taken 5/16/2022 1400 by Fanta Longoria RN  Body Position: supine  Taken 5/16/2022 1200 by Fanta Longoria RN  Body Position:   turned   weight shifting  Taken 5/16/2022 0800 by Fanta Longoria RN  Body Position:   turned   weight shifting  Intervention: Prevent and Manage VTE (Venous Thromboembolism) Risk  Recent Flowsheet Documentation  Taken 5/16/2022 1000 by Fanta Longoria RN  Activity Management:   activity adjusted per tolerance   activity encouraged  Taken 5/16/2022 0800 by Fanta Longoria RN  Activity Management:   activity encouraged   activity adjusted per tolerance  VTE Prevention/Management: sequential compression devices off  Intervention: Prevent Infection  Recent Flowsheet Documentation  Taken 5/16/2022 1400 by Fanta Longoria RN  Infection Prevention:   cohorting utilized   environmental surveillance performed   equipment surfaces disinfected   hand hygiene promoted   personal protective equipment utilized   rest/sleep promoted   single patient room provided  Taken 5/16/2022 1200 by Fanta Longoria RN  Infection Prevention:   environmental surveillance performed   equipment surfaces disinfected   hand hygiene promoted   personal protective equipment utilized   rest/sleep promoted   single patient room provided  Taken 5/16/2022 1000 by Fanta Longoria RN  Infection Prevention:   environmental surveillance performed   equipment surfaces disinfected   hand hygiene promoted   personal protective equipment utilized   rest/sleep promoted   single patient room provided  Taken 5/16/2022 0800 by Fanta Longoria RN  Infection Prevention:   environmental surveillance  performed   equipment surfaces disinfected   hand hygiene promoted   personal protective equipment utilized   rest/sleep promoted   single patient room provided  Goal: Optimal Comfort and Wellbeing  Outcome: Ongoing, Progressing  Intervention: Provide Person-Centered Care  Recent Flowsheet Documentation  Taken 5/16/2022 1200 by Fanta Longoria RN  Trust Relationship/Rapport:   choices provided   emotional support provided   empathic listening provided   questions answered   questions encouraged   reassurance provided   thoughts/feelings acknowledged  Taken 5/16/2022 0800 by Fanta Longoria RN  Trust Relationship/Rapport:   care explained   choices provided   emotional support provided   empathic listening provided   questions answered   questions encouraged   reassurance provided   thoughts/feelings acknowledged  Goal: Readiness for Transition of Care  Outcome: Ongoing, Progressing     Problem: Diabetes Comorbidity  Goal: Blood Glucose Level Within Targeted Range  Outcome: Ongoing, Progressing  Intervention: Monitor and Manage Glycemia  Recent Flowsheet Documentation  Taken 5/16/2022 1200 by Fanta Longoria RN  Glycemic Management:   supplemental insulin given   blood glucose monitored  Taken 5/16/2022 0800 by Fanta Longoria RN  Glycemic Management:   blood glucose monitored   supplemental insulin given     Problem: Hypertension Comorbidity  Goal: Blood Pressure in Desired Range  Outcome: Ongoing, Progressing     Problem: Skin Injury Risk Increased  Goal: Skin Health and Integrity  Outcome: Ongoing, Progressing  Intervention: Optimize Skin Protection  Recent Flowsheet Documentation  Taken 5/16/2022 1400 by Fanta Longoria RN  Head of Bed (HOB) Positioning: HOB at 20-30 degrees  Taken 5/16/2022 1200 by Fanta Longoria RN  Head of Bed (HOB) Positioning: HOB at 30-45 degrees  Taken 5/16/2022 0800 by Fanta Longoria RN  Head of Bed (HOB) Positioning: HOB at 20-30 degrees     Problem: Fall Injury Risk  Goal: Absence of Fall  and Fall-Related Injury  Outcome: Ongoing, Progressing  Intervention: Promote Injury-Free Environment  Recent Flowsheet Documentation  Taken 5/16/2022 1400 by Fanta Longoria, RN  Safety Promotion/Fall Prevention:   activity supervised   assistive device/personal items within reach   clutter free environment maintained   elopement precautions   fall prevention program maintained   gait belt   lighting adjusted   mobility aid in reach   muscle strengthening facilitated   nonskid shoes/slippers when out of bed   room organization consistent   safety round/check completed  Taken 5/16/2022 1200 by Fanta Longoria, RN  Safety Promotion/Fall Prevention:   activity supervised   assistive device/personal items within reach   clutter free environment maintained   elopement precautions   fall prevention program maintained   gait belt   lighting adjusted   mobility aid in reach   muscle strengthening facilitated   nonskid shoes/slippers when out of bed   room organization consistent   safety round/check completed  Taken 5/16/2022 1000 by Fanta Longoria, RN  Safety Promotion/Fall Prevention:   activity supervised   assistive device/personal items within reach   clutter free environment maintained   elopement precautions   fall prevention program maintained   gait belt   lighting adjusted   mobility aid in reach   muscle strengthening facilitated   patient off unit   room organization consistent   safety round/check completed  Taken 5/16/2022 0800 by Fanta Longoria, CAROLEE  Safety Promotion/Fall Prevention:   activity supervised   assistive device/personal items within reach   clutter free environment maintained   elopement precautions   fall prevention program maintained   gait belt   lighting adjusted   mobility aid in reach   muscle strengthening facilitated   nonskid shoes/slippers when out of bed   room organization consistent   safety round/check completed   Goal Outcome Evaluation:  Plan of Care Reviewed With: patient, spouse            Outcome Evaluation: Waiting on precert from insurance for [facility] to be completed.

## 2022-05-16 NOTE — CASE MANAGEMENT/SOCIAL WORK
Continued Stay Note  RIGOBERTO Mcdermott     Patient Name: Chao Lazar  MRN: [id number]  Today's Date: 5/16/2022    Admit Date: 5/12/2022     Discharge Plan     Row Name 05/16/22 1057       Plan    Plan DC to [facility] (precert started 5/13/22).    Plan Comments CM contacted Shonna at 8:53 am on 5/16 to check on precert status, which is still pending. CM updated MD & RN at rapid rounds.  UPDATE 5/16/22 2:42 pm: Per Shonna at [facility], insurance has all information requested, but may not receive precert until tomorrow. CM update MD & RN via secure chat.                    Expected Discharge Date and Time     Expected Discharge Date Expected Discharge Time    May 16, 2022         Odalis London RN, MSN  Care Manager  [phone]

## 2022-05-16 NOTE — DISCHARGE SUMMARY
[facility] Medicine Services  DISCHARGE SUMMARY    Patient Name: Chao Lazar  : 1960  MRN: [id number]    Date of Admission: 2022  Discharge Diagnosis: Dizziness  Date of Discharge: 2022  Condition stable  Disposition rehab  Primary Care Physician: Al Kimbrough MD      Presenting Problem:   Dizziness [R42]    Active and Resolved Hospital Problems:  Active Hospital Problems    Diagnosis POA   • **Dizziness [R42] Yes   • CKD (chronic kidney disease), stage II [N18.2] Yes   • Fall [W19.XXXA] Yes   • History of CVA (cerebrovascular accident) [Z86.73] Not Applicable   • Coronary artery disease involving native coronary artery of native heart without angina pectoris [I25.10] Yes   • Essential hypertension [I10] Yes   • Asthma [J45.909] Yes   • Hyperlipidemia, mixed [E78.2] Yes   • Gastroparesis [K31.84] Yes   • Type 2 diabetes mellitus with hyperglycemia, with long-term current use of insulin (HCC) [E11.65, Z79.4] Not Applicable      Resolved Hospital Problems   No resolved problems to display.         Hospital Course     Hospital Course:  Chao Lazar is a 62 y.o. male      Brief interim history: 62-year-old pleasant male with history of CVA, involving right PCA with left homonymous hemianopsia (spends months in rehab facility), unsteady gait, CAD, T2DM with peripheral neuropathy, HLD, and gastropathy.  According to admission note, he presented to [facility] ED on 2022 complaining of dizziness and wife reported generalized weakness with unsteady gait.  No headache, visual changes, no dysphagia, dysarthria or speech impediment.  Concern for possible recurrent stroke, patient underwent CT of the head without contrast and MRI of the brain () were unrevealing.  Seen and followed by PT/OT due to unsteady gait with lateralization.  Patient was also seen by neurologist in consultation today due to unsteady gait and lateralizing to the left side with  ambulation.     5/13/2022: Patient seen and examined and follow-up.  Ambulated with noted unsteady gait.  5/14/22 patient seen and examined in bed no acute distress, blood pressure stable, heart rate 117, EKG ordered.  Consulted cardiology, metoprolol added.     5/15/22 patient seen and examine, in bed NAD, c/o dizziness, vss     /5/16  Notes and labs reviewed  Blood pressure is acceptable at this moment  Afebrile oxygen saturation low normal 92% on room air.  Blood glucose mildly elevated  Most recent labs on the 13th reviewed  Magnesium 1.8 today     Assessment/plan         Dizziness--resolving,  - no evidence of orthostatic hypotension          -CT head/MRI of the brain (5/12) negative for CVA     Unsteady gait/lateralizing      -likely 2/2 autonomic/peripheral neuropathy       -neurologist,  evaluation -recommendation-effective previous stroke and autonomic dysfunction.        -optimize glycemic control, PT/OT as tolerated     History of CVA with left homonymous hemianopsia       -aspirin/Lipitor/Xarelto and control blood pressure     HLD     -Lipitor     T2DM (recent A1c of 7.6<--- 12.5)       -70/30 insulin and SSI, monitor closely for hypoglycemia     CKD, stage II       -Hydrate gently and monitor renal function closely     History of fall         -cont fall precaution     Narrow complex tachycardia likely sinus per cardiology  Nonspecific EKG changes  Might be related to dysautonomia  On metoprolol  Improved  On aspirin statin  On Xarelto for previous stroke  ,      Disposition acute rehab pending  Referral to [facility] (accepted and Shonna with request precert today (5/13/22). No PASRR required. Barrier: Need precert approval.     DISCHARGE Follow Up Recommendations for labs and diagnostics:   Follow-up with neurology 1 month  Follow-up with cardiology 1 month  Follow-up with PCP ASAP  Monitor vital signs and orthostatic blood pressure        Reasons For Change In Medications and Indications for New  Medications:      Day of Discharge     Vital Signs:  Temp:  [98.5 °F (36.9 °C)-98.8 °F (37.1 °C)] 98.5 °F (36.9 °C)  Heart Rate:  [73-82] 79  Resp:  [14-17] 14  BP: (118-141)/(64-78) 141/78    Physical Exam:  Physical Exam *  Please refer to progress note today      Pertinent  and/or Most Recent Results     LAB RESULTS:      Lab 05/13/22 0330 05/12/22 0629   WBC 8.70 10.90*   HEMOGLOBIN 12.9* 14.1   HEMATOCRIT 37.7 42.8   PLATELETS 274 355   NEUTROS ABS 5.00 7.20*   LYMPHS ABS 2.80 2.60   MONOS ABS 0.60 0.70   EOS ABS 0.30 0.30   MCV 89.6 91.6         Lab 05/16/22  0028 05/15/22  0300 05/14/22 0336 05/13/22 0330 05/12/22 1108 05/12/22 0629   SODIUM  --   --   --  137  --  137   POTASSIUM  --   --   --  4.6  --  4.9   CHLORIDE  --   --   --  102  --  99   CO2  --   --   --  20.0*  --  24.0   ANION GAP  --   --   --  15.0  --  14.0   BUN  --   --   --  17  --  20   CREATININE  --   --   --  1.16  --  1.34*   EGFR  --   --   --  70.3  --  59.2*   GLUCOSE  --   --   --  160*  --  171*   CALCIUM  --   --   --  9.4  --  9.7   MAGNESIUM 1.8 2.2 1.6 1.7 1.3*  --    HEMOGLOBIN A1C  --   --   --   --  7.6*  --          Lab 05/12/22 0629   TOTAL PROTEIN 7.9   ALBUMIN 4.20   GLOBULIN 3.7   ALT (SGPT) 51*   AST (SGOT) 34   BILIRUBIN 0.2   ALK PHOS 127*         Lab 05/14/22 0336 05/12/22  1108 05/12/22  0629   TROPONIN T <0.010 <0.010 <0.010         Lab 05/12/22 0629   CHOLESTEROL 118   LDL CHOL 49   HDL CHOL 36*   TRIGLYCERIDES 202*             Brief Urine Lab Results  (Last result in the past 365 days)      Color   Clarity   Blood   Leuk Est   Nitrite   Protein   CREAT   Urine HCG        05/12/22 0644 Yellow   Clear   Negative   Negative   Negative   100 mg/dL (2+)               Microbiology Results (last 10 days)     Procedure Component Value - Date/Time    COVID PRE-OP / PRE-PROCEDURE SCREENING ORDER (NO ISOLATION) - Swab, Nasopharynx [[id number]]  (Normal) Collected: 05/12/22 0830    Lab Status: Final result  Specimen: Swab from Nasopharynx Updated: 05/12/22 0914    Narrative:      The following orders were created for panel order COVID PRE-OP / PRE-PROCEDURE SCREENING ORDER (NO ISOLATION) - Swab, Nasopharynx.  Procedure                               Abnormality         Status                     ---------                               -----------         ------                     COVID-19,CEPHEID/JOANNA,CO...[[id number]]  Normal              Final result                 Please view results for these tests on the individual orders.    COVID-19,CEPHEID/JOANNA,COR/[facility]IP/PAD/CELINA IN-HOUSE(OR EMERGENT/ADD-ON),NP SWAB IN TRANSPORT MEDIA 3-4 HR TAT, RT-PCR - Swab, Nasopharynx [[id number]]  (Normal) Collected: 05/12/22 0830    Lab Status: Final result Specimen: Swab from Nasopharynx Updated: 05/12/22 0914     COVID19 Not Detected    Narrative:      Fact sheet for providers: https://www.fda.gov/media/303747/download     Fact sheet for patients: https://www.fda.gov/media/273924/download  Fact sheet for providers: https://www.fda.gov/media/492550/download     Fact sheet for patients: https://www.fda.gov/media/459783/download          CT Head Without Contrast    Result Date: 5/12/2022  Impression:  1. Generalized atrophy with chronic periventricular and thalamic deep white matter ischemic changes. 2. Focal area of chronic encephalomalacia in the right parieto-occipital lobe. 3. No acute intracranial findings.  Electronically Signed By-Ronaldo Sheikh MD On:5/12/2022 7:14 AM This report was finalized on [id number] by  Ronaldo Sheikh MD.    MRI Brain Without Contrast    Result Date: 5/12/2022  Impression:  1. Chronic right PCA distribution infarct. 2. No acute intracranial pathology 3. Changes of mild atrophy and microvascular ischemic disease.   Electronically Signed By-Jerome Bush MD On:5/12/2022 11:08 AM This report was finalized on [id number] by  Jerome Bush MD.              Results for orders placed during the hospital encounter of  09/22/21    Adult Transthoracic Echo Complete W/ Cont if Necessary Per Protocol (With Agitated Saline)    Interpretation Summary  · Left ventricular systolic function is normal.  · Left ventricular ejection fraction is 55 to 60%  · Left ventricular wall thickness is consistent with mild concentric hypertrophy.  · Left ventricular diastolic function was normal.      Labs Pending at Discharge:      Procedures Performed           Consults:   Consults     Date and Time Order Name Status Description    5/14/2022 12:11 PM Inpatient Cardiology Consult Completed     5/13/2022 10:52 AM Inpatient Neurology Consult Other (see comments) Completed     5/12/2022  7:39 AM Inpatient Hospitalist Consult              Discharge Details        Discharge Medications      New Medications      Instructions Start Date   Alcohol Wipes 70 % misc   1 each, Apply externally, 3 Times Daily Before Meals, Dx code: E11.65      insulin NPH-insulin regular (70-30) 100 UNIT/ML injection  Commonly known as: humuLIN 70/30,novoLIN 70/30  Replaces: NOVOLIN 70/30 FLEXPEN SC   30 Units, Subcutaneous, 3 Times Daily      insulin NPH-insulin regular (70-30) 100 UNIT/ML injection  Commonly known as: humuLIN 70/30,novoLIN 70/30   5 Units, Subcutaneous, Nightly      meclizine 25 MG tablet  Commonly known as: ANTIVERT   25 mg, Oral, 3 Times Daily PRN      metoprolol tartrate 25 MG tablet  Commonly known as: LOPRESSOR   25 mg, Oral, Every 12 Hours Scheduled      OneTouch Delica Plus [id number] misc   1 each, Does not apply, 3 Times Daily Before Meals, Dx code: E11.65      OneTouch Verio test strip  Generic drug: glucose blood   1 each, Other, 3 Times Daily Before Meals, Use as instructed Dx code: E11.65         Continue These Medications      Instructions Start Date   aspirin 81 MG EC tablet   81 mg, Oral, Daily      atorvastatin 80 MG tablet  Commonly known as: LIPITOR   TAKE 1 TABLET BY MOUTH EVERY DAY      HM Vitamin D3 50 MCG (2000 UT) capsule  Generic drug:  Cholecalciferol   2,000 Units, Oral, Daily      metFORMIN  MG 24 hr tablet  Commonly known as: GLUCOPHAGE-XR   500 mg, Oral, 2 Times Daily, Take one tab in morning and one in the evening      metoclopramide 10 MG tablet  Commonly known as: REGLAN   10 mg, Oral, 3 Times Daily Before Meals      omeprazole 20 MG capsule  Commonly known as: priLOSEC   40 mg, Oral, 2 Times Daily      rivaroxaban 10 MG tablet  Commonly known as: XARELTO   5 mg, Oral, 2 Times Daily         Stop These Medications    NOVOLIN 70/30 FLEXPEN SC  Replaced by: insulin NPH-insulin regular (70-30) 100 UNIT/ML injection            No Known Allergies      Discharge Disposition:     Rehab Facility or Unit (DC - External)    Diet:  Hospital:  Diet Order   Procedures   • Diet Cardiac, Diabetic/Consistent Carbs; Healthy Heart; Diabetic - Consistent Carb         Discharge Activity:     Up w assistance    CODE STATUS:  Code Status and Medical Interventions:   Ordered at: 05/12/22 0832     Level Of Support Discussed With:    Patient     Code Status (Patient has no pulse and is not breathing):    CPR (Attempt to Resuscitate)     Medical Interventions (Patient has pulse or is breathing):    Full Support         Future Appointments   Date Time Provider Department Center   6/8/2022 11:30 AM Seipel, Joseph F, MD [facility]   7/25/2022 10:10 AM LAB  [facility]  [facility] None   8/1/2022  2:45 PM Radha Marniquez MD [facility] END [facility]           Time spent on Discharge including face to face service:  32 minutes    This patient has been examined wearing appropriate Personal Protective Equipment and discussed with hospital infection control department. 05/16/22      Signature: Electronically signed by Salazar Bower MD, 05/16/22, 10:45 AM EDT.  [facility] Hospitalist Team

## 2022-05-16 NOTE — PROGRESS NOTES
[facility] Medicine Services Daily Progress Note    Patient Name: Chao Lazar  : 1960  MRN: [id number]  Primary Care Physician:  Al Kimbrough MD  Date of admission: 2022      Subjective    Brief interim history: 62-year-old pleasant male with history of CVA, involving right PCA with left homonymous hemianopsia (spends months in rehab facility), unsteady gait, CAD, T2DM with peripheral neuropathy, HLD, and gastropathy.  According to admission note, he presented to [facility] ED on 2022 complaining of dizziness and wife reported generalized weakness with unsteady gait.  No headache, visual changes, no dysphagia, dysarthria or speech impediment.  Concern for possible recurrent stroke, patient underwent CT of the head without contrast and MRI of the brain () were unrevealing.  Seen and followed by PT/OT due to unsteady gait with lateralization.  Patient was also seen by neurologist in consultation today due to unsteady gait and lateralizing to the left side with ambulation.    2022: Patient seen and examined and follow-up.  Ambulated with noted unsteady gait.  22 patient seen and examined in bed no acute distress, blood pressure stable, heart rate 117, EKG ordered.  Consulted cardiology, metoprolol added.    5/15/22 patient seen and examine, in bed NAD, c/o dizziness, vss    /  Notes and labs reviewed  Blood pressure is acceptable at this moment  Afebrile oxygen saturation low normal 92% on room air.  Blood glucose mildly elevated  Most recent labs on the  reviewed  Magnesium 1.8 today    Review of systems  All other systems reviewed and negative except as above    Objective      Vitals:   Temp:  [98.5 °F (36.9 °C)-98.8 °F (37.1 °C)] 98.5 °F (36.9 °C)  Heart Rate:  [73-82] 79  Resp:  [14-17] 14  BP: (118-141)/(64-78) 141/78    Physical Exam  Constitutional:       General: He is not in acute distress.     Appearance: He is obese. He is not ill-appearing.    HENT:      Head: Normocephalic.      Mouth/Throat:      Mouth: Mucous membranes are moist.   Eyes:      Pupils: Pupils are equal, round, and reactive to light.   Cardiovascular:      Rate and Rhythm: Normal rate.      Pulses: Normal pulses.   Pulmonary:      Effort: Pulmonary effort is normal.   Abdominal:      General: Abdomen is flat.   Musculoskeletal:         General: Normal range of motion.      Cervical back: Normal range of motion and neck supple.   Skin:     General: Skin is warm.   Neurological:      Mental Status: He is alert. Mental status is at baseline.   Psychiatric:         Mood and Affect: Mood normal.          Result Review    Result Review:  I have personally reviewed the results from the time of this admission to 5/16/2022 10:35 EDT and agree with these findings:  []  Laboratory  []  Microbiology  []  Radiology  []  EKG/Telemetry   []  Cardiology/Vascular   []  Pathology  []  Old records  []  Other:  Most notable findings include:  Wounds (last 24 hours)         CMP:        Lab 05/16/22  0028 05/15/22  0300 05/14/22  0336 05/13/22  0330 05/12/22  1108 05/12/22  0629   SODIUM  --   --   --  137  --  137   POTASSIUM  --   --   --  4.6  --  4.9   CHLORIDE  --   --   --  102  --  99   CO2  --   --   --  20.0*  --  24.0   ANION GAP  --   --   --  15.0  --  14.0   BUN  --   --   --  17  --  20   CREATININE  --   --   --  1.16  --  1.34*   EGFR  --   --   --  70.3  --  59.2*   GLUCOSE  --   --   --  160*  --  171*   CALCIUM  --   --   --  9.4  --  9.7   MAGNESIUM 1.8 2.2 1.6 1.7 1.3*  --    TOTAL PROTEIN  --   --   --   --   --  7.9   ALBUMIN  --   --   --   --   --  4.20   GLOBULIN  --   --   --   --   --  3.7   ALT (SGPT)  --   --   --   --   --  51*   AST (SGOT)  --   --   --   --   --  34   BILIRUBIN  --   --   --   --   --  0.2   ALK PHOS  --   --   --   --   --  127*     CBC:      Lab 05/13/22  0330 05/12/22  0629   WBC 8.70 10.90*   HEMOGLOBIN 12.9* 14.1   HEMATOCRIT 37.7 42.8   PLATELETS 274  355   NEUTROS ABS 5.00 7.20*   LYMPHS ABS 2.80 2.60   MONOS ABS 0.60 0.70   EOS ABS 0.30 0.30   MCV 89.6 91.6         Assessment & Plan      aspirin, 81 mg, Oral, Daily  atorvastatin, 80 mg, Oral, Daily  insulin lispro, 0-9 Units, Subcutaneous, TID AC  insulin NPH-insulin regular, 30 Units, Subcutaneous, TID  insulin NPH-insulin regular, 5 Units, Subcutaneous, Nightly  metoclopramide, 10 mg, Oral, TID AC  metoprolol tartrate, 25 mg, Oral, Q12H  pantoprazole, 40 mg, Oral, QAM  Rivaroxaban, 5 mg, Oral, Q12H  sodium chloride, 10 mL, Intravenous, Q12H         Active Hospital Problems:  Active Hospital Problems    Diagnosis    • **Dizziness    • CKD (chronic kidney disease), stage II    • Fall    • History of CVA (cerebrovascular accident)    • Coronary artery disease involving native coronary artery of native heart without angina pectoris    • Essential hypertension    • Asthma    • Hyperlipidemia, mixed    • Gastroparesis    • Type 2 diabetes mellitus with hyperglycemia, with long-term current use of insulin ([facility])      Assessment/plan       Dizziness--resolving,  - no evidence of orthostatic hypotension          -CT head/MRI of the brain (5/12) negative for CVA    Unsteady gait/lateralizing      -likely 2/2 autonomic/peripheral neuropathy       -neurologist,  evaluation -recommendation-effective previous stroke and autonomic dysfunction.        -optimize glycemic control, PT/OT as tolerated    History of CVA with left homonymous hemianopsia       -aspirin/Lipitor/Xarelto and control blood pressure    HLD     -Lipitor    T2DM (recent A1c of 7.6<--- 12.5)       -70/30 insulin and SSI, monitor closely for hypoglycemia    CKD, stage II       -Hydrate gently and monitor renal function closely    History of fall         -cont fall precaution    Narrow complex tachycardia likely sinus per cardiology  Nonspecific EKG changes  Might be related to dysautonomia  On metoprolol  Improved  On aspirin statin  On Xarelto for  previous stroke  ,      Disposition acute rehab pending  Referral to [facility] (accepted and Shonna with request precert today (5/13/22). No PASRR required. Barrier: Need precert approval.    DVT prophylaxis:  Medical and mechanical DVT prophylaxis orders are present.    CODE STATUS:    Level Of Support Discussed With: Patient  Code Status (Patient has no pulse and is not breathing): CPR (Attempt to Resuscitate)  Medical Interventions (Patient has pulse or is breathing): Full Support      Disposition:  I expect patient to be discharged 24-48-hours    Electronically signed by Salazar Bower MD, 05/16/22, 10:35 EDT.  [facility]

## 2022-05-16 NOTE — PROGRESS NOTES
[facility] [place]        LOS:  LOS: 0 days   Patient Name: Chao Lazar  Age/Sex: 62 y.o. male  : 1960  MRN: [id number]    Day of Service: 22   Length of Stay: 0  Encounter Provider: EMRE Curry  Place of Service: [facility] CARDIOLOGY  Patient Care Team:  Al Kimbrough MD as PCP - General  Al Kimbrough MD as PCP - Family Medicine    Subjective:     Chief Complaint: f/u dizziness    Subjective: Patient sleeping on my encounter. When he wakes he states he is still dizzy. Orthostatics negative.  On metoprolol. No tachycardia on telemetry.     Current Medications:   Scheduled Meds:aspirin, 81 mg, Oral, Daily  atorvastatin, 80 mg, Oral, Daily  insulin lispro, 0-9 Units, Subcutaneous, TID AC  insulin NPH-insulin regular, 30 Units, Subcutaneous, TID  insulin NPH-insulin regular, 5 Units, Subcutaneous, Nightly  metoclopramide, 10 mg, Oral, TID AC  metoprolol tartrate, 25 mg, Oral, Q12H  pantoprazole, 40 mg, Oral, QAM  Rivaroxaban, 5 mg, Oral, Q12H  sodium chloride, 10 mL, Intravenous, Q12H      Continuous Infusions:     Allergies:  No Known Allergies    Review of Systems   Constitutional: Negative for chills, diaphoresis and malaise/fatigue.   Cardiovascular: Negative for chest pain, dyspnea on exertion, irregular heartbeat, leg swelling, near-syncope, orthopnea, palpitations, paroxysmal nocturnal dyspnea and syncope.   Respiratory: Negative for cough, shortness of breath, sleep disturbances due to breathing and sputum production.    Gastrointestinal: Negative for change in bowel habit.   Genitourinary: Negative for urgency.   Neurological: Positive for dizziness. Negative for headaches.   Psychiatric/Behavioral: Negative for altered mental status.         Objective:     Temp:  [98.5 °F (36.9 °C)-98.8 °F (37.1 °C)] 98.5 °F (36.9 °C)  Heart Rate:  [73-82] 79  Resp:  [14-17] 14  BP: (118-141)/(64-78) 141/78     Intake/Output Summary (Last 24 hours) at  5/16/2022 1154  Last data filed at 5/16/2022 0942  Gross per 24 hour   Intake 1240 ml   Output 500 ml   Net 740 ml     Body mass index is 27.22 kg/m².      05/12/22  0516 05/13/22  0531 05/14/22  0527   Weight: 86.2 kg (190 lb) 93.6 kg (206 lb 5.6 oz) 93.6 kg (206 lb 5.6 oz)         General Appearance:    Alert, cooperative, in no acute distress                                Head: Atraumatic, normocephalic, PERRLA               Neck:   supple,  no JVD   Lungs:     Clear to auscultation,respirations regular, even and               unlabored    Heart:    Regular rhythm and normal rate, normal S1 and S2    Abdomen:     Normal bowel sounds, no masses, no organomegaly, soft  non-tender, non-distended, no guarding, no rebound  tenderness   Extremities:   Moves all extremities well, no edema, no cyanosis, no  redness   Pulses:   Pulses palpable and equal bilaterally   Skin:   No bleeding, bruising or rash   Neurologic:   Awake, alert, oriented x3         Lab Review:   Results from last 7 days   Lab Units 05/13/22  0330 05/12/22  0629   SODIUM mmol/L 137 137   POTASSIUM mmol/L 4.6 4.9   CHLORIDE mmol/L 102 99   CO2 mmol/L 20.0* 24.0   BUN mg/dL 17 20   CREATININE mg/dL 1.16 1.34*   GLUCOSE mg/dL 160* 171*   CALCIUM mg/dL 9.4 9.7   AST (SGOT) U/L  --  34   ALT (SGPT) U/L  --  51*     Results from last 7 days   Lab Units 05/14/22  0336 05/12/22  1108 05/12/22  0629   TROPONIN T ng/mL <0.010 <0.010 <0.010     Results from last 7 days   Lab Units 05/13/22  0330 05/12/22  0629   WBC 10*3/mm3 8.70 10.90*   HEMOGLOBIN g/dL 12.9* 14.1   HEMATOCRIT % 37.7 42.8   PLATELETS 10*3/mm3 274 355         Results from last 7 days   Lab Units 05/16/22  0028 05/15/22  0300   MAGNESIUM mg/dL 1.8 2.2     Results from last 7 days   Lab Units 05/12/22  0629   CHOLESTEROL mg/dL 118   TRIGLYCERIDES mg/dL 202*   HDL CHOL mg/dL 36*               Recent Radiology:  Imaging Results (Most Recent)     Procedure Component Value Units Date/Time    MRI  Brain Without Contrast [[id number]] Collected: 05/12/22 1059     Updated: 05/12/22 1110    Narrative:      MRI BRAIN WO CONTRAST-     Date of Exam: 5/12/2022 9:35 AM     Indication: Dizziness, non-specific     Comparison: 12/06/2019, and 09/22/2021     Technique: Multiplanar multisequence images of the brain were performed  without contrast according to routine brain MRI protocol.     FINDINGS:  Redemonstration of chronic area of encephalomalacic changes within the  right occipital and posterior parietal region in the region of the right  PCA distribution, from prior infarct. There is susceptibility artifact  in this region suggestive chronic petechial hemorrhage. No new areas of  encephalomalacia are identified. No new diffusion-weighted abnormalities  are identified. There is mild atrophy. Periventricular hypodensities  suggestive changes of chronic microvascular ischemic disease. Orbits  paranasal sinuses and mastoid air cells are unremarkable.          Impression:         1. Chronic right PCA distribution infarct.  2. No acute intracranial pathology  3. Changes of mild atrophy and microvascular ischemic disease.        Electronically Signed By-Jerome Bush MD On:5/12/2022 11:08 AM  This report was finalized on [id number] by  Jerome Bush MD.    CT Head Without Contrast [[id number]] Collected: 05/12/22 0712     Updated: 05/12/22 0716    Narrative:         DATE OF EXAM:  5/12/2022 6:56 AM     PROCEDURE:   CT HEAD WO CONTRAST-     INDICATIONS:   dizziness     COMPARISON:  9/22/2021     TECHNIQUE:   Routine transaxial and coronal reconstruction images were obtained  through the head without the administration of contrast. Automated  exposure control and iterative reconstruction methods were used.     FINDINGS:  There is generalized enlargement of ventricles and CSF containing  spaces. There is an area of encephalomalacia in the right  parieto-occipital region with dystrophic calcifications in the  cortical  sulci. No mass lesions, mass effect, acute hemorrhage or edema. There is  decreased attenuation in the thalami and in the periventricular white  matter both hemispheres. The mastoid air cells are clear. The paranasal  sinuses are clear. There is no bone destruction.        Impression:         1. Generalized atrophy with chronic periventricular and thalamic deep  white matter ischemic changes.  2. Focal area of chronic encephalomalacia in the right parieto-occipital  lobe.  3. No acute intracranial findings.     Electronically Signed By-Ronaldo Sheikh MD On:5/12/2022 7:14 AM  This report was finalized on [id number] by  Ronaldo Sheikh MD.          ECHOCARDIOGRAM:    Results for orders placed during the hospital encounter of 09/22/21    Adult Transthoracic Echo Complete W/ Cont if Necessary Per Protocol (With Agitated Saline)    Interpretation Summary  · Left ventricular systolic function is normal.  · Left ventricular ejection fraction is 55 to 60%  · Left ventricular wall thickness is consistent with mild concentric hypertrophy.  · Left ventricular diastolic function was normal.        I reviewed the patient's new clinical results.    EKG:      Assessment:       Dizziness    Asthma    Type 2 diabetes mellitus with hyperglycemia, with long-term current use of insulin ([facility])    Gastroparesis    Hyperlipidemia, mixed    Essential hypertension    Coronary artery disease involving native coronary artery of native heart without angina pectoris    History of CVA (cerebrovascular accident)    CKD (chronic kidney disease), stage II    Fall    1. Dizziness  - TTE 9/22/2021: EF 55-60%, no significant valvular pathology.    2. Narrow complex tachycardia  - on metoprolol  - monitor at discharge    3. Mechanical fall due to dizziness    4. CAD s/p prior RCA stent in 2017  - 2017 [facility]: nonobstructive disease of LAD and circumflex.  RCA had high-grade stenosis and he underwent stenting of proximal RCA.  Preserved LV  function.    5. HTN    6. History of CVA    7. CKD II      Plan:   Patient remains with dizziness   On beta blocker, no tachycardia or arrhythmia noted on telemetry  Will plan monitor at discharge   Will discuss stress test with Dr. Morris García, APRN  05/16/22  11:54 EDT

## 2022-05-16 NOTE — PLAN OF CARE
Problem: Adult Inpatient Plan of Care  Goal: Plan of Care Review  Outcome: Ongoing, Progressing  Flowsheets (Taken 5/16/2022 0155)  Progress: improving  Plan of Care Reviewed With: patient  Goal: Patient-Specific Goal (Individualized)  Outcome: Ongoing, Progressing  Goal: Absence of Hospital-Acquired Illness or Injury  Outcome: Ongoing, Progressing  Intervention: Identify and Manage Fall Risk  Recent Flowsheet Documentation  Taken 5/16/2022 0000 by Rosa Sol LPN  Safety Promotion/Fall Prevention:   activity supervised   assistive device/personal items within reach   clutter free environment maintained   fall prevention program maintained   lighting adjusted   nonskid shoes/slippers when out of bed   room organization consistent   safety round/check completed  Taken 5/15/2022 2200 by Rosa Sol LPN  Safety Promotion/Fall Prevention:   activity supervised   assistive device/personal items within reach   clutter free environment maintained   fall prevention program maintained   lighting adjusted   nonskid shoes/slippers when out of bed   room organization consistent   safety round/check completed  Taken 5/15/2022 2000 by Rosa Sol LPN  Safety Promotion/Fall Prevention:   activity supervised   assistive device/personal items within reach   clutter free environment maintained   fall prevention program maintained   lighting adjusted   nonskid shoes/slippers when out of bed   room organization consistent   safety round/check completed  Intervention: Prevent Skin Injury  Recent Flowsheet Documentation  Taken 5/16/2022 0000 by Rosa Sol LPN  Skin Protection: adhesive use limited  Taken 5/15/2022 2000 by Rosa Sol LPN  Skin Protection: adhesive use limited  Intervention: Prevent and Manage VTE (Venous Thromboembolism) Risk  Recent Flowsheet Documentation  Taken 5/16/2022 0000 by Rosa Sol LPN  VTE Prevention/Management:   bilateral   sequential compression devices on  Taken 5/15/2022  2000 by Rosa Sol LPN  VTE Prevention/Management:   bilateral   sequential compression devices on  Range of Motion: active ROM (range of motion) encouraged  Intervention: Prevent Infection  Recent Flowsheet Documentation  Taken 5/16/2022 0000 by Rosa Sol LPN  Infection Prevention:   environmental surveillance performed   equipment surfaces disinfected   hand hygiene promoted   personal protective equipment utilized   rest/sleep promoted   single patient room provided  Taken 5/15/2022 2200 by Rosa Sol LPN  Infection Prevention:   environmental surveillance performed   equipment surfaces disinfected   hand hygiene promoted   personal protective equipment utilized   rest/sleep promoted   single patient room provided  Taken 5/15/2022 2000 by Rosa Sol LPN  Infection Prevention:   environmental surveillance performed   equipment surfaces disinfected   hand hygiene promoted   personal protective equipment utilized   rest/sleep promoted   single patient room provided  Goal: Optimal Comfort and Wellbeing  Outcome: Ongoing, Progressing  Intervention: Monitor Pain and Promote Comfort  Recent Flowsheet Documentation  Taken 5/16/2022 0000 by Rosa Sol LPN  Pain Management Interventions:   quiet environment facilitated   position adjusted   pillow support provided   care clustered  Taken 5/15/2022 2000 by Rosa Sol LPN  Pain Management Interventions:   quiet environment facilitated   pillow support provided   position adjusted   care clustered  Intervention: Provide Person-Centered Care  Recent Flowsheet Documentation  Taken 5/15/2022 2000 by Rosa Sol LPN  Trust Relationship/Rapport:   care explained   choices provided   emotional support provided  Goal: Readiness for Transition of Care  Outcome: Ongoing, Progressing     Problem: Diabetes Comorbidity  Goal: Blood Glucose Level Within Targeted Range  Outcome: Ongoing, Progressing     Problem: Hypertension Comorbidity  Goal:  Blood Pressure in Desired Range  Outcome: Ongoing, Progressing  Intervention: Maintain Blood Pressure Management  Recent Flowsheet Documentation  Taken 5/16/2022 0000 by Rosa Sol LPN  Medication Review/Management: medications reviewed  Taken 5/15/2022 2200 by Rosa Sol LPN  Medication Review/Management: medications reviewed  Taken 5/15/2022 2000 by Rosa Sol LPN  Medication Review/Management: medications reviewed     Problem: Skin Injury Risk Increased  Goal: Skin Health and Integrity  Outcome: Ongoing, Progressing  Intervention: Optimize Skin Protection  Recent Flowsheet Documentation  Taken 5/16/2022 0000 by Rosa Sol LPN  Pressure Reduction Techniques:   frequent weight shift encouraged   weight shift assistance provided  Pressure Reduction Devices: pressure-redistributing mattress utilized  Skin Protection: adhesive use limited  Taken 5/15/2022 2000 by Rosa Sol LPN  Pressure Reduction Techniques:   frequent weight shift encouraged   weight shift assistance provided  Pressure Reduction Devices: pressure-redistributing mattress utilized  Skin Protection: adhesive use limited     Problem: Fall Injury Risk  Goal: Absence of Fall and Fall-Related Injury  Outcome: Ongoing, Progressing  Intervention: Identify and Manage Contributors  Recent Flowsheet Documentation  Taken 5/16/2022 0000 by Rosa Sol LPN  Medication Review/Management: medications reviewed  Taken 5/15/2022 2200 by Rosa Sol LPN  Medication Review/Management: medications reviewed  Taken 5/15/2022 2000 by Rosa Sol LPN  Medication Review/Management: medications reviewed  Intervention: Promote Injury-Free Environment  Recent Flowsheet Documentation  Taken 5/16/2022 0000 by Rosa Sol LPN  Safety Promotion/Fall Prevention:   activity supervised   assistive device/personal items within reach   clutter free environment maintained   fall prevention program maintained   lighting adjusted   nonskid  shoes/slippers when out of bed   room organization consistent   safety round/check completed  Taken 5/15/2022 2200 by Rosa Sol LPN  Safety Promotion/Fall Prevention:   activity supervised   assistive device/personal items within reach   clutter free environment maintained   fall prevention program maintained   lighting adjusted   nonskid shoes/slippers when out of bed   room organization consistent   safety round/check completed  Taken 5/15/2022 2000 by Rosa Sol LPN  Safety Promotion/Fall Prevention:   activity supervised   assistive device/personal items within reach   clutter free environment maintained   fall prevention program maintained   lighting adjusted   nonskid shoes/slippers when out of bed   room organization consistent   safety round/check completed   Goal Outcome Evaluation:  Plan of Care Reviewed With: patient        Progress: improving    Patient resting in bed with eyes closed, all safety measures in place, all HS meds taken with ease, call light within reach.

## 2022-05-16 NOTE — DISCHARGE INSTRUCTIONS
-Follow-up with neurology 1 month  Follow-up with cardiology 1 month  Follow-up with PCP ASAP  Monitor vital signs and orthostatic blood pressue

## 2022-05-16 NOTE — CASE MANAGEMENT/SOCIAL WORK
Continued Stay Note  RIGOBERTO Mcdermott     Patient Name: Chao Lazar  MRN: [id number]  Today's Date: 5/16/2022    Admit Date: 5/12/2022     Discharge Plan     Row Name 05/16/22 1520       Plan    Plan DC to [facility]. Precert received on 5/16 & bed ready today. No PASSR required.    Plan Comments Per nani Kilpatrick received and they have a bed today at 6:30 pm. CM udpated MD & RN via secure chat.                    Expected Discharge Date and Time     Expected Discharge Date Expected Discharge Time    May 16, 2022         Odalis London, RN, MSN  Care Manager  [phone]

## 2022-06-02 ENCOUNTER — TELEPHONE (OUTPATIENT)
Dept: FAMILY MEDICINE CLINIC | Facility: CLINIC | Age: 62
End: 2022-06-02

## 2022-06-02 NOTE — TELEPHONE ENCOUNTER
UNABLE TO WARM TRANSFER    Caller: SHIV MALDONADO    Relationship to patient: Emergency Contact    Best call back number: [phone]     New or established patient?  [] New  [x] Established    Date of discharge: 5/30/2022  Facility discharged from: [facility]  Diagnosis/Symptoms:DIZZINESS, LOSS OF BALANCE   Length of stay (If applicable):     Specialty Only: Did you see a Confucianist health provider?    [] Yes  [x] No      REHAB : [facility]   DISCHARG 5/30/2022   DIAGNOSIS : DIZZINESS, LOSS OF BALANCE  NEEDING APPOINTMENT WITHIN A WEEK  1 WEEK

## 2022-06-03 NOTE — PROGRESS NOTES
Chief Complaint  Hospital Follow Up Visit (CVA)    Subjective            Chao Lazar presents to [facility] NEUROLOGY for Hospital f/u  History of Present Illness   Patient is here to f/u on CVA.   Patient brother states his stroke was almost 2 yrs ago. 18 months ago.    But he is still having issues with: Per patient brother his vision is gone in his periferal, weakness in left arm w/ tremor and weakness in leg.      Patient recent ER visit was due to dizziness, mostly light headed.  patient states dizziness has gotten better he current takes meclizine 25 mg and states medication seems to help when he takes it     Wakes a lot at night, takes naps during the day, not reported to snore.    Reviewed mri brain , cta studies with pt and his brother                 ======RECENT ED VISIT [facility] 5/12/22=======  CT Head Without Contrast     Result Date: 5/12/2022  Impression:  1. Generalized atrophy with chronic periventricular and thalamic deep white matter ischemic changes. 2. Focal area of chronic encephalomalacia in the right parieto-occipital lobe. 3. No acute intracranial findings.  Electronically Signed By-Ronaldo Sheikh MD On:5/12/2022 7:14 AM This report was finalized on [id number] by  Ronaldo Sheikh MD.     MRI Brain Without Contrast     Result Date: 5/12/2022  Impression:  1. Chronic right PCA distribution infarct. 2. No acute intracranial pathology 3. Changes of mild atrophy and microvascular ischemic disease.   Electronically Signed By-Jerome Bush MD On:5/12/2022 11:08 AM This report was finalized on [id number] by  Jerome Bush MD.               Results for orders placed during the hospital encounter of 09/22/21     Adult Transthoracic Echo Complete W/ Cont if Necessary Per Protocol (With Agitated Saline)     Interpretation Summary  · Left ventricular systolic function is normal.  · Left ventricular ejection fraction is 55 to 60%  · Left ventricular wall thickness is consistent with mild  concentric hypertrophy.  · Left ventricular diastolic function was normal.           ======[facility] ED VISIT 9/22/21====    CT Angiogram Neck     Result Date: 9/22/2021  Impression:  1. Old area of infarction in the right occipital lobe with chronic occlusion of the right P2 segment of the posterior cerebral artery. 2. No additional hemodynamically significant intracranial or extracranial vascular stenoses or additional occlusions. 3. Chronic lung disease in both apices.  Electronically Signed By-Ronaldo Sheikh MD On:9/22/2021 9:34 AM This report was finalized on [id number] by  Ronaldo Sheikh MD.     MRI Brain Without Contrast     Result Date: 9/22/2021  Impression: 1. Negative for acute ischemia. 2. Motion limited exam despite multiple attempts at imaging. 3. Sequela of prior right PCA distribution infarct again noted. 4. Generalized cerebral atrophy.  Electronically Signed By-Kartik Macias MD On:9/22/2021 1:44 PM This report was finalized on [id number] by  Kartik Macias MD.     XR Chest 1 View     Result Date: 9/22/2021  Impression: Hypoinflated lungs with patchy bibasilar airspace disease left greater than right that may relate to pneumonia and/or atelectasis.  Electronically Signed By-Kartik Macias MD On:9/22/2021 7:40 AM This report was finalized on [id number] by  Kartik Macias MD.     CT Angiogram Head     Result Date: 9/22/2021  Impression:  1. Old area of infarction in the right occipital lobe with chronic occlusion of the right P2 segment of the posterior cerebral artery. 2. No additional hemodynamically significant intracranial or extracranial vascular stenoses or additional occlusions. 3. Chronic lung disease in both apices.  Electronically Signed By-Ronaldo Sheikh MD On:9/22/2021 9:34 AM This report was finalized on [id number] by  Ronaldo Sheikh MD.     CT Head Without Contrast Stroke Protocol     Result Date: 9/22/2021  Impression: IMPRESSION : 1. No definite acute infarct. 2. Sequela of prior right PCA  infarct seen on prior study from 2/20/2020. Areas of cortical hyperdensity likely related to cortical laminar necrosis and/or calcification. 3. Generalized cerebral atrophy and mild white matter findings of chronic small vessel disease.  Electronically Signed By-Kartik Macias MD On:9/22/2021 8:16 AM This report was finalized on [id number] by  Kartik Macias MD.      Adult Transesophageal Echo (LINH) W/ Cont if Necessary Per Protocol     Interpretation Summary  · Left ventricular systolic function is normal.  · Mild mitral valve regurgitation is present  · Estimated EF appears to be in the range of 56 - 60%.           History of present illness:  The patient is a 61 year old gentleman with multiple medical problems including DM, CAD, asthma, HTN, CVA in the past who presented to ER of [facility] secondary to weakness of the left side.  His family states that his last normal was around 10:00 pm last night.  The patient was noticed to have some weakness of the left side.  His blood sugar was around 200s and insulin was given to him.  Repeat blood sugar was 270.  However EMS reported blood sugar of 30.  He had more weakness and sugar replacement was given to him.  He was brought to ER of [facility] and his weakness had improved.  He has been taking Eliquis and Plavix at home.  His condition has improved.  He is able to move left side without much difficulty.      ASSESSMENT/PLAN:  The patient is a 61 year old gentleman with history of DM, CAD, HTN history of stroke who had developed an increase in weakness of left side.  He had profound hypoglycemia which was corrected.  The diagnostic considerations include metabolic decompensation of old stroke vs., TIA.   Rec:  Will continue Eliquis.  Agree with work up including MRI of Brain.  Will follow.   DM, HTN, HLD, CAD, asthma as per hospitalist, MD.      Modification of stroke risk factors:   - Blood pressure should be less than 130/80 outpatient, HbA1c less than 6.5, LDL less than 70;  b12>500 and smoking cessation if applicable.     We would be grateful if the primary team / primary care physician would keep a close watch on the above targets.  - Stroke education  - Follow up with neurologist of choice        I discussed the patient's findings and my recommendations with patient, family and nursing staff      Family History   Problem Relation Age of Onset   • Irritable bowel syndrome Mother    • Anxiety disorder Mother    • Depression Father    • Hypertension Father    • Mental illness Father         committed suicide   • Other Sister         back problems   • Other Brother         back problems       Past Medical History:   Diagnosis Date   • Asthma 3/22/2018   • Coronary artery disease involving native coronary artery of native heart without angina pectoris 8/27/2020   • Essential hypertension 6/28/2019   • Gastroparesis 12/23/2013   • History of CVA (cerebrovascular accident) 9/22/2021   • Hyperlipidemia, mixed 4/3/2017   • Hypoglycemia    • Type 2 diabetes mellitus (HCC)        Social History     Socioeconomic History   • Marital status:    Tobacco Use   • Smoking status: Former Smoker     Quit date: 2007     Years since quitting: 15.4   • Smokeless tobacco: Former User   Vaping Use   • Vaping Use: Never used   Substance and Sexual Activity   • Alcohol use: Yes     Comment: occasionally   • Drug use: No   • Sexual activity: Defer         Current Outpatient Medications:   •  aspirin 81 MG EC tablet, Take 1 tablet by mouth Daily., Disp: , Rfl:   •  atorvastatin (LIPITOR) 80 MG tablet, TAKE 1 TABLET BY MOUTH EVERY DAY, Disp: 90 tablet, Rfl: 1  •  Cholecalciferol (HM Vitamin D3) 50 MCG (2000 UT) capsule, Take 2,000 Units by mouth Daily., Disp: , Rfl:   •  glucose blood (OneTouch Verio) test strip, 1 each by Other route 3 (Three) Times a Day Before Meals. Use as instructed Dx code: E11.65, Disp: 100 each, Rfl: 2  •  Isopropyl Alcohol (Alcohol Wipes) 70 % misc, Apply 1 each topically 3  "(Three) Times a Day Before Meals. Dx code: E11.65, Disp: 100 each, Rfl: 2  •  Lancets (OneTouch Delica Plus Aoxsja53O) misc, 1 each 3 (Three) Times a Day Before Meals. Dx code: E11.65, Disp: 100 each, Rfl: 2  •  meclizine (ANTIVERT) 25 MG tablet, Take 1 tablet by mouth 3 (Three) Times a Day As Needed for Dizziness., Disp: , Rfl:   •  metFORMIN (GLUCOPHAGE) 500 MG tablet, Take 500 mg by mouth 2 (Two) Times a Day., Disp: , Rfl:   •  metoclopramide (REGLAN) 10 MG tablet, Take 10 mg by mouth 3 (Three) Times a Day Before Meals., Disp: , Rfl:   •  metoprolol tartrate (LOPRESSOR) 25 MG tablet, Take 1 tablet by mouth Every 12 (Twelve) Hours., Disp: , Rfl:   •  midodrine (PROAMATINE) 5 MG tablet, Take 5 mg by mouth 3 (Three) Times a Day., Disp: , Rfl:   •  NovoLIN 70/30 FlexPen (70-30) 100 UNIT/ML suspension pen-injector, INJECT 30 UNITS UNDER THE SKIN THREE TIMES DAILY BEFORE MEALS AND 5 UNITS AT BEDTIME, Disp: , Rfl:   •  omeprazole (priLOSEC) 20 MG capsule, Take 40 mg by mouth 2 (Two) Times a Day., Disp: , Rfl:   •  rivaroxaban (XARELTO) 10 MG tablet, Take 5 mg by mouth 2 (Two) Times a Day., Disp: , Rfl:   •  traMADol (ULTRAM) 50 MG tablet, Take 50 mg by mouth Every 6 (Six) Hours As Needed., Disp: , Rfl:     Review of Systems   Eyes: Positive for visual disturbance.   Neurological: Positive for dizziness, tremors (left side only), weakness (left side only) and numbness (h/o dm neuropathy).            Objective   Vital Signs:   /76   Pulse 77   Temp 98 °F (36.7 °C) (Temporal)   Ht 185.4 cm (73\")   Wt 85.7 kg (189 lb)   BMI 24.94 kg/m²     Physical Exam  Vitals reviewed.   HENT:      Nose: Nose normal.   Eyes:      Pupils: Pupils are equal, round, and reactive to light.   Cardiovascular:      Rate and Rhythm: Normal rate.   Pulmonary:      Effort: Pulmonary effort is normal. No respiratory distress.   Neurological:      General: No focal deficit present.      Mental Status: He is alert and oriented to person, " place, and time.        Result Review :                Neurologic Exam     Mental Status   Oriented to person, place, and time.     Cranial Nerves     CN III, IV, VI   Pupils are equal, round, and reactive to light.             Assessment and Plan    Diagnoses and all orders for this visit:    1. History of CVA (cerebrovascular accident) (Primary)    2. Left-sided weakness    reviewed history, testing and medications  Continue current treatment   I spent 22 minutes caring for Chao on this date of service. This time includes time spent by me in the following activities:reviewing tests, performing a medically appropriate examination and/or evaluation , counseling and educating the patient/family/caregiver and documenting information in the medical record  Follow Up   Return if symptoms worsen or fail to improve.  Patient was given instructions and counseling regarding his condition or for health maintenance advice. Please see specific information pulled into the AVS if appropriate.         This document has been electronically signed by Joseph Seipel, MD on June 8, 2022 12:01 EDT

## 2022-06-08 ENCOUNTER — OFFICE VISIT (OUTPATIENT)
Dept: NEUROLOGY | Facility: CLINIC | Age: 62
End: 2022-06-08

## 2022-06-08 VITALS
BODY MASS INDEX: 25.05 KG/M2 | HEIGHT: 73 IN | TEMPERATURE: 98 F | DIASTOLIC BLOOD PRESSURE: 76 MMHG | WEIGHT: 189 LBS | HEART RATE: 77 BPM | SYSTOLIC BLOOD PRESSURE: 111 MMHG

## 2022-06-08 DIAGNOSIS — Z86.73 HISTORY OF CVA (CEREBROVASCULAR ACCIDENT): Primary | Chronic | ICD-10-CM

## 2022-06-08 DIAGNOSIS — R53.1 LEFT-SIDED WEAKNESS: ICD-10-CM

## 2022-06-08 PROCEDURE — 99213 OFFICE O/P EST LOW 20 MIN: CPT | Performed by: PSYCHIATRY & NEUROLOGY

## 2022-06-08 RX ORDER — HUMAN INSULIN 100 [IU]/ML
INJECTION, SUSPENSION SUBCUTANEOUS
COMMUNITY
Start: 2022-05-31

## 2022-06-08 RX ORDER — MIDODRINE HYDROCHLORIDE 5 MG/1
5 TABLET ORAL 3 TIMES DAILY
COMMUNITY
Start: 2022-05-30 | End: 2023-03-10 | Stop reason: SDUPTHER

## 2022-06-08 RX ORDER — TRAMADOL HYDROCHLORIDE 50 MG/1
50 TABLET ORAL EVERY 6 HOURS PRN
COMMUNITY
Start: 2022-05-30

## 2022-06-09 ENCOUNTER — OFFICE VISIT (OUTPATIENT)
Dept: FAMILY MEDICINE CLINIC | Facility: CLINIC | Age: 62
End: 2022-06-09

## 2022-06-09 VITALS
SYSTOLIC BLOOD PRESSURE: 130 MMHG | BODY MASS INDEX: 25.58 KG/M2 | TEMPERATURE: 97.8 F | OXYGEN SATURATION: 99 % | DIASTOLIC BLOOD PRESSURE: 80 MMHG | WEIGHT: 193 LBS | HEART RATE: 89 BPM | RESPIRATION RATE: 16 BRPM | HEIGHT: 73 IN

## 2022-06-09 DIAGNOSIS — Z12.5 SPECIAL SCREENING FOR MALIGNANT NEOPLASM OF PROSTATE: ICD-10-CM

## 2022-06-09 DIAGNOSIS — Z79.4 TYPE 2 DIABETES MELLITUS WITH HYPERGLYCEMIA, WITH LONG-TERM CURRENT USE OF INSULIN: Primary | Chronic | ICD-10-CM

## 2022-06-09 DIAGNOSIS — G90.1 DYSAUTONOMIA: ICD-10-CM

## 2022-06-09 DIAGNOSIS — I63.531 ACUTE ISCHEMIC RIGHT PCA STROKE: ICD-10-CM

## 2022-06-09 DIAGNOSIS — R53.1 LEFT-SIDED WEAKNESS: ICD-10-CM

## 2022-06-09 DIAGNOSIS — I25.10 CORONARY ARTERY DISEASE INVOLVING NATIVE CORONARY ARTERY OF NATIVE HEART WITHOUT ANGINA PECTORIS: Chronic | ICD-10-CM

## 2022-06-09 DIAGNOSIS — I10 ESSENTIAL HYPERTENSION: Chronic | ICD-10-CM

## 2022-06-09 DIAGNOSIS — E11.65 TYPE 2 DIABETES MELLITUS WITH HYPERGLYCEMIA, WITH LONG-TERM CURRENT USE OF INSULIN: Primary | Chronic | ICD-10-CM

## 2022-06-09 PROCEDURE — 99213 OFFICE O/P EST LOW 20 MIN: CPT | Performed by: FAMILY MEDICINE

## 2022-06-09 NOTE — PROGRESS NOTES
"Chief Complaint  Cerebrovascular Accident (F/u rehab)    Subjective    {Problem List  Visit Diagnosis   Encounters  Notes  Medications  Labs  Result Review Imaging  Media :23}    Chao Lazar presents to [facility]  For follow up from hospital and rehab.    Cerebrovascular Accident        Objective   Vital Signs:  /80 (BP Location: Left arm)   Pulse 89   Temp 97.8 °F (36.6 °C) (Infrared)   Resp 16   Ht 185.4 cm (73\")   Wt 87.5 kg (193 lb)   SpO2 99%   BMI 25.46 kg/m²   Estimated body mass index is 25.46 kg/m² as calculated from the following:    Height as of this encounter: 185.4 cm (73\").    Weight as of this encounter: 87.5 kg (193 lb).    {BMI is >= 25 and <30. (Overweight) The following options were offered after discussion; (Optional):59431}      Physical Exam   Result Review :{Labs  Result Review  Imaging  Med Tab  Media  Procedures  :23}  {The following data was reviewed by (Optional):28174}  {Ambulatory Labs (Optional):94224}  {Data reviewed (Optional):57359:::1}          Assessment and Plan {CC Problem List  Visit Diagnosis   ROS  Review (Popup)  Health Maintenance  Quality  BestPractice  Medications  SmartSets  SnapShot Encounters  Media :23}  There are no diagnoses linked to this encounter.       {Time Spent (Optional):36371}  Follow Up {Instructions Charge Capture  Follow-up Communications :23}  No follow-ups on file.  Patient was given instructions and counseling regarding his condition or for health maintenance advice. Please see specific information pulled into the AVS if appropriate.       "

## 2022-06-09 NOTE — PROGRESS NOTES
Chief Complaint  Cerebrovascular Accident (F/u rehab)    Subjective      Chao Lazar presents to [facility]  For follow up from hospital and rehab.    Cerebrovascular Accident      The patient presents today for a follow-up. He is accompanied by an adult female.    The patient states that he was in inpatient rehabilitation for 2 weeks and is now doing outpatient rehabilitation. The patient reports the rehabilitation center helping him to get stronger, but states that he is still using his walker to help with his balance. His wife states that he was told it was dysautonomia, which makes him dizzy and disoriented. The patient is able to get up on his own without too much trouble, and notes that if he holds on to something he is able to safely walk. He does walk around sometimes without any support at all, but according to his wife it is not often. The patient has a cane, but he does not use it to aid him in walking. He denies having COVID-19, anytime over the past couple years.     The patient reports having a stroke, and although he is healing, he is still experiencing some aftereffects. He notes having a little shake on his left side, as well as his left side being weaker when compared to the right. His wife states that he also has some vision weakness on his left, and the patient describes it as having a black field when looking to the left.     The adult female notes that he also saw a neurologist yesterday, but she was unsure if he was a part of the rehab team at [facility] or separate.     The patient states that his blood glucose readings are good. He does report some numbness in his feet and hands.     He reports that since having stents placed, he has not experienced any angina. He states that he does not have nitroglycerin to use in the event that he does experience any chest pain.    He states that he stopped smoking a long time ago.       Objective   Vital  "Signs:  /80 (BP Location: Left arm)   Pulse 89   Temp 97.8 °F (36.6 °C) (Infrared)   Resp 16   Ht 185.4 cm (73\")   Wt 87.5 kg (193 lb)   SpO2 99%   BMI 25.46 kg/m²   Estimated body mass index is 25.46 kg/m² as calculated from the following:    Height as of this encounter: 185.4 cm (73\").    Weight as of this encounter: 87.5 kg (193 lb).          Physical Exam  Constitutional:       Appearance: He is well-developed and normal weight. He is ill-appearing.      Comments: Somewhat pale weak 62-year-old white male.  Just generally looks worn out   HENT:      Head: Normocephalic and atraumatic.      Right Ear: Tympanic membrane, ear canal and external ear normal.      Left Ear: Tympanic membrane, ear canal and external ear normal.      Nose: Nose normal.      Mouth/Throat:      Mouth: Mucous membranes are moist.      Pharynx: Oropharynx is clear. No oropharyngeal exudate.   Eyes:      Extraocular Movements: Extraocular movements intact.      Conjunctiva/sclera: Conjunctivae normal.      Pupils: Pupils are equal, round, and reactive to light.   Cardiovascular:      Rate and Rhythm: Normal rate and regular rhythm.      Pulses: Normal pulses.      Heart sounds: Normal heart sounds.   Pulmonary:      Effort: Pulmonary effort is normal.      Breath sounds: Rales present.      Comments: End inspiratory crackling rales in the bases  Abdominal:      General: Bowel sounds are normal.      Palpations: Abdomen is soft.   Musculoskeletal:         General: Normal range of motion.      Cervical back: Normal range of motion and neck supple.   Skin:     General: Skin is warm and dry.      Coloration: Skin is pale.      Findings: Bruising present.      Comments: Pale and thin   Neurological:      General: No focal deficit present.      Mental Status: He is alert and oriented to person, place, and time. Mental status is at baseline.      Comments: Generalized weakness   Psychiatric:         Mood and Affect: Mood normal.        "  Behavior: Behavior normal.         Thought Content: Thought content normal.         Judgment: Judgment normal.               Assessment and Plan   1. Type 2 diabetes with hyperglycemia and long term use of insulin  - Elias has been diabetic for many years. He is also under the care of the [facility] for his diabetes. His last hemoglobin A1c was back in 05/2022 and was 7.6 %. About a year ago, he was 12.5 % so he has certainly improved his diabetic control over this past year. He will be getting another hemoglobin A1c during his next visit with the [facility]. He is going to continue his current doses of insulin and his current activity level. Overall, he feels reasonably well.    2. Dysautonomia  - The patient has diabetes that has developed not only a peripheral neuropathy where he has numbness in his feet and lower legs, but also in his fingertips. He also has autonomic dysfunction with episodes of hypotension, inappropriate tachycardia, and definite balance issues. He is going to a rehab hospital at [facility], and he has increased his strength and balance since last visit. He went inpatient for a couple of weeks and now he is using their outpatient program, and I think it is helping him quite a bit. He looks a lot more confident when he stands up and walks and he is also much more confident walking with his walker. The patient will continue these exercises and I think he will do quite well.    3. Essential hypertension  - The patient's blood pressure today was 130/80 mmHg. He takes metoprolol 25 mg a day, this is all he needs to control his blood pressure at this time. He actually has midodrine on board due to the orthostasis that occurs with the dysautonomia. Overall, he is doing much better in this area. We will continue with monitoring, and his current treatment plan.    4. Coronary artery disease involving native arteries  - The patient has had several stents placed. He denies any angina  recently. The patient has been walking and working rather hard without any signs of ischemic. We will continue to monitor.    5. Acute previous CVA in the right posterior cerebral artery distribution  - He did lose some left-sided vision from the stroke. The patient also has some left-sided weakness in his arm and leg, it is subtle and only when testing his strength can you tell a difference between left and right. He moves his left arm and left leg reasonably well, but if you compare strength left to right, he is definitely weaker on the left. He is going to continue his Xarelto and his aspirin for the stroke.    6. Left-sided weakness  - As mentioned above, this is stable and maybe even improving slightly with the help of rehab.    7. Screening for malignant neoplasm of the prostate  - We will order a PSA this year. Elias is 62 years old, and we are following his routine test.     - We consider a colonoscopy in the next year or two, but since he has been so busy with other things right now including rehab after a stroke. I think we'll wait until he's a little bit further along and stable before we put him through colonoscopy.        Follow Up Return in about 4 months (around 10/9/2022), or if symptoms worsen or fail to improve, for Medicare Wellness.  Patient was given instructions and counseling regarding his condition or for health maintenance advice. Please see specific information pulled into the AVS if appropriate.       Transcribed from ambient dictation for Al Kimbrough MD by Toya Ascencio.  06/09/22   11:06 EDT    Patient verbalized consent to the visit recording.

## 2022-07-11 RX ORDER — RIVAROXABAN 2.5 MG/1
TABLET, FILM COATED ORAL
Qty: 60 TABLET | Refills: 4 | Status: SHIPPED | OUTPATIENT
Start: 2022-07-11 | End: 2022-12-02

## 2022-07-15 ENCOUNTER — TELEPHONE (OUTPATIENT)
Dept: ENDOCRINOLOGY | Facility: CLINIC | Age: 62
End: 2022-07-15

## 2022-07-15 NOTE — TELEPHONE ENCOUNTER
Tried to call patient regarding lab closure. No answer left vm [facility] or contact our office and let know where to send the orders.

## 2022-07-25 ENCOUNTER — LAB (OUTPATIENT)
Dept: LAB | Facility: HOSPITAL | Age: 62
End: 2022-07-25

## 2022-07-25 DIAGNOSIS — Z12.5 SPECIAL SCREENING FOR MALIGNANT NEOPLASM OF PROSTATE: ICD-10-CM

## 2022-07-25 DIAGNOSIS — E78.2 HYPERLIPIDEMIA, MIXED: Chronic | ICD-10-CM

## 2022-07-25 DIAGNOSIS — I25.10 CORONARY ARTERY DISEASE INVOLVING NATIVE CORONARY ARTERY OF NATIVE HEART WITHOUT ANGINA PECTORIS: ICD-10-CM

## 2022-07-25 DIAGNOSIS — R53.1 LEFT-SIDED WEAKNESS: ICD-10-CM

## 2022-07-25 DIAGNOSIS — Z79.4 TYPE 2 DIABETES MELLITUS WITH HYPERGLYCEMIA, WITH LONG-TERM CURRENT USE OF INSULIN: ICD-10-CM

## 2022-07-25 DIAGNOSIS — I10 ESSENTIAL HYPERTENSION: ICD-10-CM

## 2022-07-25 DIAGNOSIS — I63.531 ACUTE ISCHEMIC RIGHT PCA STROKE: ICD-10-CM

## 2022-07-25 DIAGNOSIS — E11.65 TYPE 2 DIABETES MELLITUS WITH HYPERGLYCEMIA, WITH LONG-TERM CURRENT USE OF INSULIN: ICD-10-CM

## 2022-07-25 DIAGNOSIS — G90.1 DYSAUTONOMIA: ICD-10-CM

## 2022-07-25 LAB
BACTERIA UR QL AUTO: ABNORMAL /HPF
BASOPHILS # BLD AUTO: 0.05 10*3/MM3 (ref 0–0.2)
BASOPHILS NFR BLD AUTO: 0.5 % (ref 0–1.5)
BILIRUB UR QL STRIP: NEGATIVE
CLARITY UR: CLEAR
COLOR UR: YELLOW
DEPRECATED RDW RBC AUTO: 42.5 FL (ref 37–54)
EOSINOPHIL # BLD AUTO: 0.39 10*3/MM3 (ref 0–0.4)
EOSINOPHIL NFR BLD AUTO: 4.1 % (ref 0.3–6.2)
ERYTHROCYTE [DISTWIDTH] IN BLOOD BY AUTOMATED COUNT: 12.7 % (ref 12.3–15.4)
GLUCOSE UR STRIP-MCNC: ABNORMAL MG/DL
HBA1C MFR BLD: 8 % (ref 3.5–5.6)
HCT VFR BLD AUTO: 45.2 % (ref 37.5–51)
HGB BLD-MCNC: 14.9 G/DL (ref 13–17.7)
HGB UR QL STRIP.AUTO: NEGATIVE
HYALINE CASTS UR QL AUTO: ABNORMAL /LPF
IMM GRANULOCYTES # BLD AUTO: 0.03 10*3/MM3 (ref 0–0.05)
IMM GRANULOCYTES NFR BLD AUTO: 0.3 % (ref 0–0.5)
KETONES UR QL STRIP: ABNORMAL
LEUKOCYTE ESTERASE UR QL STRIP.AUTO: NEGATIVE
LYMPHOCYTES # BLD AUTO: 2.41 10*3/MM3 (ref 0.7–3.1)
LYMPHOCYTES NFR BLD AUTO: 25.1 % (ref 19.6–45.3)
MCH RBC QN AUTO: 30.3 PG (ref 26.6–33)
MCHC RBC AUTO-ENTMCNC: 33 G/DL (ref 31.5–35.7)
MCV RBC AUTO: 92.1 FL (ref 79–97)
MONOCYTES # BLD AUTO: 0.65 10*3/MM3 (ref 0.1–0.9)
MONOCYTES NFR BLD AUTO: 6.8 % (ref 5–12)
NEUTROPHILS NFR BLD AUTO: 6.09 10*3/MM3 (ref 1.7–7)
NEUTROPHILS NFR BLD AUTO: 63.2 % (ref 42.7–76)
NITRITE UR QL STRIP: NEGATIVE
NRBC BLD AUTO-RTO: 0 /100 WBC (ref 0–0.2)
PH UR STRIP.AUTO: 5.5 [PH] (ref 5–8)
PLATELET # BLD AUTO: 406 10*3/MM3 (ref 140–450)
PMV BLD AUTO: 10 FL (ref 6–12)
PROT UR QL STRIP: ABNORMAL
RBC # BLD AUTO: 4.91 10*6/MM3 (ref 4.14–5.8)
RBC # UR STRIP: ABNORMAL /HPF
REF LAB TEST METHOD: ABNORMAL
SP GR UR STRIP: 1.03 (ref 1–1.03)
SQUAMOUS #/AREA URNS HPF: ABNORMAL /HPF
UROBILINOGEN UR QL STRIP: ABNORMAL
WBC # UR STRIP: ABNORMAL /HPF
WBC NRBC COR # BLD: 9.62 10*3/MM3 (ref 3.4–10.8)

## 2022-07-25 PROCEDURE — 84439 ASSAY OF FREE THYROXINE: CPT

## 2022-07-25 PROCEDURE — 83036 HEMOGLOBIN GLYCOSYLATED A1C: CPT

## 2022-07-25 PROCEDURE — 80061 LIPID PANEL: CPT

## 2022-07-25 PROCEDURE — 81001 URINALYSIS AUTO W/SCOPE: CPT

## 2022-07-25 PROCEDURE — 82306 VITAMIN D 25 HYDROXY: CPT

## 2022-07-25 PROCEDURE — G0103 PSA SCREENING: HCPCS

## 2022-07-25 PROCEDURE — 82607 VITAMIN B-12: CPT

## 2022-07-25 PROCEDURE — 36415 COLL VENOUS BLD VENIPUNCTURE: CPT

## 2022-07-25 PROCEDURE — 80050 GENERAL HEALTH PANEL: CPT

## 2022-07-26 LAB
25(OH)D3 SERPL-MCNC: 33.9 NG/ML (ref 30–100)
ALBUMIN SERPL-MCNC: 4.3 G/DL (ref 3.5–5.2)
ALBUMIN/GLOB SERPL: 1.1 G/DL
ALP SERPL-CCNC: 109 U/L (ref 39–117)
ALT SERPL W P-5'-P-CCNC: 29 U/L (ref 1–41)
ANION GAP SERPL CALCULATED.3IONS-SCNC: 14 MMOL/L (ref 5–15)
AST SERPL-CCNC: 23 U/L (ref 1–40)
BILIRUB SERPL-MCNC: 0.3 MG/DL (ref 0–1.2)
BUN SERPL-MCNC: 18 MG/DL (ref 8–23)
BUN/CREAT SERPL: 13.1 (ref 7–25)
CALCIUM SPEC-SCNC: 10.4 MG/DL (ref 8.6–10.5)
CHLORIDE SERPL-SCNC: 99 MMOL/L (ref 98–107)
CHOLEST SERPL-MCNC: 305 MG/DL (ref 0–200)
CO2 SERPL-SCNC: 24 MMOL/L (ref 22–29)
CREAT SERPL-MCNC: 1.37 MG/DL (ref 0.76–1.27)
EGFRCR SERPLBLD CKD-EPI 2021: 57.6 ML/MIN/1.73
GLOBULIN UR ELPH-MCNC: 3.8 GM/DL
GLUCOSE SERPL-MCNC: 126 MG/DL (ref 65–99)
HDLC SERPL-MCNC: 34 MG/DL (ref 40–60)
LDLC SERPL CALC-MCNC: 164 MG/DL (ref 0–100)
LDLC/HDLC SERPL: 4.8 {RATIO}
POTASSIUM SERPL-SCNC: 5.3 MMOL/L (ref 3.5–5.2)
PROT SERPL-MCNC: 8.1 G/DL (ref 6–8.5)
PSA SERPL-MCNC: 0.14 NG/ML (ref 0–4)
SODIUM SERPL-SCNC: 137 MMOL/L (ref 136–145)
T4 FREE SERPL-MCNC: 1.14 NG/DL (ref 0.93–1.7)
TRIGL SERPL-MCNC: 539 MG/DL (ref 0–150)
TSH SERPL DL<=0.05 MIU/L-ACNC: 1.65 UIU/ML (ref 0.27–4.2)
VIT B12 BLD-MCNC: 352 PG/ML (ref 211–946)
VLDLC SERPL-MCNC: 107 MG/DL (ref 5–40)

## 2022-07-29 RX ORDER — METFORMIN HYDROCHLORIDE 500 MG/1
500 TABLET, EXTENDED RELEASE ORAL 3 TIMES DAILY
COMMUNITY
Start: 2022-06-08 | End: 2022-09-07

## 2022-08-01 ENCOUNTER — OFFICE VISIT (OUTPATIENT)
Dept: ENDOCRINOLOGY | Facility: CLINIC | Age: 62
End: 2022-08-01

## 2022-08-01 VITALS
HEIGHT: 73 IN | BODY MASS INDEX: 25.05 KG/M2 | HEART RATE: 72 BPM | SYSTOLIC BLOOD PRESSURE: 110 MMHG | DIASTOLIC BLOOD PRESSURE: 70 MMHG | WEIGHT: 189 LBS

## 2022-08-01 DIAGNOSIS — E78.2 HYPERLIPIDEMIA, MIXED: Chronic | ICD-10-CM

## 2022-08-01 DIAGNOSIS — E11.42 TYPE 2 DIABETES MELLITUS WITH DIABETIC POLYNEUROPATHY, WITH LONG-TERM CURRENT USE OF INSULIN: Primary | ICD-10-CM

## 2022-08-01 DIAGNOSIS — E55.9 VITAMIN D DEFICIENCY: ICD-10-CM

## 2022-08-01 DIAGNOSIS — Z79.4 TYPE 2 DIABETES MELLITUS WITH DIABETIC POLYNEUROPATHY, WITH LONG-TERM CURRENT USE OF INSULIN: Primary | ICD-10-CM

## 2022-08-01 LAB — GLUCOSE BLDC GLUCOMTR-MCNC: 171 MG/DL (ref 70–105)

## 2022-08-01 PROCEDURE — 82962 GLUCOSE BLOOD TEST: CPT | Performed by: INTERNAL MEDICINE

## 2022-08-01 PROCEDURE — 99214 OFFICE O/P EST MOD 30 MIN: CPT | Performed by: INTERNAL MEDICINE

## 2022-08-01 NOTE — PATIENT INSTRUCTIONS
Keep up the good work!  See eye doctor.  Continue PT exercises.  Continue diabetes & chol meds & vit D supplements.  Call if blood sugars are running under 100 or over 200.  F/u in 6 months, with fasting labs prior.

## 2022-08-02 NOTE — PROGRESS NOTES
[facility]        Patient Care Team:  Al Kimbrough MD as PCP - General  Al Kimbrough MD as PCP - Family Medicine    Chief Complaint:    Chief Complaint   Patient presents with   • Diabetes     Type 2, ,  coffee/cream 1 hr PP, Novolin 70/30 60 units this am         Subjective   Here for diabetes f/u  Blood sugars: in the high 100's  Getting the 70/30 insulin 2-3 x/ d  Exercise program: PT 2 d / week; using walker  Taking vit D    Interval History:     Patient Complaints: none  Patient Denies: hypoglycemia  History taken from: patient & wife (care giver- gives him his meds)    Review of Systems:   Review of Systems   HENT: Negative for trouble swallowing.    Eyes: Positive for blurred vision.   Gastrointestinal: Negative for nausea.   Endocrine: Negative for polyuria.   Musculoskeletal: Positive for gait problem.   Neurological: Negative for headache.     Lost  9 lb since last visit    Objective     Vital Signs  Heart Rate:  [72] 72  BP: (110)/(70) 110/70    Physical Exam:     General Appearance:    Alert, cooperative, in no acute distress   Head:    Normocephalic, without obvious abnormality, atraumatic   Eyes:            Lids and lashes normal, conjunctivae and sclerae normal, no   icterus, no pallor, corneas clear, PERRLA   Throat:   No oral lesions,  oral mucosa moist   Neck:   No adenopathy, supple,  no thyromegaly, no   carotid bruit   Lungs:     Clear     Heart:    Regular rhythm and normal rate   Chest Wall:    No abnormalities observed   Abdomen:     Normal bowel sounds, soft                 Extremities:   Hammer toes, no edema               Pulses:   Pulses palpable and equal bilaterally   Skin:   Dry   Neurologic:  DTR absent, unable to feel the 10g monofilament          Results Review:    I have reviewed the patient's new clinical results, labs & imaging.    Medication Review:   Prior to Admission medications    Medication Sig Start Date End Date Taking?  Authorizing Provider   aspirin 81 MG EC tablet Take 1 tablet by mouth Daily. 12/12/19  Yes Farrah Crain MD   atorvastatin (LIPITOR) 80 MG tablet TAKE 1 TABLET BY MOUTH EVERY DAY 5/11/22  Yes Al Kimbrough MD   Cholecalciferol (HM Vitamin D3) 50 MCG (2000 UT) capsule Take 2,000 Units by mouth Daily.   Yes Jaylene Berger MD   glucose blood (OneTouch Verio) test strip 1 each by Other route 3 (Three) Times a Day Before Meals. Use as instructed  Dx code: E11.65 5/16/22  Yes Salazar Bower MD   Isopropyl Alcohol (Alcohol Wipes) 70 % misc Apply 1 each topically 3 (Three) Times a Day Before Meals. Dx code: E11.65 5/16/22  Yes Salazar Bower MD   Lancets (OneTouch Delica Plus [id number]) misc 1 each 3 (Three) Times a Day Before Meals. Dx code: E11.65 5/16/22  Yes Salazar Bower MD   meclizine (ANTIVERT) 25 MG tablet Take 1 tablet by mouth 3 (Three) Times a Day As Needed for Dizziness. 5/16/22  Yes Salazar Bower MD   metFORMIN (GLUCOPHAGE) 500 MG tablet Take 500 mg by mouth 2 (Two) Times a Day. 5/30/22  Yes Jaylene Berger MD   metFORMIN ER (GLUCOPHAGE-XR) 500 MG 24 hr tablet Take 500 mg by mouth 3 (Three) Times a Day. 6/8/22  Yes Jaylene Berger MD   metoclopramide (REGLAN) 10 MG tablet Take 10 mg by mouth 3 (Three) Times a Day Before Meals.   Yes Jaylene Berger MD   metoprolol tartrate (LOPRESSOR) 25 MG tablet Take 1 tablet by mouth Every 12 (Twelve) Hours. 5/16/22  Yes Salazar Bower MD   midodrine (PROAMATINE) 5 MG tablet Take 5 mg by mouth 3 (Three) Times a Day. As needed 5/30/22  Yes Jaylene Berger MD   NovoLIN 70/30 FlexPen (70-30) 100 UNIT/ML suspension pen-injector INJECT 30 UNITS UNDER THE SKIN THREE TIMES DAILY BEFORE MEALS AND 5 UNITS AT BEDTIME 5/31/22  Yes Provider, MD Jaylene   omeprazole (priLOSEC) 20 MG capsule Take 40 mg by mouth 2 (Two) Times a Day.   Yes Provider,  MD Jaylene   traMADol (ULTRAM) 50 MG tablet Take 50 mg by mouth Every 6 (Six) Hours As Needed. 5/30/22  Yes Jaylene Berger MD   Xarelto 2.5 MG tablet TAKE 1 TABLET BY MOUTH 2 TIMES A DAY FOR 30 DAYS. 7/11/22  Yes Al Kimbrough MD   rivaroxaban (XARELTO) 10 MG tablet Take 5 mg by mouth 2 (Two) Times a Day.  8/1/22 Yes ProviderJaylene MD       Lab Results (most recent)     Procedure Component Value Units Date/Time    POC Glucose [[id number]]  (Abnormal) Collected: 08/01/22 1501    Specimen: Blood Updated: 08/01/22 1502     Glucose 171 mg/dL      Comment: Serial Number: [id number]:  [id number]           Lab Results   Component Value Date    HGBA1C 8.0 (H) 07/25/2022    HGBA1C 7.6 (H) 05/12/2022    HGBA1C 7.9 (H) 01/19/2022      Lab Results   Component Value Date    GLUCOSE 126 (H) 07/25/2022    BUN 18 07/25/2022    CREATININE 1.37 (H) 07/25/2022    EGFRIFNONA 56 (L) 01/19/2022    BCR 13.1 07/25/2022    K 5.3 (H) 07/25/2022    CO2 24.0 07/25/2022    CALCIUM 10.4 07/25/2022    ALBUMIN 4.30 07/25/2022    LABIL2 0.8 (L) 05/26/2019    AST 23 07/25/2022    ALT 29 07/25/2022    CHOL 305 (H) 07/25/2022     (H) 07/25/2022    HDL 34 (L) 07/25/2022    TRIG 539 (H) 07/25/2022     Lab Results   Component Value Date    TSH 1.650 07/25/2022    FREET4 1.14 07/25/2022    JMNY65AP 33.9 07/25/2022       Assessment & Plan     Diagnoses and all orders for this visit:    1. Type 2 diabetes mellitus with diabetic polyneuropathy, with long-term current use of insulin (HCC) (Primary)  -     Hemoglobin A1c; Future  -     Microalbumin / Creatinine Urine Ratio - Urine, Clean Catch; Future    2. Vitamin D deficiency    3. Hyperlipidemia, mixed  -     Lipid Panel; Future  -     Comprehensive Metabolic Panel; Future    Other orders  -     POC Glucose    Glucose control improved. Vit D stable. Lipids (non fasting) much worse (unsure why, taking statin)    See eye doctor.  Continue PT exercises.  Continue diabetes &  chol meds & vit D supplements.  Call if blood sugars are running under 100 or over 200.  Will order Jose CGMS. It is medically indicated & necessary.        Radha Manriquez MD  08/01/22  20:31 EDT

## 2022-08-05 ENCOUNTER — TELEPHONE (OUTPATIENT)
Dept: ENDOCRINOLOGY | Facility: CLINIC | Age: 62
End: 2022-08-05

## 2022-09-07 RX ORDER — METFORMIN HYDROCHLORIDE 500 MG/1
TABLET, EXTENDED RELEASE ORAL
Qty: 270 TABLET | Refills: 3 | Status: SHIPPED | OUTPATIENT
Start: 2022-09-07

## 2022-09-27 RX ORDER — ATORVASTATIN CALCIUM 80 MG/1
TABLET, FILM COATED ORAL
Qty: 90 TABLET | Refills: 1 | Status: SHIPPED | OUTPATIENT
Start: 2022-09-27 | End: 2023-03-07

## 2022-11-03 DIAGNOSIS — E11.65 TYPE 2 DIABETES MELLITUS WITH HYPERGLYCEMIA: ICD-10-CM

## 2022-11-03 RX ORDER — BLOOD SUGAR DIAGNOSTIC
STRIP MISCELLANEOUS
Qty: 200 EACH | Refills: 4 | Status: SHIPPED | OUTPATIENT
Start: 2022-11-03

## 2022-12-02 RX ORDER — RIVAROXABAN 2.5 MG/1
TABLET, FILM COATED ORAL
Qty: 60 TABLET | Refills: 4 | Status: SHIPPED | OUTPATIENT
Start: 2022-12-02 | End: 2023-03-09

## 2023-01-01 ENCOUNTER — APPOINTMENT (OUTPATIENT)
Dept: CT IMAGING | Facility: HOSPITAL | Age: 63
DRG: 066 | End: 2023-01-01
Payer: MEDICARE

## 2023-01-01 ENCOUNTER — TELEPHONE (OUTPATIENT)
Dept: PEDIATRICS | Facility: OTHER | Age: 63
End: 2023-01-01

## 2023-01-01 ENCOUNTER — TELEPHONE (OUTPATIENT)
Dept: FAMILY MEDICINE CLINIC | Facility: CLINIC | Age: 63
End: 2023-01-01

## 2023-01-01 ENCOUNTER — APPOINTMENT (OUTPATIENT)
Dept: GENERAL RADIOLOGY | Facility: HOSPITAL | Age: 63
DRG: 066 | End: 2023-01-01
Payer: MEDICARE

## 2023-01-01 ENCOUNTER — APPOINTMENT (OUTPATIENT)
Dept: CT IMAGING | Facility: HOSPITAL | Age: 63
DRG: 066 | End: 2023-01-01
Payer: OTHER GOVERNMENT

## 2023-01-01 ENCOUNTER — HOSPITAL ENCOUNTER (INPATIENT)
Facility: HOSPITAL | Age: 63
LOS: 2 days | DRG: 951 | End: 2023-10-06
Attending: INTERNAL MEDICINE | Admitting: INTERNAL MEDICINE
Payer: OTHER GOVERNMENT

## 2023-01-01 ENCOUNTER — OFFICE VISIT (OUTPATIENT)
Dept: ENDOCRINOLOGY | Facility: CLINIC | Age: 63
End: 2023-01-01
Payer: MEDICARE

## 2023-01-01 ENCOUNTER — HOSPITAL ENCOUNTER (INPATIENT)
Facility: HOSPITAL | Age: 63
LOS: 1 days | DRG: 066 | End: 2023-10-04
Attending: EMERGENCY MEDICINE | Admitting: INTERNAL MEDICINE
Payer: MEDICARE

## 2023-01-01 VITALS
WEIGHT: 179 LBS | HEIGHT: 73 IN | DIASTOLIC BLOOD PRESSURE: 70 MMHG | SYSTOLIC BLOOD PRESSURE: 115 MMHG | BODY MASS INDEX: 23.72 KG/M2 | OXYGEN SATURATION: 96 % | HEART RATE: 101 BPM

## 2023-01-01 VITALS
OXYGEN SATURATION: 99 % | SYSTOLIC BLOOD PRESSURE: 119 MMHG | HEIGHT: 73 IN | RESPIRATION RATE: 14 BRPM | HEART RATE: 108 BPM | DIASTOLIC BLOOD PRESSURE: 75 MMHG | TEMPERATURE: 100.5 F | WEIGHT: 182.98 LBS | BODY MASS INDEX: 24.25 KG/M2

## 2023-01-01 VITALS — TEMPERATURE: 98.8 F | BODY MASS INDEX: 24.54 KG/M2 | HEIGHT: 73 IN | WEIGHT: 185.19 LBS

## 2023-01-01 DIAGNOSIS — I61.5 INTRAVENTRICULAR HEMORRHAGE: Primary | ICD-10-CM

## 2023-01-01 DIAGNOSIS — R50.9 FEVER, UNSPECIFIED FEVER CAUSE: ICD-10-CM

## 2023-01-01 DIAGNOSIS — E78.2 HYPERLIPIDEMIA, MIXED: Chronic | ICD-10-CM

## 2023-01-01 DIAGNOSIS — E55.9 VITAMIN D DEFICIENCY: ICD-10-CM

## 2023-01-01 DIAGNOSIS — E11.42 TYPE 2 DIABETES MELLITUS WITH DIABETIC POLYNEUROPATHY, WITH LONG-TERM CURRENT USE OF INSULIN: Primary | Chronic | ICD-10-CM

## 2023-01-01 DIAGNOSIS — Z79.4 TYPE 2 DIABETES MELLITUS WITH DIABETIC POLYNEUROPATHY, WITH LONG-TERM CURRENT USE OF INSULIN: Primary | Chronic | ICD-10-CM

## 2023-01-01 LAB
ALBUMIN SERPL-MCNC: 4 G/DL (ref 3.5–5.2)
ALBUMIN/GLOB SERPL: 1.1 G/DL
ALP SERPL-CCNC: 110 U/L (ref 39–117)
ALT SERPL W P-5'-P-CCNC: 26 U/L (ref 1–41)
AMPHET+METHAMPHET UR QL: NEGATIVE
ANION GAP SERPL CALCULATED.3IONS-SCNC: 14 MMOL/L (ref 5–15)
ANION GAP SERPL CALCULATED.3IONS-SCNC: 20 MMOL/L (ref 10–20)
APAP SERPL-MCNC: <5 MCG/ML (ref 0–30)
APTT PPP: 35.2 SECONDS (ref 61–76.5)
ARTERIAL PATENCY WRIST A: ABNORMAL
ARTERIAL PATENCY WRIST A: POSITIVE
AST SERPL-CCNC: 25 U/L (ref 1–40)
ATMOSPHERIC PRESS: ABNORMAL MM[HG]
ATMOSPHERIC PRESS: ABNORMAL MM[HG]
BACTERIA UR QL AUTO: ABNORMAL /HPF
BARBITURATES UR QL SCN: NEGATIVE
BASE EXCESS BLDA CALC-SCNC: -2.3 MMOL/L (ref 0–3)
BASE EXCESS BLDA CALC-SCNC: 0.2 MMOL/L (ref 0–3)
BASOPHILS # BLD AUTO: 0.1 10*3/MM3 (ref 0–0.2)
BASOPHILS NFR BLD AUTO: 0.7 % (ref 0–1.5)
BDY SITE: ABNORMAL
BDY SITE: ABNORMAL
BENZODIAZ UR QL SCN: NEGATIVE
BILIRUB SERPL-MCNC: 0.3 MG/DL (ref 0–1.2)
BILIRUB UR QL STRIP: NEGATIVE
BUN BLDA-MCNC: 26 MG/DL (ref 8–26)
BUN SERPL-MCNC: 27 MG/DL (ref 8–23)
BUN/CREAT SERPL: 18.6 (ref 7–25)
CA-I BLDA-SCNC: 1.2 MMOL/L (ref 1.15–1.33)
CA-I BLDA-SCNC: 1.21 MMOL/L (ref 1.12–1.32)
CA-I BLDA-SCNC: 1.22 MMOL/L (ref 1.15–1.33)
CALCIUM SPEC-SCNC: 9.6 MG/DL (ref 8.6–10.5)
CANNABINOIDS SERPL QL: NEGATIVE
CHLORIDE BLDA-SCNC: 103 MMOL/L (ref 98–109)
CHLORIDE SERPL-SCNC: 102 MMOL/L (ref 98–107)
CLARITY UR: CLEAR
CO2 BLDA-SCNC: 24 MMOL/L (ref 24–29)
CO2 SERPL-SCNC: 25 MMOL/L (ref 22–29)
COCAINE UR QL: NEGATIVE
COLOR UR: YELLOW
CREAT BLDA-MCNC: 1.22 MG/DL
CREAT BLDA-MCNC: 1.35 MG/DL
CREAT BLDA-MCNC: 1.6 MG/DL (ref 0.6–1.3)
CREAT SERPL-MCNC: 1.45 MG/DL (ref 0.76–1.27)
D-LACTATE SERPL-SCNC: 0.7 MMOL/L (ref 0.2–2)
D-LACTATE SERPL-SCNC: 0.9 MMOL/L (ref 0.2–2)
D-LACTATE SERPL-SCNC: 1.2 MMOL/L (ref 0.3–2)
DEPRECATED RDW RBC AUTO: 44.6 FL (ref 37–54)
EGFRCR SERPLBLD CKD-EPI 2021: 47.5 ML/MIN/1.73
EGFRCR SERPLBLD CKD-EPI 2021: 53.5 ML/MIN/1.73
EGFRCR SERPLBLD CKD-EPI 2021: 58.3 ML/MIN/1.73
EGFRCR SERPLBLD CKD-EPI 2021: 65.8 ML/MIN/1.73
EOSINOPHIL # BLD AUTO: 0 10*3/MM3 (ref 0–0.4)
EOSINOPHIL NFR BLD AUTO: 0 % (ref 0.3–6.2)
ERYTHROCYTE [DISTWIDTH] IN BLOOD BY AUTOMATED COUNT: 14.1 % (ref 12.3–15.4)
ETHANOL UR QL: <0.01 %
GLOBULIN UR ELPH-MCNC: 3.6 GM/DL
GLUCOSE BLDC GLUCOMTR-MCNC: 118 MG/DL (ref 70–105)
GLUCOSE BLDC GLUCOMTR-MCNC: 173 MG/DL (ref 70–105)
GLUCOSE BLDC GLUCOMTR-MCNC: 185 MG/DL (ref 70–105)
GLUCOSE BLDC GLUCOMTR-MCNC: 197 MG/DL (ref 74–100)
GLUCOSE BLDC GLUCOMTR-MCNC: 197 MG/DL (ref 74–100)
GLUCOSE BLDC GLUCOMTR-MCNC: 218 MG/DL (ref 74–100)
GLUCOSE BLDC GLUCOMTR-MCNC: 218 MG/DL (ref 74–100)
GLUCOSE SERPL-MCNC: 197 MG/DL (ref 65–99)
GLUCOSE UR STRIP-MCNC: ABNORMAL MG/DL
GRAN CASTS URNS QL MICRO: ABNORMAL /LPF
HCO3 BLDA-SCNC: 23 MMOL/L (ref 21–28)
HCO3 BLDA-SCNC: 23.8 MMOL/L (ref 21–28)
HCT VFR BLD AUTO: 40.4 % (ref 37.5–51)
HCT VFR BLDA CALC: 36 % (ref 38–51)
HCT VFR BLDA CALC: 40 % (ref 38–51)
HCT VFR BLDA CALC: 42 % (ref 38–51)
HEMODILUTION: NO
HEMODILUTION: NO
HGB BLD-MCNC: 13.5 G/DL (ref 13–17.7)
HGB BLDA-MCNC: 12.1 G/DL (ref 12–17)
HGB BLDA-MCNC: 13.6 G/DL (ref 12–17)
HGB BLDA-MCNC: 14.4 G/DL (ref 12–17)
HGB UR QL STRIP.AUTO: ABNORMAL
HOLD SPECIMEN: NORMAL
HYALINE CASTS UR QL AUTO: ABNORMAL /LPF
INHALED O2 CONCENTRATION: 100 %
INHALED O2 CONCENTRATION: 32 %
INR PPP: 2.39 (ref 2–3)
KETONES UR QL STRIP: ABNORMAL
LEUKOCYTE ESTERASE UR QL STRIP.AUTO: NEGATIVE
LYMPHOCYTES # BLD AUTO: 1.2 10*3/MM3 (ref 0.7–3.1)
LYMPHOCYTES NFR BLD AUTO: 9.2 % (ref 19.6–45.3)
MAGNESIUM SERPL-MCNC: 1.4 MG/DL (ref 1.6–2.4)
MCH RBC QN AUTO: 30.2 PG (ref 26.6–33)
MCHC RBC AUTO-ENTMCNC: 33.4 G/DL (ref 31.5–35.7)
MCV RBC AUTO: 90.4 FL (ref 79–97)
METHADONE UR QL SCN: NEGATIVE
MODALITY: ABNORMAL
MODALITY: ABNORMAL
MONOCYTES # BLD AUTO: 0.5 10*3/MM3 (ref 0.1–0.9)
MONOCYTES NFR BLD AUTO: 3.9 % (ref 5–12)
NEUTROPHILS NFR BLD AUTO: 11.5 10*3/MM3 (ref 1.7–7)
NEUTROPHILS NFR BLD AUTO: 86.2 % (ref 42.7–76)
NITRITE UR QL STRIP: NEGATIVE
NRBC BLD AUTO-RTO: 0 /100 WBC (ref 0–0.2)
OPIATES UR QL: NEGATIVE
OXYCODONE UR QL SCN: NEGATIVE
PCO2 BLDA: 34.6 MM HG (ref 35–48)
PCO2 BLDA: 40.7 MM HG (ref 35–48)
PEEP RESPIRATORY: 5 CM[H2O]
PH BLDA: 7.36 PH UNITS (ref 7.35–7.45)
PH BLDA: 7.45 PH UNITS (ref 7.35–7.45)
PH UR STRIP.AUTO: <=5 [PH] (ref 5–8)
PLATELET # BLD AUTO: 417 10*3/MM3 (ref 140–450)
PMV BLD AUTO: 7.6 FL (ref 6–12)
PO2 BLDA: 508 MM HG (ref 83–108)
PO2 BLDA: 67.2 MM HG (ref 83–108)
POTASSIUM BLDA-SCNC: 4 MMOL/L (ref 3.5–4.5)
POTASSIUM BLDA-SCNC: 4.3 MMOL/L (ref 3.5–4.5)
POTASSIUM BLDA-SCNC: 4.4 MMOL/L (ref 3.5–4.9)
POTASSIUM SERPL-SCNC: 4.3 MMOL/L (ref 3.5–5.2)
PROT SERPL-MCNC: 7.6 G/DL (ref 6–8.5)
PROT UR QL STRIP: ABNORMAL
PROTHROMBIN TIME: 24.4 SECONDS (ref 19.4–28.5)
QT INTERVAL: 298 MS
QTC INTERVAL: 430 MS
RBC # BLD AUTO: 4.47 10*6/MM3 (ref 4.14–5.8)
RBC # UR STRIP: ABNORMAL /HPF
REF LAB TEST METHOD: ABNORMAL
RESPIRATORY RATE: 14
SALICYLATES SERPL-MCNC: 0.3 MG/DL
SAO2 % BLDCOA: 100 % (ref 94–98)
SAO2 % BLDCOA: 94 % (ref 94–98)
SODIUM BLD-SCNC: 138 MMOL/L (ref 138–146)
SODIUM BLD-SCNC: 142 MMOL/L (ref 138–146)
SODIUM BLD-SCNC: 142 MMOL/L (ref 138–146)
SODIUM SERPL-SCNC: 141 MMOL/L (ref 136–145)
SP GR UR STRIP: 1.03 (ref 1–1.03)
SQUAMOUS #/AREA URNS HPF: ABNORMAL /HPF
TROPONIN T SERPL HS-MCNC: 27 NG/L
UROBILINOGEN UR QL STRIP: ABNORMAL
VENTILATOR MODE: AC
VT ON VENT VENT: 500 ML
WBC # UR STRIP: ABNORMAL /HPF
WBC NRBC COR # BLD: 13.3 10*3/MM3 (ref 3.4–10.8)

## 2023-01-01 PROCEDURE — 25010000002 LORAZEPAM PER 2 MG: Performed by: STUDENT IN AN ORGANIZED HEALTH CARE EDUCATION/TRAINING PROGRAM

## 2023-01-01 PROCEDURE — 71045 X-RAY EXAM CHEST 1 VIEW: CPT

## 2023-01-01 PROCEDURE — 80307 DRUG TEST PRSMV CHEM ANLYZR: CPT | Performed by: EMERGENCY MEDICINE

## 2023-01-01 PROCEDURE — 94799 UNLISTED PULMONARY SVC/PX: CPT

## 2023-01-01 PROCEDURE — 36415 COLL VENOUS BLD VENIPUNCTURE: CPT

## 2023-01-01 PROCEDURE — 25010000002 VANCOMYCIN HCL IN NACL 1.75-0.9 GM/500ML-% SOLUTION: Performed by: EMERGENCY MEDICINE

## 2023-01-01 PROCEDURE — 36600 WITHDRAWAL OF ARTERIAL BLOOD: CPT

## 2023-01-01 PROCEDURE — 0 MIDAZOLAM 1 MG/ML 100ML NS 100 MG/100ML SOLUTION: Performed by: EMERGENCY MEDICINE

## 2023-01-01 PROCEDURE — 70496 CT ANGIOGRAPHY HEAD: CPT

## 2023-01-01 PROCEDURE — 84484 ASSAY OF TROPONIN QUANT: CPT | Performed by: EMERGENCY MEDICINE

## 2023-01-01 PROCEDURE — 25010000002 PROPOFOL 10 MG/ML EMULSION

## 2023-01-01 PROCEDURE — 25010000002 PROTHROMBIN COMPLEX CONC HUMAN 500 UNITS KIT: Performed by: EMERGENCY MEDICINE

## 2023-01-01 PROCEDURE — 99285 EMERGENCY DEPT VISIT HI MDM: CPT

## 2023-01-01 PROCEDURE — 25010000002 HYDROMORPHONE 1 MG/ML SOLUTION

## 2023-01-01 PROCEDURE — 80053 COMPREHEN METABOLIC PANEL: CPT | Performed by: EMERGENCY MEDICINE

## 2023-01-01 PROCEDURE — 85018 HEMOGLOBIN: CPT

## 2023-01-01 PROCEDURE — 25010000002 HYDROMORPHONE 1 MG/ML SOLUTION: Performed by: STUDENT IN AN ORGANIZED HEALTH CARE EDUCATION/TRAINING PROGRAM

## 2023-01-01 PROCEDURE — 0BH17EZ INSERTION OF ENDOTRACHEAL AIRWAY INTO TRACHEA, VIA NATURAL OR ARTIFICIAL OPENING: ICD-10-PCS | Performed by: EMERGENCY MEDICINE

## 2023-01-01 PROCEDURE — 99223 1ST HOSP IP/OBS HIGH 75: CPT

## 2023-01-01 PROCEDURE — 25510000001 IOPAMIDOL PER 1 ML: Performed by: EMERGENCY MEDICINE

## 2023-01-01 PROCEDURE — 25010000002 LEVETRIRACETAM PER 10 MG: Performed by: EMERGENCY MEDICINE

## 2023-01-01 PROCEDURE — 99214 OFFICE O/P EST MOD 30 MIN: CPT | Performed by: INTERNAL MEDICINE

## 2023-01-01 PROCEDURE — 3074F SYST BP LT 130 MM HG: CPT | Performed by: INTERNAL MEDICINE

## 2023-01-01 PROCEDURE — 93005 ELECTROCARDIOGRAM TRACING: CPT

## 2023-01-01 PROCEDURE — 83605 ASSAY OF LACTIC ACID: CPT

## 2023-01-01 PROCEDURE — 0 PHYTONADIONE 10 MG/ML SOLUTION 1 ML AMPULE: Performed by: EMERGENCY MEDICINE

## 2023-01-01 PROCEDURE — 80143 DRUG ASSAY ACETAMINOPHEN: CPT | Performed by: EMERGENCY MEDICINE

## 2023-01-01 PROCEDURE — 87040 BLOOD CULTURE FOR BACTERIA: CPT | Performed by: EMERGENCY MEDICINE

## 2023-01-01 PROCEDURE — 80051 ELECTROLYTE PANEL: CPT

## 2023-01-01 PROCEDURE — 85610 PROTHROMBIN TIME: CPT | Performed by: EMERGENCY MEDICINE

## 2023-01-01 PROCEDURE — 0 CEFEPIME PER 500 MG: Performed by: EMERGENCY MEDICINE

## 2023-01-01 PROCEDURE — 82565 ASSAY OF CREATININE: CPT

## 2023-01-01 PROCEDURE — 5A1935Z RESPIRATORY VENTILATION, LESS THAN 24 CONSECUTIVE HOURS: ICD-10-PCS | Performed by: EMERGENCY MEDICINE

## 2023-01-01 PROCEDURE — 85025 COMPLETE CBC W/AUTO DIFF WBC: CPT | Performed by: EMERGENCY MEDICINE

## 2023-01-01 PROCEDURE — 31500 INSERT EMERGENCY AIRWAY: CPT

## 2023-01-01 PROCEDURE — 70498 CT ANGIOGRAPHY NECK: CPT

## 2023-01-01 PROCEDURE — 30283B1 TRANSFUSION OF NONAUTOLOGOUS 4-FACTOR PROTHROMBIN COMPLEX CONCENTRATE INTO VEIN, PERCUTANEOUS APPROACH: ICD-10-PCS | Performed by: EMERGENCY MEDICINE

## 2023-01-01 PROCEDURE — 85730 THROMBOPLASTIN TIME PARTIAL: CPT | Performed by: EMERGENCY MEDICINE

## 2023-01-01 PROCEDURE — 82330 ASSAY OF CALCIUM: CPT

## 2023-01-01 PROCEDURE — 82948 REAGENT STRIP/BLOOD GLUCOSE: CPT

## 2023-01-01 PROCEDURE — 25010000002 PROPOFOL 10 MG/ML EMULSION: Performed by: EMERGENCY MEDICINE

## 2023-01-01 PROCEDURE — 25010000002 LORAZEPAM PER 2 MG: Performed by: INTERNAL MEDICINE

## 2023-01-01 PROCEDURE — 80047 BASIC METABLC PNL IONIZED CA: CPT

## 2023-01-01 PROCEDURE — 94002 VENT MGMT INPAT INIT DAY: CPT

## 2023-01-01 PROCEDURE — 81001 URINALYSIS AUTO W/SCOPE: CPT | Performed by: EMERGENCY MEDICINE

## 2023-01-01 PROCEDURE — 83735 ASSAY OF MAGNESIUM: CPT | Performed by: EMERGENCY MEDICINE

## 2023-01-01 PROCEDURE — 25010000002 HYDROMORPHONE 1 MG/ML SOLUTION: Performed by: INTERNAL MEDICINE

## 2023-01-01 PROCEDURE — 70450 CT HEAD/BRAIN W/O DYE: CPT

## 2023-01-01 PROCEDURE — 82803 BLOOD GASES ANY COMBINATION: CPT

## 2023-01-01 PROCEDURE — 82077 ASSAY SPEC XCP UR&BREATH IA: CPT | Performed by: EMERGENCY MEDICINE

## 2023-01-01 PROCEDURE — 3051F HG A1C>EQUAL 7.0%<8.0%: CPT | Performed by: INTERNAL MEDICINE

## 2023-01-01 PROCEDURE — 25010000002 LORAZEPAM PER 2 MG

## 2023-01-01 PROCEDURE — P9612 CATHETERIZE FOR URINE SPEC: HCPCS

## 2023-01-01 PROCEDURE — 25010000002 MAGNESIUM SULFATE 2 GM/50ML SOLUTION: Performed by: EMERGENCY MEDICINE

## 2023-01-01 PROCEDURE — 85014 HEMATOCRIT: CPT

## 2023-01-01 PROCEDURE — 3078F DIAST BP <80 MM HG: CPT | Performed by: INTERNAL MEDICINE

## 2023-01-01 PROCEDURE — 80179 DRUG ASSAY SALICYLATE: CPT | Performed by: EMERGENCY MEDICINE

## 2023-01-01 PROCEDURE — 94761 N-INVAS EAR/PLS OXIMETRY MLT: CPT

## 2023-01-01 RX ORDER — LORAZEPAM 2 MG/ML
1 INJECTION INTRAMUSCULAR
Status: DISCONTINUED | OUTPATIENT
Start: 2023-01-01 | End: 2023-01-01 | Stop reason: HOSPADM

## 2023-01-01 RX ORDER — ETOMIDATE 2 MG/ML
INJECTION INTRAVENOUS
Status: DISCONTINUED
Start: 2023-01-01 | End: 2023-01-01 | Stop reason: HOSPADM

## 2023-01-01 RX ORDER — ACETAMINOPHEN 650 MG/1
650 SUPPOSITORY RECTAL ONCE
Status: COMPLETED | OUTPATIENT
Start: 2023-01-01 | End: 2023-01-01

## 2023-01-01 RX ORDER — VANCOMYCIN 1.75 GRAM/500 ML IN 0.9 % SODIUM CHLORIDE INTRAVENOUS
20 ONCE
Status: COMPLETED | OUTPATIENT
Start: 2023-01-01 | End: 2023-01-01

## 2023-01-01 RX ORDER — SODIUM CHLORIDE 0.9 % (FLUSH) 0.9 %
10 SYRINGE (ML) INJECTION EVERY 12 HOURS SCHEDULED
Status: DISCONTINUED | OUTPATIENT
Start: 2023-01-01 | End: 2023-01-01 | Stop reason: HOSPADM

## 2023-01-01 RX ORDER — LEVETIRACETAM 500 MG/5ML
1000 INJECTION, SOLUTION, CONCENTRATE INTRAVENOUS EVERY 12 HOURS SCHEDULED
Status: DISCONTINUED | OUTPATIENT
Start: 2023-01-01 | End: 2023-01-01

## 2023-01-01 RX ORDER — ETOMIDATE 2 MG/ML
INJECTION INTRAVENOUS
Status: COMPLETED | OUTPATIENT
Start: 2023-01-01 | End: 2023-01-01

## 2023-01-01 RX ORDER — MAGNESIUM SULFATE HEPTAHYDRATE 40 MG/ML
2 INJECTION, SOLUTION INTRAVENOUS ONCE
Status: COMPLETED | OUTPATIENT
Start: 2023-01-01 | End: 2023-01-01

## 2023-01-01 RX ORDER — LORAZEPAM 2 MG/ML
1 INJECTION INTRAMUSCULAR
Status: DISCONTINUED | OUTPATIENT
Start: 2023-01-01 | End: 2023-01-01

## 2023-01-01 RX ORDER — GLYCOPYRROLATE 0.2 MG/ML
0.4 INJECTION INTRAMUSCULAR; INTRAVENOUS 4 TIMES DAILY
Status: DISCONTINUED | OUTPATIENT
Start: 2023-01-01 | End: 2023-01-01 | Stop reason: HOSPADM

## 2023-01-01 RX ORDER — SODIUM CHLORIDE 9 MG/ML
40 INJECTION, SOLUTION INTRAVENOUS AS NEEDED
Status: DISCONTINUED | OUTPATIENT
Start: 2023-01-01 | End: 2023-01-01 | Stop reason: HOSPADM

## 2023-01-01 RX ORDER — ROCURONIUM BROMIDE 10 MG/ML
INJECTION, SOLUTION INTRAVENOUS
Status: COMPLETED | OUTPATIENT
Start: 2023-01-01 | End: 2023-01-01

## 2023-01-01 RX ORDER — LORAZEPAM 2 MG/ML
1 INJECTION INTRAMUSCULAR EVERY 6 HOURS
Status: DISCONTINUED | OUTPATIENT
Start: 2023-01-01 | End: 2023-01-01 | Stop reason: HOSPADM

## 2023-01-01 RX ORDER — MIDAZOLAM IN NACL,ISO-OSMOT/PF 50 MG/50ML
1-10 INFUSION BOTTLE (ML) INTRAVENOUS
Status: DISCONTINUED | OUTPATIENT
Start: 2023-01-01 | End: 2023-01-01

## 2023-01-01 RX ORDER — PROPOFOL 10 MG/ML
VIAL (ML) INTRAVENOUS
Status: COMPLETED
Start: 2023-01-01 | End: 2023-01-01

## 2023-01-01 RX ORDER — SCOLOPAMINE TRANSDERMAL SYSTEM 1 MG/1
1 PATCH, EXTENDED RELEASE TRANSDERMAL
Status: DISCONTINUED | OUTPATIENT
Start: 2023-01-01 | End: 2023-01-01 | Stop reason: HOSPADM

## 2023-01-01 RX ORDER — LEVETIRACETAM 500 MG/5ML
1000 INJECTION, SOLUTION, CONCENTRATE INTRAVENOUS ONCE
Status: COMPLETED | OUTPATIENT
Start: 2023-01-01 | End: 2023-01-01

## 2023-01-01 RX ORDER — GLYCOPYRROLATE 0.2 MG/ML
0.4 INJECTION INTRAMUSCULAR; INTRAVENOUS
Status: DISCONTINUED | OUTPATIENT
Start: 2023-01-01 | End: 2023-01-01

## 2023-01-01 RX ORDER — ACETAMINOPHEN 650 MG/1
650 SUPPOSITORY RECTAL EVERY 4 HOURS PRN
Status: DISCONTINUED | OUTPATIENT
Start: 2023-01-01 | End: 2023-01-01 | Stop reason: HOSPADM

## 2023-01-01 RX ORDER — GLYCOPYRROLATE 0.2 MG/ML
0.4 INJECTION INTRAMUSCULAR; INTRAVENOUS ONCE
Status: COMPLETED | OUTPATIENT
Start: 2023-01-01 | End: 2023-01-01

## 2023-01-01 RX ORDER — SODIUM CHLORIDE 0.9 % (FLUSH) 0.9 %
10 SYRINGE (ML) INJECTION AS NEEDED
Status: DISCONTINUED | OUTPATIENT
Start: 2023-01-01 | End: 2023-01-01 | Stop reason: HOSPADM

## 2023-01-01 RX ORDER — GLYCOPYRROLATE 0.2 MG/ML
0.4 INJECTION INTRAMUSCULAR; INTRAVENOUS
Status: DISCONTINUED | OUTPATIENT
Start: 2023-01-01 | End: 2023-01-01 | Stop reason: HOSPADM

## 2023-01-01 RX ORDER — GLYCOPYRROLATE 0.2 MG/ML
0.4 INJECTION INTRAMUSCULAR; INTRAVENOUS
Status: CANCELLED | OUTPATIENT
Start: 2023-01-01

## 2023-01-01 RX ORDER — SODIUM CHLORIDE 0.9 % (FLUSH) 0.9 %
10 SYRINGE (ML) INJECTION AS NEEDED
Status: CANCELLED | OUTPATIENT
Start: 2023-01-01

## 2023-01-01 RX ORDER — ROCURONIUM BROMIDE 10 MG/ML
INJECTION, SOLUTION INTRAVENOUS
Status: DISCONTINUED
Start: 2023-01-01 | End: 2023-01-01 | Stop reason: HOSPADM

## 2023-01-01 RX ORDER — LORAZEPAM 2 MG/ML
1 INJECTION INTRAMUSCULAR
Status: CANCELLED | OUTPATIENT
Start: 2023-01-01 | End: 2023-10-11

## 2023-01-01 RX ORDER — SODIUM CHLORIDE 9 MG/ML
40 INJECTION, SOLUTION INTRAVENOUS AS NEEDED
Status: CANCELLED | OUTPATIENT
Start: 2023-01-01

## 2023-01-01 RX ORDER — LANOLIN ALCOHOL/MO/W.PET/CERES
1000 CREAM (GRAM) TOPICAL DAILY
COMMUNITY

## 2023-01-01 RX ADMIN — HYDROMORPHONE HYDROCHLORIDE 1 MG: 1 INJECTION, SOLUTION INTRAMUSCULAR; INTRAVENOUS; SUBCUTANEOUS at 11:19

## 2023-01-01 RX ADMIN — PROPOFOL INJECTABLE EMULSION 30 MCG/KG/MIN: 10 INJECTION, EMULSION INTRAVENOUS at 06:45

## 2023-01-01 RX ADMIN — HYDROMORPHONE HYDROCHLORIDE 1 MG: 1 INJECTION, SOLUTION INTRAMUSCULAR; INTRAVENOUS; SUBCUTANEOUS at 03:24

## 2023-01-01 RX ADMIN — LORAZEPAM 1 MG: 2 INJECTION INTRAMUSCULAR; INTRAVENOUS at 16:40

## 2023-01-01 RX ADMIN — LORAZEPAM 1 MG: 2 INJECTION INTRAMUSCULAR; INTRAVENOUS at 20:28

## 2023-01-01 RX ADMIN — HYDROMORPHONE HYDROCHLORIDE 1 MG: 1 INJECTION, SOLUTION INTRAMUSCULAR; INTRAVENOUS; SUBCUTANEOUS at 16:12

## 2023-01-01 RX ADMIN — LORAZEPAM 1 MG: 2 INJECTION INTRAMUSCULAR; INTRAVENOUS at 13:14

## 2023-01-01 RX ADMIN — HYDROMORPHONE HYDROCHLORIDE 1 MG: 1 INJECTION, SOLUTION INTRAMUSCULAR; INTRAVENOUS; SUBCUTANEOUS at 00:04

## 2023-01-01 RX ADMIN — LORAZEPAM 1 MG: 2 INJECTION INTRAMUSCULAR; INTRAVENOUS at 02:00

## 2023-01-01 RX ADMIN — LORAZEPAM 1 MG: 2 INJECTION INTRAMUSCULAR; INTRAVENOUS at 22:00

## 2023-01-01 RX ADMIN — HYDROMORPHONE HYDROCHLORIDE 1 MG: 1 INJECTION, SOLUTION INTRAMUSCULAR; INTRAVENOUS; SUBCUTANEOUS at 06:56

## 2023-01-01 RX ADMIN — LORAZEPAM 1 MG: 2 INJECTION INTRAMUSCULAR; INTRAVENOUS at 09:43

## 2023-01-01 RX ADMIN — GLYCOPYRROLATE 0.4 MG: 0.2 INJECTION INTRAMUSCULAR; INTRAVENOUS at 20:00

## 2023-01-01 RX ADMIN — GLYCOPYRROLATE 0.4 MG: 0.2 INJECTION INTRAMUSCULAR; INTRAVENOUS at 17:41

## 2023-01-01 RX ADMIN — HYDROMORPHONE HYDROCHLORIDE 1 MG: 1 INJECTION, SOLUTION INTRAMUSCULAR; INTRAVENOUS; SUBCUTANEOUS at 13:14

## 2023-01-01 RX ADMIN — GLYCOPYRROLATE 0.4 MG: 0.2 INJECTION INTRAMUSCULAR; INTRAVENOUS at 13:15

## 2023-01-01 RX ADMIN — ROCURONIUM BROMIDE 40 MG: 10 INJECTION, SOLUTION INTRAVENOUS at 06:11

## 2023-01-01 RX ADMIN — HYDROMORPHONE HYDROCHLORIDE 1 MG: 1 INJECTION, SOLUTION INTRAMUSCULAR; INTRAVENOUS; SUBCUTANEOUS at 07:31

## 2023-01-01 RX ADMIN — LORAZEPAM 1 MG: 2 INJECTION INTRAMUSCULAR; INTRAVENOUS at 11:19

## 2023-01-01 RX ADMIN — HYDROMORPHONE HYDROCHLORIDE 1 MG: 1 INJECTION, SOLUTION INTRAMUSCULAR; INTRAVENOUS; SUBCUTANEOUS at 14:04

## 2023-01-01 RX ADMIN — LORAZEPAM 1 MG: 2 INJECTION INTRAMUSCULAR; INTRAVENOUS at 21:02

## 2023-01-01 RX ADMIN — LORAZEPAM 1 MG: 2 INJECTION INTRAMUSCULAR; INTRAVENOUS at 18:55

## 2023-01-01 RX ADMIN — SCOPALAMINE 1 PATCH: 1 PATCH, EXTENDED RELEASE TRANSDERMAL at 09:44

## 2023-01-01 RX ADMIN — HYDROMORPHONE HYDROCHLORIDE 1 MG: 1 INJECTION, SOLUTION INTRAMUSCULAR; INTRAVENOUS; SUBCUTANEOUS at 14:44

## 2023-01-01 RX ADMIN — HYDROMORPHONE HYDROCHLORIDE 1 MG: 1 INJECTION, SOLUTION INTRAMUSCULAR; INTRAVENOUS; SUBCUTANEOUS at 13:40

## 2023-01-01 RX ADMIN — HYDROMORPHONE HYDROCHLORIDE 1 MG: 1 INJECTION, SOLUTION INTRAMUSCULAR; INTRAVENOUS; SUBCUTANEOUS at 02:24

## 2023-01-01 RX ADMIN — GLYCOPYRROLATE 0.4 MG: 0.2 INJECTION INTRAMUSCULAR; INTRAVENOUS at 13:41

## 2023-01-01 RX ADMIN — HYDROMORPHONE HYDROCHLORIDE 1 MG: 1 INJECTION, SOLUTION INTRAMUSCULAR; INTRAVENOUS; SUBCUTANEOUS at 17:55

## 2023-01-01 RX ADMIN — LORAZEPAM 1 MG: 2 INJECTION INTRAMUSCULAR; INTRAVENOUS at 08:43

## 2023-01-01 RX ADMIN — HYDROMORPHONE HYDROCHLORIDE 1 MG: 1 INJECTION, SOLUTION INTRAMUSCULAR; INTRAVENOUS; SUBCUTANEOUS at 17:42

## 2023-01-01 RX ADMIN — LORAZEPAM 1 MG: 2 INJECTION INTRAMUSCULAR; INTRAVENOUS at 04:23

## 2023-01-01 RX ADMIN — HYDROMORPHONE HYDROCHLORIDE 1 MG: 1 INJECTION, SOLUTION INTRAMUSCULAR; INTRAVENOUS; SUBCUTANEOUS at 18:56

## 2023-01-01 RX ADMIN — GLYCOPYRROLATE 0.4 MG: 0.2 INJECTION INTRAMUSCULAR; INTRAVENOUS at 21:16

## 2023-01-01 RX ADMIN — LORAZEPAM 1 MG: 2 INJECTION INTRAMUSCULAR; INTRAVENOUS at 16:12

## 2023-01-01 RX ADMIN — LORAZEPAM 1 MG: 2 INJECTION INTRAMUSCULAR; INTRAVENOUS at 17:55

## 2023-01-01 RX ADMIN — HYDROMORPHONE HYDROCHLORIDE 1 MG: 1 INJECTION, SOLUTION INTRAMUSCULAR; INTRAVENOUS; SUBCUTANEOUS at 18:55

## 2023-01-01 RX ADMIN — LORAZEPAM 1 MG: 2 INJECTION INTRAMUSCULAR; INTRAVENOUS at 17:41

## 2023-01-01 RX ADMIN — HYDROMORPHONE HYDROCHLORIDE 1 MG: 1 INJECTION, SOLUTION INTRAMUSCULAR; INTRAVENOUS; SUBCUTANEOUS at 15:27

## 2023-01-01 RX ADMIN — LORAZEPAM 1 MG: 2 INJECTION INTRAMUSCULAR; INTRAVENOUS at 18:57

## 2023-01-01 RX ADMIN — GLYCOPYRROLATE 0.4 MG: 0.2 INJECTION INTRAMUSCULAR; INTRAVENOUS at 01:03

## 2023-01-01 RX ADMIN — PROPOFOL INJECTABLE EMULSION 50 MCG/KG/MIN: 10 INJECTION, EMULSION INTRAVENOUS at 09:50

## 2023-01-01 RX ADMIN — LORAZEPAM 1 MG: 2 INJECTION INTRAMUSCULAR; INTRAVENOUS at 15:28

## 2023-01-01 RX ADMIN — LORAZEPAM 1 MG: 2 INJECTION INTRAMUSCULAR; INTRAVENOUS at 20:00

## 2023-01-01 RX ADMIN — GLYCOPYRROLATE 0.4 MG: 0.2 INJECTION INTRAMUSCULAR; INTRAVENOUS at 07:31

## 2023-01-01 RX ADMIN — LORAZEPAM 1 MG: 2 INJECTION INTRAMUSCULAR; INTRAVENOUS at 16:50

## 2023-01-01 RX ADMIN — LORAZEPAM 1 MG: 2 INJECTION INTRAMUSCULAR; INTRAVENOUS at 15:06

## 2023-01-01 RX ADMIN — GLYCOPYRROLATE 0.4 MG: 0.2 INJECTION INTRAMUSCULAR; INTRAVENOUS at 11:19

## 2023-01-01 RX ADMIN — CEFEPIME 2000 MG: 2 INJECTION, POWDER, FOR SOLUTION INTRAVENOUS at 07:19

## 2023-01-01 RX ADMIN — GLYCOPYRROLATE 0.4 MG: 0.2 INJECTION INTRAMUSCULAR; INTRAVENOUS at 18:56

## 2023-01-01 RX ADMIN — GLYCOPYRROLATE 0.4 MG: 0.2 INJECTION INTRAMUSCULAR; INTRAVENOUS at 05:52

## 2023-01-01 RX ADMIN — PROPOFOL 30 MCG/KG/MIN: 10 INJECTION, EMULSION INTRAVENOUS at 06:45

## 2023-01-01 RX ADMIN — HYDROMORPHONE HYDROCHLORIDE 1 MG: 1 INJECTION, SOLUTION INTRAMUSCULAR; INTRAVENOUS; SUBCUTANEOUS at 20:00

## 2023-01-01 RX ADMIN — HYDROMORPHONE HYDROCHLORIDE 1 MG: 1 INJECTION, SOLUTION INTRAMUSCULAR; INTRAVENOUS; SUBCUTANEOUS at 12:18

## 2023-01-01 RX ADMIN — LORAZEPAM 1 MG: 2 INJECTION INTRAMUSCULAR; INTRAVENOUS at 22:12

## 2023-01-01 RX ADMIN — LORAZEPAM 1 MG: 2 INJECTION INTRAMUSCULAR; INTRAVENOUS at 07:31

## 2023-01-01 RX ADMIN — LORAZEPAM 1 MG: 2 INJECTION INTRAMUSCULAR; INTRAVENOUS at 05:52

## 2023-01-01 RX ADMIN — GLYCOPYRROLATE 0.4 MG: 0.2 INJECTION INTRAMUSCULAR; INTRAVENOUS at 00:04

## 2023-01-01 RX ADMIN — HYDROMORPHONE HYDROCHLORIDE 1 MG: 1 INJECTION, SOLUTION INTRAMUSCULAR; INTRAVENOUS; SUBCUTANEOUS at 15:06

## 2023-01-01 RX ADMIN — GLYCOPYRROLATE 0.4 MG: 0.2 INJECTION INTRAMUSCULAR; INTRAVENOUS at 08:43

## 2023-01-01 RX ADMIN — PROTHROMBIN, COAGULATION FACTOR VII HUMAN, COAGULATION FACTOR IX HUMAN, COAGULATION FACTOR X HUMAN, PROTEIN C, PROTEIN S HUMAN, AND WATER 2106 UNITS: KIT at 06:41

## 2023-01-01 RX ADMIN — HYDROMORPHONE HYDROCHLORIDE 1 MG: 1 INJECTION, SOLUTION INTRAMUSCULAR; INTRAVENOUS; SUBCUTANEOUS at 08:43

## 2023-01-01 RX ADMIN — LORAZEPAM 1 MG: 2 INJECTION INTRAMUSCULAR; INTRAVENOUS at 23:03

## 2023-01-01 RX ADMIN — ACETAMINOPHEN 650 MG: 650 SUPPOSITORY RECTAL at 07:20

## 2023-01-01 RX ADMIN — HYDROMORPHONE HYDROCHLORIDE 1 MG: 1 INJECTION, SOLUTION INTRAMUSCULAR; INTRAVENOUS; SUBCUTANEOUS at 05:52

## 2023-01-01 RX ADMIN — GLYCOPYRROLATE 0.4 MG: 0.2 INJECTION INTRAMUSCULAR; INTRAVENOUS at 02:00

## 2023-01-01 RX ADMIN — HYDROMORPHONE HYDROCHLORIDE 1 MG: 1 INJECTION, SOLUTION INTRAMUSCULAR; INTRAVENOUS; SUBCUTANEOUS at 09:43

## 2023-01-01 RX ADMIN — GLYCOPYRROLATE 0.4 MG: 0.2 INJECTION INTRAMUSCULAR; INTRAVENOUS at 05:53

## 2023-01-01 RX ADMIN — LORAZEPAM 1 MG: 2 INJECTION INTRAMUSCULAR; INTRAVENOUS at 00:04

## 2023-01-01 RX ADMIN — LORAZEPAM 1 MG: 2 INJECTION INTRAMUSCULAR; INTRAVENOUS at 12:18

## 2023-01-01 RX ADMIN — HYDROMORPHONE HYDROCHLORIDE 1 MG: 1 INJECTION, SOLUTION INTRAMUSCULAR; INTRAVENOUS; SUBCUTANEOUS at 06:21

## 2023-01-01 RX ADMIN — Medication 1750 MG: at 08:15

## 2023-01-01 RX ADMIN — HYDROMORPHONE HYDROCHLORIDE 1 MG: 1 INJECTION, SOLUTION INTRAMUSCULAR; INTRAVENOUS; SUBCUTANEOUS at 23:03

## 2023-01-01 RX ADMIN — MAGNESIUM SULFATE HEPTAHYDRATE 2 G: 2 INJECTION, SOLUTION INTRAVENOUS at 08:15

## 2023-01-01 RX ADMIN — GLYCOPYRROLATE 0.4 MG: 0.2 INJECTION INTRAMUSCULAR; INTRAVENOUS at 18:55

## 2023-01-01 RX ADMIN — MIDAZOLAM HYDROCHLORIDE 1 MG/HR: 1 INJECTION, SOLUTION INTRAVENOUS at 06:45

## 2023-01-01 RX ADMIN — HYDROMORPHONE HYDROCHLORIDE 1 MG: 1 INJECTION, SOLUTION INTRAMUSCULAR; INTRAVENOUS; SUBCUTANEOUS at 21:02

## 2023-01-01 RX ADMIN — HYDROMORPHONE HYDROCHLORIDE 1 MG: 1 INJECTION, SOLUTION INTRAMUSCULAR; INTRAVENOUS; SUBCUTANEOUS at 16:40

## 2023-01-01 RX ADMIN — PHYTONADIONE 10 MG: 10 INJECTION, EMULSION INTRAMUSCULAR; INTRAVENOUS; SUBCUTANEOUS at 06:52

## 2023-01-01 RX ADMIN — HYDROMORPHONE HYDROCHLORIDE 1 MG: 1 INJECTION, SOLUTION INTRAMUSCULAR; INTRAVENOUS; SUBCUTANEOUS at 16:50

## 2023-01-01 RX ADMIN — GLYCOPYRROLATE 0.4 MG: 0.2 INJECTION INTRAMUSCULAR; INTRAVENOUS at 04:01

## 2023-01-01 RX ADMIN — LORAZEPAM 1 MG: 2 INJECTION INTRAMUSCULAR; INTRAVENOUS at 13:40

## 2023-01-01 RX ADMIN — LORAZEPAM 1 MG: 2 INJECTION INTRAMUSCULAR; INTRAVENOUS at 14:44

## 2023-01-01 RX ADMIN — LORAZEPAM 1 MG: 2 INJECTION INTRAMUSCULAR; INTRAVENOUS at 01:03

## 2023-01-01 RX ADMIN — PROPOFOL INJECTABLE EMULSION 50 MCG/KG/MIN: 10 INJECTION, EMULSION INTRAVENOUS at 12:45

## 2023-01-01 RX ADMIN — LORAZEPAM 1 MG: 2 INJECTION INTRAMUSCULAR; INTRAVENOUS at 02:23

## 2023-01-01 RX ADMIN — ETOMIDATE 20 MG: 2 INJECTION INTRAVENOUS at 06:10

## 2023-01-01 RX ADMIN — GLYCOPYRROLATE 0.4 MG: 0.2 INJECTION INTRAMUSCULAR; INTRAVENOUS at 16:40

## 2023-01-01 RX ADMIN — HYDROMORPHONE HYDROCHLORIDE 1 MG: 1 INJECTION, SOLUTION INTRAMUSCULAR; INTRAVENOUS; SUBCUTANEOUS at 20:28

## 2023-01-01 RX ADMIN — LORAZEPAM 1 MG: 2 INJECTION INTRAMUSCULAR; INTRAVENOUS at 14:04

## 2023-01-01 RX ADMIN — GLYCOPYRROLATE 0.4 MG: 0.2 INJECTION INTRAMUSCULAR; INTRAVENOUS at 17:55

## 2023-01-01 RX ADMIN — HYDROMORPHONE HYDROCHLORIDE 1 MG: 1 INJECTION, SOLUTION INTRAMUSCULAR; INTRAVENOUS; SUBCUTANEOUS at 22:00

## 2023-01-01 RX ADMIN — GLYCOPYRROLATE 0.4 MG: 0.2 INJECTION INTRAMUSCULAR; INTRAVENOUS at 03:24

## 2023-01-01 RX ADMIN — LORAZEPAM 1 MG: 2 INJECTION INTRAMUSCULAR; INTRAVENOUS at 03:24

## 2023-01-01 RX ADMIN — GLYCOPYRROLATE 0.4 MG: 0.2 INJECTION INTRAMUSCULAR; INTRAVENOUS at 21:59

## 2023-01-01 RX ADMIN — HYDROMORPHONE HYDROCHLORIDE 1 MG: 1 INJECTION, SOLUTION INTRAMUSCULAR; INTRAVENOUS; SUBCUTANEOUS at 04:22

## 2023-01-01 RX ADMIN — HYDROMORPHONE HYDROCHLORIDE 1 MG: 1 INJECTION, SOLUTION INTRAMUSCULAR; INTRAVENOUS; SUBCUTANEOUS at 04:01

## 2023-01-01 RX ADMIN — HYDROMORPHONE HYDROCHLORIDE 1 MG: 1 INJECTION, SOLUTION INTRAMUSCULAR; INTRAVENOUS; SUBCUTANEOUS at 01:03

## 2023-01-01 RX ADMIN — LEVETIRACETAM 1000 MG: 100 INJECTION, SOLUTION INTRAVENOUS at 05:57

## 2023-01-01 RX ADMIN — IOPAMIDOL 100 ML: 755 INJECTION, SOLUTION INTRAVENOUS at 06:45

## 2023-01-01 RX ADMIN — ACETAMINOPHEN 650 MG: 650 SUPPOSITORY RECTAL at 08:43

## 2023-01-01 RX ADMIN — LORAZEPAM 1 MG: 2 INJECTION INTRAMUSCULAR; INTRAVENOUS at 06:55

## 2023-02-13 ENCOUNTER — LAB (OUTPATIENT)
Dept: LAB | Facility: HOSPITAL | Age: 63
End: 2023-02-13
Attending: INTERNAL MEDICINE
Payer: MEDICARE

## 2023-02-13 DIAGNOSIS — E11.42 TYPE 2 DIABETES MELLITUS WITH DIABETIC POLYNEUROPATHY, WITH LONG-TERM CURRENT USE OF INSULIN: ICD-10-CM

## 2023-02-13 DIAGNOSIS — E78.2 HYPERLIPIDEMIA, MIXED: Chronic | ICD-10-CM

## 2023-02-13 DIAGNOSIS — Z79.4 TYPE 2 DIABETES MELLITUS WITH DIABETIC POLYNEUROPATHY, WITH LONG-TERM CURRENT USE OF INSULIN: ICD-10-CM

## 2023-02-13 LAB
ALBUMIN SERPL-MCNC: 4.3 G/DL (ref 3.5–5.2)
ALBUMIN/GLOB SERPL: 1.3 G/DL
ALP SERPL-CCNC: 119 U/L (ref 39–117)
ALT SERPL W P-5'-P-CCNC: 25 U/L (ref 1–41)
ANION GAP SERPL CALCULATED.3IONS-SCNC: 12.2 MMOL/L (ref 5–15)
AST SERPL-CCNC: 24 U/L (ref 1–40)
BILIRUB SERPL-MCNC: 0.3 MG/DL (ref 0–1.2)
BUN SERPL-MCNC: 29 MG/DL (ref 8–23)
BUN/CREAT SERPL: 19.7 (ref 7–25)
CALCIUM SPEC-SCNC: 9.8 MG/DL (ref 8.6–10.5)
CHLORIDE SERPL-SCNC: 97 MMOL/L (ref 98–107)
CHOLEST SERPL-MCNC: 123 MG/DL (ref 0–200)
CO2 SERPL-SCNC: 29.8 MMOL/L (ref 22–29)
CREAT SERPL-MCNC: 1.47 MG/DL (ref 0.76–1.27)
EGFRCR SERPLBLD CKD-EPI 2021: 52.6 ML/MIN/1.73
GLOBULIN UR ELPH-MCNC: 3.4 GM/DL
GLUCOSE SERPL-MCNC: 120 MG/DL (ref 65–99)
HBA1C MFR BLD: 7.2 % (ref 3.5–5.6)
HDLC SERPL-MCNC: 37 MG/DL (ref 40–60)
LDLC SERPL CALC-MCNC: 59 MG/DL (ref 0–100)
LDLC/HDLC SERPL: 1.49 {RATIO}
POTASSIUM SERPL-SCNC: 4.4 MMOL/L (ref 3.5–5.2)
PROT SERPL-MCNC: 7.7 G/DL (ref 6–8.5)
SODIUM SERPL-SCNC: 139 MMOL/L (ref 136–145)
TRIGL SERPL-MCNC: 154 MG/DL (ref 0–150)
VLDLC SERPL-MCNC: 27 MG/DL (ref 5–40)

## 2023-02-13 PROCEDURE — 80053 COMPREHEN METABOLIC PANEL: CPT

## 2023-02-13 PROCEDURE — 80061 LIPID PANEL: CPT

## 2023-02-13 PROCEDURE — 83036 HEMOGLOBIN GLYCOSYLATED A1C: CPT

## 2023-02-13 PROCEDURE — 36415 COLL VENOUS BLD VENIPUNCTURE: CPT

## 2023-02-20 ENCOUNTER — LAB (OUTPATIENT)
Dept: LAB | Facility: HOSPITAL | Age: 63
End: 2023-02-20
Payer: MEDICARE

## 2023-02-20 ENCOUNTER — OFFICE VISIT (OUTPATIENT)
Dept: ENDOCRINOLOGY | Facility: CLINIC | Age: 63
End: 2023-02-20
Payer: MEDICARE

## 2023-02-20 VITALS
BODY MASS INDEX: 24.52 KG/M2 | WEIGHT: 185 LBS | SYSTOLIC BLOOD PRESSURE: 120 MMHG | DIASTOLIC BLOOD PRESSURE: 84 MMHG | HEIGHT: 73 IN | HEART RATE: 108 BPM

## 2023-02-20 DIAGNOSIS — E78.2 HYPERLIPIDEMIA, MIXED: Chronic | ICD-10-CM

## 2023-02-20 DIAGNOSIS — Z79.4 TYPE 2 DIABETES MELLITUS WITH DIABETIC POLYNEUROPATHY, WITH LONG-TERM CURRENT USE OF INSULIN: Primary | ICD-10-CM

## 2023-02-20 DIAGNOSIS — E11.42 TYPE 2 DIABETES MELLITUS WITH DIABETIC POLYNEUROPATHY, WITH LONG-TERM CURRENT USE OF INSULIN: Primary | ICD-10-CM

## 2023-02-20 DIAGNOSIS — E55.9 VITAMIN D DEFICIENCY: ICD-10-CM

## 2023-02-20 LAB — GLUCOSE BLDC GLUCOMTR-MCNC: 198 MG/DL (ref 70–105)

## 2023-02-20 PROCEDURE — 82962 GLUCOSE BLOOD TEST: CPT | Performed by: INTERNAL MEDICINE

## 2023-02-20 PROCEDURE — 82043 UR ALBUMIN QUANTITATIVE: CPT

## 2023-02-20 PROCEDURE — 99214 OFFICE O/P EST MOD 30 MIN: CPT | Performed by: INTERNAL MEDICINE

## 2023-02-20 PROCEDURE — 82570 ASSAY OF URINE CREATININE: CPT

## 2023-02-21 LAB
ALBUMIN UR-MCNC: 142.1 MG/DL
CREAT UR-MCNC: 169.3 MG/DL
MICROALBUMIN/CREAT UR: 839.3 MG/G

## 2023-03-07 ENCOUNTER — TELEPHONE (OUTPATIENT)
Dept: FAMILY MEDICINE CLINIC | Facility: CLINIC | Age: 63
End: 2023-03-07
Payer: MEDICARE

## 2023-03-07 RX ORDER — ATORVASTATIN CALCIUM 80 MG/1
TABLET, FILM COATED ORAL
Qty: 90 TABLET | Refills: 1 | Status: SHIPPED | OUTPATIENT
Start: 2023-03-07

## 2023-03-07 RX ORDER — METOCLOPRAMIDE 10 MG/1
TABLET ORAL
Qty: 120 TABLET | Refills: 6 | Status: SHIPPED | OUTPATIENT
Start: 2023-03-07

## 2023-03-07 NOTE — TELEPHONE ENCOUNTER
Debbi, Spouse, dropped off FMLA forms to be completed so she can help assist patient with doctors appointments.     Debbi states FMLA is regarding stroke / diabetes.     Debbi states patient doesn't see very well since having his stroke in 2019. Debbi has to assist him with driving and walking.     Debbi would like forms faxed to [facility] once completed, fax # [phone].     $25 form fee has been collected.   Forms placed in PMJ / MA box in [facility] office.   Thank you!

## 2023-03-09 RX ORDER — APIXABAN 5 MG/1
TABLET, FILM COATED ORAL
Qty: 180 TABLET | Refills: 2 | Status: SHIPPED | OUTPATIENT
Start: 2023-03-09

## 2023-03-10 ENCOUNTER — TELEPHONE (OUTPATIENT)
Dept: FAMILY MEDICINE CLINIC | Facility: CLINIC | Age: 63
End: 2023-03-10
Payer: MEDICARE

## 2023-03-10 RX ORDER — MIDODRINE HYDROCHLORIDE 5 MG/1
5 TABLET ORAL 3 TIMES DAILY
Qty: 90 TABLET | Refills: 0 | Status: SHIPPED | OUTPATIENT
Start: 2023-03-10 | End: 2023-04-02

## 2023-03-10 NOTE — TELEPHONE ENCOUNTER
Incoming Refill Request      Medication requested (name and dose):   midodrine (PROAMATINE) 5 MG tablet  5 mg, 3 Times Daily         Pharmacy where request should be sent: [facility]/pharmacy #[id number] - [facility] [place] [address] [phone] [facility] [phone] [phone]    Additional details provided by patient: NONE    Best call back number: [phone]    Does the patient have less than a 3 day supply:  [x] Yes  [] No    Mackenzie Lange Rep  03/10/23, 09:27 EST

## 2023-03-10 NOTE — PROGRESS NOTES
[facility]        Patient Care Team:  Al Kimbrough MD as PCP - General  Al Kimbrough MD as PCP - Family Medicine    Chief Complaint:    Chief Complaint   Patient presents with   • Diabetes     Type 2, . 2.5 her PP, Novolin 70/30 60 units today         Subjective   Here for diabetes f/u  Blood sugars: in the mid 100's  Exercise program: using walker    Interval History:     Patient Complaints: L blurred vision since stroke. Not improving  Patient Denies: hypoglycemia  History taken from: patient    Review of Systems:   Review of Systems   HENT: Negative for trouble swallowing.    Eyes: Positive for blurred vision.   Gastrointestinal: Negative for nausea.   Endocrine: Negative for polyuria.   Musculoskeletal: Positive for gait problem.   Neurological: Negative for headache.     Lost  4 lb since last visit    Objective     Vital Signs      Vitals:    02/20/23 1506   BP: 120/84   Pulse: 108       Physical Exam:     General Appearance:    Alert, cooperative, in no acute distress   Head:    Normocephalic, without obvious abnormality, atraumatic   Eyes:            Lids and lashes normal, conjunctivae and sclerae normal, no   icterus, no pallor, corneas clear, PERRLA   Throat:   No oral lesions,  oral mucosa moist. Edentulous   Neck:   No adenopathy, supple, no thyromegaly, no carotid bruit   Lungs:     Clear     Heart:    Regular rhythm and normal rate   Chest Wall:    No abnormalities observed   Abdomen:     Normal bowel sounds, soft                 Extremities:   Hammer toes, coarse hand tremors, no edema               Pulses:   Pulses palpable and equal bilaterally   Skin:   Dry   Neurologic:  DTR absent in ankles, absent vibratory sense in toes, unable to feel the 10g monofilament ( same as 1y ago )            Results Review:    I have reviewed the patient's new clinical results, labs & imaging.    Medication Review:   Prior to Admission medications    Medication Sig Start  Date End Date Taking? Authorizing Provider   Accu-Chek Guide test strip USE TO CHECK BLOOD SUGAR TWICE DAILY 11/3/22  Yes Radha Manriquez MD   Cholecalciferol (HM Vitamin D3) 50 MCG (2000 UT) capsule Take 2,000 Units by mouth Daily.   Yes Jaylene Berger MD   Isopropyl Alcohol (Alcohol Wipes) 70 % misc Apply 1 each topically 3 (Three) Times a Day Before Meals. Dx code: E11.65 5/16/22  Yes Salazar Bower MD   Lancets (OneTouch Delica Plus [id number]) misc 1 each 3 (Three) Times a Day Before Meals. Dx code: E11.65 5/16/22  Yes Salazar Bower MD   meclizine (ANTIVERT) 25 MG tablet Take 1 tablet by mouth 3 (Three) Times a Day As Needed for Dizziness. 5/16/22  Yes Salazar Bower MD   metFORMIN ER (GLUCOPHAGE-XR) 500 MG 24 hr tablet TAKE 1 TABLET BY MOUTH THREE TIMES A DAY  Patient taking differently: 500 mg 2 (Two) Times a Day. 9/7/22  Yes Radha Manriquez MD   metoprolol tartrate (LOPRESSOR) 25 MG tablet Take 1 tablet by mouth Every 12 (Twelve) Hours. 5/16/22  Yes Salazar Bower MD   midodrine (PROAMATINE) 5 MG tablet Take 5 mg by mouth 3 (Three) Times a Day. As needed 5/30/22  Yes Jaylene Berger MD   NovoLIN 70/30 FlexPen (70-30) 100 UNIT/ML suspension pen-injector Inject 60 units ac breakfast & supper  5/31/22  Yes Jaylene Berger MD   omeprazole (priLOSEC) 20 MG capsule Take 40 mg by mouth 2 (Two) Times a Day.   Yes Jaylene Berger MD   traMADol (ULTRAM) 50 MG tablet Take 50 mg by mouth Every 6 (Six) Hours As Needed. 5/30/22  Yes Jaylene Berger MD   aspirin 81 MG EC tablet Take 1 tablet by mouth Daily. 12/12/19 3/9/23 Yes Farrah Crain MD   Xarelto 2.5 MG tablet TAKE 1 TABLET BY MOUTH 2 TIMES A DAY 12/2/22 3/9/23 Yes Al Kimbrough MD   atorvastatin (LIPITOR) 80 MG tablet TAKE 1 TABLET BY MOUTH EVERY DAY 3/7/23   Al Kimbrough MD   Eliquis 5 MG tablet tablet TAKE 1 TABLET BY MOUTH EVERY 12  (TWELVE) HOURS FOR 90 DAYS. 3/9/23   Al Kimbrough MD   metoclopramide (REGLAN) 10 MG tablet TAKE 1 TABLET BY MOUTH 4 (FOUR) TIMES A DAY BEFORE MEALS & AT BEDTIME FOR 30 DAYS. 3/7/23   Al Kimbrough MD       Lab Results (most recent)     Procedure Component Value Units Date/Time    POC Glucose [[id number]]  (Abnormal) Collected: 02/20/23 1513    Specimen: Blood Updated: 02/20/23 1515     Glucose 198 mg/dL      Comment: Serial Number: [id number]:  [id number]           Lab Results   Component Value Date    HGBA1C 7.2 (H) 02/13/2023    HGBA1C 8.0 (H) 07/25/2022    HGBA1C 7.6 (H) 05/12/2022      Lab Results   Component Value Date    GLUCOSE 120 (H) 02/13/2023    BUN 29 (H) 02/13/2023    CREATININE 1.47 (H) 02/13/2023    EGFRIFNONA 56 (L) 01/19/2022    BCR 19.7 02/13/2023    K 4.4 02/13/2023    CO2 29.8 (H) 02/13/2023    CALCIUM 9.8 02/13/2023    ALBUMIN 4.3 02/13/2023    LABIL2 0.8 (L) 05/26/2019    AST 24 02/13/2023    ALT 25 02/13/2023    CHOL 123 02/13/2023    LDL 59 02/13/2023    HDL 37 (L) 02/13/2023    TRIG 154 (H) 02/13/2023     Lab Results   Component Value Date    TSH 1.650 07/25/2022    FREET4 1.14 07/25/2022    FFYG94BE 33.9 07/25/2022     Microalb/cr ratio 839.3    Assessment & Plan     Diagnoses and all orders for this visit:    1. Type 2 diabetes mellitus with diabetic polyneuropathy, with long-term current use of insulin (HCC) (Primary)  -     Hemoglobin A1c; Future    2. Vitamin D deficiency  -     Vitamin D,25-Hydroxy; Future    3. Hyperlipidemia, mixed  -     Comprehensive Metabolic Panel; Future  -     TSH; Future    Other orders  -     POC Glucose    Glucose control & lipids improved. Will check vit D status next visit.    See eye doctor.  Continue exercise.  Continue diabetes & chol meds & vit D supplements.   Call if blood sugars are running under 100 or over 200.        Radha Manriquez MD  03/09/23  23:31 EST

## 2023-04-02 RX ORDER — MIDODRINE HYDROCHLORIDE 5 MG/1
5 TABLET ORAL 3 TIMES DAILY
Qty: 90 TABLET | Refills: 0 | Status: SHIPPED | OUTPATIENT
Start: 2023-04-02

## 2023-04-26 RX ORDER — MIDODRINE HYDROCHLORIDE 5 MG/1
5 TABLET ORAL 3 TIMES DAILY
Qty: 90 TABLET | Refills: 0 | Status: SHIPPED | OUTPATIENT
Start: 2023-04-26

## 2023-05-22 ENCOUNTER — APPOINTMENT (OUTPATIENT)
Dept: CT IMAGING | Facility: HOSPITAL | Age: 63
DRG: 871 | End: 2023-05-22
Payer: MEDICARE

## 2023-05-22 ENCOUNTER — APPOINTMENT (OUTPATIENT)
Dept: GENERAL RADIOLOGY | Facility: HOSPITAL | Age: 63
DRG: 871 | End: 2023-05-22
Payer: MEDICARE

## 2023-05-22 ENCOUNTER — HOSPITAL ENCOUNTER (INPATIENT)
Facility: HOSPITAL | Age: 63
LOS: 6 days | Discharge: NURSING, REHAB OR LONG-TERM CARE | DRG: 871 | End: 2023-05-28
Attending: EMERGENCY MEDICINE | Admitting: EMERGENCY MEDICINE
Payer: MEDICARE

## 2023-05-22 DIAGNOSIS — R29.810 FACIAL DROOP: Primary | ICD-10-CM

## 2023-05-22 DIAGNOSIS — R47.01 EXPRESSIVE APHASIA: ICD-10-CM

## 2023-05-22 LAB
ABO GROUP BLD: NORMAL
ALBUMIN SERPL-MCNC: 4 G/DL (ref 3.5–5.2)
ALBUMIN/GLOB SERPL: 1.1 G/DL
ALP SERPL-CCNC: 126 U/L (ref 39–117)
ALT SERPL W P-5'-P-CCNC: 25 U/L (ref 1–41)
ANION GAP SERPL CALCULATED.3IONS-SCNC: 14 MMOL/L (ref 5–15)
APTT PPP: 24.2 SECONDS (ref 24–31)
AST SERPL-CCNC: 22 U/L (ref 1–40)
BASOPHILS # BLD AUTO: 0 10*3/MM3 (ref 0–0.2)
BASOPHILS NFR BLD AUTO: 0.4 % (ref 0–1.5)
BILIRUB SERPL-MCNC: 0.4 MG/DL (ref 0–1.2)
BLD GP AB SCN SERPL QL: NEGATIVE
BUN SERPL-MCNC: 28 MG/DL (ref 8–23)
BUN/CREAT SERPL: 20.6 (ref 7–25)
CALCIUM SPEC-SCNC: 10 MG/DL (ref 8.6–10.5)
CHLORIDE SERPL-SCNC: 102 MMOL/L (ref 98–107)
CHOLEST SERPL-MCNC: 168 MG/DL (ref 0–200)
CO2 SERPL-SCNC: 25 MMOL/L (ref 22–29)
CREAT SERPL-MCNC: 1.36 MG/DL (ref 0.76–1.27)
DEPRECATED RDW RBC AUTO: 43.8 FL (ref 37–54)
EGFRCR SERPLBLD CKD-EPI 2021: 57.8 ML/MIN/1.73
EOSINOPHIL # BLD AUTO: 0.3 10*3/MM3 (ref 0–0.4)
EOSINOPHIL NFR BLD AUTO: 3.2 % (ref 0.3–6.2)
ERYTHROCYTE [DISTWIDTH] IN BLOOD BY AUTOMATED COUNT: 13.2 % (ref 12.3–15.4)
GLOBULIN UR ELPH-MCNC: 3.5 GM/DL
GLUCOSE BLDC GLUCOMTR-MCNC: 266 MG/DL (ref 70–105)
GLUCOSE SERPL-MCNC: 246 MG/DL (ref 65–99)
HBA1C MFR BLD: 8.5 % (ref 4.8–5.6)
HCT VFR BLD AUTO: 42.1 % (ref 37.5–51)
HDLC SERPL-MCNC: 37 MG/DL (ref 40–60)
HGB BLD-MCNC: 14.3 G/DL (ref 13–17.7)
HOLD SPECIMEN: NORMAL
HOLD SPECIMEN: NORMAL
INR PPP: <0.93 (ref 0.93–1.1)
LDLC SERPL CALC-MCNC: 69 MG/DL (ref 0–100)
LDLC/HDLC SERPL: 1.42 {RATIO}
LYMPHOCYTES # BLD AUTO: 2 10*3/MM3 (ref 0.7–3.1)
LYMPHOCYTES NFR BLD AUTO: 20 % (ref 19.6–45.3)
MCH RBC QN AUTO: 30.4 PG (ref 26.6–33)
MCHC RBC AUTO-ENTMCNC: 33.9 G/DL (ref 31.5–35.7)
MCV RBC AUTO: 89.5 FL (ref 79–97)
MONOCYTES # BLD AUTO: 0.6 10*3/MM3 (ref 0.1–0.9)
MONOCYTES NFR BLD AUTO: 6.2 % (ref 5–12)
NEUTROPHILS NFR BLD AUTO: 6.9 10*3/MM3 (ref 1.7–7)
NEUTROPHILS NFR BLD AUTO: 70.2 % (ref 42.7–76)
NRBC BLD AUTO-RTO: 0.1 /100 WBC (ref 0–0.2)
PLATELET # BLD AUTO: 350 10*3/MM3 (ref 140–450)
PMV BLD AUTO: 8.2 FL (ref 6–12)
POTASSIUM SERPL-SCNC: 4.7 MMOL/L (ref 3.5–5.2)
PROT SERPL-MCNC: 7.5 G/DL (ref 6–8.5)
PROTHROMBIN TIME: 9.8 SECONDS (ref 9.6–11.7)
RBC # BLD AUTO: 4.71 10*6/MM3 (ref 4.14–5.8)
RH BLD: POSITIVE
SODIUM SERPL-SCNC: 141 MMOL/L (ref 136–145)
T&S EXPIRATION DATE: NORMAL
TRIGL SERPL-MCNC: 392 MG/DL (ref 0–150)
TROPONIN T SERPL HS-MCNC: 22 NG/L
TROPONIN T SERPL HS-MCNC: 23 NG/L
TSH SERPL DL<=0.05 MIU/L-ACNC: 1.49 UIU/ML (ref 0.27–4.2)
VLDLC SERPL-MCNC: 62 MG/DL (ref 5–40)
WBC NRBC COR # BLD: 9.9 10*3/MM3 (ref 3.4–10.8)
WHOLE BLOOD HOLD COAG: NORMAL

## 2023-05-22 PROCEDURE — 82607 VITAMIN B-12: CPT | Performed by: PHYSICIAN ASSISTANT

## 2023-05-22 PROCEDURE — 25010000002 ONDANSETRON PER 1 MG: Performed by: PHYSICIAN ASSISTANT

## 2023-05-22 PROCEDURE — 84484 ASSAY OF TROPONIN QUANT: CPT

## 2023-05-22 PROCEDURE — 99285 EMERGENCY DEPT VISIT HI MDM: CPT

## 2023-05-22 PROCEDURE — 0042T HC CT CEREBRAL PERFUSION W/WO CONTRAST: CPT

## 2023-05-22 PROCEDURE — 93005 ELECTROCARDIOGRAM TRACING: CPT

## 2023-05-22 PROCEDURE — 84443 ASSAY THYROID STIM HORMONE: CPT | Performed by: PHYSICIAN ASSISTANT

## 2023-05-22 PROCEDURE — 85610 PROTHROMBIN TIME: CPT

## 2023-05-22 PROCEDURE — 82948 REAGENT STRIP/BLOOD GLUCOSE: CPT

## 2023-05-22 PROCEDURE — 70450 CT HEAD/BRAIN W/O DYE: CPT

## 2023-05-22 PROCEDURE — 70496 CT ANGIOGRAPHY HEAD: CPT

## 2023-05-22 PROCEDURE — 71045 X-RAY EXAM CHEST 1 VIEW: CPT

## 2023-05-22 PROCEDURE — 80053 COMPREHEN METABOLIC PANEL: CPT

## 2023-05-22 PROCEDURE — 80061 LIPID PANEL: CPT | Performed by: PHYSICIAN ASSISTANT

## 2023-05-22 PROCEDURE — 4A03X5D MEASUREMENT OF ARTERIAL FLOW, INTRACRANIAL, EXTERNAL APPROACH: ICD-10-PCS | Performed by: RADIOLOGY

## 2023-05-22 PROCEDURE — 86901 BLOOD TYPING SEROLOGIC RH(D): CPT

## 2023-05-22 PROCEDURE — 83036 HEMOGLOBIN GLYCOSYLATED A1C: CPT | Performed by: PHYSICIAN ASSISTANT

## 2023-05-22 PROCEDURE — 25510000001 IOPAMIDOL PER 1 ML: Performed by: EMERGENCY MEDICINE

## 2023-05-22 PROCEDURE — 85730 THROMBOPLASTIN TIME PARTIAL: CPT

## 2023-05-22 PROCEDURE — 86900 BLOOD TYPING SEROLOGIC ABO: CPT

## 2023-05-22 PROCEDURE — 85025 COMPLETE CBC W/AUTO DIFF WBC: CPT

## 2023-05-22 PROCEDURE — 86850 RBC ANTIBODY SCREEN: CPT

## 2023-05-22 PROCEDURE — 70498 CT ANGIOGRAPHY NECK: CPT

## 2023-05-22 PROCEDURE — 36415 COLL VENOUS BLD VENIPUNCTURE: CPT | Performed by: EMERGENCY MEDICINE

## 2023-05-22 RX ORDER — IBUPROFEN 600 MG/1
1 TABLET ORAL
Status: DISCONTINUED | OUTPATIENT
Start: 2023-05-22 | End: 2023-05-28 | Stop reason: HOSPADM

## 2023-05-22 RX ORDER — BISACODYL 10 MG
10 SUPPOSITORY, RECTAL RECTAL DAILY PRN
Status: DISCONTINUED | OUTPATIENT
Start: 2023-05-22 | End: 2023-05-28 | Stop reason: HOSPADM

## 2023-05-22 RX ORDER — SODIUM CHLORIDE 9 MG/ML
40 INJECTION, SOLUTION INTRAVENOUS AS NEEDED
Status: DISCONTINUED | OUTPATIENT
Start: 2023-05-22 | End: 2023-05-28 | Stop reason: HOSPADM

## 2023-05-22 RX ORDER — ACETAMINOPHEN 325 MG/1
650 TABLET ORAL EVERY 4 HOURS PRN
Status: DISCONTINUED | OUTPATIENT
Start: 2023-05-22 | End: 2023-05-28 | Stop reason: HOSPADM

## 2023-05-22 RX ORDER — ONDANSETRON 4 MG/1
4 TABLET, FILM COATED ORAL EVERY 6 HOURS PRN
Status: DISCONTINUED | OUTPATIENT
Start: 2023-05-22 | End: 2023-05-28 | Stop reason: HOSPADM

## 2023-05-22 RX ORDER — ASPIRIN 325 MG
325 TABLET ORAL DAILY
Status: DISCONTINUED | OUTPATIENT
Start: 2023-05-22 | End: 2023-05-26

## 2023-05-22 RX ORDER — PANTOPRAZOLE SODIUM 40 MG/1
40 TABLET, DELAYED RELEASE ORAL
Status: DISCONTINUED | OUTPATIENT
Start: 2023-05-23 | End: 2023-05-28 | Stop reason: HOSPADM

## 2023-05-22 RX ORDER — ATORVASTATIN CALCIUM 40 MG/1
80 TABLET, FILM COATED ORAL NIGHTLY
Status: DISCONTINUED | OUTPATIENT
Start: 2023-05-22 | End: 2023-05-28 | Stop reason: HOSPADM

## 2023-05-22 RX ORDER — METOCLOPRAMIDE 10 MG/1
10 TABLET ORAL
Status: DISCONTINUED | OUTPATIENT
Start: 2023-05-22 | End: 2023-05-28 | Stop reason: HOSPADM

## 2023-05-22 RX ORDER — ACETAMINOPHEN 325 MG/1
650 TABLET ORAL EVERY 4 HOURS PRN
Status: DISCONTINUED | OUTPATIENT
Start: 2023-05-22 | End: 2023-05-22 | Stop reason: SDUPTHER

## 2023-05-22 RX ORDER — ONDANSETRON 2 MG/ML
4 INJECTION INTRAMUSCULAR; INTRAVENOUS EVERY 6 HOURS PRN
Status: DISCONTINUED | OUTPATIENT
Start: 2023-05-22 | End: 2023-05-28 | Stop reason: HOSPADM

## 2023-05-22 RX ORDER — DEXTROSE MONOHYDRATE 25 G/50ML
25 INJECTION, SOLUTION INTRAVENOUS
Status: DISCONTINUED | OUTPATIENT
Start: 2023-05-22 | End: 2023-05-28 | Stop reason: HOSPADM

## 2023-05-22 RX ORDER — MECLIZINE HYDROCHLORIDE 25 MG/1
25 TABLET ORAL 3 TIMES DAILY PRN
Status: DISCONTINUED | OUTPATIENT
Start: 2023-05-22 | End: 2023-05-28 | Stop reason: HOSPADM

## 2023-05-22 RX ORDER — SODIUM CHLORIDE 0.9 % (FLUSH) 0.9 %
10 SYRINGE (ML) INJECTION AS NEEDED
Status: DISCONTINUED | OUTPATIENT
Start: 2023-05-22 | End: 2023-05-28 | Stop reason: HOSPADM

## 2023-05-22 RX ORDER — ACETAMINOPHEN 160 MG/5ML
650 SOLUTION ORAL EVERY 4 HOURS PRN
Status: DISCONTINUED | OUTPATIENT
Start: 2023-05-22 | End: 2023-05-28 | Stop reason: HOSPADM

## 2023-05-22 RX ORDER — ALUMINA, MAGNESIA, AND SIMETHICONE 2400; 2400; 240 MG/30ML; MG/30ML; MG/30ML
15 SUSPENSION ORAL EVERY 6 HOURS PRN
Status: DISCONTINUED | OUTPATIENT
Start: 2023-05-22 | End: 2023-05-28 | Stop reason: HOSPADM

## 2023-05-22 RX ORDER — BISACODYL 5 MG/1
5 TABLET, DELAYED RELEASE ORAL DAILY PRN
Status: DISCONTINUED | OUTPATIENT
Start: 2023-05-22 | End: 2023-05-28 | Stop reason: HOSPADM

## 2023-05-22 RX ORDER — INSULIN LISPRO 100 [IU]/ML
3-14 INJECTION, SOLUTION INTRAVENOUS; SUBCUTANEOUS
Status: DISCONTINUED | OUTPATIENT
Start: 2023-05-22 | End: 2023-05-24

## 2023-05-22 RX ORDER — POLYETHYLENE GLYCOL 3350 17 G/17G
17 POWDER, FOR SOLUTION ORAL DAILY PRN
Status: DISCONTINUED | OUTPATIENT
Start: 2023-05-22 | End: 2023-05-28 | Stop reason: HOSPADM

## 2023-05-22 RX ORDER — NICOTINE POLACRILEX 4 MG
15 LOZENGE BUCCAL
Status: DISCONTINUED | OUTPATIENT
Start: 2023-05-22 | End: 2023-05-28 | Stop reason: HOSPADM

## 2023-05-22 RX ORDER — CHOLECALCIFEROL (VITAMIN D3) 125 MCG
5 CAPSULE ORAL NIGHTLY PRN
Status: DISCONTINUED | OUTPATIENT
Start: 2023-05-22 | End: 2023-05-28 | Stop reason: HOSPADM

## 2023-05-22 RX ORDER — METFORMIN HYDROCHLORIDE 500 MG/1
500 TABLET, EXTENDED RELEASE ORAL 3 TIMES DAILY
COMMUNITY

## 2023-05-22 RX ORDER — ACETAMINOPHEN 650 MG/1
650 SUPPOSITORY RECTAL EVERY 4 HOURS PRN
Status: DISCONTINUED | OUTPATIENT
Start: 2023-05-22 | End: 2023-05-28 | Stop reason: HOSPADM

## 2023-05-22 RX ORDER — AMOXICILLIN 250 MG
2 CAPSULE ORAL 2 TIMES DAILY
Status: DISCONTINUED | OUTPATIENT
Start: 2023-05-22 | End: 2023-05-28 | Stop reason: HOSPADM

## 2023-05-22 RX ORDER — SODIUM CHLORIDE 0.9 % (FLUSH) 0.9 %
10 SYRINGE (ML) INJECTION EVERY 12 HOURS SCHEDULED
Status: DISCONTINUED | OUTPATIENT
Start: 2023-05-22 | End: 2023-05-28 | Stop reason: HOSPADM

## 2023-05-22 RX ORDER — ASPIRIN 300 MG/1
300 SUPPOSITORY RECTAL DAILY
Status: DISCONTINUED | OUTPATIENT
Start: 2023-05-22 | End: 2023-05-26

## 2023-05-22 RX ORDER — MIDODRINE HYDROCHLORIDE 5 MG/1
5 TABLET ORAL 3 TIMES DAILY PRN
Status: DISCONTINUED | OUTPATIENT
Start: 2023-05-22 | End: 2023-05-28 | Stop reason: HOSPADM

## 2023-05-22 RX ORDER — NITROGLYCERIN 0.4 MG/1
0.4 TABLET SUBLINGUAL
Status: DISCONTINUED | OUTPATIENT
Start: 2023-05-22 | End: 2023-05-28 | Stop reason: HOSPADM

## 2023-05-22 RX ORDER — ACETAMINOPHEN 650 MG/1
650 SUPPOSITORY RECTAL EVERY 4 HOURS PRN
Status: DISCONTINUED | OUTPATIENT
Start: 2023-05-22 | End: 2023-05-22 | Stop reason: SDUPTHER

## 2023-05-22 RX ADMIN — ONDANSETRON 4 MG: 2 INJECTION INTRAMUSCULAR; INTRAVENOUS at 21:00

## 2023-05-22 RX ADMIN — IOPAMIDOL 150 ML: 755 INJECTION, SOLUTION INTRAVENOUS at 17:59

## 2023-05-22 NOTE — H&P
[facility] Medicine Services      Patient Name: Chao Lazar  : 1960  MRN: [id number]  Primary Care Physician:  Al Kimbrough MD  Date of admission: 2023      Subjective      Chief Complaint: slurred speech    History of Present Illness: Chao Lazar is a 63 y.o. male who presented to [facility] on 2023 complaining of slurred speech and left-sided facial droop about 90 minutes prior to arrival.  Patient has a history of CVA with residual left-sided weakness however facial droop and slurred speech and worsening weakness left upper and lower extremity.  Expressive aphasia noted on initial exam as well.  Patient alert and oriented at this time.  Patient has obvious left-sided facial droop.  No reported recent cough, fever, chills, nausea, vomiting.  Of note, patient did complain of headache.  No new numbness tingling of extremities.  Patient does have peripheral neuropathy of bilateral lower extremities chronic..      In the ED, initial troponins 23, no prior to compare.  Patient typed and screened.  Glucose of 246, serum creatinine 1.36 which is about baseline for patient.  Coags obtained.  CBC largely unremarkable.  CT head shows no acute intracranial hemorrhage.  CT head and neck shows no hemodynamically significant stenosis or large vessel clot or aneurysms again intracranial circulation.  No hemodynamically significant stenosis, dissection or aneurysm use and extracranial circulation.  Unchanged chronic occlusion of right PCA.  Left PCA patent.  EKG shows sinus rhythm 95 bpm, no ST elevation apparent.  Patient is afebrile, pulse in the 90s, on room air oxygen 95% SPO2 and blood pressure 160s over 70s.  Neurology consulted, Dr. Keys in ED. No medications given in ED.          Review of Systems   Constitutional: Negative. Negative for chills and fever.   Cardiovascular: Negative.    Respiratory: Negative.  Negative for cough and shortness of breath.     Skin: Negative.    Musculoskeletal: Negative.    Gastrointestinal: Negative.  Negative for diarrhea and vomiting.   Genitourinary: Negative.    Neurological: Positive for focal weakness and headaches. Negative for numbness and vertigo.   Psychiatric/Behavioral: Negative.         Personal History     Past Medical History:   Diagnosis Date   • Asthma 3/22/2018   • Coronary artery disease involving native coronary artery of native heart without angina pectoris 8/27/2020   • Essential hypertension 6/28/2019   • Gastroparesis 12/23/2013   • History of CVA (cerebrovascular accident) 9/22/2021   • Hyperlipidemia, mixed 4/3/2017   • Hypoglycemia        Past Surgical History:   Procedure Laterality Date   • CARDIAC CATHETERIZATION     • CHOLECYSTECTOMY  2004   • COLON RESECTION      [id number]   • COLON RESECTION SMALL BOWEL Right 12/5/2019    Procedure: open right hemicolectomy;  Surgeon: Ronaldo Ardon MD;  Location[facility] OR;  Service: General   • COLONOSCOPY  2019    x2    • CORONARY ANGIOPLASTY WITH STENT PLACEMENT  04/2017   • ECHO - CONVERTED  2019   • ECHO - CONVERTED  2020   • FRACTURE SURGERY      collar bone as a child    • KNEE ARTHROSCOPY Left 08/2003   • KNEE JOINT MANIPULATION Left 04/2010   • TOTAL KNEE ARTHROPLASTY Bilateral     2013,2011       Family History: family history includes Anxiety disorder in his mother; Depression in his father; Hypertension in his father; Irritable bowel syndrome in his mother; Mental illness in his father; Other in his brother and sister. Otherwise pertinent FHx was reviewed and not pertinent to current issue.    Social History:  reports that he quit smoking about 16 years ago. His smoking use included cigarettes. He has quit using smokeless tobacco. He reports current alcohol use. He reports that he does not use drugs.    Home Medications:  Prior to Admission Medications     Prescriptions Last Dose Informant Patient Reported? Taking?    Accu-Chek Guide test  strip   No No    USE TO CHECK BLOOD SUGAR TWICE DAILY    atorvastatin (LIPITOR) 80 MG tablet   No No    TAKE 1 TABLET BY MOUTH EVERY DAY    Cholecalciferol (HM Vitamin D3) 50 MCG (2000 UT) capsule   Yes No    Take 2,000 Units by mouth Daily.    Eliquis 5 MG tablet tablet   No No    TAKE 1 TABLET BY MOUTH EVERY 12 (TWELVE) HOURS FOR 90 DAYS.    Isopropyl Alcohol (Alcohol Wipes) 70 % misc   No No    Apply 1 each topically 3 (Three) Times a Day Before Meals. Dx code: E11.65    Lancets (OneTouch Delica Plus [id number]) misc   No No    1 each 3 (Three) Times a Day Before Meals. Dx code: E11.65    meclizine (ANTIVERT) 25 MG tablet   No No    Take 1 tablet by mouth 3 (Three) Times a Day As Needed for Dizziness.    metFORMIN ER (GLUCOPHAGE-XR) 500 MG 24 hr tablet   No No    TAKE 1 TABLET BY MOUTH THREE TIMES A DAY    Patient taking differently:  500 mg 2 (Two) Times a Day.    metoclopramide (REGLAN) 10 MG tablet   No No    TAKE 1 TABLET BY MOUTH 4 (FOUR) TIMES A DAY BEFORE MEALS & AT BEDTIME FOR 30 DAYS.    metoprolol tartrate (LOPRESSOR) 25 MG tablet   No No    Take 1 tablet by mouth Every 12 (Twelve) Hours.    midodrine (PROAMATINE) 5 MG tablet   No No    TAKE 1 TABLET BY MOUTH 3 (THREE) TIMES A DAY AS NEEDED    NovoLIN 70/30 FlexPen (70-30) 100 UNIT/ML suspension pen-injector   Yes No    Inject 60 units ac breakfast & supper     omeprazole (priLOSEC) 20 MG capsule   Yes No    Take 40 mg by mouth 2 (Two) Times a Day.    traMADol (ULTRAM) 50 MG tablet   Yes No    Take 50 mg by mouth Every 6 (Six) Hours As Needed.            Allergies:  No Known Allergies    Objective      Vitals:   Temp:  [98.6 °F (37 °C)-102 °F (38.9 °C)] 102 °F (38.9 °C)  Heart Rate:  [] 124  Resp:  [16-25] 24  BP: (130-182)/() 130/73    Physical Exam  Vitals and nursing note reviewed.   Constitutional:       General: He is not in acute distress.     Appearance: He is well-developed. He is not diaphoretic.   HENT:      Head: Normocephalic and  atraumatic.      Right Ear: External ear normal.      Left Ear: External ear normal.      Nose: Nose normal.      Mouth/Throat:      Mouth: Mucous membranes are moist.   Eyes:      Extraocular Movements: Extraocular movements intact.      Conjunctiva/sclera: Conjunctivae normal.      Pupils: Pupils are equal, round, and reactive to light.   Cardiovascular:      Rate and Rhythm: Normal rate and regular rhythm.      Pulses: Normal pulses.      Heart sounds: Normal heart sounds.      Comments: S1, S2 audible.  Pulmonary:      Effort: Pulmonary effort is normal. No respiratory distress.      Breath sounds: Normal breath sounds. No wheezing, rhonchi or rales.      Comments: On room air.  Abdominal:      General: Bowel sounds are normal. There is no distension.      Palpations: Abdomen is soft.      Tenderness: There is no abdominal tenderness. There is no guarding or rebound.   Musculoskeletal:         General: No tenderness or deformity. Normal range of motion.      Cervical back: Normal range of motion.   Skin:     General: Skin is warm.      Capillary Refill: Capillary refill takes less than 2 seconds.      Findings: No erythema or rash.   Neurological:      General: No focal deficit present.      Mental Status: He is alert and oriented to person, place, and time.      Cranial Nerves: Cranial nerve deficit present.      Sensory: No sensory deficit.      Motor: Weakness present.      Comments: Patient has obvious  strength weakness of the left upper extremity compared to right.  Patient has no obvious discrepancy and bilateral lower extremities.  Patient has obvious left-sided facial droop and tongue deviation.  Patient has very mild slurred speech on my exam as well. Follows commands.   Psychiatric:         Mood and Affect: Mood normal.         Behavior: Behavior normal.          Result Review    Result Review:  I have personally reviewed the results from the time of this admission to 5/23/2023 04:59 EDT and agree  with these findings:  [x]  Laboratory  [x]  Microbiology  [x]  Radiology  [x]  EKG/Telemetry   []  Cardiology/Vascular   []  Pathology  []  Old records  []  Other:        Assessment & Plan        Active Hospital Problems:  Active Hospital Problems    Diagnosis    • **Facial droop    • CKD (chronic kidney disease), stage II    • History of CVA (cerebrovascular accident)    • Left-sided weakness    • Coronary artery disease involving native coronary artery of native heart without angina pectoris    • Essential hypertension    • Hyperlipidemia, mixed    • Type 2 diabetes mellitus with diabetic polyneuropathy, with long-term current use of insulin (HCC)      Plan:       Left-sided facial droop, expressive aphasia, history of CVA with left-sided weakness with worsening left-sided weakness   - CT head shows no acute intracranial hemorrhage.   - CT head and neck shows no hemodynamically significant stenosis or large vessel clot or aneurysms again intracranial circulation.  No hemodynamically significant stenosis, dissection or aneurysm use and extracranial circulation.  Unchanged chronic occlusion of right PCA.  Left PCA patent.   -CT perfusion study shows patient has old infarct in right occipital lobe as seen on prior MRI, head CT and CT angiogram head and neck.  Mismatch volume likely related to prior infarct.  No new perfusion defects are identified.     -Patient is afebrile, pulse in the 90s, on room air oxygen 95% SPO2 and blood pressure 160s over 70s.    -Neurology consulted, Dr. Keys in ED.   -No medications given in ED.  -tPA not given as patient is on Eliquis due to previous stroke  -Continue home Eliquis,   -Check MRI brain  -Check echo, lipid panel, TSH, A1c, B12  -Neurochecks   -PT/ST/OT consult  -Fall precautions  -Continuous cardiac monitoring  -N.p.o. until passes swallow screen, otherwise diabetic heart healthy diet    Diabetes mellitus type 2 with hyperglycemia  -Not in DKA upon admission  -Glucose of  246   -SSI, Accu-Cheks 3 times daily AC  -Check A1c  -Hold home metformin  -Continue home Novolin    Elevated troponin, History of CAD status post stent  -initial troponins 23, no prior to compare.    -EKG shows sinus rhythm 95 bpm, no ST elevation apparent.  -Check serial troponins    After patient had been admitted, patient was found to have a fever and tachycardic.  -CXR shows question infiltrate versus atelectasis of left lower lobe  -Check blood cultures x2, lactic, Pro-Abhilash, UA  -1 L NS bolus ordered   - tylenol for fever    CKD stage II, at baseline   -serum creatinine 1.36 which is about baseline for patient.      Hyperlipidemia  -Check lipid panel  -Continue home statin    Essential hypertension, chronic, poorly controlled  -Continue home Lopressor    Paroxysmal hypotension  -Continue home midodrine as needed.    GERD  -Continue home PPI    History of vertigo  -Continue home meclizine    Chronic pain  -Continue home tramadol,        DVT prophylaxis: home eliquis    CODE STATUS:    Code Status (Patient has no pulse and is not breathing): CPR (Attempt to Resuscitate)  Medical Interventions (Patient has pulse or is breathing): Full Support    Admission Status:  I believe this patient meets inpatient status.    I discussed the patient's findings and my recommendations with patient.    This patient has been examined wearing appropriate Personal Protective Equipment and discussed with hospital infection control department. 05/23/23      Signature: Electronically signed by RYAN Auguste, 05/22/23, 8:32 PM EDT.

## 2023-05-22 NOTE — Clinical Note
Level of Care: Telemetry [5]   Admitting Physician: DERICK WATSON [[id number]]   Attending Physician: DERICK WATSON [[id number]]   Bed Request Comments: IRMA

## 2023-05-22 NOTE — ED PROVIDER NOTES
Subjective   History of Present Illness  Chief Complaint: Left-sided facial droop, slurred speech      HPI: Patient is a 63-year-old male who presents to the emergency room by EMS he has a history of CVA with minimal left-sided residual weakness.  90 minutes prior to his arrival he had sudden onset left-sided facial droop with slurred speech, family reports worsening left-sided extremity weakness.  Expressive aphasia noted at initial exam.    PCP: Luis E     History provided by:  Relative and EMS personnel      Review of Systems   Unable to perform ROS: Mental status change (ROS limited due to mental status change.)   Respiratory: Negative for shortness of breath.    Cardiovascular: Negative for chest pain.   Gastrointestinal: Negative for abdominal pain.   Neurological: Positive for facial asymmetry and weakness.       Past Medical History:   Diagnosis Date   • Asthma 3/22/2018   • Coronary artery disease involving native coronary artery of native heart without angina pectoris 8/27/2020   • Essential hypertension 6/28/2019   • Gastroparesis 12/23/2013   • History of CVA (cerebrovascular accident) 9/22/2021   • Hyperlipidemia, mixed 4/3/2017   • Hypoglycemia        No Known Allergies    Past Surgical History:   Procedure Laterality Date   • CARDIAC CATHETERIZATION     • CHOLECYSTECTOMY  2004   • COLON RESECTION      [id number]   • COLON RESECTION SMALL BOWEL Right 12/5/2019    Procedure: open right hemicolectomy;  Surgeon: Ronaldo Ardon MD;  Location: [facility];  Service: General   • COLONOSCOPY  2019    x2    • CORONARY ANGIOPLASTY WITH STENT PLACEMENT  04/2017   • ECHO - CONVERTED  2019   • ECHO - CONVERTED  2020   • FRACTURE SURGERY      collar bone as a child    • KNEE ARTHROSCOPY Left 08/2003   • KNEE JOINT MANIPULATION Left 04/2010   • TOTAL KNEE ARTHROPLASTY Bilateral     2013,2011       Family History   Problem Relation Age of Onset   • Irritable bowel syndrome Mother    • Anxiety disorder  Mother    • Depression Father    • Hypertension Father    • Mental illness Father         committed suicide   • Other Sister         back problems   • Other Brother         back problems       Social History     Socioeconomic History   • Marital status:    Tobacco Use   • Smoking status: Former     Types: Cigarettes     Quit date:      Years since quittin.3   • Smokeless tobacco: Former   Vaping Use   • Vaping Use: Never used   Substance and Sexual Activity   • Alcohol use: Yes     Comment: occasionally   • Drug use: No   • Sexual activity: Defer           Objective   Physical Exam  Vitals reviewed.   Constitutional:       Appearance: He is ill-appearing.   HENT:      Head: Normocephalic.      Nose: Nose normal.      Mouth/Throat:      Mouth: Mucous membranes are dry.   Eyes:      Extraocular Movements: Extraocular movements intact.      Pupils: Pupils are equal, round, and reactive to light.   Neurological:      Mental Status: He is alert.      Cranial Nerves: Facial asymmetry (left sided facial droop) present.      Motor: Weakness (left upper extremity weakness) present.      Comments: Expressive aphasia   Psychiatric:         Speech: Speech is slurred.         Behavior: Behavior is slowed.         Procedures  EKG obtained, per my interpretation sinus rhythm with a rate of 95 no ST elevation noted in comparison to previous that was obtained 2022 sinus tachycardia with a rate of 125, corroborated with Dr. Bryant who agrees with interpretation.                 ED Course  ED Course as of 05/22/23 1931   Mon May 22, 2023   0573 I evaluated the patient in the ambulance bay, last known well 90 minutes prior to arrival history of CVA with left-sided deficits.  Left-sided facial droop with slurred speech, with increased left-sided weakness currently on Eliquis reportedly complaint    Code stroke initiated []   1736 Delay in consult with neurology as  had not placed call  [BH]   1738 Spoke  "with Dr. Wells, patient is not a tPA candidate as he is on anticoagulation therapy.  CT CTA and perfusion ordered, plan for admission for monitoring. []   1827 HS Troponin T(!): 23 []      ED Course User Index  [] Danii Palomares APRN           /78   Pulse 99   Resp 18   Ht 185.4 cm (73\")   Wt 95.3 kg (210 lb)   SpO2 96%   BMI 27.71 kg/m²   Labs Reviewed   COMPREHENSIVE METABOLIC PANEL - Abnormal; Notable for the following components:       Result Value    Glucose 246 (*)     BUN 28 (*)     Creatinine 1.36 (*)     Alkaline Phosphatase 126 (*)     eGFR 57.8 (*)     All other components within normal limits    Narrative:     GFR Normal >60  Chronic Kidney Disease <60  Kidney Failure <15     PROTIME-INR - Abnormal; Notable for the following components:    INR <0.93 (*)     All other components within normal limits   SINGLE HSTROPONIN T - Abnormal; Notable for the following components:    HS Troponin T 23 (*)     All other components within normal limits    Narrative:     High Sensitive Troponin T Reference Range:  <10.0 ng/L- Negative Female for AMI  <15.0 ng/L- Negative Male for AMI  >=10 - Abnormal Female indicating possible myocardial injury.  >=15 - Abnormal Male indicating possible myocardial injury.   Clinicians would have to utilize clinical acumen, EKG, Troponin, and serial changes to determine if it is an Acute Myocardial Infarction or myocardial injury due to an underlying chronic condition.        APTT - Normal   CBC WITH AUTO DIFFERENTIAL - Normal   RAINBOW DRAW    Narrative:     The following orders were created for panel order [place] Draw.  Procedure                               Abnormality         Status                     ---------                               -----------         ------                     Green Top (Gel)[[id number]]                                  Final result               Lavender Top[[id number]]                                                              "   Gold Top - SST[[id number]]                                   Final result               Light Blue Top[[id number]]                                   Final result                 Please view results for these tests on the individual orders.   SINGLE HSTROPONIN T   POCT GLUCOSE FINGERSTICK   TYPE AND SCREEN   BB ARMBAND CHECK   CBC AND DIFFERENTIAL    Narrative:     The following orders were created for panel order CBC & Differential.  Procedure                               Abnormality         Status                     ---------                               -----------         ------                     CBC Auto Differential[[id number]]        Normal              Final result                 Please view results for these tests on the individual orders.   GREEN TOP   GOLD TOP - SST   LIGHT BLUE TOP     Medications   sodium chloride 0.9 % flush 10 mL (has no administration in time range)   iopamidol (ISOVUE-370) 76 % injection 150 mL (150 mL Intravenous Given 5/22/23 1759)     CT Angiogram Neck    Result Date: 5/22/2023  1.No hemodynamically significant stenosis, large vessel cut or aneurysms in intracranial circulation 2.No hemodynamically significant stenosis, dissection or aneurysms in extracranial circulation. 3.Unchanged chronic occlusion of right PCA. Left PCA is patent. Electronically Signed: [email or web address]  5/22/2023 6:21 PM EDT  Workstation ID: [id number]    XR Chest 1 View    Result Date: 5/22/2023  Impression: Cardiomegaly. Mild vascular congestion. Left basilar opacities may represent atelectasis or infiltrates. No pneumothorax. 1. Electronically Signed: [email or web address]  5/22/2023 6:58 PM EDT  Workstation ID: [id number]    CT Head Without Contrast Stroke Protocol    Result Date: 5/22/2023  1.No acute intracranial hemorrhage. Calvarium is intact. 2.Chronic findings as detailed above. Electronically Signed: [email or web address]  5/22/2023 5:44 PM EDT  Workstation ID: [id number]    CT Angiogram Head w AI Analysis of  LVO    Result Date: 5/22/2023  1.No hemodynamically significant stenosis, large vessel cut or aneurysms in intracranial circulation 2.No hemodynamically significant stenosis, dissection or aneurysms in extracranial circulation. 3.Unchanged chronic occlusion of right PCA. Left PCA is patent. Electronically Signed: Andrew Ali  5/22/2023 6:21 PM EDT  Workstation ID: [id number]    CT CEREBRAL PERFUSION WITH & WITHOUT CONTRAST    Result Date: 5/22/2023  Impression: 1.Please note that patient has old infarct in right occipital lobe as seen on prior MRI, head CT and CT angiogram head and neck. Mismatch volume likely related to prior infarct. No new perfusion defects are identified. Electronically Signed: Andrew Amarjit  5/22/2023 6:27 PM EDT  Workstation ID: [id number]                                    Medical Decision Making  This patient presented to the emergency room with left-sided facial droop, slurred speech and expressive aphasia.  He has a known history of CVA, family states that at the time of his previous stroke he had left-sided weakness he had had minimal residual weakness.  Patient is currently on Eliquis, does not qualify for tPA.  Code stroke was initiated, IV was established, CBC noted no leukocytosis.  Creatinine slightly elevated at 1.36 though with review of chart this appears chronic and is actually improved.  CT images obtained of the head and neck, perfusion noted chronic infarct without acute changes.  Spoke with Dr. Wells with neurology throughout patient's emergency room stay.  High-sensitivity troponin elevated at 23 pending delta.  Spoke with Chente DAVIS with the hospitalist, admitting physician Dr. Ritter.     Chart review:5/12/2023 MRI brain  1. Chronic right PCA distribution infarct.  2. No acute intracranial pathology  3. Changes of mild atrophy and microvascular ischemic disease.    CT Head  1. Generalized atrophy with chronic periventricular and thalamic deep  white matter ischemic  changes.  2. Focal area of chronic encephalomalacia in the right parieto-occipital  lobe.  3. No acute intracranial findings.      This document is intended for medical expert use only. Reading of this document by patients and/or patient's family without participating medical staff guidance may result in misinterpretation and unintended morbidity. Any interpretation of such data is the responsibility of the patient and/or family member responsible for the patient in concert with their primary or specialist providers, not to be left for sources of online searches such as [employer] or similar queries. Relying on these approaches to knowledge may result in misinterpretation, misguided goals of care and even death should patients or family members try recommendations outside of the realm of professional medical care in a supervised inpatient environment.     Note: Please forgive punctuation, typographic/voice recognition errors, as portions of this document were created with Dragon Dictation, a voice recognition software.    Appropriate PPE worn during exam.    Expressive aphasia: complicated acute illness or injury  Facial droop: complicated acute illness or injury  Amount and/or Complexity of Data Reviewed  Labs: ordered. Decision-making details documented in ED Course.  Radiology: ordered.  ECG/medicine tests: ordered.      Risk  Prescription drug management.  Decision regarding hospitalization.          Final diagnoses:   Facial droop   Expressive aphasia       ED Disposition  ED Disposition     ED Disposition   Decision to Admit    Condition   --    Comment   Level of Care: Telemetry [5]   Admitting Physician: DERICK WATSON [[id number]]   Attending Physician: DERICK WATSON [[id number]]   Bed Request Comments: IRMA               No follow-up provider specified.       Medication List      No changes were made to your prescriptions during this visit.         Danii Palomares, APRN  05/22/23 1931

## 2023-05-23 ENCOUNTER — APPOINTMENT (OUTPATIENT)
Dept: INTERVENTIONAL RADIOLOGY/VASCULAR | Facility: HOSPITAL | Age: 63
DRG: 871 | End: 2023-05-23
Payer: MEDICARE

## 2023-05-23 ENCOUNTER — APPOINTMENT (OUTPATIENT)
Dept: GENERAL RADIOLOGY | Facility: HOSPITAL | Age: 63
DRG: 871 | End: 2023-05-23
Payer: MEDICARE

## 2023-05-23 ENCOUNTER — APPOINTMENT (OUTPATIENT)
Dept: MRI IMAGING | Facility: HOSPITAL | Age: 63
DRG: 871 | End: 2023-05-23
Payer: MEDICARE

## 2023-05-23 PROBLEM — A41.9 SEPSIS: Status: ACTIVE | Noted: 2023-05-23

## 2023-05-23 LAB
ANION GAP SERPL CALCULATED.3IONS-SCNC: 18 MMOL/L (ref 5–15)
APPEARANCE CSF: CLEAR
ARTERIAL PATENCY WRIST A: POSITIVE
ATMOSPHERIC PRESS: ABNORMAL MM[HG]
B PARAPERT DNA SPEC QL NAA+PROBE: NOT DETECTED
B PERT DNA SPEC QL NAA+PROBE: NOT DETECTED
BACTERIA UR QL AUTO: ABNORMAL /HPF
BASE EXCESS BLDA CALC-SCNC: -1.1 MMOL/L (ref 0–3)
BASOPHILS # BLD AUTO: 0 10*3/MM3 (ref 0–0.2)
BASOPHILS NFR BLD AUTO: 0.1 % (ref 0–1.5)
BDY SITE: ABNORMAL
BILIRUB UR QL STRIP: NEGATIVE
BUN SERPL-MCNC: 29 MG/DL (ref 8–23)
BUN/CREAT SERPL: 20.1 (ref 7–25)
C GATTII+NEOFOR DNA CSF QL NAA+NON-PROBE: NOT DETECTED
C PNEUM DNA NPH QL NAA+NON-PROBE: NOT DETECTED
CALCIUM SPEC-SCNC: 9.6 MG/DL (ref 8.6–10.5)
CHLORIDE SERPL-SCNC: 101 MMOL/L (ref 98–107)
CLARITY UR: CLEAR
CMV DNA CSF QL NAA+PROBE: NOT DETECTED
CO2 BLDA-SCNC: 23.1 MMOL/L (ref 22–29)
CO2 SERPL-SCNC: 22 MMOL/L (ref 22–29)
COLOR CSF: COLORLESS
COLOR UR: YELLOW
CREAT SERPL-MCNC: 1.44 MG/DL (ref 0.76–1.27)
D-LACTATE SERPL-SCNC: 2 MMOL/L (ref 0.5–2)
D-LACTATE SERPL-SCNC: 2.5 MMOL/L (ref 0.5–2)
DEPRECATED RDW RBC AUTO: 43.8 FL (ref 37–54)
E COLI K1 DNA CSF QL NAA+NON-PROBE: NOT DETECTED
EGFRCR SERPLBLD CKD-EPI 2021: 53.9 ML/MIN/1.73
EOSINOPHIL # BLD AUTO: 0 10*3/MM3 (ref 0–0.4)
EOSINOPHIL NFR BLD AUTO: 0 % (ref 0.3–6.2)
ERYTHROCYTE [DISTWIDTH] IN BLOOD BY AUTOMATED COUNT: 13.3 % (ref 12.3–15.4)
EV RNA CSF QL NAA+PROBE: NOT DETECTED
FLUAV SUBTYP SPEC NAA+PROBE: NOT DETECTED
FLUBV RNA ISLT QL NAA+PROBE: NOT DETECTED
GEN 5 2HR TROPONIN T REFLEX: 25 NG/L
GLUCOSE BLDC GLUCOMTR-MCNC: 180 MG/DL (ref 70–105)
GLUCOSE BLDC GLUCOMTR-MCNC: 183 MG/DL (ref 70–105)
GLUCOSE BLDC GLUCOMTR-MCNC: 205 MG/DL (ref 70–105)
GLUCOSE BLDC GLUCOMTR-MCNC: 228 MG/DL (ref 70–105)
GLUCOSE CSF-MCNC: 135 MG/DL (ref 40–70)
GLUCOSE SERPL-MCNC: 258 MG/DL (ref 65–99)
GLUCOSE UR STRIP-MCNC: ABNORMAL MG/DL
GP B STREP DNA SPEC QL NAA+PROBE: NOT DETECTED
HADV DNA SPEC NAA+PROBE: NOT DETECTED
HAEM INFLU SEROTYP DNA SPEC NAA+PROBE: NOT DETECTED
HCO3 BLDA-SCNC: 22.1 MMOL/L (ref 21–28)
HCOV 229E RNA SPEC QL NAA+PROBE: NOT DETECTED
HCOV HKU1 RNA SPEC QL NAA+PROBE: NOT DETECTED
HCOV NL63 RNA SPEC QL NAA+PROBE: NOT DETECTED
HCOV OC43 RNA SPEC QL NAA+PROBE: NOT DETECTED
HCT VFR BLD AUTO: 39.4 % (ref 37.5–51)
HEMODILUTION: NO
HGB BLD-MCNC: 13.6 G/DL (ref 13–17.7)
HGB UR QL STRIP.AUTO: ABNORMAL
HHV6 DNA CSF QL NAA+PROBE: NOT DETECTED
HMPV RNA NPH QL NAA+NON-PROBE: NOT DETECTED
HPIV1 RNA ISLT QL NAA+PROBE: NOT DETECTED
HPIV2 RNA SPEC QL NAA+PROBE: NOT DETECTED
HPIV3 RNA NPH QL NAA+PROBE: NOT DETECTED
HPIV4 P GENE NPH QL NAA+PROBE: NOT DETECTED
HSV1 DNA CSF QL NAA+PROBE: NOT DETECTED
HSV2 DNA CSF QL NAA+PROBE: NOT DETECTED
HYALINE CASTS UR QL AUTO: ABNORMAL /LPF
INHALED O2 CONCENTRATION: 21 %
KETONES UR QL STRIP: NEGATIVE
L MONOCYTOG RRNA SPEC QL PROBE: NOT DETECTED
L PNEUMO1 AG UR QL IA: NEGATIVE
LEUKOCYTE ESTERASE UR QL STRIP.AUTO: NEGATIVE
LYMPHOCYTES # BLD AUTO: 0.8 10*3/MM3 (ref 0.7–3.1)
LYMPHOCYTES NFR BLD AUTO: 6.4 % (ref 19.6–45.3)
M PNEUMO IGG SER IA-ACNC: NOT DETECTED
MCH RBC QN AUTO: 30.8 PG (ref 26.6–33)
MCHC RBC AUTO-ENTMCNC: 34.4 G/DL (ref 31.5–35.7)
MCV RBC AUTO: 89.5 FL (ref 79–97)
METHOD: ABNORMAL
MODALITY: ABNORMAL
MONOCYTES # BLD AUTO: 0.4 10*3/MM3 (ref 0.1–0.9)
MONOCYTES NFR BLD AUTO: 3.4 % (ref 5–12)
MRSA DNA SPEC QL NAA+PROBE: NORMAL
N MEN DNA SPEC QL NAA+PROBE: NOT DETECTED
NEUTROPHILS NFR BLD AUTO: 11.9 10*3/MM3 (ref 1.7–7)
NEUTROPHILS NFR BLD AUTO: 90.1 % (ref 42.7–76)
NITRITE UR QL STRIP: NEGATIVE
NRBC BLD AUTO-RTO: 0 /100 WBC (ref 0–0.2)
NUC CELL # CSF MANUAL: 3 /MM3 (ref 0–5)
PARECHOVIRUS A RNA CSF QL NAA+NON-PROBE: NOT DETECTED
PCO2 BLDA: 32.2 MM HG (ref 35–48)
PH BLDA: 7.45 PH UNITS (ref 7.35–7.45)
PH UR STRIP.AUTO: 5.5 [PH] (ref 5–8)
PLATELET # BLD AUTO: 358 10*3/MM3 (ref 140–450)
PMV BLD AUTO: 8.4 FL (ref 6–12)
PO2 BLDA: 81.8 MM HG (ref 83–108)
POTASSIUM SERPL-SCNC: 4.3 MMOL/L (ref 3.5–5.2)
PROCALCITONIN SERPL-MCNC: 0.11 NG/ML (ref 0–0.25)
PROT CSF-MCNC: 155 MG/DL (ref 15–45)
PROT UR QL STRIP: ABNORMAL
QT INTERVAL: 358 MS
RBC # BLD AUTO: 4.41 10*6/MM3 (ref 4.14–5.8)
RBC # CSF MANUAL: 316 /MM3 (ref 0–5)
RBC # UR STRIP: ABNORMAL /HPF
REF LAB TEST METHOD: ABNORMAL
RHINOVIRUS RNA SPEC NAA+PROBE: NOT DETECTED
RSV RNA NPH QL NAA+NON-PROBE: NOT DETECTED
S PNEUM AG SPEC QL LA: NEGATIVE
S PNEUM DNA CSF QL NAA+NON-PROBE: NOT DETECTED
SAO2 % BLDCOA: 96.6 % (ref 94–98)
SARS-COV-2 RNA NPH QL NAA+NON-PROBE: NOT DETECTED
SODIUM SERPL-SCNC: 141 MMOL/L (ref 136–145)
SP GR UR STRIP: 1.04 (ref 1–1.03)
SPECIMEN VOL CSF: 1 ML
SQUAMOUS #/AREA URNS HPF: ABNORMAL /HPF
TROPONIN T DELTA: 1 NG/L
TROPONIN T SERPL HS-MCNC: 24 NG/L
TUBE # CSF: 1
UROBILINOGEN UR QL STRIP: ABNORMAL
VIT B12 BLD-MCNC: 290 PG/ML (ref 211–946)
VZV DNA CSF QL NAA+PROBE: NOT DETECTED
WBC # UR STRIP: ABNORMAL /HPF
WBC NRBC COR # BLD: 13.2 10*3/MM3 (ref 3.4–10.8)

## 2023-05-23 PROCEDURE — 99221 1ST HOSP IP/OBS SF/LOW 40: CPT | Performed by: PSYCHIATRY & NEUROLOGY

## 2023-05-23 PROCEDURE — 84484 ASSAY OF TROPONIN QUANT: CPT | Performed by: PHYSICIAN ASSISTANT

## 2023-05-23 PROCEDURE — 81001 URINALYSIS AUTO W/SCOPE: CPT | Performed by: INTERNAL MEDICINE

## 2023-05-23 PROCEDURE — 87641 MR-STAPH DNA AMP PROBE: CPT | Performed by: STUDENT IN AN ORGANIZED HEALTH CARE EDUCATION/TRAINING PROGRAM

## 2023-05-23 PROCEDURE — 87205 SMEAR GRAM STAIN: CPT | Performed by: NURSE PRACTITIONER

## 2023-05-23 PROCEDURE — 82945 GLUCOSE OTHER FLUID: CPT | Performed by: NURSE PRACTITIONER

## 2023-05-23 PROCEDURE — 80048 BASIC METABOLIC PNL TOTAL CA: CPT | Performed by: PHYSICIAN ASSISTANT

## 2023-05-23 PROCEDURE — 71045 X-RAY EXAM CHEST 1 VIEW: CPT

## 2023-05-23 PROCEDURE — 83605 ASSAY OF LACTIC ACID: CPT | Performed by: PHYSICIAN ASSISTANT

## 2023-05-23 PROCEDURE — 0 LIDOCAINE 1 % SOLUTION: Performed by: RADIOLOGY

## 2023-05-23 PROCEDURE — 63710000001 INSULIN ISOPHANE & REGULAR PER 5 UNITS: Performed by: PHYSICIAN ASSISTANT

## 2023-05-23 PROCEDURE — 63710000001 INSULIN LISPRO (HUMAN) PER 5 UNITS: Performed by: PHYSICIAN ASSISTANT

## 2023-05-23 PROCEDURE — 89050 BODY FLUID CELL COUNT: CPT | Performed by: NURSE PRACTITIONER

## 2023-05-23 PROCEDURE — 25010000002 ACYCLOVIR PER 5 MG: Performed by: INTERNAL MEDICINE

## 2023-05-23 PROCEDURE — 87040 BLOOD CULTURE FOR BACTERIA: CPT | Performed by: PHYSICIAN ASSISTANT

## 2023-05-23 PROCEDURE — 82948 REAGENT STRIP/BLOOD GLUCOSE: CPT

## 2023-05-23 PROCEDURE — 77002 NEEDLE LOCALIZATION BY XRAY: CPT

## 2023-05-23 PROCEDURE — 25010000002 VANCOMYCIN HCL IN NACL 2-0.9 GM/500ML-% SOLUTION: Performed by: STUDENT IN AN ORGANIZED HEALTH CARE EDUCATION/TRAINING PROGRAM

## 2023-05-23 PROCEDURE — 87015 SPECIMEN INFECT AGNT CONCNTJ: CPT | Performed by: NURSE PRACTITIONER

## 2023-05-23 PROCEDURE — 009U3ZX DRAINAGE OF SPINAL CANAL, PERCUTANEOUS APPROACH, DIAGNOSTIC: ICD-10-PCS | Performed by: PSYCHIATRY & NEUROLOGY

## 2023-05-23 PROCEDURE — 25010000002 MEROPENEM PER 100 MG: Performed by: INTERNAL MEDICINE

## 2023-05-23 PROCEDURE — 0 CEFEPIME PER 500 MG: Performed by: STUDENT IN AN ORGANIZED HEALTH CARE EDUCATION/TRAINING PROGRAM

## 2023-05-23 PROCEDURE — 87070 CULTURE OTHR SPECIMN AEROBIC: CPT | Performed by: NURSE PRACTITIONER

## 2023-05-23 PROCEDURE — 92610 EVALUATE SWALLOWING FUNCTION: CPT

## 2023-05-23 PROCEDURE — 84157 ASSAY OF PROTEIN OTHER: CPT | Performed by: NURSE PRACTITIONER

## 2023-05-23 PROCEDURE — 0202U NFCT DS 22 TRGT SARS-COV-2: CPT | Performed by: PHYSICIAN ASSISTANT

## 2023-05-23 PROCEDURE — 25010000002 CEFTRIAXONE PER 250 MG: Performed by: INTERNAL MEDICINE

## 2023-05-23 PROCEDURE — 87483 CNS DNA AMP PROBE TYPE 12-25: CPT | Performed by: NURSE PRACTITIONER

## 2023-05-23 PROCEDURE — 82803 BLOOD GASES ANY COMBINATION: CPT

## 2023-05-23 PROCEDURE — 87449 NOS EACH ORGANISM AG IA: CPT | Performed by: STUDENT IN AN ORGANIZED HEALTH CARE EDUCATION/TRAINING PROGRAM

## 2023-05-23 PROCEDURE — 85025 COMPLETE CBC W/AUTO DIFF WBC: CPT | Performed by: PHYSICIAN ASSISTANT

## 2023-05-23 PROCEDURE — 84145 PROCALCITONIN (PCT): CPT | Performed by: PHYSICIAN ASSISTANT

## 2023-05-23 PROCEDURE — 70551 MRI BRAIN STEM W/O DYE: CPT

## 2023-05-23 PROCEDURE — 36600 WITHDRAWAL OF ARTERIAL BLOOD: CPT

## 2023-05-23 RX ORDER — SODIUM CHLORIDE, SODIUM LACTATE, POTASSIUM CHLORIDE, CALCIUM CHLORIDE 600; 310; 30; 20 MG/100ML; MG/100ML; MG/100ML; MG/100ML
100 INJECTION, SOLUTION INTRAVENOUS CONTINUOUS
Status: DISCONTINUED | OUTPATIENT
Start: 2023-05-23 | End: 2023-05-26

## 2023-05-23 RX ORDER — LIDOCAINE HYDROCHLORIDE 10 MG/ML
INJECTION, SOLUTION INFILTRATION; PERINEURAL AS NEEDED
Status: COMPLETED | OUTPATIENT
Start: 2023-05-23 | End: 2023-05-23

## 2023-05-23 RX ORDER — VANCOMYCIN 2 GRAM/500 ML IN 0.9 % SODIUM CHLORIDE INTRAVENOUS
2000 ONCE
Status: COMPLETED | OUTPATIENT
Start: 2023-05-23 | End: 2023-05-23

## 2023-05-23 RX ORDER — METOPROLOL TARTRATE 5 MG/5ML
2.5 INJECTION INTRAVENOUS EVERY 6 HOURS PRN
Status: DISCONTINUED | OUTPATIENT
Start: 2023-05-23 | End: 2023-05-23

## 2023-05-23 RX ORDER — ENOXAPARIN SODIUM 100 MG/ML
1 INJECTION SUBCUTANEOUS EVERY 12 HOURS
Status: DISCONTINUED | OUTPATIENT
Start: 2023-05-24 | End: 2023-05-26

## 2023-05-23 RX ORDER — LABETALOL HYDROCHLORIDE 5 MG/ML
10 INJECTION, SOLUTION INTRAVENOUS
Status: DISCONTINUED | OUTPATIENT
Start: 2023-05-23 | End: 2023-05-28 | Stop reason: HOSPADM

## 2023-05-23 RX ORDER — ENOXAPARIN SODIUM 100 MG/ML
1 INJECTION SUBCUTANEOUS EVERY 12 HOURS
Status: DISCONTINUED | OUTPATIENT
Start: 2023-05-23 | End: 2023-05-23

## 2023-05-23 RX ADMIN — ACETAMINOPHEN 650 MG: 650 SUPPOSITORY RECTAL at 02:59

## 2023-05-23 RX ADMIN — CEFEPIME 2 G: 2 INJECTION, POWDER, FOR SOLUTION INTRAVENOUS at 05:26

## 2023-05-23 RX ADMIN — INSULIN LISPRO 8 UNITS: 100 INJECTION, SOLUTION INTRAVENOUS; SUBCUTANEOUS at 00:18

## 2023-05-23 RX ADMIN — SODIUM CHLORIDE 1000 ML: 9 INJECTION, SOLUTION INTRAVENOUS at 04:16

## 2023-05-23 RX ADMIN — INSULIN HUMAN 60 UNITS: 100 INJECTION, SUSPENSION SUBCUTANEOUS at 00:18

## 2023-05-23 RX ADMIN — Medication 10 ML: at 09:55

## 2023-05-23 RX ADMIN — ACETAMINOPHEN 650 MG: 650 SUPPOSITORY RECTAL at 16:05

## 2023-05-23 RX ADMIN — MEROPENEM 2 G: 1 INJECTION, POWDER, FOR SOLUTION INTRAVENOUS at 12:17

## 2023-05-23 RX ADMIN — CEFTRIAXONE 2 G: 2 INJECTION, POWDER, FOR SOLUTION INTRAMUSCULAR; INTRAVENOUS at 17:11

## 2023-05-23 RX ADMIN — ASPIRIN 300 MG: 300 SUPPOSITORY RECTAL at 09:47

## 2023-05-23 RX ADMIN — LIDOCAINE HYDROCHLORIDE 6 ML: 10 INJECTION, SOLUTION INFILTRATION; PERINEURAL at 15:23

## 2023-05-23 RX ADMIN — METOPROLOL TARTRATE 2.5 MG: 1 INJECTION, SOLUTION INTRAVENOUS at 01:10

## 2023-05-23 RX ADMIN — ACYCLOVIR SODIUM 950 MG: 50 INJECTION, SOLUTION INTRAVENOUS at 23:00

## 2023-05-23 RX ADMIN — SODIUM CHLORIDE 1000 ML: 9 INJECTION, SOLUTION INTRAVENOUS at 05:26

## 2023-05-23 RX ADMIN — ACETAMINOPHEN 650 MG: 650 SUPPOSITORY RECTAL at 09:47

## 2023-05-23 RX ADMIN — Medication 10 ML: at 20:45

## 2023-05-23 RX ADMIN — Medication 2000 MG: at 09:54

## 2023-05-23 RX ADMIN — ACYCLOVIR SODIUM 950 MG: 50 INJECTION, SOLUTION INTRAVENOUS at 16:04

## 2023-05-23 NOTE — PLAN OF CARE
Goal Outcome Evaluation:                     Problem: Adult Inpatient Plan of Care  Goal: Plan of Care Review  Outcome: Ongoing, Progressing  Goal: Patient-Specific Goal (Individualized)  Outcome: Ongoing, Progressing  Goal: Absence of Hospital-Acquired Illness or Injury  Outcome: Ongoing, Progressing  Intervention: Identify and Manage Fall Risk  Recent Flowsheet Documentation  Taken 5/23/2023 1400 by Sara Henderson RN  Safety Promotion/Fall Prevention:   activity supervised   assistive device/personal items within reach   clutter free environment maintained   safety round/check completed   room organization consistent   nonskid shoes/slippers when out of bed   lighting adjusted   fall prevention program maintained  Taken 5/23/2023 1200 by Sara Henderson RN  Safety Promotion/Fall Prevention:   activity supervised   assistive device/personal items within reach   clutter free environment maintained   safety round/check completed   room organization consistent   nonskid shoes/slippers when out of bed   lighting adjusted   fall prevention program maintained  Taken 5/23/2023 1000 by Sara Henderson RN  Safety Promotion/Fall Prevention:   activity supervised   assistive device/personal items within reach   clutter free environment maintained   safety round/check completed   room organization consistent   lighting adjusted   fall prevention program maintained  Intervention: Prevent Skin Injury  Recent Flowsheet Documentation  Taken 5/23/2023 1600 by Sara Henderson RN  Skin Protection: adhesive use limited  Taken 5/23/2023 1200 by Sara Henderson RN  Skin Protection: adhesive use limited  Taken 5/23/2023 0800 by Sara Henderson RN  Skin Protection: adhesive use limited  Intervention: Prevent and Manage VTE (Venous Thromboembolism) Risk  Recent Flowsheet Documentation  Taken 5/23/2023 0800 by Sara Henderson RN  VTE Prevention/Management:   bilateral   sequential compression devices on  Intervention: Prevent  Infection  Recent Flowsheet Documentation  Taken 5/23/2023 1400 by Sara Henderson RN  Infection Prevention:   single patient room provided   rest/sleep promoted   personal protective equipment utilized   hand hygiene promoted   equipment surfaces disinfected  Taken 5/23/2023 1200 by Sara Henderson RN  Infection Prevention:   single patient room provided   rest/sleep promoted   personal protective equipment utilized   hand hygiene promoted   equipment surfaces disinfected  Taken 5/23/2023 1000 by Sara Henderson RN  Infection Prevention:   single patient room provided   rest/sleep promoted   personal protective equipment utilized   hand hygiene promoted   equipment surfaces disinfected  Goal: Optimal Comfort and Wellbeing  Outcome: Ongoing, Progressing  Intervention: Provide Person-Centered Care  Recent Flowsheet Documentation  Taken 5/23/2023 1200 by Sara Henderson RN  Trust Relationship/Rapport: care explained  Taken 5/23/2023 0800 by Sara Henderson RN  Trust Relationship/Rapport: care explained  Goal: Readiness for Transition of Care  Outcome: Ongoing, Progressing     Problem: Hypertension Comorbidity  Goal: Blood Pressure in Desired Range  Outcome: Ongoing, Progressing  Intervention: Maintain Blood Pressure Management  Recent Flowsheet Documentation  Taken 5/23/2023 1400 by Sara Henderson RN  Medication Review/Management: medications reviewed  Taken 5/23/2023 1200 by Sara Henderson RN  Medication Review/Management: medications reviewed  Taken 5/23/2023 1000 by Sara Henderson RN  Medication Review/Management: medications reviewed  Taken 5/23/2023 0800 by Sara Henderson RN  Medication Review/Management: medications reviewed     Problem: Pain Chronic (Persistent) (Comorbidity Management)  Goal: Acceptable Pain Control and Functional Ability  Outcome: Ongoing, Progressing  Intervention: Manage Persistent Pain  Recent Flowsheet Documentation  Taken 5/23/2023 1400 by Sara Henderson  RN  Medication Review/Management: medications reviewed  Taken 5/23/2023 1200 by Sara Henderson RN  Medication Review/Management: medications reviewed  Taken 5/23/2023 1000 by Sara Henderson RN  Medication Review/Management: medications reviewed  Taken 5/23/2023 0800 by Sara Henderson RN  Medication Review/Management: medications reviewed  Intervention: Optimize Psychosocial Wellbeing  Recent Flowsheet Documentation  Taken 5/23/2023 1600 by Sara Henderson RN  Family/Support System Care: support provided  Taken 5/23/2023 1200 by Sara Henderson RN  Family/Support System Care: support provided  Taken 5/23/2023 0800 by Sara Henderson RN  Family/Support System Care: support provided     Problem: Skin Injury Risk Increased  Goal: Skin Health and Integrity  Outcome: Ongoing, Progressing  Intervention: Optimize Skin Protection  Recent Flowsheet Documentation  Taken 5/23/2023 1600 by Sara Henderson RN  Pressure Reduction Techniques: weight shift assistance provided  Pressure Reduction Devices: pressure-redistributing mattress utilized  Skin Protection: adhesive use limited  Taken 5/23/2023 1200 by Sara Henderson RN  Pressure Reduction Techniques: weight shift assistance provided  Pressure Reduction Devices: pressure-redistributing mattress utilized  Skin Protection: adhesive use limited  Taken 5/23/2023 0800 by Sara Henderson RN  Pressure Reduction Techniques: weight shift assistance provided  Pressure Reduction Devices: pressure-redistributing mattress utilized  Skin Protection: adhesive use limited     Problem: Adjustment to Illness (Stroke, Ischemic/Transient Ischemic Attack)  Goal: Optimal Coping  Outcome: Ongoing, Progressing  Intervention: Support Psychosocial Response to Stroke  Recent Flowsheet Documentation  Taken 5/23/2023 1600 by Sara Henderson RN  Family/Support System Care: support provided  Taken 5/23/2023 1200 by Sara Henderson RN  Family/Support System Care: support  provided  Taken 5/23/2023 0800 by Sara Henderson RN  Family/Support System Care: support provided     Problem: Bowel Elimination Impaired (Stroke, Ischemic/Transient Ischemic Attack)  Goal: Effective Bowel Elimination  Outcome: Ongoing, Progressing     Problem: Cerebral Tissue Perfusion (Stroke, Ischemic/Transient Ischemic Attack)  Goal: Optimal Cerebral Tissue Perfusion  Outcome: Ongoing, Progressing     Problem: Cognitive Impairment (Stroke, Ischemic/Transient Ischemic Attack)  Goal: Optimal Cognitive Function  Outcome: Ongoing, Progressing     Problem: Communication Impairment (Stroke, Ischemic/Transient Ischemic Attack)  Goal: Improved Communication Skills  Outcome: Ongoing, Progressing     Problem: Functional Ability Impaired (Stroke, Ischemic/Transient Ischemic Attack)  Goal: Optimal Functional Ability  Outcome: Ongoing, Progressing     Problem: Respiratory Compromise (Stroke, Ischemic/Transient Ischemic Attack)  Goal: Effective Oxygenation and Ventilation  Outcome: Ongoing, Progressing     Problem: Sensorimotor Impairment (Stroke, Ischemic/Transient Ischemic Attack)  Goal: Improved Sensorimotor Function  Outcome: Ongoing, Progressing  Intervention: Optimize Sensory and Perceptual Ability  Recent Flowsheet Documentation  Taken 5/23/2023 1600 by Sara Henderson RN  Pressure Reduction Techniques: weight shift assistance provided  Pressure Reduction Devices: pressure-redistributing mattress utilized  Taken 5/23/2023 1200 by Sara Henderson RN  Pressure Reduction Techniques: weight shift assistance provided  Pressure Reduction Devices: pressure-redistributing mattress utilized  Taken 5/23/2023 0800 by Sara Henderson RN  Pressure Reduction Techniques: weight shift assistance provided  Pressure Reduction Devices: pressure-redistributing mattress utilized     Problem: Swallowing Impairment (Stroke, Ischemic/Transient Ischemic Attack)  Goal: Optimal Eating and Swallowing without Aspiration  Outcome: Ongoing,  Progressing     Problem: Urinary Elimination Impaired (Stroke, Ischemic/Transient Ischemic Attack)  Goal: Effective Urinary Elimination  Outcome: Ongoing, Progressing     Problem: Fall Injury Risk  Goal: Absence of Fall and Fall-Related Injury  Outcome: Ongoing, Progressing  Intervention: Identify and Manage Contributors  Recent Flowsheet Documentation  Taken 5/23/2023 1400 by Sara Henderson RN  Medication Review/Management: medications reviewed  Taken 5/23/2023 1200 by Sara Henderson RN  Medication Review/Management: medications reviewed  Taken 5/23/2023 1000 by Sara Henderson RN  Medication Review/Management: medications reviewed  Taken 5/23/2023 0800 by Sara Henderson RN  Medication Review/Management: medications reviewed  Intervention: Promote Injury-Free Environment  Recent Flowsheet Documentation  Taken 5/23/2023 1400 by Sara Henderson RN  Safety Promotion/Fall Prevention:   activity supervised   assistive device/personal items within reach   clutter free environment maintained   safety round/check completed   room organization consistent   nonskid shoes/slippers when out of bed   lighting adjusted   fall prevention program maintained  Taken 5/23/2023 1200 by Sara Henderson RN  Safety Promotion/Fall Prevention:   activity supervised   assistive device/personal items within reach   clutter free environment maintained   safety round/check completed   room organization consistent   nonskid shoes/slippers when out of bed   lighting adjusted   fall prevention program maintained  Taken 5/23/2023 1000 by Sara Henderson RN  Safety Promotion/Fall Prevention:   activity supervised   assistive device/personal items within reach   clutter free environment maintained   safety round/check completed   room organization consistent   lighting adjusted   fall prevention program maintained     Problem: Adjustment to Illness (Sepsis/Septic Shock)  Goal: Optimal Coping  Outcome: Ongoing,  Progressing  Intervention: Optimize Psychosocial Adjustment to Illness  Recent Flowsheet Documentation  Taken 5/23/2023 1600 by Sara Henderson RN  Family/Support System Care: support provided  Taken 5/23/2023 1200 by Sara Henderson RN  Family/Support System Care: support provided  Taken 5/23/2023 0800 by Sara Henderson RN  Family/Support System Care: support provided     Problem: Bleeding (Sepsis/Septic Shock)  Goal: Absence of Bleeding  Outcome: Ongoing, Progressing     Problem: Glycemic Control Impaired (Sepsis/Septic Shock)  Goal: Blood Glucose Level Within Desired Range  Outcome: Ongoing, Progressing     Problem: Infection Progression (Sepsis/Septic Shock)  Goal: Absence of Infection Signs and Symptoms  Outcome: Ongoing, Progressing  Intervention: Initiate Sepsis Management  Recent Flowsheet Documentation  Taken 5/23/2023 1400 by Sara Henderson RN  Infection Prevention:   single patient room provided   rest/sleep promoted   personal protective equipment utilized   hand hygiene promoted   equipment surfaces disinfected  Taken 5/23/2023 1200 by Sara Henderson RN  Infection Prevention:   single patient room provided   rest/sleep promoted   personal protective equipment utilized   hand hygiene promoted   equipment surfaces disinfected  Taken 5/23/2023 1000 by Sara Henderson RN  Infection Prevention:   single patient room provided   rest/sleep promoted   personal protective equipment utilized   hand hygiene promoted   equipment surfaces disinfected  Intervention: Promote Stabilization  Recent Flowsheet Documentation  Taken 5/23/2023 1736 by Sara Henderson RN  Fever Reduction/Comfort Measures: cooling blanket applied  Taken 5/23/2023 1200 by Sara Henderson RN  Fever Reduction/Comfort Measures: cooling blanket applied  Taken 5/23/2023 0800 by Sara Henderson RN  Fever Reduction/Comfort Measures: ice pack(s) applied     Problem: Nutrition Impaired (Sepsis/Septic Shock)  Goal: Optimal Nutrition  Intake  Outcome: Ongoing, Progressing

## 2023-05-23 NOTE — CASE MANAGEMENT/SOCIAL WORK
Social Work Assessment  [facility]     Patient Name: Chao Lazar  MRN: [id number]  Today's Date: 5/23/2023    Admit Date: 5/22/2023       Discharge Plan     Row Name 05/23/23 1558       Plan    Plan D/C Plan: Possibly SNF pending PT/OT Clinical evals. [facility] accepted.                    05/23/23 155   [facility]   [facility] Status Approved/Complete       YARELIS Shook, LSW    Phone: [phone]  Cell: [phone]  Fax: [phone]  [email or web address]

## 2023-05-23 NOTE — PLAN OF CARE
Goal Outcome Evaluation:   Pt seen for clinical swallow eval. Pt was sleepy but responsive and brought upright in bed. Pt able to follow simple commands and nod to questions. Pt given oral care due to dry, sticky oral cavity, and then given trials of water on a toothette and water by spoon only. Pt exhibited good labial closure over sponge and spoon with minimal labial spillage. Pt had no difficulty pulling water from the sponge or spoon. Oral transit appeared timely. Digital palpation suggests timely swallow. After the swallow pt had consistent clear vocal quality and no cough, however only small amounts given due to dec alertness.     Pt does not appear ready for PO due to dec LOC. It is rec that he remain NPO. pt may have small 1/2 tsp size sips of water by spoon with nursing supervision for oral gratification via the Man Water protocol. See guidelines below. Education given to pt and his family member in the room on the rationale for continued NPO and only small sips of water with the nurse only. They verbalized understanding. ST will follow to re-eval swallow as indicated to establish PO diet as pt improves.       The rationale to recommend water when a PO diet cannot appropriately/functionally sustain nutrition (or if thickened liquids are prescribed due to aspiration of thin liquids) is because water is low risk for aspiration pna when compared to aspiration of food or other liquids.    Benefits of a water protocol include but are not limited to:      Oral gratification   Engagement of oropharyngeal swallow musculature   Decrease likelihood of dehydration       Guidelines for proper implementation include:  Waiting a minimum of 30 minutes after consuming any other PO   Thorough oral care prior to consuming water  Upright at 90 degree hip flexion  Small sips at slow rate  Monitor for any changes in respiratory status and discontinue if distress noted     The Man Free Water Protocol is a research based  protocol established in 1984.

## 2023-05-23 NOTE — CONSULTS
Infectious Diseases Consult Note    Referring Provider: Manjinder Brannon DO    Reason for Consultation: sepsis    Patient Care Team:  Al Kimbrough MD as PCP - General  Al Kimbrough MD as PCP - Family Medicine    Chief complaint mental status change, fever, neck pain, headache, facial droop, slurred speech, suppressive aphasia, vomiting    Subjective    The patient has had a fever as high as 1 to 2.4 degrees in the last 24 hours.  The patient is on room air, hemodynamically stable, and is tolerating antimicrobial therapy.    History of present illness:     This is a 63-year-old male who presents to the hospital 5/22/2023 about 90 minutes after family noticed neurological changes.  They states that patient's speech started to slur he had left facial droop and had some expressive aphasia.  Patient has history of CVA with left-sided deficits but this was a significant change from his baseline.  Patient apparently also has had some vomiting yesterday.  Patient's brother is at bedside to help with history.  States that he did not have any known fever at home.  I am able to wake the patient up and he knows his name and where he is at but falls asleep after answering 1 or 2 questions.  He does admit to headache and a painful neck.  Patient is having frequent tremors to the right upper extremity    Review of Systems   Review of Systems   Constitutional: Positive for fatigue and fever.   Eyes: Negative.    Cardiovascular: Negative.    Gastrointestinal: Negative.    Endocrine: Negative.    Genitourinary: Negative.    Musculoskeletal: Positive for neck pain.   Skin: Negative.    Neurological: Positive for tremors, speech difficulty and headaches.   Psychiatric/Behavioral: Positive for confusion.   All other systems reviewed and are negative.      Medications  Medications Prior to Admission   Medication Sig Dispense Refill Last Dose   • atorvastatin (LIPITOR) 80 MG tablet TAKE 1 TABLET BY MOUTH EVERY DAY 90 tablet 1     • Cholecalciferol (HM Vitamin D3) 50 MCG (2000 UT) capsule Take 1 capsule by mouth Daily.      • meclizine (ANTIVERT) 25 MG tablet Take 1 tablet by mouth 3 (Three) Times a Day As Needed for Dizziness.      • metFORMIN ER (GLUCOPHAGE-XR) 500 MG 24 hr tablet Take 1 tablet by mouth 3 (Three) Times a Day.      • metoclopramide (REGLAN) 10 MG tablet TAKE 1 TABLET BY MOUTH 4 (FOUR) TIMES A DAY BEFORE MEALS & AT BEDTIME FOR 30 DAYS. 120 tablet 6    • metoprolol tartrate (LOPRESSOR) 25 MG tablet Take 1 tablet by mouth Every 12 (Twelve) Hours.      • midodrine (PROAMATINE) 5 MG tablet TAKE 1 TABLET BY MOUTH 3 (THREE) TIMES A DAY AS NEEDED 90 tablet 0    • NovoLIN 70/30 FlexPen (70-30) 100 UNIT/ML suspension pen-injector Inject 60 units ac breakfast & supper       • omeprazole (priLOSEC) 20 MG capsule Take 2 capsules by mouth 2 (Two) Times a Day.      • Accu-Chek Guide test strip USE TO CHECK BLOOD SUGAR TWICE DAILY 200 each 4    • Isopropyl Alcohol (Alcohol Wipes) 70 % misc Apply 1 each topically 3 (Three) Times a Day Before Meals. Dx code: E11.65 100 each 2    • Lancets (OneTouch Delica Plus [id number]) misc 1 each 3 (Three) Times a Day Before Meals. Dx code: E11.65 100 each 2        History  Past Medical History:   Diagnosis Date   • Asthma 3/22/2018   • Coronary artery disease involving native coronary artery of native heart without angina pectoris 8/27/2020   • Essential hypertension 6/28/2019   • Gastroparesis 12/23/2013   • History of CVA (cerebrovascular accident) 9/22/2021   • Hyperlipidemia, mixed 4/3/2017   • Hypoglycemia      Past Surgical History:   Procedure Laterality Date   • CARDIAC CATHETERIZATION     • CHOLECYSTECTOMY  2004   • COLON RESECTION      [id number]   • COLON RESECTION SMALL BOWEL Right 12/5/2019    Procedure: open right hemicolectomy;  Surgeon: Ronaldo Ardon MD;  Location: [facility] OR;  Service: General   • COLONOSCOPY  2019    x2    • CORONARY ANGIOPLASTY WITH STENT PLACEMENT   04/2017   • ECHO - CONVERTED  2019   • ECHO - CONVERTED  2020   • FRACTURE SURGERY      collar bone as a child    • KNEE ARTHROSCOPY Left 08/2003   • KNEE JOINT MANIPULATION Left 04/2010   • TOTAL KNEE ARTHROPLASTY Bilateral     2013,2011       Family History  Family History   Problem Relation Age of Onset   • Irritable bowel syndrome Mother    • Anxiety disorder Mother    • Depression Father    • Hypertension Father    • Mental illness Father         committed suicide   • Other Sister         back problems   • Other Brother         back problems       Social History   reports that he quit smoking about 16 years ago. His smoking use included cigarettes. He has quit using smokeless tobacco. He reports current alcohol use. He reports that he does not use drugs.    Allergies  Patient has no known allergies.    Objective     Vital Signs   Vital Signs (last 24 hours)       05/22 0700  05/23 0659 05/23 0700  05/23 1434   Most Recent      Temp (°F) 98.6 -  102.4    100.9 -  102.9     100.9 (38.3) 05/23 1411    Heart Rate 85 -  133    91 -  100     91 05/23 1411    Resp 16 -  29    15 -  25     25 05/23 1411    /73 -  182/95    139/77 -  141/72     141/72 05/23 1411    SpO2 (%) 74 -  98      97     97 05/23 1411          Physical Exam:  Physical Exam  Vitals and nursing note reviewed.   Constitutional:       General: He is not in acute distress.     Appearance: He is well-developed and normal weight. He is ill-appearing. He is not diaphoretic.   HENT:      Head: Normocephalic and atraumatic.   Eyes:      Extraocular Movements: Extraocular movements intact.      Conjunctiva/sclera: Conjunctivae normal.      Pupils: Pupils are equal, round, and reactive to light.   Cardiovascular:      Rate and Rhythm: Normal rate and regular rhythm.      Heart sounds: Normal heart sounds, S1 normal and S2 normal.   Pulmonary:      Effort: Pulmonary effort is normal. No respiratory distress.      Breath sounds: Normal breath sounds. No  stridor. No wheezing or rales.   Abdominal:      General: Bowel sounds are normal. There is no distension.      Palpations: Abdomen is soft. There is no mass.      Tenderness: There is no abdominal tenderness. There is no guarding.   Genitourinary:     Comments: External catheter  Musculoskeletal:         General: No deformity.      Cervical back: Tenderness present. No rigidity.   Skin:     General: Skin is warm and dry.      Coloration: Skin is not pale.      Findings: No erythema or rash.   Neurological:      Mental Status: He is alert.      Cranial Nerves: No cranial nerve deficit.      Comments: Alert and oriented x2-fairly lethargic and somewhat confused  Patient was able to move his right leg and arm on command but had any difficulties moving his left extremities.  Visible tremor on the right upper extremity  No facial asymmetry  No slurred speech         Microbiology  Microbiology Results (last 10 days)     Procedure Component Value - Date/Time    S. Pneumo Ag Urine or CSF - Urine, Urine, Clean Catch [[id number]]  (Normal) Collected: 05/23/23 0742    Lab Status: Final result Specimen: Urine, Clean Catch Updated: 05/23/23 0813     Strep Pneumo Ag Negative    Legionella Antigen, Urine - Urine, Urine, Clean Catch [[id number]]  (Normal) Collected: 05/23/23 0742    Lab Status: Final result Specimen: Urine, Clean Catch Updated: 05/23/23 0812     LEGIONELLA ANTIGEN, URINE Negative    MRSA Screen, PCR (Inpatient) - Swab, Nares [[id number]]  (Normal) Collected: 05/23/23 0536    Lab Status: Final result Specimen: Swab from Nares Updated: 05/23/23 0924     MRSA PCR No MRSA Detected    Narrative:      The negative predictive value of this diagnostic test is high and should only be used to consider de-escalating anti-MRSA therapy. A positive result may indicate colonization with MRSA and must be correlated clinically.    Respiratory Panel PCR w/COVID-19(SARS-CoV-2) CHRISTIE/DARIUS/PHILLIP/PAD/[facility]/MAD/THEODORE In-House, NP Swab in UTM/VTM,  3-4 HR TAT - Swab, Nasopharynx [[id number]]  (Normal) Collected: 05/23/23 0416    Lab Status: Final result Specimen: Swab from Nasopharynx Updated: 05/23/23 0527     ADENOVIRUS, PCR Not Detected     Coronavirus 229E Not Detected     Coronavirus HKU1 Not Detected     Coronavirus NL63 Not Detected     Coronavirus OC43 Not Detected     COVID19 Not Detected     Human Metapneumovirus Not Detected     Human Rhinovirus/Enterovirus Not Detected     Influenza A PCR Not Detected     Influenza B PCR Not Detected     Parainfluenza Virus 1 Not Detected     Parainfluenza Virus 2 Not Detected     Parainfluenza Virus 3 Not Detected     Parainfluenza Virus 4 Not Detected     RSV, PCR Not Detected     Bordetella pertussis pcr Not Detected     Bordetella parapertussis PCR Not Detected     Chlamydophila pneumoniae PCR Not Detected     Mycoplasma pneumo by PCR Not Detected    Narrative:      In the setting of a positive respiratory panel with a viral infection PLUS a negative procalcitonin without other underlying concern for bacterial infection, consider observing off antibiotics or discontinuation of antibiotics and continue supportive care. If the respiratory panel is positive for atypical bacterial infection (Bordetella pertussis, Chlamydophila pneumoniae, or Mycoplasma pneumoniae), consider antibiotic de-escalation to target atypical bacterial infection.          Laboratory  Results from last 7 days   Lab Units 05/23/23  0259   WBC 10*3/mm3 13.20*   HEMOGLOBIN g/dL 13.6   HEMATOCRIT % 39.4   PLATELETS 10*3/mm3 358     Results from last 7 days   Lab Units 05/23/23  0259   SODIUM mmol/L 141   POTASSIUM mmol/L 4.3   CHLORIDE mmol/L 101   CO2 mmol/L 22.0   BUN mg/dL 29*   CREATININE mg/dL 1.44*   GLUCOSE mg/dL 258*   CALCIUM mg/dL 9.6     Results from last 7 days   Lab Units 05/23/23  0259   SODIUM mmol/L 141   POTASSIUM mmol/L 4.3   CHLORIDE mmol/L 101   CO2 mmol/L 22.0   BUN mg/dL 29*   CREATININE mg/dL 1.44*   GLUCOSE mg/dL 258*    CALCIUM mg/dL 9.6                   Radiology  Imaging Results (Last 72 Hours)     Procedure Component Value Units Date/Time    MRI Brain Without Contrast [[id number]] Collected: 05/23/23 0843     Updated: 05/23/23 0852    Narrative:      MRI BRAIN WO CONTRAST    Date of Exam: 5/23/2023 7:05 AM EDT    Indication: Stroke, follow up.     Comparison: CT head from May 22, 2023 and MRI brain from September 22, 2021    Technique:  Routine multiplanar/multisequence sequence images of the brain were obtained without contrast administration.      Findings:  No acute infarction, acute intracranial hemorrhage, or extra-axial collection is identified. Chronic blood products are noted within encephalomalacia within the right occipital lobe. The ventricles are stable in caliber, with no evidence of mass effect   or midline shift. The basal cisterns appear patent. There is mild generalized parenchymal atrophy. Patchy areas of periventricular and subcortical white matter FLAIR hyperintensities are nonspecific, but likely the sequela of mild chronic small vessel   ischemic disease.    The midline structures appear intact. The globes and orbits appear intact. The intracranial vascular flow-voids appear patent.      Impression:      Impression:  1.No acute intracranial process identified.  2.Findings suggestive of mild chronic small vessel ischemic disease.  3.Encephalomalacia with chronic blood products within right occipital lobe.        Electronically Signed: Eric Monahan    5/23/2023 8:49 AM EDT    Workstation ID: [id number]    XR Chest 1 View [[id number]] Collected: 05/23/23 0322     Updated: 05/23/23 0524    Narrative:      EXAMINATION: XR CHEST 1 VW    DATE: 5/23/2023 4:53 AM    INDICATION:  Dyspnea, concern for aspiration;    COMPARISON:  None.    FINDINGS:  Minor hypoinflation lungs and portable technique limit evaluation.    No consolidation, pleural effusion, or pneumothorax.    Cardiomediastinal silhouette   unremarkable.    No acute osseous abnormality.          Impression:        1.  No acute cardiopulmonary process visible.          Electronically signed by:  Kiya Glass M.D.    5/23/2023 3:23 AM Mountain Time    CT Head Without Contrast [[id number]] Collected: 05/22/23 2303     Updated: 05/22/23 2311    Narrative:      CT HEAD WO CONTRAST    Date of Exam: 5/22/2023 10:57 PM EDT    Indication: Stroke, follow up  posssible evolution.    Comparison: CT head 5/22/2023. MRI 5/12/2022    Technique: Axial CT images were obtained of the head without contrast administration.  Sagittal and coronal reconstructions were performed.  Automated exposure control and iterative reconstruction methods were used.      Findings:  *No acute intracranial hemorrhage.  *No masses, mass effect, midline shift or hydrocephalus.  *Chronic small vessel ischemic disease. Generalized brain atrophy.  *Unchanged old encephalomalacia in right parietal and occipital lobe.  *Calvarium is intact.  *Visualized orbits and globes are unremarkable without radiopaque foreign bodies.  *Visualized paranasal sinuses are clear.  *Visualized mastoid air cells are clear.        Impression:      1.No acute intracranial hemorrhage. Calvarium is intact.  2.Chronic findings as detailed above.          Electronically Signed: Andrew Ali    5/22/2023 11:09 PM EDT    Workstation ID: [id number]    XR Chest 1 View [[id number]] Collected: 05/22/23 1857     Updated: 05/22/23 1900    Narrative:      XR CHEST 1 VW    Date of Exam: 5/22/2023 6:20 PM EDT    Indication: Acute Stroke Protocol (onset < 12 hrs)    Comparison: 9/22/2021    Findings:  As below.      Impression:      Impression:  Cardiomegaly.  Mild vascular congestion.  Left basilar opacities may represent atelectasis or infiltrates.  No pneumothorax.  1.    Electronically Signed: Erenis    5/22/2023 6:58 PM EDT    Workstation ID: [id number]    CT CEREBRAL PERFUSION WITH & WITHOUT CONTRAST [[id number]] Collected:  05/22/23 1821     Updated: 05/22/23 1830    Narrative:      CT CEREBRAL PERFUSION W WO CONTRAST    Date of Exam: 5/22/2023 5:57 PM EDT    Indication: Neuro deficit, acute, stroke suspected.     Comparison: None available.    Technique: Axial CT images of the brain were obtained prior to and after the administration of iodinated contrast. CT Perfusion protocol was utilized. Automated post processing was performed by RAPID software and submitted to PACS for interpretation.   Automated exposure control and iterative reconstruction was utilized.      Findings:  CBF less than 30%: 19 mL  Tmax greater than 6 seconds: 37 mL  Mismatch perfusion volume: 18 mL  Mismatch ratio: 1.9.      Impression:      Impression:  1.Please note that patient has old infarct in right occipital lobe as seen on prior MRI, head CT and CT angiogram head and neck. Mismatch volume likely related to prior infarct. No new perfusion defects are identified.        Electronically Signed: Andrew Ocasio    5/22/2023 6:27 PM EDT    Workstation ID: [id number]    CT Angiogram Head w AI Analysis of LVO [[id number]] Collected: 05/22/23 1814     Updated: 05/22/23 1828    Narrative:      CT ANGIOGRAM HEAD W AI ANALYSIS OF LVO, CT ANGIOGRAM NECK    Date of Exam: 5/22/2023 5:57 PM EDT    Indication: Stroke, follow up  Neuro deficit, acute stroke suspected  Acute Stroke.    Comparison: CT head without contrast 5/22/2023. MRI brain 5/12/2022. CTA 12/6/2019  Technique: CTA of the head was performed after the uneventful intravenous administration of iodinated contrast. Reconstructed coronal and sagittal images were also obtained. In addition, a 3-D volume rendered image was created for interpretation.   Automated exposure control and iterative reconstruction methods were used.      Findings:    CTA NECK:  *Aortic arch: No aneurysm. No significant stenosis or occlusion of the major arch vessels.  *Left carotid system: No aneurysm, significant stenosis or occlusion.  *Right  carotid system: No aneurysm, significant stenosis or occlusion.  *Vertebrobasilar system: The vertebral arteries arise from their respective subclavian arteries. No aneurysm, significant stenosis or occlusion.    CTA HEAD:  *Anterior circulation: No aneurysm, significant stenosis or occlusion.  *Posterior circulation: Chronic occlusion of right PCA. Left PCA is patent.  *Anatomic variants: None of significance.  *Venous sinuses: Patent.      Impression:      1.No hemodynamically significant stenosis, large vessel cut or aneurysms in intracranial circulation  2.No hemodynamically significant stenosis, dissection or aneurysms in extracranial circulation.  3.Unchanged chronic occlusion of right PCA. Left PCA is patent.        Electronically Signed: Andrew Ocasio    5/22/2023 6:21 PM EDT    Workstation ID: [id number]    CT Angiogram Neck [[id number]] Collected: 05/22/23 1814     Updated: 05/22/23 1823    Narrative:      CT ANGIOGRAM HEAD W AI ANALYSIS OF LVO, CT ANGIOGRAM NECK    Date of Exam: 5/22/2023 5:57 PM EDT    Indication: Stroke, follow up  Neuro deficit, acute stroke suspected  Acute Stroke.    Comparison: CT head without contrast 5/22/2023. MRI brain 5/12/2022. CTA 12/6/2019  Technique: CTA of the head was performed after the uneventful intravenous administration of iodinated contrast. Reconstructed coronal and sagittal images were also obtained. In addition, a 3-D volume rendered image was created for interpretation.   Automated exposure control and iterative reconstruction methods were used.      Findings:    CTA NECK:  *Aortic arch: No aneurysm. No significant stenosis or occlusion of the major arch vessels.  *Left carotid system: No aneurysm, significant stenosis or occlusion.  *Right carotid system: No aneurysm, significant stenosis or occlusion.  *Vertebrobasilar system: The vertebral arteries arise from their respective subclavian arteries. No aneurysm, significant stenosis or occlusion.    CTA  HEAD:  *Anterior circulation: No aneurysm, significant stenosis or occlusion.  *Posterior circulation: Chronic occlusion of right PCA. Left PCA is patent.  *Anatomic variants: None of significance.  *Venous sinuses: Patent.      Impression:      1.No hemodynamically significant stenosis, large vessel cut or aneurysms in intracranial circulation  2.No hemodynamically significant stenosis, dissection or aneurysms in extracranial circulation.  3.Unchanged chronic occlusion of right PCA. Left PCA is patent.        Electronically Signed: Andrew Ocasio    5/22/2023 6:21 PM EDT    Workstation ID: [id number]    CT Head Without Contrast Stroke Protocol [[id number]] Collected: 05/22/23 1742     Updated: 05/22/23 1746    Narrative:      CT HEAD WO CONTRAST STROKE PROTOCOL    Date of Exam: 5/22/2023 5:37 PM EDT    Indication: Neuro deficit, acute stroke suspected  Neuro deficit, acute, stroke suspected.    Comparison: MRI 5/12/2022    Technique: Axial CT images were obtained of the head without contrast administration.  Reconstructed coronal and sagittal images were also obtained. Automated exposure control and iterative construction methods were used.    Scan Time: 5:37 p.m.    Findings:  *No acute intracranial hemorrhage.  *No masses, mass effect, midline shift or hydrocephalus.  *Chronic small vessel ischemic disease. Generalized brain atrophy.  *Unchanged old encephalomalacia in the right posterior parietal and occipital lobe.  *Calvarium is intact.  *Visualized orbits and globes are unremarkable without radiopaque foreign bodies.  *Visualized paranasal sinuses are clear.  *Visualized mastoid air cells are clear.        Impression:      1.No acute intracranial hemorrhage. Calvarium is intact.  2.Chronic findings as detailed above.            Electronically Signed: Andrew Ali    5/22/2023 5:44 PM EDT    Workstation ID: [id number]          Cardiology      Results Review:  I have reviewed all clinical data, test, lab, and imaging  results.       Schedule Meds  acyclovir, 10 mg/kg, Intravenous, Q8H  aspirin, 325 mg, Oral, Daily   Or  aspirin, 300 mg, Rectal, Daily  atorvastatin, 80 mg, Oral, Nightly  cefTRIAXone, 2 g, Intravenous, Q12H  [START ON 5/24/2023] enoxaparin, 1 mg/kg, Subcutaneous, Q12H  insulin lispro, 3-14 Units, Subcutaneous, 4x Daily AC & at Bedtime  insulin NPH-insulin regular, 30 Units, Subcutaneous, BID With Meals  metoclopramide, 10 mg, Oral, 4x Daily AC & at Bedtime  metoprolol tartrate, 25 mg, Oral, Q12H  pantoprazole, 40 mg, Oral, Q AM  senna-docusate sodium, 2 tablet, Oral, BID  sodium chloride, 10 mL, Intravenous, Q12H        Infusion Meds  hold, 1 each  lactated ringers, 100 mL/hr, Last Rate: 100 mL/hr (05/23/23 0914)        PRN Meds  •  acetaminophen **OR** acetaminophen **OR** acetaminophen  •  aluminum-magnesium hydroxide-simethicone  •  senna-docusate sodium **AND** polyethylene glycol **AND** bisacodyl **AND** bisacodyl  •  bisacodyl  •  Calcium Replacement - Follow Nurse / BPA Driven Protocol  •  dextrose  •  dextrose  •  glucagon (human recombinant)  •  hold  •  labetalol  •  Magnesium Standard Dose Replacement - Follow Nurse / BPA Driven Protocol  •  meclizine  •  melatonin  •  midodrine  •  nitroglycerin  •  ondansetron **OR** ondansetron  •  ondansetron  •  Phosphorus Replacement - Follow Nurse / BPA Driven Protocol  •  Potassium Replacement - Follow Nurse / BPA Driven Protocol  •  sodium chloride  •  sodium chloride  •  sodium chloride      Assessment & Plan       Assessment    Febrile illness associated with mental status changes.  Neck is supple so there is no concern for meningitis but we need to rule out encephalitis.  MRI of the brain done without contrast and showed no acute findings    Vomiting noted prior to admission.  Concerning for aspiration pneumonia    History of old CVA with left-sided deficits    Type 2 diabetes    Acute on chronic kidney disease    Plan    Discontinue IV vancomycin and IV  Merrem  Start IV acyclovir 10 Mg per KG every 8 hours waiting on CSF findings  Start IV Rocephin 2 g every 12 hours  Case discussed with hospitalist  Case discussed with RN and neurology  Plans to consult IR for lumbar puncture-we will ask for culture, fluid differential, protein, glucose and meningitis/encephalitis panel  Blood cultures pending  Continue supportive care  A.m. labs  Case discussed with pharmacy    Rola Simon, APRN  05/23/23  14:34 EDT    Note is dictated utilizing voice recognition software/Dragon

## 2023-05-23 NOTE — PLAN OF CARE
Goal Outcome Evaluation:      Patient arouses with stimulation, alert to self and place. He complains of no pain, NIH currently a 22, awaiting test results and collection of urinalysis. He is incontinent of B/B with external cath in place. Call light is in reach, and bed alarm is active. Wife to remain at bedside, patient is febrile and currently being treated with tylenol and ice packs. Care continues

## 2023-05-23 NOTE — PLAN OF CARE
#Sepsis    - Patient febrile (38.9F), tachycardic (), with leukocytosis (13.2)    - Nursing reports patient vomited twice    - concern for aspiration vs UTI vs CVA induced    - UA pending    - lethargy and AMS concerning for UTI    - lactic acid 2.5, trend    - 2L bolus ordered    - procal 0.11    - Currently NPO for stroke workup    - Vanc, pharm to dose    - Cefepime, pharm to dose    - blood cultures pending    - Tylenol PRN    - strep, legionella, sputum     - RVP

## 2023-05-23 NOTE — CONSULTS
Primary Care Provider: No ref. provider found     Consult requested by: Strokelike symptoms  Reason for Consultation: Neurological evaluation/lethargy and strokelike symptoms    History taken from: chart RN    Chief complaint: Worsening left-sided weakness       SUBJECTIVE:    History of present illness: Background per H&P: Chao Lazar is a 63 y.o. year old male who was evaluated in room 260 at [facility]    Source of information is mostly the medical records, the patient's nurse, the patient's wife is not here but the brother is and he referred to evaluation done by the team and also what the sister-in-law told him  I talked to Danii Palomares, nurse practitioner last evening when the patient came to the ER    He apparently has had a stroke in the past with left-sided weakness and some facial droop and some vision changes but apparently got worse so he was brought in    His CT and CTA were reviewed and it looks like this is chronic right PCA infarct  His MRI also is already done and it does not show any acute stroke, interestingly there were concerns for some blood products in the right PCA distribution infarct in that is probably old    But the patient's condition worsened and he got febrile and now he is being worked up for sepsis and have requested an LP    This gentleman has been evaluated here in the past and there was concern about him needing anticoagulation he was supposed to be on Xarelto and it looks like that is not the case and then Eliquis has been discussed in the notes but apparently he is not even taking that    So he does not have a stroke but does have sepsis and possibility of encephalitis so that is being addressed in by the time I saw him he was quite lethargic and really could not have good conversation with me and examination is very limited        As per admitting,  History of Present Illness: Chao Lazar is a 63 y.o. male who presented to [facility] on 5/22/2023  complaining of slurred speech and left-sided facial droop about 90 minutes prior to arrival.  Patient has a history of CVA with residual left-sided weakness however facial droop and slurred speech and worsening weakness left upper and lower extremity.  Expressive aphasia noted on initial exam as well.  Patient alert and oriented at this time.  Patient has obvious left-sided facial droop.  No reported recent cough, fever, chills, nausea, vomiting.  Of note, patient did complain of headache.  No new numbness tingling of extremities.  Patient does have peripheral neuropathy of bilateral lower extremities chronic..        In the ED, initial troponins 23, no prior to compare.  Patient typed and screened.  Glucose of 246, serum creatinine 1.36 which is about baseline for patient.  Coags obtained.  CBC largely unremarkable.  CT head shows no acute intracranial hemorrhage.  CT head and neck shows no hemodynamically significant stenosis or large vessel clot or aneurysms again intracranial circulation.  No hemodynamically significant stenosis, dissection or aneurysm use and extracranial circulation.  Unchanged chronic occlusion of right PCA.  Left PCA patent.  EKG shows sinus rhythm 95 bpm, no ST elevation apparent.  Patient is afebrile, pulse in the 90s, on room air oxygen 95% SPO2 and blood pressure 160s over 70s.  Neurology consulted, Dr. Keys in ED. No medications given in ED.            Chao Lazar is a 63 y.o. male with a past medical history of CVA with chronic left-sided weakness, hypertension, hyperlipidemia, CAD, and DM2 who presented to [facility] on 5/22/2023 complaining of slurred speech and left-sided facial droop about 90 minutes prior to arrival.  Patient has a history of CVA with residual left-sided weakness however facial droop and slurred speech and worsening weakness left upper and lower extremity.  Expressive aphasia noted on initial exam as well.  Stroke work-up initiated in the ER with  CT of the head and CTA of the head and neck negative for any acute findings.  MRI brain done on 5/23 negative for new stroke or new acute findings.  Patient was noted to have significant fever and developed sepsis few hours after being admitted.  Concern for possible aspiration as patient did vomit but according to wife patient was at normal state and eating without any difficulty prior to admission.  No actual aspiration noted during the episode of vomiting.  He remains confused and lethargic, started on IV fluids and broad-spectrum IV antibiotics.  Neurology consulted.         - Portions of the above HPI were copied from previous encounters and edited as appropriate. PMH as detailed below.     Review of Systems   Not possible because of his lethargy    PATIENT HISTORY:  Past Medical History:   Diagnosis Date   • Asthma 3/22/2018   • Coronary artery disease involving native coronary artery of native heart without angina pectoris 8/27/2020   • Essential hypertension 6/28/2019   • Gastroparesis 12/23/2013   • History of CVA (cerebrovascular accident) 9/22/2021   • Hyperlipidemia, mixed 4/3/2017   • Hypoglycemia    ,   Past Surgical History:   Procedure Laterality Date   • CARDIAC CATHETERIZATION     • CHOLECYSTECTOMY  2004   • COLON RESECTION      [id number]   • COLON RESECTION SMALL BOWEL Right 12/5/2019    Procedure: open right hemicolectomy;  Surgeon: Ronaldo Ardon MD;  Location: [facility] OR;  Service: General   • COLONOSCOPY  2019    x2    • CORONARY ANGIOPLASTY WITH STENT PLACEMENT  04/2017   • ECHO - CONVERTED  2019   • ECHO - CONVERTED  2020   • FRACTURE SURGERY      collar bone as a child    • KNEE ARTHROSCOPY Left 08/2003   • KNEE JOINT MANIPULATION Left 04/2010   • TOTAL KNEE ARTHROPLASTY Bilateral     2013,2011   ,   Family History   Problem Relation Age of Onset   • Irritable bowel syndrome Mother    • Anxiety disorder Mother    • Depression Father    • Hypertension Father    • Mental illness  Father         committed suicide   • Other Sister         back problems   • Other Brother         back problems   ,   Social History     Tobacco Use   • Smoking status: Former     Types: Cigarettes     Quit date:      Years since quittin.4   • Smokeless tobacco: Former   Vaping Use   • Vaping Use: Never used   Substance Use Topics   • Alcohol use: Yes     Comment: occasionally   • Drug use: No   ,   Prior to Admission medications    Medication Sig Start Date End Date Taking? Authorizing Provider   atorvastatin (LIPITOR) 80 MG tablet TAKE 1 TABLET BY MOUTH EVERY DAY 3/7/23  Yes Al Kimbrough MD   Cholecalciferol (HM Vitamin D3) 50 MCG (2000 UT) capsule Take 1 capsule by mouth Daily.   Yes ProviderJaylene MD   meclizine (ANTIVERT) 25 MG tablet Take 1 tablet by mouth 3 (Three) Times a Day As Needed for Dizziness. 22  Yes Salazar Bower MD   metFORMIN ER (GLUCOPHAGE-XR) 500 MG 24 hr tablet Take 1 tablet by mouth 3 (Three) Times a Day.   Yes Jaylene Berger MD   metoclopramide (REGLAN) 10 MG tablet TAKE 1 TABLET BY MOUTH 4 (FOUR) TIMES A DAY BEFORE MEALS & AT BEDTIME FOR 30 DAYS. 3/7/23  Yes Al Kimbrough MD   metoprolol tartrate (LOPRESSOR) 25 MG tablet Take 1 tablet by mouth Every 12 (Twelve) Hours. 22  Yes Salazar Bower MD   midodrine (PROAMATINE) 5 MG tablet TAKE 1 TABLET BY MOUTH 3 (THREE) TIMES A DAY AS NEEDED 23  Yes Al Kimbrough MD   NovoLIN 70/30 FlexPen (70-30) 100 UNIT/ML suspension pen-injector Inject 60 units ac breakfast & supper  22  Yes Jaylene Berger MD   omeprazole (priLOSEC) 20 MG capsule Take 2 capsules by mouth 2 (Two) Times a Day.   Yes Jaylene Berger MD   Accu-Chek Guide test strip USE TO CHECK BLOOD SUGAR TWICE DAILY 11/3/22   Radha Manriquez MD   Isopropyl Alcohol (Alcohol Wipes) 70 % misc Apply 1 each topically 3 (Three) Times a Day Before Meals. Dx code: E11.65 22   Sathish  Salazar Lopez MD   Lancets (OneTouch Delica Plus [id number]) misc 1 each 3 (Three) Times a Day Before Meals. Dx code: E11.65 5/16/22   Salazar Bower MD    Allergies:  Patient has no known allergies.    Current Facility-Administered Medications   Medication Dose Route Frequency Provider Last Rate Last Admin   • acetaminophen (TYLENOL) tablet 650 mg  650 mg Oral Q4H PRN Chente Shanks PA        Or   • acetaminophen (TYLENOL) 160 MG/5ML solution 650 mg  650 mg Oral Q4H PRN Chente Shanks PA        Or   • acetaminophen (TYLENOL) suppository 650 mg  650 mg Rectal Q4H PRN Chente Shanks PA   650 mg at 05/23/23 0947   • aluminum-magnesium hydroxide-simethicone (MAALOX MAX) 400-400-40 MG/5ML suspension 15 mL  15 mL Oral Q6H PRN Chente Shanks PA       • aspirin tablet 325 mg  325 mg Oral Daily Chente Shanks PA        Or   • aspirin suppository 300 mg  300 mg Rectal Daily Chente Shanks PA   300 mg at 05/23/23 0947   • atorvastatin (LIPITOR) tablet 80 mg  80 mg Oral Nightly Chente Shanks PA       • sennosides-docusate (PERICOLACE) 8.6-50 MG per tablet 2 tablet  2 tablet Oral BID Chente Shanks PA        And   • polyethylene glycol (MIRALAX) packet 17 g  17 g Oral Daily PRN Chente Shanks PA        And   • bisacodyl (DULCOLAX) EC tablet 5 mg  5 mg Oral Daily PRN Chente Shanks PA        And   • bisacodyl (DULCOLAX) suppository 10 mg  10 mg Rectal Daily PRN Chente Shanks PA       • bisacodyl (DULCOLAX) suppository 10 mg  10 mg Rectal Daily PRN Chente Shanks PA       • Calcium Replacement - Follow Nurse / BPA Driven Protocol   Does not apply PRN Chente Shanks PA       • dextrose (D50W) (25 g/50 mL) IV injection 25 g  25 g Intravenous Q15 Min PRN Chente Shanks PA       • dextrose (GLUTOSE) oral gel 15 g  15 g Oral Q15 Min PRN Chente Shanks PA       • Enoxaparin Sodium (LOVENOX) syringe 100 mg  1 mg/kg Subcutaneous Q12H Manjinder Brannon DO       • glucagon (GLUCAGEN) injection 1 mg  1 mg Intramuscular Q15 Min PRN Chente Shanks PA        • insulin lispro (HUMALOG/ADMELOG) injection 3-14 Units  3-14 Units Subcutaneous 4x Daily AC & at Bedtime Chente Shanks PA   8 Units at 05/23/23 0018   • insulin NPH-insulin regular (humuLIN 70/30,novoLIN 70/30) injection 30 Units  30 Units Subcutaneous BID With Meals Manjinder Brannon DO       • labetalol (NORMODYNE,TRANDATE) injection 10 mg  10 mg Intravenous Q2H PRN Manjinder Brannon DO       • lactated ringers infusion  100 mL/hr Intravenous Continuous Brent Ritter  mL/hr at 05/23/23 0914 100 mL/hr at 05/23/23 0914   • Magnesium Standard Dose Replacement - Follow Nurse / BPA Driven Protocol   Does not apply PRN Chente Shanks PA       • meclizine (ANTIVERT) tablet 25 mg  25 mg Oral TID PRN Chente Shanks PA       • melatonin tablet 5 mg  5 mg Oral Nightly PRN Chente Shanks PA       • meropenem (MERREM) 2 g in sodium chloride 0.9 % 100 mL IVPB  2 g Intravenous Q8H Manjinder Brannon DO       • meropenem (MERREM) 2 g in sodium chloride 0.9 % 100 mL IVPB  2 g Intravenous Once Manjinder Brannon DO       • metoclopramide (REGLAN) tablet 10 mg  10 mg Oral 4x Daily AC & at Bedtime Chente Shanks PA       • metoprolol tartrate (LOPRESSOR) tablet 25 mg  25 mg Oral Q12H Chente Shanks PA       • midodrine (PROAMATINE) tablet 5 mg  5 mg Oral TID PRN Chente Shanks PA       • nitroglycerin (NITROSTAT) SL tablet 0.4 mg  0.4 mg Sublingual Q5 Min PRN Chente Shanks PA       • ondansetron (ZOFRAN) tablet 4 mg  4 mg Oral Q6H PRN Chente Shanks PA        Or   • ondansetron (ZOFRAN) injection 4 mg  4 mg Intravenous Q6H PRN Chente Shanks PA       • ondansetron (ZOFRAN) injection 4 mg  4 mg Intravenous Q6H PRN Chente Shanks PA   4 mg at 05/22/23 2100   • pantoprazole (PROTONIX) EC tablet 40 mg  40 mg Oral Q AM Dimitris, Chente, PA       • Pharmacy to dose vancomycin   Does not apply Continuous PRN Brent Ritter, DO       • Phosphorus Replacement - Follow Nurse / BPA Driven Protocol   Does not apply PRN Chente Shanks PA       • Potassium Replacement - Follow Nurse  / BPA Driven Protocol   Does not apply PRN Chente Shanks PA       • sodium chloride 0.9 % flush 10 mL  10 mL Intravenous PRN Danii Palomares APRN       • sodium chloride 0.9 % flush 10 mL  10 mL Intravenous Q12H Chente hSanks PA   10 mL at 05/23/23 0955   • sodium chloride 0.9 % flush 10 mL  10 mL Intravenous PRN Chente Shanks PA       • sodium chloride 0.9 % infusion 40 mL  40 mL Intravenous PRN Chente Shanks PA       • [START ON 5/24/2023] vancomycin 1500 mg/500 mL 0.9% NS IVPB ([facility])  1,500 mg Intravenous Q24H Brent Ritter,             ________________________________________________________        OBJECTIVE:  Upon today's exam, patient essentially obtunded     Neurologic Exam    Very limited neurological examination patient could tell me his name  He was trying to identify objects  He was following some very simple commands but very lethargic    On cranial nerve screening, this question about visual field changes  He probably has significant visual deficits on the left side but even struggling on the right side also  Eye movements are conjugate  No ptosis or nystagmus  No forceful eye deviation or seizure-like activity  I do not see any significant facial asymmetry and facial sensation is present  Hearing seem to be intact  Tongue was midline but I could not visualize his oropharynx or uvula  He had some head turning and there is question about some neck stiffness    On motor examination he is moving extremities on withdrawing right more than left though  Strength is at least 4/5    Sensory examination intact for pain, he withdraws  I could not get any reflexes and toes are mute  Gait and coordination could not be evaluated  ________________________________________________________   RESULTS REVIEW:    VITAL SIGNS:   Temp:  [98.6 °F (37 °C)-102.4 °F (39.1 °C)] 101.9 °F (38.8 °C)  Heart Rate:  [] 100  Resp:  [15-29] 15  BP: (130-182)/() 139/77     LABS:      Lab 05/23/23  0355 05/23/23  0259  05/22/23  1743   WBC  --  13.20* 9.90   HEMOGLOBIN  --  13.6 14.3   HEMATOCRIT  --  39.4 42.1   PLATELETS  --  358 350   NEUTROS ABS  --  11.90* 6.90   LYMPHS ABS  --  0.80 2.00   MONOS ABS  --  0.40 0.60   EOS ABS  --  0.00 0.30   MCV  --  89.5 89.5   PROCALCITONIN  --  0.11  --    LACTATE 2.5*  --   --    PROTIME  --   --  9.8   APTT  --   --  24.2         Lab 05/23/23  0259 05/22/23 2015 05/22/23  1743   SODIUM 141  --  141   POTASSIUM 4.3  --  4.7   CHLORIDE 101  --  102   CO2 22.0  --  25.0   ANION GAP 18.0*  --  14.0   BUN 29*  --  28*   CREATININE 1.44*  --  1.36*   EGFR 53.9*  --  57.8*   GLUCOSE 258*  --  246*   CALCIUM 9.6  --  10.0   HEMOGLOBIN A1C  --   --  8.50*   TSH  --  1.490  --          Lab 05/22/23 1743   TOTAL PROTEIN 7.5   ALBUMIN 4.0   GLOBULIN 3.5   ALT (SGPT) 25   AST (SGOT) 22   BILIRUBIN 0.4   ALK PHOS 126*         Lab 05/23/23  0451 05/23/23 0259 05/22/23 2015 05/22/23  1743   HSTROP T 25* 24* 22* 23*   PROTIME  --   --   --  9.8   INR  --   --   --  <0.93*         Lab 05/22/23 2015   CHOLESTEROL 168   LDL CHOL 69   HDL CHOL 37*   TRIGLYCERIDES 392*         Lab 05/22/23  1743   ABO TYPING B   RH TYPING Positive   ANTIBODY SCREEN Negative         Lab 05/23/23 0522   PH, ARTERIAL 7.446   PCO2, ARTERIAL 32.2*   PO2 ART 81.8*   O2 SATURATION ART 96.6   FIO2 21   HCO3 ART 22.1   BASE EXCESS ART -1.1*     UA        7/25/2022    16:13   Urinalysis   Squamous Epithelial Cells, UA 0-2     Specific Gravity, UA 1.027     Ketones, UA Trace     Blood, UA Negative     Leukocytes, UA Negative     Nitrite, UA Negative     RBC, UA 0-2     WBC, UA 3-5     Bacteria, UA None Seen         Lab Results   Component Value Date    TSH 1.490 05/22/2023    LDL 69 05/22/2023    HGBA1C 8.50 (H) 05/22/2023    [id number] 352 07/25/2022       IMAGING STUDIES:  CT Head Without Contrast    Result Date: 5/22/2023  1.No acute intracranial hemorrhage. Calvarium is intact. 2.Chronic findings as detailed above.  Electronically Signed: Andrew Ocasio  5/22/2023 11:09 PM EDT  Workstation ID: [id number]    CT Angiogram Neck    Result Date: 5/22/2023  1.No hemodynamically significant stenosis, large vessel cut or aneurysms in intracranial circulation 2.No hemodynamically significant stenosis, dissection or aneurysms in extracranial circulation. 3.Unchanged chronic occlusion of right PCA. Left PCA is patent. Electronically Signed: Andrew Ocasio  5/22/2023 6:21 PM EDT  Workstation ID: [id number]    MRI Brain Without Contrast    Result Date: 5/23/2023  Impression: 1.No acute intracranial process identified. 2.Findings suggestive of mild chronic small vessel ischemic disease. 3.Encephalomalacia with chronic blood products within right occipital lobe. Electronically Signed: Eric Monahan  5/23/2023 8:49 AM EDT  Workstation ID: [id number]    XR Chest 1 View    Result Date: 5/23/2023  1.  No acute cardiopulmonary process visible. Electronically signed by:  Kiya Glass M.D.  5/23/2023 3:23 AM Mountain Time    XR Chest 1 View    Result Date: 5/22/2023  Impression: Cardiomegaly. Mild vascular congestion. Left basilar opacities may represent atelectasis or infiltrates. No pneumothorax. 1. Electronically Signed: Andrew Ocasio  5/22/2023 6:58 PM EDT  Workstation ID: [id number]    CT Head Without Contrast Stroke Protocol    Result Date: 5/22/2023  1.No acute intracranial hemorrhage. Calvarium is intact. 2.Chronic findings as detailed above. Electronically Signed: Andrew Ocasio  5/22/2023 5:44 PM EDT  Workstation ID: [id number]    CT Angiogram Head w AI Analysis of LVO    Result Date: 5/22/2023  1.No hemodynamically significant stenosis, large vessel cut or aneurysms in intracranial circulation 2.No hemodynamically significant stenosis, dissection or aneurysms in extracranial circulation. 3.Unchanged chronic occlusion of right PCA. Left PCA is patent. Electronically Signed: Andrew Ocasio  5/22/2023 6:21 PM EDT  Workstation ID: [id number]    CT CEREBRAL  PERFUSION WITH & WITHOUT CONTRAST    Result Date: 5/22/2023  Impression: 1.Please note that patient has old infarct in right occipital lobe as seen on prior MRI, head CT and CT angiogram head and neck. Mismatch volume likely related to prior infarct. No new perfusion defects are identified. Electronically Signed: Andrew Ocasio  5/22/2023 6:27 PM EDT  Workstation ID: [id number]      I reviewed the patient's new clinical results.    ________________________________________________________     PROBLEM LIST:    Facial droop    Type 2 diabetes mellitus with diabetic polyneuropathy, with long-term current use of insulin (HCC)    Hyperlipidemia, mixed    Essential hypertension    Coronary artery disease involving native coronary artery of native heart without angina pectoris    History of CVA (cerebrovascular accident)    Left-sided weakness    CKD (chronic kidney disease), stage II    Sepsis            ASSESSMENT/PLAN:  Mental status changes  He does not have a new stroke    He does have had prior strokes and right PCA infarct and PCA occlusion  But nothing new  The concern is now mental status changes and LP has been requested and I will follow-up      Discussed with primary team  Discussed with ID    I agree that the patient needs an LP  I will request IR to perform it    Orders placed    Acute change could be encephalitis but could be other infectious causes which are being looked at    Not a new stroke  Apparently was not taking his blood thinners anyway    We will address that later      I discussed the patient's findings and my recommendations with family, nursing staff, primary care team and consulting provider    Candice Wells MD  05/23/23  10:40 EDT

## 2023-05-23 NOTE — PROGRESS NOTES
"Pharmacy Antimicrobial Dosing Service    Subjective:  Chao Lazar is a 63 y.o.male admitted with slurred speech and left facial droop. Pharmacy has been consulted to dose Vancomycin and Cefepime for possible sepsis/pneumonia.      Assessment/Plan    1. Day #1 Vancomycin: Goal -600 mcg*h/mL. 2000mg (~21mg/kg ABW) IV x1 dose followed by 1500mg (~16mg/kg ABW) IV q24h.  Pk level ordered for 5/25 at 1100.  Tr level ordered for 5/26 at 0600 prior to fourth total dose.      2. Day #1 Cefepime: 2gm IV q8h for estCrCl > 60 mL/min.    Will continue to monitor drug levels, renal function, culture and sensitivities, and patient clinical status.       Objective:  Relevant clinical data and objective history reviewed:  185.4 cm (73\")   95.3 kg (210 lb)   Ideal body weight: 79.9 kg (176 lb 2.4 oz)  Adjusted ideal body weight: 86 kg (189 lb 11 oz)  Body mass index is 27.71 kg/m².        Results from last 7 days   Lab Units 05/23/23  0259 05/22/23  1743   CREATININE mg/dL 1.44* 1.36*     Estimated Creatinine Clearance: 68.9 mL/min (A) (by C-G formula based on SCr of 1.44 mg/dL (H)).  No intake/output data recorded.    Results from last 7 days   Lab Units 05/23/23  0259 05/22/23  1743   WBC 10*3/mm3 13.20* 9.90     Temperature    05/22/23 2223 05/23/23 0248 05/23/23 0300   Temp: 99.8 °F (37.7 °C) (!) 101.3 °F (38.5 °C) (!) 102 °F (38.9 °C)     Baseline culture/source/susceptibility:  Microbiology Results (last 10 days)       Procedure Component Value - Date/Time    Respiratory Panel PCR w/COVID-19(SARS-CoV-2) CHRISTIE/DARIUS/PHILLIP/PAD/COR/MAD/THEODORE In-House, NP Swab in UTM/VTM, 3-4 HR TAT - Swab, Nasopharynx [[id number]]  (Normal) Collected: 05/23/23 0416    Lab Status: Final result Specimen: Swab from Nasopharynx Updated: 05/23/23 0527     ADENOVIRUS, PCR Not Detected     Coronavirus 229E Not Detected     Coronavirus HKU1 Not Detected     Coronavirus NL63 Not Detected     Coronavirus OC43 Not Detected     COVID19 Not Detected     " Human Metapneumovirus Not Detected     Human Rhinovirus/Enterovirus Not Detected     Influenza A PCR Not Detected     Influenza B PCR Not Detected     Parainfluenza Virus 1 Not Detected     Parainfluenza Virus 2 Not Detected     Parainfluenza Virus 3 Not Detected     Parainfluenza Virus 4 Not Detected     RSV, PCR Not Detected     Bordetella pertussis pcr Not Detected     Bordetella parapertussis PCR Not Detected     Chlamydophila pneumoniae PCR Not Detected     Mycoplasma pneumo by PCR Not Detected    Narrative:      In the setting of a positive respiratory panel with a viral infection PLUS a negative procalcitonin without other underlying concern for bacterial infection, consider observing off antibiotics or discontinuation of antibiotics and continue supportive care. If the respiratory panel is positive for atypical bacterial infection (Bordetella pertussis, Chlamydophila pneumoniae, or Mycoplasma pneumoniae), consider antibiotic de-escalation to target atypical bacterial infection.            Kenny Valenzuela  05/23/23 05:36 EDT

## 2023-05-23 NOTE — PROGRESS NOTES
[facility]   Daily Progress Note      Patient Name: Chao Lazar  : 1960  MRN: [id number]  Primary Care Physician:  Al Kimbrough MD  Date of admission: 2023      Subjective      Chief Complaint: Slurred speech, left facial droop    Patient seen and examined this morning.  Patient is sleepy but arousable, slurred speech noted, follows some commands but easily goes back to sleep.  Wife at bedside was at home prior to episode of slurred speech and facial droop.  Wife states he has been normal prior to episode and was talking normally and eating his dinner last night.  Did not complain of any fever, chills or any symptoms prior to this.  Of note, wife states patient was placed on Eliquis after his last stroke 3 years ago however had to come off of it few months ago due to cost.  Per the wife, patient was switched to Xarelto however per pharmacy patient has not filled Xarelto since last November.  According to wife he was taking once a daily pill of Xarelto at home however wife not able to confirm the exact dose.  Per pharmacy, patient last filled Xarelto in November but the dose was prophylactic at 2.5 mg daily.    Unable to obtain full review of system due to patient's underlying mental status.      Objective      Vitals:   Temp:  [98.6 °F (37 °C)-102.4 °F (39.1 °C)] 101.9 °F (38.8 °C)  Heart Rate:  [] 100  Resp:  [15-29] 15  BP: (130-182)/() 139/77    Physical Exam:    General: Lethargic but arousable, elderly male, ill-appearing, lying in bed  Eyes: PERRL, EOMI, conjunctivae are clear  Cardiovascular: Regular rate and rhythm, no murmurs  Respiratory: Clear to auscultation bilaterally, no wheezing or rales, unlabored breathing  Abdomen: Soft, nontender, positive bowel sounds, no guarding  Neurologic: A&O to self only, slurred speech and confusion noted, otherwise CN grossly intact, chronic left-sided weakness noted, no acute meningeal signs noted at this  time   Musculoskeletal: No joint swelling, no other gross deformities  Skin: Warm, dry         Result Review    Result Review:  I have personally reviewed the results from the time of this admission to 5/23/2023 10:06 EDT and agree with these findings:  [x]  Laboratory  [x]  Microbiology  [x]  Radiology  [x]  EKG/Telemetry   [x]  Cardiology/Vascular   []  Pathology  [x]  Old records  []  Other:          Assessment & Plan      Brief Patient Summary:  Chao Lazar is a 63 y.o. male with a past medical history of CVA with chronic left-sided weakness, hypertension, hyperlipidemia, CAD, and DM2 who presented to [facility] on 5/22/2023 complaining of slurred speech and left-sided facial droop about 90 minutes prior to arrival.  Patient has a history of CVA with residual left-sided weakness however facial droop and slurred speech and worsening weakness left upper and lower extremity.  Expressive aphasia noted on initial exam as well.  Stroke work-up initiated in the ER with CT of the head and CTA of the head and neck negative for any acute findings.  MRI brain done on 5/23 negative for new stroke or new acute findings.  Patient was noted to have significant fever and developed sepsis few hours after being admitted.  Concern for possible aspiration as patient did vomit but according to wife patient was at normal state and eating without any difficulty prior to admission.  No actual aspiration noted during the episode of vomiting.  He remains confused and lethargic, started on IV fluids and broad-spectrum IV antibiotics.  Neurology consulted.      aspirin, 325 mg, Oral, Daily   Or  aspirin, 300 mg, Rectal, Daily  atorvastatin, 80 mg, Oral, Nightly  enoxaparin, 1 mg/kg, Subcutaneous, Q12H  insulin lispro, 3-14 Units, Subcutaneous, 4x Daily AC & at Bedtime  insulin NPH-insulin regular, 60 Units, Subcutaneous, BID With Meals  meropenem, 2 g, Intravenous, Q8H  meropenem, 2 g, Intravenous, Once  metoclopramide, 10  mg, Oral, 4x Daily AC & at Bedtime  metoprolol tartrate, 25 mg, Oral, Q12H  pantoprazole, 40 mg, Oral, Q AM  senna-docusate sodium, 2 tablet, Oral, BID  sodium chloride, 10 mL, Intravenous, Q12H  [START ON 5/24/2023] vancomycin, 1,500 mg, Intravenous, Q24H       lactated ringers, 100 mL/hr, Last Rate: 100 mL/hr (05/23/23 0914)  Pharmacy to dose vancomycin,          I have utilized all available, immediate resources to obtain, update, or review the patient's current medications including all prescriptions, over-the-counter products, herbals, cannabis/cannabidiol products, and vitamin.mineral/dietary (nutritional) supplements.    Active Hospital Problems:  Active Hospital Problems    Diagnosis    • **Facial droop    • Sepsis    • CKD (chronic kidney disease), stage II    • History of CVA (cerebrovascular accident)    • Left-sided weakness    • Coronary artery disease involving native coronary artery of native heart without angina pectoris    • Essential hypertension    • Hyperlipidemia, mixed    • Type 2 diabetes mellitus with diabetic polyneuropathy, with long-term current use of insulin ([facility])      Plan:     Slurred speech, left facial droop  Acute metabolic encephalopathy  History of CVA with chronic left-sided weakness  -CT head and CTA head and neck negative for any acute findings, chronic occlusion of right PCA noted  -MRI brain negative for new CVA or hemorrhage  -Patient not a tPA candidate on admission per the report  -However unclear on whether he was actually on anticoagulation with Eliquis or Xarelto at home  -Unable to pass swallow eval at this time, ST consulted  -Continue treatment dose Lovenox for now  -Continue aspirin suppository  -PT/OT/ST  -Neurology consult    Severe sepsis  -Unclear source at this time, possible aspiration versus CNS infection  -Patient did have 1 episode of vomiting on admission but no aspiration event was noted  -Chest x-ray negative for any lobar infiltrate  -Patient spiking  high-grade temperature since admission  -Unable to evaluate for acute meningeal signs at this time as patient mainly lethargic and unable to follow commands  -Lactic acid elevated, RVP neg., check UA to r/o UTI  -Discontinue IV cefepime as it can worsen confusion  -Start IV meropenem, continue IV vancomycin  -Discussed with pharmacy  -will discuss case with Dr. Wells regarding possible LP  -Case discussed with ID service and they will follow-up today    DM 2  -Decrease home basal insulin dose as patient n.p.o. at this time  -Continue SSI, monitor blood glucose  -Adjust as needed    CKD stage II  -Creatinine appears to be at baseline of 1.3-1.4  -Monitor, avoid nephrotoxins if possible    Hypertension  -Currently n.p.o.  -Resume home meds as able  -Labetalol as needed added    DVT prophylaxis  -Lovenox    CODE STATUS:    Code Status (Patient has no pulse and is not breathing): CPR (Attempt to Resuscitate)  Medical Interventions (Patient has pulse or is breathing): Full Support      Disposition: Pending clinical course    Electronically signed by Manijnder Brannon DO, 05/23/23, 10:06 EDT.  [facility], [place] Hospitalist Team      Part of this note may be an electronic transcription/translation of spoken language to printed text using the Dragon Dictation System.

## 2023-05-23 NOTE — THERAPY EVALUATION
Acute Care - Speech Language Pathology   Swallow Initial Evaluation  [place]     Patient Name: Chao Lazar  : 1960  MRN: [id number]  Today's Date: 2023               Admit Date: 2023    Visit Dx:     ICD-10-CM ICD-9-CM   1. Facial droop  R29.810 781.94   2. Expressive aphasia  R47.01 784.3     Patient Active Problem List   Diagnosis   • Allergic state   • Asthma   • Chronic cough   • Degenerative joint disease   • Type 2 diabetes mellitus with diabetic polyneuropathy, with long-term current use of insulin (HCC)   • Gastroparesis   • Hyperlipidemia, mixed   • Essential hypertension   • Nausea and vomiting   • Vitamin D deficiency   • Combined forms of age-related cataract, bilateral   • Presbyopia   • Acute ischemic right PCA stroke involving the right occipital lobe (CMS/HCC)   • Sphenoid sinusitis   • Retinal disorder   • Coronary artery disease involving native coronary artery of native heart without angina pectoris   • Cutaneous abscess of head excluding face   • History of CVA (cerebrovascular accident)   • Leukocytosis   • Hypomagnesemia   • Left-sided weakness   • Hypoglycemia   • Dizziness   • CKD (chronic kidney disease), stage II   • Fall   • Dysautonomia   • Facial droop   • Sepsis     Past Medical History:   Diagnosis Date   • Asthma 3/22/2018   • Coronary artery disease involving native coronary artery of native heart without angina pectoris 2020   • Essential hypertension 2019   • Gastroparesis 2013   • History of CVA (cerebrovascular accident) 2021   • Hyperlipidemia, mixed 4/3/2017   • Hypoglycemia      Past Surgical History:   Procedure Laterality Date   • CARDIAC CATHETERIZATION     • CHOLECYSTECTOMY     • COLON RESECTION      [id number]   • COLON RESECTION SMALL BOWEL Right 2019    Procedure: open right hemicolectomy;  Surgeon: Ronaldo Ardon MD;  Location: [facility];  Service: General   • COLONOSCOPY  2019    x2    • CORONARY  ANGIOPLASTY WITH STENT PLACEMENT  04/2017   • ECHO - CONVERTED  2019   • ECHO - CONVERTED  2020   • FRACTURE SURGERY      collar bone as a child    • KNEE ARTHROSCOPY Left 08/2003   • KNEE JOINT MANIPULATION Left 04/2010   • TOTAL KNEE ARTHROPLASTY Bilateral     2013,2011       SLP Recommendation and Plan  SLP Swallowing Diagnosis: suspected pharyngeal dysphagia (05/23/23 1553)  SLP Diet Recommendation: NPO (05/23/23 1553)     SLP Rec. for Method of Medication Administration: meds via alternate route (05/23/23 1553)        Recommended Diagnostics: reassess via clinical swallow evaluation (05/23/23 1553)  Swallow Criteria for Skilled Therapeutic Interventions Met: demonstrates skilled criteria (05/23/23 1553)     Rehab Potential/Prognosis, Swallowing: good, to achieve stated therapy goals (05/23/23 1553)  Therapy Frequency (Swallow): PRN (05/23/23 1553)  Predicted Duration Therapy Intervention (Days): until discharge (05/23/23 1553)                                               SWALLOW EVALUATION (last 72 hours)     SLP Adult Swallow Evaluation     Row Name 05/23/23 1553          Document Type evaluation  -CP    Subjective Information no complaints  -CP    Patient Observations cooperative;decreased LOC  -CP    Patient/Family/Caregiver Comments/Observations Family member prsent  -CP    Patient Effort fair  -CP          Patient Profile Reviewed yes  -CP    Pertinent History Of Current Problem This is a 63-year-old male who presents to the hospital 5/22/2023 about 90 minutes after family noticed neurological changes.  They states that patient's speech started to slur he had left facial droop and had some expressive aphasia.  Patient has history of CVA with left-sided deficits but this was a significant change from his baseline.  Patient apparently also has had some vomiting yesterday.  Patient's brother is at bedside to help with history.  States that he did not have any known fever at home.  I am able to wake the  patient up and he knows his name and where he is at but falls asleep after answering 1 or 2 questions.  He does admit to headache and a painful neck.  Patient is having frequent tremors to the right upper extremity. Pt being w/u for CVA and failed the swallow screen.  -CP    Current Method of Nutrition NPO  -CP    Prior Level of Function-Swallowing no diet consistency restrictions;regular textures;thin liquids  -CP    Plans/Goals Discussed with patient;family  -CP    Barriers to Rehab none identified  -CP          Additional Documentation Pain Scale: FACES Pre/Post-Treatment (Group)  -CP          Pain: FACES Scale, Pretreatment 0-->no hurt  -CP    Posttreatment Pain Rating 0-->no hurt  -CP          Dentition Assessment natural, present and adequate  -CP    Secretion Management WNL/WFL  -CP          Oral Motor General Assessment generalized oral motor weakness;other (see comments)  -CP    Oral Motor, Comment Pt able to protrude tongue weakly, unable yo lateralize tongue. Labial protrusion weak. and retraction weak on the L.  -CP          Respiratory Support Currently in Use room air  -CP    Eating/Swallowing Skills fed by SLP  -CP    Positioning During Eating upright in bed  -CP    Utensils Used spoon  -CP    Consistencies Trialed thin liquids  -CP          Clinical Swallow Evaluation Summary Pt seen for clinical swallow eval. Pt was sleepy but responsive and brought upright in bed. Pt able to follow simple commands and nod to questions. Pt given oral care due to dry, sticky oral cavity, and then given trials of water on a toothette and water by spoon only. Pt exhibited good labial closure over sponge and spoon with minimal labial spillage. Pt had no difficulty pulling water from the sponge or spoon. Oral transit appeared timely. Digital palpation suggests timely swallow. After the swallow pt had consistent clear vocal quality and no cough, however only small amounts given due to dec alertness. Pt does not appear  ready for PO due to dec LOC. It is rec that he remain NPO. pt may have small 1/2 tsp size sips of water by spoon with nursing supervision for oral gratification via the Man Water protocol. See guidelines below. Education gifven to pt and his family member in tyhe room on the rationale for continued NPO and only small sips of water with the nurse only. They verbalized understanding. ST will follow to re-eval swallow as indicated to establish PO diet as pt improves.      The rationale to recommend water when a PO diet cannot appropriately/functionally sustain nutrition (or if thickened liquids are prescribed due to aspiration of thin liquids) is because water is low risk for aspiration pna when compared to aspiration of food or other liquids.    Benefits of a water protocol include but are not limited to:     Oral gratification   Engagement of oropharyngeal swallow musculature   Decrease likelihood of dehydration      Guidelines for proper implementation include:  Waiting a minimum of 30 minutes after consuming any other PO   Thorough oral care prior to consuming water  Upright at 90 degree hip flexion  Small sips at slow rate  Monitor for any changes in respiratory status and discontinue if distress noted    The Man Free Water Protocol is a research based protocol established in 1984.   -CP          Education and counseling provided Signs of aspiration;Risks of aspiration;Oral care recommendations and rationale  -CP          SLP Swallowing Diagnosis suspected pharyngeal dysphagia  -CP    Functional Impact risk of aspiration/pneumonia  -CP    Rehab Potential/Prognosis, Swallowing good, to achieve stated therapy goals  -CP    Swallow Criteria for Skilled Therapeutic Interventions Met demonstrates skilled criteria  -CP          Care Plan Review evaluation/treatment results reviewed;risks/benefits reviewed  -CP          Therapy Frequency (Swallow) PRN  -CP    Predicted Duration Therapy Intervention (Days) until  discharge  -CP    SLP Diet Recommendation NPO  -CP    Recommended Diagnostics reassess via clinical swallow evaluation  -CP    Oral Care Recommendations Before ice/water  -CP    SLP Rec. for Method of Medication Administration meds via alternate route  -CP          Swallow LTGs Swallow Long Term Goal (free text)  -CP    Swallow STGs diet tolerance goal selection (SLP)  -CP    Diet Tolerance Goal Selection (SLP) Swallow Short Term Goal 1  -CP          (LTG) Swallow Pt will maximize swallow function for least restrictive PO diet, exhibiting no complication associated with dysphagia, adequate PO intake, and demonstrating independent use of swallow compensations  -CP    Time Frame (Swallow Long Term Goal) by discharge  -CP          (STG) Swallow 1 The patient will participate in ongoing assessment of swallow, including re-evaluation clinically and/or including instrumental assessment of swallow if indicated, to further assess swallow function in anticipation to initiate a PO diet  -CP    Time Frame (Swallow Short Term Goal 1) 1 week  -CP          User Key  (r) = Recorded By, (t) = Taken By, (c) = Cosigned By    Initials Name Effective Dates    CP Nora Capps, JAIDA 06/16/21 -                 EDUCATION  The patient has been educated in the following areas:   Dysphagia (Swallowing Impairment) Oral Care/Hydration NPO rationale.        SLP GOALS     Row Name 05/23/23 1553             (LTG) Swallow    (LTG) Swallow Pt will maximize swallow function for least restrictive PO diet, exhibiting no complication associated with dysphagia, adequate PO intake, and demonstrating independent use of swallow compensations  -CP      Time Frame (Swallow Long Term Goal) by discharge  -CP         (STG) Swallow 1    (STG) Swallow 1 The patient will participate in ongoing assessment of swallow, including re-evaluation clinically and/or including instrumental assessment of swallow if indicated, to further assess swallow function in  anticipation to initiate a PO diet  -CP      Time Frame (Swallow Short Term Goal 1) 1 week  -CP            User Key  (r) = Recorded By, (t) = Taken By, (c) = Cosigned By    Initials Name Provider Type    CP Nora Capps, SLP Speech and Language Pathologist                   Time Calculation:                JAIDA Gregorio  5/23/2023

## 2023-05-23 NOTE — SIGNIFICANT NOTE
05/23/23 1501   OTHER   Discipline physical therapist   Rehab Time/Intention   Session Not Performed unable to evaluate, medical status change;other (see comments)  (Pt lethargic and sleeping at entry. Pt to have lumbar puncture later this date for possible encephalitis. PT will follow up as appropriate.)   Therapy Assessment/Plan (PT)   Criteria for Skilled Interventions Met (PT) yes;meets criteria   Recommendation   PT - Next Appointment 05/24/23

## 2023-05-23 NOTE — PROGRESS NOTES
We have requested IR to do an LP and appreciate their help     Both myself and infectious disease team considered necessary to get CSF analysis to rule in or rule out encephalitis because he has an acute change in mentation     I considered an emergency as it may affect our treatment decision     Thanks

## 2023-05-23 NOTE — CONSULTS
Administered the [facility] Sac[facility]ament of the Levine, Susan. \Hospital Has a New Name and Outlook.\"".

## 2023-05-24 ENCOUNTER — APPOINTMENT (OUTPATIENT)
Dept: CT IMAGING | Facility: HOSPITAL | Age: 63
DRG: 871 | End: 2023-05-24
Payer: MEDICARE

## 2023-05-24 ENCOUNTER — APPOINTMENT (OUTPATIENT)
Dept: CARDIOLOGY | Facility: HOSPITAL | Age: 63
DRG: 871 | End: 2023-05-24
Payer: MEDICARE

## 2023-05-24 LAB
ALBUMIN SERPL-MCNC: 3.2 G/DL (ref 3.5–5.2)
ALBUMIN/GLOB SERPL: 1.1 G/DL
ALP SERPL-CCNC: 78 U/L (ref 39–117)
ALT SERPL W P-5'-P-CCNC: 17 U/L (ref 1–41)
ANION GAP SERPL CALCULATED.3IONS-SCNC: 15 MMOL/L (ref 5–15)
AST SERPL-CCNC: 19 U/L (ref 1–40)
BASOPHILS # BLD AUTO: 0 10*3/MM3 (ref 0–0.2)
BASOPHILS NFR BLD AUTO: 0.3 % (ref 0–1.5)
BILIRUB SERPL-MCNC: 0.5 MG/DL (ref 0–1.2)
BUN SERPL-MCNC: 27 MG/DL (ref 8–23)
BUN/CREAT SERPL: 20.1 (ref 7–25)
CALCIUM SPEC-SCNC: 8.2 MG/DL (ref 8.6–10.5)
CHLORIDE SERPL-SCNC: 105 MMOL/L (ref 98–107)
CO2 SERPL-SCNC: 21 MMOL/L (ref 22–29)
CREAT SERPL-MCNC: 1.34 MG/DL (ref 0.76–1.27)
DEPRECATED RDW RBC AUTO: 45.9 FL (ref 37–54)
EGFRCR SERPLBLD CKD-EPI 2021: 58.8 ML/MIN/1.73
EOSINOPHIL # BLD AUTO: 0.1 10*3/MM3 (ref 0–0.4)
EOSINOPHIL NFR BLD AUTO: 0.9 % (ref 0.3–6.2)
ERYTHROCYTE [DISTWIDTH] IN BLOOD BY AUTOMATED COUNT: 13.3 % (ref 12.3–15.4)
GLOBULIN UR ELPH-MCNC: 2.9 GM/DL
GLUCOSE BLDC GLUCOMTR-MCNC: 148 MG/DL (ref 70–105)
GLUCOSE BLDC GLUCOMTR-MCNC: 168 MG/DL (ref 70–105)
GLUCOSE BLDC GLUCOMTR-MCNC: 177 MG/DL (ref 70–105)
GLUCOSE BLDC GLUCOMTR-MCNC: 217 MG/DL (ref 70–105)
GLUCOSE SERPL-MCNC: 222 MG/DL (ref 65–99)
HCT VFR BLD AUTO: 36.7 % (ref 37.5–51)
HGB BLD-MCNC: 12.3 G/DL (ref 13–17.7)
LYMPHOCYTES # BLD AUTO: 0.9 10*3/MM3 (ref 0.7–3.1)
LYMPHOCYTES NFR BLD AUTO: 8.6 % (ref 19.6–45.3)
MCH RBC QN AUTO: 31.1 PG (ref 26.6–33)
MCHC RBC AUTO-ENTMCNC: 33.4 G/DL (ref 31.5–35.7)
MCV RBC AUTO: 93 FL (ref 79–97)
MONOCYTES # BLD AUTO: 0.8 10*3/MM3 (ref 0.1–0.9)
MONOCYTES NFR BLD AUTO: 8.3 % (ref 5–12)
NEUTROPHILS NFR BLD AUTO: 8.4 10*3/MM3 (ref 1.7–7)
NEUTROPHILS NFR BLD AUTO: 81.9 % (ref 42.7–76)
NRBC BLD AUTO-RTO: 0 /100 WBC (ref 0–0.2)
PLATELET # BLD AUTO: 239 10*3/MM3 (ref 140–450)
PMV BLD AUTO: 8.5 FL (ref 6–12)
POTASSIUM SERPL-SCNC: 4.1 MMOL/L (ref 3.5–5.2)
PROT SERPL-MCNC: 6.1 G/DL (ref 6–8.5)
RBC # BLD AUTO: 3.95 10*6/MM3 (ref 4.14–5.8)
SODIUM SERPL-SCNC: 141 MMOL/L (ref 136–145)
WBC NRBC COR # BLD: 10.2 10*3/MM3 (ref 3.4–10.8)

## 2023-05-24 PROCEDURE — 63710000001 INSULIN ISOPHANE & REGULAR PER 5 UNITS: Performed by: INTERNAL MEDICINE

## 2023-05-24 PROCEDURE — 25010000002 CEFTRIAXONE PER 250 MG: Performed by: INTERNAL MEDICINE

## 2023-05-24 PROCEDURE — 92526 ORAL FUNCTION THERAPY: CPT

## 2023-05-24 PROCEDURE — 80053 COMPREHEN METABOLIC PANEL: CPT | Performed by: NURSE PRACTITIONER

## 2023-05-24 PROCEDURE — 25010000002 ACYCLOVIR PER 5 MG: Performed by: INTERNAL MEDICINE

## 2023-05-24 PROCEDURE — 85025 COMPLETE CBC W/AUTO DIFF WBC: CPT | Performed by: INTERNAL MEDICINE

## 2023-05-24 PROCEDURE — 97162 PT EVAL MOD COMPLEX 30 MIN: CPT

## 2023-05-24 PROCEDURE — 93306 TTE W/DOPPLER COMPLETE: CPT

## 2023-05-24 PROCEDURE — 97166 OT EVAL MOD COMPLEX 45 MIN: CPT

## 2023-05-24 PROCEDURE — 94799 UNLISTED PULMONARY SVC/PX: CPT

## 2023-05-24 PROCEDURE — 63710000001 INSULIN LISPRO (HUMAN) PER 5 UNITS

## 2023-05-24 PROCEDURE — 25010000002 PIPERACILLIN SOD-TAZOBACTAM PER 1 G: Performed by: INTERNAL MEDICINE

## 2023-05-24 PROCEDURE — 82948 REAGENT STRIP/BLOOD GLUCOSE: CPT

## 2023-05-24 PROCEDURE — 93306 TTE W/DOPPLER COMPLETE: CPT | Performed by: INTERNAL MEDICINE

## 2023-05-24 PROCEDURE — 70450 CT HEAD/BRAIN W/O DYE: CPT

## 2023-05-24 PROCEDURE — 25010000002 ENOXAPARIN PER 10 MG: Performed by: INTERNAL MEDICINE

## 2023-05-24 PROCEDURE — 0 LEVETIRACETAM IN NACL 0.54% 1500 MG/100ML SOLUTION

## 2023-05-24 RX ORDER — LEVETIRACETAM 5 MG/ML
500 INJECTION INTRAVASCULAR EVERY 12 HOURS SCHEDULED
Status: DISCONTINUED | OUTPATIENT
Start: 2023-05-25 | End: 2023-05-27

## 2023-05-24 RX ORDER — INSULIN LISPRO 100 [IU]/ML
3-14 INJECTION, SOLUTION INTRAVENOUS; SUBCUTANEOUS EVERY 6 HOURS SCHEDULED
Status: DISCONTINUED | OUTPATIENT
Start: 2023-05-24 | End: 2023-05-28 | Stop reason: HOSPADM

## 2023-05-24 RX ORDER — LEVETIRACETAM 15 MG/ML
1500 INJECTION INTRAVASCULAR ONCE
Status: COMPLETED | OUTPATIENT
Start: 2023-05-24 | End: 2023-05-24

## 2023-05-24 RX ADMIN — INSULIN HUMAN 30 UNITS: 100 INJECTION, SUSPENSION SUBCUTANEOUS at 08:01

## 2023-05-24 RX ADMIN — SODIUM CHLORIDE, POTASSIUM CHLORIDE, SODIUM LACTATE AND CALCIUM CHLORIDE 100 ML/HR: 600; 310; 30; 20 INJECTION, SOLUTION INTRAVENOUS at 18:36

## 2023-05-24 RX ADMIN — INSULIN LISPRO 5 UNITS: 100 INJECTION, SOLUTION INTRAVENOUS; SUBCUTANEOUS at 08:01

## 2023-05-24 RX ADMIN — METOCLOPRAMIDE 10 MG: 10 TABLET ORAL at 17:42

## 2023-05-24 RX ADMIN — INSULIN LISPRO 3 UNITS: 100 INJECTION, SOLUTION INTRAVENOUS; SUBCUTANEOUS at 17:42

## 2023-05-24 RX ADMIN — INSULIN HUMAN 30 UNITS: 100 INJECTION, SUSPENSION SUBCUTANEOUS at 17:42

## 2023-05-24 RX ADMIN — PIPERACILLIN AND TAZOBACTAM 3.38 G: 3; .375 INJECTION, POWDER, LYOPHILIZED, FOR SOLUTION INTRAVENOUS at 22:27

## 2023-05-24 RX ADMIN — ACYCLOVIR SODIUM 950 MG: 50 INJECTION, SOLUTION INTRAVENOUS at 08:01

## 2023-05-24 RX ADMIN — CEFTRIAXONE 2 G: 2 INJECTION, POWDER, FOR SOLUTION INTRAMUSCULAR; INTRAVENOUS at 05:53

## 2023-05-24 RX ADMIN — PIPERACILLIN AND TAZOBACTAM 3.38 G: 3; .375 INJECTION, POWDER, LYOPHILIZED, FOR SOLUTION INTRAVENOUS at 15:57

## 2023-05-24 RX ADMIN — Medication 10 ML: at 08:02

## 2023-05-24 RX ADMIN — ENOXAPARIN SODIUM 100 MG: 100 INJECTION SUBCUTANEOUS at 08:03

## 2023-05-24 RX ADMIN — ASPIRIN 300 MG: 300 SUPPOSITORY RECTAL at 08:01

## 2023-05-24 RX ADMIN — LEVETIRACETAM 1500 MG: 15 INJECTION INTRAVENOUS at 22:07

## 2023-05-24 RX ADMIN — ENOXAPARIN SODIUM 100 MG: 100 INJECTION SUBCUTANEOUS at 20:23

## 2023-05-24 RX ADMIN — INSULIN LISPRO 3 UNITS: 100 INJECTION, SOLUTION INTRAVENOUS; SUBCUTANEOUS at 11:59

## 2023-05-24 NOTE — THERAPY EVALUATION
Patient Name: Chao Lazar  : 1960    MRN: [id number]                              Today's Date: 2023       Admit Date: 2023    Visit Dx:     ICD-10-CM ICD-9-CM   1. Facial droop  R29.810 781.94   2. Expressive aphasia  R47.01 784.3     Patient Active Problem List   Diagnosis   • Allergic state   • Asthma   • Chronic cough   • Degenerative joint disease   • Type 2 diabetes mellitus with diabetic polyneuropathy, with long-term current use of insulin ([facility])   • Gastroparesis   • Hyperlipidemia, mixed   • Essential hypertension   • Nausea and vomiting   • Vitamin D deficiency   • Combined forms of age-related cataract, bilateral   • Presbyopia   • Acute ischemic right PCA stroke involving the right occipital lobe (CMS/HCC)   • Sphenoid sinusitis   • Retinal disorder   • Coronary artery disease involving native coronary artery of native heart without angina pectoris   • Cutaneous abscess of head excluding face   • History of CVA (cerebrovascular accident)   • Leukocytosis   • Hypomagnesemia   • Left-sided weakness   • Hypoglycemia   • Dizziness   • CKD (chronic kidney disease), stage II   • Fall   • Dysautonomia   • Facial droop   • Sepsis     Past Medical History:   Diagnosis Date   • Asthma 3/22/2018   • Coronary artery disease involving native coronary artery of native heart without angina pectoris 2020   • Essential hypertension 2019   • Gastroparesis 2013   • History of CVA (cerebrovascular accident) 2021   • Hyperlipidemia, mixed 4/3/2017   • Hypoglycemia      Past Surgical History:   Procedure Laterality Date   • CARDIAC CATHETERIZATION     • CHOLECYSTECTOMY     • COLON RESECTION      [id number]   • COLON RESECTION SMALL BOWEL Right 2019    Procedure: open right hemicolectomy;  Surgeon: Ronaldo Ardon MD;  Location: [facility] OR;  Service: General   • COLONOSCOPY  2019    x2    • CORONARY ANGIOPLASTY WITH STENT PLACEMENT  2017   • ECHO - CONVERTED   2019   • ECHO - CONVERTED  2020   • FRACTURE SURGERY      collar bone as a child    • KNEE ARTHROSCOPY Left 08/2003   • KNEE JOINT MANIPULATION Left 04/2010   • TOTAL KNEE ARTHROPLASTY Bilateral     2013,2011      General Information     [facility] Name 05/24/23 1402          Physical Therapy Time and Intention    Document Type evaluation  -AM     Mode of Treatment physical therapy  -AM     [facility] Name 05/24/23 1402          General Information    Patient Profile Reviewed yes  -AM     Prior Level of Function independent:;community mobility;gait;transfer;bed mobility;using stairs  -AM     Existing Precautions/Restrictions fall  -AM     Barriers to Rehab medically complex  -AM     [facility] Name 05/24/23 1402          Living Environment    People in Home spouse  -AM     Row Name 05/24/23 1402          Home Main Entrance    Number of Stairs, Main Entrance two  -AM     Stair Railings, Main Entrance railings safe and in good condition  -AM     Row Name 05/24/23 1402          Stairs Within Home, Primary    Number of Stairs, Within Home, Primary none  -AM     Row Name 05/24/23 1402          Cognition    Orientation Status (Cognition) oriented to;person;place  -AM     Row Name 05/24/23 1402          Safety Issues, Functional Mobility    Safety Issues Affecting Function (Mobility) awareness of need for assistance;insight into deficits/self-awareness;safety precaution awareness;sequencing abilities  -AM     Impairments Affecting Function (Mobility) balance;cognition;endurance/activity tolerance;motor planning;strength  -AM           User Key  (r) = Recorded By, (t) = Taken By, (c) = Cosigned By    Initials Name Provider Type    AM Vishnu Pérez, PT Physical Therapist               Mobility     Row Name 05/24/23 1403          Bed Mobility    Bed Mobility bed mobility (all) activities  -AM     All Activities, [place] (Bed Mobility) maximum assist (25% patient effort);2 person assist  -AM     Assistive Device (Bed Mobility) draw sheet   -AM     Comment, (Bed Mobility) Sitting balance:  Min/Mod A for short periods of time than L extensor tone UE/LE occurs intermittently and pt requires Max A for sitting balance  -AM     Row Name 05/24/23 1403          Bed-Chair Transfer    Bed-Chair [place] (Transfers) not tested  -AM     Row Name 05/24/23 1403          Sit-Stand Transfer    Sit-Stand [place] (Transfers) not tested  -AM     Row Name 05/24/23 1403          Gait/Stairs (Locomotion)    [place] Level (Stairs) not tested  -AM           User Key  (r) = Recorded By, (t) = Taken By, (c) = Cosigned By    Initials Name Provider Type    AM Vishnu Pérez, PT Physical Therapist               Obj/Interventions     Row Name 05/24/23 1404          Range of Motion Comprehensive    General Range of Motion no range of motion deficits identified  -AM     Row Name 05/24/23 1404          Strength Comprehensive (MMT)    Comment, General Manual Muscle Testing (MMT) Assessment Unable to formally test secondary to pt not following instructions for MMT.  At least 3+/5 RLE and 2/5 L LE observed through spontaneous movement  -AM     Row Name 05/24/23 1404          Motor Skills    Motor Skills functional endurance;muscle tone  -AM     Functional Endurance poor  -AM     Muscle Tone other (see comments)  intermittent periods of L extensor tone UE/LE  -AM     Row Name 05/24/23 1404          Balance    Balance Assessment sitting static balance;sitting dynamic balance  -AM     Static Sitting Balance moderate assist  -AM     Dynamic Sitting Balance maximum assist  -AM     Position, Sitting Balance supported;sitting edge of bed  -AM     Row Name 05/24/23 1404          Sensory Assessment (Somatosensory)    Sensory Assessment (Somatosensory) unable/difficult to assess  -AM           User Key  (r) = Recorded By, (t) = Taken By, (c) = Cosigned By    Initials Name Provider Type    AM Vishnu Pérez, PT Physical Therapist               Goals/Plan     Row Name 05/24/23 1412           Bed Mobility Goal 1 (PT)    Activity/Assistive Device (Bed Mobility Goal 1, PT) bed mobility activities, all  -AM     [place] Level/Cues Needed (Bed Mobility Goal 1, PT) contact guard required  -AM     Time Frame (Bed Mobility Goal 1, PT) long term goal (LTG)  -AM     Row Name 05/24/23 1413          Transfer Goal 1 (PT)    Activity/Assistive Device (Transfer Goal 1, PT) transfers, all;walker, rolling  -AM     [place] Level/Cues Needed (Transfer Goal 1, PT) minimum assist (75% or more patient effort)  -AM     Row Name 05/24/23 1413          Gait Training Goal 1 (PT)    Activity/Assistive Device (Gait Training Goal 1, PT) gait (walking locomotion);walker, rolling  -AM     [place] Level (Gait Training Goal 1, PT) minimum assist (75% or more patient effort)  -AM     Distance (Gait Training Goal 1, PT) 50'  -AM     Time Frame (Gait Training Goal 1, PT) long term goal (LTG)  -AM     Row Name 05/24/23 1413          Problem Specific Goal 1 (PT)    Problem Specific Goal 1 (PT) Pt will sit EOB x 10 minutes with supervision  -AM     Time Frame (Problem Specific Goal 1, PT) long-term goal (LTG)  -AM     Row Name 05/24/23 1413          Therapy Assessment/Plan (PT)    Planned Therapy Interventions (PT) balance training;bed mobility training;neuromuscular re-education;gait training;patient/family education;strengthening;transfer training  -AM           User Key  (r) = Recorded By, (t) = Taken By, (c) = Cosigned By    Initials Name Provider Type    AM Vishnu Pérez, PT Physical Therapist               Clinical Impression     Row Name 05/24/23 1406          Pain    Pretreatment Pain Rating 0/10 - no pain  -AM     Posttreatment Pain Rating 0/10 - no pain  -AM     Row Name 05/24/23 1406          Plan of Care Review    Plan of Care Reviewed With patient;spouse  -AM     Outcome Evaluation Pt is a 64 y/o male with c/o slurred speech and L sided facial droop 90 minutes before hospitalization.  Pt with hx of CVA with  residual L sided weakness.  Expressive aphasian noted in ED and pt with 2 bouts of vomitng while in hospital.  CT head: (-), CT head/neck: (-).  Pt being worked up for possible sepsis or encephalitis.  Pt's wife reports they live in a 1 story home with 2 steps to enter into home.  Pt was mod I with all functional mobility tasks and pt used a RW for outside ambulation.  Pt on room air with IV and telemetry, Purewick.  Pt required Max A x 2 for bed mobilty and brief periods of Min/Mod A for sitting balance.  Had multiple bouts of L extensor tone in UE/LE which resulted in strong posterior lean that needed Max A for balance.  Extensor tone bouts lasted for 1 minute and then resolve.  Pt's BP at end of session: 131/60.  Recommend inpt rehab.  -AM     Row Name 05/24/23 1406          Therapy Assessment/Plan (PT)    Patient/Family Therapy Goals Statement (PT) Wife- to get better  -AM     Rehab Potential (PT) good, to achieve stated therapy goals  -AM     Criteria for Skilled Interventions Met (PT) yes;skilled treatment is necessary  -AM     Therapy Frequency (PT) 5 times/wk  -AM     Predicted Duration of Therapy Intervention (PT) until d/c  -AM     Row Name 05/24/23 1406          Vital Signs    Post Systolic BP Rehab 131  -AM     Post Treatment Diastolic BP 60  -AM     O2 Delivery Pre Treatment room air  -AM     O2 Delivery Intra Treatment room air  -AM     O2 Delivery Post Treatment room air  -AM     Pre Patient Position Supine  -AM     Intra Patient Position Sitting  -AM     Post Patient Position Supine  -AM     Row Name 05/24/23 1406          Positioning and Restraints    Pre-Treatment Position in bed  -AM     Post Treatment Position bed  -AM     In Bed supine;call light within reach;encouraged to call for assist;exit alarm on;with family/caregiver  -AM           User Key  (r) = Recorded By, (t) = Taken By, (c) = Cosigned By    Initials Name Provider Type    AM Vishnu Pérez, PT Physical Therapist               Outcome  Measures     Row Name 05/24/23 1414          Modified [place] Scale    Pre-Stroke Modified [place] Scale 1 - No significant disability despite symptoms.  Able to carry out all usual duties and activities.  -AM     Modified [place] Scale 5 - Severe disability.  Bedridden, incontinent, and requiring constant nursing care and attention.  -AM     Row Name 05/24/23 1414          Functional Assessment    Outcome Measure Options Modified [place]  -AM           User Key  (r) = Recorded By, (t) = Taken By, (c) = Cosigned By    Initials Name Provider Type    AM Vishnu Pérez, PT Physical Therapist                             Physical Therapy Education     Title: PT OT SLP Therapies (In Progress)     Topic: Physical Therapy (In Progress)     Point: Mobility training (Done)     Learning Progress Summary           Family Acceptance, E,TB, VU by AM at 5/24/2023 1415                   Point: Home exercise program (Not Started)     Learner Progress:  Not documented in this visit.          Point: Body mechanics (Done)     Learning Progress Summary           Family Acceptance, E,TB, VU by AM at 5/24/2023 1415                   Point: Precautions (Done)     Learning Progress Summary           Family Acceptance, E,TB, VU by AM at 5/24/2023 1415                               User Key     Initials Effective Dates Name Provider Type Discipline    AM 05/10/21 -  Vishnu Pérez, PT Physical Therapist PT              PT Recommendation and Plan  Planned Therapy Interventions (PT): balance training, bed mobility training, neuromuscular re-education, gait training, patient/family education, strengthening, transfer training  Plan of Care Reviewed With: patient, spouse  Outcome Evaluation: Pt is a 64 y/o male with c/o slurred speech and L sided facial droop 90 minutes before hospitalization.  Pt with hx of CVA with residual L sided weakness.  Expressive aphasian noted in ED and pt with 2 bouts of vomitng while in hospital.  CT head: (-), CT  head/neck: (-).  Pt being worked up for possible sepsis or encephalitis.  Pt's wife reports they live in a 1 story home with 2 steps to enter into home.  Pt was mod I with all functional mobility tasks and pt used a RW for outside ambulation.  Pt on room air with IV and telemetry, Purewick.  Pt required Max A x 2 for bed mobilty and brief periods of Min/Mod A for sitting balance.  Had multiple bouts of L extensor tone in UE/LE which resulted in strong posterior lean that needed Max A for balance.  Extensor tone bouts lasted for 1 minute and then resolve.  Pt's BP at end of session: 131/60.  Recommend inpt rehab.     Time Calculation:    PT Charges     Row Name 05/24/23 1415             Time Calculation    Start Time 0932  -AM      Stop Time 0959  -AM      Time Calculation (min) 27 min  -AM      PT Received On 05/24/23  -AM      PT - Next Appointment 05/25/23  -AM      PT Goal Re-Cert Due Date 06/07/23  -AM            User Key  (r) = Recorded By, (t) = Taken By, (c) = Cosigned By    Initials Name Provider Type    AM Vishnu Pérez, PT Physical Therapist              Therapy Charges for Today     Code Description Service Date Service Provider Modifiers Qty    [id number] HC PT EVAL MOD COMPLEXITY 4 5/24/2023 Vishnu Pérez, PT GP 1          PT G-Codes  Outcome Measure Options: Modified [place]  Modified [place] Scale: 5 - Severe disability.  Bedridden, incontinent, and requiring constant nursing care and attention.  PT Discharge Summary  Anticipated Discharge Disposition (PT): inpatient rehabilitation facility    Vishnu Pérez PT  5/24/2023

## 2023-05-24 NOTE — PROGRESS NOTES
Infectious Diseases Progress Note      LOS: 2 days   Patient Care Team:  Al Kimbrough MD as PCP - General  Al Kimbrough MD as PCP - Family Medicine    Chief Complaint: Confusion and fever    Subjective     The patient had high-grade fever last night up to 101.7.  He is afebrile today.  He is more awake and alert today.  He is hemodynamically stable.  Currently on room air    Review of Systems:   Review of Systems   Constitutional: Positive for fever.   Psychiatric/Behavioral: Positive for confusion.        Objective     Vital Signs  Temp:  [97.8 °F (36.6 °C)-101.7 °F (38.7 °C)] 97.8 °F (36.6 °C)  Heart Rate:  [73-97] 74  Resp:  [11-28] 28  BP: (102-131)/(35-71) 120/59    Physical Exam:  Physical Exam  Vitals and nursing note reviewed.   Constitutional:       Appearance: He is well-developed.      Comments: Lethargic   HENT:      Head: Normocephalic and atraumatic.   Eyes:      Pupils: Pupils are equal, round, and reactive to light.   Cardiovascular:      Rate and Rhythm: Normal rate and regular rhythm.      Heart sounds: Normal heart sounds.   Pulmonary:      Effort: Pulmonary effort is normal. No respiratory distress.      Breath sounds: Normal breath sounds. No wheezing or rales.   Abdominal:      General: Bowel sounds are normal. There is no distension.      Palpations: Abdomen is soft. There is no mass.      Tenderness: There is no abdominal tenderness. There is no guarding or rebound.   Musculoskeletal:         General: No deformity. Normal range of motion.      Cervical back: Normal range of motion and neck supple.   Skin:     General: Skin is warm.      Findings: No erythema or rash.   Neurological:      Mental Status: He is disoriented.      Cranial Nerves: No cranial nerve deficit.      Comments: The patient is oriented in person and place but not in time          Results Review:    I have reviewed all clinical data, test, lab, and imaging results.     Radiology  IR LUMBAR PUNCTURE  DIAGNOSTIC    Result Date: 5/23/2023  IR LUMBAR PUNCTURE DIAGNOSTIC Date of Exam: 5/23/2023 3:12 PM EDT Indication: 63-year-old male with suspected encephalitis. Comparison: None available. Technique: The patient's medications were reviewed prior to the procedure. After obtaining appropriate consent he was placed onto the radiograph table in the left lateral decubitus position with a timeout performed. His lower lumbar region was prepped and draped using maximum sterile barrier technique. After obtaining adequate local anesthesia using 1% lidocaine, the lumbar thecal sac was successfully at L3-4 cannulated using a oblique approach along both fluoroscopic guidance in 2 planes. CSF was collected into the 4 separate containers with a total volume of 7 cc obtained. The stylet was replaced and the needle removed. Hemostasis was obtained manually and a sterile dressing applied. The patient tolerated the procedure well and there were no immediate complications. Fluoroscopic Time: 3 minutes 57 seconds (89 mGy) Findings: Fluoroscopic imaging confirms appropriate positioning of the needle tip during the diagnostic lumbar puncture procedure. Incidentally noted is moderate degenerative change throughout the spine, especially inferiorly.     Impression: Technically successful fluoroscopically guided 20-gauge diagnostic lumbar puncture, as described. Electronically Signed: Donny Braden  5/23/2023 4:25 PM EDT  Workstation ID: [id number]      Cardiology    Laboratory    Results from last 7 days   Lab Units 05/24/23 0319 05/23/23 0259 05/22/23  1743   WBC 10*3/mm3 10.20 13.20* 9.90   HEMOGLOBIN g/dL 12.3* 13.6 14.3   HEMATOCRIT % 36.7* 39.4 42.1   PLATELETS 10*3/mm3 239 358 350     Results from last 7 days   Lab Units 05/24/23 0319 05/23/23  0259 05/22/23  1743   SODIUM mmol/L 141 141 141   POTASSIUM mmol/L 4.1 4.3 4.7   CHLORIDE mmol/L 105 101 102   CO2 mmol/L 21.0* 22.0 25.0   BUN mg/dL 27* 29* 28*   CREATININE mg/dL 1.34* 1.44*  1.36*   GLUCOSE mg/dL 222* 258* 246*   ALBUMIN g/dL 3.2*  --  4.0   BILIRUBIN mg/dL 0.5  --  0.4   ALK PHOS U/L 78  --  126*   AST (SGOT) U/L 19  --  22   ALT (SGPT) U/L 17  --  25   CALCIUM mg/dL 8.2* 9.6 10.0                 Microbiology   Microbiology Results (last 10 days)     Procedure Component Value - Date/Time    Culture, CSF - Cerebrospinal Fluid, Lumbar Puncture [[id number]] Collected: 05/23/23 1537    Lab Status: Preliminary result Specimen: Cerebrospinal Fluid from Lumbar Puncture Updated: 05/24/23 1231     CSF Culture No growth     Gram Stain Rare (1+) WBCs per low power field      No organisms seen    Meningitis / Encephalitis Panel, PCR - Cerebrospinal Fluid, Lumbar Puncture [[id number]]  (Normal) Collected: 05/23/23 1537    Lab Status: Final result Specimen: Cerebrospinal Fluid from Lumbar Puncture Updated: 05/23/23 1725     ESCHERICHIA COLI K1, PCR Not Detected     HAEMOPHILUS INFLUENZAE, PCR Not Detected     LISTERIA MONOCYTOGENES, PCR Not Detected     NEISSERIA MENINGITIDIS, PCR Not Detected     STREPTOCOCCUS AGALACTIAE, PCR Not Detected     STREPTOCOCCUS PNEUMONIAE, PCR Not Detected     CYTOMEGALOVIRUS (CMV), PCR Not Detected     ENTEROVIRUS, PCR Not Detected     HERPES SIMPLEX VIRUS 1 (HSV-1), PCR Not Detected     HERPES SIMPLEX VIRUS 2 (HSV-2), PCR Not Detected     HUMAN PARECHOVIRUS, PCR Not Detected     VARICELLA ZOSTER VIRUS (VZV), PCR Not Detected     CRYPTOCOCCUS NEOFORMANS / GATTII, PCR Not Detected     HUMAN HERPES VIRUS 6 PCR Not Detected    S. Pneumo Ag Urine or CSF - Urine, Urine, Clean Catch [[id number]]  (Normal) Collected: 05/23/23 0742    Lab Status: Final result Specimen: Urine, Clean Catch Updated: 05/23/23 0813     Strep Pneumo Ag Negative    Legionella Antigen, Urine - Urine, Urine, Clean Catch [[id number]]  (Normal) Collected: 05/23/23 0742    Lab Status: Final result Specimen: Urine, Clean Catch Updated: 05/23/23 0812     LEGIONELLA ANTIGEN, URINE Negative    MRSA Screen, PCR  (Inpatient) - Swab, Nares [[id number]]  (Normal) Collected: 05/23/23 0536    Lab Status: Final result Specimen: Swab from Nares Updated: 05/23/23 0924     MRSA PCR No MRSA Detected    Narrative:      The negative predictive value of this diagnostic test is high and should only be used to consider de-escalating anti-MRSA therapy. A positive result may indicate colonization with MRSA and must be correlated clinically.    Respiratory Panel PCR w/COVID-19(SARS-CoV-2) CHRISTIE/DARIUS/PHILLIP/PAD/[facility]/MAD/THEODORE In-House, NP Swab in UTM/VTM, 3-4 HR TAT - Swab, Nasopharynx [[id number]]  (Normal) Collected: 05/23/23 0416    Lab Status: Final result Specimen: Swab from Nasopharynx Updated: 05/23/23 0527     ADENOVIRUS, PCR Not Detected     Coronavirus 229E Not Detected     Coronavirus HKU1 Not Detected     Coronavirus NL63 Not Detected     Coronavirus OC43 Not Detected     COVID19 Not Detected     Human Metapneumovirus Not Detected     Human Rhinovirus/Enterovirus Not Detected     Influenza A PCR Not Detected     Influenza B PCR Not Detected     Parainfluenza Virus 1 Not Detected     Parainfluenza Virus 2 Not Detected     Parainfluenza Virus 3 Not Detected     Parainfluenza Virus 4 Not Detected     RSV, PCR Not Detected     Bordetella pertussis pcr Not Detected     Bordetella parapertussis PCR Not Detected     Chlamydophila pneumoniae PCR Not Detected     Mycoplasma pneumo by PCR Not Detected    Narrative:      In the setting of a positive respiratory panel with a viral infection PLUS a negative procalcitonin without other underlying concern for bacterial infection, consider observing off antibiotics or discontinuation of antibiotics and continue supportive care. If the respiratory panel is positive for atypical bacterial infection (Bordetella pertussis, Chlamydophila pneumoniae, or Mycoplasma pneumoniae), consider antibiotic de-escalation to target atypical bacterial infection.    Blood Culture - Blood, Arm, Right [[id number]]  (Normal)  Collected: 05/23/23 0355    Lab Status: Preliminary result Specimen: Blood from Arm, Right Updated: 05/24/23 0416     Blood Culture No growth at 24 hours    Narrative:      Less than seven (7) mL's of blood was collected.  Insufficient quantity may yield false negative results.    Blood Culture - Blood, Arm, Left [[id number]]  (Normal) Collected: 05/23/23 0355    Lab Status: Preliminary result Specimen: Blood from Arm, Left Updated: 05/24/23 0416     Blood Culture No growth at 24 hours          Medication Review:       Schedule Meds  aspirin, 325 mg, Oral, Daily   Or  aspirin, 300 mg, Rectal, Daily  atorvastatin, 80 mg, Oral, Nightly  enoxaparin, 1 mg/kg, Subcutaneous, Q12H  insulin lispro, 3-14 Units, Subcutaneous, Q6H  insulin NPH-insulin regular, 30 Units, Subcutaneous, BID With Meals  metoclopramide, 10 mg, Oral, 4x Daily AC & at Bedtime  metoprolol tartrate, 25 mg, Oral, Q12H  pantoprazole, 40 mg, Oral, Q AM  piperacillin-tazobactam, 3.375 g, Intravenous, Once  piperacillin-tazobactam, 3.375 g, Intravenous, Q8H  senna-docusate sodium, 2 tablet, Oral, BID  sodium chloride, 10 mL, Intravenous, Q12H        Infusion Meds  hold, 1 each  lactated ringers, 100 mL/hr, Last Rate: 100 mL/hr (05/23/23 2046)        PRN Meds  •  acetaminophen **OR** acetaminophen **OR** acetaminophen  •  aluminum-magnesium hydroxide-simethicone  •  senna-docusate sodium **AND** polyethylene glycol **AND** bisacodyl **AND** bisacodyl  •  bisacodyl  •  Calcium Replacement - Follow Nurse / BPA Driven Protocol  •  dextrose  •  dextrose  •  glucagon (human recombinant)  •  hold  •  labetalol  •  Magnesium Standard Dose Replacement - Follow Nurse / BPA Driven Protocol  •  meclizine  •  melatonin  •  midodrine  •  nitroglycerin  •  ondansetron **OR** ondansetron  •  ondansetron  •  Phosphorus Replacement - Follow Nurse / BPA Driven Protocol  •  Potassium Replacement - Follow Nurse / BPA Driven Protocol  •  sodium chloride  •  sodium chloride  •   sodium chloride        Assessment & Plan       Antimicrobial Therapy   1.         2.        3.        4.        5.            Assessment    Fever started after patient had mental status changes at home.  Concerning for underlying seizure.  Patient is s/p lumbar puncture and CSF findings are not consistent with infection and herpes simplex PCR was negative.  I am concerned about patient having seizures and that resulted in aspiration pneumonia  Mental status started to improve    History of CVA with left-sided deficit    Diabetes mellitus type 2    Chronic kidney disease    Plan    Discontinue IV acyclovir and ceftriaxone  Start Zosyn 3.375 g IV every 8 hours empirically for aspiration pneumonia  Continue supportive care  A.m. labs  Case was discussed with the patient's wife at bedside        Rio Kaiser MD  05/24/23  14:51 EDT    Note is dictated utilizing voice recognition software/Dragon

## 2023-05-24 NOTE — THERAPY RE-EVALUATION
Acute Care - Speech Language Pathology   Swallow Re-Evaluation  [place]     Patient Name: Chao Lazar  : 1960  MRN: [id number]  Today's Date: 2023               Admit Date: 2023    Visit Dx:     ICD-10-CM ICD-9-CM   1. Facial droop  R29.810 781.94   2. Expressive aphasia  R47.01 784.3     Patient Active Problem List   Diagnosis    Allergic state    Asthma    Chronic cough    Degenerative joint disease    Type 2 diabetes mellitus with diabetic polyneuropathy, with long-term current use of insulin (HCC)    Gastroparesis    Hyperlipidemia, mixed    Essential hypertension    Nausea and vomiting    Vitamin D deficiency    Combined forms of age-related cataract, bilateral    Presbyopia    Acute ischemic right PCA stroke involving the right occipital lobe (CMS/HCC)    Sphenoid sinusitis    Retinal disorder    Coronary artery disease involving native coronary artery of native heart without angina pectoris    Cutaneous abscess of head excluding face    History of CVA (cerebrovascular accident)    Leukocytosis    Hypomagnesemia    Left-sided weakness    Hypoglycemia    Dizziness    CKD (chronic kidney disease), stage II    Fall    Dysautonomia    Facial droop    Sepsis     Past Medical History:   Diagnosis Date    Asthma 3/22/2018    Coronary artery disease involving native coronary artery of native heart without angina pectoris 2020    Essential hypertension 2019    Gastroparesis 2013    History of CVA (cerebrovascular accident) 2021    Hyperlipidemia, mixed 4/3/2017    Hypoglycemia      Past Surgical History:   Procedure Laterality Date    CARDIAC CATHETERIZATION      CHOLECYSTECTOMY  2004    COLON RESECTION      [id number]    COLON RESECTION SMALL BOWEL Right 2019    Procedure: open right hemicolectomy;  Surgeon: Ronaldo Ardon MD;  Location: [facility];  Service: General    COLONOSCOPY  2019    x2     CORONARY ANGIOPLASTY WITH STENT PLACEMENT  2017    ECHO -  CONVERTED  2019    ECHO - CONVERTED  2020    FRACTURE SURGERY      collar bone as a child     KNEE ARTHROSCOPY Left 08/2003    KNEE JOINT MANIPULATION Left 04/2010    TOTAL KNEE ARTHROPLASTY Bilateral     2013,2011     Re-eval Narrative/results:    Multidisciplinary session with re-evaluation conducted while pt sitting EOB during PT and OT evaluation. Pt requires cues to alert/attend throughout re-evaluation. Pt does readily pull from straw and consumed >8 oz of water by straw over multiple trials with sequential drinks. Accepts/clears trials puree with trace anterior loss; no visible stasis. Prolonged oral manipulation of single peach without clinically overt rotary mastication; whole peach is visible in oral cavity. Suspect swallow of unmasticated peach however cannot confirm clinically. The patient inconsistently responds to post PO voicing cues but phonation is clear at all times. No s/s aspiration at any time.       Pt is at risk of malnutrition and aspiration in the setting of AMS, and should be monitored closely for fluctuations which would impact PO tolerance and increase risks associated with aspiration.         SLP Recommendation and Plan    Cautious initiation of puree diet, thin liquids  Medications crushed in Puree    Strict adherence to the following precautions:    *Full feed by nursing staff only  *Ensure pt fully awake/alert prior to feeding  *Strict 90 degree hip flexion  *STOP PO with any unresolved pocketing or if pt becomes too drowsy  *oral care following meals    ST will cont to follow to for dysphagia with further recs as indicated. Discussed recs and feeding precautions in detail with pt's family and RN. Both parties expressed understanding/agreement.                 SLP Swallowing Diagnosis: oral dysphagia (in the setting of AMS with reduced alertness) (05/24/23 1100)  SLP Diet Recommendation: thin liquids, puree (05/24/23 1100)  Recommended Precautions and Strategies: 1:1 supervision (full  feed by staff, 1:1 supervision during ALL PO feed only when fully awake/alert, oral care following meals to assess for pocketing.) (05/24/23 1100)  SLP Rec. for Method of Medication Administration: meds crushed, with puree (05/24/23 1100)     Monitor for Signs of Aspiration: yes, notify SLP if any concerns (05/24/23 1100)  Recommended Diagnostics: reassess via clinical swallow evaluation (05/24/23 1100)  Swallow Criteria for Skilled Therapeutic Interventions Met: demonstrates skilled criteria (05/24/23 1100)     Rehab Potential/Prognosis, Swallowing: adequate, monitor progress closely (05/24/23 1100)  Therapy Frequency (Swallow): PRN (05/24/23 1100)  Predicted Duration Therapy Intervention (Days): until discharge (05/24/23 1100)          SWALLOW EVALUATION (last 72 hours)       SLP Adult Swallow Evaluation       Row Name 05/24/23 1100       Rehab Evaluation    Document Type re-evaluation  -    Patient Effort fair  -    Comment multidisciplinary session with re-evaluation conducted while pt sitting EOB during PT and OT evaluation.    -       General Information    Patient Profile Reviewed yes  -    Pertinent History Of Current Problem Pt has a history with ST, including swallow evaluation however he was placed on a regular diet at that time and family reports he remains on regular diet at baseline/prior to admission  -    Current Method of Nutrition NPO  -    Prior Level of Function-Swallowing no diet consistency restrictions;regular textures;thin liquids  pt is edentulous. Has dentures but does not wear per family. Family denies any restrictions in PO due to this.  -    Plans/Goals Discussed with patient and family  -       SLP Evaluation Clinical Impression    SLP Swallowing Diagnosis oral dysphagia  in the setting of AMS with reduced alertness  -    Functional Impact risk of malnutrition;risk of aspiration/pneumonia  -    Rehab Potential/Prognosis, Swallowing adequate, monitor progress closely   -    Swallow Criteria for Skilled Therapeutic Interventions Met demonstrates skilled criteria  -          Recommendations    Therapy Frequency (Swallow) PRN  -    Predicted Duration Therapy Intervention (Days) until discharge  -    SLP Diet Recommendation thin liquids;puree  -    Recommended Diagnostics reassess via clinical swallow evaluation  -    Recommended Precautions and Strategies 1:1 supervision  full feed by staff, 1:1 supervision during ALL PO feed only when fully awake/alert, oral care following meals to assess for pocketing.  -    Oral Care Recommendations Oral Care before breakfast, after meals and PRN  -    SLP Rec. for Method of Medication Administration meds crushed;with puree  -    Monitor for Signs of Aspiration yes;notify SLP if any concerns  -       Swallow Goals (SLP)    Swallow LTGs Swallow Long Term Goal (free text)  -    Swallow STGs diet tolerance goal selection (SLP)  -    Diet Tolerance Goal Selection (SLP) Swallow Short Term Goal 1;Swallow Short Term Goal 2  -       (LTG) Swallow    (LTG) Swallow Pt will maximize swallow function for least restrictive PO diet, exhibiting no complication associated with dysphagia, adequate PO intake, and demonstrating independent use of swallow compensations  -    [place] (Swallow Long Term Goal) with moderate cues (50-74% accuracy)  -    Time Frame (Swallow Long Term Goal) by discharge  -    Barriers (Swallow Long Term Goal) poor alertness  -    Progress/Outcomes (Swallow Long Term Goal) good progress toward goal  -       (STG) Swallow 1    (STG) Swallow 1 The patient will participate in ongoing assessment of swallow, including re-evaluation clinically and/or including instrumental assessment of swallow if indicated, to further assess swallow function in anticipation to initiate a PO diet  -    Time Frame (Swallow Short Term Goal 1) 1 week  -    Progress/Outcomes (Swallow Short Term Goal 1) goal ongoing;good  progress toward goal  -MC       (STG) Swallow 2    (STG) Swallow 2 The patient will demonstrate oral cavity clearance of PO during therapy  -MC    [place] (Swallow Short Term Goal 2) with moderate cues (50-74% accuracy)  -MC    Time Frame (Swallow Short Term Goal 2) 1 week  -MC    Progress/Outcomes (Swallow Short Term Goal 2) new goal  -              User Key  (r) = Recorded By, (t) = Taken By, (c) = Cosigned By      Initials Name Effective Dates    Debi Ramirez, SLP 08/16/22 -                     EDUCATION  The patient and family have been educated in the following areas:   Feeding precautions       SLP GOALS       Row Name 05/24/23 1100       (LTG) Swallow    (LTG) Swallow Pt will maximize swallow function for least restrictive PO diet, exhibiting no complication associated with dysphagia, adequate PO intake, and demonstrating independent use of swallow compensations  -MC    [place] (Swallow Long Term Goal) with moderate cues (50-74% accuracy)  -MC    Time Frame (Swallow Long Term Goal) by discharge  -MC    Barriers (Swallow Long Term Goal) poor alertness  -MC    Progress/Outcomes (Swallow Long Term Goal) good progress toward goal  -MC       (STG) Swallow 1    (STG) Swallow 1 The patient will participate in ongoing assessment of swallow, including re-evaluation clinically and/or including instrumental assessment of swallow if indicated, to further assess swallow function in anticipation to initiate a PO diet  -MC    Time Frame (Swallow Short Term Goal 1) 1 week  -MC    Progress/Outcomes (Swallow Short Term Goal 1) goal ongoing;good progress toward goal  -MC       (STG) Swallow 2    (STG) Swallow 2 The patient will demonstrate oral cavity clearance of PO during therapy  -MC    [place] (Swallow Short Term Goal 2) with moderate cues (50-74% accuracy)  -MC    Time Frame (Swallow Short Term Goal 2) 1 week  -MC    Progress/Outcomes (Swallow Short Term Goal 2) new goal  -              User Key   (r) = Recorded By, (t) = Taken By, (c) = Cosigned By      Initials Name Provider Type    CP Nora Capps, SLP Speech and Language Pathologist    Debi Ramirez, SLP Speech and Language Pathologist                    JAIDA Edge  5/24/2023

## 2023-05-24 NOTE — PLAN OF CARE
Goal Outcome Evaluation:      Patient alert to self and place. He is more alert today but still very drowsy. He continues to be febrile with cooling blanket in place. Wife remains at bedside. Bed is lowered, call light in reach and bed alarm active.     Problem: Adult Inpatient Plan of Care  Goal: Plan of Care Review  Outcome: Ongoing, Progressing  Goal: Patient-Specific Goal (Individualized)  Outcome: Ongoing, Progressing  Goal: Absence of Hospital-Acquired Illness or Injury  Outcome: Ongoing, Progressing  Intervention: Identify and Manage Fall Risk  Recent Flowsheet Documentation  Taken 5/24/2023 0411 by Tamela Glaser LPN  Safety Promotion/Fall Prevention: safety round/check completed  Taken 5/24/2023 0200 by Tamela Glaser LPN  Safety Promotion/Fall Prevention: safety round/check completed  Taken 5/23/2023 2320 by Tamela Glaser LPN  Safety Promotion/Fall Prevention: safety round/check completed  Taken 5/23/2023 2246 by Tamela Glaser LPN  Safety Promotion/Fall Prevention: safety round/check completed  Taken 5/23/2023 2046 by Tamela Glaser LPN  Safety Promotion/Fall Prevention: safety round/check completed  Intervention: Prevent Skin Injury  Recent Flowsheet Documentation  Taken 5/24/2023 0411 by Tamela Glaser LPN  Body Position: turned  Taken 5/23/2023 2320 by Tamela Glaser LPN  Body Position:   side-lying   left  Intervention: Prevent and Manage VTE (Venous Thromboembolism) Risk  Recent Flowsheet Documentation  Taken 5/24/2023 0411 by Tamela Glaser LPN  Activity Management: bedrest  VTE Prevention/Management:   bilateral   sequential compression devices on  Range of Motion: ROM (range of motion) performed  Taken 5/23/2023 2320 by Tamela Glaser LPN  Activity Management: bedrest  VTE Prevention/Management:   sequential compression devices on   bilateral  Range of Motion: ROM (range of motion) performed  Taken 5/23/2023 2046 by Tamela Glaser LPN  Activity Management: bedrest  VTE Prevention/Management:    bilateral   sequential compression devices on  Range of Motion: ROM (range of motion) performed  Intervention: Prevent Infection  Recent Flowsheet Documentation  Taken 5/23/2023 2046 by Tamela Glaser LPN  Infection Prevention:   environmental surveillance performed   equipment surfaces disinfected   hand hygiene promoted   rest/sleep promoted   single patient room provided  Goal: Optimal Comfort and Wellbeing  Outcome: Ongoing, Progressing  Intervention: Provide Person-Centered Care  Recent Flowsheet Documentation  Taken 5/24/2023 0411 by Tamela Glaser LPN  Trust Relationship/Rapport:   care explained   thoughts/feelings acknowledged  Taken 5/23/2023 2320 by Tamela Glaser LPN  Trust Relationship/Rapport:   care explained   questions answered   thoughts/feelings acknowledged  Taken 5/23/2023 2046 by Tamela Glaser LPN  Trust Relationship/Rapport:   care explained   questions encouraged   thoughts/feelings acknowledged  Goal: Readiness for Transition of Care  Outcome: Ongoing, Progressing     Problem: Hypertension Comorbidity  Goal: Blood Pressure in Desired Range  Outcome: Ongoing, Progressing  Intervention: Maintain Blood Pressure Management  Recent Flowsheet Documentation  Taken 5/24/2023 0200 by Tamela Glaser LPN  Medication Review/Management: medications reviewed  Taken 5/23/2023 2320 by Tamela Glaser LPN  Medication Review/Management: medications reviewed  Taken 5/23/2023 2246 by Tamela Glaser LPN  Medication Review/Management: medications reviewed  Taken 5/23/2023 2046 by Tamela Glaser LPN  Medication Review/Management: medications reviewed     Problem: Pain Chronic (Persistent) (Comorbidity Management)  Goal: Acceptable Pain Control and Functional Ability  Outcome: Ongoing, Progressing  Intervention: Manage Persistent Pain  Recent Flowsheet Documentation  Taken 5/24/2023 0200 by Tamela Glaser LPN  Medication Review/Management: medications reviewed  Taken 5/23/2023 2320 by Tamela Glaser LPN  Medication  Review/Management: medications reviewed  Taken 5/23/2023 2246 by Tamela Glaser LPN  Medication Review/Management: medications reviewed  Taken 5/23/2023 2046 by Tamela Glaser LPN  Medication Review/Management: medications reviewed     Problem: Skin Injury Risk Increased  Goal: Skin Health and Integrity  Outcome: Ongoing, Progressing  Intervention: Optimize Skin Protection  Recent Flowsheet Documentation  Taken 5/23/2023 2046 by Tamela Glaser LPN  Pressure Reduction Devices: pressure-redistributing mattress utilized     Problem: Adjustment to Illness (Stroke, Ischemic/Transient Ischemic Attack)  Goal: Optimal Coping  Outcome: Ongoing, Progressing     Problem: Bowel Elimination Impaired (Stroke, Ischemic/Transient Ischemic Attack)  Goal: Effective Bowel Elimination  Outcome: Ongoing, Progressing     Problem: Cerebral Tissue Perfusion (Stroke, Ischemic/Transient Ischemic Attack)  Goal: Optimal Cerebral Tissue Perfusion  Outcome: Ongoing, Progressing     Problem: Cognitive Impairment (Stroke, Ischemic/Transient Ischemic Attack)  Goal: Optimal Cognitive Function  Outcome: Ongoing, Progressing     Problem: Communication Impairment (Stroke, Ischemic/Transient Ischemic Attack)  Goal: Improved Communication Skills  Outcome: Ongoing, Progressing     Problem: Functional Ability Impaired (Stroke, Ischemic/Transient Ischemic Attack)  Goal: Optimal Functional Ability  Outcome: Ongoing, Progressing  Intervention: Optimize Functional Ability  Recent Flowsheet Documentation  Taken 5/24/2023 0411 by Tamela Glaser LPN  Activity Management: bedrest  Taken 5/23/2023 2320 by Tamela Glaser LPN  Activity Management: bedrest  Taken 5/23/2023 2046 by Tamela Glaser LPN  Activity Management: bedrest     Problem: Respiratory Compromise (Stroke, Ischemic/Transient Ischemic Attack)  Goal: Effective Oxygenation and Ventilation  Outcome: Ongoing, Progressing     Problem: Sensorimotor Impairment (Stroke, Ischemic/Transient Ischemic  Attack)  Goal: Improved Sensorimotor Function  Outcome: Ongoing, Progressing  Intervention: Optimize Range of Motion, Motor Control and Function  Recent Flowsheet Documentation  Taken 5/24/2023 0411 by Tamela Glaser LPN  Range of Motion: ROM (range of motion) performed  Taken 5/23/2023 2320 by Tamela Glaser LPN  Range of Motion: ROM (range of motion) performed  Taken 5/23/2023 2046 by Tamela Glaser LPN  Range of Motion: ROM (range of motion) performed  Intervention: Optimize Sensory and Perceptual Ability  Recent Flowsheet Documentation  Taken 5/23/2023 2046 by Tamela Glaser LPN  Pressure Reduction Devices: pressure-redistributing mattress utilized     Problem: Swallowing Impairment (Stroke, Ischemic/Transient Ischemic Attack)  Goal: Optimal Eating and Swallowing without Aspiration  Outcome: Ongoing, Progressing     Problem: Urinary Elimination Impaired (Stroke, Ischemic/Transient Ischemic Attack)  Goal: Effective Urinary Elimination  Outcome: Ongoing, Progressing     Problem: Fall Injury Risk  Goal: Absence of Fall and Fall-Related Injury  Outcome: Ongoing, Progressing  Intervention: Identify and Manage Contributors  Recent Flowsheet Documentation  Taken 5/24/2023 0200 by Tamela Glaser LPN  Medication Review/Management: medications reviewed  Taken 5/23/2023 2320 by Tamela Glaser LPN  Medication Review/Management: medications reviewed  Taken 5/23/2023 2246 by Tamela Glaser LPN  Medication Review/Management: medications reviewed  Taken 5/23/2023 2046 by Tamela Glaser LPN  Medication Review/Management: medications reviewed  Intervention: Promote Injury-Free Environment  Recent Flowsheet Documentation  Taken 5/24/2023 0411 by Tamela Glaser LPN  Safety Promotion/Fall Prevention: safety round/check completed  Taken 5/24/2023 0200 by Tamela Glaser LPN  Safety Promotion/Fall Prevention: safety round/check completed  Taken 5/23/2023 2320 by Tamela Glaser LPN  Safety Promotion/Fall Prevention: safety round/check  completed  Taken 5/23/2023 2246 by Tamela Glaser LPN  Safety Promotion/Fall Prevention: safety round/check completed  Taken 5/23/2023 2046 by Tamela Glaser LPN  Safety Promotion/Fall Prevention: safety round/check completed     Problem: Adjustment to Illness (Sepsis/Septic Shock)  Goal: Optimal Coping  Outcome: Ongoing, Progressing     Problem: Bleeding (Sepsis/Septic Shock)  Goal: Absence of Bleeding  Outcome: Ongoing, Progressing     Problem: Glycemic Control Impaired (Sepsis/Septic Shock)  Goal: Blood Glucose Level Within Desired Range  Outcome: Ongoing, Progressing     Problem: Infection Progression (Sepsis/Septic Shock)  Goal: Absence of Infection Signs and Symptoms  Outcome: Ongoing, Progressing  Intervention: Initiate Sepsis Management  Recent Flowsheet Documentation  Taken 5/23/2023 2046 by Tamela Glaser LPN  Infection Prevention:   environmental surveillance performed   equipment surfaces disinfected   hand hygiene promoted   rest/sleep promoted   single patient room provided  Intervention: Promote Recovery  Recent Flowsheet Documentation  Taken 5/24/2023 0411 by Tamela Glaser LPN  Activity Management: bedrest  Taken 5/23/2023 2320 by Tamela Glaser LPN  Activity Management: bedrest  Taken 5/23/2023 2046 by Tamela Glaser LPN  Activity Management: bedrest     Problem: Nutrition Impaired (Sepsis/Septic Shock)  Goal: Optimal Nutrition Intake  Outcome: Ongoing, Progressing

## 2023-05-24 NOTE — PLAN OF CARE
Goal Outcome Evaluation:  Plan of Care Reviewed With: patient, spouse           Outcome Evaluation: Pt is a 64 y/o male with c/o slurred speech and L sided facial droop 90 minutes before hospitalization.  Pt with hx of CVA with residual L sided weakness.  Expressive aphasian noted in ED and pt with 2 bouts of vomitng while in hospital.  CT head: (-), CT head/neck: (-).  Pt being worked up for possible sepsis or encephalitis.  Pt's wife reports they live in a 1 story home with 2 steps to enter into home.  Pt was mod I with all functional mobility tasks and pt used a RW for outside ambulation.  Pt on room air with IV and telemetry, Purewick.  Pt required Max A x 2 for bed mobilty and brief periods of Min/Mod A for sitting balance.  Had multiple bouts of L extensor tone in UE/LE which resulted in strong posterior lean that needed Max A for balance.  Extensor tone bouts lasted for 1 minute and then resolve.  Pt's BP at end of session: 131/60.  Recommend inpt rehab.

## 2023-05-24 NOTE — DISCHARGE PLACEMENT REQUEST
"Chao Maldonado (63 y.o. Male)     Date of Birth   1960    Social Security Number       Address   [address] [place] [address]    Home Phone   [phone]    MRN   [id number]       Voodoo   Druze    Marital Status                               Admission Date   5/22/23    Admission Type   Emergency    Admitting Provider   Joshua Bryant MD    Attending Provider   Manjinder Brannon DO    Department, Room/Bed   [facility] NEURO HEART, 260/1       Discharge Date       Discharge Disposition       Discharge Destination                               Attending Provider: Manjinder Brannon DO    Allergies: No Known Allergies    Isolation: None   Infection: None   Code Status: CPR    Ht: 185.4 cm (73\")   Wt: 99.3 kg (219 lb)    Admission Cmt: None   Principal Problem: Facial droop [R29.810]                 Active Insurance as of 5/22/2023     Primary Coverage     Payor Plan Insurance Group Employer/Plan Group    MEDICARE MEDICARE A & B      Payor Plan Address Payor Plan Phone Number Payor Plan Fax Number Effective Dates    [address] [phone]  6/1/2022 - None Entered    [facility] [id number]       Subscriber Name Subscriber Birth Date Member ID       CHAO MALDONADO 1960 [id number]           Secondary Coverage     Payor Plan Insurance Group Employer/Plan Group    [employer] AAR HE[employer] CARE OPTIONS      Payor Plan Address Payor Plan Phone Number Payor Plan Fax Number Effective Dates    [facility] [phone]  1/1/2023 - None Entered    [address]       [facility] [address]       Subscriber Name Subscriber Birth Date Member ID       CHAO MALDONADO 1960 [id number]                 Emergency Contacts      (Rel.) Home Phone Work Phone Mobile Phone    SHIV MALDONADO (Spouse) [phone] -- [phone]              "

## 2023-05-24 NOTE — PROGRESS NOTES
[facility] Medicine Services   Daily Progress Note      Patient Name: Chao Lazar  : 1960  MRN: [id number]  Primary Care Physician:  Al Kimbrough MD  Date of admission: 2023      Subjective      Chief Complaint: Slurred speech, left facial droop    Patient seen and examined this morning.  Patient more awake and alert this morning but still intermittently confused.  Follows simple commands.  Wife at bedside who states he is waking up more but not at baseline yet.  LP results discussed with her.    Unable to obtain full review of system due to patient's underlying mental status.      Objective      Vitals:   Temp:  [99.1 °F (37.3 °C)-102.9 °F (39.4 °C)] 99.1 °F (37.3 °C)  Heart Rate:  [80-97] 80  Resp:  [11-28] 15  BP: (102-141)/(35-72) 112/43    Physical Exam:    General: Awake, alert, elderly male, ill-appearing, lying in bed  Eyes: PERRL, EOMI, conjunctivae are clear  Cardiovascular: Regular rate and rhythm, no murmurs  Respiratory: Clear to auscultation bilaterally, no wheezing or rales, unlabored breathing  Abdomen: Soft, nontender, positive bowel sounds, no guarding  Neurologic: A&O to self only, slurred speech and confusion noted, otherwise CN grossly intact, chronic left-sided weakness noted, Musculoskeletal: No joint swelling, no other gross deformities  Skin: Warm, dry         Result Review    Result Review:  I have personally reviewed the results from the time of this admission to 2023 09:45 EDT and agree with these findings:  [x]  Laboratory  [x]  Microbiology  [x]  Radiology  [x]  EKG/Telemetry   []  Cardiology/Vascular   []  Pathology  []  Old records  []  Other:          Assessment & Plan      Brief Patient Summary:  Chao Lazar is a 63 y.o. male with a past medical history of CVA with chronic left-sided weakness, hypertension, hyperlipidemia, CAD, and DM2 who presented to [facility] complaining of slurred speech and left-sided facial  droop about 90 minutes prior to arrival.  Patient has a history of CVA with residual left-sided weakness however facial droop and slurred speech and worsening weakness left upper and lower extremity.  Expressive aphasia noted on initial exam as well.  Stroke work-up initiated in the ER with CT of the head and CTA of the head and neck negative for any acute findings.  MRI brain done on 5/23 negative for new stroke or new acute findings.  Patient was noted to have significant fever and developed sepsis few hours after being admitted.  Concern for possible aspiration as patient did vomit but according to wife patient was at normal state and eating without any difficulty prior to admission.  No actual aspiration noted during the episode of vomiting.  He remains confused and lethargic, started on IV fluids and broad-spectrum IV antibiotics.  Concern for CNS infection.  Case was discussed with ID and neurology.  Patient underwent lumbar puncture on 5/23 by IR.  Meningitis/encephalitis panel negative.  Elevated protein noted on CSF study.    aspirin, 325 mg, Oral, Daily   Or  aspirin, 300 mg, Rectal, Daily  atorvastatin, 80 mg, Oral, Nightly  cefTRIAXone, 2 g, Intravenous, Q12H  enoxaparin, 1 mg/kg, Subcutaneous, Q12H  insulin lispro, 3-14 Units, Subcutaneous, Q6H  insulin NPH-insulin regular, 30 Units, Subcutaneous, BID With Meals  metoclopramide, 10 mg, Oral, 4x Daily AC & at Bedtime  metoprolol tartrate, 25 mg, Oral, Q12H  pantoprazole, 40 mg, Oral, Q AM  senna-docusate sodium, 2 tablet, Oral, BID  sodium chloride, 10 mL, Intravenous, Q12H       hold, 1 each  lactated ringers, 100 mL/hr, Last Rate: 100 mL/hr (05/23/23 2046)         I have utilized all available, immediate resources to obtain, update, or review the patient's current medications including all prescriptions, over-the-counter products, herbals, cannabis/cannabidiol products, and vitamin.mineral/dietary (nutritional) supplements.    Active Hospital  Problems:  Active Hospital Problems    Diagnosis    • **Facial droop    • Sepsis    • CKD (chronic kidney disease), stage II    • History of CVA (cerebrovascular accident)    • Left-sided weakness    • Coronary artery disease involving native coronary artery of native heart without angina pectoris    • Essential hypertension    • Hyperlipidemia, mixed    • Type 2 diabetes mellitus with diabetic polyneuropathy, with long-term current use of insulin (HCC)      Plan:     Acute metabolic encephalopathy  History of CVA with chronic left-sided weakness  -Patient presented as a stroke alert with slurred speech and left facial droop  -CT head and CTA head and neck negative for any acute findings, chronic occlusion of right PCA noted  -MRI brain w/o negative for new CVA or hemorrhage  -Patient not a tPA candidate on admission per the report  -However unclear on whether he was actually on anticoagulation with Eliquis or Xarelto at home  -Unable to pass swallow eval at this time due to lethargy, SLP following  -Continue treatment dose Lovenox for now, will discuss with neurology regarding long-term anticoagulation  -Continue aspirin suppository  -PT/OT/ST  -Neurology following    Severe sepsis with possible CNS infection  -Unlikely aspiration, initial concern for CNS infection  -Patient spiking high-grade temperature since admission  -Chest x-ray and UA negative for infection  -Case was discussed with ID and neurology and patient is s/p LP by IR on 5/23  -CSF study with elevated protein noted, meningitis/encephalitis panel negative  -Continue on IV Rocephin and acyclovir per ID  -Consider checking MRI brain with contrast to rule out other etiology    DM 2  -Decreased home basal insulin dose as patient n.p.o. at this time  -Continue SSI, monitor blood glucose  -Adjust as needed    CKD stage II  -Creatinine appears to be at baseline of 1.3-1.4  -Monitor, avoid nephrotoxins if possible    Hypertension  -Currently n.p.o.  -Resume  home meds as able  -Labetalol as needed added    DVT prophylaxis  -Lovenox    CODE STATUS:    Code Status (Patient has no pulse and is not breathing): CPR (Attempt to Resuscitate)  Medical Interventions (Patient has pulse or is breathing): Full Support      Disposition: Pending clinical course    Electronically signed by Manjinder Brannon DO, 05/24/23, 09:45 EDT.  [facility]      Part of this note may be an electronic transcription/translation of spoken language to printed text using the Dragon Dictation System.

## 2023-05-24 NOTE — PLAN OF CARE
Problem: Adult Inpatient Plan of Care  Goal: Plan of Care Review  Outcome: Ongoing, Progressing  Goal: Patient-Specific Goal (Individualized)  Outcome: Ongoing, Progressing  Goal: Absence of Hospital-Acquired Illness or Injury  Outcome: Ongoing, Progressing  Intervention: Identify and Manage Fall Risk  Recent Flowsheet Documentation  Taken 5/24/2023 1636 by [place], Jaimie A, LPN  Safety Promotion/Fall Prevention:   activity supervised   assistive device/personal items within reach   clutter free environment maintained   fall prevention program maintained   nonskid shoes/slippers when out of bed   room organization consistent   safety round/check completed  Taken 5/24/2023 1400 by [place], Jaimie A, LPN  Safety Promotion/Fall Prevention:   activity supervised   assistive device/personal items within reach   clutter free environment maintained   fall prevention program maintained   nonskid shoes/slippers when out of bed   room organization consistent   safety round/check completed  Intervention: Prevent and Manage VTE (Venous Thromboembolism) Risk  Recent Flowsheet Documentation  Taken 5/24/2023 1636 by Jaimie Connelly LPN  VTE Prevention/Management:   bilateral   sequential compression devices on  Range of Motion: ROM (range of motion) performed  Intervention: Prevent Infection  Recent Flowsheet Documentation  Taken 5/24/2023 1636 by Jaimie Connelly LPN  Infection Prevention: hand hygiene promoted  Taken 5/24/2023 1400 by Jaimie Connelly LPN  Infection Prevention: hand hygiene promoted  Goal: Optimal Comfort and Wellbeing  Outcome: Ongoing, Progressing  Intervention: Provide Person-Centered Care  Recent Flowsheet Documentation  Taken 5/24/2023 1636 by Jaimie Connelly LPN  Trust Relationship/Rapport:   care explained   thoughts/feelings acknowledged  Goal: Readiness for Transition of Care  Outcome: Ongoing, Progressing     Problem: Hypertension Comorbidity  Goal: Blood Pressure in Desired  Range  Outcome: Ongoing, Progressing  Intervention: Maintain Blood Pressure Management  Recent Flowsheet Documentation  Taken 5/24/2023 1636 by Jaimie Connelly LPN  Syncope Management: position changed slowly  Medication Review/Management: medications reviewed  Taken 5/24/2023 1400 by Jaimie Connelly LPN  Medication Review/Management: medications reviewed     Problem: Pain Chronic (Persistent) (Comorbidity Management)  Goal: Acceptable Pain Control and Functional Ability  Outcome: Ongoing, Progressing  Intervention: Manage Persistent Pain  Recent Flowsheet Documentation  Taken 5/24/2023 1636 by Jaimie Connelly LPN  Medication Review/Management: medications reviewed  Taken 5/24/2023 1400 by Jaimie Connelly LPN  Medication Review/Management: medications reviewed     Problem: Skin Injury Risk Increased  Goal: Skin Health and Integrity  Outcome: Ongoing, Progressing     Problem: Adjustment to Illness (Stroke, Ischemic/Transient Ischemic Attack)  Goal: Optimal Coping  Outcome: Ongoing, Progressing     Problem: Bowel Elimination Impaired (Stroke, Ischemic/Transient Ischemic Attack)  Goal: Effective Bowel Elimination  Outcome: Ongoing, Progressing     Problem: Cerebral Tissue Perfusion (Stroke, Ischemic/Transient Ischemic Attack)  Goal: Optimal Cerebral Tissue Perfusion  Outcome: Ongoing, Progressing     Problem: Cognitive Impairment (Stroke, Ischemic/Transient Ischemic Attack)  Goal: Optimal Cognitive Function  Outcome: Ongoing, Progressing     Problem: Communication Impairment (Stroke, Ischemic/Transient Ischemic Attack)  Goal: Improved Communication Skills  Outcome: Ongoing, Progressing  Intervention: Optimize Communication Skills  Recent Flowsheet Documentation  Taken 5/24/2023 1636 by Jaimie Connelly LPN  Communication Enhancement Strategies: call light answered in person     Problem: Functional Ability Impaired (Stroke, Ischemic/Transient Ischemic Attack)  Goal: Optimal Functional  Ability  Outcome: Ongoing, Progressing     Problem: Respiratory Compromise (Stroke, Ischemic/Transient Ischemic Attack)  Goal: Effective Oxygenation and Ventilation  Outcome: Ongoing, Progressing     Problem: Sensorimotor Impairment (Stroke, Ischemic/Transient Ischemic Attack)  Goal: Improved Sensorimotor Function  Outcome: Ongoing, Progressing  Intervention: Optimize Range of Motion, Motor Control and Function  Recent Flowsheet Documentation  Taken 5/24/2023 1636 by Jaimie Connelly LPN  Range of Motion: ROM (range of motion) performed     Problem: Swallowing Impairment (Stroke, Ischemic/Transient Ischemic Attack)  Goal: Optimal Eating and Swallowing without Aspiration  Outcome: Ongoing, Progressing     Problem: Urinary Elimination Impaired (Stroke, Ischemic/Transient Ischemic Attack)  Goal: Effective Urinary Elimination  Outcome: Ongoing, Progressing     Problem: Fall Injury Risk  Goal: Absence of Fall and Fall-Related Injury  Outcome: Ongoing, Progressing  Intervention: Identify and Manage Contributors  Recent Flowsheet Documentation  Taken 5/24/2023 1636 by Jaimie Connelly LPN  Medication Review/Management: medications reviewed  Taken 5/24/2023 1400 by Jaimie Connelly LPN  Medication Review/Management: medications reviewed  Intervention: Promote Injury-Free Environment  Recent Flowsheet Documentation  Taken 5/24/2023 1636 by [place], Jaimie A, LPN  Safety Promotion/Fall Prevention:   activity supervised   assistive device/personal items within reach   clutter free environment maintained   fall prevention program maintained   nonskid shoes/slippers when out of bed   room organization consistent   safety round/check completed  Taken 5/24/2023 1400 by [place], Jaimie A, LPN  Safety Promotion/Fall Prevention:   activity supervised   assistive device/personal items within reach   clutter free environment maintained   fall prevention program maintained   nonskid shoes/slippers when out of bed   room  organization consistent   safety round/check completed     Problem: Adjustment to Illness (Sepsis/Septic Shock)  Goal: Optimal Coping  Outcome: Ongoing, Progressing     Problem: Bleeding (Sepsis/Septic Shock)  Goal: Absence of Bleeding  Outcome: Ongoing, Progressing     Problem: Glycemic Control Impaired (Sepsis/Septic Shock)  Goal: Blood Glucose Level Within Desired Range  Outcome: Ongoing, Progressing     Problem: Infection Progression (Sepsis/Septic Shock)  Goal: Absence of Infection Signs and Symptoms  Outcome: Ongoing, Progressing  Intervention: Initiate Sepsis Management  Recent Flowsheet Documentation  Taken 5/24/2023 1636 by Jaimie Connelly LPN  Infection Prevention: hand hygiene promoted  Taken 5/24/2023 1400 by Jaimie Connelly LPN  Infection Prevention: hand hygiene promoted     Problem: Nutrition Impaired (Sepsis/Septic Shock)  Goal: Optimal Nutrition Intake  Outcome: Ongoing, Progressing   Goal Outcome Evaluation:

## 2023-05-24 NOTE — PLAN OF CARE
Goal Outcome Evaluation:  Plan of Care Reviewed With: patient        Progress: no change  Outcome Evaluation: Pt is a 62 y/o male with c/o slurred speech and L sided facial droop 90 minutes before hospitalization. Pt with hx of CVA with residual L sided weakness. Expressive aphasian noted in ED and pt with 2 bouts of vomitng while in hospital. CT head: (-), CT head/neck: (-). Pt being worked up for possible sepsis or encephalitis. Pt's wife reports they live in a 1 story home with 2 steps to enter into home. Pt. is typically ADL independent at baseline. Pt. requires max A x 2 for all bed mobility this date, unable to maintain static sitting balance w/o max-mod A. Pt. demonstrates increased spasticity w/ LUE during seated activity. Complete assist provided for toilet hygiene secondary to incontinence of bowel. Recommend IP rehab at d/c to address aforementioned deficits.

## 2023-05-24 NOTE — PROGRESS NOTES
Patient lethargic but arousable  Had detailed discussion with the staff  He was following commands  Subtle left-sided weakness    So far LP is unremarkable for infection  MRI brain unremarkable    So not sure MRI with contrast would change anything  Today his wife was present and I talked to her in detail and this gentleman got worse after having a sandwich and he did not choke or had a seizure or anything.  He finished a sandwich and then sort of collapsed    So far we do not know why he is having febrile episodes    No further seizures    Discussed with Dr. Brannon    Discussed with Dr. Kaiser    Continue present plan which is supportive care    May resume his anticoagulation and likely Xarelto is what he could afford because I do not contraindication    Xarelto was started because his prior work-up and failure for conventional therapy    I will follow-up

## 2023-05-24 NOTE — CASE MANAGEMENT/SOCIAL WORK
Discharge Planning Assessment   [facility]     Patient Name: Chao Lazar  MRN: [id number]  Today's Date: 5/24/2023    Admit Date: 5/22/2023    Plan: D/C Plan: [facility] pending acceptance; No pre-cert or PASRR required.  Transport TBD. (Car vs facility van)   Discharge Needs Assessment     Row Name 05/24/23 1600       Living Environment    People in Home spouse    Name(s) of People in Home Debbi/spouse    Current Living Arrangements home    Potentially Unsafe Housing Conditions none    Primary Care Provided by self    Provides Primary Care For no one, unable/limited ability to care for self    Family Caregiver if Needed spouse    Quality of Family Relationships supportive    Able to Return to Prior Arrangements other (see comments)  Needs rehab prior to home       Resource/Environmental Concerns    Resource/Environmental Concerns none    Transportation Concerns none       Transition Planning    Patient/Family Anticipates Transition to inpatient rehabilitation facility    Patient/Family Anticipated Services at Transition rehabilitation services    Transportation Anticipated family or friend will provide       Discharge Needs Assessment    Readmission Within the Last 30 Days no previous admission in last 30 days    Equipment Currently Used at Home walker, rolling;shower chair;commode    Concerns to be Addressed discharge planning    Anticipated Changes Related to Illness inability to care for self    Equipment Needed After Discharge none    Outpatient/Agency/Support Group Needs inpatient rehabilitation facility    Discharge Facility/Level of Care Needs rehabilitation facility    Provided Post Acute Provider List? Yes    Post Acute Provider List Inpatient Rehab    Provided Post Acute Provider Quality & Resource List? Yes    Post Acute Provider Quality and Resource List Inpatient Rehab    Delivered To Support Person    Support Person Debbi/spouse    Method of Delivery Telephone    Patient's Choice of Community  Agency(s) [facility]    Current Discharge Risk dependent with mobility/activities of daily living;cognitively impaired               Discharge Plan     Row Name 05/24/23 2727       Plan    Plan D/C Plan: [facility] pending acceptance; No pre-cert or PASRR required.  Transport TBD. (Car vs facility van)    Plan Comments Spoke to spose over the phone cirilo  was lethargic/sleepy; Verified PCP-[facility] and pharm. No financial issues paying for medications up to this point. requested cost to pt for Xarelto.  Claim check with home phone pharm-[facility] revealed $525 co-pay per month.  Hospitalist will discuss options for other meds with neurologist.  Mr Lazar was mod.ind. in the home.  He has needed DME. He has had rehab at [facility] after several CVA's in the past. Therapy did evaluation and recommended IRF. Spouse was given choices, and requested return to [facility].  Referral sent to [facility]. No other needs identified at this tme.  Barrier to d/c is sever sepsis; fevers requiring cooling blanket.              Continued Care and Services - Admitted Since 5/22/2023    Coordination has not been started for this encounter.       Expected Discharge Date and Time     Expected Discharge Date Expected Discharge Time    May 25, 2023          Demographic Summary     Row Name 05/24/23 155       General Information    Admission Type inpatient    Arrived From emergency department    Required Notices Provided Important Message from Medicare    Referral Source admission list    Reason for Consult discharge planning    Preferred Language English               Functional Status     Row Name 05/24/23 6952       Functional Status    Usual Activity Tolerance good    Current Activity Tolerance good       Functional Status, IADL    Medications independent    Meal Preparation assistive person    Housekeeping assistive person    Laundry assistive person    Shopping assistive person    IADL Comments [facility] in the home       Mental Status     General Appearance WDL WDL       Mental Status Summary    Recent Changes in Mental Status/Cognitive Functioning other (see comments)    Mental Status Comments Very lethargic first couple of days       Employment/    Employment Status retired    Current or Previous Occupation not applicable                 Donna Rivera RN

## 2023-05-24 NOTE — PLAN OF CARE
Goal Outcome Evaluation:      Multidisciplinary session with re-evaluation conducted while pt sitting EOB during PT and OT evaluation. Pt requires cues to alert/attend throughout re-evaluation. Pt  readily pulls from straw and consumed >8 oz of water over multiple trials w/sequential drinks. Accepts/clears trials puree with trace anterior loss; no visible stasis. Prolonged oral manipulation of single peach without clinically overt rotary mastication; whole peach is visible in oral cavity. Suspect swallow of unmasticated peach however cannot confirm clinically. The patient inconsistently responds to post PO voicing cues but phonation is clear at all times. No s/s aspiration at any time.       Pt is at risk of malnutrition and aspiration in the setting of AMS, and should be monitored closely for fluctuations which would impact PO tolerance and increase risks associated with aspiration. Do not suspect acute changes to swallow function outside of AMS, in the absence of acute CVA and noted PO tolerance clinically.     SLP Recommendation and Plan    Cautious initiation of puree diet, thin liquids  Medications crushed in Puree    Strict adherence to the following precautions:    *Full feed by nursing staff only  *Ensure pt fully awake/alert prior to feeding  *Strict 90 degree hip flexion  *STOP PO with any unresolved pocketing or if pt becomes too drowsy  *oral care following meals    ST will cont to follow to for dysphagia with further recs as indicated. Discussed recs and feeding precautions in detail with pt's family and RN. Both parties expressed understanding/agreement.

## 2023-05-24 NOTE — THERAPY EVALUATION
Patient Name: Chao Lazar  : 1960    MRN: [id number]                              Today's Date: 2023       Admit Date: 2023    Visit Dx:     ICD-10-CM ICD-9-CM   1. Facial droop  R29.810 781.94   2. Expressive aphasia  R47.01 784.3     Patient Active Problem List   Diagnosis   • Allergic state   • Asthma   • Chronic cough   • Degenerative joint disease   • Type 2 diabetes mellitus with diabetic polyneuropathy, with long-term current use of insulin ([facility], located [facility])   • Gastroparesis   • Hyperlipidemia, mixed   • Essential hypertension   • Nausea and vomiting   • Vitamin D deficiency   • Combined forms of age-related cataract, bilateral   • Presbyopia   • Acute ischemic right PCA stroke involving the right occipital lobe (CMS/HCC)   • Sphenoid sinusitis   • Retinal disorder   • Coronary artery disease involving native coronary artery of native heart without angina pectoris   • Cutaneous abscess of head excluding face   • History of CVA (cerebrovascular accident)   • Leukocytosis   • Hypomagnesemia   • Left-sided weakness   • Hypoglycemia   • Dizziness   • CKD (chronic kidney disease), stage II   • Fall   • Dysautonomia   • Facial droop   • Sepsis     Past Medical History:   Diagnosis Date   • Asthma 3/22/2018   • Coronary artery disease involving native coronary artery of native heart without angina pectoris 2020   • Essential hypertension 2019   • Gastroparesis 2013   • History of CVA (cerebrovascular accident) 2021   • Hyperlipidemia, mixed 4/3/2017   • Hypoglycemia      Past Surgical History:   Procedure Laterality Date   • CARDIAC CATHETERIZATION     • CHOLECYSTECTOMY     • COLON RESECTION      [id number]   • COLON RESECTION SMALL BOWEL Right 2019    Procedure: open right hemicolectomy;  Surgeon: Ronaldo Ardon MD;  Location: [facility] OR;  Service: General   • COLONOSCOPY  2019    x2    • CORONARY ANGIOPLASTY WITH STENT PLACEMENT  2017   • ECHO - CONVERTED   2019   • ECHO - CONVERTED  2020   • FRACTURE SURGERY      collar bone as a child    • KNEE ARTHROSCOPY Left 08/2003   • KNEE JOINT MANIPULATION Left 04/2010   • TOTAL KNEE ARTHROPLASTY Bilateral     2013,2011      General Information     [facility] Name 05/24/23 1630          OT Time and Intention    Document Type evaluation  -     Mode of Treatment occupational therapy  -MP     Row Name 05/24/23 1630          General Information    Patient Profile Reviewed yes  -MP     Prior Level of Function independent:;ADL's  -MP     Existing Precautions/Restrictions fall  -MP     Row Name 05/24/23 1630          Living Environment    People in Home spouse  -MP     Row Name 05/24/23 1630          Cognition    Orientation Status (Cognition) oriented to;person;place  -[facility] Name 05/24/23 1630          Safety Issues, Functional Mobility    Safety Issues Affecting Function (Mobility) insight into deficits/self-awareness;judgment  -MP     Impairments Affecting Function (Mobility) balance;cognition;endurance/activity tolerance;motor planning;strength  -MP           User Key  (r) = Recorded By, (t) = Taken By, (c) = Cosigned By    Initials Name Provider Type    MP Arnoldo Hoffman OT Occupational Therapist                 Mobility/ADL's     [facility] 05/24/23 1631          Bed Mobility    Bed Mobility bed mobility (all) activities  -     All Activities, [place] (Bed Mobility) maximum assist (25% patient effort);2 person assist  -MP     Assistive Device (Bed Mobility) draw sheet  -MP     Row Name 05/24/23 1631          Bed-Chair Transfer    Bed-Chair [place] (Transfers) not tested  -MP     Row Name 05/24/23 1631          Sit-Stand Transfer    Sit-Stand [place] (Transfers) not tested  -MP     Row Name 05/24/23 1631          Activities of Daily Living    BADL Assessment/Intervention toileting  -MP     Row Name 05/24/23 1631          Toileting Assessment/Training    [place] Level (Toileting) toileting  skills;dependent (less than 25% patient effort)  -MP           User Key  (r) = Recorded By, (t) = Taken By, (c) = Cosigned By    Initials Name Provider Type    Arnoldo Alexander OT Occupational Therapist               Obj/Interventions     Row Name 05/24/23 1635          Range of Motion Comprehensive    Comment, General Range of Motion Difficult to assess BUE AROM secondary to lethragy and increased confusion  -MP     Row Name 05/24/23 1635          Strength Comprehensive (MMT)    Comment, General Manual Muscle Testing (MMT) Assessment Difficult to assess BUE AROM secondary to lethragy and increased confusion  -MP     Row Name 05/24/23 1635          Balance    Balance Interventions sitting;supported;static  -MP           User Key  (r) = Recorded By, (t) = Taken By, (c) = Cosigned By    Initials Name Provider Type    Arnoldo Alexander OT Occupational Therapist               Goals/Plan     Row Name 05/24/23 1705          Bed Mobility Goal 1 (OT)    Activity/Assistive Device (Bed Mobility Goal 1, OT) bed mobility activities, all  -MP     [place] Level/Cues Needed (Bed Mobility Goal 1, OT) minimum assist (75% or more patient effort)  -MP     Time Frame (Bed Mobility Goal 1, OT) long term goal (LTG);2 weeks  -MP     Row Name 05/24/23 1705          Transfer Goal 1 (OT)    Activity/Assistive Device (Transfer Goal 1, OT) sit-to-stand/stand-to-sit;toilet  -MP     [place] Level/Cues Needed (Transfer Goal 1, OT) moderate assist (50-74% patient effort)  -MP     Time Frame (Transfer Goal 1, OT) long term goal (LTG);2 weeks  -MP     Row Name 05/24/23 1705          Dressing Goal 1 (OT)    Activity/Device (Dressing Goal 1, OT) lower body dressing  -MP     [place]/Cues Needed (Dressing Goal 1, OT) moderate assist (50-74% patient effort)  -MP     Time Frame (Dressing Goal 1, OT) long term goal (LTG);2 weeks  -MP           User Key  (r) = Recorded By, (t) = Taken By, (c) = Cosigned By    Initials Name Provider  Type    MP Arnoldo Hoffman OT Occupational Therapist               Clinical Impression     Row Name 05/24/23 1636          Pain Assessment    Pretreatment Pain Rating 0/10 - no pain  -MP     Posttreatment Pain Rating 0/10 - no pain  -MP     Row Name 05/24/23 1704 05/24/23 1636       Plan of Care Review    Plan of Care Reviewed With -- patient  -MP    Progress -- no change  -MP    Outcome Evaluation Pt is a 64 y/o male with c/o slurred speech and L sided facial droop 90 minutes before hospitalization. Pt with hx of CVA with residual L sided weakness. Expressive aphasian noted in ED and pt with 2 bouts of vomitng while in hospital. CT head: (-), CT head/neck: (-). Pt being worked up for possible sepsis or encephalitis. Pt's wife reports they live in a 1 story home with 2 steps to enter into home. Pt. is typically ADL independent at baseline. Pt. requires max A x 2 for all bed mobility this date, unable to maintain static sitting balance w/o max-mod A. Pt. demonstrates increased spasticity w/ LUE during seated activity. Complete assist provided for toilet hygiene secondary to incontinence of bowel. Recommend IP rehab at d/c to address aforementioned deficits.  -MP Pt is a 64 y/o male with c/o slurred speech and L sided facial droop 90 minutes before hospitalization. Pt with hx of CVA with residual L sided weakness. Expressive aphasian noted in ED and pt with 2 bouts of vomitng while in hospital. CT head: (-), CT head/neck: (-). Pt being worked up for possible sepsis or encephalitis. Pt's wife reports they live in a 1 story home with 2 steps to enter into home. Pt. is typically ADL independent at baseline. Pt. requires max A x 2 for all bed mobility this date, unable to maintain static sitting balance w/o max-mod A. Pt. demonstrates increased spasticity w/ LUE during seated activity. Complete assist provided for toilet hygiene secondary to incontinence of bowel. Recommend SNF at d/c to address aforementioned  deficits.  -    Row Name 05/24/23 1704          Therapy Assessment/Plan (OT)    Rehab Potential (OT) good, to achieve stated therapy goals  -     Criteria for Skilled Therapeutic Interventions Met (OT) yes;meets criteria;skilled treatment is necessary  -MP     Therapy Frequency (OT) 5 times/wk  -MP     Row Name 05/24/23 1704          Therapy Plan Review/Discharge Plan (OT)    Anticipated Discharge Disposition (OT) inpatient rehabilitation facility  -[facility] Name 05/24/23 1704          Vital Signs    Pre Patient Position Supine  -MP     Intra Patient Position Sitting  -MP     Post Patient Position Supine  -MP     Row Name 05/24/23 1704          Positioning and Restraints    Pre-Treatment Position in bed  -MP     Post Treatment Position bed  -MP     In Bed supine;encouraged to call for assist;call light within reach;exit alarm on  -MP           User Key  (r) = Recorded By, (t) = Taken By, (c) = Cosigned By    Initials Name Provider Type    Arnoldo Alexander, OT Occupational Therapist               Outcome Measures     Row Name 05/24/23 1414          Modified Burdett Scale    Pre-Stroke Modified Jenaro Scale 1 - No significant disability despite symptoms.  Able to carry out all usual duties and activities.  -AM     Modified Burdett Scale 5 - Severe disability.  Bedridden, incontinent, and requiring constant nursing care and attention.  -[facility] Name 05/24/23 1414          Functional Assessment    Outcome Measure Options Modified Jenaro  -AM           User Key  (r) = Recorded By, (t) = Taken By, (c) = Cosigned By    Initials Name Provider Type    AM Vishnu Pérez, PT Physical Therapist                Occupational Therapy Education     Title: PT OT SLP Therapies (In Progress)     Topic: Occupational Therapy (In Progress)     Point: ADL training (Not Started)     Description:   Instruct learner(s) on proper safety adaptation and remediation techniques during self care or transfers.   Instruct in proper use of  assistive devices.              Learner Progress:  Not documented in this visit.          Point: Home exercise program (Not Started)     Description:   Instruct learner(s) on appropriate technique for monitoring, assisting and/or progressing therapeutic exercises/activities.              Learner Progress:  Not documented in this visit.          Point: Precautions (Not Started)     Description:   Instruct learner(s) on prescribed precautions during self-care and functional transfers.              Learner Progress:  Not documented in this visit.          Point: Body mechanics (Done)     Description:   Instruct learner(s) on proper positioning and spine alignment during self-care, functional mobility activities and/or exercises.              Learning Progress Summary           Patient Acceptance, E,TB, VU by ERIN at 5/24/2023 1708                               User Key     Initials Effective Dates Name Provider Type Discipline    ERIN 06/16/21 -  Arnoldo Hoffman OT Occupational Therapist OT              OT Recommendation and Plan  Therapy Frequency (OT): 5 times/wk  Plan of Care Review  Plan of Care Reviewed With: patient  Progress: no change  Outcome Evaluation: Pt is a 64 y/o male with c/o slurred speech and L sided facial droop 90 minutes before hospitalization. Pt with hx of CVA with residual L sided weakness. Expressive aphasian noted in ED and pt with 2 bouts of vomitng while in hospital. CT head: (-), CT head/neck: (-). Pt being worked up for possible sepsis or encephalitis. Pt's wife reports they live in a 1 story home with 2 steps to enter into home. Pt. is typically ADL independent at baseline. Pt. requires max A x 2 for all bed mobility this date, unable to maintain static sitting balance w/o max-mod A. Pt. demonstrates increased spasticity w/ LUE during seated activity. Complete assist provided for toilet hygiene secondary to incontinence of bowel. Recommend IP rehab at d/c to address aforementioned  deficits.     Time Calculation:    Time Calculation- OT     Row Name 05/24/23 1706             Time Calculation- OT    OT Start Time 0932  -MP      OT Stop Time 0959  -      OT Time Calculation (min) 27 min  -      Total Timed Code Minutes- OT 0 minute(s)  -MP      OT Received On 05/24/23  -      OT - Next Appointment 05/25/23  -      OT Goal Re-Cert Due Date 06/07/23  -            User Key  (r) = Recorded By, (t) = Taken By, (c) = Cosigned By    Initials Name Provider Type    Arnoldo Alexander OT Occupational Therapist              Therapy Charges for Today     Code Description Service Date Service Provider Modifiers Qty    [id number] HC OT EVAL MOD COMPLEXITY 4 5/24/2023 Arnoldo Hoffman OT GO 1               Arnoldo Hoffman OT  5/24/2023

## 2023-05-25 LAB
ALBUMIN SERPL-MCNC: 3.2 G/DL (ref 3.5–5.2)
ALBUMIN/GLOB SERPL: 1.1 G/DL
ALP SERPL-CCNC: 71 U/L (ref 39–117)
ALT SERPL W P-5'-P-CCNC: 14 U/L (ref 1–41)
ANION GAP SERPL CALCULATED.3IONS-SCNC: 13 MMOL/L (ref 5–15)
AST SERPL-CCNC: 18 U/L (ref 1–40)
BASOPHILS # BLD AUTO: 0 10*3/MM3 (ref 0–0.2)
BASOPHILS NFR BLD AUTO: 0.2 % (ref 0–1.5)
BH CV ECHO MEAS - ACS: 1.84 CM
BH CV ECHO MEAS - AO MAX PG: 9.7 MMHG
BH CV ECHO MEAS - AO MEAN PG: 5.7 MMHG
BH CV ECHO MEAS - AO ROOT DIAM: 3.5 CM
BH CV ECHO MEAS - AO V2 MAX: 156 CM/SEC
BH CV ECHO MEAS - AO V2 VTI: 25.8 CM
BH CV ECHO MEAS - AVA(I,D): 3.9 CM2
BH CV ECHO MEAS - EDV(CUBED): 84.8 ML
BH CV ECHO MEAS - EDV(MOD-SP4): 79.6 ML
BH CV ECHO MEAS - EF(MOD-SP4): 68.3 %
BH CV ECHO MEAS - ESV(CUBED): 24.6 ML
BH CV ECHO MEAS - ESV(MOD-SP4): 25.2 ML
BH CV ECHO MEAS - FS: 33.8 %
BH CV ECHO MEAS - IVS/LVPW: 1.13 CM
BH CV ECHO MEAS - IVSD: 1.25 CM
BH CV ECHO MEAS - LA DIMENSION: 3.7 CM
BH CV ECHO MEAS - LV DIASTOLIC VOL/BSA (35-75): 35.6 CM2
BH CV ECHO MEAS - LV MASS(C)D: 187.1 GRAMS
BH CV ECHO MEAS - LV MAX PG: 6.4 MMHG
BH CV ECHO MEAS - LV MEAN PG: 2.9 MMHG
BH CV ECHO MEAS - LV SYSTOLIC VOL/BSA (12-30): 11.3 CM2
BH CV ECHO MEAS - LV V1 MAX: 126.1 CM/SEC
BH CV ECHO MEAS - LV V1 VTI: 24.3 CM
BH CV ECHO MEAS - LVIDD: 4.4 CM
BH CV ECHO MEAS - LVIDS: 2.9 CM
BH CV ECHO MEAS - LVOT AREA: 4.2 CM2
BH CV ECHO MEAS - LVOT DIAM: 2.31 CM
BH CV ECHO MEAS - LVPWD: 1.11 CM
BH CV ECHO MEAS - MV A MAX VEL: 104.4 CM/SEC
BH CV ECHO MEAS - MV DEC SLOPE: 512.4 CM/SEC2
BH CV ECHO MEAS - MV DEC TIME: 0.22 MSEC
BH CV ECHO MEAS - MV E MAX VEL: 57.1 CM/SEC
BH CV ECHO MEAS - MV E/A: 0.55
BH CV ECHO MEAS - MV MAX PG: 4.6 MMHG
BH CV ECHO MEAS - MV MEAN PG: 1.73 MMHG
BH CV ECHO MEAS - MV V2 VTI: 32.8 CM
BH CV ECHO MEAS - MVA(VTI): 3.1 CM2
BH CV ECHO MEAS - PA ACC TIME: 0.06 SEC
BH CV ECHO MEAS - PA PR(ACCEL): 54.1 MMHG
BH CV ECHO MEAS - PA V2 MAX: 120.6 CM/SEC
BH CV ECHO MEAS - PULM A REVS DUR: 0.1 SEC
BH CV ECHO MEAS - PULM A REVS VEL: 23.4 CM/SEC
BH CV ECHO MEAS - PULM DIAS VEL: 42.5 CM/SEC
BH CV ECHO MEAS - PULM S/D: 1.7
BH CV ECHO MEAS - PULM SYS VEL: 72 CM/SEC
BH CV ECHO MEAS - RV MAX PG: 2.47 MMHG
BH CV ECHO MEAS - RV V1 MAX: 78.6 CM/SEC
BH CV ECHO MEAS - RV V1 VTI: 13.5 CM
BH CV ECHO MEAS - RVDD: 2.6 CM
BH CV ECHO MEAS - SI(MOD-SP4): 24.3 ML/M2
BH CV ECHO MEAS - SV(LVOT): 101.8 ML
BH CV ECHO MEAS - SV(MOD-SP4): 54.4 ML
BILIRUB SERPL-MCNC: 0.3 MG/DL (ref 0–1.2)
BUN SERPL-MCNC: 27 MG/DL (ref 8–23)
BUN/CREAT SERPL: 21.1 (ref 7–25)
CALCIUM SPEC-SCNC: 8.1 MG/DL (ref 8.6–10.5)
CHLORIDE SERPL-SCNC: 106 MMOL/L (ref 98–107)
CO2 SERPL-SCNC: 24 MMOL/L (ref 22–29)
CREAT SERPL-MCNC: 1.28 MG/DL (ref 0.76–1.27)
DEPRECATED RDW RBC AUTO: 43.3 FL (ref 37–54)
EGFRCR SERPLBLD CKD-EPI 2021: 62.1 ML/MIN/1.73
EOSINOPHIL # BLD AUTO: 0.1 10*3/MM3 (ref 0–0.4)
EOSINOPHIL NFR BLD AUTO: 1.4 % (ref 0.3–6.2)
ERYTHROCYTE [DISTWIDTH] IN BLOOD BY AUTOMATED COUNT: 13.5 % (ref 12.3–15.4)
GLOBULIN UR ELPH-MCNC: 2.9 GM/DL
GLUCOSE BLDC GLUCOMTR-MCNC: 100 MG/DL (ref 70–105)
GLUCOSE BLDC GLUCOMTR-MCNC: 103 MG/DL (ref 70–105)
GLUCOSE BLDC GLUCOMTR-MCNC: 117 MG/DL (ref 70–105)
GLUCOSE BLDC GLUCOMTR-MCNC: 117 MG/DL (ref 70–105)
GLUCOSE BLDC GLUCOMTR-MCNC: 130 MG/DL (ref 70–105)
GLUCOSE BLDC GLUCOMTR-MCNC: 174 MG/DL (ref 70–105)
GLUCOSE BLDC GLUCOMTR-MCNC: 181 MG/DL (ref 70–105)
GLUCOSE BLDC GLUCOMTR-MCNC: 88 MG/DL (ref 70–105)
GLUCOSE SERPL-MCNC: 93 MG/DL (ref 65–99)
HCT VFR BLD AUTO: 33 % (ref 37.5–51)
HGB BLD-MCNC: 11.1 G/DL (ref 13–17.7)
LYMPHOCYTES # BLD AUTO: 1.4 10*3/MM3 (ref 0.7–3.1)
LYMPHOCYTES NFR BLD AUTO: 15 % (ref 19.6–45.3)
MAXIMAL PREDICTED HEART RATE: 155 BPM
MCH RBC QN AUTO: 30.8 PG (ref 26.6–33)
MCHC RBC AUTO-ENTMCNC: 33.6 G/DL (ref 31.5–35.7)
MCV RBC AUTO: 91.5 FL (ref 79–97)
MONOCYTES # BLD AUTO: 0.7 10*3/MM3 (ref 0.1–0.9)
MONOCYTES NFR BLD AUTO: 7.2 % (ref 5–12)
NEUTROPHILS NFR BLD AUTO: 7 10*3/MM3 (ref 1.7–7)
NEUTROPHILS NFR BLD AUTO: 76.2 % (ref 42.7–76)
NRBC BLD AUTO-RTO: 0 /100 WBC (ref 0–0.2)
PLATELET # BLD AUTO: 235 10*3/MM3 (ref 140–450)
PMV BLD AUTO: 8 FL (ref 6–12)
POTASSIUM SERPL-SCNC: 3.4 MMOL/L (ref 3.5–5.2)
POTASSIUM SERPL-SCNC: 3.8 MMOL/L (ref 3.5–5.2)
PROT SERPL-MCNC: 6.1 G/DL (ref 6–8.5)
RBC # BLD AUTO: 3.61 10*6/MM3 (ref 4.14–5.8)
SODIUM SERPL-SCNC: 143 MMOL/L (ref 136–145)
STRESS TARGET HR: 132 BPM
WBC NRBC COR # BLD: 9.2 10*3/MM3 (ref 3.4–10.8)

## 2023-05-25 PROCEDURE — 82948 REAGENT STRIP/BLOOD GLUCOSE: CPT

## 2023-05-25 PROCEDURE — 92526 ORAL FUNCTION THERAPY: CPT

## 2023-05-25 PROCEDURE — 63710000001 INSULIN ISOPHANE & REGULAR PER 5 UNITS: Performed by: INTERNAL MEDICINE

## 2023-05-25 PROCEDURE — 25010000002 PIPERACILLIN SOD-TAZOBACTAM PER 1 G: Performed by: INTERNAL MEDICINE

## 2023-05-25 PROCEDURE — 84132 ASSAY OF SERUM POTASSIUM: CPT | Performed by: INTERNAL MEDICINE

## 2023-05-25 PROCEDURE — 97535 SELF CARE MNGMENT TRAINING: CPT

## 2023-05-25 PROCEDURE — 0 POTASSIUM CHLORIDE 10 MEQ/100ML SOLUTION: Performed by: INTERNAL MEDICINE

## 2023-05-25 PROCEDURE — 97112 NEUROMUSCULAR REEDUCATION: CPT

## 2023-05-25 PROCEDURE — 85025 COMPLETE CBC W/AUTO DIFF WBC: CPT | Performed by: INTERNAL MEDICINE

## 2023-05-25 PROCEDURE — 63710000001 INSULIN LISPRO (HUMAN) PER 5 UNITS

## 2023-05-25 PROCEDURE — 97530 THERAPEUTIC ACTIVITIES: CPT

## 2023-05-25 PROCEDURE — 25010000002 LEVETIRACETAM IN NACL 0.82% 500 MG/100ML SOLUTION

## 2023-05-25 PROCEDURE — 25010000002 ENOXAPARIN PER 10 MG: Performed by: INTERNAL MEDICINE

## 2023-05-25 PROCEDURE — 80053 COMPREHEN METABOLIC PANEL: CPT | Performed by: INTERNAL MEDICINE

## 2023-05-25 RX ORDER — POTASSIUM CHLORIDE 7.45 MG/ML
10 INJECTION INTRAVENOUS
Status: COMPLETED | OUTPATIENT
Start: 2023-05-25 | End: 2023-05-25

## 2023-05-25 RX ORDER — POTASSIUM CHLORIDE 7.45 MG/ML
10 INJECTION INTRAVENOUS
Status: DISPENSED | OUTPATIENT
Start: 2023-05-25 | End: 2023-05-25

## 2023-05-25 RX ADMIN — LEVETIRACETAM 500 MG: 5 INJECTION INTRAVENOUS at 10:20

## 2023-05-25 RX ADMIN — METOPROLOL TARTRATE 25 MG: 25 TABLET, FILM COATED ORAL at 21:59

## 2023-05-25 RX ADMIN — POTASSIUM CHLORIDE 10 MEQ: 7.46 INJECTION, SOLUTION INTRAVENOUS at 10:22

## 2023-05-25 RX ADMIN — LEVETIRACETAM 500 MG: 5 INJECTION INTRAVENOUS at 21:59

## 2023-05-25 RX ADMIN — POTASSIUM CHLORIDE 10 MEQ: 7.46 INJECTION, SOLUTION INTRAVENOUS at 05:19

## 2023-05-25 RX ADMIN — PIPERACILLIN AND TAZOBACTAM 3.38 G: 3; .375 INJECTION, POWDER, LYOPHILIZED, FOR SOLUTION INTRAVENOUS at 22:00

## 2023-05-25 RX ADMIN — ENOXAPARIN SODIUM 100 MG: 100 INJECTION SUBCUTANEOUS at 21:59

## 2023-05-25 RX ADMIN — PIPERACILLIN AND TAZOBACTAM 3.38 G: 3; .375 INJECTION, POWDER, LYOPHILIZED, FOR SOLUTION INTRAVENOUS at 06:18

## 2023-05-25 RX ADMIN — Medication 5 MG: at 21:59

## 2023-05-25 RX ADMIN — METOCLOPRAMIDE 10 MG: 10 TABLET ORAL at 21:59

## 2023-05-25 RX ADMIN — Medication 10 ML: at 22:00

## 2023-05-25 RX ADMIN — ENOXAPARIN SODIUM 100 MG: 100 INJECTION SUBCUTANEOUS at 10:19

## 2023-05-25 RX ADMIN — METOCLOPRAMIDE 10 MG: 10 TABLET ORAL at 17:45

## 2023-05-25 RX ADMIN — INSULIN LISPRO 3 UNITS: 100 INJECTION, SOLUTION INTRAVENOUS; SUBCUTANEOUS at 17:45

## 2023-05-25 RX ADMIN — ATORVASTATIN CALCIUM 80 MG: 40 TABLET, FILM COATED ORAL at 21:59

## 2023-05-25 RX ADMIN — POTASSIUM CHLORIDE 10 MEQ: 7.46 INJECTION, SOLUTION INTRAVENOUS at 04:12

## 2023-05-25 RX ADMIN — INSULIN HUMAN 30 UNITS: 100 INJECTION, SUSPENSION SUBCUTANEOUS at 17:45

## 2023-05-25 RX ADMIN — PIPERACILLIN AND TAZOBACTAM 3.38 G: 3; .375 INJECTION, POWDER, LYOPHILIZED, FOR SOLUTION INTRAVENOUS at 13:58

## 2023-05-25 RX ADMIN — POTASSIUM CHLORIDE 10 MEQ: 7.46 INJECTION, SOLUTION INTRAVENOUS at 11:44

## 2023-05-25 RX ADMIN — ASPIRIN 300 MG: 300 SUPPOSITORY RECTAL at 11:44

## 2023-05-25 NOTE — SIGNIFICANT NOTE
Requested to come to a rapid response called on this patient due to seizure activity by the Intensivist NP as I am covering Dr. Brannon's patients.  Please see rapid response note for details.      Intensivist NP requested that I order a loading dose of Keppra due to seizure activity. Dr. Keys was also notified and requested Keppra 1.5g x1 IV followed by Keppra 500 mg IV BID. A CT of the head was completed and showed no acute process.

## 2023-05-25 NOTE — NURSING NOTE
Patient spouse Debbi contacted regarding Rapid Response for this patient earlier this evening.  Advised her that patient had CT of head and he was started on Keppra related to possible seizure activity.  She stated that she will be here in the morning.

## 2023-05-25 NOTE — NURSING NOTE
Rapid response called due to seizure episode witnessed. Patient was non-responsive to pain and needed vigorous stimulation to be awoken. He was then absently staring to the right and witnessed having eye flutters. Jackie ordered, CT of head was obtained and Dr Wells notified. Care continues.     CRN to call spouse in the morning to update.

## 2023-05-25 NOTE — PLAN OF CARE
Goal Outcome Evaluation:            Chao Lazar presents with functional mobility impairments which indicate the need for skilled intervention. Pt required max A x2 to perform supine <> sitting eob. Pt sat eob ~15 minutes with max-CGA to maintain sitting balance. Pt performed weight bearing through LUE to improve upright posture when seated eob. Pt displayed ability to wash off face with RUE but required AAROM while using LUE. Tolerating session today without incident. Will continue to follow and progress as tolerated.

## 2023-05-25 NOTE — THERAPY TREATMENT NOTE
Acute Care - Speech Language Pathology   Swallow Treatment Note  [place]     Patient Name: Chao Lazar  : 1960  MRN: [id number]  Today's Date: 2023               Admit Date: 2023    Visit Dx:     ICD-10-CM ICD-9-CM   1. Facial droop  R29.810 781.94   2. Expressive aphasia  R47.01 784.3     Patient Active Problem List   Diagnosis    Allergic state    Asthma    Chronic cough    Degenerative joint disease    Type 2 diabetes mellitus with diabetic polyneuropathy, with long-term current use of insulin (HCC)    Gastroparesis    Hyperlipidemia, mixed    Essential hypertension    Nausea and vomiting    Vitamin D deficiency    Combined forms of age-related cataract, bilateral    Presbyopia    Acute ischemic right PCA stroke involving the right occipital lobe (CMS/HCC)    Sphenoid sinusitis    Retinal disorder    Coronary artery disease involving native coronary artery of native heart without angina pectoris    Cutaneous abscess of head excluding face    History of CVA (cerebrovascular accident)    Leukocytosis    Hypomagnesemia    Left-sided weakness    Hypoglycemia    Dizziness    CKD (chronic kidney disease), stage II    Fall    Dysautonomia    Facial droop    Sepsis     Past Medical History:   Diagnosis Date    Asthma 3/22/2018    Coronary artery disease involving native coronary artery of native heart without angina pectoris 2020    Essential hypertension 2019    Gastroparesis 2013    History of CVA (cerebrovascular accident) 2021    Hyperlipidemia, mixed 4/3/2017    Hypoglycemia      Past Surgical History:   Procedure Laterality Date    CARDIAC CATHETERIZATION      CHOLECYSTECTOMY  2004    COLON RESECTION      [id number]    COLON RESECTION SMALL BOWEL Right 2019    Procedure: open right hemicolectomy;  Surgeon: Ronaldo Ardon MD;  Location: [facility];  Service: General    COLONOSCOPY  2019    x2     CORONARY ANGIOPLASTY WITH STENT PLACEMENT  2017    ECHO -  CONVERTED  2019    ECHO - CONVERTED  2020    FRACTURE SURGERY      collar bone as a child     KNEE ARTHROSCOPY Left 08/2003    KNEE JOINT MANIPULATION Left 04/2010    TOTAL KNEE ARTHROPLASTY Bilateral     2013,2011         Skilled ST intervention conducted this date targeting dysphagia in the setting of AMS.  Pt alert and lethargic.  Pt on room air during session. Pt currently prescribed a Pureed and Thin liquids     Treatment narrative/results:  Pt found alert upon arrival, family present. Latent and inconsistent responses to 1-step commands but does participate and remain alert throughout session. Family with questions regarding advancing diet. Pt was provided with soft solid/mixed consistency (peaches) trials x3, thin liquids. There is again clinically evident prolonged oral manipulation of of peaches, appearing more prolonged than previous date, with some tongue thrust noted during mastication and visible whole peaches. Pt does clear oral cavity but requires extensive amount of time. No s/s aspiration at any time. Alertness has improved significantly when compared to previous date however he remains at risk of complications related to oral dysphagia and AMS, including malnutrition, whole stasis in oral cavity with risk of choking.        SLP Recommendation and Plan       Recommend continue current diet of puree with thin liquids, all the below precautions, with ST continuing to follow for dysphagia including diagnostic trials solids for diet advancement as appropriate.      Strict adherence to the following precautions:     *Full feed by nursing staff only  *Ensure pt fully awake/alert prior to feeding  *Strict 90 degree hip flexion  *STOP PO with any unresolved pocketing or if pt becomes too drowsy  *oral care following meals         Pt, family, and RN educated on all the above recs/rationale.                     EDUCATION  The patient, family, and RN have been educated in the following areas:   Dysphagia  (Swallowing Impairment) Modified Diet Instruction, ST POC.        SLP GOALS       Row Name 05/25/23 1600       (LTG) Swallow    (LTG) Swallow Pt will maximize swallow function for least restrictive PO diet, exhibiting no complication associated with dysphagia, adequate PO intake, and demonstrating independent use of swallow compensations  -MC    [place] (Swallow Long Term Goal) with moderate cues (50-74% accuracy)  -MC    Time Frame (Swallow Long Term Goal) by discharge  -MC    Progress/Outcomes (Swallow Long Term Goal) good progress toward goal  -MC    Comment (Swallow Long Term Goal) see abovetherapy narrative  -MC       (STG) Swallow 1    (STG) Swallow 1 The patient will participate in ongoing assessment of swallow, including re-evaluation clinically and/or including instrumental assessment of swallow if indicated, to further assess swallow function in anticipation to initiate a PO diet  -MC    Time Frame (Swallow Short Term Goal 1) 1 week  -MC    Progress/Outcomes (Swallow Short Term Goal 1) goal ongoing;good progress toward goal  -MC    Comment (Swallow Short Term Goal 1) see above therapy narrative  -MC       (STG) Swallow 2    (STG) Swallow 2 The patient will demonstrate oral cavity clearance of PO during therapy  -MC    [place] (Swallow Short Term Goal 2) with moderate cues (50-74% accuracy)  -MC    Time Frame (Swallow Short Term Goal 2) 1 week  -MC    Progress/Outcomes (Swallow Short Term Goal 2) good progress toward goal  -MC    Comment (Swallow Short Term Goal 2) pt provided diagnostic trials soft solids in therapy this date. see above  -MC              User Key  (r) = Recorded By, (t) = Taken By, (c) = Cosigned By      Initials Name Provider Type    Debi Ramirez SLP Speech and Language Pathologist                            JAIDA Edge  5/25/2023

## 2023-05-25 NOTE — PROGRESS NOTES
Events noted    Though we were concerned about possibility that he had a seizure, was postictal, could have aspirated, but there was no prove and he was not on any antiepileptics    But yesterday he got a generalized convulsive seizure, brief and afterwards he was confused but now he is improving    Antiepileptics like Keppra started    Continue present plan

## 2023-05-25 NOTE — CASE MANAGEMENT/SOCIAL WORK
Continued Stay Note  RIGOBERTO Mcdermott     Patient Name: Chao Lazar  MRN: [id number]  Today's Date: 5/25/2023    Admit Date: 5/22/2023    Plan: D/C Plan: [facility]  Transport TBD.   Discharge Plan     Row Name 05/25/23 0821       Plan    Plan D/C Plan: [facility].  Transport TBD.    Plan Comments D/C Barrier: Rapid response late last night; Seizure; Neurology re-eval             Expected Discharge Date and Time     Expected Discharge Date Expected Discharge Time    May 25, 2023             Donna Rivera RN

## 2023-05-25 NOTE — PLAN OF CARE
Problem: Adult Inpatient Plan of Care  Goal: Plan of Care Review  Outcome: Ongoing, Progressing  Goal: Patient-Specific Goal (Individualized)  Outcome: Ongoing, Progressing  Goal: Absence of Hospital-Acquired Illness or Injury  Outcome: Ongoing, Progressing  Intervention: Identify and Manage Fall Risk  Recent Flowsheet Documentation  Taken 5/25/2023 1212 by Marina, Jaimie A, LPN  Safety Promotion/Fall Prevention:   activity supervised   assistive device/personal items within reach   clutter free environment maintained   fall prevention program maintained   nonskid shoes/slippers when out of bed   room organization consistent   safety round/check completed  Taken 5/25/2023 0815 by Marina, Jaimie A, LPN  Safety Promotion/Fall Prevention:   activity supervised   assistive device/personal items within reach   clutter free environment maintained   fall prevention program maintained   nonskid shoes/slippers when out of bed   room organization consistent   safety round/check completed  Intervention: Prevent Skin Injury  Recent Flowsheet Documentation  Taken 5/25/2023 0815 by Jaimie Connelly LPN  Skin Protection:   adhesive use limited   tubing/devices free from skin contact  Intervention: Prevent and Manage VTE (Venous Thromboembolism) Risk  Recent Flowsheet Documentation  Taken 5/25/2023 1212 by Jaimie Connelly LPN  VTE Prevention/Management:   bilateral   sequential compression devices on  Taken 5/25/2023 0815 by Jaimie Connelly LPN  VTE Prevention/Management:   bilateral   sequential compression devices on  Intervention: Prevent Infection  Recent Flowsheet Documentation  Taken 5/25/2023 1212 by Jaimie Connelly LPN  Infection Prevention: hand hygiene promoted  Taken 5/25/2023 0815 by Jaimie Connelly LPN  Infection Prevention: hand hygiene promoted  Goal: Optimal Comfort and Wellbeing  Outcome: Ongoing, Progressing  Intervention: Provide Person-Centered Care  Recent Flowsheet Documentation  Taken  5/25/2023 1212 by Jaimie Connelly LPN  Trust Relationship/Rapport:   care explained   thoughts/feelings acknowledged  Taken 5/25/2023 0815 by Jaimie Connelly LPN  Trust Relationship/Rapport:   care explained   thoughts/feelings acknowledged  Goal: Readiness for Transition of Care  Outcome: Ongoing, Progressing     Problem: Hypertension Comorbidity  Goal: Blood Pressure in Desired Range  Outcome: Ongoing, Progressing  Intervention: Maintain Blood Pressure Management  Recent Flowsheet Documentation  Taken 5/25/2023 1212 by Jaimie Connelly LPN  Medication Review/Management: medications reviewed  Taken 5/25/2023 0815 by Jaimie Connelly LPN  Syncope Management: position changed slowly  Medication Review/Management: medications reviewed     Problem: Pain Chronic (Persistent) (Comorbidity Management)  Goal: Acceptable Pain Control and Functional Ability  Outcome: Ongoing, Progressing  Intervention: Manage Persistent Pain  Recent Flowsheet Documentation  Taken 5/25/2023 1212 by Jaimie Connelly LPN  Medication Review/Management: medications reviewed  Taken 5/25/2023 0815 by Jaimie Connelly LPN  Medication Review/Management: medications reviewed     Problem: Skin Injury Risk Increased  Goal: Skin Health and Integrity  Outcome: Ongoing, Progressing  Intervention: Optimize Skin Protection  Recent Flowsheet Documentation  Taken 5/25/2023 0815 by Jaimie Connelly LPN  Pressure Reduction Techniques:   frequent weight shift encouraged   weight shift assistance provided  Pressure Reduction Devices:   positioning supports utilized   pressure-redistributing mattress utilized  Skin Protection:   adhesive use limited   tubing/devices free from skin contact     Problem: Adjustment to Illness (Stroke, Ischemic/Transient Ischemic Attack)  Goal: Optimal Coping  Outcome: Ongoing, Progressing     Problem: Bowel Elimination Impaired (Stroke, Ischemic/Transient Ischemic Attack)  Goal: Effective Bowel  Elimination  Outcome: Ongoing, Progressing     Problem: Cerebral Tissue Perfusion (Stroke, Ischemic/Transient Ischemic Attack)  Goal: Optimal Cerebral Tissue Perfusion  Outcome: Ongoing, Progressing     Problem: Cognitive Impairment (Stroke, Ischemic/Transient Ischemic Attack)  Goal: Optimal Cognitive Function  Outcome: Ongoing, Progressing     Problem: Communication Impairment (Stroke, Ischemic/Transient Ischemic Attack)  Goal: Improved Communication Skills  Outcome: Ongoing, Progressing  Intervention: Optimize Communication Skills  Recent Flowsheet Documentation  Taken 5/25/2023 0815 by Jaimie Connelly LPN  Communication Enhancement Strategies: call light answered in person     Problem: Functional Ability Impaired (Stroke, Ischemic/Transient Ischemic Attack)  Goal: Optimal Functional Ability  Outcome: Ongoing, Progressing     Problem: Respiratory Compromise (Stroke, Ischemic/Transient Ischemic Attack)  Goal: Effective Oxygenation and Ventilation  Outcome: Ongoing, Progressing     Problem: Sensorimotor Impairment (Stroke, Ischemic/Transient Ischemic Attack)  Goal: Improved Sensorimotor Function  Outcome: Ongoing, Progressing  Intervention: Optimize Sensory and Perceptual Ability  Recent Flowsheet Documentation  Taken 5/25/2023 0815 by Jaimie Connelly LPN  Pressure Reduction Techniques:   frequent weight shift encouraged   weight shift assistance provided  Pressure Reduction Devices:   positioning supports utilized   pressure-redistributing mattress utilized     Problem: Swallowing Impairment (Stroke, Ischemic/Transient Ischemic Attack)  Goal: Optimal Eating and Swallowing without Aspiration  Outcome: Ongoing, Progressing     Problem: Urinary Elimination Impaired (Stroke, Ischemic/Transient Ischemic Attack)  Goal: Effective Urinary Elimination  Outcome: Ongoing, Progressing     Problem: Fall Injury Risk  Goal: Absence of Fall and Fall-Related Injury  Outcome: Ongoing, Progressing  Intervention: Identify and  Manage Contributors  Recent Flowsheet Documentation  Taken 5/25/2023 1212 by Jaimie Connelly LPN  Medication Review/Management: medications reviewed  Taken 5/25/2023 0815 by Jaimie Connelly LPN  Medication Review/Management: medications reviewed  Intervention: Promote Injury-Free Environment  Recent Flowsheet Documentation  Taken 5/25/2023 1212 by Avis, Jaimie A, LPN  Safety Promotion/Fall Prevention:   activity supervised   assistive device/personal items within reach   clutter free environment maintained   fall prevention program maintained   nonskid shoes/slippers when out of bed   room organization consistent   safety round/check completed  Taken 5/25/2023 0815 by Avis, Jaimie A, LPN  Safety Promotion/Fall Prevention:   activity supervised   assistive device/personal items within reach   clutter free environment maintained   fall prevention program maintained   nonskid shoes/slippers when out of bed   room organization consistent   safety round/check completed     Problem: Adjustment to Illness (Sepsis/Septic Shock)  Goal: Optimal Coping  Outcome: Ongoing, Progressing     Problem: Bleeding (Sepsis/Septic Shock)  Goal: Absence of Bleeding  Outcome: Ongoing, Progressing     Problem: Glycemic Control Impaired (Sepsis/Septic Shock)  Goal: Blood Glucose Level Within Desired Range  Outcome: Ongoing, Progressing     Problem: Infection Progression (Sepsis/Septic Shock)  Goal: Absence of Infection Signs and Symptoms  Outcome: Ongoing, Progressing  Intervention: Initiate Sepsis Management  Recent Flowsheet Documentation  Taken 5/25/2023 1212 by Jaimie Connelly LPN  Infection Prevention: hand hygiene promoted  Taken 5/25/2023 0815 by Jaimie Connelly LPN  Infection Prevention: hand hygiene promoted     Problem: Nutrition Impaired (Sepsis/Septic Shock)  Goal: Optimal Nutrition Intake  Outcome: Ongoing, Progressing   Goal Outcome Evaluation:

## 2023-05-25 NOTE — PLAN OF CARE
Problem: Adult Inpatient Plan of Care  Goal: Plan of Care Review  5/25/2023 0351 by Joel Jaquez RN  Outcome: Ongoing, Not Progressing  Flowsheets (Taken 5/25/2023 0351)  Progress: no change  Plan of Care Reviewed With: patient  5/25/2023 0349 by Joel Jaquez RN  Outcome: Ongoing, Not Progressing  Flowsheets (Taken 5/25/2023 0349)  Progress: no change  Plan of Care Reviewed With: patient  Goal: Patient-Specific Goal (Individualized)  5/25/2023 0351 by Joel Jaquez RN  Outcome: Ongoing, Not Progressing  5/25/2023 0349 by Joel Jaquez RN  Outcome: Ongoing, Not Progressing  Goal: Absence of Hospital-Acquired Illness or Injury  5/25/2023 0351 by Joel Jaquez RN  Outcome: Ongoing, Not Progressing  5/25/2023 0349 by Joel Jaquez RN  Outcome: Ongoing, Not Progressing  Intervention: Identify and Manage Fall Risk  Recent Flowsheet Documentation  Taken 5/24/2023 2200 by Joel Jaquez RN  Safety Promotion/Fall Prevention: activity supervised  Taken 5/24/2023 2100 by Joel Jaquez RN  Safety Promotion/Fall Prevention: activity supervised  Intervention: Prevent Skin Injury  Recent Flowsheet Documentation  Taken 5/25/2023 0200 by Joel Jaquez RN  Body Position: turned  Taken 5/25/2023 0000 by Joel Jaquez RN  Body Position: turned  Taken 5/24/2023 2200 by Joel Jaquez RN  Body Position: turned  Taken 5/24/2023 2100 by Joel Jaquez RN  Skin Protection: adhesive use limited  Intervention: Prevent and Manage VTE (Venous Thromboembolism) Risk  Recent Flowsheet Documentation  Taken 5/24/2023 2200 by Joel Jaquez RN  Activity Management: bedrest  Taken 5/24/2023 2100 by Joel Jaquez RN  Activity Management: bedrest  Intervention: Prevent Infection  Recent Flowsheet Documentation  Taken 5/25/2023 0200 by Joel Jaquez RN  Infection Prevention: rest/sleep promoted  Taken 5/24/2023 2200 by Joel Jaquez RN  Infection Prevention: rest/sleep promoted  Taken 5/24/2023 2100 by Joel Jaquez RN  Infection Prevention: rest/sleep promoted  Goal:  Optimal Comfort and Wellbeing  5/25/2023 0351 by Joel Jaquez RN  Outcome: Ongoing, Not Progressing  5/25/2023 0349 by Joel Jaquez RN  Outcome: Ongoing, Not Progressing  Intervention: Provide Person-Centered Care  Recent Flowsheet Documentation  Taken 5/24/2023 2100 by Joel Jaquez RN  Trust Relationship/Rapport: care explained  Goal: Readiness for Transition of Care  5/25/2023 0351 by Joel Jaquez RN  Outcome: Ongoing, Not Progressing  5/25/2023 0349 by Joel Jaquez RN  Outcome: Ongoing, Not Progressing     Problem: Hypertension Comorbidity  Goal: Blood Pressure in Desired Range  5/25/2023 0351 by Joel Jaquez RN  Outcome: Ongoing, Not Progressing  5/25/2023 0349 by Joel Jaquez RN  Outcome: Ongoing, Not Progressing  Intervention: Maintain Blood Pressure Management  Recent Flowsheet Documentation  Taken 5/24/2023 2200 by Joel Jaquez RN  Medication Review/Management: medications reviewed  Taken 5/24/2023 2100 by Joel Jaquez RN  Medication Review/Management: medications reviewed     Problem: Pain Chronic (Persistent) (Comorbidity Management)  Goal: Acceptable Pain Control and Functional Ability  5/25/2023 0351 by Joel Jaquez RN  Outcome: Ongoing, Not Progressing  5/25/2023 0349 by Joel Jaquez RN  Outcome: Ongoing, Not Progressing  Intervention: Manage Persistent Pain  Recent Flowsheet Documentation  Taken 5/24/2023 2200 by Joel Jaquez RN  Medication Review/Management: medications reviewed  Taken 5/24/2023 2100 by Joel Jaquez RN  Medication Review/Management: medications reviewed  Intervention: Optimize Psychosocial Wellbeing  Recent Flowsheet Documentation  Taken 5/24/2023 2100 by Joel Jaquez RN  Family/Support System Care: support provided     Problem: Skin Injury Risk Increased  Goal: Skin Health and Integrity  5/25/2023 0351 by Joel Jaquez RN  Outcome: Ongoing, Not Progressing  5/25/2023 0349 by Joel Jaquez RN  Outcome: Ongoing, Not Progressing  Intervention: Optimize Skin Protection  Recent Flowsheet  Documentation  Taken 5/25/2023 0200 by Joel Jaquez RN  Head of Bed (HOB) Positioning: HOB at 30 degrees  Taken 5/25/2023 0000 by Joel Jaquez RN  Head of Bed (HOB) Positioning: HOB at 30 degrees  Taken 5/24/2023 2200 by Joel Jaquez RN  Head of Bed (HOB) Positioning: HOB at 30 degrees  Taken 5/24/2023 2100 by Joel Jaquez RN  Pressure Reduction Techniques: weight shift assistance provided  Pressure Reduction Devices: pressure-redistributing mattress utilized  Skin Protection: adhesive use limited     Problem: Adjustment to Illness (Stroke, Ischemic/Transient Ischemic Attack)  Goal: Optimal Coping  5/25/2023 0351 by Joel Jaquez RN  Outcome: Ongoing, Not Progressing  5/25/2023 0349 by Joel Jaquez RN  Outcome: Ongoing, Not Progressing  Intervention: Support Psychosocial Response to Stroke  Recent Flowsheet Documentation  Taken 5/24/2023 2100 by Joel Jaquez RN  Family/Support System Care: support provided     Problem: Bowel Elimination Impaired (Stroke, Ischemic/Transient Ischemic Attack)  Goal: Effective Bowel Elimination  5/25/2023 0351 by Joel Jaquez RN  Outcome: Ongoing, Not Progressing  5/25/2023 0349 by Joel Jaquez RN  Outcome: Ongoing, Not Progressing     Problem: Cerebral Tissue Perfusion (Stroke, Ischemic/Transient Ischemic Attack)  Goal: Optimal Cerebral Tissue Perfusion  5/25/2023 0351 by Joel Jaquez RN  Outcome: Ongoing, Not Progressing  5/25/2023 0349 by Joel Jaquez RN  Outcome: Ongoing, Not Progressing     Problem: Cognitive Impairment (Stroke, Ischemic/Transient Ischemic Attack)  Goal: Optimal Cognitive Function  5/25/2023 0351 by Joel Jaquez RN  Outcome: Ongoing, Not Progressing  5/25/2023 0349 by Joel Jaquez RN  Outcome: Ongoing, Not Progressing     Problem: Communication Impairment (Stroke, Ischemic/Transient Ischemic Attack)  Goal: Improved Communication Skills  5/25/2023 0351 by Joel Jaquez RN  Outcome: Ongoing, Not Progressing  5/25/2023 0349 by Joel Jaquez RN  Outcome: Ongoing, Not  Progressing     Problem: Functional Ability Impaired (Stroke, Ischemic/Transient Ischemic Attack)  Goal: Optimal Functional Ability  5/25/2023 0351 by Joel Jaquez RN  Outcome: Ongoing, Not Progressing  5/25/2023 0349 by Joel Jaquez RN  Outcome: Ongoing, Not Progressing  Intervention: Optimize Functional Ability  Recent Flowsheet Documentation  Taken 5/24/2023 2200 by Joel Jaquez RN  Activity Management: bedrest  Taken 5/24/2023 2100 by Joel Jaquez RN  Activity Management: bedrest     Problem: Respiratory Compromise (Stroke, Ischemic/Transient Ischemic Attack)  Goal: Effective Oxygenation and Ventilation  5/25/2023 0351 by Joel Jaquez RN  Outcome: Ongoing, Not Progressing  5/25/2023 0349 by Joel Jaquez RN  Outcome: Ongoing, Not Progressing  Intervention: Optimize Oxygenation and Ventilation  Recent Flowsheet Documentation  Taken 5/25/2023 0200 by Joel Jaquez RN  Head of Bed (HOB) Positioning: HOB at 30 degrees  Taken 5/25/2023 0000 by Joel Jaquez RN  Head of Bed (HOB) Positioning: HOB at 30 degrees  Taken 5/24/2023 2200 by Joel Jaquez RN  Head of Bed (HOB) Positioning: HOB at 30 degrees     Problem: Sensorimotor Impairment (Stroke, Ischemic/Transient Ischemic Attack)  Goal: Improved Sensorimotor Function  5/25/2023 0351 by Joel Jaquez RN  Outcome: Ongoing, Not Progressing  5/25/2023 0349 by Joel Jaquez RN  Outcome: Ongoing, Not Progressing  Intervention: Optimize Range of Motion, Motor Control and Function  Recent Flowsheet Documentation  Taken 5/25/2023 0200 by Joel Jaquez RN  Positioning/Transfer Devices: pillows  Taken 5/25/2023 0000 by Joel Jaquez RN  Positioning/Transfer Devices: pillows  Taken 5/24/2023 2200 by Joel Jaquez RN  Positioning/Transfer Devices: pillows  Intervention: Optimize Sensory and Perceptual Ability  Recent Flowsheet Documentation  Taken 5/24/2023 2100 by Joel Jaquez RN  Pressure Reduction Techniques: weight shift assistance provided  Pressure Reduction Devices:  pressure-redistributing mattress utilized     Problem: Swallowing Impairment (Stroke, Ischemic/Transient Ischemic Attack)  Goal: Optimal Eating and Swallowing without Aspiration  5/25/2023 0351 by Joel Jaquez RN  Outcome: Ongoing, Not Progressing  5/25/2023 0349 by Joel Jaquez RN  Outcome: Ongoing, Not Progressing     Problem: Urinary Elimination Impaired (Stroke, Ischemic/Transient Ischemic Attack)  Goal: Effective Urinary Elimination  5/25/2023 0351 by Joel Jaquez RN  Outcome: Ongoing, Not Progressing  5/25/2023 0349 by Joel Jaquez RN  Outcome: Ongoing, Not Progressing     Problem: Fall Injury Risk  Goal: Absence of Fall and Fall-Related Injury  5/25/2023 0351 by Joel Jaquez RN  Outcome: Ongoing, Not Progressing  5/25/2023 0349 by Joel Jaquez RN  Outcome: Ongoing, Not Progressing  Intervention: Identify and Manage Contributors  Recent Flowsheet Documentation  Taken 5/24/2023 2200 by Joel Jaquez RN  Medication Review/Management: medications reviewed  Taken 5/24/2023 2100 by Joel Jaquez RN  Medication Review/Management: medications reviewed  Intervention: Promote Injury-Free Environment  Recent Flowsheet Documentation  Taken 5/24/2023 2200 by Joel Jaquez RN  Safety Promotion/Fall Prevention: activity supervised  Taken 5/24/2023 2100 by Joel Jaquez RN  Safety Promotion/Fall Prevention: activity supervised     Problem: Adjustment to Illness (Sepsis/Septic Shock)  Goal: Optimal Coping  5/25/2023 0351 by Joel Jaquez RN  Outcome: Ongoing, Not Progressing  5/25/2023 0349 by Joel Jaquez RN  Outcome: Ongoing, Not Progressing  Intervention: Optimize Psychosocial Adjustment to Illness  Recent Flowsheet Documentation  Taken 5/24/2023 2100 by Joel Jaquez RN  Family/Support System Care: support provided     Problem: Bleeding (Sepsis/Septic Shock)  Goal: Absence of Bleeding  5/25/2023 0351 by Joel Jaquez RN  Outcome: Ongoing, Not Progressing  5/25/2023 0349 by Joel Jaquez RN  Outcome: Ongoing, Not Progressing      Problem: Glycemic Control Impaired (Sepsis/Septic Shock)  Goal: Blood Glucose Level Within Desired Range  5/25/2023 0351 by Joel Jaquez RN  Outcome: Ongoing, Not Progressing  5/25/2023 0349 by Joel Jaquez RN  Outcome: Ongoing, Not Progressing     Problem: Infection Progression (Sepsis/Septic Shock)  Goal: Absence of Infection Signs and Symptoms  5/25/2023 0351 by Joel Jaquez RN  Outcome: Ongoing, Not Progressing  5/25/2023 0349 by Joel Jaquez RN  Outcome: Ongoing, Not Progressing  Intervention: Initiate Sepsis Management  Recent Flowsheet Documentation  Taken 5/25/2023 0200 by Joel Jaquez RN  Infection Prevention: rest/sleep promoted  Taken 5/24/2023 2200 by Joel Jaquez RN  Infection Prevention: rest/sleep promoted  Taken 5/24/2023 2100 by Joel Jaquez RN  Infection Prevention: rest/sleep promoted  Intervention: Promote Recovery  Recent Flowsheet Documentation  Taken 5/24/2023 2200 by Joel Jaquez RN  Activity Management: bedrest  Taken 5/24/2023 2100 by Joel Jaquez RN  Activity Management: bedrest     Problem: Nutrition Impaired (Sepsis/Septic Shock)  Goal: Optimal Nutrition Intake  5/25/2023 0351 by Joel Jaquez RN  Outcome: Ongoing, Not Progressing  5/25/2023 0349 by Joel Jaquez RN  Outcome: Ongoing, Not Progressing   Goal Outcome Evaluation:  Plan of Care Reviewed With: patient        Progress: no change

## 2023-05-25 NOTE — PROGRESS NOTES
[facility] Medicine Services   Daily Progress Note      Patient Name: Chao Lazar  : 1960  MRN: [id number]  Primary Care Physician:  Al Kimbrough MD  Date of admission: 2023      Subjective      Chief Complaint: Slurred speech, left facial droop    Patient seen and examined this morning.  Continues to be more awake and alert, normal speech now, following all commands.  Alert and oriented.  Wife at bedside confirms that patient is pretty much back to baseline now.  Overnight events noted, concern for underlying seizure, started on Keppra, neurology to evaluate today.    Pertinent positives as noted in HPI/subjective.  All other systems were reviewed and are negative.      Objective      Vitals:   Temp:  [97.6 °F (36.4 °C)-99.7 °F (37.6 °C)] 97.6 °F (36.4 °C)  Heart Rate:  [62-79] 65  Resp:  [21-35] 29  BP: (111-143)/(59-79) 137/76  Flow (L/min):  [2] 2    Physical Exam:    General: Awake, alert, elderly male, chronically ill-appearing, lying in bed  Eyes: PERRL, EOMI, conjunctivae are clear  Cardiovascular: Regular rate and rhythm, no murmurs  Respiratory: Clear to auscultation bilaterally, no wheezing or rales, unlabored breathing  Abdomen: Soft, nontender, positive bowel sounds, no guarding  Neurologic: A&O, confusion improved, speech appears to be normal, otherwise CN grossly intact, chronic left-sided weakness noted, Musculoskeletal: No joint swelling, no other gross deformities  Skin: Warm, dry         Result Review    Result Review:  I have personally reviewed the results from the time of this admission to 2023 09:37 EDT and agree with these findings:  [x]  Laboratory  [x]  Microbiology  [x]  Radiology  []  EKG/Telemetry   []  Cardiology/Vascular   []  Pathology  []  Old records  []  Other:          Assessment & Plan      Brief Patient Summary:  Chao Lazar is a 63 y.o. male with a past medical history of CVA with chronic left-sided weakness, hypertension,  hyperlipidemia, CAD, and DM2 who presented to [facility] on 5/22/2023 complaining of slurred speech and left-sided facial droop about 90 minutes prior to arrival.  Patient has a history of CVA with residual left-sided weakness however facial droop and slurred speech and worsening weakness left upper and lower extremity.  Expressive aphasia noted on initial exam as well.  Stroke work-up initiated in the ER with CT of the head and CTA of the head and neck negative for any acute findings.  MRI brain done on 5/23 negative for new stroke or new acute findings.  Patient was noted to have significant fever and developed sepsis few hours after being admitted.  Concern for possible aspiration as patient did vomit but according to wife patient was at normal state and eating without any difficulty prior to admission.  No actual aspiration noted during the episode of vomiting.  He remains confused and lethargic, started on IV fluids and broad-spectrum IV antibiotics.  Concern for CNS infection.  Case was discussed with ID and neurology.  Patient underwent lumbar puncture on 5/23 by IR.  Meningitis/encephalitis panel negative.  Elevated protein noted on CSF study.    aspirin, 325 mg, Oral, Daily   Or  aspirin, 300 mg, Rectal, Daily  atorvastatin, 80 mg, Oral, Nightly  enoxaparin, 1 mg/kg, Subcutaneous, Q12H  insulin lispro, 3-14 Units, Subcutaneous, Q6H  insulin NPH-insulin regular, 30 Units, Subcutaneous, BID With Meals  levETIRAcetam, 500 mg, Intravenous, Q12H  metoclopramide, 10 mg, Oral, 4x Daily AC & at Bedtime  metoprolol tartrate, 25 mg, Oral, Q12H  pantoprazole, 40 mg, Oral, Q AM  piperacillin-tazobactam, 3.375 g, Intravenous, Q8H  potassium chloride, 10 mEq, Intravenous, Q1H  senna-docusate sodium, 2 tablet, Oral, BID  sodium chloride, 10 mL, Intravenous, Q12H       hold, 1 each  lactated ringers, 100 mL/hr, Last Rate: 100 mL/hr (05/25/23 0000)         I have utilized all available, immediate resources to  obtain, update, or review the patient's current medications including all prescriptions, over-the-counter products, herbals, cannabis/cannabidiol products, and vitamin.mineral/dietary (nutritional) supplements.    Active Hospital Problems:  Active Hospital Problems    Diagnosis    • **Facial droop    • Sepsis    • CKD (chronic kidney disease), stage II    • History of CVA (cerebrovascular accident)    • Left-sided weakness    • Coronary artery disease involving native coronary artery of native heart without angina pectoris    • Essential hypertension    • Hyperlipidemia, mixed    • Type 2 diabetes mellitus with diabetic polyneuropathy, with long-term current use of insulin (HCC)      Plan:     Acute metabolic encephalopathy  Possible underlying seizure  History of CVA with chronic left-sided weakness  -Patient presented as a stroke alert with slurred speech and left facial droop  -CT head and CTA head and neck negative for any acute findings, chronic occlusion of right PCA noted  -MRI brain w/o negative for new CVA or hemorrhage  -Patient not a tPA candidate on admission per the report  -However unclear on whether he was actually on anticoagulation with Eliquis or Xarelto at home  -Passed swallow eval, on diet per SLP  -Continue treatment dose Lovenox for now, will discuss with neurology regarding long-term anticoagulation, likely switch to Plavix as patient has no history of underlying A-fib  -Continue aspirin, statin  -PT/OT/ST, rehab placement  -Possible underlying seizure activity noted, repeat CT head negative for new acute findings  -Continue on empiric Keppra for now  -Neurology following    Severe sepsis with possible CNS infection vs PNA  -? aspiration, initial concern for CNS infection  -Patient started having high-grade fevers initially which has since improved  -Chest x-ray and UA negative for infection  -Case was discussed with ID and neurology and patient is s/p LP by IR on 5/23  -CSF study with  elevated protein noted, meningitis/encephalitis panel negative  -Antibiotics switched to IV Zosyn to cover aspiration pneumonia for now per ID  -ID following    DM 2  -Decreased home basal insulin dose due to decreased p.o. intake  -Continue SSI, monitor blood glucose  -Adjust as needed    CKD stage II  -Creatinine appears to be at baseline of 1.3-1.4  -Monitor, avoid nephrotoxins if possible    Hypertension  -Currently n.p.o.  -Resume home meds as able  -Labetalol as needed added    DVT prophylaxis  -Lovenox    CODE STATUS:    Code Status (Patient has no pulse and is not breathing): CPR (Attempt to Resuscitate)  Medical Interventions (Patient has pulse or is breathing): Full Support      Disposition: Pending clinical course, plan for rehab placement.    Electronically signed by Manjinder Brannon DO, 05/25/23, 09:37 EDT.  [facility] Hospitalist Team      Part of this note may be an electronic transcription/translation of spoken language to printed text using the Dragon Dictation System.

## 2023-05-25 NOTE — THERAPY TREATMENT NOTE
Subjective: Pt agreeable to therapeutic plan of care. Pt expressed feeling better today. Pt expressed frustration with inability to move his body like he wants.    Objective:     Bed mobility - Max-A and Assist x 2  Transfers - N/A or Not attempted.  Ambulation - N/A or Not attempted.    Therapeutic Exercise - Pt attempted to perform LAQ seated eob requiring AAROM to complete. Pt performed seated/supine AP    Vitals: WNL    Pain: 0 VAS   Location: n/a  Intervention for pain: N/A    Education: Provided education on the importance of mobility in the acute care setting, Verbal/Tactile Cues, Transfer Training and Energy conservation strategies    Assessment: Chao Lazar presents with functional mobility impairments which indicate the need for skilled intervention. Pt required max A x2 to perform supine <> sitting eob. Pt sat eob ~15 minutes with max-CGA to maintain sitting balance. Pt performed weight bearing through LUE to improve upright posture when seated eob. Pt displayed ability to wash off face with RUE but required AAROM while using LUE. Tolerating session today without incident. Will continue to follow and progress as tolerated.     Plan/Recommendations:   High Intensity Therapy recommended post-acute care. This is recommended as therapy feels the patient would require 5-6 days per week, 2-3 hours per day. At this time, inpatient rehabilitation (acute rehab) would be the first choice and SNF would be second.. Pt requires no DME at discharge.     Pt desires Inpatient Rehabilitation placement at discharge. Pt cooperative; agreeable to therapeutic recommendations and plan of care.         Basic Mobility 6-click:  Rollin = Total, A lot = 2, A little = 3; 4 = None  Supine>Sit:   1 = Total, A lot = 2, A little = 3; 4 = None   Sit>Stand with arms:  1 = Total, A lot = 2, A little = 3; 4 = None  Bed>Chair:   1 = Total, A lot = 2, A little = 3; 4 = None  Ambulate in room:  1 = Total, A lot = 2, A little =  3; 4 = None  3-5 Steps with railin = Total, A lot = 2, A little = 3; 4 = None  Score: 8    Modified Jenaro: 4 = Moderately severe disability (Unable to attend to own bodily needs without assistance, and unable to walk unassisted)     Post-Tx Position: Supine with HOB Elevated, Alarms activated and Call light and personal items within reach  PPE: gloves and surgical mask

## 2023-05-25 NOTE — PROGRESS NOTES
Infectious Diseases Progress Note      LOS: 3 days   Patient Care Team:  Al Kimbrough MD as PCP - General  Al Kimbrough MD as PCP - Family Medicine    Chief Complaint: Confusion and fever    Subjective       The patient has been afebrile for the last 24 hours.  The patient is on room air, hemodynamically stable, and is tolerating antimicrobial therapy.  Currently on room air and states that the shortness of breath is much better.    Review of Systems:   Review of Systems   HENT: Negative.    Eyes: Negative.    Respiratory: Negative.    Cardiovascular: Negative.    Gastrointestinal: Negative.    Endocrine: Negative.    Genitourinary: Negative.    Musculoskeletal: Negative.    Skin: Negative.    Neurological: Positive for weakness.   Psychiatric/Behavioral: Negative.    All other systems reviewed and are negative.       Objective     Vital Signs  Temp:  [97.6 °F (36.4 °C)-98.8 °F (37.1 °C)] 98 °F (36.7 °C)  Heart Rate:  [60-79] 60  Resp:  [21-35] 23  BP: (111-143)/(59-79) 140/67    Physical Exam:  Physical Exam  Vitals and nursing note reviewed.   Constitutional:       Appearance: He is well-developed.   HENT:      Head: Normocephalic and atraumatic.   Eyes:      Pupils: Pupils are equal, round, and reactive to light.   Cardiovascular:      Rate and Rhythm: Normal rate and regular rhythm.      Heart sounds: Normal heart sounds.   Pulmonary:      Effort: Pulmonary effort is normal. No respiratory distress.      Breath sounds: Normal breath sounds. No wheezing or rales.   Abdominal:      General: Bowel sounds are normal. There is no distension.      Palpations: Abdomen is soft. There is no mass.      Tenderness: There is no abdominal tenderness. There is no guarding or rebound.   Musculoskeletal:         General: No deformity. Normal range of motion.      Cervical back: Normal range of motion and neck supple.   Skin:     General: Skin is warm.      Findings: No erythema or rash.   Neurological:       Cranial Nerves: No cranial nerve deficit.      Comments: Alert and oriented x2-more alert and awake    Left-sided weakness          Results Review:    I have reviewed all clinical data, test, lab, and imaging results.     Radiology  CT Head Without Contrast    Result Date: 5/24/2023  CT HEAD WO CONTRAST Date of Exam: 5/24/2023 9:21 PM EDT Indication: Seizure, new-onset, no history of trauma. Comparison: CT head without contrast 5/22/2023. MRI 5/23/2023. MRI 5/12/2022. Technique: Axial CT images were obtained of the head without contrast administration.  Sagittal and coronal reconstructions were performed.  Automated exposure control and iterative reconstruction methods were used. Findings: *No acute intracranial hemorrhage. *No masses, mass effect, midline shift or hydrocephalus. *Chronic small vessel ischemic disease. Generalized brain atrophy. *Old encephalomalacia in right parietal/occipital lobe. *Calvarium is intact. *Visualized orbits and globes are unremarkable without radiopaque foreign bodies. *Visualized paranasal sinuses are clear. *Visualized mastoid air cells are clear.     1.No acute intracranial hemorrhage. Calvarium is intact. 2.Chronic findings as detailed above. Electronically Signed: Andrew Ocasio  5/24/2023 9:33 PM EDT  Workstation ID: [id number]      Cardiology    Laboratory    Results from last 7 days   Lab Units 05/25/23  0243 05/24/23  0319 05/23/23  0259 05/22/23  1743   WBC 10*3/mm3 9.20 10.20 13.20* 9.90   HEMOGLOBIN g/dL 11.1* 12.3* 13.6 14.3   HEMATOCRIT % 33.0* 36.7* 39.4 42.1   PLATELETS 10*3/mm3 235 239 358 350     Results from last 7 days   Lab Units 05/25/23  0243 05/24/23  0319 05/23/23  0259 05/22/23  1743   SODIUM mmol/L 143 141 141 141   POTASSIUM mmol/L 3.4* 4.1 4.3 4.7   CHLORIDE mmol/L 106 105 101 102   CO2 mmol/L 24.0 21.0* 22.0 25.0   BUN mg/dL 27* 27* 29* 28*   CREATININE mg/dL 1.28* 1.34* 1.44* 1.36*   GLUCOSE mg/dL 93 222* 258* 246*   ALBUMIN g/dL 3.2* 3.2*  --  4.0    BILIRUBIN mg/dL 0.3 0.5  --  0.4   ALK PHOS U/L 71 78  --  126*   AST (SGOT) U/L 18 19  --  22   ALT (SGPT) U/L 14 17  --  25   CALCIUM mg/dL 8.1* 8.2* 9.6 10.0                 Microbiology   Microbiology Results (last 10 days)       Procedure Component Value - Date/Time    Culture, CSF - Cerebrospinal Fluid, Lumbar Puncture [[id number]] Collected: 05/23/23 1537    Lab Status: Preliminary result Specimen: Cerebrospinal Fluid from Lumbar Puncture Updated: 05/24/23 1231     CSF Culture No growth     Gram Stain Rare (1+) WBCs per low power field      No organisms seen    Meningitis / Encephalitis Panel, PCR - Cerebrospinal Fluid, Lumbar Puncture [[id number]]  (Normal) Collected: 05/23/23 1537    Lab Status: Final result Specimen: Cerebrospinal Fluid from Lumbar Puncture Updated: 05/23/23 1725     ESCHERICHIA COLI K1, PCR Not Detected     HAEMOPHILUS INFLUENZAE, PCR Not Detected     LISTERIA MONOCYTOGENES, PCR Not Detected     NEISSERIA MENINGITIDIS, PCR Not Detected     STREPTOCOCCUS AGALACTIAE, PCR Not Detected     STREPTOCOCCUS PNEUMONIAE, PCR Not Detected     CYTOMEGALOVIRUS (CMV), PCR Not Detected     ENTEROVIRUS, PCR Not Detected     HERPES SIMPLEX VIRUS 1 (HSV-1), PCR Not Detected     HERPES SIMPLEX VIRUS 2 (HSV-2), PCR Not Detected     HUMAN PARECHOVIRUS, PCR Not Detected     VARICELLA ZOSTER VIRUS (VZV), PCR Not Detected     CRYPTOCOCCUS NEOFORMANS / GATTII, PCR Not Detected     HUMAN HERPES VIRUS 6 PCR Not Detected    S. Pneumo Ag Urine or CSF - Urine, Urine, Clean Catch [[id number]]  (Normal) Collected: 05/23/23 0742    Lab Status: Final result Specimen: Urine, Clean Catch Updated: 05/23/23 0813     Strep Pneumo Ag Negative    Legionella Antigen, Urine - Urine, Urine, Clean Catch [[id number]]  (Normal) Collected: 05/23/23 0742    Lab Status: Final result Specimen: Urine, Clean Catch Updated: 05/23/23 0812     LEGIONELLA ANTIGEN, URINE Negative    MRSA Screen, PCR (Inpatient) - Swab, Nares [[id number]]  (Normal)  Collected: 05/23/23 0536    Lab Status: Final result Specimen: Swab from Nares Updated: 05/23/23 0924     MRSA PCR No MRSA Detected    Narrative:      The negative predictive value of this diagnostic test is high and should only be used to consider de-escalating anti-MRSA therapy. A positive result may indicate colonization with MRSA and must be correlated clinically.    Respiratory Panel PCR w/COVID-19(SARS-CoV-2) CHRISTIE/DARIUS/PHILLIP/PAD/COR/MAD/THEODORE In-House, NP Swab in UTM/VTM, 3-4 HR TAT - Swab, Nasopharynx [[id number]]  (Normal) Collected: 05/23/23 0416    Lab Status: Final result Specimen: Swab from Nasopharynx Updated: 05/23/23 0527     ADENOVIRUS, PCR Not Detected     Coronavirus 229E Not Detected     Coronavirus HKU1 Not Detected     Coronavirus NL63 Not Detected     Coronavirus OC43 Not Detected     COVID19 Not Detected     Human Metapneumovirus Not Detected     Human Rhinovirus/Enterovirus Not Detected     Influenza A PCR Not Detected     Influenza B PCR Not Detected     Parainfluenza Virus 1 Not Detected     Parainfluenza Virus 2 Not Detected     Parainfluenza Virus 3 Not Detected     Parainfluenza Virus 4 Not Detected     RSV, PCR Not Detected     Bordetella pertussis pcr Not Detected     Bordetella parapertussis PCR Not Detected     Chlamydophila pneumoniae PCR Not Detected     Mycoplasma pneumo by PCR Not Detected    Narrative:      In the setting of a positive respiratory panel with a viral infection PLUS a negative procalcitonin without other underlying concern for bacterial infection, consider observing off antibiotics or discontinuation of antibiotics and continue supportive care. If the respiratory panel is positive for atypical bacterial infection (Bordetella pertussis, Chlamydophila pneumoniae, or Mycoplasma pneumoniae), consider antibiotic de-escalation to target atypical bacterial infection.    Blood Culture - Blood, Arm, Right [[id number]]  (Normal) Collected: 05/23/23 0355    Lab Status: Preliminary  result Specimen: Blood from Arm, Right Updated: 05/25/23 0416     Blood Culture No growth at 2 days    Narrative:      Less than seven (7) mL's of blood was collected.  Insufficient quantity may yield false negative results.    Blood Culture - Blood, Arm, Left [[id number]]  (Normal) Collected: 05/23/23 0355    Lab Status: Preliminary result Specimen: Blood from Arm, Left Updated: 05/25/23 0416     Blood Culture No growth at 2 days            Medication Review:       Schedule Meds  aspirin, 325 mg, Oral, Daily   Or  aspirin, 300 mg, Rectal, Daily  atorvastatin, 80 mg, Oral, Nightly  enoxaparin, 1 mg/kg, Subcutaneous, Q12H  insulin lispro, 3-14 Units, Subcutaneous, Q6H  insulin NPH-insulin regular, 30 Units, Subcutaneous, BID With Meals  levETIRAcetam, 500 mg, Intravenous, Q12H  metoclopramide, 10 mg, Oral, 4x Daily AC & at Bedtime  metoprolol tartrate, 25 mg, Oral, Q12H  pantoprazole, 40 mg, Oral, Q AM  piperacillin-tazobactam, 3.375 g, Intravenous, Q8H  senna-docusate sodium, 2 tablet, Oral, BID  sodium chloride, 10 mL, Intravenous, Q12H        Infusion Meds  hold, 1 each  lactated ringers, 100 mL/hr, Last Rate: 100 mL/hr (05/25/23 1023)        PRN Meds    acetaminophen **OR** acetaminophen **OR** acetaminophen    aluminum-magnesium hydroxide-simethicone    senna-docusate sodium **AND** polyethylene glycol **AND** bisacodyl **AND** bisacodyl    bisacodyl    Calcium Replacement - Follow Nurse / BPA Driven Protocol    dextrose    dextrose    glucagon (human recombinant)    hold    labetalol    Magnesium Standard Dose Replacement - Follow Nurse / BPA Driven Protocol    meclizine    melatonin    midodrine    nitroglycerin    ondansetron **OR** ondansetron    ondansetron    Phosphorus Replacement - Follow Nurse / BPA Driven Protocol    Potassium Replacement - Follow Nurse / BPA Driven Protocol    sodium chloride    sodium chloride    sodium chloride        Assessment & Plan       Antimicrobial Therapy   1.  IV  Zosyn        2.        3.        4.        5.            Assessment    Fever started after patient had mental status changes at home.  Concerning for underlying seizure.  Patient is s/p lumbar puncture and CSF findings are not consistent with infection and herpes simplex PCR was negative.  Patient had seizures eventually during this hospital stay.  This most likely aspiration pneumonia procedure.  Mental status started to improve after starting seizure medications    History of CVA with left-sided deficit    Diabetes mellitus type 2    Chronic kidney disease    Plan    Continue Zosyn 3.375 g IV every 8 hours empirically for aspiration pneumonia  Continue supportive care  A.m. labs  Case was discussed with the patient's wife at bedside  Case was discussed with neurology service      Rola Simon, APRN  05/25/23  13:20 EDT    Note is dictated utilizing voice recognition software/Dragon

## 2023-05-26 LAB
ANION GAP SERPL CALCULATED.3IONS-SCNC: 11 MMOL/L (ref 5–15)
BACTERIA SPEC AEROBE CULT: NORMAL
BASOPHILS # BLD AUTO: 0 10*3/MM3 (ref 0–0.2)
BASOPHILS NFR BLD AUTO: 0.4 % (ref 0–1.5)
BUN SERPL-MCNC: 18 MG/DL (ref 8–23)
BUN/CREAT SERPL: 16.7 (ref 7–25)
CALCIUM SPEC-SCNC: 8.6 MG/DL (ref 8.6–10.5)
CHLORIDE SERPL-SCNC: 103 MMOL/L (ref 98–107)
CO2 SERPL-SCNC: 26 MMOL/L (ref 22–29)
CREAT SERPL-MCNC: 1.08 MG/DL (ref 0.76–1.27)
DEPRECATED RDW RBC AUTO: 42 FL (ref 37–54)
EGFRCR SERPLBLD CKD-EPI 2021: 76.2 ML/MIN/1.73
EOSINOPHIL # BLD AUTO: 0.4 10*3/MM3 (ref 0–0.4)
EOSINOPHIL NFR BLD AUTO: 4 % (ref 0.3–6.2)
ERYTHROCYTE [DISTWIDTH] IN BLOOD BY AUTOMATED COUNT: 13.1 % (ref 12.3–15.4)
GLUCOSE BLDC GLUCOMTR-MCNC: 102 MG/DL (ref 70–105)
GLUCOSE BLDC GLUCOMTR-MCNC: 125 MG/DL (ref 70–105)
GLUCOSE BLDC GLUCOMTR-MCNC: 176 MG/DL (ref 70–105)
GLUCOSE BLDC GLUCOMTR-MCNC: 230 MG/DL (ref 70–105)
GLUCOSE BLDC GLUCOMTR-MCNC: 77 MG/DL (ref 70–105)
GLUCOSE SERPL-MCNC: 77 MG/DL (ref 65–99)
GRAM STN SPEC: NORMAL
GRAM STN SPEC: NORMAL
HCT VFR BLD AUTO: 34.6 % (ref 37.5–51)
HGB BLD-MCNC: 11.7 G/DL (ref 13–17.7)
INR PPP: 0.99 (ref 2–3)
LYMPHOCYTES # BLD AUTO: 1.7 10*3/MM3 (ref 0.7–3.1)
LYMPHOCYTES NFR BLD AUTO: 16 % (ref 19.6–45.3)
MCH RBC QN AUTO: 30.9 PG (ref 26.6–33)
MCHC RBC AUTO-ENTMCNC: 34 G/DL (ref 31.5–35.7)
MCV RBC AUTO: 90.8 FL (ref 79–97)
MONOCYTES # BLD AUTO: 0.8 10*3/MM3 (ref 0.1–0.9)
MONOCYTES NFR BLD AUTO: 7.6 % (ref 5–12)
NEUTROPHILS NFR BLD AUTO: 7.7 10*3/MM3 (ref 1.7–7)
NEUTROPHILS NFR BLD AUTO: 72 % (ref 42.7–76)
NRBC BLD AUTO-RTO: 0 /100 WBC (ref 0–0.2)
PLATELET # BLD AUTO: 269 10*3/MM3 (ref 140–450)
PMV BLD AUTO: 8.3 FL (ref 6–12)
POTASSIUM SERPL-SCNC: 3.5 MMOL/L (ref 3.5–5.2)
POTASSIUM SERPL-SCNC: 4.7 MMOL/L (ref 3.5–5.2)
PROTHROMBIN TIME: 10.6 SECONDS (ref 19.4–28.5)
RBC # BLD AUTO: 3.81 10*6/MM3 (ref 4.14–5.8)
SODIUM SERPL-SCNC: 140 MMOL/L (ref 136–145)
WBC NRBC COR # BLD: 10.7 10*3/MM3 (ref 3.4–10.8)

## 2023-05-26 PROCEDURE — 63710000001 INSULIN LISPRO (HUMAN) PER 5 UNITS

## 2023-05-26 PROCEDURE — 85610 PROTHROMBIN TIME: CPT | Performed by: INTERNAL MEDICINE

## 2023-05-26 PROCEDURE — 84132 ASSAY OF SERUM POTASSIUM: CPT | Performed by: INTERNAL MEDICINE

## 2023-05-26 PROCEDURE — 97530 THERAPEUTIC ACTIVITIES: CPT

## 2023-05-26 PROCEDURE — 82948 REAGENT STRIP/BLOOD GLUCOSE: CPT

## 2023-05-26 PROCEDURE — 25010000002 PIPERACILLIN SOD-TAZOBACTAM PER 1 G: Performed by: INTERNAL MEDICINE

## 2023-05-26 PROCEDURE — 92526 ORAL FUNCTION THERAPY: CPT

## 2023-05-26 PROCEDURE — 97110 THERAPEUTIC EXERCISES: CPT

## 2023-05-26 PROCEDURE — 63710000001 INSULIN ISOPHANE & REGULAR PER 5 UNITS: Performed by: INTERNAL MEDICINE

## 2023-05-26 PROCEDURE — 25010000002 LEVETIRACETAM IN NACL 0.82% 500 MG/100ML SOLUTION

## 2023-05-26 PROCEDURE — 97535 SELF CARE MNGMENT TRAINING: CPT

## 2023-05-26 PROCEDURE — 80048 BASIC METABOLIC PNL TOTAL CA: CPT | Performed by: INTERNAL MEDICINE

## 2023-05-26 PROCEDURE — 85025 COMPLETE CBC W/AUTO DIFF WBC: CPT | Performed by: INTERNAL MEDICINE

## 2023-05-26 PROCEDURE — 25010000002 ENOXAPARIN PER 10 MG: Performed by: INTERNAL MEDICINE

## 2023-05-26 RX ORDER — AMOXICILLIN AND CLAVULANATE POTASSIUM 875; 125 MG/1; MG/1
1 TABLET, FILM COATED ORAL EVERY 12 HOURS SCHEDULED
Status: DISCONTINUED | OUTPATIENT
Start: 2023-05-26 | End: 2023-05-28 | Stop reason: HOSPADM

## 2023-05-26 RX ORDER — WARFARIN SODIUM 5 MG/1
5 TABLET ORAL
Status: DISCONTINUED | OUTPATIENT
Start: 2023-05-26 | End: 2023-05-28 | Stop reason: HOSPADM

## 2023-05-26 RX ORDER — POTASSIUM CHLORIDE 20 MEQ/1
40 TABLET, EXTENDED RELEASE ORAL EVERY 4 HOURS
Status: COMPLETED | OUTPATIENT
Start: 2023-05-26 | End: 2023-05-26

## 2023-05-26 RX ADMIN — METOPROLOL TARTRATE 25 MG: 25 TABLET, FILM COATED ORAL at 09:51

## 2023-05-26 RX ADMIN — METOCLOPRAMIDE 10 MG: 10 TABLET ORAL at 18:22

## 2023-05-26 RX ADMIN — LEVETIRACETAM 500 MG: 5 INJECTION INTRAVENOUS at 22:21

## 2023-05-26 RX ADMIN — PIPERACILLIN AND TAZOBACTAM 3.38 G: 3; .375 INJECTION, POWDER, LYOPHILIZED, FOR SOLUTION INTRAVENOUS at 05:59

## 2023-05-26 RX ADMIN — PIPERACILLIN AND TAZOBACTAM 3.38 G: 3; .375 INJECTION, POWDER, LYOPHILIZED, FOR SOLUTION INTRAVENOUS at 12:39

## 2023-05-26 RX ADMIN — ATORVASTATIN CALCIUM 80 MG: 40 TABLET, FILM COATED ORAL at 22:22

## 2023-05-26 RX ADMIN — PANTOPRAZOLE SODIUM 40 MG: 40 TABLET, DELAYED RELEASE ORAL at 05:59

## 2023-05-26 RX ADMIN — LEVETIRACETAM 500 MG: 5 INJECTION INTRAVENOUS at 09:51

## 2023-05-26 RX ADMIN — METOCLOPRAMIDE 10 MG: 10 TABLET ORAL at 12:38

## 2023-05-26 RX ADMIN — Medication 10 ML: at 22:49

## 2023-05-26 RX ADMIN — POTASSIUM CHLORIDE 40 MEQ: 1500 TABLET, EXTENDED RELEASE ORAL at 16:20

## 2023-05-26 RX ADMIN — INSULIN HUMAN 30 UNITS: 100 INJECTION, SUSPENSION SUBCUTANEOUS at 18:21

## 2023-05-26 RX ADMIN — Medication 10 ML: at 09:52

## 2023-05-26 RX ADMIN — AMOXICILLIN AND CLAVULANATE POTASSIUM 1 TABLET: 875; 125 TABLET, FILM COATED ORAL at 22:23

## 2023-05-26 RX ADMIN — METOCLOPRAMIDE 10 MG: 10 TABLET ORAL at 22:49

## 2023-05-26 RX ADMIN — POTASSIUM CHLORIDE 40 MEQ: 1500 TABLET, EXTENDED RELEASE ORAL at 11:40

## 2023-05-26 RX ADMIN — INSULIN HUMAN 30 UNITS: 100 INJECTION, SUSPENSION SUBCUTANEOUS at 09:51

## 2023-05-26 RX ADMIN — METOCLOPRAMIDE 10 MG: 10 TABLET ORAL at 09:51

## 2023-05-26 RX ADMIN — ASPIRIN 325 MG: 325 TABLET ORAL at 09:51

## 2023-05-26 RX ADMIN — METOPROLOL TARTRATE 25 MG: 25 TABLET, FILM COATED ORAL at 22:22

## 2023-05-26 RX ADMIN — WARFARIN SODIUM 5 MG: 5 TABLET ORAL at 18:22

## 2023-05-26 RX ADMIN — INSULIN LISPRO 3 UNITS: 100 INJECTION, SOLUTION INTRAVENOUS; SUBCUTANEOUS at 12:39

## 2023-05-26 RX ADMIN — ENOXAPARIN SODIUM 100 MG: 100 INJECTION SUBCUTANEOUS at 09:51

## 2023-05-26 NOTE — PLAN OF CARE
Goal Outcome Evaluation:      Chao Lazar presents with functional mobility impairments which indicate the need for skilled intervention. Pt displayed improvement in bed mobility and STS on this date. Pt required min-mod A x2 during log roll with cuing for proper technique. Pt performed 3 STS with min A x2 displaying posterior lean with cuing for upright posture. Pt's L hand flexion synergy during standing requiring weight bearing through LUE to relax hand following stand. Pt stood ~2 minutes each standing trial. Tolerating session today without incident. Will continue to follow and progress as tolerated.

## 2023-05-26 NOTE — PLAN OF CARE
Problem: Adult Inpatient Plan of Care  Goal: Absence of Hospital-Acquired Illness or Injury  Intervention: Identify and Manage Fall Risk  Recent Flowsheet Documentation  Taken 5/26/2023 0010 by Lyla Reich RN  Safety Promotion/Fall Prevention:   clutter free environment maintained   assistive device/personal items within reach   fall prevention program maintained   lighting adjusted   muscle strengthening facilitated   nonskid shoes/slippers when out of bed   room organization consistent   safety round/check completed  Taken 5/25/2023 2002 by Lyla Reich RN  Safety Promotion/Fall Prevention:   fall prevention program maintained   assistive device/personal items within reach   clutter free environment maintained   lighting adjusted   muscle strengthening facilitated  Intervention: Prevent Skin Injury  Recent Flowsheet Documentation  Taken 5/26/2023 0010 by Lyla Reich RN  Body Position:   right   side-lying  Taken 5/25/2023 2002 by Lyla Reich RN  Body Position:   turned   left   side-lying  Intervention: Prevent and Manage VTE (Venous Thromboembolism) Risk  Recent Flowsheet Documentation  Taken 5/26/2023 0010 by Lyla Reich RN  Activity Management: bedrest  VTE Prevention/Management:   bilateral   sequential compression devices on  Range of Motion: active ROM (range of motion) encouraged  Taken 5/25/2023 2002 by Lyla Reich RN  Activity Management:   bedrest   activity minimized  Range of Motion: active ROM (range of motion) encouraged  Intervention: Prevent Infection  Recent Flowsheet Documentation  Taken 5/25/2023 2002 by Lyla Reich RN  Infection Prevention:   hand hygiene promoted   rest/sleep promoted   visitors restricted/screened   single patient room provided  Goal: Optimal Comfort and Wellbeing  Intervention: Provide Person-Centered Care  Recent Flowsheet Documentation  Taken 5/26/2023 0010 by Lyla Reich RN  Trust Relationship/Rapport:   care explained    emotional support provided   choices provided   thoughts/feelings acknowledged   questions encouraged   questions answered  Taken 5/25/2023 2002 by Lyla Reich RN  Trust Relationship/Rapport:   care explained   choices provided   thoughts/feelings acknowledged   questions answered   empathic listening provided     Problem: Hypertension Comorbidity  Goal: Blood Pressure in Desired Range  Intervention: Maintain Blood Pressure Management  Recent Flowsheet Documentation  Taken 5/26/2023 0010 by Lyla Reich RN  Medication Review/Management: medications reviewed     Problem: Pain Chronic (Persistent) (Comorbidity Management)  Goal: Acceptable Pain Control and Functional Ability  Intervention: Manage Persistent Pain  Recent Flowsheet Documentation  Taken 5/26/2023 0010 by Lyla Reich RN  Bowel Elimination Promotion:   diet adjusted   ambulation promoted  Medication Review/Management: medications reviewed  Intervention: Optimize Psychosocial Wellbeing  Recent Flowsheet Documentation  Taken 5/26/2023 0010 by Lyla Reich RN  Supportive Measures:   active listening utilized   relaxation techniques promoted   self-reflection promoted   self-responsibility promoted   goal-setting facilitated   decision-making supported  Diversional Activities:   television   LP Amina  Family/Support System Care:   caregiver stress acknowledged   support provided   self-care encouraged  Taken 5/25/2023 2002 by Lyla Reich RN  Diversional Activities: television  Family/Support System Care:   caregiver stress acknowledged   support provided     Problem: Skin Injury Risk Increased  Goal: Skin Health and Integrity  Intervention: Optimize Skin Protection  Recent Flowsheet Documentation  Taken 5/26/2023 0010 by Lyla Reich RN  Pressure Reduction Techniques:   heels elevated off bed   sit time limited to 2 hours   weight shift assistance provided   pressure points protected  Pressure Reduction Devices:    pressure-redistributing mattress utilized   alternating pressure pump (ADD)  Taken 5/25/2023 2002 by Lyla Reich RN  Head of Bed (HOB) Positioning: HOB at 20-30 degrees     Problem: Adjustment to Illness (Stroke, Ischemic/Transient Ischemic Attack)  Goal: Optimal Coping  Intervention: Support Psychosocial Response to Stroke  Recent Flowsheet Documentation  Taken 5/26/2023 0010 by Lyla Reich RN  Supportive Measures:   active listening utilized   relaxation techniques promoted   self-reflection promoted   self-responsibility promoted   goal-setting facilitated   decision-making supported  Family/Support System Care:   caregiver stress acknowledged   support provided   self-care encouraged  Taken 5/25/2023 2002 by Lyla Reich RN  Family/Support System Care:   caregiver stress acknowledged   support provided     Problem: Cerebral Tissue Perfusion (Stroke, Ischemic/Transient Ischemic Attack)  Goal: Optimal Cerebral Tissue Perfusion  Intervention: Protect and Optimize Cerebral Perfusion  Recent Flowsheet Documentation  Taken 5/26/2023 0010 by Lyla Reich RN  Sensory Stimulation Regulation:   care clustered   lighting decreased   quiet environment promoted   television on   visual stimulation minimized  Taken 5/25/2023 2002 by Lyla Reich RN  Fluid/Electrolyte Management:   fluids provided   intravenous fluids adjusted  Sensory Stimulation Regulation:   lighting decreased   care clustered   television on   quiet environment promoted   visual stimulation provided  Cerebral Perfusion Promotion: blood pressure monitored     Problem: Cognitive Impairment (Stroke, Ischemic/Transient Ischemic Attack)  Goal: Optimal Cognitive Function  Intervention: Optimize Cognitive Function  Recent Flowsheet Documentation  Taken 5/26/2023 0010 by Lyla Reich RN  Sensory Stimulation Regulation:   care clustered   lighting decreased   quiet environment promoted   television on   visual stimulation  minimized  Reorientation Measures:   calendar in view   glasses use encouraged  Environment Familiarity/Consistency:   daily routine followed   personal clothing/items utilized  Taken 5/25/2023 2002 by Lyla Reich RN  Sensory Stimulation Regulation:   lighting decreased   care clustered   television on   quiet environment promoted   visual stimulation provided  Reorientation Measures: (disoriented to time)   calendar in view   clock in view   reorientation provided  Environment Familiarity/Consistency: daily routine followed     Problem: Communication Impairment (Stroke, Ischemic/Transient Ischemic Attack)  Goal: Improved Communication Skills  Intervention: Optimize Communication Skills  Recent Flowsheet Documentation  Taken 5/26/2023 0010 by Lyla Reich RN  Communication Enhancement Strategies:   communication board used   call light answered in person  Taken 5/25/2023 2002 by Lyla Reich RN  Communication Enhancement Strategies:   call light answered in person   communication board used     Problem: Functional Ability Impaired (Stroke, Ischemic/Transient Ischemic Attack)  Goal: Optimal Functional Ability  Intervention: Optimize Functional Ability  Recent Flowsheet Documentation  Taken 5/26/2023 0010 by Lyla Reich RN  Activity Management: bedrest  Self-Care Promotion:   independence encouraged   BADL personal objects within reach  Taken 5/25/2023 2002 by Lyla Reich RN  Activity Management:   bedrest   activity minimized  Self-Care Promotion:   BADL personal objects within reach   independence encouraged   BADL personal routines maintained     Problem: Respiratory Compromise (Stroke, Ischemic/Transient Ischemic Attack)  Goal: Effective Oxygenation and Ventilation  Intervention: Optimize Oxygenation and Ventilation  Recent Flowsheet Documentation  Taken 5/26/2023 0010 by Lyla Reich RN  Airway/Ventilation Management: airway patency maintained  Taken 5/25/2023 2002 by Damir  CAROLEE Arita  Head of Bed (HOB) Positioning: HOB at 20-30 degrees     Problem: Sensorimotor Impairment (Stroke, Ischemic/Transient Ischemic Attack)  Goal: Improved Sensorimotor Function  Intervention: Optimize Range of Motion, Motor Control and Function  Recent Flowsheet Documentation  Taken 5/26/2023 0010 by Lyla Reich RN  Range of Motion: active ROM (range of motion) encouraged  Taken 5/25/2023 2002 by Lyla Reich RN  Spasticity Management:   triggers managed   weight-bearing facilitated  Positioning/Transfer Devices: pillows  Range of Motion: active ROM (range of motion) encouraged  Intervention: Optimize Sensory and Perceptual Ability  Recent Flowsheet Documentation  Taken 5/26/2023 0010 by Lyla Reich RN  Pressure Reduction Techniques:   heels elevated off bed   sit time limited to 2 hours   weight shift assistance provided   pressure points protected  Pressure Reduction Devices:   pressure-redistributing mattress utilized   alternating pressure pump (ADD)     Problem: Swallowing Impairment (Stroke, Ischemic/Transient Ischemic Attack)  Goal: Optimal Eating and Swallowing without Aspiration  Intervention: Optimize Eating and Swallowing  Recent Flowsheet Documentation  Taken 5/26/2023 0010 by Lyla Reich RN  Aspiration Precautions: awake/alert before oral intake  Taken 5/25/2023 2002 by Lyla Reich RN  Feeding/Eating Techniques:   feeding assistance provided   rest periods provided   oral mucosa moistened   monitored for feeding stress cues     Problem: Fall Injury Risk  Goal: Absence of Fall and Fall-Related Injury  Intervention: Identify and Manage Contributors  Recent Flowsheet Documentation  Taken 5/26/2023 0010 by Lyla Reich RN  Medication Review/Management: medications reviewed  Self-Care Promotion:   independence encouraged   BADL personal objects within reach  Taken 5/25/2023 2002 by Lyla Reich RN  Self-Care Promotion:   BADL personal objects within reach    independence encouraged   BADL personal routines maintained  Intervention: Promote Injury-Free Environment  Recent Flowsheet Documentation  Taken 5/26/2023 0010 by Lyla Reich, RN  Safety Promotion/Fall Prevention:   clutter free environment maintained   assistive device/personal items within reach   fall prevention program maintained   lighting adjusted   muscle strengthening facilitated   nonskid shoes/slippers when out of bed   room organization consistent   safety round/check completed  Taken 5/25/2023 2002 by Lyla Reich RN  Safety Promotion/Fall Prevention:   fall prevention program maintained   assistive device/personal items within reach   clutter free environment maintained   lighting adjusted   muscle strengthening facilitated     Problem: Adjustment to Illness (Sepsis/Septic Shock)  Goal: Optimal Coping  Intervention: Optimize Psychosocial Adjustment to Illness  Recent Flowsheet Documentation  Taken 5/26/2023 0010 by Lyla Reich RN  Supportive Measures:   active listening utilized   relaxation techniques promoted   self-reflection promoted   self-responsibility promoted   goal-setting facilitated   decision-making supported  Family/Support System Care:   caregiver stress acknowledged   support provided   self-care encouraged  Taken 5/25/2023 2002 by Lyla Reich RN  Family/Support System Care:   caregiver stress acknowledged   support provided     Problem: Infection Progression (Sepsis/Septic Shock)  Goal: Absence of Infection Signs and Symptoms  Intervention: Initiate Sepsis Management  Recent Flowsheet Documentation  Taken 5/25/2023 2002 by Lyla Reich RN  Infection Prevention:   hand hygiene promoted   rest/sleep promoted   visitors restricted/screened   single patient room provided  Intervention: Promote Recovery  Recent Flowsheet Documentation  Taken 5/26/2023 0010 by Lyla Reich RN  Activity Management: bedrest  Airway/Ventilation Support:   humidification applied    dyspnea relief promoted  Taken 5/25/2023 2002 by Lyla Reich, RN  Activity Management:   bedrest   activity minimized  Intervention: Promote Stabilization  Recent Flowsheet Documentation  Taken 5/25/2023 2002 by Lyla Reich, RN  Fluid/Electrolyte Management:   fluids provided   intravenous fluids adjusted   Goal Outcome Evaluation:

## 2023-05-26 NOTE — THERAPY TREATMENT NOTE
Acute Care - Speech Language Pathology   Swallow Treatment Note [facility]     Patient Name: Chao Lazar  : 1960  MRN: [id number]  Today's Date: 2023               Admit Date: 2023    Visit Dx:     ICD-10-CM ICD-9-CM   1. Facial droop  R29.810 781.94   2. Expressive aphasia  R47.01 784.3     Patient Active Problem List   Diagnosis   • Allergic state   • Asthma   • Chronic cough   • Degenerative joint disease   • Type 2 diabetes mellitus with diabetic polyneuropathy, with long-term current use of insulin ([facility])   • Gastroparesis   • Hyperlipidemia, mixed   • Essential hypertension   • Nausea and vomiting   • Vitamin D deficiency   • Combined forms of age-related cataract, bilateral   • Presbyopia   • Acute ischemic right PCA stroke involving the right occipital lobe (CMS/HCC)   • Sphenoid sinusitis   • Retinal disorder   • Coronary artery disease involving native coronary artery of native heart without angina pectoris   • Cutaneous abscess of head excluding face   • History of CVA (cerebrovascular accident)   • Leukocytosis   • Hypomagnesemia   • Left-sided weakness   • Hypoglycemia   • Dizziness   • CKD (chronic kidney disease), stage II   • Fall   • Dysautonomia   • Facial droop   • Sepsis     Past Medical History:   Diagnosis Date   • Asthma 3/22/2018   • Coronary artery disease involving native coronary artery of native heart without angina pectoris 2020   • Essential hypertension 2019   • Gastroparesis 2013   • History of CVA (cerebrovascular accident) 2021   • Hyperlipidemia, mixed 4/3/2017   • Hypoglycemia      Past Surgical History:   Procedure Laterality Date   • CARDIAC CATHETERIZATION     • CHOLECYSTECTOMY     • COLON RESECTION      [id number]   • COLON RESECTION SMALL BOWEL Right 2019    Procedure: open right hemicolectomy;  Surgeon: Ronaldo Ardon MD;  Location: [facility];  Service: General   • COLONOSCOPY  2019    x2    • CORONARY ANGIOPLASTY  WITH STENT PLACEMENT  04/2017   • ECHO - CONVERTED  2019   • ECHO - CONVERTED  2020   • FRACTURE SURGERY      collar bone as a child    • KNEE ARTHROSCOPY Left 08/2003   • KNEE JOINT MANIPULATION Left 04/2010   • TOTAL KNEE ARTHROPLASTY Bilateral     2013,2011       SLP Recommendation and Plan    SWALLOW EVALUATION (last 72 hours)      Row Name 05/26/23 1600       Rehab Evaluation    Document Type therapy note (daily note)  -PF    Subjective Information no complaints  -PF    Patient Observations alert;cooperative  -PF    Patient Effort good  -PF    Comment Follow- up dysphagia treatment completed this date. Patient alert and oriented this date, required feed assistance. Edentulous. Soft trials (3x) assessed. No anterior spillage. Significantly prolonged mastication across soft trials. Oral transit timely. Adequate oral clearance. No s/s during pharyngeal phase of swallow across soft solids and thin trials (via straw cup - single sip). Recommendations: continue puree and thins d/t inconsistency with varying levels of alertness observed across previous sessions. Patient to receive follow-up dysphagia therapy and re-eval for diet upgrade to soft solids if indicated. -PF                   User Key  (r) = Recorded By, (t) = Taken By, (c) = Cosigned By    Initials Name Effective Dates    PF Mary Anne Bear SLP 05/08/23 -              EDUCATION  The patient has been educated in the following areas:   Dysphagia (Swallowing Impairment) Modified Diet Instruction.      SLP GOALS     Row Name 05/26/23 1600          (LTG) Swallow Pt will maximize swallow function for least restrictive PO diet, exhibiting no complication associated with dysphagia, adequate PO intake, and demonstrating independent use of swallow compensations  -PF    [place] (Swallow Long Term Goal) with moderate cues (50-74% accuracy)  -PF    Time Frame (Swallow Long Term Goal) by discharge  -PF    Barriers (Swallow Long Term Goal) --    Progress/Outcomes  (Swallow Long Term Goal) good progress toward goal  -PF    Comment (Swallow Long Term Goal) see above therapy narrative  -PF          (STG) Swallow 1 The patient will participate in ongoing assessment of swallow, including re-evaluation clinically and/or including instrumental assessment of swallow if indicated, to further assess swallow function in anticipation to initiate a PO diet  -PF    Time Frame (Swallow Short Term Goal 1) 1 week  -PF    Progress/Outcomes (Swallow Short Term Goal 1) goal ongoing; good progress toward goal  -PF    Comment (Swallow Short Term Goal 1) see above therapy narrative  -PF          (STG) Swallow 2 The patient will demonstrate oral cavity clearance of PO during therapy  -PF         Progress/Outcomes (Swallow Short Term Goal 2) good progress toward goal  -PF    Comment (Swallow Short Term Goal 2) pt provided diagnostic trials soft solids in therapy this date. see above  -PF          User Key  (r) = Recorded By, (t) = Taken By, (c) = Cosigned By    Initials Name Provider Type    PF Fakto, [place], SLP Speech and Language Pathologist                   Time Calculation:                JAIDA Yeung  5/26/2023

## 2023-05-26 NOTE — CONSULTS
Diabetes Education    Patient Name:  Chao Lazar  YOB: 1960  MRN: [id number]  Admit Date:  5/22/2023        On 5/22/2023 A1c was 8.5% and admission blood sugar was 228. A1c info sheet given with discussion on A1c target and healthy blood sugar range. Patient's wife at bedside stated patient has the Accu-chek Guide glucometer that is about 2 years old and his blood sugars get checked 2-3X a day with results 180-215. AT home patient takes Metformin  mg 1 tab 3X a day before meals and Novolin 70/30 60 units BID. Patient stated he mainly drinks diet soda. Discussed with patient about trying to add in some water and patient stated that water gives him indigestion. Asked patient if he has tried flavored water and he said he has and has less indigestion with flavored water. Patient and wife have no further questions or concerns related to diabetes at this time.      Electronically signed by:  Veronica Rivera RN  05/26/23 15:26 EDT

## 2023-05-26 NOTE — PROGRESS NOTES
Patient doing much better  Much more awake and alert  No further seizures    Continue present antiepileptics      - Fall, syncope precautions (see below)    SEIZURE/SYNCOPE INSTRUCTIONS:  -Recommended to observe all seizure precautions, including, but not limited to:   -No driving until seizure free for more than 3 months- as per State driving regulation / law;   -Avoid all high-risk activity, Take showers instead of baths, Avoid swimming without observation, Avoid open heat sources, Avoid working at heights, and Avoid engaging in all potentially hazardous activities.   -Patient expressed clear understanding.        Follow-up with neurology

## 2023-05-26 NOTE — THERAPY TREATMENT NOTE
Subjective: Pt agreeable to therapeutic plan of care. Pt expressed feeling better today.    Objective:     Bed mobility - Min-A, Mod-A and Assist x 2  Transfers - Min-A and Assist x 2  Ambulation - N/A or Not attempted.    Therapeutic Exercise - 5 Reps B LE AROM/AAROM unsupported sitting / EOB    Vitals: WNL; pt's O2 sats read 89-94% on RA    Pain: 0 VAS   Location: N/a   Intervention for pain: N/A    Education: Provided education on the importance of mobility in the acute care setting, Verbal/Tactile Cues, Transfer Training and Energy conservation strategies    Assessment: Chao Lazar presents with functional mobility impairments which indicate the need for skilled intervention. Pt displayed improvement in bed mobility and STS on this date. Pt required min-mod A x2 during log roll with cuing for proper technique. Pt performed 3 STS with min A x2 displaying posterior lean with cuing for upright posture. Pt's L hand flexion synergy during standing requiring weight bearing through LUE to relax hand following stand. Pt stood ~2 minutes each standing trial. Tolerating session today without incident. Will continue to follow and progress as tolerated.     Plan/Recommendations:   High Intensity Therapy recommended post-acute care. This is recommended as therapy feels the patient would require 5-6 days per week, 2-3 hours per day. At this time, inpatient rehabilitation (acute rehab) would be the first choice and SNF would be second.. Pt requires no DME at discharge.     Pt desires Inpatient Rehabilitation placement at discharge. Pt cooperative; agreeable to therapeutic recommendations and plan of care.         Basic Mobility 6-click:  Rollin = Total, A lot = 2, A little = 3; 4 = None  Supine>Sit:   1 = Total, A lot = 2, A little = 3; 4 = None   Sit>Stand with arms:  1 = Total, A lot = 2, A little = 3; 4 = None  Bed>Chair:   1 = Total, A lot = 2, A little = 3; 4 = None  Ambulate in room:  1 = Total, A lot =  2, A little = 3; 4 = None  3-5 Steps with railin = Total, A lot = 2, A little = 3; 4 = None  Score: 11    Modified [place]: 4 = Moderately severe disability (Unable to attend to own bodily needs without assistance, and unable to walk unassisted)     Post-Tx Position: Supine with HOB Elevated, Alarms activated and Call light and personal items within reach  PPE: gloves and surgical mask

## 2023-05-26 NOTE — PROGRESS NOTES
[facility] Medicine Services   Daily Progress Note      Patient Name: Chao Lazar  : 1960  MRN: [id number]  Primary Care Physician:  Al Kimbrough MD  Date of admission: 2023      Subjective      Chief Complaint: Slurred speech, left facial droop    Patient seen and examined this morning.  Continues to improve, remains awake and alert and follows commands.  Almost at baseline per wife.  No further seizure-like activity noted overnight.  Tolerating diet.  No other complaints.    Pertinent positives as noted in HPI/subjective.  All other systems were reviewed and are negative.      Objective      Vitals:   Temp:  [97.4 °F (36.3 °C)-98.8 °F (37.1 °C)] 97.4 °F (36.3 °C)  Heart Rate:  [60-70] 62  Resp:  [15-23] 20  BP: (132-147)/(66-91) 143/74  Flow (L/min):  [2] 2    Physical Exam:    General: Awake, alert, elderly male, lying in bed, NAD  Cardiovascular: Regular rate and rhythm, no murmurs  Respiratory: Clear to auscultation bilaterally, no wheezing or rales, unlabored breathing  Abdomen: Soft, nontender, positive bowel sounds, no guarding  Neurologic: A&O, confusion improved, speech appears to be normal, otherwise CN grossly intact, chronic left-sided weakness noted, Musculoskeletal: No joint swelling, no other gross deformities  Skin: Warm, dry         Result Review    Result Review:  I have personally reviewed the results from the time of this admission to 2023 10:03 EDT and agree with these findings:  [x]  Laboratory  [x]  Microbiology  [x]  Radiology  []  EKG/Telemetry   []  Cardiology/Vascular   []  Pathology  []  Old records  []  Other:          Assessment & Plan      Brief Patient Summary:  Chao Lazar is a 63 y.o. male with a past medical history of CVA with chronic left-sided weakness, hypertension, hyperlipidemia, CAD, and DM2 who presented to [facility] complaining of slurred speech and left-sided facial droop about 90 minutes prior to arrival.   Patient has a history of CVA with residual left-sided weakness however facial droop and slurred speech and worsening weakness left upper and lower extremity.  Expressive aphasia noted on initial exam as well.  Stroke work-up initiated in the ER with CT of the head and CTA of the head and neck negative for any acute findings.  MRI brain done on 5/23 negative for new stroke or new acute findings.  Patient was noted to have significant fever and developed sepsis few hours after being admitted.  Concern for possible aspiration as patient did vomit but according to wife patient was at normal state and eating without any difficulty prior to admission.  No actual aspiration noted during the episode of vomiting.  He remains confused and lethargic, started on IV fluids and broad-spectrum IV antibiotics.  Concern for CNS infection.  Case was discussed with ID and neurology.  Patient underwent lumbar puncture on 5/23 by IR.  Meningitis/encephalitis panel negative.  Elevated protein noted on CSF study.    aspirin, 325 mg, Oral, Daily   Or  aspirin, 300 mg, Rectal, Daily  atorvastatin, 80 mg, Oral, Nightly  enoxaparin, 1 mg/kg, Subcutaneous, Q12H  insulin lispro, 3-14 Units, Subcutaneous, Q6H  insulin NPH-insulin regular, 30 Units, Subcutaneous, BID With Meals  levETIRAcetam, 500 mg, Intravenous, Q12H  metoclopramide, 10 mg, Oral, 4x Daily AC & at Bedtime  metoprolol tartrate, 25 mg, Oral, Q12H  pantoprazole, 40 mg, Oral, Q AM  piperacillin-tazobactam, 3.375 g, Intravenous, Q8H  potassium chloride ER, 40 mEq, Oral, Q4H  senna-docusate sodium, 2 tablet, Oral, BID  sodium chloride, 10 mL, Intravenous, Q12H       hold, 1 each  lactated ringers, 100 mL/hr, Last Rate: 100 mL/hr (05/25/23 1823)         I have utilized all available, immediate resources to obtain, update, or review the patient's current medications including all prescriptions, over-the-counter products, herbals, cannabis/cannabidiol products, and  vitamin.mineral/dietary (nutritional) supplements.    Active Hospital Problems:  Active Hospital Problems    Diagnosis    • **Facial droop    • Sepsis    • CKD (chronic kidney disease), stage II    • History of CVA (cerebrovascular accident)    • Left-sided weakness    • Coronary artery disease involving native coronary artery of native heart without angina pectoris    • Essential hypertension    • Hyperlipidemia, mixed    • Type 2 diabetes mellitus with diabetic polyneuropathy, with long-term current use of insulin (HCC)      Plan:     Acute metabolic encephalopathy  Possible underlying seizure  History of CVA with chronic left-sided weakness  -Patient presented as a stroke alert with slurred speech and left facial droop  -CT head and CTA head and neck negative for any acute findings, chronic occlusion of right PCA noted  -MRI brain w/o negative for new CVA or hemorrhage  -Patient not a tPA candidate on admission per the report  -However unclear on whether he was actually on anticoagulation with Eliquis or Xarelto at home, patient unable to afford NOAC at this time  -Case discussed with neurology, previously flailed aspirin and Plavix, started on warfarin with pharmacy to dose  -No need for bridging at this time as patient has been off anticoagulation for last few months  -Passed swallow eval, on diet per SLP  -Continue aspirin, statin  -PT/OT/ST, rehab placement  -Possible underlying seizure activity noted, repeat CT head negative for new acute findings  -Continue on empiric Keppra for now  -Neurology following    Severe sepsis with possible CNS infection vs PNA  -? aspiration, initial concern for CNS infection  -Patient started having high-grade fevers initially which has since improved  -Chest x-ray and UA negative for infection  -Case was discussed with ID and neurology and patient is s/p LP by IR on 5/23  -CSF study with elevated protein noted, meningitis/encephalitis panel negative  -Antibiotics switched to  IV Zosyn to cover aspiration pneumonia for now per ID  -ID following    DM 2  -Decreased home basal insulin dose due to decreased p.o. intake  -Continue SSI, monitor blood glucose  -Adjust as needed    CKD stage II  -Creatinine appears to be at baseline of 1.3-1.4  -Monitor, avoid nephrotoxins if possible    Hypertension  -Currently n.p.o.  -Resume home meds as able  -Labetalol as needed added    DVT prophylaxis  -Warfarin    CODE STATUS:    Code Status (Patient has no pulse and is not breathing): CPR (Attempt to Resuscitate)  Medical Interventions (Patient has pulse or is breathing): Full Support      Disposition: Possible discharge to rehab in next 24 to 48 hours    Electronically signed by Manjinder Brannon DO, 05/26/23, 10:03 EDT.  [facility] Hospitalist Team      Part of this note may be an electronic transcription/translation of spoken language to printed text using the Dragon Dictation System.

## 2023-05-26 NOTE — PROGRESS NOTES
Infectious Diseases Progress Note      LOS: 4 days   Patient Care Team:  Al Kimbrough MD as PCP - General  Al Kimbrough MD as PCP - Family Medicine    Chief Complaint: Confusion and fever    Subjective       The patient has been afebrile for the last 24 hours.  The patient is on 2 L oxygen by nasal cannula, hemodynamically stable, and is tolerating antimicrobial therapy.  Patient is able to tolerate diet and is able to take pills.  Feels like he is getting back to his baseline    Review of Systems:   Review of Systems   HENT: Negative.    Eyes: Negative.    Respiratory: Negative.    Cardiovascular: Negative.    Gastrointestinal: Negative.    Endocrine: Negative.    Genitourinary: Negative.    Musculoskeletal: Negative.    Skin: Negative.    Neurological: Positive for weakness.   Psychiatric/Behavioral: Negative.    All other systems reviewed and are negative.       Objective     Vital Signs  Temp:  [97.4 °F (36.3 °C)-98.8 °F (37.1 °C)] 97.7 °F (36.5 °C)  Heart Rate:  [61-70] 66  Resp:  [14-21] 14  BP: (132-147)/(66-91) 142/74    Physical Exam:  Physical Exam  Vitals and nursing note reviewed.   Constitutional:       Appearance: He is well-developed.   HENT:      Head: Normocephalic and atraumatic.   Eyes:      Pupils: Pupils are equal, round, and reactive to light.   Cardiovascular:      Rate and Rhythm: Normal rate and regular rhythm.      Heart sounds: Normal heart sounds.   Pulmonary:      Effort: Pulmonary effort is normal. No respiratory distress.      Breath sounds: Normal breath sounds. No wheezing or rales.   Abdominal:      General: Bowel sounds are normal. There is no distension.      Palpations: Abdomen is soft. There is no mass.      Tenderness: There is no abdominal tenderness. There is no guarding or rebound.   Musculoskeletal:         General: No deformity. Normal range of motion.      Cervical back: Normal range of motion and neck supple.   Skin:     General: Skin is warm.       Findings: No erythema or rash.   Neurological:      Mental Status: He is oriented to person, place, and time.      Cranial Nerves: No cranial nerve deficit.      Comments: Left-sided weakness   Psychiatric:         Mood and Affect: Mood normal.          Results Review:    I have reviewed all clinical data, test, lab, and imaging results.     Radiology  No Radiology Exams Resulted Within Past 24 Hours    Cardiology    Laboratory    Results from last 7 days   Lab Units 05/26/23 0217 05/25/23 0243 05/24/23 0319 05/23/23 0259 05/22/23  1743   WBC 10*3/mm3 10.70 9.20 10.20 13.20* 9.90   HEMOGLOBIN g/dL 11.7* 11.1* 12.3* 13.6 14.3   HEMATOCRIT % 34.6* 33.0* 36.7* 39.4 42.1   PLATELETS 10*3/mm3 269 235 239 358 350     Results from last 7 days   Lab Units 05/26/23 0217 05/25/23 2000 05/25/23 0243 05/24/23 0319 05/23/23 0259 05/22/23  1743   SODIUM mmol/L 140  --  143 141 141 141   POTASSIUM mmol/L 3.5 3.8 3.4* 4.1 4.3 4.7   CHLORIDE mmol/L 103  --  106 105 101 102   CO2 mmol/L 26.0  --  24.0 21.0* 22.0 25.0   BUN mg/dL 18  --  27* 27* 29* 28*   CREATININE mg/dL 1.08  --  1.28* 1.34* 1.44* 1.36*   GLUCOSE mg/dL 77  --  93 222* 258* 246*   ALBUMIN g/dL  --   --  3.2* 3.2*  --  4.0   BILIRUBIN mg/dL  --   --  0.3 0.5  --  0.4   ALK PHOS U/L  --   --  71 78  --  126*   AST (SGOT) U/L  --   --  18 19  --  22   ALT (SGPT) U/L  --   --  14 17  --  25   CALCIUM mg/dL 8.6  --  8.1* 8.2* 9.6 10.0                 Microbiology   Microbiology Results (last 10 days)     Procedure Component Value - Date/Time    Culture, CSF - Cerebrospinal Fluid, Lumbar Puncture [[id number]] Collected: 05/23/23 1537    Lab Status: Preliminary result Specimen: Cerebrospinal Fluid from Lumbar Puncture Updated: 05/25/23 1354     CSF Culture No growth at 2 days     Gram Stain Rare (1+) WBCs per low power field      No organisms seen    Meningitis / Encephalitis Panel, PCR - Cerebrospinal Fluid, Lumbar Puncture [[id number]]  (Normal) Collected: 05/23/23  1537    Lab Status: Final result Specimen: Cerebrospinal Fluid from Lumbar Puncture Updated: 05/23/23 1725     ESCHERICHIA COLI K1, PCR Not Detected     HAEMOPHILUS INFLUENZAE, PCR Not Detected     LISTERIA MONOCYTOGENES, PCR Not Detected     NEISSERIA MENINGITIDIS, PCR Not Detected     STREPTOCOCCUS AGALACTIAE, PCR Not Detected     STREPTOCOCCUS PNEUMONIAE, PCR Not Detected     CYTOMEGALOVIRUS (CMV), PCR Not Detected     ENTEROVIRUS, PCR Not Detected     HERPES SIMPLEX VIRUS 1 (HSV-1), PCR Not Detected     HERPES SIMPLEX VIRUS 2 (HSV-2), PCR Not Detected     HUMAN PARECHOVIRUS, PCR Not Detected     VARICELLA ZOSTER VIRUS (VZV), PCR Not Detected     CRYPTOCOCCUS NEOFORMANS / GATTII, PCR Not Detected     HUMAN HERPES VIRUS 6 PCR Not Detected    S. Pneumo Ag Urine or CSF - Urine, Urine, Clean Catch [[id number]]  (Normal) Collected: 05/23/23 0742    Lab Status: Final result Specimen: Urine, Clean Catch Updated: 05/23/23 0813     Strep Pneumo Ag Negative    Legionella Antigen, Urine - Urine, Urine, Clean Catch [[id number]]  (Normal) Collected: 05/23/23 0742    Lab Status: Final result Specimen: Urine, Clean Catch Updated: 05/23/23 0812     LEGIONELLA ANTIGEN, URINE Negative    MRSA Screen, PCR (Inpatient) - Swab, Nares [[id number]]  (Normal) Collected: 05/23/23 0536    Lab Status: Final result Specimen: Swab from Nares Updated: 05/23/23 0924     MRSA PCR No MRSA Detected    Narrative:      The negative predictive value of this diagnostic test is high and should only be used to consider de-escalating anti-MRSA therapy. A positive result may indicate colonization with MRSA and must be correlated clinically.    Respiratory Panel PCR w/COVID-19(SARS-CoV-2) CHRISTIE/DARIUS/PHILLIP/PAD/[facility]/MAD/THEODORE In-House, NP Swab in UTM/VTM, 3-4 HR TAT - Swab, Nasopharynx [[id number]]  (Normal) Collected: 05/23/23 0416    Lab Status: Final result Specimen: Swab from Nasopharynx Updated: 05/23/23 0527     ADENOVIRUS, PCR Not Detected     Coronavirus 229E  Not Detected     Coronavirus HKU1 Not Detected     Coronavirus NL63 Not Detected     Coronavirus OC43 Not Detected     COVID19 Not Detected     Human Metapneumovirus Not Detected     Human Rhinovirus/Enterovirus Not Detected     Influenza A PCR Not Detected     Influenza B PCR Not Detected     Parainfluenza Virus 1 Not Detected     Parainfluenza Virus 2 Not Detected     Parainfluenza Virus 3 Not Detected     Parainfluenza Virus 4 Not Detected     RSV, PCR Not Detected     Bordetella pertussis pcr Not Detected     Bordetella parapertussis PCR Not Detected     Chlamydophila pneumoniae PCR Not Detected     Mycoplasma pneumo by PCR Not Detected    Narrative:      In the setting of a positive respiratory panel with a viral infection PLUS a negative procalcitonin without other underlying concern for bacterial infection, consider observing off antibiotics or discontinuation of antibiotics and continue supportive care. If the respiratory panel is positive for atypical bacterial infection (Bordetella pertussis, Chlamydophila pneumoniae, or Mycoplasma pneumoniae), consider antibiotic de-escalation to target atypical bacterial infection.    Blood Culture - Blood, Arm, Right [[id number]]  (Normal) Collected: 05/23/23 0355    Lab Status: Preliminary result Specimen: Blood from Arm, Right Updated: 05/26/23 0415     Blood Culture No growth at 3 days    Narrative:      Less than seven (7) mL's of blood was collected.  Insufficient quantity may yield false negative results.    Blood Culture - Blood, Arm, Left [[id number]]  (Normal) Collected: 05/23/23 0355    Lab Status: Preliminary result Specimen: Blood from Arm, Left Updated: 05/26/23 0415     Blood Culture No growth at 3 days          Medication Review:       Schedule Meds  atorvastatin, 80 mg, Oral, Nightly  insulin lispro, 3-14 Units, Subcutaneous, Q6H  insulin NPH-insulin regular, 30 Units, Subcutaneous, BID With Meals  levETIRAcetam, 500 mg, Intravenous, Q12H  metoclopramide,  10 mg, Oral, 4x Daily AC & at Bedtime  metoprolol tartrate, 25 mg, Oral, Q12H  pantoprazole, 40 mg, Oral, Q AM  piperacillin-tazobactam, 3.375 g, Intravenous, Q8H  potassium chloride ER, 40 mEq, Oral, Q4H  senna-docusate sodium, 2 tablet, Oral, BID  sodium chloride, 10 mL, Intravenous, Q12H        Infusion Meds  hold, 1 each  Pharmacy to dose warfarin,         PRN Meds  •  acetaminophen **OR** acetaminophen **OR** acetaminophen  •  aluminum-magnesium hydroxide-simethicone  •  senna-docusate sodium **AND** polyethylene glycol **AND** bisacodyl **AND** bisacodyl  •  bisacodyl  •  Calcium Replacement - Follow Nurse / BPA Driven Protocol  •  dextrose  •  dextrose  •  glucagon (human recombinant)  •  hold  •  labetalol  •  Magnesium Standard Dose Replacement - Follow Nurse / BPA Driven Protocol  •  meclizine  •  melatonin  •  midodrine  •  nitroglycerin  •  ondansetron **OR** ondansetron  •  ondansetron  •  Pharmacy to dose warfarin  •  Phosphorus Replacement - Follow Nurse / BPA Driven Protocol  •  Potassium Replacement - Follow Nurse / BPA Driven Protocol  •  sodium chloride  •  sodium chloride  •  sodium chloride        Assessment & Plan       Antimicrobial Therapy   1.  IV Zosyn        2.  P.o. Augmentin        3.        4.        5.            Assessment    Fever started after patient had mental status changes at home.  Concerning for underlying seizure.  Patient is s/p lumbar puncture and CSF findings are not consistent with infection and herpes simplex PCR was negative.  Patient had seizures eventually during this hospital stay.  This most likely aspiration pneumonia procedure.  Mental status started to improve after starting seizure medications    History of CVA with left-sided deficit    Diabetes mellitus type 2    Chronic kidney disease    Plan    Discontinue IV Zosyn  Start p.o. Augmentin 875/125mg  twice daily for 5 days for 7 days total treatment for aspiration pneumonia  Continue supportive care  A.m.  labs  Case was discussed with the patient's wife at bedside        Rola Simon, EMRE  05/26/23  13:29 EDT    Note is dictated utilizing voice recognition software/Dragon

## 2023-05-27 LAB
ANION GAP SERPL CALCULATED.3IONS-SCNC: 10 MMOL/L (ref 5–15)
BASOPHILS # BLD AUTO: 0.1 10*3/MM3 (ref 0–0.2)
BASOPHILS NFR BLD AUTO: 0.7 % (ref 0–1.5)
BUN SERPL-MCNC: 13 MG/DL (ref 8–23)
BUN/CREAT SERPL: 11.5 (ref 7–25)
CALCIUM SPEC-SCNC: 9.1 MG/DL (ref 8.6–10.5)
CHLORIDE SERPL-SCNC: 105 MMOL/L (ref 98–107)
CO2 SERPL-SCNC: 27 MMOL/L (ref 22–29)
CREAT SERPL-MCNC: 1.13 MG/DL (ref 0.76–1.27)
DEPRECATED RDW RBC AUTO: 42.9 FL (ref 37–54)
EGFRCR SERPLBLD CKD-EPI 2021: 72.1 ML/MIN/1.73
EOSINOPHIL # BLD AUTO: 0.3 10*3/MM3 (ref 0–0.4)
EOSINOPHIL NFR BLD AUTO: 3.7 % (ref 0.3–6.2)
ERYTHROCYTE [DISTWIDTH] IN BLOOD BY AUTOMATED COUNT: 13.3 % (ref 12.3–15.4)
GLUCOSE BLDC GLUCOMTR-MCNC: 108 MG/DL (ref 70–105)
GLUCOSE BLDC GLUCOMTR-MCNC: 155 MG/DL (ref 70–105)
GLUCOSE BLDC GLUCOMTR-MCNC: 245 MG/DL (ref 70–105)
GLUCOSE BLDC GLUCOMTR-MCNC: 279 MG/DL (ref 70–105)
GLUCOSE BLDC GLUCOMTR-MCNC: 339 MG/DL (ref 70–105)
GLUCOSE BLDC GLUCOMTR-MCNC: 59 MG/DL (ref 70–105)
GLUCOSE SERPL-MCNC: 110 MG/DL (ref 65–99)
HCT VFR BLD AUTO: 36.5 % (ref 37.5–51)
HGB BLD-MCNC: 12.4 G/DL (ref 13–17.7)
INR PPP: 0.96 (ref 2–3)
LYMPHOCYTES # BLD AUTO: 1.7 10*3/MM3 (ref 0.7–3.1)
LYMPHOCYTES NFR BLD AUTO: 21.1 % (ref 19.6–45.3)
MCH RBC QN AUTO: 31.1 PG (ref 26.6–33)
MCHC RBC AUTO-ENTMCNC: 33.9 G/DL (ref 31.5–35.7)
MCV RBC AUTO: 91.7 FL (ref 79–97)
MONOCYTES # BLD AUTO: 0.6 10*3/MM3 (ref 0.1–0.9)
MONOCYTES NFR BLD AUTO: 7.8 % (ref 5–12)
NEUTROPHILS NFR BLD AUTO: 5.2 10*3/MM3 (ref 1.7–7)
NEUTROPHILS NFR BLD AUTO: 66.7 % (ref 42.7–76)
NRBC BLD AUTO-RTO: 0 /100 WBC (ref 0–0.2)
PLATELET # BLD AUTO: 294 10*3/MM3 (ref 140–450)
PMV BLD AUTO: 8.6 FL (ref 6–12)
POTASSIUM SERPL-SCNC: 4.1 MMOL/L (ref 3.5–5.2)
PROTHROMBIN TIME: 10.3 SECONDS (ref 19.4–28.5)
RBC # BLD AUTO: 3.98 10*6/MM3 (ref 4.14–5.8)
SODIUM SERPL-SCNC: 142 MMOL/L (ref 136–145)
WBC NRBC COR # BLD: 7.8 10*3/MM3 (ref 3.4–10.8)

## 2023-05-27 PROCEDURE — 80048 BASIC METABOLIC PNL TOTAL CA: CPT | Performed by: INTERNAL MEDICINE

## 2023-05-27 PROCEDURE — 85610 PROTHROMBIN TIME: CPT

## 2023-05-27 PROCEDURE — 82948 REAGENT STRIP/BLOOD GLUCOSE: CPT

## 2023-05-27 PROCEDURE — 97112 NEUROMUSCULAR REEDUCATION: CPT

## 2023-05-27 PROCEDURE — 97530 THERAPEUTIC ACTIVITIES: CPT

## 2023-05-27 PROCEDURE — 97110 THERAPEUTIC EXERCISES: CPT

## 2023-05-27 PROCEDURE — 85025 COMPLETE CBC W/AUTO DIFF WBC: CPT | Performed by: INTERNAL MEDICINE

## 2023-05-27 PROCEDURE — 25010000002 LEVETIRACETAM IN NACL 0.82% 500 MG/100ML SOLUTION

## 2023-05-27 RX ORDER — LEVETIRACETAM 500 MG/1
500 TABLET ORAL EVERY 12 HOURS SCHEDULED
Start: 2023-05-27

## 2023-05-27 RX ORDER — LEVETIRACETAM 500 MG/1
500 TABLET ORAL EVERY 12 HOURS SCHEDULED
Status: DISCONTINUED | OUTPATIENT
Start: 2023-05-27 | End: 2023-05-28 | Stop reason: HOSPADM

## 2023-05-27 RX ORDER — AMOXICILLIN AND CLAVULANATE POTASSIUM 875; 125 MG/1; MG/1
1 TABLET, FILM COATED ORAL EVERY 12 HOURS SCHEDULED
Qty: 8 TABLET | Refills: 0
Start: 2023-05-27 | End: 2023-05-31

## 2023-05-27 RX ORDER — WARFARIN SODIUM 5 MG/1
TABLET ORAL
Start: 2023-05-27

## 2023-05-27 RX ADMIN — METOPROLOL TARTRATE 25 MG: 25 TABLET, FILM COATED ORAL at 22:18

## 2023-05-27 RX ADMIN — PANTOPRAZOLE SODIUM 40 MG: 40 TABLET, DELAYED RELEASE ORAL at 06:20

## 2023-05-27 RX ADMIN — Medication 10 ML: at 08:26

## 2023-05-27 RX ADMIN — Medication 10 ML: at 22:16

## 2023-05-27 RX ADMIN — AMOXICILLIN AND CLAVULANATE POTASSIUM 1 TABLET: 875; 125 TABLET, FILM COATED ORAL at 08:25

## 2023-05-27 RX ADMIN — METOCLOPRAMIDE 10 MG: 10 TABLET ORAL at 22:17

## 2023-05-27 RX ADMIN — AMOXICILLIN AND CLAVULANATE POTASSIUM 1 TABLET: 875; 125 TABLET, FILM COATED ORAL at 22:17

## 2023-05-27 RX ADMIN — ATORVASTATIN CALCIUM 80 MG: 40 TABLET, FILM COATED ORAL at 22:17

## 2023-05-27 RX ADMIN — LEVETIRACETAM 500 MG: 500 TABLET, FILM COATED ORAL at 22:17

## 2023-05-27 RX ADMIN — DEXTROSE MONOHYDRATE 25 G: 25 INJECTION, SOLUTION INTRAVENOUS at 00:44

## 2023-05-27 RX ADMIN — METOCLOPRAMIDE 10 MG: 10 TABLET ORAL at 18:35

## 2023-05-27 RX ADMIN — LEVETIRACETAM 500 MG: 5 INJECTION INTRAVENOUS at 08:25

## 2023-05-27 RX ADMIN — METOPROLOL TARTRATE 25 MG: 25 TABLET, FILM COATED ORAL at 08:25

## 2023-05-27 RX ADMIN — METOCLOPRAMIDE 10 MG: 10 TABLET ORAL at 14:13

## 2023-05-27 RX ADMIN — METOCLOPRAMIDE 10 MG: 10 TABLET ORAL at 08:11

## 2023-05-27 NOTE — DISCHARGE SUMMARY
[facility]   DISCHARGE SUMMARY    Patient Name: Chao Lazar  : 1960  MRN: [id number]    Date of Admission: 2023  Date of Discharge:  23    Primary Care Physician: Al Kimbrough MD      Presenting Problem:   Facial droop [R29.810]  Expressive aphasia [R47.01]    Active and Resolved Hospital Problems:  Active Hospital Problems    Diagnosis POA   • **Facial droop [R29.810] Yes   • Sepsis [A41.9] Yes   • CKD (chronic kidney disease), stage II [N18.2] Yes   • History of CVA (cerebrovascular accident) [Z86.73] Not Applicable   • Left-sided weakness [R53.1] Yes   • Coronary artery disease involving native coronary artery of native heart without angina pectoris [I25.10] Yes   • Essential hypertension [I10] Yes   • Hyperlipidemia, mixed [E78.2] Yes   • Type 2 diabetes mellitus with diabetic polyneuropathy, with long-term current use of insulin (HCC) [E11.42, Z79.4] Not Applicable      Resolved Hospital Problems   No resolved problems to display.         Hospital Course     Hospital Course:  Chao Lazar is a 63 y.o. male with a past medical history of CVA with chronic left-sided weakness, hypertension, hyperlipidemia, CAD, and DM2 who presented to [facility] on 2023 complaining of slurred speech and left-sided facial droop about 90 minutes prior to arrival.  Patient has a history of CVA with residual left-sided weakness however facial droop and slurred speech and worsening weakness left upper and lower extremity.  Expressive aphasia noted on initial exam as well.  Stroke work-up initiated in the ER with CT of the head and CTA of the head and neck negative for any acute findings.  MRI brain done on  negative for new stroke or new acute findings.  Patient was noted to have significant fever and developed sepsis few hours after being admitted.  Concern for possible aspiration as patient did vomit but according to wife patient was at normal  state and eating without any difficulty prior to admission.  No actual aspiration noted during the episode of vomiting.  He remains confused and lethargic, started on IV fluids and broad-spectrum IV antibiotics.  Concern for CNS infection.  Case was discussed with ID and neurology.  Patient underwent lumbar puncture on 5/23 by IR.  Meningitis/encephalitis panel negative.  Elevated protein noted on CSF study.  CNS infection ruled out per ID.  Antibiotic switched to IV Zosyn to cover possible aspiration pneumonia.  Patient continued to show improvement in mentation, possible seizure activity was noted and patient was started on empiric Keppra.  Neurology recommending continue Keppra for now and follow-up with neurology outpatient for further work-up.  Patient mentation has improved and is at baseline now, confirmed with wife.  Anticoagulation discussed with neurology, patient previously failed aspirin and Plavix and was started on Eliquis at that time.  Patient unable to afford any NOAC at this time, started on warfarin on 5/26 per neurology recommendations.  Patient has improved overall, no further inpatient work-up needed.  PT/OT recommending rehab.  Patient is medically stable to discharge to rehab with follow-up with PCP and neurology as an outpatient.    A/P:    Acute metabolic encephalopathy, resolved  Possible underlying seizure  History of CVA with chronic left-sided weakness  -Patient presented as a stroke alert with slurred speech and left facial droop  -CT head and CTA head and neck negative for any acute findings, chronic occlusion of right PCA noted  -MRI brain w/o negative for new CVA or hemorrhage  -Patient not a tPA candidate on admission per the report  -However unclear on whether he was actually on anticoagulation with Eliquis or Xarelto at home, patient unable to afford NOAC at this time  -Case discussed with neurology, previously flailed aspirin and Plavix, started on warfarin with pharmacy to  dose  -No need for bridging at this time as patient has been off anticoagulation for last few months  -Passed swallow eval, on diet per SLP  -Continue aspirin, statin  -PT/OT/ST, rehab placement  -Possible underlying seizure activity noted, repeat CT head negative for new acute findings  -Continue on empiric Keppra for now, discussed with neurology  -Okay to discharge per ID and neurology  -Patient is alert and oriented and at baseline now, confirmed with wife at bedside.     Severe sepsis with possible CNS infection vs PNA  -? aspiration, initial concern for CNS infection  -Patient started having high-grade fevers initially which has since improved  -Chest x-ray and UA negative for infection  -Case was discussed with ID and neurology and patient is s/p LP by IR on 5/23  -CSF study with elevated protein noted, meningitis/encephalitis panel negative  -Antibiotics switched to IV Zosyn to cover aspiration pneumonia for now per ID  -ID following, antibiotics changed to p.o. Augmentin and okay to discharge to rehab     DM 2  -Decreased home basal insulin dose due to decreased p.o. intake  -Continue SSI, monitor blood glucose  -Adjust as needed     CKD stage II  -Creatinine appears to be at baseline of 1.3-1.4  -Monitor, avoid nephrotoxins if possible     Hypertension  -Currently n.p.o.  -Resume home meds     DISCHARGE Follow Up Recommendations for labs and diagnostics:   Follow-up with PCP and neurology    Reasons For Change In Medications and Indications for New Medications:  As noted below    Day of Discharge     Vital Signs:  Temp:  [97.3 °F (36.3 °C)-99.4 °F (37.4 °C)] 97.3 °F (36.3 °C)  Heart Rate:  [60-69] 65  Resp:  [17-30] 17  BP: (117-150)/(69-89) 128/69  Flow (L/min):  [2] 2    Physical Exam:  General: Awake, alert, elderly male, lying in bed, NAD  Cardiovascular: Regular rate and rhythm, no murmurs  Respiratory: Clear to auscultation bilaterally, no wheezing or rales, unlabored breathing  Abdomen: Soft,  nontender, positive bowel sounds, no guarding  Neurologic: A&O, confusion improved, speech appears to be normal, otherwise CN grossly intact, chronic left-sided weakness noted, Musculoskeletal: No joint swelling, no other gross deformities  Skin: Warm, dry        Pertinent  and/or Most Recent Results     LAB RESULTS:      Lab 05/27/23 0256 05/26/23 1712 05/26/23 0217 05/25/23 0243 05/24/23 0319 05/23/23 1002 05/23/23 0355 05/23/23 0259 05/22/23  1743   WBC 7.80  --  10.70 9.20 10.20  --   --  13.20* 9.90   HEMOGLOBIN 12.4*  --  11.7* 11.1* 12.3*  --   --  13.6 14.3   HEMATOCRIT 36.5*  --  34.6* 33.0* 36.7*  --   --  39.4 42.1   PLATELETS 294  --  269 235 239  --   --  358 350   NEUTROS ABS 5.20  --  7.70* 7.00 8.40*  --   --  11.90* 6.90   LYMPHS ABS 1.70  --  1.70 1.40 0.90  --   --  0.80 2.00   MONOS ABS 0.60  --  0.80 0.70 0.80  --   --  0.40 0.60   EOS ABS 0.30  --  0.40 0.10 0.10  --   --  0.00 0.30   MCV 91.7  --  90.8 91.5 93.0  --   --  89.5 89.5   PROCALCITONIN  --   --   --   --   --   --   --  0.11  --    LACTATE  --   --   --   --   --  2.0 2.5*  --   --    PROTIME 10.3* 10.6*  --   --   --   --   --   --  9.8   APTT  --   --   --   --   --   --   --   --  24.2         Lab 05/27/23 0256 05/26/23 1712 05/26/23 0217 05/25/23 2000 05/25/23 0243 05/24/23 0319 05/23/23 0259 05/22/23 2015 05/22/23  1743   SODIUM 142  --  140  --  143 141 141  --  141   POTASSIUM 4.1 4.7 3.5 3.8 3.4* 4.1 4.3  --  4.7   CHLORIDE 105  --  103  --  106 105 101  --  102   CO2 27.0  --  26.0  --  24.0 21.0* 22.0  --  25.0   ANION GAP 10.0  --  11.0  --  13.0 15.0 18.0*  --  14.0   BUN 13  --  18  --  27* 27* 29*  --  28*   CREATININE 1.13  --  1.08  --  1.28* 1.34* 1.44*  --  1.36*   EGFR 72.1  --  76.2  --  62.1 58.8* 53.9*  --  57.8*   GLUCOSE 110*  --  77  --  93 222* 258*  --  246*   CALCIUM 9.1  --  8.6  --  8.1* 8.2* 9.6  --  10.0   HEMOGLOBIN A1C  --   --   --   --   --   --   --   --  8.50*   TSH  --   --    --   --   --   --   --  1.490  --          Lab 05/25/23  0243 05/24/23  0319 05/22/23  1743   TOTAL PROTEIN 6.1 6.1 7.5   ALBUMIN 3.2* 3.2* 4.0   GLOBULIN 2.9 2.9 3.5   ALT (SGPT) 14 17 25   AST (SGOT) 18 19 22   BILIRUBIN 0.3 0.5 0.4   ALK PHOS 71 78 126*         Lab 05/27/23  0256 05/26/23  1712 05/23/23  0451 05/23/23  0259 05/22/23 2015 05/22/23  1743   HSTROP T  --   --  25* 24* 22* 23*   PROTIME 10.3* 10.6*  --   --   --  9.8   INR 0.96* 0.99*  --   --   --  <0.93*         Lab 05/22/23 2015   CHOLESTEROL 168   LDL CHOL 69   HDL CHOL 37*   TRIGLYCERIDES 392*         Lab 05/22/23  1743   VITAMIN B 12 290   ABO TYPING B   RH TYPING Positive   ANTIBODY SCREEN Negative         Lab 05/23/23  0522   PH, ARTERIAL 7.446   PCO2, ARTERIAL 32.2*   PO2 ART 81.8*   O2 SATURATION ART 96.6   FIO2 21   HCO3 ART 22.1   BASE EXCESS ART -1.1*     Brief Urine Lab Results  (Last result in the past 365 days)      Color   Clarity   Blood   Leuk Est   Nitrite   Protein   CREAT   Urine HCG        05/23/23 0742 Yellow   Clear   Small (1+)   Negative   Negative   >=300 mg/dL (3+)               Microbiology Results (last 10 days)     Procedure Component Value - Date/Time    Culture, CSF - Cerebrospinal Fluid, Lumbar Puncture [[id number]] Collected: 05/23/23 1537    Lab Status: Final result Specimen: Cerebrospinal Fluid from Lumbar Puncture Updated: 05/26/23 1401     CSF Culture No growth at 3 days     Gram Stain Rare (1+) WBCs per low power field      No organisms seen    Meningitis / Encephalitis Panel, PCR - Cerebrospinal Fluid, Lumbar Puncture [[id number]]  (Normal) Collected: 05/23/23 1537    Lab Status: Final result Specimen: Cerebrospinal Fluid from Lumbar Puncture Updated: 05/23/23 1725     ESCHERICHIA COLI K1, PCR Not Detected     HAEMOPHILUS INFLUENZAE, PCR Not Detected     LISTERIA MONOCYTOGENES, PCR Not Detected     NEISSERIA MENINGITIDIS, PCR Not Detected     STREPTOCOCCUS AGALACTIAE, PCR Not Detected     STREPTOCOCCUS  PNEUMONIAE, PCR Not Detected     CYTOMEGALOVIRUS (CMV), PCR Not Detected     ENTEROVIRUS, PCR Not Detected     HERPES SIMPLEX VIRUS 1 (HSV-1), PCR Not Detected     HERPES SIMPLEX VIRUS 2 (HSV-2), PCR Not Detected     HUMAN PARECHOVIRUS, PCR Not Detected     VARICELLA ZOSTER VIRUS (VZV), PCR Not Detected     CRYPTOCOCCUS NEOFORMANS / GATTII, PCR Not Detected     HUMAN HERPES VIRUS 6 PCR Not Detected    S. Pneumo Ag Urine or CSF - Urine, Urine, Clean Catch [[id number]]  (Normal) Collected: 05/23/23 0742    Lab Status: Final result Specimen: Urine, Clean Catch Updated: 05/23/23 0813     Strep Pneumo Ag Negative    Legionella Antigen, Urine - Urine, Urine, Clean Catch [[id number]]  (Normal) Collected: 05/23/23 0742    Lab Status: Final result Specimen: Urine, Clean Catch Updated: 05/23/23 0812     LEGIONELLA ANTIGEN, URINE Negative    MRSA Screen, PCR (Inpatient) - Swab, Nares [[id number]]  (Normal) Collected: 05/23/23 0536    Lab Status: Final result Specimen: Swab from Nares Updated: 05/23/23 0924     MRSA PCR No MRSA Detected    Narrative:      The negative predictive value of this diagnostic test is high and should only be used to consider de-escalating anti-MRSA therapy. A positive result may indicate colonization with MRSA and must be correlated clinically.    Respiratory Panel PCR w/COVID-19(SARS-CoV-2) [facility] In-House, NP Swab in UTM/VTM, 3-4 HR TAT - Swab, Nasopharynx [[id number]]  (Normal) Collected: 05/23/23 0416    Lab Status: Final result Specimen: Swab from Nasopharynx Updated: 05/23/23 0527     ADENOVIRUS, PCR Not Detected     Coronavirus 229E Not Detected     Coronavirus HKU1 Not Detected     Coronavirus NL63 Not Detected     Coronavirus OC43 Not Detected     COVID19 Not Detected     Human Metapneumovirus Not Detected     Human Rhinovirus/Enterovirus Not Detected     Influenza A PCR Not Detected     Influenza B PCR Not Detected     Parainfluenza Virus 1 Not Detected     Parainfluenza  Virus 2 Not Detected     Parainfluenza Virus 3 Not Detected     Parainfluenza Virus 4 Not Detected     RSV, PCR Not Detected     Bordetella pertussis pcr Not Detected     Bordetella parapertussis PCR Not Detected     Chlamydophila pneumoniae PCR Not Detected     Mycoplasma pneumo by PCR Not Detected    Narrative:      In the setting of a positive respiratory panel with a viral infection PLUS a negative procalcitonin without other underlying concern for bacterial infection, consider observing off antibiotics or discontinuation of antibiotics and continue supportive care. If the respiratory panel is positive for atypical bacterial infection (Bordetella pertussis, Chlamydophila pneumoniae, or Mycoplasma pneumoniae), consider antibiotic de-escalation to target atypical bacterial infection.    Blood Culture - Blood, Arm, Right [[id number]]  (Normal) Collected: 05/23/23 0355    Lab Status: Preliminary result Specimen: Blood from Arm, Right Updated: 05/27/23 0416     Blood Culture No growth at 4 days    Narrative:      Less than seven (7) mL's of blood was collected.  Insufficient quantity may yield false negative results.    Blood Culture - Blood, Arm, Left [[id number]]  (Normal) Collected: 05/23/23 0355    Lab Status: Preliminary result Specimen: Blood from Arm, Left Updated: 05/27/23 0416     Blood Culture No growth at 4 days          CT Head Without Contrast    Result Date: 5/24/2023  Impression: 1.No acute intracranial hemorrhage. Calvarium is intact. 2.Chronic findings as detailed above. Electronically Signed: Andrew Ocasio  5/24/2023 9:33 PM EDT  Workstation ID: [id number]    CT Head Without Contrast    Result Date: 5/22/2023  Impression: 1.No acute intracranial hemorrhage. Calvarium is intact. 2.Chronic findings as detailed above. Electronically Signed: Andrew Ocasio  5/22/2023 11:09 PM EDT  Workstation ID: [id number]    CT Angiogram Neck    Result Date: 5/22/2023  Impression: 1.No hemodynamically significant stenosis,  large vessel cut or aneurysms in intracranial circulation 2.No hemodynamically significant stenosis, dissection or aneurysms in extracranial circulation. 3.Unchanged chronic occlusion of right PCA. Left PCA is patent. Electronically Signed: Andrew Ocasio  5/22/2023 6:21 PM EDT  Workstation ID: [id number]    MRI Brain Without Contrast    Result Date: 5/23/2023  Impression: Impression: 1.No acute intracranial process identified. 2.Findings suggestive of mild chronic small vessel ischemic disease. 3.Encephalomalacia with chronic blood products within right occipital lobe. Electronically Signed: Eric Monahan  5/23/2023 8:49 AM EDT  Workstation ID: [id number]    XR Chest 1 View    Result Date: 5/23/2023  Impression: 1.  No acute cardiopulmonary process visible. Electronically signed by:  Kiya Glass M.D.  5/23/2023 3:23 AM Mountain Time    XR Chest 1 View    Result Date: 5/22/2023  Impression: Impression: Cardiomegaly. Mild vascular congestion. Left basilar opacities may represent atelectasis or infiltrates. No pneumothorax. 1. Electronically Signed: Andrew Ocasio  5/22/2023 6:58 PM EDT  Workstation ID: [id number]    IR LUMBAR PUNCTURE DIAGNOSTIC    Result Date: 5/23/2023  Impression: Impression: Technically successful fluoroscopically guided 20-gauge diagnostic lumbar puncture, as described. Electronically Signed: Donny Braden  5/23/2023 4:25 PM EDT  Workstation ID: [id number]    CT Head Without Contrast Stroke Protocol    Result Date: 5/22/2023  Impression: 1.No acute intracranial hemorrhage. Calvarium is intact. 2.Chronic findings as detailed above. Electronically Signed: Andrew Ocasio  5/22/2023 5:44 PM EDT  Workstation ID: [id number]    CT Angiogram Head w AI Analysis of LVO    Result Date: 5/22/2023  Impression: 1.No hemodynamically significant stenosis, large vessel cut or aneurysms in intracranial circulation 2.No hemodynamically significant stenosis, dissection or aneurysms in extracranial circulation. 3.Unchanged  chronic occlusion of right PCA. Left PCA is patent. Electronically Signed: Andrew Ocasio  5/22/2023 6:21 PM EDT  Workstation ID: [id number]    CT CEREBRAL PERFUSION WITH & WITHOUT CONTRAST    Result Date: 5/22/2023  Impression: Impression: 1.Please note that patient has old infarct in right occipital lobe as seen on prior MRI, head CT and CT angiogram head and neck. Mismatch volume likely related to prior infarct. No new perfusion defects are identified. Electronically Signed: Andrew Ocasio  5/22/2023 6:27 PM EDT  Workstation ID: [id number]              Results for orders placed during the hospital encounter of 05/22/23    Adult Transthoracic Echo Complete W/ Cont if Necessary Per Protocol (With Agitated Saline)    Interpretation Summary  •  Left ventricular systolic function is normal. Left ventricular ejection fraction appears to be 61 - 65%.  •  Left ventricular diastolic function was normal.  •  Saline test results are negative.      Labs Pending at Discharge:  Pending Labs     Order Current Status    Blood Culture - Blood, Arm, Left Preliminary result    Blood Culture - Blood, Arm, Right Preliminary result          Procedures Performed           Consults:   Consults     Date and Time Order Name Status Description    5/23/2023 10:22 AM Inpatient Infectious Diseases Consult Completed     5/22/2023  8:39 PM Inpatient Neurology Consult Stroke Completed     5/22/2023  6:35 PM Hospitalist (on-call MD unless specified)      5/22/2023  5:29 PM Inpatient Neurology Consult Stroke      5/22/2023  5:29 PM Inpatient Neurology Consult Stroke              Discharge Details        Discharge Medications      New Medications      Instructions Start Date   amoxicillin-clavulanate 875-125 MG per tablet  Commonly known as: AUGMENTIN   1 tablet, Oral, Every 12 Hours Scheduled      insulin NPH-insulin regular (70-30) 100 UNIT/ML injection  Commonly known as: humuLIN 70/30,novoLIN 70/30  Replaces: NovoLIN 70/30 FlexPen (70-30) 100 UNIT/ML  suspension pen-injector   30 Units, Subcutaneous, 2 Times Daily With Meals      levETIRAcetam 500 MG tablet  Commonly known as: KEPPRA   500 mg, Oral, Every 12 Hours Scheduled      warfarin 5 MG tablet  Commonly known as: COUMADIN   Take 1 tab daily until adjusted by Pharmacy based on INR      PHARMACY TO DOSE WARFARIN   Does not apply, Continuous PRN         Continue These Medications      Instructions Start Date   Accu-Chek Guide test strip  Generic drug: glucose blood   USE TO CHECK BLOOD SUGAR TWICE DAILY      Alcohol Wipes 70 % misc   1 each, Apply externally, 3 Times Daily Before Meals, Dx code: E11.65      atorvastatin 80 MG tablet  Commonly known as: LIPITOR   TAKE 1 TABLET BY MOUTH EVERY DAY      HM Vitamin D3 50 MCG (2000 UT) capsule  Generic drug: Cholecalciferol   2,000 Units, Oral, Daily      meclizine 25 MG tablet  Commonly known as: ANTIVERT   25 mg, Oral, 3 Times Daily PRN      metFORMIN  MG 24 hr tablet  Commonly known as: GLUCOPHAGE-XR   500 mg, Oral, 3 Times Daily      metoclopramide 10 MG tablet  Commonly known as: REGLAN   TAKE 1 TABLET BY MOUTH 4 (FOUR) TIMES A DAY BEFORE MEALS & AT BEDTIME FOR 30 DAYS.      metoprolol tartrate 25 MG tablet  Commonly known as: LOPRESSOR   25 mg, Oral, Every 12 Hours Scheduled      midodrine 5 MG tablet  Commonly known as: PROAMATINE   5 mg, Oral, 3 Times Daily, As needed      omeprazole 20 MG capsule  Commonly known as: priLOSEC   40 mg, Oral, 2 Times Daily      OneTouch Delica Plus [id number] misc   1 each, Does not apply, 3 Times Daily Before Meals, Dx code: E11.65         Stop These Medications    NovoLIN 70/30 FlexPen (70-30) 100 UNIT/ML suspension pen-injector  Generic drug: Insulin NPH Isophane & Regular  Replaced by: insulin NPH-insulin regular (70-30) 100 UNIT/ML injection            No Known Allergies      Discharge Disposition:  Rehab Facility or Unit (DC - External)    Diet:  Hospital:  Diet Order   Procedures   • Diet: Diabetic Diets;  Consistent Carbohydrate; Texture: Pureed (NDD 1); Fluid Consistency: Thin (IDDSI 0)         Discharge Activity:         CODE STATUS:  Code Status and Medical Interventions:   Ordered at: 05/22/23 2003     Code Status (Patient has no pulse and is not breathing):    CPR (Attempt to Resuscitate)     Medical Interventions (Patient has pulse or is breathing):    Full Support         Future Appointments   Date Time Provider Department Center   9/12/2023  1:30 PM LAB  [facility] None   9/19/2023  3:00 PM Radha Manriquez MD [facility]           Time spent on Discharge including face to face service:  40 minutes    Signature:    Electronically signed by Manjinder Brannon DO, 05/27/23, 10:44 AM EDT.      Part of this note may be an electronic transcription/translation of spoken language to printed text using the Dragon Dictation System.

## 2023-05-27 NOTE — CASE MANAGEMENT/SOCIAL WORK
Continued Stay Note  RIGOBERTO Mcdermott     Patient Name: Chao Lazar  MRN: [id number]  Today's Date: 5/27/2023    Admit Date: 5/22/2023    Plan: [facility] accepted. No precert or PASRR required. Bed available after 1400 today.   Discharge Plan     Row Name 05/27/23 1044       Plan    Plan [facility] accepted. No precert or PASRR required. Bed available after 1400 today.    Plan Comments [facility] is ready for the pt after 1400 today. RN notified.

## 2023-05-27 NOTE — CASE MANAGEMENT/SOCIAL WORK
Continued Stay Note  RIGOBERTO Mcdermott     Patient Name: Chao Lazar  MRN: [id number]  Today's Date: 5/27/2023    Admit Date: 5/22/2023    Plan: [facility] accepted. No precert or PASRR required. Bed available after 1400 today.   Discharge Plan     Row Name 05/27/23 1750       Plan    Plan Comments Spouse declining to transport due to safety concerns. [facility] contacted, but will not be able to tranport until tomorrow. Avoidable day placed.

## 2023-05-27 NOTE — CASE MANAGEMENT/SOCIAL WORK
Case Management Discharge Note      Final Note: [facility]    Provided Post Acute Provider List?: Yes  Post Acute Provider List: Inpatient Rehab  Provided Post Acute Provider Quality & Resource List?: Yes  Post Acute Provider Quality and Resource List: Inpatient Rehab  Delivered To: Support Person  Support Person: Debbi/spouse  Method of Delivery: Telephone    Selected Continued Care - Admitted Since 5/22/2023      Transportation Services  Private: Car    Final Discharge Disposition Code: 62 - inpatient rehab facility

## 2023-05-27 NOTE — PLAN OF CARE
Problem: Urinary Elimination Impaired (Stroke, Ischemic/Transient Ischemic Attack)  Goal: Effective Urinary Elimination  Outcome: Ongoing, Progressing   Goal Outcome Evaluation:      Utilizing external catheter

## 2023-05-27 NOTE — PROGRESS NOTES
Infectious Diseases Progress Note      LOS: 5 days   Patient Care Team:  Al Kimbrough MD as PCP - General  Al Kimbrough MD as PCP - Family Medicine    Chief Complaint: Confusion and fever    Subjective       The patient has been afebrile for the last 24 hours.  The patient is on 2 L oxygen by nasal cannula, hemodynamically stable, and is tolerating antimicrobial therapy.    Review of Systems:   Review of Systems   HENT: Negative.    Eyes: Negative.    Respiratory: Negative.    Cardiovascular: Negative.    Gastrointestinal: Negative.    Endocrine: Negative.    Genitourinary: Negative.    Musculoskeletal: Negative.    Skin: Negative.    Neurological: Positive for weakness.   Psychiatric/Behavioral: Negative.    All other systems reviewed and are negative.       Objective     Vital Signs  Temp:  [97.3 °F (36.3 °C)-99.4 °F (37.4 °C)] 97.6 °F (36.4 °C)  Heart Rate:  [60-69] 65  Resp:  [13-30] 13  BP: (117-150)/(69-89) 125/72    Physical Exam:  Physical Exam  Vitals and nursing note reviewed.   Constitutional:       Appearance: He is well-developed.   HENT:      Head: Normocephalic and atraumatic.   Eyes:      Pupils: Pupils are equal, round, and reactive to light.   Cardiovascular:      Rate and Rhythm: Normal rate and regular rhythm.      Heart sounds: Normal heart sounds.   Pulmonary:      Effort: Pulmonary effort is normal. No respiratory distress.      Breath sounds: Normal breath sounds. No wheezing or rales.   Abdominal:      General: Bowel sounds are normal. There is no distension.      Palpations: Abdomen is soft. There is no mass.      Tenderness: There is no abdominal tenderness. There is no guarding or rebound.   Musculoskeletal:         General: No deformity. Normal range of motion.      Cervical back: Normal range of motion and neck supple.   Skin:     General: Skin is warm.      Findings: No erythema or rash.   Neurological:      Mental Status: He is oriented to person, place, and time.       Cranial Nerves: No cranial nerve deficit.      Comments: Left-sided weakness   Psychiatric:         Mood and Affect: Mood normal.          Results Review:    I have reviewed all clinical data, test, lab, and imaging results.     Radiology  No Radiology Exams Resulted Within Past 24 Hours    Cardiology    Laboratory    Results from last 7 days   Lab Units 05/27/23 0256 05/26/23 0217 05/25/23 0243 05/24/23 0319 05/23/23 0259 05/22/23  1743   WBC 10*3/mm3 7.80 10.70 9.20 10.20 13.20* 9.90   HEMOGLOBIN g/dL 12.4* 11.7* 11.1* 12.3* 13.6 14.3   HEMATOCRIT % 36.5* 34.6* 33.0* 36.7* 39.4 42.1   PLATELETS 10*3/mm3 294 269 235 239 358 350     Results from last 7 days   Lab Units 05/27/23 0256 05/26/23 1712 05/26/23 0217 05/25/23 2000 05/25/23 0243 05/24/23 0319 05/23/23 0259 05/22/23  1743   SODIUM mmol/L 142  --  140  --  143 141 141 141   POTASSIUM mmol/L 4.1 4.7 3.5 3.8 3.4* 4.1 4.3 4.7   CHLORIDE mmol/L 105  --  103  --  106 105 101 102   CO2 mmol/L 27.0  --  26.0  --  24.0 21.0* 22.0 25.0   BUN mg/dL 13  --  18  --  27* 27* 29* 28*   CREATININE mg/dL 1.13  --  1.08  --  1.28* 1.34* 1.44* 1.36*   GLUCOSE mg/dL 110*  --  77  --  93 222* 258* 246*   ALBUMIN g/dL  --   --   --   --  3.2* 3.2*  --  4.0   BILIRUBIN mg/dL  --   --   --   --  0.3 0.5  --  0.4   ALK PHOS U/L  --   --   --   --  71 78  --  126*   AST (SGOT) U/L  --   --   --   --  18 19  --  22   ALT (SGPT) U/L  --   --   --   --  14 17  --  25   CALCIUM mg/dL 9.1  --  8.6  --  8.1* 8.2* 9.6 10.0                 Microbiology   Microbiology Results (last 10 days)     Procedure Component Value - Date/Time    Culture, CSF - Cerebrospinal Fluid, Lumbar Puncture [[id number]] Collected: 05/23/23 1537    Lab Status: Final result Specimen: Cerebrospinal Fluid from Lumbar Puncture Updated: 05/26/23 1401     CSF Culture No growth at 3 days     Gram Stain Rare (1+) WBCs per low power field      No organisms seen    Meningitis / Encephalitis Panel, PCR -  Cerebrospinal Fluid, Lumbar Puncture [[id number]]  (Normal) Collected: 05/23/23 1537    Lab Status: Final result Specimen: Cerebrospinal Fluid from Lumbar Puncture Updated: 05/23/23 1725     ESCHERICHIA COLI K1, PCR Not Detected     HAEMOPHILUS INFLUENZAE, PCR Not Detected     LISTERIA MONOCYTOGENES, PCR Not Detected     NEISSERIA MENINGITIDIS, PCR Not Detected     STREPTOCOCCUS AGALACTIAE, PCR Not Detected     STREPTOCOCCUS PNEUMONIAE, PCR Not Detected     CYTOMEGALOVIRUS (CMV), PCR Not Detected     ENTEROVIRUS, PCR Not Detected     HERPES SIMPLEX VIRUS 1 (HSV-1), PCR Not Detected     HERPES SIMPLEX VIRUS 2 (HSV-2), PCR Not Detected     HUMAN PARECHOVIRUS, PCR Not Detected     VARICELLA ZOSTER VIRUS (VZV), PCR Not Detected     CRYPTOCOCCUS NEOFORMANS / GATTII, PCR Not Detected     HUMAN HERPES VIRUS 6 PCR Not Detected    S. Pneumo Ag Urine or CSF - Urine, Urine, Clean Catch [[id number]]  (Normal) Collected: 05/23/23 0742    Lab Status: Final result Specimen: Urine, Clean Catch Updated: 05/23/23 0813     Strep Pneumo Ag Negative    Legionella Antigen, Urine - Urine, Urine, Clean Catch [[id number]]  (Normal) Collected: 05/23/23 0742    Lab Status: Final result Specimen: Urine, Clean Catch Updated: 05/23/23 0812     LEGIONELLA ANTIGEN, URINE Negative    MRSA Screen, PCR (Inpatient) - Swab, Nares [[id number]]  (Normal) Collected: 05/23/23 0536    Lab Status: Final result Specimen: Swab from Nares Updated: 05/23/23 0924     MRSA PCR No MRSA Detected    Narrative:      The negative predictive value of this diagnostic test is high and should only be used to consider de-escalating anti-MRSA therapy. A positive result may indicate colonization with MRSA and must be correlated clinically.    Respiratory Panel PCR w/COVID-19(SARS-CoV-2) [facility]IUS/PHILLIP/[facility] In-House, NP Swab in UTM/VTM, 3-4 HR TAT - Swab, Nasopharynx [[id number]]  (Normal) Collected: 05/23/23 0416    Lab Status: Final result Specimen: Swab from Nasopharynx  Updated: 05/23/23 0527     ADENOVIRUS, PCR Not Detected     Coronavirus 229E Not Detected     Coronavirus HKU1 Not Detected     Coronavirus NL63 Not Detected     Coronavirus OC43 Not Detected     COVID19 Not Detected     Human Metapneumovirus Not Detected     Human Rhinovirus/Enterovirus Not Detected     Influenza A PCR Not Detected     Influenza B PCR Not Detected     Parainfluenza Virus 1 Not Detected     Parainfluenza Virus 2 Not Detected     Parainfluenza Virus 3 Not Detected     Parainfluenza Virus 4 Not Detected     RSV, PCR Not Detected     Bordetella pertussis pcr Not Detected     Bordetella parapertussis PCR Not Detected     Chlamydophila pneumoniae PCR Not Detected     Mycoplasma pneumo by PCR Not Detected    Narrative:      In the setting of a positive respiratory panel with a viral infection PLUS a negative procalcitonin without other underlying concern for bacterial infection, consider observing off antibiotics or discontinuation of antibiotics and continue supportive care. If the respiratory panel is positive for atypical bacterial infection (Bordetella pertussis, Chlamydophila pneumoniae, or Mycoplasma pneumoniae), consider antibiotic de-escalation to target atypical bacterial infection.    Blood Culture - Blood, Arm, Right [[id number]]  (Normal) Collected: 05/23/23 0355    Lab Status: Preliminary result Specimen: Blood from Arm, Right Updated: 05/27/23 0416     Blood Culture No growth at 4 days    Narrative:      Less than seven (7) mL's of blood was collected.  Insufficient quantity may yield false negative results.    Blood Culture - Blood, Arm, Left [[id number]]  (Normal) Collected: 05/23/23 0355    Lab Status: Preliminary result Specimen: Blood from Arm, Left Updated: 05/27/23 0416     Blood Culture No growth at 4 days          Medication Review:       Schedule Meds  amoxicillin-clavulanate, 1 tablet, Oral, Q12H  atorvastatin, 80 mg, Oral, Nightly  insulin lispro, 3-14 Units, Subcutaneous,  Q6H  insulin NPH-insulin regular, 30 Units, Subcutaneous, BID With Meals  levETIRAcetam, 500 mg, Oral, Q12H  metoclopramide, 10 mg, Oral, 4x Daily AC & at Bedtime  metoprolol tartrate, 25 mg, Oral, Q12H  pantoprazole, 40 mg, Oral, Q AM  senna-docusate sodium, 2 tablet, Oral, BID  sodium chloride, 10 mL, Intravenous, Q12H  warfarin, 5 mg, Oral, Daily        Infusion Meds  Pharmacy to dose warfarin,         PRN Meds  •  acetaminophen **OR** acetaminophen **OR** acetaminophen  •  aluminum-magnesium hydroxide-simethicone  •  senna-docusate sodium **AND** polyethylene glycol **AND** bisacodyl **AND** bisacodyl  •  bisacodyl  •  Calcium Replacement - Follow Nurse / BPA Driven Protocol  •  dextrose  •  dextrose  •  glucagon (human recombinant)  •  labetalol  •  Magnesium Standard Dose Replacement - Follow Nurse / BPA Driven Protocol  •  meclizine  •  melatonin  •  midodrine  •  nitroglycerin  •  ondansetron **OR** ondansetron  •  ondansetron  •  Pharmacy to dose warfarin  •  Phosphorus Replacement - Follow Nurse / BPA Driven Protocol  •  Potassium Replacement - Follow Nurse / BPA Driven Protocol  •  sodium chloride  •  sodium chloride  •  sodium chloride        Assessment & Plan       Antimicrobial Therapy   1.  P.o. Augmentin        Assessment    Fever present on admission.  Most likely secondary to aspiration pneumonia after seizures.  Fever had resolved.  Currently on 2 L of oxygen    Probable aspiration pneumonia    New onset of seizure.  Patient had a negative work-up for meningeal encephalitis with a negative LP    History of CVA with left-sided deficit    Diabetes mellitus type 2    Chronic kidney disease    Plan    Continue p.o. Augmentin 875 mg twice daily for total 5 days which will be a total of 1 week of treatment for aspiration pneumonia  I will sign off today.  Please call for any question  Case was discussed with the patient's wife at bedside        Rio Kaiser MD  05/27/23  14:37 EDT    Note is dictated  utilizing voice recognition software/Dragon

## 2023-05-27 NOTE — THERAPY TREATMENT NOTE
Subjective: Pt agreeable to therapeutic plan of care. Pt supine upon entering room with wife at bedside. Agreeable to session.     Objective:     Bed mobility - Supine to sitting EOB Mod-A and Assist x 2 with max verbal and tactile cueing required for task sequencing.     Transfers -   STS Mod-A, Assist x 2 and with rolling walker. Max verbal and tactile cueing for appropriate hand positioning. Unsteadiness noted upon standing and pt given cues to reposition LEs.   Pt able to maintain upright stance x2 min and weight shift laterally with mod A.   SPS from hospital bed to recliner chair max Ax2.     Ambulation - 0 feet. Pt attempts to take forward steps with max Ax2; however, pt unable to take steps, despite max Ax2 due to impaired balance and poor motor control.     Therapeutic Exercise - x15 reps each AAROM supine B ankle pumps, SAQ, heel slides, hip abd.     Vitals: WNL    Pain: 0 VAS   Location:   Intervention for pain: N/A    Education: Provided education on the importance of mobility in the acute care setting, Verbal/Tactile Cues, Transfer Training and Gait Training    Assessment: Chao Lazar presents with functional mobility impairments which indicate the need for skilled intervention. Tolerating session today without incident. Pt demonstrates impaired cognitive, balance, and motor planning deficits that place him at a high risk for falls. Pt will benefit from Acute IP Rehab upon discharge to address functional deficits. Will continue to follow and progress as tolerated.     Plan/Recommendations:   High Intensity Therapy recommended post-acute care. This is recommended as therapy feels the patient would require 5-6 days per week, 2-3 hours per day. At this time, inpatient rehabilitation (acute rehab) would be the first choice and SNF would be second.. Pt requires no DME at discharge.     Pt desires Inpatient Rehabilitation placement at discharge. Pt cooperative; agreeable to therapeutic recommendations and  plan of care.         Basic Mobility 6-click:  Rollin = Total, A lot = 2, A little = 3; 4 = None  Supine>Sit:   1 = Total, A lot = 2, A little = 3; 4 = None   Sit>Stand with arms:  1 = Total, A lot = 2, A little = 3; 4 = None  Bed>Chair:   1 = Total, A lot = 2, A little = 3; 4 = None  Ambulate in room:  1 = Total, A lot = 2, A little = 3; 4 = None  3-5 Steps with railin = Total, A lot = 2, A little = 3; 4 = None  Score: 9    Modified [place]: 4 = Moderately severe disability (Unable to attend to own bodily needs without assistance, and unable to walk unassisted)     Post-Tx Position: Up in Chair, Alarms activated and Call light and personal items within reach  PPE: gloves

## 2023-05-27 NOTE — CASE MANAGEMENT/SOCIAL WORK
Case Management Discharge Note      Final Note: [facility]      Transport: Bin liao    Final Discharge Disposition Code: 62 - inpatient rehab facility

## 2023-05-27 NOTE — PROGRESS NOTES
"Pharmacy dosing service  Anticoagulant  Warfarin     Subjective:    Chao Lazar is a 63 y.o.male being initiated on warfarin for  History of CVA .    INR Goal: 2 - 3  Home medication?: No, patient has been of AC for past few months  Bridge Therapy Present?:  No, not indicated at this time per Dr. Brannon's note 5/26  Interacting Medications Evaluation (New/Present/Discontinued): Augmentin (started 5/26, may increase INR)        Assessment/Plan:    Continue with warfarin 5 mg daily (for a planned total of 3 days initially), then adjust dose based on INR.    Continue to monitor and adjust based on INR.         Date 5/26 5/27 5/28         INR 0.99 0.96          Dose 5 mg 5 mg 5 mg             Objective:  [Ht: 185.4 cm (73\"); Wt: 97.4 kg (214 lb 11.7 oz); BMI: Body mass index is 28.33 kg/m².]    Lab Results   Component Value Date    ALBUMIN 3.2 (L) 05/25/2023     Lab Results   Component Value Date    INR 0.96 (L) 05/27/2023    INR 0.99 (L) 05/26/2023    INR <0.93 (L) 05/22/2023    PROTIME 10.3 (L) 05/27/2023    PROTIME 10.6 (L) 05/26/2023    PROTIME 9.8 05/22/2023     Lab Results   Component Value Date    HGB 12.4 (L) 05/27/2023    HGB 11.7 (L) 05/26/2023    HGB 11.1 (L) 05/25/2023     Lab Results   Component Value Date    HCT 36.5 (L) 05/27/2023    HCT 34.6 (L) 05/26/2023    HCT 33.0 (L) 05/25/2023       Jonathan Lanier RPH  05/27/23 10:39 EDT     "

## 2023-05-27 NOTE — PLAN OF CARE
Assessment: Chao Lazar presents with functional mobility impairments which indicate the need for skilled intervention. Tolerating session today without incident. Pt demonstrates impaired cognitive, balance, and motor planning deficits that place him at a high risk for falls. Pt will benefit from Acute IP Rehab upon discharge to address functional deficits. Will continue to follow and progress as tolerated.     Plan/Recommendations:   High Intensity Therapy recommended post-acute care. This is recommended as therapy feels the patient would require 5-6 days per week, 2-3 hours per day. At this time, inpatient rehabilitation (acute rehab) would be the first choice and SNF would be second.. Pt requires no DME at discharge.     Pt desires Inpatient Rehabilitation placement at discharge. Pt cooperative; agreeable to therapeutic recommendations and plan of care.

## 2023-05-27 NOTE — PLAN OF CARE
Problem: Urinary Elimination Impaired (Stroke, Ischemic/Transient Ischemic Attack)  Goal: Effective Urinary Elimination  Outcome: Ongoing, Progressing   Goal Outcome Evaluation:

## 2023-05-28 VITALS
HEIGHT: 73 IN | DIASTOLIC BLOOD PRESSURE: 72 MMHG | WEIGHT: 214.73 LBS | RESPIRATION RATE: 24 BRPM | TEMPERATURE: 97.5 F | SYSTOLIC BLOOD PRESSURE: 127 MMHG | BODY MASS INDEX: 28.46 KG/M2 | OXYGEN SATURATION: 98 % | HEART RATE: 56 BPM

## 2023-05-28 LAB
ANION GAP SERPL CALCULATED.3IONS-SCNC: 11 MMOL/L (ref 5–15)
BACTERIA SPEC AEROBE CULT: NORMAL
BACTERIA SPEC AEROBE CULT: NORMAL
BASOPHILS # BLD AUTO: 0 10*3/MM3 (ref 0–0.2)
BASOPHILS NFR BLD AUTO: 0.5 % (ref 0–1.5)
BUN SERPL-MCNC: 15 MG/DL (ref 8–23)
BUN/CREAT SERPL: 14 (ref 7–25)
CALCIUM SPEC-SCNC: 9.6 MG/DL (ref 8.6–10.5)
CHLORIDE SERPL-SCNC: 100 MMOL/L (ref 98–107)
CO2 SERPL-SCNC: 24 MMOL/L (ref 22–29)
CREAT SERPL-MCNC: 1.07 MG/DL (ref 0.76–1.27)
DEPRECATED RDW RBC AUTO: 42 FL (ref 37–54)
EGFRCR SERPLBLD CKD-EPI 2021: 77 ML/MIN/1.73
EOSINOPHIL # BLD AUTO: 0.3 10*3/MM3 (ref 0–0.4)
EOSINOPHIL NFR BLD AUTO: 4.5 % (ref 0.3–6.2)
ERYTHROCYTE [DISTWIDTH] IN BLOOD BY AUTOMATED COUNT: 13.1 % (ref 12.3–15.4)
GLUCOSE BLDC GLUCOMTR-MCNC: 224 MG/DL (ref 70–105)
GLUCOSE BLDC GLUCOMTR-MCNC: 243 MG/DL (ref 70–105)
GLUCOSE BLDC GLUCOMTR-MCNC: 286 MG/DL (ref 70–105)
GLUCOSE SERPL-MCNC: 280 MG/DL (ref 65–99)
HCT VFR BLD AUTO: 39.1 % (ref 37.5–51)
HGB BLD-MCNC: 12.8 G/DL (ref 13–17.7)
INR PPP: 0.93 (ref 2–3)
LYMPHOCYTES # BLD AUTO: 2 10*3/MM3 (ref 0.7–3.1)
LYMPHOCYTES NFR BLD AUTO: 26.9 % (ref 19.6–45.3)
MCH RBC QN AUTO: 30.4 PG (ref 26.6–33)
MCHC RBC AUTO-ENTMCNC: 32.8 G/DL (ref 31.5–35.7)
MCV RBC AUTO: 92.6 FL (ref 79–97)
MONOCYTES # BLD AUTO: 0.5 10*3/MM3 (ref 0.1–0.9)
MONOCYTES NFR BLD AUTO: 6.8 % (ref 5–12)
NEUTROPHILS NFR BLD AUTO: 4.6 10*3/MM3 (ref 1.7–7)
NEUTROPHILS NFR BLD AUTO: 61.3 % (ref 42.7–76)
NRBC BLD AUTO-RTO: 0.1 /100 WBC (ref 0–0.2)
PLATELET # BLD AUTO: 277 10*3/MM3 (ref 140–450)
PMV BLD AUTO: 8.2 FL (ref 6–12)
POTASSIUM SERPL-SCNC: 4.4 MMOL/L (ref 3.5–5.2)
PROTHROMBIN TIME: 10 SECONDS (ref 19.4–28.5)
RBC # BLD AUTO: 4.22 10*6/MM3 (ref 4.14–5.8)
SODIUM SERPL-SCNC: 135 MMOL/L (ref 136–145)
WBC NRBC COR # BLD: 7.5 10*3/MM3 (ref 3.4–10.8)

## 2023-05-28 PROCEDURE — 80048 BASIC METABOLIC PNL TOTAL CA: CPT | Performed by: INTERNAL MEDICINE

## 2023-05-28 PROCEDURE — 85610 PROTHROMBIN TIME: CPT

## 2023-05-28 PROCEDURE — 85025 COMPLETE CBC W/AUTO DIFF WBC: CPT | Performed by: INTERNAL MEDICINE

## 2023-05-28 PROCEDURE — 82948 REAGENT STRIP/BLOOD GLUCOSE: CPT

## 2023-05-28 PROCEDURE — 63710000001 INSULIN LISPRO (HUMAN) PER 5 UNITS

## 2023-05-28 PROCEDURE — 63710000001 INSULIN ISOPHANE & REGULAR PER 5 UNITS: Performed by: INTERNAL MEDICINE

## 2023-05-28 RX ADMIN — AMOXICILLIN AND CLAVULANATE POTASSIUM 1 TABLET: 875; 125 TABLET, FILM COATED ORAL at 09:09

## 2023-05-28 RX ADMIN — INSULIN LISPRO 8 UNITS: 100 INJECTION, SOLUTION INTRAVENOUS; SUBCUTANEOUS at 00:56

## 2023-05-28 RX ADMIN — INSULIN HUMAN 30 UNITS: 100 INJECTION, SUSPENSION SUBCUTANEOUS at 09:09

## 2023-05-28 RX ADMIN — Medication 10 ML: at 09:09

## 2023-05-28 RX ADMIN — PANTOPRAZOLE SODIUM 40 MG: 40 TABLET, DELAYED RELEASE ORAL at 05:58

## 2023-05-28 RX ADMIN — INSULIN LISPRO 5 UNITS: 100 INJECTION, SOLUTION INTRAVENOUS; SUBCUTANEOUS at 09:43

## 2023-05-28 RX ADMIN — LEVETIRACETAM 500 MG: 500 TABLET, FILM COATED ORAL at 09:09

## 2023-05-28 RX ADMIN — METOCLOPRAMIDE 10 MG: 10 TABLET ORAL at 09:09

## 2023-05-28 RX ADMIN — SENNOSIDES AND DOCUSATE SODIUM 2 TABLET: 50; 8.6 TABLET ORAL at 09:09

## 2023-05-28 RX ADMIN — METOPROLOL TARTRATE 25 MG: 25 TABLET, FILM COATED ORAL at 09:09

## 2023-05-28 NOTE — PROGRESS NOTES
"Pharmacy dosing service  Anticoagulant  Warfarin     Subjective:    Chao Lazar is a 63 y.o.male being initiated on warfarin for  History of CVA .    INR Goal: 2 - 3  Home medication?: No, patient has been of AC for past few months  Bridge Therapy Present?:  No, not indicated at this time per Dr. Brannon's note 5/26  Interacting Medications Evaluation (New/Present/Discontinued): Augmentin (started 5/26, may increase INR)        Assessment/Plan:    INR unchanged from yesterday, but it appears patient did not receive warfarin yesterday for unknown reason. Plan is for patient to discharge to rehab facility today. If patient remains at our facility when warfarin is due today at 1800, will continue with warfarin 5 mg today. Recommend adjusting dose based on INR once patient has three days of consecutive warfarin therapy.     Continue to monitor and adjust based on INR.         Date 5/26 5/27 5/28         INR 0.99 0.96 0.93         Dose 5 mg Not given 5 mg             Objective:  [Ht: 185.4 cm (73\"); Wt: 97.4 kg (214 lb 11.7 oz); BMI: Body mass index is 28.33 kg/m².]    Lab Results   Component Value Date    ALBUMIN 3.2 (L) 05/25/2023     Lab Results   Component Value Date    INR 0.93 (L) 05/28/2023    INR 0.96 (L) 05/27/2023    INR 0.99 (L) 05/26/2023    PROTIME 10.0 (L) 05/28/2023    PROTIME 10.3 (L) 05/27/2023    PROTIME 10.6 (L) 05/26/2023     Lab Results   Component Value Date    HGB 12.8 (L) 05/28/2023    HGB 12.4 (L) 05/27/2023    HGB 11.7 (L) 05/26/2023     Lab Results   Component Value Date    HCT 39.1 05/28/2023    HCT 36.5 (L) 05/27/2023    HCT 34.6 (L) 05/26/2023       Jonathan Lanier RPH  05/28/23 10:21 EDT     "

## 2023-05-28 NOTE — PLAN OF CARE
Goal Outcome Evaluation:        Problem: Adult Inpatient Plan of Care  Goal: Plan of Care Review  Outcome: Met  Goal: Patient-Specific Goal (Individualized)  Outcome: Met  Goal: Absence of Hospital-Acquired Illness or Injury  Outcome: Met  Intervention: Identify and Manage Fall Risk  Recent Flowsheet Documentation  Taken 5/28/2023 1200 by Sandhya Castro LPN  Safety Promotion/Fall Prevention:   activity supervised   assistive device/personal items within reach   clutter free environment maintained   elopement precautions   fall prevention program maintained   gait belt   nonskid shoes/slippers when out of bed   safety round/check completed  Taken 5/28/2023 1000 by Sandhya Castro LPN  Safety Promotion/Fall Prevention:   activity supervised   assistive device/personal items within reach   clutter free environment maintained   fall prevention program maintained   elopement precautions   gait belt   nonskid shoes/slippers when out of bed   safety round/check completed  Taken 5/28/2023 0800 by Sandhya Castro LPN  Safety Promotion/Fall Prevention:   activity supervised   assistive device/personal items within reach   clutter free environment maintained   elopement precautions   fall prevention program maintained   gait belt   nonskid shoes/slippers when out of bed   safety round/check completed  Intervention: Prevent Skin Injury  Recent Flowsheet Documentation  Taken 5/28/2023 1200 by Sandhya Castro LPN  Skin Protection:   adhesive use limited   incontinence pads utilized   skin sealant/moisture barrier applied   skin-to-device areas padded  Taken 5/28/2023 0800 by Sandhya Castro LPN  Skin Protection:   adhesive use limited   incontinence pads utilized   skin sealant/moisture barrier applied   skin-to-device areas padded  Intervention: Prevent and Manage VTE (Venous Thromboembolism) Risk  Recent Flowsheet Documentation  Taken 5/28/2023 1200 by Sandhya Castro LPN  VTE Prevention/Management:   sequential compression  devices on   bilateral  Range of Motion: active ROM (range of motion) encouraged  Taken 5/28/2023 0800 by Sandhya Castro LPN  VTE Prevention/Management:   sequential compression devices on   bilateral  Range of Motion: active ROM (range of motion) encouraged  Intervention: Prevent Infection  Recent Flowsheet Documentation  Taken 5/28/2023 1200 by Sandhya Castro LPN  Infection Prevention:   environmental surveillance performed   hand hygiene promoted   personal protective equipment utilized   equipment surfaces disinfected   single patient room provided  Taken 5/28/2023 1000 by Sandhya Castro LPN  Infection Prevention:   environmental surveillance performed   equipment surfaces disinfected   personal protective equipment utilized   hand hygiene promoted   single patient room provided  Taken 5/28/2023 0800 by Sandhya Castro LPN  Infection Prevention:   environmental surveillance performed   equipment surfaces disinfected   hand hygiene promoted   single patient room provided  Goal: Optimal Comfort and Wellbeing  Outcome: Met  Intervention: Provide Person-Centered Care  Recent Flowsheet Documentation  Taken 5/28/2023 1200 by Sandhya Castro LPN  Trust Relationship/Rapport:   care explained   questions encouraged   questions answered   thoughts/feelings acknowledged  Taken 5/28/2023 0800 by Sandhya Castro LPN  Trust Relationship/Rapport:   care explained   questions answered   questions encouraged   thoughts/feelings acknowledged  Goal: Readiness for Transition of Care  Outcome: Met     Problem: Hypertension Comorbidity  Goal: Blood Pressure in Desired Range  Outcome: Met  Intervention: Maintain Blood Pressure Management  Recent Flowsheet Documentation  Taken 5/28/2023 1200 by Sandhya Castro LPN  Syncope Management:   legs elevated   position changed slowly  Medication Review/Management: medications reviewed  Taken 5/28/2023 1000 by Sandhya Castro LPN  Medication Review/Management: medications reviewed  Taken  5/28/2023 0800 by Sandhya Castro LPN  Syncope Management:   legs elevated   position changed slowly  Medication Review/Management: medications reviewed     Problem: Pain Chronic (Persistent) (Comorbidity Management)  Goal: Acceptable Pain Control and Functional Ability  Outcome: Met  Intervention: Manage Persistent Pain  Recent Flowsheet Documentation  Taken 5/28/2023 1200 by Sandhya Castro LPN  Sleep/Rest Enhancement:   family presence promoted   natural light exposure provided  Medication Review/Management: medications reviewed  Taken 5/28/2023 1000 by Sandhya Castro LPN  Medication Review/Management: medications reviewed  Taken 5/28/2023 0800 by Sandhya Castro LPN  Sleep/Rest Enhancement:   noise level reduced   natural light exposure provided  Medication Review/Management: medications reviewed  Intervention: Optimize Psychosocial Wellbeing  Recent Flowsheet Documentation  Taken 5/28/2023 1200 by Sandhya Castro LPN  Supportive Measures:   active listening utilized   decision-making supported   goal-setting facilitated   self-care encouraged  Diversional Activities: television  Family/Support System Care:   involvement promoted   presence promoted   self-care encouraged   support provided  Taken 5/28/2023 0800 by Sandhya Castro LPN  Supportive Measures:   active listening utilized   decision-making supported   goal-setting facilitated   relaxation techniques promoted  Diversional Activities: television  Family/Support System Care:   involvement promoted   presence promoted     Problem: Skin Injury Risk Increased  Goal: Skin Health and Integrity  Outcome: Met  Intervention: Promote and Optimize Oral Intake  Recent Flowsheet Documentation  Taken 5/28/2023 1200 by Sandhya Castro LPN  Oral Nutrition Promotion: medicated  Taken 5/28/2023 0800 by Sandhya Castro LPN  Oral Nutrition Promotion: medicated  Intervention: Optimize Skin Protection  Recent Flowsheet Documentation  Taken 5/28/2023 1200 by Sandhya Castro  LPN  Pressure Reduction Techniques:   frequent weight shift encouraged   weight shift assistance provided  Pressure Reduction Devices:   positioning supports utilized   pressure-redistributing mattress utilized  Skin Protection:   adhesive use limited   incontinence pads utilized   skin sealant/moisture barrier applied   skin-to-device areas padded  Taken 5/28/2023 0800 by Sandhya Castro LPN  Pressure Reduction Techniques:   frequent weight shift encouraged   weight shift assistance provided  Pressure Reduction Devices:   positioning supports utilized   pressure-redistributing mattress utilized  Skin Protection:   adhesive use limited   incontinence pads utilized   skin sealant/moisture barrier applied   skin-to-device areas padded     Problem: Adjustment to Illness (Stroke, Ischemic/Transient Ischemic Attack)  Goal: Optimal Coping  Outcome: Met  Intervention: Support Psychosocial Response to Stroke  Recent Flowsheet Documentation  Taken 5/28/2023 1200 by Sandhya Castro LPN  Supportive Measures:   active listening utilized   decision-making supported   goal-setting facilitated   self-care encouraged  Family/Support System Care:   involvement promoted   presence promoted   self-care encouraged   support provided  Taken 5/28/2023 0800 by Sandhya Castro LPN  Supportive Measures:   active listening utilized   decision-making supported   goal-setting facilitated   relaxation techniques promoted  Family/Support System Care:   involvement promoted   presence promoted     Problem: Bowel Elimination Impaired (Stroke, Ischemic/Transient Ischemic Attack)  Goal: Effective Bowel Elimination  Outcome: Met     Problem: Cerebral Tissue Perfusion (Stroke, Ischemic/Transient Ischemic Attack)  Goal: Optimal Cerebral Tissue Perfusion  Outcome: Met  Intervention: Protect and Optimize Cerebral Perfusion  Recent Flowsheet Documentation  Taken 5/28/2023 1200 by Sandhya Castro LPN  Stabilization Measures: legs  elevated  Fluid/Electrolyte Management: fluids provided  Sensory Stimulation Regulation:   care clustered   lighting decreased   television on  Cerebral Perfusion Promotion: blood pressure monitored  Taken 5/28/2023 1000 by Sandhya Castro LPN  Stabilization Measures: legs elevated  Taken 5/28/2023 0800 by Sandhya Castro LPN  Stabilization Measures: legs elevated  Fluid/Electrolyte Management: fluids provided  Sensory Stimulation Regulation:   care clustered   lighting decreased   television on  Cerebral Perfusion Promotion: blood pressure monitored     Problem: Cognitive Impairment (Stroke, Ischemic/Transient Ischemic Attack)  Goal: Optimal Cognitive Function  Outcome: Met  Intervention: Optimize Cognitive Function  Recent Flowsheet Documentation  Taken 5/28/2023 1200 by Sandhya Castro LPN  Sensory Stimulation Regulation:   care clustered   lighting decreased   television on  Reorientation Measures: clock in view  Environment Familiarity/Consistency: daily routine followed  Taken 5/28/2023 0800 by Sandhya Castro LPN  Sensory Stimulation Regulation:   care clustered   lighting decreased   television on  Reorientation Measures: clock in view  Environment Familiarity/Consistency: daily routine followed     Problem: Communication Impairment (Stroke, Ischemic/Transient Ischemic Attack)  Goal: Improved Communication Skills  Outcome: Met  Intervention: Optimize Communication Skills  Recent Flowsheet Documentation  Taken 5/28/2023 1200 by Sandhya Castro LPN  Communication Enhancement Strategies:   call light answered in person   device use encouraged   extra time allowed for response   family involved in communication plan   healthcare instructions adapted  Taken 5/28/2023 0800 by Sandhya Castro LPN  Communication Enhancement Strategies:   communication device used   device use encouraged   extra time allowed for response   family involved in communication plan   healthcare instructions adapted     Problem: Functional  Ability Impaired (Stroke, Ischemic/Transient Ischemic Attack)  Goal: Optimal Functional Ability  Outcome: Met     Problem: Respiratory Compromise (Stroke, Ischemic/Transient Ischemic Attack)  Goal: Effective Oxygenation and Ventilation  Outcome: Met     Problem: Sensorimotor Impairment (Stroke, Ischemic/Transient Ischemic Attack)  Goal: Improved Sensorimotor Function  Outcome: Met  Intervention: Optimize Range of Motion, Motor Control and Function  Recent Flowsheet Documentation  Taken 5/28/2023 1200 by Sandhya Castro LPN  Spasticity Management: positioned with supportive device  Range of Motion: active ROM (range of motion) encouraged  Taken 5/28/2023 0800 by Sandhya Castro LPN  Spasticity Management: positioned with supportive device  Range of Motion: active ROM (range of motion) encouraged  Intervention: Optimize Sensory and Perceptual Ability  Recent Flowsheet Documentation  Taken 5/28/2023 1200 by Sandhya Castro LPN  Pressure Reduction Techniques:   frequent weight shift encouraged   weight shift assistance provided  Pressure Reduction Devices:   positioning supports utilized   pressure-redistributing mattress utilized  Taken 5/28/2023 0800 by Sandhya Castro LPN  Pressure Reduction Techniques:   frequent weight shift encouraged   weight shift assistance provided  Pressure Reduction Devices:   positioning supports utilized   pressure-redistributing mattress utilized     Problem: Urinary Elimination Impaired (Stroke, Ischemic/Transient Ischemic Attack)  Goal: Effective Urinary Elimination  Outcome: Met  Intervention: Promote Effective Bladder Elimination  Recent Flowsheet Documentation  Taken 5/28/2023 1200 by Sandhya Castro LPN  Urinary Elimination Promotion:   absorbent pad/diaper use encouraged   toileting device within reach   toileting offered  Taken 5/28/2023 0800 by Sandhya Castro LPN  Urinary Elimination Promotion:   absorbent pad/diaper use encouraged   toileting device within reach     Problem:  Fall Injury Risk  Goal: Absence of Fall and Fall-Related Injury  Outcome: Met  Intervention: Identify and Manage Contributors  Recent Flowsheet Documentation  Taken 5/28/2023 1200 by Sandhya Castro LPN  Medication Review/Management: medications reviewed  Taken 5/28/2023 1000 by Sandhya Castro LPN  Medication Review/Management: medications reviewed  Taken 5/28/2023 0800 by Sandhya Castro LPN  Medication Review/Management: medications reviewed  Intervention: Promote Injury-Free Environment  Recent Flowsheet Documentation  Taken 5/28/2023 1200 by Sandhya Castro LPN  Safety Promotion/Fall Prevention:   activity supervised   assistive device/personal items within reach   clutter free environment maintained   elopement precautions   fall prevention program maintained   gait belt   nonskid shoes/slippers when out of bed   safety round/check completed  Taken 5/28/2023 1000 by Sandhya Castro LPN  Safety Promotion/Fall Prevention:   activity supervised   assistive device/personal items within reach   clutter free environment maintained   fall prevention program maintained   elopement precautions   gait belt   nonskid shoes/slippers when out of bed   safety round/check completed  Taken 5/28/2023 0800 by Sandhya Castro LPN  Safety Promotion/Fall Prevention:   activity supervised   assistive device/personal items within reach   clutter free environment maintained   elopement precautions   fall prevention program maintained   gait belt   nonskid shoes/slippers when out of bed   safety round/check completed     Problem: Adjustment to Illness (Sepsis/Septic Shock)  Goal: Optimal Coping  Outcome: Met  Intervention: Optimize Psychosocial Adjustment to Illness  Recent Flowsheet Documentation  Taken 5/28/2023 1200 by Sandhya Castro LPN  Supportive Measures:   active listening utilized   decision-making supported   goal-setting facilitated   self-care encouraged  Family/Support System Care:   involvement promoted   presence  promoted   self-care encouraged   support provided  Taken 5/28/2023 0800 by Sandhya Castro LPN  Supportive Measures:   active listening utilized   decision-making supported   goal-setting facilitated   relaxation techniques promoted  Family/Support System Care:   involvement promoted   presence promoted     Problem: Bleeding (Sepsis/Septic Shock)  Goal: Absence of Bleeding  Outcome: Met  Intervention: Monitor and Manage Bleeding  Recent Flowsheet Documentation  Taken 5/28/2023 1200 by Sandhya Castro LPN  Bleeding Precautions:   blood pressure closely monitored   gentle oral care promoted  Taken 5/28/2023 1000 by Sandhya Castro LPN  Bleeding Precautions:   blood pressure closely monitored   gentle oral care promoted  Taken 5/28/2023 0800 by Sandhya Castro LPN  Bleeding Precautions:   blood pressure closely monitored   gentle oral care promoted     Problem: Glycemic Control Impaired (Sepsis/Septic Shock)  Goal: Blood Glucose Level Within Desired Range  Outcome: Met  Intervention: Optimize Glycemic Control  Recent Flowsheet Documentation  Taken 5/28/2023 1200 by Sandhya Castro LPN  Glycemic Management: blood glucose monitored  Taken 5/28/2023 0800 by Sandhya Castro LPN  Glycemic Management:   blood glucose monitored   oral glucose given     Problem: Infection Progression (Sepsis/Septic Shock)  Goal: Absence of Infection Signs and Symptoms  Outcome: Met  Intervention: Initiate Sepsis Management  Recent Flowsheet Documentation  Taken 5/28/2023 1200 by Sandhya Castro LPN  Stabilization Measures: legs elevated  Infection Prevention:   environmental surveillance performed   hand hygiene promoted   personal protective equipment utilized   equipment surfaces disinfected   single patient room provided  Taken 5/28/2023 1000 by Sandhya Castro LPN  Stabilization Measures: legs elevated  Infection Prevention:   environmental surveillance performed   equipment surfaces disinfected   personal protective equipment utilized    hand hygiene promoted   single patient room provided  Taken 5/28/2023 0800 by Sandhya Castro LPN  Stabilization Measures: legs elevated  Infection Prevention:   environmental surveillance performed   equipment surfaces disinfected   hand hygiene promoted   single patient room provided  Intervention: Promote Recovery  Recent Flowsheet Documentation  Taken 5/28/2023 1200 by Sandhya Castro LPN  Sleep/Rest Enhancement:   family presence promoted   natural light exposure provided  Taken 5/28/2023 0800 by Sandhya Castro LPN  Sleep/Rest Enhancement:   noise level reduced   natural light exposure provided  Intervention: Promote Stabilization  Recent Flowsheet Documentation  Taken 5/28/2023 1200 by Sandhya Castro LPN  Fluid/Electrolyte Management: fluids provided  Taken 5/28/2023 0800 by Sandhya Castro LPN  Fluid/Electrolyte Management: fluids provided     Problem: Nutrition Impaired (Sepsis/Septic Shock)  Goal: Optimal Nutrition Intake  Outcome: Met

## 2023-05-28 NOTE — PLAN OF CARE
Problem: Swallowing Impairment (Stroke, Ischemic/Transient Ischemic Attack)  Goal: Optimal Eating and Swallowing without Aspiration  Outcome: Adequate for Care Transition   Goal Outcome Evaluation:

## 2023-06-26 NOTE — TELEPHONE ENCOUNTER
Caller: ANTWON    Relationship: [facility]    Best call back number: [phone]     What orders are you requesting (i.e. lab or imaging): NURSING, PHYSICAL THERAPY, AND OCCUPATIONAL THERAPY

## 2023-07-05 NOTE — TELEPHONE ENCOUNTER
Caller: MARICEL-[facility]    Relationship to patient: [facility]    Best call back number: [phone]    Patient is needing: HOME HEALTH  CARE JENNY IS CALLING TO INFORM THE PROVIDER THAT PATIENT IS NOT USING AND STATES HE DON NOT NEED THIS MEDICATION IF ANY QUESTIONS PLEASE CALL MARICEL ALVARADO [facility]          metoprolol tartrate (LOPRESSOR) 25 MG tablet

## 2023-07-20 NOTE — TELEPHONE ENCOUNTER
Caller: MARTHA ENGLAND    Relationship:     Best call back number: [phone]      What was the call regarding: REPORTING PATIENT'S INR     2.2 INR TODAY     Is it okay if the provider responds through MyChart: CALL IF NEEDED

## 2023-07-26 RX ORDER — METFORMIN HYDROCHLORIDE 500 MG/1
TABLET, EXTENDED RELEASE ORAL
Qty: 270 TABLET | Refills: 3 | Status: SHIPPED | OUTPATIENT
Start: 2023-07-26

## 2023-07-27 ENCOUNTER — TELEPHONE (OUTPATIENT)
Dept: FAMILY MEDICINE CLINIC | Facility: CLINIC | Age: 63
End: 2023-07-27
Payer: MEDICARE

## 2023-07-27 NOTE — TELEPHONE ENCOUNTER
Caller: SHIV MALDONADO    Relationship to patient: Emergency Contact    Best call back number: [phone]    Patient is needing: PATIENT'S WIFE CALLED AND SAID SHE RECEIVED A NOTIFICATION FROM [facility] THAT THEY WERE WAITING ON APPROVAL FROM DR. GIRON FOR SOME OF THE PATIENT'S MEDICATIONS     SHE DID NOT HAVE THE NAMES OF THE MEDICATIONS, SHE SAID IT WAS JUST A NOTIFICATION THAT THEY HAD NOT HEARD BACK

## 2023-07-27 NOTE — TELEPHONE ENCOUNTER
PATIENT'S WIFE HAD REQUESTED REFILLS ON SOME OF HER 'S MEDICATION THROUGH [facility]    [facility] TOLD HER THEY DID NOT HEAR BACK FROM DR. GIRON AND WON'T BE ABLE TO FILL THE MEDICATIONS    HUB ASKED WHICH MEDICATIONS HE WAS NEEDING, AND HIS WIFE DID NOT KNOW, SHE IS WANTING SOMEONE TO CONTACT [facility] TO SEE WHICH ONES THEY NEED APPROVAL FROM DR. GIRON TO REFILL

## 2023-07-27 NOTE — TELEPHONE ENCOUNTER
Caller: [facility]    Best call back number: [phone]    REPORTING INR 2.9     CURRENT WARFARIN DOSE IS 5 MG MONDAY WEDNESDAY AND FRIDAY THEN 10 MG ON ALL OTHER DAYS

## 2023-07-28 DIAGNOSIS — Z79.01 LONG TERM (CURRENT) USE OF ANTICOAGULANTS: ICD-10-CM

## 2023-07-28 DIAGNOSIS — Z92.29 HX OF LONG TERM USE OF BLOOD THINNERS: Primary | ICD-10-CM

## 2023-07-28 NOTE — TELEPHONE ENCOUNTER
Shereen Griffin to stay on the exact  same dose of Coumadin.  Recheck in 1 or 2 weeks the INR.     Coumadin 10mg qd except Mon / Weds/ Frid and on those 3 days take 5mg

## 2023-07-28 NOTE — TELEPHONE ENCOUNTER
HUB TO SHARE    Left detailed message on wife's voice mail at 11:09am.  Patient or his wife needs to figure out what meds patient is needing refilled.  We have not received any refill requests to Dr Kimbrough.

## 2023-07-28 NOTE — TELEPHONE ENCOUNTER
On 7/26 we refilled Metformin ( refilled)  On 7/18 we sent refills on Keppra ( refilled)  On 7/3 we sent refill on Eliquis (Chelo Pressley)    These were all sent with refills.  Patient or his wife needs to figure out what her  is needing refilled.     There are several doctors and specialist that are sending refills. Nothing has came to .

## 2023-08-02 ENCOUNTER — TELEPHONE (OUTPATIENT)
Dept: FAMILY MEDICINE CLINIC | Facility: CLINIC | Age: 63
End: 2023-08-02
Payer: MEDICARE

## 2023-08-02 RX ORDER — WARFARIN SODIUM 10 MG/1
TABLET ORAL
Qty: 30 TABLET | Refills: 3 | Status: SHIPPED | OUTPATIENT
Start: 2023-08-02

## 2023-08-03 ENCOUNTER — TELEPHONE (OUTPATIENT)
Dept: FAMILY MEDICINE CLINIC | Facility: CLINIC | Age: 63
End: 2023-08-03
Payer: MEDICARE

## 2023-08-04 ENCOUNTER — TELEPHONE (OUTPATIENT)
Dept: FAMILY MEDICINE CLINIC | Facility: CLINIC | Age: 63
End: 2023-08-04
Payer: MEDICARE

## 2023-08-10 ENCOUNTER — TELEPHONE (OUTPATIENT)
Dept: FAMILY MEDICINE CLINIC | Facility: CLINIC | Age: 63
End: 2023-08-10
Payer: MEDICARE

## 2023-08-10 DIAGNOSIS — R68.89 SPELLS OF DECREASED ATTENTIVENESS: ICD-10-CM

## 2023-08-10 RX ORDER — LEVETIRACETAM 1000 MG/1
TABLET ORAL
Qty: 60 TABLET | Refills: 5 | Status: SHIPPED | OUTPATIENT
Start: 2023-08-10

## 2023-08-10 NOTE — TELEPHONE ENCOUNTER
Caller: FAUSTO ALVARADO    Relationship to patient: Home Health    Best call back number: [phone]    Patient is needing: PATIENT HOME HEALTH NURSE IS CALLING TO GIVE A INR REPORT 3.6     CURRENT DOSE 10 MGS EVERYDAY EXCEPT MONDAY,WEDNESDAY AND FRIDAY AND THOSE DAYS HE TAKE 5 MGS

## 2023-08-17 ENCOUNTER — OFFICE VISIT (OUTPATIENT)
Dept: FAMILY MEDICINE CLINIC | Facility: CLINIC | Age: 63
End: 2023-08-17
Payer: MEDICARE

## 2023-08-17 VITALS
SYSTOLIC BLOOD PRESSURE: 130 MMHG | RESPIRATION RATE: 18 BRPM | OXYGEN SATURATION: 95 % | HEIGHT: 73 IN | DIASTOLIC BLOOD PRESSURE: 78 MMHG | BODY MASS INDEX: 23.14 KG/M2 | HEART RATE: 97 BPM | WEIGHT: 174.6 LBS

## 2023-08-17 DIAGNOSIS — E78.2 HYPERLIPIDEMIA, MIXED: Chronic | ICD-10-CM

## 2023-08-17 DIAGNOSIS — G40.A09 NONINTRACTABLE ABSENCE EPILEPSY WITHOUT STATUS EPILEPTICUS: ICD-10-CM

## 2023-08-17 DIAGNOSIS — Z00.00 MEDICARE ANNUAL WELLNESS VISIT, SUBSEQUENT: Primary | ICD-10-CM

## 2023-08-17 DIAGNOSIS — I63.531 ACUTE ISCHEMIC RIGHT PCA STROKE: ICD-10-CM

## 2023-08-17 DIAGNOSIS — Z79.4 TYPE 2 DIABETES MELLITUS WITH DIABETIC POLYNEUROPATHY, WITH LONG-TERM CURRENT USE OF INSULIN: Chronic | ICD-10-CM

## 2023-08-17 DIAGNOSIS — I25.10 CORONARY ARTERY DISEASE INVOLVING NATIVE CORONARY ARTERY OF NATIVE HEART WITHOUT ANGINA PECTORIS: Chronic | ICD-10-CM

## 2023-08-17 DIAGNOSIS — E55.9 VITAMIN D DEFICIENCY, UNSPECIFIED: ICD-10-CM

## 2023-08-17 DIAGNOSIS — E11.42 TYPE 2 DIABETES MELLITUS WITH DIABETIC POLYNEUROPATHY, WITH LONG-TERM CURRENT USE OF INSULIN: Chronic | ICD-10-CM

## 2023-08-17 DIAGNOSIS — N18.2 CKD (CHRONIC KIDNEY DISEASE), STAGE II: Chronic | ICD-10-CM

## 2023-08-17 DIAGNOSIS — Z12.5 SPECIAL SCREENING FOR MALIGNANT NEOPLASM OF PROSTATE: ICD-10-CM

## 2023-08-17 DIAGNOSIS — I10 ESSENTIAL HYPERTENSION: Chronic | ICD-10-CM

## 2023-08-17 NOTE — PROGRESS NOTES
The ABCs of the Annual Wellness Visit  Subsequent Medicare Wellness Visit    Subjective    Chao Lazar is a 63 y.o. male who presents for a Subsequent Medicare Wellness Visit.    The following portions of the patient's history were reviewed and   updated as appropriate: allergies, current medications, past family history, past medical history, past social history, past surgical history, and problem list.    Compared to one year ago, the patient feels his physical   health is the same.    Compared to one year ago, the patient feels his mental   health is the same.    Recent Hospitalizations:  This patient has had a [facility] admission record on file within the last 365 days.    Current Medical Providers:  Patient Care Team:  Al Kimbrough MD as PCP - General  Al Kimbrough MD as PCP - Family Medicine    Outpatient Medications Prior to Visit   Medication Sig Dispense Refill    Accu-Chek Guide test strip USE TO CHECK BLOOD SUGAR TWICE DAILY 200 each 4    atorvastatin (LIPITOR) 80 MG tablet TAKE 1 TABLET BY MOUTH EVERY DAY 90 tablet 1    Cholecalciferol (HM Vitamin D3) 50 MCG (2000 UT) capsule Take 1 capsule by mouth Daily.      insulin NPH-insulin regular (humuLIN 70/30,novoLIN 70/30) (70-30) 100 UNIT/ML injection Inject 30 Units under the skin into the appropriate area as directed 2 (Two) Times a Day With Meals.  12    Isopropyl Alcohol (Alcohol Wipes) 70 % misc Apply 1 each topically 3 (Three) Times a Day Before Meals. Dx code: E11.65 100 each 2    Lancets (OneTouch Delica Plus Ruruyl04T) misc 1 each 3 (Three) Times a Day Before Meals. Dx code: E11.65 100 each 2    levETIRAcetam (KEPPRA) 1000 MG tablet TAKE 1 TABLET BY MOUTH TWICE A DAY 60 tablet 5    meclizine (ANTIVERT) 25 MG tablet Take 1 tablet by mouth 3 (Three) Times a Day As Needed for Dizziness.      metFORMIN ER (GLUCOPHAGE-XR) 500 MG 24 hr tablet TAKE 1 TABLET BY MOUTH THREE TIMES A  tablet 3    metoclopramide (REGLAN) 10 MG  tablet TAKE 1 TABLET BY MOUTH 4 (FOUR) TIMES A DAY BEFORE MEALS & AT BEDTIME FOR 30 DAYS. 120 tablet 6    midodrine (PROAMATINE) 5 MG tablet TAKE 1 TABLET BY MOUTH 3 (THREE) TIMES A DAY AS NEEDED 90 tablet 0    omeprazole (priLOSEC) 20 MG capsule Take 2 capsules by mouth 2 (Two) Times a Day.      warfarin (Coumadin) 10 MG tablet TAKE 1 TABLET DAILY EXCEPT MONDAY, WEDNESDAY, AND FRIDAY; TAKE 5MG ON THOSE DAYS 30 tablet 3    Warfarin Sodium (PHARMACY TO DOSE WARFARIN) Continuous As Needed (for multiple CVAs, Goal INR of 2-3). Indications: Other - full anticoagulation       No facility-administered medications prior to visit.       No opioid medication identified on active medication list. I have reviewed chart for other potential  high risk medication/s and harmful drug interactions in the elderly.      ASA is used qd.  .    Patient Active Problem List   Diagnosis    Allergic state    Asthma    Chronic cough    Degenerative joint disease    Type 2 diabetes mellitus with diabetic polyneuropathy, with long-term current use of insulin    Gastroparesis    Hyperlipidemia, mixed    Essential hypertension    Nausea and vomiting    Vitamin D deficiency    Combined forms of age-related cataract, bilateral    Presbyopia    Acute ischemic right PCA stroke    Sphenoid sinusitis    Retinal disorder    Coronary artery disease involving native coronary artery of native heart without angina pectoris    Cutaneous abscess of head excluding face    History of CVA (cerebrovascular accident)    Leukocytosis    Hypomagnesemia    Left-sided weakness    Hypoglycemia    Dizziness    CKD (chronic kidney disease), stage II    Fall    Dysautonomia    Facial droop    Sepsis    Medicare annual wellness visit, subsequent    Absence seizure disorder     Advance Care Planning   Advance Care Planning     Advance Directive is not on file.  ACP discussion was held with the patient during this visit. Patient has an advance directive (not in EMR), copy  "requested.     Objective    Vitals:    23 1326   BP: 130/78   BP Location: Left arm   Patient Position: Sitting   Cuff Size: Adult   Pulse: 97   Resp: 18   SpO2: 95%   Weight: 79.2 kg (174 lb 9.6 oz)   Height: 185.4 cm (73\")     Estimated body mass index is 23.04 kg/mý as calculated from the following:    Height as of this encounter: 185.4 cm (73\").    Weight as of this encounter: 79.2 kg (174 lb 9.6 oz).    BMI is within normal parameters. No other follow-up for BMI required.      Does the patient have evidence of cognitive impairment? Yes    Lab Results   Component Value Date    TRIG 392 (H) 2023    HDL 37 (L) 2023    LDL 69 2023    VLDL 62 (H) 2023    HGBA1C 8.50 (H) 2023        HEALTH RISK ASSESSMENT    Smoking Status:  Social History     Tobacco Use   Smoking Status Former    Types: Cigarettes    Quit date:     Years since quittin.6   Smokeless Tobacco Former     Alcohol Consumption:  Social History     Substance and Sexual Activity   Alcohol Use Yes    Comment: occasionally     Fall Risk Screen:    MARIELA Fall Risk Assessment was completed, and patient is at LOW risk for falls.Assessment completed on:2023    Depression Screenin/17/2023     1:23 PM   PHQ-2/PHQ-9 Depression Screening   Little Interest or Pleasure in Doing Things 0-->not at all   Feeling Down, Depressed or Hopeless 0-->not at all   PHQ-9: Brief Depression Severity Measure Score 0       Health Habits and Functional and Cognitive Screenin/17/2023     1:24 PM   Functional & Cognitive Status   Do you have difficulty preparing food and eating? No   Do you have difficulty bathing yourself, getting dressed or grooming yourself? No   Do you have difficulty using the toilet? No   Do you have difficulty moving around from place to place? No   Do you have trouble with steps or getting out of a bed or a chair? No   Current Diet Well Balanced Diet   Dental Exam Up to date   Eye Exam Up to " date   Exercise (times per week) 2 times per week        Exercise Comment physical therapy   Do you need help using the phone?  No   Are you deaf or do you have serious difficulty hearing?  No   Do you need help to go to places out of walking distance? No   Do you need help shopping? No   Do you need help preparing meals?  No   Do you need help with housework?  No   Do you need help with laundry? No   Do you need help taking your medications? No   Do you need help managing money? No   Do you ever drive or ride in a car without wearing a seat belt? No   Have you felt unusual stress, anger or loneliness in the last month? No   Who do you live with? Spouse   If you need help, do you have trouble finding someone available to you? No   Do you have difficulty concentrating, remembering or making decisions? No       Age-appropriate Screening Schedule:  Refer to the list below for future screening recommendations based on patient's age, sex and/or medical conditions. Orders for these recommended tests are listed in the plan section. The patient has been provided with a written plan.    Health Maintenance   Topic Date Due    ZOSTER VACCINE (2 of 3) 06/15/2015    ANNUAL WELLNESS VISIT  Never done    DIABETIC FOOT EXAM  04/09/2020    DIABETIC EYE EXAM  04/22/2022    COVID-19 Vaccine (6 - Pfizer series) 05/03/2022    TDAP/TD VACCINES (2 - Td or Tdap) 08/10/2022    INFLUENZA VACCINE  10/01/2023    HEMOGLOBIN A1C  11/22/2023    URINE MICROALBUMIN  02/20/2024    LIPID PANEL  05/22/2024    COLORECTAL CANCER SCREENING  12/10/2031    HEPATITIS C SCREENING  Completed    Pneumococcal Vaccine 65+  Completed    AAA SCREEN (ONE-TIME)  Completed                  CMS Preventative Services Quick Reference  Risk Factors Identified During Encounter  Fall Risk-High or Moderate: Discussed Fall Prevention in the home  Inactivity/Sedentary: Patient was advised to exercise at least 150 minutes a week per CDC recommendations.  The above  risks/problems have been discussed with the patient.  Pertinent information has been shared with the patient in the After Visit Summary.  An After Visit Summary and PPPS were made available to the patient.    Follow Up:   Next Medicare Wellness visit to be scheduled in 1 year.       Additional E&M Note during same encounter follows:  Patient has multiple medical problems which are significant and separately identifiable that require additional work above and beyond the Medicare Wellness Visit.      Chief Complaint  Annual Exam    Subjective        Pt here for AWV, labs    Chao Lazar is also being seen today for MCW and coronary artery disease, hypertension, hyperlipidemia, type 2 diabetes, chronic kidney disease stage II, history of acute ischemic right PCA stroke, screening for malignant neoplasia of the prostate, vitamin D deficiency,.  Absence seizure's.    The patient presents today for a Medicare wellness visit and follow-up from hospital. He is accompanied by an adult male today.    The patient was recently hospitalized for 1 week for a possible stroke. He had an MRI performed and was told that there was no stroke. The adult male reports the patient was in the hospital for 2 weeks, [facility] rehab for 3 weeks and 1 week at [facility]. The patient admits to knowing when the spells occur. His spells occur from 20 seconds to 4 minutes. He denies any pain currently. He has physical therapy that comes to his house twice weekly. He keeps tension in his lower extremities while standing during a seizure. He takes aspirin daily. He denies any concerns currently.     The adult male reports the patient had 2 colonoscopies in the past, with 1 showing 28 polyps and another showing 24 polyps. The patient then had 1 foot of his large intestines removed and came back normal. The adult male states the patient woke up the next morning, running into the corner of the wall because he could not see it. He then was  "told he had 0.5 vision in his eyes bilaterally. He was told by Dr. Brian that it would be a risk to have another colonoscopy. He has not had a colonoscopy in approximately 2 to 3 years. He experienced a stroke a couple of years ago. The patient had 40 polyps removed. The adult male states that polyps run in their family. He sees Dr. Arauz for his eyes. He has a podiatrist but does not see him currently. He checks on his feet periodically due to neuropathy. He sees a dentist. He denies seeing a dermatologist. He does not see a neurologist regularly. He sees endocrinology routinely. He receives his influenza vaccine annually. He denies any issue with his hernia. He denies seeing neurology. He cannot write very well. The adult male states the patient can walk well.        Objective   Vital Signs:  /78 (BP Location: Left arm, Patient Position: Sitting, Cuff Size: Adult)   Pulse 97   Resp 18   Ht 185.4 cm (73\")   Wt 79.2 kg (174 lb 9.6 oz)   SpO2 95%   BMI 23.04 kg/mý     Physical Exam  Constitutional:       Appearance: Normal appearance. He is well-developed and normal weight.   HENT:      Head: Normocephalic and atraumatic.      Right Ear: Tympanic membrane, ear canal and external ear normal.      Left Ear: Tympanic membrane, ear canal and external ear normal.      Nose: Nose normal.      Mouth/Throat:      Mouth: Mucous membranes are moist.      Pharynx: Oropharynx is clear. No oropharyngeal exudate.   Eyes:      Extraocular Movements: Extraocular movements intact.      Conjunctiva/sclera: Conjunctivae normal.      Pupils: Pupils are equal, round, and reactive to light.   Cardiovascular:      Rate and Rhythm: Normal rate and regular rhythm.      Pulses: Normal pulses.      Heart sounds: Normal heart sounds.   Pulmonary:      Effort: Pulmonary effort is normal.      Breath sounds: Normal breath sounds.   Abdominal:      General: Bowel sounds are normal.      Palpations: Abdomen is soft.   Musculoskeletal: "         General: Normal range of motion.      Cervical back: Normal range of motion and neck supple.   Skin:     General: Skin is warm and dry.   Neurological:      General: No focal deficit present.      Mental Status: He is alert and oriented to person, place, and time. Mental status is at baseline.   Psychiatric:         Mood and Affect: Mood normal.         Behavior: Behavior normal.         Thought Content: Thought content normal.         Judgment: Judgment normal.                       Assessment and Plan       1. Medicare annual wellness visit, subsequent  Upon arrival to the room the patient underwent the Medicare health risk assessment.  Neither the questions themselves or the answers that were given prompted any major concern on the part of the patient or by the medical staff that gave the assessment.  As far as the preventative care examinations and the preventative care immunizations that this patient requires they are as listed below.   Screening tests recommended:    Colonoscopy- utd  eye exam- 2-3x year  foot exam- sees podiatrist and wife monitors  PSA- utd  Dentist-utd  Derm- not needed   Endocrine- [facility]    Immunization:  Influenza-utd  Prevnar- needs prevnar 20  Pneumovax- prevnar 20  Tetanus-needed at pharm  Shingles vaccine- good until 2025  Hepatitis -utd  Covid -  utd                 2. Coronary artery disease  The patient has not had any recent ischemic events. Continue current medications.    3. Essential hypertension  The patient's blood pressure today is 130/78 mmHg. Continue current medicines.    4. Hyperlipidemia  The patient will continue statin, Lipitor 80, and a low carb diet.     5. Type 2 diabetes  We will do his A1c today and then adjust in his insulin although Dr. Manriquez follows him very closely also.     6. Chronic kidney disease  We will recheck his kidney function today, make adjustments in medicines if needed.     7. Previous right posterior cerebral artery stroke  No  recent changes in this.     8. Special screening for malignant neoplasia of the prostate  We will check his PSA today.     9. Vitamin D deficiency  The patient's vitamin D levels will be checked.    10. Non-intractable absence seizure disorder  The patient has been having periods of seizures that appear to be absence type seizures. He is now on Keppra, which is improving the situation. He has seen neurology and they are continuing the work-up. The diagnosis is not certain, but this seems to be the closest diagnosis to what we are clinically seeing with Elias.                       Follow Up   Return in about 6 months (around 2/17/2024) for Recheck-recheck with labs in 6 months.              Medicare wellness exam in 1 year.  Patient was given instructions and counseling regarding his condition or for health maintenance advice. Please see specific information pulled into the AVS if appropriate.     Transcribed from ambient dictation for Al Kimbrough MD by Toshia Heredia.  08/17/23   16:21 EDT    Patient or patient representative verbalized consent to the visit recording.  I have personally performed the services described in this document as transcribed by the above individual, and it is both accurate and complete.  Al Kimbrough MD  9/1/2023  07:53 EDT

## 2023-08-29 ENCOUNTER — TELEPHONE (OUTPATIENT)
Dept: FAMILY MEDICINE CLINIC | Facility: CLINIC | Age: 63
End: 2023-08-29
Payer: MEDICARE

## 2023-08-29 NOTE — TELEPHONE ENCOUNTER
Caller: LINA ALVARADO    Relationship to patient:     Best call back number: [phone]    Patient is needing:     CALLING TO REPORT AN INR OF 2.7 AND HIS CURRENT WARFARIN DOSE IS 10 MG EVERYDAY EXCEPT ON MONDAY, WEDNESDAY, AND FRIDAY. ON THOSE DAYS HE TAKES 5MG.

## 2023-09-06 ENCOUNTER — LAB (OUTPATIENT)
Dept: FAMILY MEDICINE CLINIC | Facility: CLINIC | Age: 63
End: 2023-09-06
Payer: MEDICARE

## 2023-09-06 DIAGNOSIS — Z79.01 LONG TERM (CURRENT) USE OF ANTICOAGULANTS: ICD-10-CM

## 2023-09-06 PROCEDURE — 36415 COLL VENOUS BLD VENIPUNCTURE: CPT

## 2023-09-06 PROCEDURE — 82607 VITAMIN B-12: CPT | Performed by: FAMILY MEDICINE

## 2023-09-06 PROCEDURE — G0103 PSA SCREENING: HCPCS | Performed by: FAMILY MEDICINE

## 2023-09-06 PROCEDURE — 80053 COMPREHEN METABOLIC PANEL: CPT | Performed by: FAMILY MEDICINE

## 2023-09-06 PROCEDURE — 80061 LIPID PANEL: CPT | Performed by: FAMILY MEDICINE

## 2023-09-06 PROCEDURE — 83036 HEMOGLOBIN GLYCOSYLATED A1C: CPT | Performed by: FAMILY MEDICINE

## 2023-09-06 PROCEDURE — 84443 ASSAY THYROID STIM HORMONE: CPT | Performed by: FAMILY MEDICINE

## 2023-09-06 PROCEDURE — 85025 COMPLETE CBC W/AUTO DIFF WBC: CPT | Performed by: FAMILY MEDICINE

## 2023-09-06 PROCEDURE — 82306 VITAMIN D 25 HYDROXY: CPT | Performed by: FAMILY MEDICINE

## 2023-09-06 PROCEDURE — 84439 ASSAY OF FREE THYROXINE: CPT | Performed by: FAMILY MEDICINE

## 2023-09-07 ENCOUNTER — LAB (OUTPATIENT)
Dept: FAMILY MEDICINE CLINIC | Facility: CLINIC | Age: 63
End: 2023-09-07
Payer: MEDICARE

## 2023-09-07 ENCOUNTER — TELEPHONE (OUTPATIENT)
Dept: FAMILY MEDICINE CLINIC | Facility: CLINIC | Age: 63
End: 2023-09-07
Payer: MEDICARE

## 2023-09-07 LAB
25(OH)D3 SERPL-MCNC: 34.7 NG/ML (ref 30–100)
ALBUMIN SERPL-MCNC: 4 G/DL (ref 3.5–5.2)
ALBUMIN/GLOB SERPL: 1.2 G/DL
ALP SERPL-CCNC: 106 U/L (ref 39–117)
ALT SERPL W P-5'-P-CCNC: 24 U/L (ref 1–41)
ANION GAP SERPL CALCULATED.3IONS-SCNC: 16.2 MMOL/L (ref 5–15)
AST SERPL-CCNC: 22 U/L (ref 1–40)
BACTERIA UR QL AUTO: ABNORMAL /HPF
BASOPHILS # BLD AUTO: 0.04 10*3/MM3 (ref 0–0.2)
BASOPHILS NFR BLD AUTO: 0.3 % (ref 0–1.5)
BILIRUB SERPL-MCNC: <0.2 MG/DL (ref 0–1.2)
BILIRUB UR QL STRIP: NEGATIVE
BUN SERPL-MCNC: 20 MG/DL (ref 8–23)
BUN/CREAT SERPL: 14.5 (ref 7–25)
CALCIUM SPEC-SCNC: 9.6 MG/DL (ref 8.6–10.5)
CHLORIDE SERPL-SCNC: 103 MMOL/L (ref 98–107)
CHOLEST SERPL-MCNC: 132 MG/DL (ref 0–200)
CLARITY UR: CLEAR
CO2 SERPL-SCNC: 21.8 MMOL/L (ref 22–29)
COLOR UR: YELLOW
CREAT SERPL-MCNC: 1.38 MG/DL (ref 0.76–1.27)
DEPRECATED RDW RBC AUTO: 42.9 FL (ref 37–54)
EGFRCR SERPLBLD CKD-EPI 2021: 56.7 ML/MIN/1.73
EOSINOPHIL # BLD AUTO: 0.13 10*3/MM3 (ref 0–0.4)
EOSINOPHIL NFR BLD AUTO: 1.1 % (ref 0.3–6.2)
ERYTHROCYTE [DISTWIDTH] IN BLOOD BY AUTOMATED COUNT: 12.9 % (ref 12.3–15.4)
GLOBULIN UR ELPH-MCNC: 3.4 GM/DL
GLUCOSE SERPL-MCNC: 86 MG/DL (ref 65–99)
GLUCOSE UR STRIP-MCNC: NEGATIVE MG/DL
HBA1C MFR BLD: 7.2 % (ref 4.8–5.6)
HCT VFR BLD AUTO: 41.1 % (ref 37.5–51)
HDLC SERPL-MCNC: 36 MG/DL (ref 40–60)
HGB BLD-MCNC: 13.5 G/DL (ref 13–17.7)
HGB UR QL STRIP.AUTO: NEGATIVE
HYALINE CASTS UR QL AUTO: ABNORMAL /LPF
IMM GRANULOCYTES # BLD AUTO: 0.06 10*3/MM3 (ref 0–0.05)
IMM GRANULOCYTES NFR BLD AUTO: 0.5 % (ref 0–0.5)
KETONES UR QL STRIP: NEGATIVE
LDLC SERPL CALC-MCNC: 63 MG/DL (ref 0–100)
LDLC/HDLC SERPL: 1.58 {RATIO}
LEUKOCYTE ESTERASE UR QL STRIP.AUTO: NEGATIVE
LYMPHOCYTES # BLD AUTO: 1.4 10*3/MM3 (ref 0.7–3.1)
LYMPHOCYTES NFR BLD AUTO: 11.9 % (ref 19.6–45.3)
MCH RBC QN AUTO: 29.9 PG (ref 26.6–33)
MCHC RBC AUTO-ENTMCNC: 32.8 G/DL (ref 31.5–35.7)
MCV RBC AUTO: 91.1 FL (ref 79–97)
MONOCYTES # BLD AUTO: 0.69 10*3/MM3 (ref 0.1–0.9)
MONOCYTES NFR BLD AUTO: 5.9 % (ref 5–12)
NEUTROPHILS NFR BLD AUTO: 80.3 % (ref 42.7–76)
NEUTROPHILS NFR BLD AUTO: 9.44 10*3/MM3 (ref 1.7–7)
NITRITE UR QL STRIP: NEGATIVE
NRBC BLD AUTO-RTO: 0 /100 WBC (ref 0–0.2)
PH UR STRIP.AUTO: 6 [PH] (ref 5–8)
PLATELET # BLD AUTO: 346 10*3/MM3 (ref 140–450)
PMV BLD AUTO: 9.9 FL (ref 6–12)
POTASSIUM SERPL-SCNC: 4.7 MMOL/L (ref 3.5–5.2)
PROT SERPL-MCNC: 7.4 G/DL (ref 6–8.5)
PROT UR QL STRIP: ABNORMAL
PSA SERPL-MCNC: 0.17 NG/ML (ref 0–4)
RBC # BLD AUTO: 4.51 10*6/MM3 (ref 4.14–5.8)
RBC # UR STRIP: ABNORMAL /HPF
REF LAB TEST METHOD: ABNORMAL
SODIUM SERPL-SCNC: 141 MMOL/L (ref 136–145)
SP GR UR STRIP: 1.02 (ref 1–1.03)
SQUAMOUS #/AREA URNS HPF: ABNORMAL /HPF
T4 FREE SERPL-MCNC: 1.15 NG/DL (ref 0.93–1.7)
TRIGL SERPL-MCNC: 196 MG/DL (ref 0–150)
TSH SERPL DL<=0.05 MIU/L-ACNC: 1.31 UIU/ML (ref 0.27–4.2)
UROBILINOGEN UR QL STRIP: ABNORMAL
VIT B12 BLD-MCNC: 388 PG/ML (ref 211–946)
VLDLC SERPL-MCNC: 33 MG/DL (ref 5–40)
WBC # UR STRIP: ABNORMAL /HPF
WBC NRBC COR # BLD: 11.76 10*3/MM3 (ref 3.4–10.8)

## 2023-09-07 PROCEDURE — 81001 URINALYSIS AUTO W/SCOPE: CPT | Performed by: FAMILY MEDICINE

## 2023-09-07 NOTE — TELEPHONE ENCOUNTER
----- Message from Al Kimbrough MD sent at 9/7/2023  7:24 AM EDT -----  Tell Elias that his labs are actually very good.  Continue current medications and current doses.

## 2023-09-07 NOTE — TELEPHONE ENCOUNTER
No answer    Hub to read  Tell Elias that his labs are actually very good.  Continue current medications and current doses.

## 2023-09-15 ENCOUNTER — TELEPHONE (OUTPATIENT)
Dept: FAMILY MEDICINE CLINIC | Facility: CLINIC | Age: 63
End: 2023-09-15
Payer: MEDICARE

## 2023-09-15 NOTE — TELEPHONE ENCOUNTER
Caller: LINA ALVARADO      Best call back number: [phone]    Patient is needing:     CALLING TO REPORT AN INR OF 3.3 THIS AFTERNOON 9.15.23.    CURRENT WARFARIN DOSE    5 MG ON MONDAY, WEDNESDAY, AND FRIDAY    10MG ALL THE OTHER DAYS.

## 2023-09-16 NOTE — TELEPHONE ENCOUNTER
Same dose for now.  Repeat in one to two week.  If stays over 3 each time may cut back to 5mg on 4 days a week and 10 mg all the other days.

## 2023-09-18 NOTE — TELEPHONE ENCOUNTER
Spoke with Leonarda, she expressed udnerstanding and will check inr in 1-2 weeks. She will write the order and send to us to sign.

## 2023-09-19 PROCEDURE — 82948 REAGENT STRIP/BLOOD GLUCOSE: CPT | Performed by: INTERNAL MEDICINE

## 2023-09-19 NOTE — PATIENT INSTRUCTIONS
Keep up the good work!  Continue exercise as able.  Continue diabetes & chol meds & vit D supplements.  Call if blood sugars are running under 100 or over 200.  F/u in 6 months, with labs prior.

## 2023-09-25 NOTE — TELEPHONE ENCOUNTER
Glenny with [facility] ([phone]) called because they did not have a response to the INR done on 9/15/2023.  I read this message to her and she said Leonarda did not enter any response or orders in their computer.  Please fax the response to [facility] LESLIE [place] at fax [phone].

## 2023-09-28 ENCOUNTER — TELEPHONE (OUTPATIENT)
Dept: FAMILY MEDICINE CLINIC | Facility: CLINIC | Age: 63
End: 2023-09-28
Payer: MEDICARE

## 2023-09-28 NOTE — TELEPHONE ENCOUNTER
Provider: Al Kimbrough MD     Caller: [facility]   [phone]     Relationship to Patient: NURSE      Reason for Call: UNABLE TO REACH PATIENT TODAY FOR INR LAB DRAW    SHE WILL TRY AGAIN TOMORROW.    IF OTHER ACTION NEEDED PLEASE CALL, OTHERWISE NO RETURN CALL NEEDED PER NURSE.

## 2023-10-04 PROBLEM — Z51.5 HOSPICE CARE PATIENT: Status: ACTIVE | Noted: 2023-01-01

## 2023-10-04 PROBLEM — I61.9 BRAIN BLEED: Status: ACTIVE | Noted: 2023-01-01

## 2023-10-04 NOTE — ED NOTES
Call placed again to [facility]. Awaiting a return call from pulmonology.

## 2023-10-04 NOTE — ED PROVIDER NOTES
Subjective   History of Present Illness  Chief complaint: Patient is 63-year-old who was found unresponsive by family members.  He was last spoken to around 8 to 9 PM last night.  He may have had a seizure shortly prior to that.  He does have a history of seizure disorder.  He was not willing to take his medications last night.  He may have had a seizure around 8:00 or so last night.  He had declined his nighttime medications.  He does remain on warfarin chronically    Context:    Duration:    Timing:    Severity:    Associated Symptoms:        PCP:  LMP:    Review of Systems   Unable to perform ROS: Mental status change     Past Medical History:   Diagnosis Date   • Asthma 3/22/2018   • Coronary artery disease involving native coronary artery of native heart without angina pectoris 8/27/2020   • Essential hypertension 6/28/2019   • Gastroparesis 12/23/2013   • History of CVA (cerebrovascular accident) 9/22/2021   • Hyperlipidemia, mixed 4/3/2017   • Hypoglycemia        No Known Allergies    Past Surgical History:   Procedure Laterality Date   • CARDIAC CATHETERIZATION     • CHOLECYSTECTOMY  2004   • COLON RESECTION      [id number]   • COLON RESECTION SMALL BOWEL Right 12/5/2019    Procedure: open right hemicolectomy;  Surgeon: Ronaldo Ardon MD;  Location: [facility] OR;  Service: General   • COLONOSCOPY  2019    x2    • CORONARY ANGIOPLASTY WITH STENT PLACEMENT  04/2017   • ECHO - CONVERTED  2019   • ECHO - CONVERTED  2020   • FRACTURE SURGERY      collar bone as a child    • KNEE ARTHROSCOPY Left 08/2003   • KNEE JOINT MANIPULATION Left 04/2010   • TOTAL KNEE ARTHROPLASTY Bilateral     2013,2011       Family History   Problem Relation Age of Onset   • Irritable bowel syndrome Mother    • Anxiety disorder Mother    • Depression Father    • Hypertension Father    • Mental illness Father         committed suicide   • Other Sister         back problems   • Other Brother         back problems       Social  History     Socioeconomic History   • Marital status:      Spouse name: Debbi   • Number of children: 3   • Years of education: 12   Tobacco Use   • Smoking status: Former     Types: Cigarettes     Quit date:      Years since quittin.7   • Smokeless tobacco: Former   Vaping Use   • Vaping Use: Never used   Substance and Sexual Activity   • Alcohol use: Yes     Comment: occasionally   • Drug use: No   • Sexual activity: Defer           Objective   Physical Exam  Vitals and nursing note reviewed.   Constitutional:       Comments: Patient unresponsive   HENT:      Head: Normocephalic and atraumatic.   Eyes:      Pupils: Pupils are equal, round, and reactive to light.   Cardiovascular:      Rate and Rhythm: Tachycardia present.   Pulmonary:      Breath sounds: Normal breath sounds.   Skin:     General: Skin is warm and dry.   Neurological:      Comments: Patient unresponsive.  Minimal twitching of the left arm.  He does appear to have symmetric pupils at this point time.  Corneal reflexes present.  Gag reflex present.   Psychiatric:      Comments: Unable       Intubation    Date/Time: 10/4/2023 6:35 AM  Performed by: Andrea Palafox DO  Authorized by: Andrea Palafox DO     Consent:     Consent obtained:  Verbal    Consent given by:  Spouse    Risks, benefits, and alternatives were discussed: yes      Procedural risks discussed: Emergently had to intubate.  Verbal okay from patient's.  [place] protocol:     Procedure explained and questions answered to patient or proxy's satisfaction: yes      Patient identity confirmed:  Arm band  Pre-procedure details:     Indication: failure to oxygenate, failure to protect airway, failure to ventilate and predicted clinical deterioration      Patient status:  Unresponsive    Look externally: facial hair      Pharmacologic strategy: RSI      Induction agents:  Etomidate    Paralytics:  Rocuronium  Procedure details:     Preoxygenation:  Bag valve  mask    CPR in progress: no      Intubation method:  Oral    Intubation technique: video assisted      Laryngoscope blade:  Hypercurved    Bougie used: no      Tube size (mm):  7.5    Tube type:  Cuffed    Number of attempts:  2    Ventilation between attempts: no      Tube visualized through cords: yes    Placement assessment:     ETT at teeth/gumline (cm):  20    Tube secured with:  ETT salgado    Breath sounds:  Absent over the epigastrium and equal    Placement verification: chest rise, colorimetric ETCO2, CXR verification, direct visualization and equal breath sounds      CXR findings:  Appropriate position  Post-procedure details:     Procedure completion:  Tolerated well, no immediate complications           ED Course  ED Course as of 10/04/23 0841   Wed Oct 04, 2023   0618 I spoke with Dr. Martinez.  He wants CT angiogram stat.  He will evaluate the patient to decide about drain placement. []   0814 Patient care assumed from Dr. Palafox pending discussion with [facility] intensivist and expected ICU admission either at [facility] or [facility] []   0819 I spoke with Dr. Hinds, intensivist at [facility], states that he would accept patient for transfer if family agrees.  I spoke with the family myself, they stated they would like to wait for neurosurgery to see the patient here in the ER to discuss options including further care and extended treatment versus comfort care given the extent of patient's condition. []      ED Course User Index  [] Andrea Palafox DO  [] Veronica Quintana PA           Results for orders placed or performed during the hospital encounter of 10/04/23   Protime-INR    Specimen: Blood   Result Value Ref Range    Protime 24.4 19.4 - 28.5 Seconds    INR 2.39 2.00 - 3.00   Urinalysis With Culture If Indicated - Straight Cath    Specimen: Straight Cath; Urine   Result Value Ref Range    Color, UA Yellow Yellow, Straw    Appearance, UA Clear Clear    pH, UA <=5.0 5.0 - 8.0    Specific [place], UA  1.035 (H) 1.005 - 1.030    Glucose,  mg/dL (2+) (A) Negative    Ketones, UA 15 mg/dL (1+) (A) Negative    Bilirubin, UA Negative Negative    Blood, UA Moderate (2+) (A) Negative    Protein, UA >=300 mg/dL (3+) (A) Negative    Leuk Esterase, UA Negative Negative    Nitrite, UA Negative Negative    Urobilinogen, UA 1.0 E.U./dL 0.2 - 1.0 E.U./dL   Comprehensive Metabolic Panel    Specimen: Blood   Result Value Ref Range    Glucose 197 (H) 65 - 99 mg/dL    BUN 27 (H) 8 - 23 mg/dL    Creatinine 1.45 (H) 0.76 - 1.27 mg/dL    Sodium 141 136 - 145 mmol/L    Potassium 4.3 3.5 - 5.2 mmol/L    Chloride 102 98 - 107 mmol/L    CO2 25.0 22.0 - 29.0 mmol/L    Calcium 9.6 8.6 - 10.5 mg/dL    Total Protein 7.6 6.0 - 8.5 g/dL    Albumin 4.0 3.5 - 5.2 g/dL    ALT (SGPT) 26 1 - 41 U/L    AST (SGOT) 25 1 - 40 U/L    Alkaline Phosphatase 110 39 - 117 U/L    Total Bilirubin 0.3 0.0 - 1.2 mg/dL    Globulin 3.6 gm/dL    A/G Ratio 1.1 g/dL    BUN/Creatinine Ratio 18.6 7.0 - 25.0    Anion Gap 14.0 5.0 - 15.0 mmol/L    eGFR 53.5 (L) >60.0 mL/min/1.73   aPTT    Specimen: Blood   Result Value Ref Range    PTT 35.2 (L) 61.0 - 76.5 seconds   Acetaminophen Level    Specimen: Blood   Result Value Ref Range    Acetaminophen <5.0 0.0 - 30.0 mcg/mL   Ethanol    Specimen: Blood   Result Value Ref Range    Ethanol % <0.010 %   Urine Drug Screen - Urine, Clean Catch    Specimen: Urine, Clean Catch   Result Value Ref Range    Amphet/Methamphet, Screen Negative Negative    Barbiturates Screen, Urine Negative Negative    Benzodiazepine Screen, Urine Negative Negative    Cocaine Screen, Urine Negative Negative    Opiate Screen Negative Negative    THC, Screen, Urine Negative Negative    Methadone Screen, Urine Negative Negative    Oxycodone Screen, Urine Negative Negative   Salicylate Level    Specimen: Blood   Result Value Ref Range    Salicylate 0.3 <=30.0 mg/dL   Magnesium    Specimen: Blood   Result Value Ref Range    Magnesium 1.4 (L) 1.6 - 2.4  mg/dL   High Sensitivity Troponin T    Specimen: Blood   Result Value Ref Range    HS Troponin T 27 (H) <15 ng/L   CBC Auto Differential    Specimen: Blood   Result Value Ref Range    WBC 13.30 (H) 3.40 - 10.80 10*3/mm3    RBC 4.47 4.14 - 5.80 10*6/mm3    Hemoglobin 13.5 13.0 - 17.7 g/dL    Hematocrit 40.4 37.5 - 51.0 %    MCV 90.4 79.0 - 97.0 fL    MCH 30.2 26.6 - 33.0 pg    MCHC 33.4 31.5 - 35.7 g/dL    RDW 14.1 12.3 - 15.4 %    RDW-SD 44.6 37.0 - 54.0 fl    MPV 7.6 6.0 - 12.0 fL    Platelets 417 140 - 450 10*3/mm3    Neutrophil % 86.2 (H) 42.7 - 76.0 %    Lymphocyte % 9.2 (L) 19.6 - 45.3 %    Monocyte % 3.9 (L) 5.0 - 12.0 %    Eosinophil % 0.0 (L) 0.3 - 6.2 %    Basophil % 0.7 0.0 - 1.5 %    Neutrophils, Absolute 11.50 (H) 1.70 - 7.00 10*3/mm3    Lymphocytes, Absolute 1.20 0.70 - 3.10 10*3/mm3    Monocytes, Absolute 0.50 0.10 - 0.90 10*3/mm3    Eosinophils, Absolute 0.00 0.00 - 0.40 10*3/mm3    Basophils, Absolute 0.10 0.00 - 0.20 10*3/mm3    nRBC 0.0 0.0 - 0.2 /100 WBC   Blood Gas, Arterial -    Specimen: Arterial Blood   Result Value Ref Range    Site Right Brachial     Jacob's Test N/A     pH, Arterial 7.445 7.350 - 7.450 pH units    pCO2, Arterial 34.6 (L) 35.0 - 48.0 mm Hg    pO2, Arterial 67.2 (L) 83.0 - 108.0 mm Hg    HCO3, Arterial 23.8 21.0 - 28.0 mmol/L    Base Excess, Arterial 0.2 0.0 - 3.0 mmol/L    O2 Saturation, Arterial 94.0 94.0 - 98.0 %    Barometric Pressure for Blood Gas      Modality Cannula     FIO2 32 %    Hemodilution No    Urinalysis, Microscopic Only - Straight Cath    Specimen: Straight Cath; Urine   Result Value Ref Range    RBC, UA 6-12 (A) None Seen /HPF    WBC, UA None Seen None Seen /HPF    Bacteria, UA 2+ (A) None Seen /HPF    Squamous Epithelial Cells, UA 0-2 None Seen, 0-2 /HPF    Hyaline Casts, UA None Seen None Seen /LPF    Granular Casts, UA 0-2 None Seen /LPF    Methodology Manual Light Microscopy    Blood Gas, Arterial -    Specimen: Arterial Blood   Result Value Ref Range     Site Left Brachial     Jacob's Test Positive     pH, Arterial 7.360 7.350 - 7.450 pH units    pCO2, Arterial 40.7 35.0 - 48.0 mm Hg    pO2, Arterial 508.0 (H) 83.0 - 108.0 mm Hg    HCO3, Arterial 23.0 21.0 - 28.0 mmol/L    Base Excess, Arterial -2.3 (L) 0.0 - 3.0 mmol/L    O2 Saturation, Arterial 100.0 (H) 94.0 - 98.0 %    Barometric Pressure for Blood Gas      Modality Adult Vent     FIO2 100 %    Ventilator Mode AC     Set Tidal Volume 500     PEEP 5     Hemodilution No     Respiratory Rate 14    POC Glucose Once    Specimen: Blood   Result Value Ref Range    Glucose 173 (H) 70 - 105 mg/dL   POC Lactate    Specimen: Blood   Result Value Ref Range    Lactate 1.2 0.3 - 2.0 mmol/L   POC CHEM 8    Specimen: Venous Blood   Result Value Ref Range    Glucose 185 (H) 70 - 105 mg/dL    BUN 26 8 - 26 mg/dL    Creatinine 1.60 (H) 0.60 - 1.30 mg/dL    Sodium 142 138 - 146 mmol/L    POC Potassium 4.4 3.5 - 4.9 mmol/L    Chloride 103 98 - 109 mmol/L    Total CO2 24 24 - 29 mmol/L    Anion Gap 20.0 10.0 - 20.0 mmol/L    Hemoglobin 13.6 12.0 - 17.0 g/dL    Hematocrit 40 38 - 51 %    Ionized Calcium 1.21 1.12 - 1.32 mmol/L    eGFR 47.5 (L) >60.0 mL/min/1.73   POC Creatinine    Specimen: Arterial Blood   Result Value Ref Range    Creatinine 1.35 mg/dL    eGFR 58.3 (L) >60.0 mL/min/1.73   POCT Electrolytes +HGB +HCT    Specimen: Arterial Blood   Result Value Ref Range    Sodium 142 138 - 146 mmol/L    POC Potassium 4.3 3.5 - 4.5 mmol/L    Ionized Calcium 1.22 1.15 - 1.33 mmol/L    Glucose 197 (H) 74 - 100 mg/dL    Hematocrit 42 38 - 51 %    Hemoglobin 14.4 12.0 - 17.0 g/dL   POC Lactate    Specimen: Arterial Blood   Result Value Ref Range    Lactate 0.9 0.2 - 2.0 mmol/L   POC Glucose Once    Specimen: Arterial Blood   Result Value Ref Range    Glucose 197 (H) 74 - 100 mg/dL   POC Creatinine    Specimen: Arterial Blood   Result Value Ref Range    Creatinine 1.22 mg/dL    eGFR 65.8 >60.0 mL/min/1.73   POCT Electrolytes +HGB +HCT     Specimen: Arterial Blood   Result Value Ref Range    Sodium 138 138 - 146 mmol/L    POC Potassium 4.0 3.5 - 4.5 mmol/L    Ionized Calcium 1.20 1.15 - 1.33 mmol/L    Glucose 218 (H) 74 - 100 mg/dL    Hematocrit 36 (L) 38 - 51 %    Hemoglobin 12.1 12.0 - 17.0 g/dL   POC Lactate    Specimen: Arterial Blood   Result Value Ref Range    Lactate 0.7 0.2 - 2.0 mmol/L   POC Glucose Once    Specimen: Arterial Blood   Result Value Ref Range    Glucose 218 (H) 74 - 100 mg/dL   ECG 12 Lead Altered Mental Status   Result Value Ref Range    QT Interval 298 ms    QTC Interval 430 ms   Gold Top - SST   Result Value Ref Range    Extra Tube Hold for add-ons.                              CT Head Without Contrast    Result Date: 10/4/2023  Impression: Left greater than right intraventricular hemorrhage. The finding was communicated to the emergency room physician Dr. Palafox at the time of this dictation. Electronically Signed: Joshua Cordon MD  10/4/2023 5:59 AM EDT  Workstation ID: [id number]    XR Chest 1 View    Result Date: 10/4/2023  Impression: ET tube is in the midthoracic trachea about 2 cm above the harriet. There is cardiomegaly with mild pulmonary vascular congestion. Electronically Signed: Joshua Cordon MD  10/4/2023 6:30 AM EDT  Workstation ID: [id number]             Medical Decision Making  Patient was seen and evaluated for the above problem    Critical care time is 57 minutes    Differential diagnose includes but is not limited to sepsis, intracerebral hemorrhage, stroke, status epilepticus    Patient was seen immediately in the trauma bay on EMS arrival.  He was sent straight to CT to show intraventricular hemorrhage.  He also was febrile.  He was covered with antibiotics for sepsis.  He appeared to be having seizure-like activity.  He was placed on propofol drip.  He received Keppra here stat.  There is concern for potential continuous seizure.  He is on ventilator right now currently.  I discussed the case with Dr. Saldana of  neurosurgery.  The patient had his INR reversed with Kcentra and vitamin K.  Dr. Martinez wants patient tensive care unit and Dr. Wells feels most comfortable with the patient being transferred to a facility where more close continuous epileptic monitoring can be done.  He has severely elevated blood pressure as well.  He is on Cardene drip at this point time.        Amount and/or Complexity of Data Reviewed  Labs: ordered. Decision-making details documented in ED Course.     Details: Labs reviewed by myself  Radiology: ordered and independent interpretation performed.     Details: CT angiogram and CT head noncontrast reviewed by myself.  Showing intraventricular hemorrhage.  ECG/medicine tests: ordered and independent interpretation performed.     Details: EKG interpreted by myself sinus tachycardia rate of 125 poor wave progression    Risk  Prescription drug management.  Parenteral controlled substances.  Drug therapy requiring intensive monitoring for toxicity.  Decision regarding hospitalization.    Critical Care  Total time providing critical care: 57 minutes      Final diagnoses:   None   Intraventricular hemorrhage  Seizure    ED Disposition  ED Disposition       None            No follow-up provider specified.       Medication List      No changes were made to your prescriptions during this visit.          diagnoses:   None   Intraventricular hemorrhage  Seizure    ED Disposition  ED Disposition       None            No follow-up provider specified.       Medication List      No changes were made to your prescriptions during this visit.            Andrea Palafox DO  10/10/23 0836

## 2023-10-04 NOTE — CASE MANAGEMENT/SOCIAL WORK
Discharge Planning Assessment  [facility]     Patient Name: Chao Lazar  MRN: [id number]  Today's Date: 10/4/2023    Admit Date: 10/4/2023    Plan: DC Plan needs re-evaluated post extubation   Discharge Needs Assessment       Row Name 10/04/23 1016       Living Environment    People in Home spouse    Name(s) of People in Home Debbi    Current Living Arrangements home    Primary Care Provided by self;spouse/significant other    Provides Primary Care For no one, unable/limited ability to care for self    Family Caregiver if Needed spouse    Family Caregiver Names Debbi    Quality of Family Relationships supportive                   Discharge Plan       Row Name 10/04/23 1016       Plan    Plan DC Plan needs re-evaluated post extubation    Plan Comments  spoke with patient's spouse at bedside briefly before patient was transferred to ICU. She reports patient lived with her at home and she aided him in his personal cares.She reports he has been in rehab frequently due to medical conditions.She states he would not want to be intubated, and expects extubation at some point today. Spouse is not currently interested in hospice services. DC Barriers: intubated;versed/diprivan gtts                  Continued Care and Services - Admitted Since 10/4/2023    Coordination has not been started for this encounter.          Demographic Summary       Row Name 10/04/23 1013       General Information    Admission Type inpatient    Arrived From home    Required Notices Provided Important Message from Medicare    Referral Source admission list;hospital clinician/department;high risk screening    Reason for Consult discharge planning    Preferred Language English       Contact Information    Permission Granted to Share Info With family/designee;                   Functional Status       Row Name 10/04/23 1014       Functional Status    Usual Activity Tolerance moderate    Current Activity Tolerance poor        Mental Status Summary    Recent Changes in Mental Status/Cognitive Functioning mental status                       Patient Forms       Row Name 10/04/23 1020       Patient Forms    Important Message from Medicare (IMM) Delivered  IMM 10/4/23 per registration                  Mari Morrison RN, [facility] [place]  Office: [phone]  Fax: [phone]  [email or web address]      I met with patient in room wearing PPE: mask and glasses     Maintained distance greater than six feet and spent </=15 minutes in the room    Mari Morrison RN

## 2023-10-04 NOTE — NURSING NOTE
Patient in comfort care - transitioning to hospice care with [facility]. Performing discharge/readmit.

## 2023-10-04 NOTE — CASE MANAGEMENT/SOCIAL WORK
Continued Stay Note  [facility]     Patient Name: Chao Lazar  MRN: [id number]  Today's Date: 10/4/2023    Admit Date: 10/4/2023    Plan: Referral to [facility] pending acceptance and possible GIP   Discharge Plan       Row Name 10/04/23 5507       Plan    Plan Referral to [facility] pending acceptance and possible GIP    Plan Comments Patient to be termianlly extubated today. received epic chat from MD that he spoke with family again and they do want [facility]. referral made to Kerry at [phone]. DCP sent      Row Name 10/04/23 0831       Plan    Plan Comments Received epic chat from Dr. Brewer to see afamily again about hospice referral. To patient room and spoke with  family. They decline services at this time                   Esperanza Anderson RN

## 2023-10-04 NOTE — ED NOTES
S/w [facility] transfer center they are contacting the neurosurgeon. We are awaiting a return call.

## 2023-10-04 NOTE — Clinical Note
Level of Care: Critical Care [6]   Admitting Physician: ISLVINA VELÁZQUEZ [[id number]]   Attending Physician: SILVINA VELÁZQUEZ [[id number]]   Bed Request Comments: icu

## 2023-10-04 NOTE — PLAN OF CARE
Goal Outcome Evaluation:   Orders received as per stroke protocol. Chart reviewed and events noted. Patient currently sedated and on a ventilator. Noted family possibly to pursue comfort care. Patient not appropriate for speech/language evaluation due to medical condition. Will complete order and sign off at this time. Please re-consult if our services are warranted in the future. Thank you.

## 2023-10-04 NOTE — DISCHARGE PLACEMENT REQUEST
"Chao Maldonado (63 y.o. Male)       Date of Birth   1960    Social Security Number       Address   [address] [place] [address]    Home Phone   [phone]    MRN   [id number]       Mu-ism   Evangelical    Marital Status                               Admission Date   10/4/23    Admission Type   Emergency    Admitting Provider   Zhen Brewer DO    Attending Provider       Department, Room/Bed   [facility] INTENSIVE CARE UNIT, 2312/1       Discharge Date       Discharge Disposition       Discharge Destination                                 Attending Provider: (none)   Allergies: No Known Allergies    Isolation: None   Infection: None   Code Status: No CPR    Ht: 185.4 cm (73\")   Wt: 83 kg (182 lb 15.7 oz)    Admission Cmt: None   Principal Problem: Brain bleed [I61.9]                   Active Insurance as of 10/4/2023       Primary Coverage       Payor Plan Insurance Group Employer/Plan Group    MEDICARE MEDICARE A & B        Payor Plan Address Payor Plan Phone Number Payor Plan Fax Number Effective Dates    [address] [phone]  6/1/2022 - None Entered    [facility] [id number]         Subscriber Name Subscriber Birth Date Member ID       CHAO MALDONADO 1960 [id number]               Secondary Coverage       Payor Plan Insurance Group Employer/Plan Group    AARP  SUP A[employer] HEALTH CARE OPTIONS        Payor Plan Address Payor Plan Phone Number Payor Plan Fax Number Effective Dates    [facility] [phone]  1/1/2023 - None Entered    [address]       [facility] [address]         Subscriber Name Subscriber Birth Date Member ID       CHAO MALDONADO 1960 [id number]                     Emergency Contacts        (Rel.) Home Phone Work Phone Mobile Phone    SHIV MALDONADO (Spouse) [phone] -- [phone]                "

## 2023-10-04 NOTE — DISCHARGE SUMMARY
CRITICAL CARE DISCHARGE SUMMARY           PATIENT NAME:  Chao Lazar             :  1960            MRN:  [id number]    DATE OF ADMISSION: 10/4/2023     DATE OF DISCHARGE: 10/4/2023    DISCHARGE DIAGNOSES:   Left greater than right intraventricular hemorrhage  Seizure disorder    PRESENTING PROBLEM/ADMISSION DIAGNOSES:  Left greater than right intraventricular hemorrhage  Seizure disorder       HOSPITAL COURSE  Chao Lazar is a 63 y.o. male with PMH of CVA (), Hypertension, HLD, CAD, presented to the hospital after being found unresponsive by family members, and was admitted with a principal diagnosis of Brain bleed. Last known well was around 8 to 9 PM last night.  There was some question of patient having a seizure due to noncompliance with medication.     Upon arrival to ER patient was taken directly to CT which demonstrated an intraventricular hemorrhage.  Patient was also found to be febrile and appeared to be having seizure-like activity.  The patient was intubated, given Keppra and placed on a propofol drip.  Patient is on warfarin at home.  Patient was given Kcentra and vitamin K.  Neurosurgery evaluated patient and discussed with family the possibility of placement of an EVD.  However patient's family feels that the patient would not want to continue with aggressive measures.  In ER patient's family discussed intervention versus comfort measures.  At this time family is waiting on more family to arrive and will likely move forward with transitioning patient to comfort measures only.    Patient transitioned to comfort measures only. Hospice taking over for continued care.        PROCEDURES PERFORMED    10/04 4296 Note By: Candice Wells MD    LABS/RADIOLOGICAL STUDIES  Lab Results (last 72 hours)       Procedure Component Value Units Date/Time    Urine Drug Screen - Urine, Clean Catch [[id number]]  (Normal) Collected: 10/04/23 0604    Specimen: Urine, Clean Catch Updated: 10/04/23 0705      Amphet/Methamphet, Screen Negative     Barbiturates Screen, Urine Negative     Benzodiazepine Screen, Urine Negative     Cocaine Screen, Urine Negative     Opiate Screen Negative     THC, Screen, Urine Negative     Methadone Screen, Urine Negative     Oxycodone Screen, Urine Negative    Narrative:      Negative Thresholds Per Drugs Screened:    Amphetamines                 500 ng/ml  Barbiturates                 200 ng/ml  Benzodiazepines              100 ng/ml  Cocaine                      300 ng/ml  Methadone                    300 ng/ml  Opiates                      300 ng/ml  Oxycodone                    100 ng/ml  THC                           50 ng/ml    The Normal Value for all drugs tested is negative. This report includes final unconfirmed screening results to be used for medical treatment purposes only. Unconfirmed results must not be used for non-medical purposes such as employment or legal testing. Clinical consideration should be applied to any drug of abuse test, particularly when unconfirmed results are used.          All urine drugs of abuse requests without chain of custody are for medical screening purposes only.  False positives are possible.      Urinalysis, Microscopic Only - Straight Cath [[id number]]  (Abnormal) Collected: 10/04/23 0600    Specimen: Urine from Straight Cath Updated: 10/04/23 0653     RBC, UA 6-12 /HPF      WBC, UA None Seen /HPF      Comment: Urine culture not indicated.        Bacteria, UA 2+ /HPF      Squamous Epithelial Cells, UA 0-2 /HPF      Hyaline Casts, UA None Seen /LPF      Granular Casts, UA 0-2 /LPF      Methodology Manual Light Microscopy    POC Glucose Once [[id number]]  (Abnormal) Collected: 10/04/23 0645    Specimen: Arterial Blood Updated: 10/04/23 0650     Glucose 218 mg/dL      Comment: Serial Number: [id number]:  [id number]       POCT Electrolytes +HGB +HCT [[id number]]  (Abnormal) Collected: 10/04/23 0645    Specimen: Arterial Blood Updated: 10/04/23 0650      Sodium 138 mmol/L      POC Potassium 4.0 mmol/L      Ionized Calcium 1.20 mmol/L      Comment: Serial Number: [id number]:  [id number]        Glucose 218 mg/dL      Hematocrit 36 %      Hemoglobin 12.1 g/dL     POC Lactate [[id number]]  (Normal) Collected: 10/04/23 0645    Specimen: Arterial Blood Updated: 10/04/23 0650     Lactate 0.7 mmol/L      Comment: Serial Number: [id number]:  [id number]       POC Creatinine [[id number]] Collected: 10/04/23 0645    Specimen: Arterial Blood Updated: 10/04/23 0650     Creatinine 1.22 mg/dL      Comment: Serial Number: [id number]:  [id number]        eGFR 65.8 mL/min/1.73     Blood Gas, Arterial - [[id number]]  (Abnormal) Collected: 10/04/23 0645    Specimen: Arterial Blood Updated: 10/04/23 0650     Site Left Brachial     Jacob's Test Positive     pH, Arterial 7.360 pH units      pCO2, Arterial 40.7 mm Hg      pO2, Arterial 508.0 mm Hg      HCO3, Arterial 23.0 mmol/L      Base Excess, Arterial -2.3 mmol/L      Comment: Serial Number: [id number]:  [id number]        O2 Saturation, Arterial 100.0 %      Barometric Pressure for Blood Gas --     Comment: N/A        Modality Adult Vent     FIO2 100 %      Ventilator Mode AC     Set Tidal Volume 500     PEEP 5     Hemodilution No     Respiratory Rate 14    Extra Tubes [[id number]] Collected: 10/04/23 0544    Specimen: Blood, Venous Line Updated: 10/04/23 0646    Narrative:      The following orders were created for panel order Extra Tubes.  Procedure                               Abnormality         Status                     ---------                               -----------         ------                     [facility] - [facility][[id number]]                                   Final result                 Please view results for these tests on the individual orders.    Gold [facility] - [facility] [[id number]] Collected: 10/04/23 0544    Specimen: Blood Updated: 10/04/23 0646     Extra Tube Hold for add-ons.     Comment: Auto resulted.       Blood Culture -  Blood, Arm, Left [[id number]] Collected: 10/04/23 0621    Specimen: Blood from Arm, Left Updated: 10/04/23 0641    Blood Culture - Blood, Arm, Left [[id number]] Collected: 10/04/23 0620    Specimen: Blood from Arm, Left Updated: 10/04/23 0639    Urinalysis With Culture If Indicated - Straight Cath [[id number]]  (Abnormal) Collected: 10/04/23 0600    Specimen: Urine from Straight Cath Updated: 10/04/23 0631     Color, UA Yellow     Appearance, UA Clear     pH, UA <=5.0     Specific Gravity, UA 1.035     Glucose,  mg/dL (2+)     Ketones, UA 15 mg/dL (1+)     Bilirubin, UA Negative     Blood, UA Moderate (2+)     Protein, UA >=300 mg/dL (3+)     Leuk Esterase, UA Negative     Nitrite, UA Negative     Urobilinogen, UA 1.0 E.U./dL    Narrative:      In absence of clinical symptoms, the presence of pyuria, bacteria, and/or nitrites on the urinalysis result does not correlate with infection.    Magnesium [[id number]]  (Abnormal) Collected: 10/04/23 0544    Specimen: Blood Updated: 10/04/23 0618     Magnesium 1.4 mg/dL     Comprehensive Metabolic Panel [[id number]]  (Abnormal) Collected: 10/04/23 0544    Specimen: Blood Updated: 10/04/23 0618     Glucose 197 mg/dL      BUN 27 mg/dL      Creatinine 1.45 mg/dL      Sodium 141 mmol/L      Potassium 4.3 mmol/L      Chloride 102 mmol/L      CO2 25.0 mmol/L      Calcium 9.6 mg/dL      Total Protein 7.6 g/dL      Albumin 4.0 g/dL      ALT (SGPT) 26 U/L      AST (SGOT) 25 U/L      Alkaline Phosphatase 110 U/L      Total Bilirubin 0.3 mg/dL      Globulin 3.6 gm/dL      A/G Ratio 1.1 g/dL      BUN/Creatinine Ratio 18.6     Anion Gap 14.0 mmol/L      eGFR 53.5 mL/min/1.73     Narrative:      GFR Normal >60  Chronic Kidney Disease <60  Kidney Failure <15      Acetaminophen Level [[id number]]  (Normal) Collected: 10/04/23 0544    Specimen: Blood Updated: 10/04/23 0618     Acetaminophen <5.0 mcg/mL     Narrative:      Acetaminophen Therapeutic Range  5-20 ug/mL      Hours after ingestion             Toxic Value    4 Hours                           150 ug/mL    8 Hours                            70 ug/mL   12 Hours                            40 ug/mL   16 Hours                            20 ug/mL    These values apply to a single ingestion only.     Ethanol [[id number]] Collected: 10/04/23 0544    Specimen: Blood Updated: 10/04/23 0618     Ethanol % <0.010 %     Narrative:      Plasma Ethanol Clinical Symptoms:    ETOH (%)               Clinical Symptom  .01-.05              No apparent influence  .03-.12              Euphoria, Diminished judgment and attention   .09-.25              Impaired comprehension, Muscle incoordination  .18-.30              Confusion, Staggered gait, Slurred speech  .25-.40              Markedly decreased response to stimuli, unable to stand or                        walk, vomitting, sleep or stupor  .35-.50              Comatose, Anesthesia, Subnormal body temperature        Salicylate Level [[id number]]  (Normal) Collected: 10/04/23 0544    Specimen: Blood Updated: 10/04/23 0618     Salicylate 0.3 mg/dL     High Sensitivity Troponin T [[id number]]  (Abnormal) Collected: 10/04/23 0544    Specimen: Blood Updated: 10/04/23 0618     HS Troponin T 27 ng/L     Narrative:      High Sensitive Troponin T Reference Range:  <10.0 ng/L- Negative Female for AMI  <15.0 ng/L- Negative Male for AMI  >=10 - Abnormal Female indicating possible myocardial injury.  >=15 - Abnormal Male indicating possible myocardial injury.   Clinicians would have to utilize clinical acumen, EKG, Troponin, and serial changes to determine if it is an Acute Myocardial Infarction or myocardial injury due to an underlying chronic condition.         Protime-INR [[id number]]  (Normal) Collected: 10/04/23 0544    Specimen: Blood Updated: 10/04/23 0607     Protime 24.4 Seconds      INR 2.39    aPTT [[id number]]  (Abnormal) Collected: 10/04/23 0544    Specimen: Blood Updated: 10/04/23 0607     PTT 35.2 seconds     CBC &  Differential [[id number]]  (Abnormal) Collected: 10/04/23 0544    Specimen: Blood Updated: 10/04/23 0556    Narrative:      The following orders were created for panel order CBC & Differential.  Procedure                               Abnormality         Status                     ---------                               -----------         ------                     CBC Auto Differential[[id number]]        Abnormal            Final result                 Please view results for these tests on the individual orders.    CBC Auto Differential [[id number]]  (Abnormal) Collected: 10/04/23 0544    Specimen: Blood Updated: 10/04/23 0556     WBC 13.30 10*3/mm3      RBC 4.47 10*6/mm3      Hemoglobin 13.5 g/dL      Hematocrit 40.4 %      MCV 90.4 fL      MCH 30.2 pg      MCHC 33.4 g/dL      RDW 14.1 %      RDW-SD 44.6 fl      MPV 7.6 fL      Platelets 417 10*3/mm3      Neutrophil % 86.2 %      Lymphocyte % 9.2 %      Monocyte % 3.9 %      Eosinophil % 0.0 %      Basophil % 0.7 %      Neutrophils, Absolute 11.50 10*3/mm3      Lymphocytes, Absolute 1.20 10*3/mm3      Monocytes, Absolute 0.50 10*3/mm3      Eosinophils, Absolute 0.00 10*3/mm3      Basophils, Absolute 0.10 10*3/mm3      nRBC 0.0 /100 WBC     Blood Gas, Arterial - [[id number]]  (Abnormal) Collected: 10/04/23 0545    Specimen: Arterial Blood Updated: 10/04/23 0548     Site Right Brachial     Jacob's Test N/A     pH, Arterial 7.445 pH units      pCO2, Arterial 34.6 mm Hg      pO2, Arterial 67.2 mm Hg      HCO3, Arterial 23.8 mmol/L      Base Excess, Arterial 0.2 mmol/L      Comment: Serial Number: [id number]:  [id number]        O2 Saturation, Arterial 94.0 %      Barometric Pressure for Blood Gas --     Comment: N/A        Modality Cannula     FIO2 32 %      Hemodilution No    POCT Electrolytes +HGB +HCT [[id number]]  (Abnormal) Collected: 10/04/23 0545    Specimen: Arterial Blood Updated: 10/04/23 0548     Sodium 142 mmol/L      POC Potassium 4.3 mmol/L      Ionized  Calcium 1.22 mmol/L      Comment: Serial Number: [id number]:  [id number]        Glucose 197 mg/dL      Hematocrit 42 %      Hemoglobin 14.4 g/dL     POC Lactate [[id number]]  (Normal) Collected: 10/04/23 0545    Specimen: Arterial Blood Updated: 10/04/23 0548     Lactate 0.9 mmol/L      Comment: Serial Number: [id number]:  [id number]       POC Glucose Once [[id number]]  (Abnormal) Collected: 10/04/23 0545    Specimen: Arterial Blood Updated: 10/04/23 0548     Glucose 197 mg/dL      Comment: Serial Number: [id number]:  [id number]       POC Creatinine [[id number]]  (Abnormal) Collected: 10/04/23 0545    Specimen: Arterial Blood Updated: 10/04/23 0548     Creatinine 1.35 mg/dL      Comment: Serial Number: [id number]:  [id number]        eGFR 58.3 mL/min/1.73     POC CHEM 8 [[id number]]  (Abnormal) Collected: 10/04/23 0544    Specimen: Venous Blood Updated: 10/04/23 0547     Glucose 185 mg/dL      BUN 26 mg/dL      Creatinine 1.60 mg/dL      Sodium 142 mmol/L      POC Potassium 4.4 mmol/L      Chloride 103 mmol/L      Total CO2 24 mmol/L      Anion Gap 20.0 mmol/L      Comment: Serial Number: [id number]:  [id number]        Hemoglobin 13.6 g/dL      Hematocrit 40 %      Ionized Calcium 1.21 mmol/L      eGFR 47.5 mL/min/1.73     POC Lactate [[id number]]  (Normal) Collected: 10/04/23 0544    Specimen: Blood Updated: 10/04/23 0546     Lactate 1.2 mmol/L      Comment: Serial Number: [id number]:  [id number]       POC Glucose Once [[id number]]  (Abnormal) Collected: 10/04/23 0539    Specimen: Blood Updated: 10/04/23 0541     Glucose 173 mg/dL      Comment: Serial Number: [id number]:  [id number]               CT Head Without Contrast    Result Date: 10/4/2023  Impression: Left greater than right intraventricular hemorrhage. The finding was communicated to the emergency room physician Dr. Palafox at the time of this dictation. Electronically Signed: Joshua Cordon MD  10/4/2023 5:59 AM EDT  Workstation ID: [id number]    CT  "Angiogram Neck    Result Date: 10/4/2023  Impression: No hemodynamically significant stenosis, large vessel occlusion or aneurysm. Interventricular hemorrhage is redemonstrated. Electronically Signed: Joshua Cordon MD  10/4/2023 7:09 AM EDT  Workstation ID: [id number]    XR Chest 1 View    Result Date: 10/4/2023  Impression: ET tube is in the midthoracic trachea about 2 cm above the harriet. There is cardiomegaly with mild pulmonary vascular congestion. Electronically Signed: oJshua Cordon MD  10/4/2023 6:30 AM EDT  Workstation ID: [id number]    CT Angiogram Head    Result Date: 10/4/2023  Impression: No hemodynamically significant stenosis, large vessel occlusion or aneurysm. Interventricular hemorrhage is redemonstrated. Electronically Signed: Joshua Cordon MD  10/4/2023 7:09 AM EDT  Workstation ID: [id number]     CONSULTS   Consults       Date and Time Order Name Status Description    10/4/2023  8:50 AM Intensivist (on-call MD unless specified)      10/4/2023  5:57 AM Neurosurgery (on-call MD unless specified) Completed             CONDITION ON DISCHARGE    Stable    VITAL SIGNS  /75   Pulse 108   Temp 100.5 °F (38.1 °C) (Oral)   Resp 14   Ht 185.4 cm (73\")   Wt 83 kg (182 lb 15.7 oz)   SpO2 99%   BMI 24.14 kg/m²     Physical Exam  Vitals and nursing note reviewed.   Constitutional:       General: He is not in acute distress.     Appearance: He is ill-appearing.   HENT:      Head: Normocephalic.      Mouth/Throat:      Mouth: Mucous membranes are moist.      Pharynx: Oropharynx is clear.   Cardiovascular:      Rate and Rhythm: Normal rate and regular rhythm.   Pulmonary:      Effort: Pulmonary effort is normal.      Breath sounds: Normal breath sounds.   Abdominal:      General: Bowel sounds are normal.      Palpations: Abdomen is soft.   Musculoskeletal:      Right lower leg: No edema.      Left lower leg: No edema.   Skin:     General: Skin is warm and dry.      Findings: No rash.   Neurological:      Mental " Status: He is unresponsive.      Comments: Comfort measures only        DISCHARGE DISPOSITION  Hospice/Medical Facility ([facility] - [facility], [place])    DISCHARGE MEDICATIONS     Discharge Medications        Changes to Medications        Instructions Start Date   insulin NPH-insulin regular (70-30) 100 UNIT/ML injection  Commonly known as: humuLIN 70/30,novoLIN 70/30  What changed: additional instructions   30 Units, Subcutaneous, 2 Times Daily With Meals             Continue These Medications        Instructions Start Date   Accu-Chek Guide test strip  Generic drug: glucose blood   USE TO CHECK BLOOD SUGAR TWICE DAILY      Alcohol Wipes 70 % misc   1 each, Apply externally, 3 Times Daily Before Meals, Dx code: E11.65      atorvastatin 80 MG tablet  Commonly known as: LIPITOR   TAKE 1 TABLET BY MOUTH EVERY DAY      HM Vitamin D3 50 MCG (2000 UT) capsule  Generic drug: Cholecalciferol   2,000 Units, Oral, Daily      levETIRAcetam 1000 MG tablet  Commonly known as: KEPPRA   TAKE 1 TABLET BY MOUTH TWICE A DAY      meclizine 25 MG tablet  Commonly known as: ANTIVERT   25 mg, Oral, 3 Times Daily PRN      metFORMIN  MG 24 hr tablet  Commonly known as: GLUCOPHAGE-XR   TAKE 1 TABLET BY MOUTH THREE TIMES A DAY      metoclopramide 10 MG tablet  Commonly known as: REGLAN   TAKE 1 TABLET BY MOUTH 4 (FOUR) TIMES A DAY BEFORE MEALS & AT BEDTIME FOR 30 DAYS.      midodrine 5 MG tablet  Commonly known as: PROAMATINE   5 mg, Oral, 3 Times Daily, As needed      omeprazole 20 MG capsule  Commonly known as: priLOSEC   40 mg, Oral, 2 Times Daily      OneTouch Delica Plus [id number] misc   1 each, Does not apply, 3 Times Daily Before Meals, Dx code: E11.65      PHARMACY TO DOSE WARFARIN   Does not apply, Continuous PRN      warfarin 10 MG tablet  Commonly known as: Coumadin   TAKE 1 TABLET DAILY EXCEPT MONDAY, WEDNESDAY, AND FRIDAY; TAKE 5MG ON THOSE DAYS      vitamin B-12 1000 MCG tablet  Commonly known as: CYANOCOBALAMIN   1,000 mcg,  Oral, Daily                 DISCHARGE DIET   N/A    ACTIVITY AT DISCHARGE   N/A        FOLLOW-UP APPOINTMENTS  Future Appointments   Date Time Provider Department Center   1/23/2024  4:00 PM Omaira Irby APRN [facility]   2/19/2024  2:00 PM Al Kimbrough MD [facility]   3/12/2024  1:10 PM LAB  [facility] None   3/19/2024  1:45 PM Radha Manriquez MD [facility]   8/19/2024  3:30 PM Al Kimbrough MD [facility]           TEST RESULTS PENDING AT DISCHARGE  Pending Labs       Order Current Status    Blood Culture - Blood, Arm, Left In process    Blood Culture - Blood, Arm, Left In process              This note scribed by me for Dr. Brewer.     Time: Discharge 20 min    EMRE Tripp  10/4/2023

## 2023-10-04 NOTE — CASE MANAGEMENT/SOCIAL WORK
Continued Stay Note  RIGOBERTO Mcdermott     Patient Name: Chao Lazar  MRN: [id number]  Today's Date: 10/4/2023    Admit Date: 10/4/2023    Plan: DC Plan needs re-evaluated post extubation   Discharge Plan       Row Name 10/04/23 1316       Plan    Plan Comments Received epic chat from Dr. Brewer to see afamily again about hospice referral. To patient room and spoke with  family. They decline services at this time                            Esperanza Anderson RN

## 2023-10-04 NOTE — CONSULTS
Neurosurgery Consultation      Patient: Chao Lazar    Sex: male    YOB: 1960    Date of Admission: 10/4/2023  5:37 AM    Admitting Dx: Brain bleed [I61.9]    Reason for Consult: Intraventricular hemorrhage      Subjective     HPI:  Patient is a 63 y.o. male with a history of prior right occipital lobe CVA, seizure disorder, hypertension, and chronic warfarin use who presented to the ED early this morning after being found unresponsive at home.  Neurosurgery was consulted due to CT head findings of intraventricular hemorrhage.  History obtained from chart and discussion with family members at bedside.  Patient reportedly noncompliant with his seizure medication lately.    During my evaluation patient is intubated and sedated.  He withdraws from pain on the left, has an intact left corneal reflex, and intact gag reflex.  Does not respond to painful stimuli or corneal reflex on the right.  Patient's spouse, siblings, and son are at bedside.  They note his seizures have reportedly worsened over the past 5 to 6 years and he has been in and out of rehab facilities and nursing homes.  They are adamant that patient would not want any surgical procedures or interventions done, even if they were necessary to save his life.  Patient especially does not want to be intubated and on a respirator.      PMH:  Past Medical History:   Diagnosis Date    Asthma 3/22/2018    Coronary artery disease involving native coronary artery of native heart without angina pectoris 8/27/2020    Essential hypertension 6/28/2019    Gastroparesis 12/23/2013    History of CVA (cerebrovascular accident) 9/22/2021    Hyperlipidemia, mixed 4/3/2017    Hypoglycemia          Current Facility-Administered Medications:     etomidate (AMIDATE) injection, , , Code / Trauma / Sedation Medication, Andrea Palafox DO, 20 mg at 10/04/23 0610    midazolam (VERSED) 100 mg in 100mL NS infusion, 1-10 mg/hr, Intravenous, Titrated, Javy  Andrea Blanco DO, Last Rate: 3 mL/hr at 10/04/23 0808, 3 mg/hr at 10/04/23 0808    niCARdipine (CARDENE) 25mg in 250mL NS infusion, 5-15 mg/hr, Intravenous, Titrated, Andrea Palafox DO    propofol (DIPRIVAN) infusion 10 mg/mL 100 mL, 5-50 mcg/kg/min, Intravenous, Titrated, Andrea Palafox DO, Last Rate: 14.94 mL/hr at 10/04/23 0645, 30 mcg/kg/min at 10/04/23 0645    rocuronium (ZEMURON) injection, , , Code / Trauma / Sedation Medication, Andrea Palafox DO, 40 mg at 10/04/23 0611    [COMPLETED] Insert Peripheral IV, , , Once **AND** sodium chloride 0.9 % flush 10 mL, 10 mL, Intravenous, PRN, HottmanAndrea, DO    sodium chloride 0.9 % flush 10 mL, 10 mL, Intravenous, Q12H, Manuel, Loulou, APRN    sodium chloride 0.9 % flush 10 mL, 10 mL, Intravenous, PRN, Manuel, Loulou, APRN    sodium chloride 0.9 % infusion 40 mL, 40 mL, Intravenous, PRN, Manuel, Loulou, APRN    vancomycin IVPB 1750 mg in 0.9% Sodium Chloride (premix) 500 mL, 20 mg/kg, Intravenous, Once, Andrea Palafox DO, Last Rate: 285.7 mL/hr at 10/04/23 0815, 1,750 mg at 10/04/23 0815    Current Outpatient Medications:     Accu-Chek Guide test strip, USE TO CHECK BLOOD SUGAR TWICE DAILY, Disp: 200 each, Rfl: 4    atorvastatin (LIPITOR) 80 MG tablet, TAKE 1 TABLET BY MOUTH EVERY DAY, Disp: 90 tablet, Rfl: 1    Cholecalciferol (HM Vitamin D3) 50 MCG (2000 UT) capsule, Take 1 capsule by mouth Daily., Disp: , Rfl:     insulin NPH-insulin regular (humuLIN 70/30,novoLIN 70/30) (70-30) 100 UNIT/ML injection, Inject 30 Units under the skin into the appropriate area as directed 2 (Two) Times a Day With Meals. (Patient taking differently: Inject 30 Units under the skin into the appropriate area as directed 2 (Two) Times a Day With Meals. Pt injecting 60 units twice daily), Disp: , Rfl: 12    Isopropyl Alcohol (Alcohol Wipes) 70 % misc, Apply 1 each topically 3 (Three) Times a Day Before Meals. Dx code: E11.65, Disp:  100 each, Rfl: 2    Lancets (OneTouch Delica Plus Otwwuo55R) misc, 1 each 3 (Three) Times a Day Before Meals. Dx code: E11.65, Disp: 100 each, Rfl: 2    levETIRAcetam (KEPPRA) 1000 MG tablet, TAKE 1 TABLET BY MOUTH TWICE A DAY, Disp: 60 tablet, Rfl: 5    meclizine (ANTIVERT) 25 MG tablet, Take 1 tablet by mouth 3 (Three) Times a Day As Needed for Dizziness., Disp: , Rfl:     metFORMIN ER (GLUCOPHAGE-XR) 500 MG 24 hr tablet, TAKE 1 TABLET BY MOUTH THREE TIMES A DAY, Disp: 270 tablet, Rfl: 3    metoclopramide (REGLAN) 10 MG tablet, TAKE 1 TABLET BY MOUTH 4 (FOUR) TIMES A DAY BEFORE MEALS & AT BEDTIME FOR 30 DAYS., Disp: 120 tablet, Rfl: 6    midodrine (PROAMATINE) 5 MG tablet, TAKE 1 TABLET BY MOUTH 3 (THREE) TIMES A DAY AS NEEDED, Disp: 90 tablet, Rfl: 0    omeprazole (priLOSEC) 20 MG capsule, Take 2 capsules by mouth 2 (Two) Times a Day., Disp: , Rfl:     vitamin B-12 (CYANOCOBALAMIN) 1000 MCG tablet, Take 1 tablet by mouth Daily., Disp: , Rfl:     warfarin (Coumadin) 10 MG tablet, TAKE 1 TABLET DAILY EXCEPT MONDAY, WEDNESDAY, AND FRIDAY; TAKE 5MG ON THOSE DAYS, Disp: 30 tablet, Rfl: 3    Warfarin Sodium (PHARMACY TO DOSE WARFARIN), Continuous As Needed (for multiple CVAs, Goal INR of 2-3). Indications: Other - full anticoagulation, Disp: , Rfl:     No Known Allergies    Past Surgical History:   Procedure Laterality Date    CARDIAC CATHETERIZATION      CHOLECYSTECTOMY  2004    COLON RESECTION      [id number]    COLON RESECTION SMALL BOWEL Right 12/5/2019    Procedure: open right hemicolectomy;  Surgeon: Ronaldo Ardon MD;  Location: [facility] MAIN OR;  Service: General    COLONOSCOPY  2019    x2     CORONARY ANGIOPLASTY WITH STENT PLACEMENT  04/2017    ECHO - CONVERTED  2019    ECHO - CONVERTED  2020    FRACTURE SURGERY      collar bone as a child     KNEE ARTHROSCOPY Left 08/2003    KNEE JOINT MANIPULATION Left 04/2010    TOTAL KNEE ARTHROPLASTY Bilateral     2013,2011       Social History     Socioeconomic  History    Marital status:      Spouse name: Debbi    Number of children: 3    Years of education: 12   Tobacco Use    Smoking status: Former     Types: Cigarettes     Quit date:      Years since quittin.7    Smokeless tobacco: Former   Vaping Use    Vaping Use: Never used   Substance and Sexual Activity    Alcohol use: Yes     Comment: occasionally    Drug use: No    Sexual activity: Defer       Family History   Problem Relation Age of Onset    Irritable bowel syndrome Mother     Anxiety disorder Mother     Depression Father     Hypertension Father     Mental illness Father         committed suicide    Other Sister         back problems    Other Brother         back problems         Current Medications:  Scheduled Meds:sodium chloride, 10 mL, Intravenous, Q12H  vancomycin, 20 mg/kg, Intravenous, Once      Continuous Infusions:midazolam, 1-10 mg/hr, Last Rate: 3 mg/hr (10/04/23 0808)  niCARdipine, 5-15 mg/hr  propofol, 5-50 mcg/kg/min, Last Rate: 30 mcg/kg/min (10/04/23 0645)      PRN Meds:   etomidate    rocuronium    [COMPLETED] Insert Peripheral IV **AND** sodium chloride    sodium chloride    sodium chloride    ROS: 14 point review of systems was completed and is negative except for what is noted in HPI      Objective     Vitals:    10/04/23 0838 10/04/23 0843 10/04/23 0848 10/04/23 0853   BP: 153/88 145/85  143/80   Pulse: 113 113 115 113   Resp:       Temp:       TempSrc:       SpO2: 100% 100% 100% 100%   Weight:       Height:           Physical exam  General  - Intubated, sedated  HEENT  - Normocephalic, atraumatic  - Pupils 2-3 mm, equal, round, and reactive to light bilaterally  Cardiac  - Slightly tachycardic; regular rhythm  Respiratory  - Intubated and on a ventilator  Musculoskeletal  Skin  - Warm and dry, no bleeding, bruising, or rash      NEUROLOGIC    GENERAL:  - Intubated; sedated  - GCS: 6-intubated (1-1-4)  - Pupils 2 to 3 mm, equal round and reactive bilaterally  - Positive  corneal reflex on the left  - Negative corneal reflex on the right  - Intact gag reflex  MOTOR:  - Withdraws from pain on the left side  - No movement or reflexes on the right side  REFLEXES:  - No response to Babinski            RESULTS REVIEW:  Results from last 7 days   Lab Units 10/04/23  0645 10/04/23  0545 10/04/23  0544   WBC 10*3/mm3  --   --  13.30*   HEMOGLOBIN g/dL  --   --  13.5   HEMOGLOBIN, POC g/dL 12.1 14.4 13.6   HEMATOCRIT %  --   --  40.4   HEMATOCRIT POC % 36* 42 40   PLATELETS 10*3/mm3  --   --  417        Results from last 7 days   Lab Units 10/04/23  0645 10/04/23  0545 10/04/23  0544   SODIUM mmol/L  --   --  141   POTASSIUM mmol/L  --   --  4.3   CHLORIDE mmol/L  --   --  102   CO2 mmol/L  --   --  25.0   BUN mg/dL  --   --  27*   CREATININE mg/dL 1.22 1.35 1.60*  1.45*   CALCIUM mg/dL  --   --  9.6   BILIRUBIN mg/dL  --   --  0.3   ALK PHOS U/L  --   --  110   ALT (SGPT) U/L  --   --  26   AST (SGOT) U/L  --   --  25   GLUCOSE mg/dL  --   --  197*             CT Head Without Contrast    Result Date: 10/4/2023  CT HEAD WO CONTRAST Date of Exam: 10/4/2023 5:40 AM EDT Indication: ams. Comparison: CT scan of the head from 5/24/2023; MRI of the brain from 5/23/2023 Technique: Axial CT images were obtained of the head without contrast administration.  Coronal reconstructions were performed.  Automated exposure control and iterative reconstruction methods were used. Findings: There is a moderate amount of left-sided intraventricular hemorrhage primarily seen in the temporal horn and posterior horn the left lateral ventricle. There is a small amount of hemorrhage layering in the posterior horn of the right lateral ventricle. There is no evidence of mass effect or midline shift. There are no definite calvarial extra-axial fluid collections. There is evidence of an old right occipital infarct which is stable.     Impression: Impression: Left greater than right intraventricular hemorrhage. The  finding was communicated to the emergency room physician Dr. Palafox at the time of this dictation. Electronically Signed: Joshua Cordon MD  10/4/2023 5:59 AM EDT  Workstation ID: [id number]        CT Angiogram Head    Result Date: 10/4/2023  CT ANGIOGRAM HEAD, CT ANGIOGRAM NECK Date of Exam: 10/4/2023 6:30 AM EDT Indication: intracerebral hemorrhage. Comparison: CT scan of the head from 0542 hours of the same day CT angiogram of the head and neck from May 22, 2023 Technique: CTA of the head was performed after the uneventful intravenous administration of iodinated contrast. Reconstructed coronal and sagittal images were also obtained. In addition, a 3-D volume rendered image was created for interpretation. Automated exposure control and iterative reconstruction methods were used. Findings: Aortic arch: The aortic arch is unremarkable.  There is conventional 3 vessel arch anatomy.  The right brachiocephalic and visualized bilateral subclavian arteries are within normal limits. Right carotid: The right CCA arises as expected from the brachiocephalic trunk.  The CCA follows a normal course and appears normal caliber.  The carotid bifurcation is unremarkable.  The external carotid artery and distal branches appear within normal limits.  The cervical internal carotid artery follows a normal course and appears normal caliber throughout the neck and into the head.  The intracranial ICA segments appear within normal limits.  The ophthalmic artery origin is normal.  The A1 and M1 segments appear within normal limits.  The visualized distal NEGRITO and MCA branches appear patent.  There is  a patent  anterior communicating artery. There is a patent  posterior communicating artery. Left carotid: The left CCA arises as expected from the aortic arch.   The CCA follows a normal course and appears normal caliber.  The carotid bifurcation is unremarkable.  The external carotid artery and distal branches appear within normal limits.  The   cervical internal carotid artery follows a normal course and appears normal caliber throughout the neck and into the head.  The intracranial ICA segments appear within normal limits.  The ophthalmic artery origin is normal.  The A1 and M1 segments appear within normal limits.  The visualized distal NEGRITO and MCA branches appear patent.  There is a patent  posterior communicating artery. Posterior circulation: Vertebral arteries arise as expected from ipsilateral subclavian arteries.  The vertebral arteries are codominant.  The vertebral arteries follow a normal course and appear normal caliber throughout the neck and into the head.  The  V4 segments are patent.  Visualized posterior inferior cerebellar arteries are within normal limits.  The basilar artery is normal caliber.  Superior cerebellar arteries are patent. There is chronic occlusion of the right posterior cerebral artery as before. Nonvascular findings: Intraventricular hemorrhage is again seen as described on noncontrast CT from earlier in the day. Orbits are within normal limits.  Paranasal sinuses and mastoid air cells appear well aerated.  Superficial soft tissues and underlying musculature appear within normal limits. There is mild scarring in the lung apices posteriorly. There is a endotracheal tube in place with the tip just above the harriet. There are coronary artery calcifications. The thyroid and salivary glands  appear unremarkable.  The suprahyoid and infrahyoid spaces of the neck appear unremarkable.  There is no evidence of cervical lymphadenopathy or a neck mass.  There are no acute osseous abnormalities or destructive bone lesions.     Impression: Impression: No hemodynamically significant stenosis, large vessel occlusion or aneurysm. Interventricular hemorrhage is redemonstrated. Electronically Signed: Joshua Cordon MD  10/4/2023 7:09 AM EDT  Workstation ID: [id number]           Assessment     MDM: This is a 63 y.o. male being evaluated in the  ED by neurosurgery for new intraventricular hemorrhage.  He has a moderate amount of acute hemorrhage in the left temporal and posterior horns as well as a small amount of hemorrhage in the right posterior horn.  Evidence of prior right occipital lobe ischemic event.  No significant midline shift, mass effect, or ventriculomegaly.  CT angiogram was negative for underlying vascular anomaly.     I do not feel patient is hydrocephalic at this time, and given the dependency of this blood on imaging I think it is unlikely to progress to obstruction of the foramen of Zambrano.  Ultimately, the intraventricular hemorrhage does not explain patient's current neurologic status.  Seizure activity, especially in the light of reported noncompliance with seizure medications, seems most likely.  We will get repeat CT imaging at the 6-hour interval to evaluate for progression.  Will defer to neurology for further work-up probable seizure activity.    I did discuss that if patient did develop hydrocephalus he could warrant EVD to help regulate his pressure.  This is unlikely to progress to this point, however certainly not impossible.  Patient family members were adamant that he would not want any neurosurgical procedures done even if they were necessary to preserve life.    Ultimately there is not much more to offer from a neurosurgical standpoint, however we will review patient's CT head when completed and will be available for any questions or concerns.  Further management of hemorrhagic stroke and seizures per neurology and the primary team.  Goals of care per primary team.  Please call with any questions or concerns.          Plan     Intraventricular hemorrhage  Hemorrhagic stroke  - No urgent/emergent neurosurgical intervention necessary at this time  - SBP <150  - Sodium goal: 140-145  - Hold anticoagulant and antiplatelet medications  - Routine neurologic checks  - Repeat CT head at 6-hour interval from initial image  -  Seizure prophylaxis per neurology    Hypertension  - SBP <150  - Management per primary team    Seizure disorder  - Seizure evaluation and management per neurology    Neurosurgery will follow peripherally moving forward.  Please call with any questions or concerns.    I discussed my assessment and recommendations with Dr. Martinez, Dr. Dumont, and the ED staff      Jorge Squires PA-C  10/4/2023  09:16 EDT      Part of this note may be an electronic transcription/translation of spoken language to printed text using the Dragon Dictation System.

## 2023-10-04 NOTE — H&P
Critical Care History and Physical     Chao Lazar : 1960 MRN:[id number] LOS:0 ROOM:      Reason for admission: Brain bleed     Assessment / Plan     Left greater than right intraventricular hemorrhage  Patient evaluated by neurosurgery.  Offered possible EVD placement.  Denied by family.  Patient currently intubated for airway protection  Plans to move forward with comfort measures only once family is ready.     Seizure disorder  Reported noncompliance with medication  Patient presented with seizure-like activity  Patient given Keppra and started on propofol drip  Patient not being transferred for continuous EEG due to family honoring patient's wishes    Chronic:  Diabetes mellitus --hemoglobin A1c on 2023 7.20.  Patient on insulin and metformin at home.  Hyperlipidemia --- patient on Lipitor at home.  Lipid profile on 2023 triglycerides 196, HDL 36  History of CVA --patient on warfarin at home.  INR 2.39 today.  Reversed due to intraventricular hemorrhage.    Code Status (Patient has no pulse and is not breathing): CPR (Attempt to Resuscitate)  Medical Interventions (Patient has pulse or is breathing): Full Support       Nutrition:   NPO Diet NPO Type: Strict NPO     DVT prophylaxis:  Mechanical DVT prophylaxis orders are present.     History of Present illness     Chao Lazar is a 63 y.o. male with PMH of CVA (), Hypertension, HLD, CAD, presented to the hospital after being found unresponsive by family members, and was admitted with a principal diagnosis of Brain bleed. Last known well was around 8 to 9 PM last night.  There was some question of patient having a seizure due to noncompliance with medication.    Upon arrival to ER patient was taken directly to CT which demonstrated an intraventricular hemorrhage.  Patient was also found to be febrile and appeared to be having seizure-like activity.  The patient was intubated, given Keppra and placed on a propofol drip.  Patient is  on warfarin at home.  Patient was given Kcentra and vitamin K.  Neurosurgery evaluated patient and discussed with family the possibility of placement of an EVD.  However patient's family feels that the patient would not want to continue with aggressive measures.  In ER patient's family discussed intervention versus comfort measures.  At this time family is waiting on more family to arrive and will likely move forward with transitioning patient to comfort measures only.    ACP: No CPR, No Interventions. Plans for comfort measures only when family is ready.     Patient was seen and examined on 10/04/23 at 09:14 EDT .    Subjective / Review of systems     Review of Systems Patient intubated and sedated.     Past Medical/Surgical/Social/Family History & Allergies     Past Medical History:   Diagnosis Date    Asthma 3/22/2018    Coronary artery disease involving native coronary artery of native heart without angina pectoris 8/27/2020    Essential hypertension 6/28/2019    Gastroparesis 12/23/2013    History of CVA (cerebrovascular accident) 9/22/2021    Hyperlipidemia, mixed 4/3/2017    Hypoglycemia       Past Surgical History:   Procedure Laterality Date    CARDIAC CATHETERIZATION      CHOLECYSTECTOMY  2004    COLON RESECTION      [id number]    COLON RESECTION SMALL BOWEL Right 12/5/2019    Procedure: open right hemicolectomy;  Surgeon: Ronaldo Ardon MD;  Location: [facility] OR;  Service: General    COLONOSCOPY  2019    x2     CORONARY ANGIOPLASTY WITH STENT PLACEMENT  04/2017    ECHO - CONVERTED  2019    ECHO - CONVERTED  2020    FRACTURE SURGERY      collar bone as a child     KNEE ARTHROSCOPY Left 08/2003    KNEE JOINT MANIPULATION Left 04/2010    TOTAL KNEE ARTHROPLASTY Bilateral     2013,2011      Social History     Socioeconomic History    Marital status:      Spouse name: Debbi    Number of children: 3    Years of education: 12   Tobacco Use    Smoking status: Former     Types: Cigarettes      Quit date:      Years since quittin.7    Smokeless tobacco: Former   Vaping Use    Vaping Use: Never used   Substance and Sexual Activity    Alcohol use: Yes     Comment: occasionally    Drug use: No    Sexual activity: Defer      Family History   Problem Relation Age of Onset    Irritable bowel syndrome Mother     Anxiety disorder Mother     Depression Father     Hypertension Father     Mental illness Father         committed suicide    Other Sister         back problems    Other Brother         back problems      No Known Allergies   Social Determinants of Health     Tobacco Use: Medium Risk    Smoking Tobacco Use: Former    Smokeless Tobacco Use: Former    Passive Exposure: Not on file   Alcohol Use: Not At Risk    Frequency of Alcohol Consumption: Never    Average Number of Drinks: Patient does not drink    Frequency of Binge Drinking: Never   Financial Resource Strain: Not on file   Food Insecurity: Not on file   Transportation Needs: Not on file   Physical Activity: Not on file   Stress: Not on file   Social Connections: Not on file   Intimate Partner Violence: Not on file   Depression: Not at risk    PHQ-2 Score: 0   Housing Stability: Not on file        Home Medications     Prior to Admission medications    Medication Sig Start Date End Date Taking? Authorizing Provider   Accu-Chek Guide test strip USE TO CHECK BLOOD SUGAR TWICE DAILY 11/3/22   Radha Manriquez MD   atorvastatin (LIPITOR) 80 MG tablet TAKE 1 TABLET BY MOUTH EVERY DAY 23   Al Kimbrough MD   Cholecalciferol (HM Vitamin D3) 50 MCG (2000 UT) capsule Take 1 capsule by mouth Daily.    Provider, MD Jaylene   insulin NPH-insulin regular (humuLIN 70/30,novoLIN 70/30) (70-30) 100 UNIT/ML injection Inject 30 Units under the skin into the appropriate area as directed 2 (Two) Times a Day With Meals.  Patient taking differently: Inject 30 Units under the skin into the appropriate area as directed 2 (Two) Times a Day With Meals.  Pt injecting 60 units twice daily 5/27/23   Manjinder Brannon DO   Isopropyl Alcohol (Alcohol Wipes) 70 % misc Apply 1 each topically 3 (Three) Times a Day Before Meals. Dx code: E11.65 5/16/22   Salazar Bower MD   Lancets (OneTouch Delica Plus [id number]) misc 1 each 3 (Three) Times a Day Before Meals. Dx code: E11.65 5/16/22   Salazar Bower MD   levETIRAcetam (KEPPRA) 1000 MG tablet TAKE 1 TABLET BY MOUTH TWICE A DAY 8/10/23   Omaira Irby APRN   meclizine (ANTIVERT) 25 MG tablet Take 1 tablet by mouth 3 (Three) Times a Day As Needed for Dizziness. 5/16/22   Salazar Bower MD   metFORMIN ER (GLUCOPHAGE-XR) 500 MG 24 hr tablet TAKE 1 TABLET BY MOUTH THREE TIMES A DAY 7/26/23   Radha Manriquez MD   metoclopramide (REGLAN) 10 MG tablet TAKE 1 TABLET BY MOUTH 4 (FOUR) TIMES A DAY BEFORE MEALS & AT BEDTIME FOR 30 DAYS. 3/7/23   Al Kimbrough MD   midodrine (PROAMATINE) 5 MG tablet TAKE 1 TABLET BY MOUTH 3 (THREE) TIMES A DAY AS NEEDED 4/26/23   Al Kimbrough MD   omeprazole (priLOSEC) 20 MG capsule Take 2 capsules by mouth 2 (Two) Times a Day.    Provider, MD Jaylene   vitamin B-12 (CYANOCOBALAMIN) 1000 MCG tablet Take 1 tablet by mouth Daily.    Provider, MD Jaylene   warfarin (Coumadin) 10 MG tablet TAKE 1 TABLET DAILY EXCEPT MONDAY, WEDNESDAY, AND FRIDAY; TAKE 5MG ON THOSE DAYS 8/2/23   Al Kimbrough MD   Warfarin Sodium (PHARMACY TO DOSE WARFARIN) Continuous As Needed (for multiple CVAs, Goal INR of 2-3). Indications: Other - full anticoagulation 5/27/23   Manjinder Brannon DO        Objective / Physical Exam     Vital signs:  Temp: (!) 101.1 °F (38.4 °C)  BP: 143/80  Heart Rate: 113  Resp: (!) 36  SpO2: 100 %  Weight: 83 kg (182 lb 15.7 oz)    Admission Weight: Weight: 83 kg (182 lb 15.7 oz)    Physical Exam  Vitals and nursing note reviewed.   Constitutional:       Interventions: He is sedated and intubated.   HENT:       Head: Normocephalic.      Mouth/Throat:      Mouth: Mucous membranes are moist.      Pharynx: Oropharynx is clear.   Eyes:      Pupils: Pupils are equal, round, and reactive to light.   Cardiovascular:      Rate and Rhythm: Normal rate and regular rhythm.      Pulses: Normal pulses.      Heart sounds: Normal heart sounds.   Pulmonary:      Effort: Pulmonary effort is normal. He is intubated.      Breath sounds: Normal breath sounds.   Abdominal:      General: Bowel sounds are normal.      Palpations: Abdomen is soft.   Musculoskeletal:      Right lower leg: No edema.      Left lower leg: No edema.   Skin:     General: Skin is warm and dry.      Findings: No rash.   Neurological:      Comments: Intubated and sedated.         Labs     Results from last 7 days   Lab Units 10/04/23  0645 10/04/23  0545 10/04/23  0544   WBC 10*3/mm3  --   --  13.30*   HEMATOCRIT %  --   --  40.4   HEMATOCRIT POC % 36* 42 40   PLATELETS 10*3/mm3  --   --  417      Results from last 7 days   Lab Units 10/04/23  0645 10/04/23  0545 10/04/23  0544   SODIUM mmol/L  --   --  141   POTASSIUM mmol/L  --   --  4.3   CHLORIDE mmol/L  --   --  102   CO2 mmol/L  --   --  25.0   BUN mg/dL  --   --  27*   CREATININE mg/dL 1.22 1.35 1.60*  1.45*        Imaging     CT Head Without Contrast    Result Date: 10/4/2023  Impression: Left greater than right intraventricular hemorrhage. The finding was communicated to the emergency room physician Dr. Palafox at the time of this dictation. Electronically Signed: Joshua Cordon MD  10/4/2023 5:59 AM EDT  Workstation ID: [id number]    CT Angiogram Neck    Result Date: 10/4/2023  Impression: No hemodynamically significant stenosis, large vessel occlusion or aneurysm. Interventricular hemorrhage is redemonstrated. Electronically Signed: Joshua Cordon MD  10/4/2023 7:09 AM EDT  Workstation ID: [id number]    XR Chest 1 View    Result Date: 10/4/2023  Impression: ET tube is in the midthoracic trachea about 2 cm above the  harriet. There is cardiomegaly with mild pulmonary vascular congestion. Electronically Signed: Joshua Cordon MD  10/4/2023 6:30 AM EDT  Workstation ID: [id number]    CT Angiogram Head    Result Date: 10/4/2023  Impression: No hemodynamically significant stenosis, large vessel occlusion or aneurysm. Interventricular hemorrhage is redemonstrated. Electronically Signed: Joshua Cordon MD  10/4/2023 7:09 AM EDT  Workstation ID: [id number]       Chest X ray: My independent assessment showed no infiltrates or effusions    EKG: My independent evaluation showed normal sinus rhythm, no ST -T changes    Current Medications     Scheduled Meds:  sodium chloride, 10 mL, Intravenous, Q12H         Continuous Infusions:  midazolam, 1-10 mg/hr, Last Rate: 3 mg/hr (10/04/23 0808)  niCARdipine, 5-15 mg/hr  propofol, 5-50 mcg/kg/min, Last Rate: 50 mcg/kg/min (10/04/23 0950)           EMRE Tripp   Critical Care  10/04/23   09:14 EDT

## 2023-10-04 NOTE — PLAN OF CARE
Goal Outcome Evaluation:      Orders received per stroke protocol. Chart reviewed and events noted. Patient currently sedated and on a ventilator. Noted family possibly to pursue comfort care. Patient not appropriate for therapy eval due to medical condition. Will complete order and sign off at this time.

## 2023-10-04 NOTE — PROCEDURES
[facility]    Description of procedure: This EEG was obtained using 10-lead, 8 channel system positioned circumferentially without any parasagittal coverage (rapid EEG).  Computer selected EEG is reviewed as well as the background features and all clinically significant events.  Clarity algorithm utilized and implemented to provide analysis of underlying activity and seizure/status detection used to facilitate reading.    Description of recording:    This recording shows diffuse background slowing and occasional delta slowing and sleep  Nothing suggesting seizure or status              Impression:  Abnormal EEG with diffuse slowing, nothing suggesting seizure or status    Comment: Abnormal EEG does not rule out the diagnosis of a seizure disorder;   Clinical correlation is advised  If there is still persistent suspicion for continued seizure like activity, I would advise obtaining an electroencephalogram with 10-20 International system for improved spatial resolution and parasagittal coverage (routine EEG).

## 2023-10-04 NOTE — PROGRESS NOTES
Administered the Hinduism Sacrament of the Last Rites with the family present.  
Chp contacted regarding request for Sacrament of the Sick, contacted [employer] (Fr. Elias Black). Though Fr. Griffin was not scheduled to work he agreed to come in and provide the sacrament for the family. En route. ICU/ family informed of Fr. Griffin's intention to provide the sacrament @ 11:13 am.   
Enter Query Response Below      Query Response: Intubated for airway protection             If applicable, please update the problem list.     Patient: Chao Lazar        : 1960  Account: [id number]           Admit Date:         How to Respond to this query:       a. Click New Note     b. Answer query within the yellow box.                c. Update the Problem List, if applicable.      If you have any questions about this query contact me at: [email or web address]     :    63 year old male admitted with intraventricular  hemorrhage.  Clinical indicators:  H&P  history of present illness: Patient was also found to be febrile and appeared to be having seizure-like activity. The patient was intubated, given Keppra and placed on a propofol drip.  10/4 Respiratory rate 36.  Treatments:  Plans for comfort measures only when family is ready. Extubated 10/4/23 at 1322.    After study, please further specify:    Acute respiratory failure  Patient was intubated for airway protection only   Other _______________  Unable to determine    By submitting this query, we are merely seeking further clarification of documentation to accurately reflect all conditions that you are monitoring, evaluating, treating or that extend the hospitalization or utilize additional resources of care. Please utilize your independent clinical judgment when addressing the question(s) above.     This query and your response, once completed, will be entered into the legal medical record.    Sincerely,  Tiki Hernandez RN  Clinical Documentation Integrity Program     
Patient terminally extubated at this time per MD order.  Family present at bedside.    
no

## 2023-10-04 NOTE — H&P
[facility] Medicine Services      Patient Name: Chao Lazar  : 1960  MRN: [id number]  Primary Care Physician:  Al Kimbrough MD  Date of admission: 10/4/2023      Subjective      Chief Complaint: altered mental status    History of Present Illness: Chao Lazar is a 63 y.o. male with PMHx of HTN, CAD, h/o CVA on warfarin, h/o hemorrhagic stroke, HLD, Asthma who presented to [facility] on 10/4/2023 complaining of altered mental status.  Of note, due to condition, HPI obtained from available records. LwK around 8-9pm the night prior.  There were possible reports of seizure activity 2/2 non-compliance with medication.  CT head on 10/4/23 showed L>R intraventricular hemorrhage. Patient was on Warfarin, INR 2.39, this was reversed Patient evaluated by neurosurgery, offered possible EVD placement but family declined.  Patient moved to ICU and intubated for Airway protection, also started on Keppra.  Not transferred for continuous EEG per family wishes.  Family elected to make patient comfort care with [facility] consult.  Patient made comfort care and terminally extubated as per family wishes and patient known wishes.  Patient made GIP with [facility].  Hospitalist consulted for medical management.      ROS   12 point ROS reviewed and negative except as mentioned above      Personal History     Past Medical History:   Diagnosis Date    Asthma 3/22/2018    Coronary artery disease involving native coronary artery of native heart without angina pectoris 2020    Essential hypertension 2019    Gastroparesis 2013    History of CVA (cerebrovascular accident) 2021    Hyperlipidemia, mixed 4/3/2017    Hypoglycemia        Past Surgical History:   Procedure Laterality Date    CARDIAC CATHETERIZATION      CHOLECYSTECTOMY      COLON RESECTION      [id number]    COLON RESECTION SMALL BOWEL Right 2019    Procedure: open right hemicolectomy;  Surgeon:  Ronaldo Ardon MD;  Location: [facility] OR;  Service: General    COLONOSCOPY  2019    x2     CORONARY ANGIOPLASTY WITH STENT PLACEMENT  04/2017    ECHO - CONVERTED  2019    ECHO - CONVERTED  2020    FRACTURE SURGERY      collar bone as a child     KNEE ARTHROSCOPY Left 08/2003    KNEE JOINT MANIPULATION Left 04/2010    TOTAL KNEE ARTHROPLASTY Bilateral     2013,2011       Family History: family history includes Anxiety disorder in his mother; Depression in his father; Hypertension in his father; Irritable bowel syndrome in his mother; Mental illness in his father; Other in his brother and sister. Otherwise pertinent FHx was reviewed and not pertinent to current issue.    Social History:  reports that he quit smoking about 16 years ago. His smoking use included cigarettes. He has quit using smokeless tobacco. He reports current alcohol use. He reports that he does not use drugs.    Home Medications:  Prior to Admission Medications       Prescriptions Last Dose Informant Patient Reported? Taking?    Accu-Chek Guide test strip   No No    USE TO CHECK BLOOD SUGAR TWICE DAILY    atorvastatin (LIPITOR) 80 MG tablet   No No    TAKE 1 TABLET BY MOUTH EVERY DAY    Cholecalciferol (HM Vitamin D3) 50 MCG (2000 UT) capsule   Yes No    Take 1 capsule by mouth Daily.    insulin NPH-insulin regular (humuLIN 70/30,novoLIN 70/30) (70-30) 100 UNIT/ML injection   No No    Inject 30 Units under the skin into the appropriate area as directed 2 (Two) Times a Day With Meals.    Patient taking differently:  Inject 30 Units under the skin into the appropriate area as directed 2 (Two) Times a Day With Meals. Pt injecting 60 units twice daily    Isopropyl Alcohol (Alcohol Wipes) 70 % misc   No No    Apply 1 each topically 3 (Three) Times a Day Before Meals. Dx code: E11.65    Lancets (OneTouch Delica Plus [id number]) misc   No No    1 each 3 (Three) Times a Day Before Meals. Dx code: E11.65    levETIRAcetam (KEPPRA) 1000 MG tablet    No No    TAKE 1 TABLET BY MOUTH TWICE A DAY    meclizine (ANTIVERT) 25 MG tablet   No No    Take 1 tablet by mouth 3 (Three) Times a Day As Needed for Dizziness.    metFORMIN ER (GLUCOPHAGE-XR) 500 MG 24 hr tablet   No No    TAKE 1 TABLET BY MOUTH THREE TIMES A DAY    metoclopramide (REGLAN) 10 MG tablet   No No    TAKE 1 TABLET BY MOUTH 4 (FOUR) TIMES A DAY BEFORE MEALS & AT BEDTIME FOR 30 DAYS.    midodrine (PROAMATINE) 5 MG tablet   No No    TAKE 1 TABLET BY MOUTH 3 (THREE) TIMES A DAY AS NEEDED    omeprazole (priLOSEC) 20 MG capsule   Yes No    Take 2 capsules by mouth 2 (Two) Times a Day.    vitamin B-12 (CYANOCOBALAMIN) 1000 MCG tablet   Yes No    Take 1 tablet by mouth Daily.    warfarin (Coumadin) 10 MG tablet   No No    TAKE 1 TABLET DAILY EXCEPT MONDAY, WEDNESDAY, AND FRIDAY; TAKE 5MG ON THOSE DAYS    Warfarin Sodium (PHARMACY TO DOSE WARFARIN)   No No    Continuous As Needed (for multiple CVAs, Goal INR of 2-3). Indications: Other - full anticoagulation              Allergies:  No Known Allergies    Objective      Vitals:   Temp:  [100.5 °F (38.1 °C)-101.1 °F (38.4 °C)] 100.5 °F (38.1 °C)  Heart Rate:  [] 108  Resp:  [14-36] 14  BP: (119-179)/() 119/75  FiO2 (%):  [40 %-100 %] 40 %    Physical Exam  Constitutional:       General: He is not in acute distress.     Appearance: He is not toxic-appearing.      Comments: Patient sedated   HENT:      Head: Normocephalic and atraumatic.      Nose: Nose normal. No congestion.      Mouth/Throat:      Pharynx: No oropharyngeal exudate.   Eyes:      General: No scleral icterus.  Cardiovascular:      Rate and Rhythm: Normal rate and regular rhythm.      Heart sounds: No murmur heard.    No friction rub. No gallop.   Pulmonary:      Breath sounds: Rales present. No wheezing or rhonchi.   Abdominal:      General: There is no distension.      Tenderness: There is no abdominal tenderness. There is no guarding.   Musculoskeletal:         General: No swelling  or deformity.      Cervical back: Normal range of motion. No rigidity.      Right lower leg: No edema.      Left lower leg: No edema.   Skin:     Coloration: Skin is not jaundiced.      Findings: No bruising or lesion.   Neurological:      Mental Status: He is disoriented.      Cranial Nerves: No cranial nerve deficit.      Motor: Weakness present.        Result Review    Result Review:  I have personally reviewed the results from the time of this admission to 10/4/2023 19:08 EDT and agree with these findings:  [x]  Laboratory  []  Microbiology  [x]  Radiology  []  EKG/Telemetry   []  Cardiology/Vascular   []  Pathology  [x]  Old records  []  Other:        Assessment & Plan        Active Hospital Problems:  Active Hospital Problems    Diagnosis     **Hospice care patient      Plan:     #Intraventricular hemorrhage  #Supratherapeutic INR  #Seizure  #CAD  #h/o CVA  #Anxiety  #Depression    - Patient made comfort care due to Intraventricular hemorrhage  - terminally extubated on 10/4/2023  - Patient made GIP Hospice with [facility] as per family and patient known wishes  - Family agreeable with tonya and PRN comfort meds, non-comfort meds to be discontinued  - Dilaudid 1mg IV q4h  - Ativan 1mg IV q6h  - Dilaudid 1mg IV q15 mins PRN pain/dyspnea  - Ativan 1mg IV q15 mins PRN agitation  - Robinul Tonya and PRN    DVT prophylaxis: Comfort care      CODE STATUS:    Medical Intervention Limits: NO intubation (DNI); NO vasopressors  Level Of Support Discussed With: Next of Kin (If No Surrogate)  Code Status (Patient has no pulse and is not breathing): No CPR (Do Not Attempt to Resuscitate)  Medical Interventions (Patient has pulse or is breathing): Limited Support  Comments: Plan to transition to comfort later    Admission Status:  I believe this patient meets hospice status.    I discussed the patient's findings and my recommendations with family and nursing staff.    This patient has been examined wearing appropriate Personal  Protective Equipment and discussed with  Family and Nursing staff . 10/04/23      Signature: Electronically signed by Brent Ritter DO, 10/04/23, 7:46 PM EDT.

## 2023-10-05 NOTE — PROGRESS NOTES
[facility]        Patient Care Team:  Al Kimbrough MD as PCP - General  Al Kimbrough MD as PCP - Family Medicine    Chief Complaint:    Chief Complaint   Patient presents with    Diabetes     FU / DM Type 2 /  / Ate @ 12:00pm / insulin 5;00am          Subjective   Here for diabetes f/u  Blood sugars: mid 100's  Exercise program: using walker  Taking vit D    Interval History:     Patient Comments:  has home health -[facility]  Patient Denies: hypoglycemia  History taken from: patient    Review of Systems:   Review of Systems   HENT:  Negative for trouble swallowing.    Eyes:  Positive for blurred vision.   Gastrointestinal:  Negative for nausea.   Endocrine: Negative for polyuria.   Musculoskeletal:  Positive for gait problem.   Neurological:  Negative for headache.   Lost  6 lv since last visit    Objective     Vital Signs  Temp:  [98.8 °F (37.1 °C)-101.1 °F (38.4 °C)] 98.8 °F (37.1 °C)  Heart Rate:  [] 108  Resp:  [14-36] 14  BP: (119-179)/() 119/75  FiO2 (%):  [40 %-100 %] 40 %    Physical Exam:     General Appearance:    Alert, cooperative, in no acute distress   Head:    Normocephalic, without obvious abnormality, atraumatic   Eyes:            Lids and lashes normal, conjunctivae and sclerae normal, no   icterus, no pallor, corneas clear, PERRLA   Throat:   No oral lesions,  oral mucosa moist. Edentulous   Neck:   No adenopathy, supple,  no thyromegaly, no carotid bruit   Lungs:     Clear     Heart:    Regular rhythm and normal rate   Chest Wall:    No abnormalities observed   Abdomen:     Normal bowel sounds, soft                 Extremities:   Coarse hand tremors, hammer toes             Pulses:   Pulses palpable and equal bilaterally   Skin:   Dry   Neurologic:  DTR absent, unable to feel the 10g monofilament          Results Review:    I have reviewed the patient's new clinical results, labs & imaging.    Medication Review:   Prior to Admission  medications    Medication Sig Start Date End Date Taking? Authorizing Provider   Accu-Chek Guide test strip USE TO CHECK BLOOD SUGAR TWICE DAILY 11/3/22  Yes Radha Manriquez MD   atorvastatin (LIPITOR) 80 MG tablet TAKE 1 TABLET BY MOUTH EVERY DAY 6/30/23  Yes Al Kimbrough MD   Cholecalciferol (HM Vitamin D3) 50 MCG (2000 UT) capsule Take 1 capsule by mouth Daily.   Yes Provider, MD Jaylene   insulin NPH-insulin regular (humuLIN 70/30,novoLIN 70/30) (70-30) 100 UNIT/ML injection Inject 30 Units under the skin into the appropriate area as directed 2 (Two) Times a Day With Meals.  Patient taking differently: Inject 30 Units under the skin into the appropriate area as directed 2 (Two) Times a Day With Meals. Pt injecting 60 units twice daily 5/27/23  Yes Manjinder Brannon, DO   Isopropyl Alcohol (Alcohol Wipes) 70 % misc Apply 1 each topically 3 (Three) Times a Day Before Meals. Dx code: E11.65 5/16/22  Yes Salazar Bower MD   Lancets (OneTouch Delica Plus Ccukdf63A) misc 1 each 3 (Three) Times a Day Before Meals. Dx code: E11.65 5/16/22  Yes Salazar Bower MD   levETIRAcetam (KEPPRA) 1000 MG tablet TAKE 1 TABLET BY MOUTH TWICE A DAY 8/10/23  Yes Omaira Irby APRN   meclizine (ANTIVERT) 25 MG tablet Take 1 tablet by mouth 3 (Three) Times a Day As Needed for Dizziness. 5/16/22  Yes Salazar Bower MD   metFORMIN ER (GLUCOPHAGE-XR) 500 MG 24 hr tablet TAKE 1 TABLET BY MOUTH THREE TIMES A DAY 7/26/23  Yes Radha Manriquez MD   metoclopramide (REGLAN) 10 MG tablet TAKE 1 TABLET BY MOUTH 4 (FOUR) TIMES A DAY BEFORE MEALS & AT BEDTIME FOR 30 DAYS. 3/7/23  Yes Al Kimbrough MD   midodrine (PROAMATINE) 5 MG tablet TAKE 1 TABLET BY MOUTH 3 (THREE) TIMES A DAY AS NEEDED 4/26/23  Yes Al Kimbrough MD   omeprazole (priLOSEC) 20 MG capsule Take 2 capsules by mouth 2 (Two) Times a Day.   Yes Provider, MD Jaylene   vitamin B-12  (CYANOCOBALAMIN) 1000 MCG tablet Take 1 tablet by mouth Daily.   Yes Provider, MD Jaylene   warfarin (Coumadin) 10 MG tablet TAKE 1 TABLET DAILY EXCEPT MONDAY, WEDNESDAY, AND FRIDAY; TAKE 5MG ON THOSE DAYS 8/2/23  Yes Al Kimbrough MD   Warfarin Sodium (PHARMACY TO DOSE WARFARIN) Continuous As Needed (for multiple CVAs, Goal INR of 2-3). Indications: Other - full anticoagulation 5/27/23  Yes Manjinder Brannon DO       Lab Results (most recent)       Procedure Component Value Units Date/Time    POC Glucose [[id number]]  (Abnormal) Collected: 09/19/23 1442    Specimen: Blood Updated: 09/19/23 1443     Glucose 118 mg/dL      Comment: Serial Number: [id number]:  [id number]             Lab Results   Component Value Date    HGBA1C 7.20 (H) 09/06/2023    HGBA1C 8.50 (H) 05/22/2023    HGBA1C 7.2 (H) 02/13/2023      Lab Results   Component Value Date    GLUCOSE 197 (H) 10/04/2023    BUN 27 (H) 10/04/2023    CREATININE 1.22 10/04/2023    EGFRIFNONA 56 (L) 01/19/2022    BCR 18.6 10/04/2023    K 4.3 10/04/2023    CO2 25.0 10/04/2023    CALCIUM 9.6 10/04/2023    ALBUMIN 4.0 10/04/2023    LABIL2 0.8 (L) 05/26/2019    AST 25 10/04/2023    ALT 26 10/04/2023    CHOL 132 09/06/2023    LDL 63 09/06/2023    HDL 36 (L) 09/06/2023    TRIG 196 (H) 09/06/2023     Lab Results   Component Value Date    TSH 1.310 09/06/2023    FREET4 1.15 09/06/2023    GPKC74HC 34.7 09/06/2023       Assessment & Plan     Diagnoses and all orders for this visit:    1. Type 2 diabetes mellitus with diabetic polyneuropathy, with long-term current use of insulin (Primary)  -     Hemoglobin A1c; Future    2. Hyperlipidemia, mixed  -     Comprehensive Metabolic Panel; Future    3. Vitamin D deficiency    Other orders  -     POC Glucose    Glucose control improved. Lipids & vit D stable.    Continue exercise as able.  Continue diabetes & chol meds & vit D supplements.  Call if blood sugars are running under 100 or over 200.        Radha Manriquez,  MD  10/04/23  23:35 EDT

## 2023-10-05 NOTE — CASE MANAGEMENT/SOCIAL WORK
Discharge Planning Assessment   [facility]     Patient Name: Chao Lazar  MRN: [id number]  Today's Date: 10/5/2023    Admit Date: 10/4/2023    Plan: [facility]   Discharge Needs Assessment       Row Name 10/05/23 1341       Living Environment    People in Home spouse    Name(s) of People in Home Debbi    Current Living Arrangements home    Potentially Unsafe Housing Conditions none    Primary Care Provided by spouse/significant other    Provides Primary Care For no one, unable/limited ability to care for self    Family Caregiver if Needed spouse    Quality of Family Relationships helpful;involved;supportive    Able to Return to Prior Arrangements yes       Resource/Environmental Concerns    Resource/Environmental Concerns none    Transportation Concerns none       Transition Planning    Patient/Family Anticipates Transition to inpatient hospice    Patient/Family Anticipated Services at Transition hospice care       Discharge Needs Assessment    Readmission Within the Last 30 Days no previous admission in last 30 days    Equipment Currently Used at Home none    Concerns to be Addressed discharge planning;care coordination/care conferences    Anticipated Changes Related to Illness none    Equipment Needed After Discharge none    Outpatient/Agency/Support Group Needs hospice facility    Discharge Facility/Level of Care Needs hospice facility    Provided Post Acute Provider List? N/A    Provided Post Acute Provider Quality & Resource List? N/A    Current Discharge Risk terminally ill                   Discharge Plan       Row Name 10/05/23 3263       Plan    Plan GIP with [facility].    Patient/Family in Agreement with Plan yes    Plan Comments Pt [facility].                  Continued Care and Services - Admitted Since 10/4/2023       Home Medical Care       Service Provider Request Status Selected Services Address Phone Fax Patient Preferred    [facility] Pending - Request  Sent N/A [address] [facility] [place] [address] [phone] --                  Expected Discharge Date and Time       Expected Discharge Date Expected Discharge Time    Oct 8, 2023            Demographic Summary       Row Name 10/05/23 1341       General Information    Admission Type inpatient    Arrived From emergency department    Referral Source admission list    Reason for Consult end of life/hospice    Preferred Language English       Contact Information    Permission Granted to Share Info With                    Functional Status       Row Name 10/05/23 1341       Functional Status    Usual Activity Tolerance poor    Current Activity Tolerance poor       Functional Status, IADL    Medications completely dependent    Meal Preparation completely dependent    Housekeeping completely dependent    Laundry completely dependent    Shopping completely dependent             Megan Naegele, RN     Office Phone: [phone]  Office Cell: [phone]

## 2023-10-05 NOTE — PROGRESS NOTES
[facility] Medicine Services   Daily Progress Note    Patient Name: Chao Lazar  : 1960  MRN: [id number]  Primary Care Physician:  Al Kimbrough MD  Date of admission: 10/4/2023  Date and Time of Service: 10/05/23  at 16:57 EDT       Subjective      Chief Complaint: Altered mental status       Brief Summary:   Chao Lazar is a 63 y.o. male with PMHx of HTN, CAD, h/o CVA on warfarin, h/o hemorrhagic stroke, HLD, Asthma who presented to [facility] on 10/4/2023 complaining of altered mental status.  Of note, due to condition, HPI obtained from available records. LwK around 8-9pm the night prior.  There were possible reports of seizure activity 2/2 non-compliance with medication.  CT head on 10/4/23 showed L>R intraventricular hemorrhage. Patient was on Warfarin, INR 2.39, this was reversed Patient evaluated by neurosurgery, offered possible EVD placement but family declined.  Patient moved to ICU and intubated for Airway protection, also started on Keppra.  Not transferred for continuous EEG per family wishes.  Family elected to make patient comfort care with [facility] consult.  Patient made comfort care and terminally extubated as per family wishes and patient known wishes.  Patient made GIP with [facility].  Hospitalist consulted for medical management.        Interval History:   10/05/23 :  Patient was seen at bedside this morning.  He is lying comfortably on the bed, does not seem to be in any distress.  There were no acute events overnight.  Family is at bedside.          Objective       Vitals:   Temp:  [98.8 °F (37.1 °C)] 98.8 °F (37.1 °C)  Flow (L/min):  [2] 2    Physical Exam  Constitutional:       Comments: Somnolent     HENT:      Head: Normocephalic.      Mouth/Throat:      Mouth: Mucous membranes are moist.   Eyes:      Pupils: Pupils are equal, round, and reactive to light.   Cardiovascular:      Rate and Rhythm: Normal rate and regular rhythm.      Pulses: Normal  pulses.      Heart sounds: Normal heart sounds.   Pulmonary:      Effort: Pulmonary effort is normal.      Breath sounds: Normal breath sounds.   Abdominal:      General: Bowel sounds are normal.      Palpations: Abdomen is soft.   Skin:     General: Skin is warm.      Capillary Refill: Capillary refill takes less than 2 seconds.   Neurological:      Comments: Sedated             Result Review:  I have personally reviewed the results from the time of this admission to 10/5/2023 16:57 EDT and agree with these findings:  [x]  Laboratory  [x]  Microbiology  []  Radiology  []  EKG/Telemetry   []  Cardiology/Vascular   []  Pathology  [x]  Old records  []  Other:  Most notable findings include:             Assessment & Plan      Brief Patient Summary:  Chao Lazar is a 63 y.o. male admitted with a diagnosis of intraventricular hemorrhage, now in comfort care and inpatient hospice on 10/4/2023.   Today is hospital day 1.       Current Medications:  glycopyrrolate, 0.4 mg, Intravenous, 4x Daily  HYDROmorphone, 1 mg, Intravenous, Q4H  LORazepam, 1 mg, Intravenous, Q6H  Scopolamine, 1 patch, Transdermal, Q72H             acetaminophen    glycopyrrolate    HYDROmorphone    LORazepam    sodium chloride    sodium chloride      Active Hospital Problems:  Active Hospital Problems    Diagnosis     **Hospice care patient        Plan:     #Intraventricular hemorrhage  #Supratherapeutic INR  #Seizure  #CAD  #h/o CVA  #Anxiety  #Depression  - Patient made comfort care due to Intraventricular hemorrhage  - terminally extubated on 10/4/2023  - Patient made GIP Hospice with [facility] as per family and patient known wishes  - Family agreeable with tonya and PRN comfort meds, non-comfort meds to be discontinued  - Dilaudid 1mg IV q4h  - Ativan 1mg IV q6h  - Dilaudid 1mg IV q15 mins PRN pain/dyspnea  - Ativan 1mg IV q15 mins PRN agitation  - Carlitoinpancho Tonya and PRN       DVT prophylaxis:  No DVT prophylaxis order currently exists.      CODE  STATUS:    Code Status (Patient has no pulse and is not breathing): No CPR (Do Not Attempt to Resuscitate)  Medical Interventions (Patient has pulse or is breathing): Comfort Measures  Comments: discussed with family at bedside        Disposition:  Hospice    Discussed the plan of care with patient's family at bedside. Discussed with the RN and  in AM rounds.       Electronically signed by Pedro Alexandre MD, 10/05/23, 16:57 EDT.  [facility]

## 2023-10-05 NOTE — PLAN OF CARE
Goal Outcome Evaluation:                   Patient resting in bed. Continues with comfort care. Family wanting medicaitons every hour. Turned patient. Will continue to monitor

## 2023-10-05 NOTE — PLAN OF CARE
Goal Outcome Evaluation:   Patient admitted from ICU, transferred to bed safely. Comfort measures observed. Suctioned secretions as needed. Kept safe and comfortable

## 2023-10-05 NOTE — CASE MANAGEMENT/SOCIAL WORK
Case Management Discharge Note      [facility]         Selected Continued Care - Discharged on 10/4/2023 Admission date: 10/4/2023 - Discharge disposition: Hospice/Medical Facility ([facility])      Destination Coordination complete.      Service Provider Selected Services Address Phone Fax Patient Preferred    [facility] [address] [facility] [place] [id number] [phone] --              Durable Medical Equipment    No services have been selected for the patient.                Dialysis/Infusion    No services have been selected for the patient.                Home Medical Care    No services have been selected for the patient.                Therapy    No services have been selected for the patient.                Community Resources    No services have been selected for the patient.                Community & DME    No services have been selected for the patient.

## 2023-10-06 NOTE — CASE MANAGEMENT/SOCIAL WORK
Case Management Discharge Note      Final Note:  as GIP with [facility].      Selected Continued Care - Discharged on 10/6/2023 Admission date: 10/4/2023 - Discharge disposition:       Home Medical Care Coordination complete.      Service Provider Selected Services Address Phone Fax Patient Preferred    [facility] [address] [facility] [place] [address] [phone] --                  Final Discharge Disposition Code: 41 -  in medical facility

## 2023-10-06 NOTE — DISCHARGE SUMMARY
[facility] Medicine Services  DEATH SUMMARY      Date of Death:  10/6/2023  Cause of Death: Intraventricular hemorrhage  Final Diagnosis: Intraventricular hemorrhage    Presenting Problem/History of Present Illness  Active Hospital Problems    Diagnosis  POA    **Hospice care patient [Z51.5]  Not Applicable      Resolved Hospital Problems   No resolved problems to display.         Hospital Course  Patient was a 63 y.o. male with past medical history of hypertension, CAD, CVA on warfarin, history of hemorrhagic stroke, hyperlipidemia, asthma presented on 10/4/2023 with altered mental status.  CT head showed L>R intraventricular hemorrhage.  He was transferred to ICU and intubated for airway protection, he also had possible seizure activity and was started on Keppra.  He was evaluated by neurosurgery and offered possible EVD but family declined and opted for comfort care.  Patient was made comfort care and terminally extubated on 10/4/2023 as per family wishes and was transferred to inpatient hospice.   Patient  on 10/6/2023 at 8:28 AM.  Family were at bedside.    Procedures Performed    10/04 1318 Note By: Candice Wells MD    Consults:   Consults       Date and Time Order Name Status Description    10/4/2023  5:49 PM Inpatient Hospitalist Consult      10/4/2023  5:57 AM Neurosurgery (on-call MD unless specified) Completed                 Pedro Alexandre MD  10/06/23  13:42 EDT

## 2023-10-06 NOTE — PLAN OF CARE
Goal Outcome Evaluation:  Plan of Care Reviewed With: patient        Progress: no change  Outcome Evaluation: Patient remains on comfort measures only. IV Robinul given every 2 hours. Oral suction provided as needed. IV Ativan and Morphine given every hour per family request. Turned and repositioned. Wife remained at bedside over night.

## 2023-10-09 LAB
BACTERIA SPEC AEROBE CULT: NORMAL
BACTERIA SPEC AEROBE CULT: NORMAL

## 2023-10-27 RX ORDER — WARFARIN SODIUM 10 MG/1
TABLET ORAL
Qty: 90 TABLET | Refills: 1 | OUTPATIENT
Start: 2023-10-27

## 2024-05-03 NOTE — ADDENDUM NOTE
Addended by: MYNOR GIRON on: 7/15/2019 01:41 PM     Modules accepted: Orders    
Detail Level: Simple

## 2024-10-11 NOTE — ED NOTES
Patient's family called EMS after patient woke up this AM with left sided facial droop, left sided weakness, and confusion. EMS reports that patient's initial BG for them was <30 and they gave oral glucose and patient's sugar stuart to 270's and his weakness improved. On ED arrival patient does have left sided facial drooping. Daughter at bedside reports that patient has some left sided deficits from a previous stroke but not as severe as this AM. Patient is disoriented to situation and date, is slow to answer questions with some mild dysarthria.     Vandana Ac, RN  09/22/21 0635    
Tech transport requested to [facility]  09/22/21 6854    
[place]
Burow's Graft Text: The defect edges were debeveled with a #15 scalpel blade.  Given the location of the defect, shape of the defect, the proximity to free margins and the presence of a standing cone deformity a Burow's skin graft was deemed most appropriate. The standing cone was removed and this tissue was then trimmed to the shape of the primary defect. The adipose tissue was also removed until only dermis and epidermis were left.  The skin margins of the secondary defect were undermined to an appropriate distance in all directions utilizing iris scissors.  The secondary defect was closed with interrupted buried subcutaneous sutures.  The skin edges were then re-apposed with running  sutures.  The skin graft was then placed in the primary defect and oriented appropriately.
